# Patient Record
Sex: FEMALE | Race: ASIAN | NOT HISPANIC OR LATINO
[De-identification: names, ages, dates, MRNs, and addresses within clinical notes are randomized per-mention and may not be internally consistent; named-entity substitution may affect disease eponyms.]

---

## 2017-01-04 ENCOUNTER — MESSAGE (OUTPATIENT)
Age: 42
End: 2017-01-04

## 2017-04-23 ENCOUNTER — RESULT REVIEW (OUTPATIENT)
Age: 42
End: 2017-04-23

## 2017-04-24 ENCOUNTER — APPOINTMENT (OUTPATIENT)
Dept: OBGYN | Facility: CLINIC | Age: 42
End: 2017-04-24

## 2017-05-19 ENCOUNTER — APPOINTMENT (OUTPATIENT)
Dept: OBGYN | Facility: CLINIC | Age: 42
End: 2017-05-19

## 2017-06-27 ENCOUNTER — APPOINTMENT (OUTPATIENT)
Dept: OBGYN | Facility: CLINIC | Age: 42
End: 2017-06-27

## 2017-10-13 ENCOUNTER — APPOINTMENT (OUTPATIENT)
Dept: OBGYN | Facility: CLINIC | Age: 42
End: 2017-10-13

## 2019-09-16 ENCOUNTER — INPATIENT (INPATIENT)
Facility: HOSPITAL | Age: 44
LOS: 3 days | Discharge: HOME | End: 2019-09-20
Attending: HOSPITALIST | Admitting: HOSPITALIST
Payer: SELF-PAY

## 2019-09-16 VITALS
DIASTOLIC BLOOD PRESSURE: 122 MMHG | TEMPERATURE: 98 F | OXYGEN SATURATION: 98 % | HEART RATE: 129 BPM | SYSTOLIC BLOOD PRESSURE: 172 MMHG | RESPIRATION RATE: 18 BRPM

## 2019-09-16 DIAGNOSIS — R50.9 FEVER, UNSPECIFIED: ICD-10-CM

## 2019-09-16 DIAGNOSIS — D61.818 OTHER PANCYTOPENIA: ICD-10-CM

## 2019-09-16 DIAGNOSIS — E87.2 ACIDOSIS: ICD-10-CM

## 2019-09-16 DIAGNOSIS — F10.239 ALCOHOL DEPENDENCE WITH WITHDRAWAL, UNSPECIFIED: ICD-10-CM

## 2019-09-16 DIAGNOSIS — T18.2XXA FOREIGN BODY IN STOMACH, INITIAL ENCOUNTER: ICD-10-CM

## 2019-09-16 DIAGNOSIS — R74.0 NONSPECIFIC ELEVATION OF LEVELS OF TRANSAMINASE AND LACTIC ACID DEHYDROGENASE [LDH]: ICD-10-CM

## 2019-09-16 DIAGNOSIS — F10.10 ALCOHOL ABUSE, UNCOMPLICATED: ICD-10-CM

## 2019-09-16 DIAGNOSIS — Z29.9 ENCOUNTER FOR PROPHYLACTIC MEASURES, UNSPECIFIED: ICD-10-CM

## 2019-09-16 LAB
ALBUMIN SERPL ELPH-MCNC: 4.6 G/DL — SIGNIFICANT CHANGE UP (ref 3.3–5)
ALP SERPL-CCNC: 94 U/L — SIGNIFICANT CHANGE UP (ref 40–120)
ALT FLD-CCNC: 94 U/L — HIGH (ref 4–33)
ANION GAP SERPL CALC-SCNC: 23 MMO/L — HIGH (ref 7–14)
ANISOCYTOSIS BLD QL: SLIGHT — SIGNIFICANT CHANGE UP
APAP SERPL-MCNC: < 15 UG/ML — LOW (ref 15–25)
APTT BLD: 30.1 SEC — SIGNIFICANT CHANGE UP (ref 27.5–36.3)
AST SERPL-CCNC: 150 U/L — HIGH (ref 4–32)
BASE EXCESS BLDV CALC-SCNC: 1 MMOL/L — SIGNIFICANT CHANGE UP
BASE EXCESS BLDV CALC-SCNC: 2.3 MMOL/L — SIGNIFICANT CHANGE UP
BASOPHILS # BLD AUTO: 0.03 K/UL — SIGNIFICANT CHANGE UP (ref 0–0.2)
BASOPHILS NFR BLD AUTO: 1 % — SIGNIFICANT CHANGE UP (ref 0–2)
BASOPHILS NFR SPEC: 1.8 % — SIGNIFICANT CHANGE UP (ref 0–2)
BILIRUB SERPL-MCNC: 0.4 MG/DL — SIGNIFICANT CHANGE UP (ref 0.2–1.2)
BLASTS # FLD: 0 % — SIGNIFICANT CHANGE UP (ref 0–0)
BLOOD GAS VENOUS - CREATININE: 0.41 MG/DL — LOW (ref 0.5–1.3)
BLOOD GAS VENOUS - CREATININE: SIGNIFICANT CHANGE UP MG/DL (ref 0.5–1.3)
BLOOD GAS VENOUS - FIO2: 21 — SIGNIFICANT CHANGE UP
BLOOD GAS VENOUS - FIO2: 21 — SIGNIFICANT CHANGE UP
BUN SERPL-MCNC: 13 MG/DL — SIGNIFICANT CHANGE UP (ref 7–23)
CALCIUM SERPL-MCNC: 8.9 MG/DL — SIGNIFICANT CHANGE UP (ref 8.4–10.5)
CHLORIDE BLDV-SCNC: 100 MMOL/L — SIGNIFICANT CHANGE UP (ref 96–108)
CHLORIDE BLDV-SCNC: 100 MMOL/L — SIGNIFICANT CHANGE UP (ref 96–108)
CHLORIDE SERPL-SCNC: 93 MMOL/L — LOW (ref 98–107)
CO2 SERPL-SCNC: 23 MMOL/L — SIGNIFICANT CHANGE UP (ref 22–31)
CREAT SERPL-MCNC: 0.52 MG/DL — SIGNIFICANT CHANGE UP (ref 0.5–1.3)
EOSINOPHIL # BLD AUTO: 0.06 K/UL — SIGNIFICANT CHANGE UP (ref 0–0.5)
EOSINOPHIL NFR BLD AUTO: 1.9 % — SIGNIFICANT CHANGE UP (ref 0–6)
EOSINOPHIL NFR FLD: 1.8 % — SIGNIFICANT CHANGE UP (ref 0–6)
ETHANOL BLD-MCNC: 221 MG/DL — HIGH
GAS PNL BLDV: 137 MMOL/L — SIGNIFICANT CHANGE UP (ref 136–146)
GAS PNL BLDV: 139 MMOL/L — SIGNIFICANT CHANGE UP (ref 136–146)
GIANT PLATELETS BLD QL SMEAR: PRESENT — SIGNIFICANT CHANGE UP
GLUCOSE BLDV-MCNC: 102 MG/DL — HIGH (ref 70–99)
GLUCOSE BLDV-MCNC: 120 MG/DL — HIGH (ref 70–99)
GLUCOSE SERPL-MCNC: 102 MG/DL — HIGH (ref 70–99)
HCG SERPL-ACNC: < 5 MIU/ML — SIGNIFICANT CHANGE UP
HCO3 BLDV-SCNC: 25 MMOL/L — SIGNIFICANT CHANGE UP (ref 20–27)
HCO3 BLDV-SCNC: 25 MMOL/L — SIGNIFICANT CHANGE UP (ref 20–27)
HCT VFR BLD CALC: 34.2 % — LOW (ref 34.5–45)
HCT VFR BLDV CALC: 25.4 % — LOW (ref 34.5–45)
HCT VFR BLDV CALC: 30 % — LOW (ref 34.5–45)
HGB BLD-MCNC: 9.4 G/DL — LOW (ref 11.5–15.5)
HGB BLDV-MCNC: 8.2 G/DL — LOW (ref 11.5–15.5)
HGB BLDV-MCNC: 9.7 G/DL — LOW (ref 11.5–15.5)
HYPOCHROMIA BLD QL: SIGNIFICANT CHANGE UP
IMM GRANULOCYTES NFR BLD AUTO: 0.6 % — SIGNIFICANT CHANGE UP (ref 0–1.5)
INR BLD: 1.03 — SIGNIFICANT CHANGE UP (ref 0.88–1.17)
LACTATE BLDV-MCNC: 5.6 MMOL/L — CRITICAL HIGH (ref 0.5–2)
LACTATE BLDV-MCNC: 6.3 MMOL/L — CRITICAL HIGH (ref 0.5–2)
LIDOCAIN IGE QN: 147.1 U/L — HIGH (ref 7–60)
LYMPHOCYTES # BLD AUTO: 0.47 K/UL — LOW (ref 1–3.3)
LYMPHOCYTES # BLD AUTO: 15 % — SIGNIFICANT CHANGE UP (ref 13–44)
LYMPHOCYTES NFR SPEC AUTO: 13.4 % — SIGNIFICANT CHANGE UP (ref 13–44)
MACROCYTES BLD QL: SLIGHT — SIGNIFICANT CHANGE UP
MAGNESIUM SERPL-MCNC: 1.5 MG/DL — LOW (ref 1.6–2.6)
MCHC RBC-ENTMCNC: 20.8 PG — LOW (ref 27–34)
MCHC RBC-ENTMCNC: 27.5 % — LOW (ref 32–36)
MCV RBC AUTO: 75.8 FL — LOW (ref 80–100)
METAMYELOCYTES # FLD: 1.8 % — HIGH (ref 0–1)
MICROCYTES BLD QL: SLIGHT — SIGNIFICANT CHANGE UP
MONOCYTES # BLD AUTO: 0.17 K/UL — SIGNIFICANT CHANGE UP (ref 0–0.9)
MONOCYTES NFR BLD AUTO: 5.4 % — SIGNIFICANT CHANGE UP (ref 2–14)
MONOCYTES NFR BLD: 3.5 % — SIGNIFICANT CHANGE UP (ref 2–9)
MYELOCYTES NFR BLD: 0 % — SIGNIFICANT CHANGE UP (ref 0–0)
NEUTROPHIL AB SER-ACNC: 77.7 % — HIGH (ref 43–77)
NEUTROPHILS # BLD AUTO: 2.39 K/UL — SIGNIFICANT CHANGE UP (ref 1.8–7.4)
NEUTROPHILS NFR BLD AUTO: 76.1 % — SIGNIFICANT CHANGE UP (ref 43–77)
NEUTS BAND # BLD: 0 % — SIGNIFICANT CHANGE UP (ref 0–6)
NRBC # FLD: 0 K/UL — SIGNIFICANT CHANGE UP (ref 0–0)
OTHER - HEMATOLOGY %: 0 — SIGNIFICANT CHANGE UP
PCO2 BLDV: 42 MMHG — SIGNIFICANT CHANGE UP (ref 41–51)
PCO2 BLDV: 44 MMHG — SIGNIFICANT CHANGE UP (ref 41–51)
PH BLDV: 7.4 PH — SIGNIFICANT CHANGE UP (ref 7.32–7.43)
PH BLDV: 7.4 PH — SIGNIFICANT CHANGE UP (ref 7.32–7.43)
PHOSPHATE SERPL-MCNC: 3.4 MG/DL — SIGNIFICANT CHANGE UP (ref 2.5–4.5)
PLATELET # BLD AUTO: 83 K/UL — LOW (ref 150–400)
PLATELET COUNT - ESTIMATE: SIGNIFICANT CHANGE UP
PMV BLD: SIGNIFICANT CHANGE UP FL (ref 7–13)
PO2 BLDV: 25 MMHG — LOW (ref 35–40)
PO2 BLDV: 40 MMHG — SIGNIFICANT CHANGE UP (ref 35–40)
POLYCHROMASIA BLD QL SMEAR: SLIGHT — SIGNIFICANT CHANGE UP
POTASSIUM BLDV-SCNC: 3.7 MMOL/L — SIGNIFICANT CHANGE UP (ref 3.4–4.5)
POTASSIUM BLDV-SCNC: 3.8 MMOL/L — SIGNIFICANT CHANGE UP (ref 3.4–4.5)
POTASSIUM SERPL-MCNC: 3.9 MMOL/L — SIGNIFICANT CHANGE UP (ref 3.5–5.3)
POTASSIUM SERPL-SCNC: 3.9 MMOL/L — SIGNIFICANT CHANGE UP (ref 3.5–5.3)
PROMYELOCYTES # FLD: 0 % — SIGNIFICANT CHANGE UP (ref 0–0)
PROT SERPL-MCNC: 8.3 G/DL — SIGNIFICANT CHANGE UP (ref 6–8.3)
PROTHROM AB SERPL-ACNC: 11.7 SEC — SIGNIFICANT CHANGE UP (ref 9.8–13.1)
RBC # BLD: 4.51 M/UL — SIGNIFICANT CHANGE UP (ref 3.8–5.2)
RBC # FLD: 20.8 % — HIGH (ref 10.3–14.5)
REVIEW TO FOLLOW: YES — SIGNIFICANT CHANGE UP
SALICYLATES SERPL-MCNC: < 5 MG/DL — LOW (ref 15–30)
SAO2 % BLDV: 31.9 % — LOW (ref 60–85)
SAO2 % BLDV: 62.8 % — SIGNIFICANT CHANGE UP (ref 60–85)
SODIUM SERPL-SCNC: 139 MMOL/L — SIGNIFICANT CHANGE UP (ref 135–145)
TSH SERPL-MCNC: 1.59 UIU/ML — SIGNIFICANT CHANGE UP (ref 0.27–4.2)
VARIANT LYMPHS # BLD: 0 % — SIGNIFICANT CHANGE UP
WBC # BLD: 3.14 K/UL — LOW (ref 3.8–10.5)
WBC # FLD AUTO: 3.14 K/UL — LOW (ref 3.8–10.5)

## 2019-09-16 PROCEDURE — 70450 CT HEAD/BRAIN W/O DYE: CPT | Mod: 26

## 2019-09-16 PROCEDURE — 99223 1ST HOSP IP/OBS HIGH 75: CPT | Mod: GC

## 2019-09-16 PROCEDURE — 72125 CT NECK SPINE W/O DYE: CPT | Mod: 26

## 2019-09-16 PROCEDURE — 70486 CT MAXILLOFACIAL W/O DYE: CPT | Mod: 26

## 2019-09-16 PROCEDURE — 99222 1ST HOSP IP/OBS MODERATE 55: CPT | Mod: GC

## 2019-09-16 PROCEDURE — 74177 CT ABD & PELVIS W/CONTRAST: CPT | Mod: 26

## 2019-09-16 PROCEDURE — 71045 X-RAY EXAM CHEST 1 VIEW: CPT | Mod: 26

## 2019-09-16 RX ORDER — MAGNESIUM SULFATE 500 MG/ML
2 VIAL (ML) INJECTION ONCE
Refills: 0 | Status: COMPLETED | OUTPATIENT
Start: 2019-09-16 | End: 2019-09-16

## 2019-09-16 RX ORDER — ONDANSETRON 8 MG/1
4 TABLET, FILM COATED ORAL ONCE
Refills: 0 | Status: COMPLETED | OUTPATIENT
Start: 2019-09-16 | End: 2019-09-16

## 2019-09-16 RX ORDER — SODIUM CHLORIDE 9 MG/ML
1000 INJECTION INTRAMUSCULAR; INTRAVENOUS; SUBCUTANEOUS ONCE
Refills: 0 | Status: COMPLETED | OUTPATIENT
Start: 2019-09-16 | End: 2019-09-16

## 2019-09-16 RX ORDER — ACETAMINOPHEN 500 MG
650 TABLET ORAL ONCE
Refills: 0 | Status: COMPLETED | OUTPATIENT
Start: 2019-09-16 | End: 2019-09-16

## 2019-09-16 RX ORDER — SODIUM CHLORIDE 9 MG/ML
1000 INJECTION, SOLUTION INTRAVENOUS
Refills: 0 | Status: DISCONTINUED | OUTPATIENT
Start: 2019-09-16 | End: 2019-09-16

## 2019-09-16 RX ORDER — SODIUM CHLORIDE 9 MG/ML
1000 INJECTION, SOLUTION INTRAVENOUS
Refills: 0 | Status: COMPLETED | OUTPATIENT
Start: 2019-09-16 | End: 2019-09-16

## 2019-09-16 RX ORDER — THIAMINE MONONITRATE (VIT B1) 100 MG
500 TABLET ORAL DAILY
Refills: 0 | Status: COMPLETED | OUTPATIENT
Start: 2019-09-16 | End: 2019-09-19

## 2019-09-16 RX ORDER — INFLUENZA VIRUS VACCINE 15; 15; 15; 15 UG/.5ML; UG/.5ML; UG/.5ML; UG/.5ML
0.5 SUSPENSION INTRAMUSCULAR ONCE
Refills: 0 | Status: DISCONTINUED | OUTPATIENT
Start: 2019-09-16 | End: 2019-09-20

## 2019-09-16 RX ORDER — FOLIC ACID 0.8 MG
1 TABLET ORAL DAILY
Refills: 0 | Status: DISCONTINUED | OUTPATIENT
Start: 2019-09-16 | End: 2019-09-20

## 2019-09-16 RX ORDER — SODIUM CHLORIDE 9 MG/ML
1000 INJECTION INTRAMUSCULAR; INTRAVENOUS; SUBCUTANEOUS
Refills: 0 | Status: DISCONTINUED | OUTPATIENT
Start: 2019-09-16 | End: 2019-09-17

## 2019-09-16 RX ORDER — THIAMINE MONONITRATE (VIT B1) 100 MG
100 TABLET ORAL DAILY
Refills: 0 | Status: DISCONTINUED | OUTPATIENT
Start: 2019-09-16 | End: 2019-09-16

## 2019-09-16 RX ADMIN — SODIUM CHLORIDE 150 MILLILITER(S): 9 INJECTION, SOLUTION INTRAVENOUS at 20:00

## 2019-09-16 RX ADMIN — ONDANSETRON 4 MILLIGRAM(S): 8 TABLET, FILM COATED ORAL at 20:36

## 2019-09-16 RX ADMIN — ONDANSETRON 4 MILLIGRAM(S): 8 TABLET, FILM COATED ORAL at 14:31

## 2019-09-16 RX ADMIN — Medication 4 MILLIGRAM(S): at 20:37

## 2019-09-16 RX ADMIN — Medication 2 MILLIGRAM(S): at 14:30

## 2019-09-16 RX ADMIN — Medication 2 MILLIGRAM(S): at 16:27

## 2019-09-16 RX ADMIN — Medication 2 MILLIGRAM(S): at 18:06

## 2019-09-16 RX ADMIN — SODIUM CHLORIDE 150 MILLILITER(S): 9 INJECTION, SOLUTION INTRAVENOUS at 16:15

## 2019-09-16 RX ADMIN — Medication 650 MILLIGRAM(S): at 14:29

## 2019-09-16 RX ADMIN — Medication 50 GRAM(S): at 20:37

## 2019-09-16 RX ADMIN — SODIUM CHLORIDE 1000 MILLILITER(S): 9 INJECTION INTRAMUSCULAR; INTRAVENOUS; SUBCUTANEOUS at 14:31

## 2019-09-16 NOTE — H&P ADULT - NSHPREVIEWOFSYSTEMS_GEN_ALL_CORE
REVIEW OF SYSTEMS:  CONSTITUTIONAL: No fever, chills, night sweats, or fatigue  EYES: No eye pain, visual disturbances, or discharge  ENT:  No difficulty hearing, tinnitus, vertigo; No sinus or throat pain  NECK: No pain or stiffness  RESPIRATORY: No cough, wheezing, or hemoptysis; No shortness of breath  CARDIOVASCULAR: No chest pain, palpitations, dizziness, or leg swelling  GASTROINTESTINAL: SEE HPI  GENITOURINARY: No dysuria, frequency or urgency.  NEUROLOGICAL: SEE HPI  SKIN: No itching, burning, rashes, or lesions   LYMPH NODES: No enlarged glands  ENDOCRINE: No hot or cold intolerance; No hair loss  MUSCULOSKELETAL: No joint pain or swelling; No muscle, back, or extremity pain  PSYCHIATRIC: SEE HPI  HEME/LYMPH: No easy bruising, or bleeding gums  ALLERGY AND IMMUNOLOGIC: No hives or eczema

## 2019-09-16 NOTE — H&P ADULT - PROBLEM SELECTOR PLAN 1
Patient admitted with . CIWA 12. s/p ativan 2 mg x2 in ED.  - will treat with librium taper  - monitor CIWA  - urine toxicology, urinalysis and lipase ordered  - continue with IV thiamine, folic acid and multivitamin  - replete electrolytes  - monitor vital signs Patient admitted with . CIWA 12. s/p ativan 2 mg x2 in ED.  - will treat with ativan taper  - monitor CIWA  - urine toxicology, urinalysis and lipase ordered  - continue with IV thiamine, folic acid and multivitamin  - replete electrolytes  - monitor vital signs

## 2019-09-16 NOTE — ED PROVIDER NOTE - OBJECTIVE STATEMENT
44F w/ pmh alcoholism, depression, prior pancreatitis, multiple hospitalizations for etoh withdrawal (last 2011?) -- brought by family for concern of withdrawal. Patient reports drank 0.5L vodka yesterday, and fell down from bed yesterday or day before, unclear about history. Patient reports generalized weakness / pain. Reports mild blurry vision, has contacts in R eye but not in left (fell out)    Family at bedside report concern that patient is unreliable historian, drinks far more than reported, and fell down 10 stairs yesterday.

## 2019-09-16 NOTE — H&P ADULT - ASSESSMENT
44 year old female with history of alcoholism, depression, and prior pancreatitis presents with alcohol withdrawal, admitted for further management.

## 2019-09-16 NOTE — ED PROVIDER NOTE - CLINICAL SUMMARY MEDICAL DECISION MAKING FREE TEXT BOX
44F w/ concern for trauma, also etoh withdrawal - will check trauma workup, give benzos/vitamins, IVF, reassess

## 2019-09-16 NOTE — ED ADULT NURSE NOTE - NSIMPLEMENTINTERV_GEN_ALL_ED
Implemented All Fall with Harm Risk Interventions:  [place] to call system. Call bell, personal items and telephone within reach. Instruct patient to call for assistance. Room bathroom lighting operational. Non-slip footwear when patient is off stretcher. Physically safe environment: no spills, clutter or unnecessary equipment. Stretcher in lowest position, wheels locked, appropriate side rails in place. Provide visual cue, wrist band, yellow gown, etc. Monitor gait and stability. Monitor for mental status changes and reorient to person, place, and time. Review medications for side effects contributing to fall risk. Reinforce activity limits and safety measures with patient and family. Provide visual clues: red socks.

## 2019-09-16 NOTE — ED ADULT NURSE NOTE - OBJECTIVE STATEMENT
pt is a 44 yr old female bib family from home s/p fall last night. pt states she is experiencing withdrawal from alcohol. pt states she drinking 0.5 L of alcohol/ vodka a day, last drink last night. Accoding to family , pt fell and hit head, remarkable swelling over the right eye noted, tenderness noted on right hip of md gallego. pt aaox4 at this times, states she is halluciating, but not hearing things. #20 g placed in right at, labs sent, elevated temp noted, septic panel sent at this time, pt unabe to void. see flow sheet for vs and ciwa. will ctm

## 2019-09-16 NOTE — H&P ADULT - PROBLEM SELECTOR PLAN 3
Lactic acid elevated at 6.3 on admission. Downtrended to 5.6 on repeat. pH 7.4.  - likely 2/2 reduced po intake vs alcoholic ketoacidosis  - continue with IV fluids  - repeat lactic acid in the AM Patient febrile to 100.6F in the ED.  - etiology unclear  - BCX and urinalysis ordered

## 2019-09-16 NOTE — H&P ADULT - NSHPLABSRESULTS_GEN_ALL_CORE
LABS:                          9.4    3.14  )-----------( 83       ( 16 Sep 2019 13:50 )             34.2     Hb Trend: 9.4<--  WBC Trend: 3.14<--  Plt Trend: 83<--          09-16    139  |  93<L>  |  13  ----------------------------<  102<H>  3.9   |  23  |  0.52    Ca    8.9      16 Sep 2019 13:50  Phos  3.4     09-16  Mg     1.5     09-16    TPro  8.3  /  Alb  4.6  /  TBili  0.4  /  DBili  x   /  AST  150<H>  /  ALT  94<H>  /  AlkPhos  94  09-16      PT/INR - ( 16 Sep 2019 13:50 )   PT: 11.7 SEC;   INR: 1.03          PTT - ( 16 Sep 2019 13:50 )  PTT:30.1 SEC    CAPILLARY BLOOD GLUCOSE              IMAGING:  < from: CT Abdomen and Pelvis w/ IV Cont (09.16.19 @ 15:51) >    EXAM:  CT ABDOMEN AND PELVIS IC      PROCEDURE DATE:  Sep 16 2019     INTERPRETATION:  CLINICAL INFORMATION: Status post fall down 10 stairs   with general abdominal pain.    < from: CT Abdomen and Pelvis w/ IV Cont (09.16.19 @ 15:51) >    IMPRESSION:     No acute traumatic injury to the abdomen and pelvis.    Radiopaque foreign bodies in the stomach.    Findings were discussed with Dr. TERESA PACE 9/16/2019 3:58 PM by Dr. Bhat with read back confirmation.    < end of copied text >      < from: CT Cervical Spine No Cont (09.16.19 @ 15:40) >    EXAM:  CT CERVICAL SPINE      EXAM:  CT MAXILLOFACIAL      EXAM:  CT BRAIN      PROCEDURE DATE:  Sep 16 2019     < end of copied text >    < from: CT Cervical Spine No Cont (09.16.19 @ 15:40) >    Impression: No evidence acute hemorrhage, mass effect in posterior   fossa or supratentorial    No maxillofacial facial fractures or dislocations are seen.    There are no acute fractures or dislocations identified.    Evaluation of the paraspinal soft tissues appears unremarkable    The visualized portion of both lung apices appear clear.    Impression: Degenerative changes involving the cervical spine without   evidence of a fracture or dislocation seen.    < end of copied text >    Chest Xray: No acute Pathology    EKG: NSR,  bpm, QTc 456 ms

## 2019-09-16 NOTE — H&P ADULT - PROBLEM SELECTOR PLAN 6
Admitted with Hgb 9.4, WBC 3.14 and Platelets 83.  - likely bone marrow suppression 2/2 alcohol abuse  - trend CBC daily  - transfuse of Hgb <7  - transfuse for platelets <10, <15 if febrile, <50 if actively bleeding Admitted with  and ALT 94.  - likely 2/2 to alcohol use  - trend liver enzymes daily

## 2019-09-16 NOTE — ED PROVIDER NOTE - PHYSICAL EXAMINATION
*GEN:   uncomfortable, full body tremors, AOx3  *EYES:   pupils equally round and reactive to light, extra-occular movements intact  *HEENT:   R periocular bruising, tongue fasciculations, airway patent, moist mucosal membranes, no chipped teeth, full ROM neck w/out midline tenderness to palpation, no galicia sign, no septal hematoma or deviation, no maxillary/mandibular mobility  *CV:   tachycardiac, regular rhythm  *RESP:   clear to auscultation bilaterally, non-labored  *ABD:   soft, tender throughout including   *:   no cva/flank tenderness  *MSK:   no pelvic mobility, no MSK tenderness or limited ROM across bilateral upper and lower extremities  *SKIN:  no other bruising besides over face  *NEURO:   full body tremors, AOx3, cranial nerves intact throughout, strength 5/5, no focal loss of sensation, no pronator drift, finger/nose normal

## 2019-09-16 NOTE — H&P ADULT - PROBLEM SELECTOR PLAN 4
Patient has been drinking 0.5 liters of vodka daily.  - Social work consult  - SBIRT Lactic acid elevated at 6.3 on admission. Downtrended to 5.6 on repeat. pH 7.4.  - likely 2/2 reduced po intake vs alcoholic ketoacidosis  - continue with IV fluids  - repeat lactic acid in the AM

## 2019-09-16 NOTE — CONSULT NOTE ADULT - ASSESSMENT
44 year old female with medical history of alcohol abuse, depression, pancreatitis in past, multiple hospitalizations for etoh withdrawal and alcohol intoxication in past, brought by family for concern of withdrawal and after she fell down.  CT scan of abdomen and pelvis was done as part of trauma screening where she was found to have 2 radiopaque foreign bodies in the stomach measuring 3.2 x 2.5 cm and 0.9 x 0.7 cm.  GI consult was called for possible EGD and to retrieve these foreign bodies.  Patient had chicken noddles and soup earlier like 4 pm.  Imaging findings, anesthesia risks with general sedation, risks and benefits of performing EGD urgently now to remove foreign bodies vs holding off EGD and leaving these foreign bodies in the stomach with the possibility of them moving down to small bowel and causing small bowel perforation and inability to remove them discussed in details with the patient, her fiance and her sister at the bedside.  Patient, her fiance and sister all decided that they want to hold off EGD for now until she is more stable from her alcohol intoxication and withdrawal.    Impression:  # Foreign bodies in stomach DDx looks like sharp objects - unclear of the nature of these FBs since patient denied swallowing any.  # Alcohol abuse / withdrawal    Recommendations:  - Keep NPO for now  - Daily abdominal x ray  - Will follow   - Alcohol withdrawal management and CIWA protocol as per primary team 44 year old female with medical history of alcohol abuse, depression, pancreatitis in past, multiple hospitalizations for etoh withdrawal and alcohol intoxication in past, brought by family for concern of withdrawal and after she fell down.  CT scan of abdomen and pelvis was done as part of trauma screening where she was found to have 2 radiopaque foreign bodies in the stomach measuring 3.2 x 2.5 cm and 0.9 x 0.7 cm.  GI consult was called for possible EGD and to retrieve these foreign bodies.  Patient had chicken noddles and soup earlier like 4 pm.  Imaging findings, anesthesia risks with general sedation, risks and benefits of performing EGD urgently now to remove foreign bodies vs holding off EGD and leaving these foreign bodies in the stomach with the possibility of them moving down to small bowel and causing small bowel perforation and inability to remove them discussed in details with the patient, her fiance and her sister at the bedside.  Patient, her fiance and sister all decided that they want to hold off EGD for now until she is more stable from her alcohol intoxication and withdrawal.    Impression:  # Foreign bodies in stomach on CT A/P. DDx looks like sharp objects - unclear of the nature of these FBs since patient denied swallowing any.  # Alcohol abuse / withdrawal  # Steatosis    Recommendations:  - Keep NPO for now  - Daily abdominal x ray  - Will follow   - Alcohol withdrawal management and CIWA protocol as per primary team 44 year old female with medical history of alcohol abuse, depression, pancreatitis in past, multiple hospitalizations for etoh withdrawal and alcohol intoxication in past, brought by family for concern of withdrawal and after she fell down.  CT scan of abdomen and pelvis was done as part of trauma screening where she was found to have 2 radiopaque foreign bodies in the stomach measuring 3.2 x 2.5 cm and 0.9 x 0.7 cm.  GI consult was called for possible EGD and to retrieve these foreign bodies.  Patient had chicken noddles and soup earlier like 4 pm.  Imaging findings, anesthesia risks with general sedation, risks and benefits of performing EGD urgently now to remove foreign bodies vs holding off EGD and leaving these foreign bodies in the stomach with the possibility of them moving down to small bowel and causing small bowel perforation and inability to remove them discussed in details with the patient, her fiance and her sister at the bedside.  Patient, her fiance and sister all decided that they want to hold off EGD for now until she is more stable from her alcohol intoxication and withdrawal.    Impression:  # Foreign bodies in stomach on CT A/P. DDx looks like sharp objects - unclear of the nature of these FBs since patient denied swallowing any.  # Alcohol abuse / withdrawal  # Steatosis    Recommendations:  - Patient was offered EGD for possible removal of foreign bodies. Risks of procedure (aspiration, bleeding, perforation, anesthesia complications) and risks of waiting (migration of object into small bowel and perforation of small bowel) were discussed.  At this time, the patient, fiance, and sister would prefer to hold off on endoscopic evaluation given active withdrawal and aspiration risk.  - Keep NPO for now  - Daily abdominal X ray  - Will follow   - Alcohol withdrawal management and CIWA protocol as per primary team

## 2019-09-16 NOTE — H&P ADULT - PROBLEM SELECTOR PLAN 5
Admitted with  and ALT 94.  - likely 2/2 to alcohol use  - trend liver enzymes daily Patient has been drinking 0.5 liters of vodka daily.  - Social work consult  - SBIRT

## 2019-09-16 NOTE — H&P ADULT - NSHPPHYSICALEXAM_GEN_ALL_CORE
Vital Signs Last 24 Hrs  T(C): 36.9 (16 Sep 2019 16:59), Max: 38.1 (16 Sep 2019 13:52)  T(F): 98.4 (16 Sep 2019 16:59), Max: 100.6 (16 Sep 2019 13:52)  HR: 106 (16 Sep 2019 16:59) (106 - 129)  BP: 159/112 (16 Sep 2019 16:59) (159/112 - 172/122)  BP(mean): --  RR: 18 (16 Sep 2019 16:59) (18 - 18)  SpO2: 100% (16 Sep 2019 16:59) (98% - 100%)    General: Uncomfortable, in acute distress, anxious  Head: Right eye contusion  Eyes: PERRLA, EOMI, normal sclera  Throat: Moist mucous membranes, no tongue fasciculation  Respiratory: CTAB anteriorly, normal respiratory effort, no wheezes, crackles, rales  CV: Tachycardic, regular rhythm, S1/S2, no murmurs, rubs or gallops  Abdominal: Soft, bowel sounds present, right sided tenderness to palpation diffusely, ND  Extremities: No edema, 2+ DP pulses, bilateral hand tremors  Neurological: A&Ox4, MAEx4, non-focal  Skin: Bruise on right forehead

## 2019-09-16 NOTE — H&P ADULT - PROBLEM SELECTOR PLAN 2
CT abdomen and pelvis with two radiopaque foreign bodies 3.2 x 2.5 cm and 0.9 x 0.7 cm.   - patient does not remember possible episodes of ingestion  - GI consult  - keep NPO  - continue with IV fluids CT abdomen and pelvis with two radiopaque foreign bodies 3.2 x 2.5 cm and 0.9 x 0.7 cm.   - patient does not remember possible episodes of ingestion  - GI following; daily abdominal xrays for now  - keep NPO  - continue with IV fluids

## 2019-09-16 NOTE — ED PROVIDER NOTE - ATTENDING CONTRIBUTION TO CARE
Dr. Amaro:  I have personally performed a face to face bedside history and physical examination of this patient. I have discussed the history, examination, review of systems, assessment and plan of management with the resident. I have reviewed the electronic medical record and amended it to reflect my history, review of systems, physical exam, assessment and plan.    44f h/o ETOH abuse, depression, presents with possible ETOH withdrawal.  Last drink as yesterday.  Had  a fall down stairs, unclear if yesterday or day prior, with head trauma.  Noted to have Tm 100.6 orally in ED.  Pt feels anxious and shaky, denies other symptoms.    Exam:  - +mild tremors and tongue fasciculations  - tachy, regular  - ctab  - +contusion to right eye  - abd soft ntnd    A/P  # fall with head trauma, eval [facility]  - CT head and CT max/face  # ETOH withdrawal  - tox labs, Ativan, reassess, CIWA  # fever, eval infectious source  - cbc, cmp, ua, urine culture, ucg, cxr

## 2019-09-16 NOTE — H&P ADULT - HISTORY OF PRESENT ILLNESS
44 year old female with history of alcoholism, depression, and prior pancreatitis presents with alcohol withdrawal. The patient drinks 1/2 liter of vodka daily. Yesterday she was drinking vodka as she normally does and fell off the bed and hit her head on the table. She got up by herself, but cannot remember the following events which led to her falling down a flight of stairs. The patient reports that she was sober for nine years and started drinking again three months ago after losing her job. She was fired after getting into an argument with someone at work. She reports that she has blacked out on prior episodes after drinking. She has also had seizures in the past after drinking. She endorses anxiety, headache, nausea, abdominal pain, and tremors. She denies auditory or visual hallucinations, suicidal ideation, vision changes, vomiting, chest pain, or shortness of breath.    In the ED, vital signs were Tmax 100.6F, -129, -172/118-122 mm Hg, RR 18, 100% RA.  She received acetaminophen 650 mg x1, ativan 2 mg x2, zofran 4 mg x1, 1L NS bolus x1, IV thiamine, multivitamin and folic acid.

## 2019-09-16 NOTE — ED ADULT TRIAGE NOTE - CHIEF COMPLAINT QUOTE
p/t with hx ETOH c/o of feeling very anxious and shaky last alcohol intake last night, p/t had a seizure last night fell down 10 steps, bruising to rt head and rt eye noted,

## 2019-09-16 NOTE — CONSULT NOTE ADULT - SUBJECTIVE AND OBJECTIVE BOX
Chief Complaint:  Fall    HPI:  44 year old female with medical history of alcohol abuse, depression, pancreatitis in past, multiple hospitalizations for etoh withdrawal and alcohol intoxication in past, brought by family for concern of withdrawal. Patient reports drank 0.5L vodka yesterday and fell down from bed   yesterday or day before, unclear about history. Family at bedside report concern that patient is unreliable historian, drinks far more than reported, and fell down 10 stairs yesterday.  Patient complains of right side abdominal pain, nausea but no vomiting.  She denies swallowing foreign objects like ear rings or any other metallic objects.  No other GI symptoms.    Allergies:  No Known Allergies      Home Medications:    Hospital Medications:  LORazepam   Injectable 2 milliGRAM(s) IV Push once  multivitamin/thiamine/folic acid in sodium chloride 0.9% 1000 milliLiter(s) IV Continuous <Continuous>  ondansetron Injectable 4 milliGRAM(s) IV Push once      PMHX/PSHX:  Depression  ETOH Abuse  S/P Tonsillectomy      Family history:  No pertinent family history in first degree relatives      Social History: no smoking    ROS: Patient is intoxicated and unreliable       PHYSICAL EXAM:   GENERAL:  NAD, Appears stated age  HEENT:  NC/AT,  conjunctivae clear and pink, sclera -anicteric  CHEST:  CTA B/L, Normal effort  HEART:  RRR S1/S2, No murmurs  ABDOMEN:  Soft, RUQ tenderness, non-distended, normoactive bowel sounds,  no masses   EXTREMITIES:  No cyanosis or Edema  SKIN:  Warm & Dry. No rash or erythema  NEURO:  Alert, oriented    Vital Signs:  Vital Signs Last 24 Hrs  T(C): 36.9 (16 Sep 2019 16:59), Max: 38.1 (16 Sep 2019 13:52)  T(F): 98.4 (16 Sep 2019 16:59), Max: 100.6 (16 Sep 2019 13:52)  HR: 106 (16 Sep 2019 16:59) (106 - 129)  BP: 159/112 (16 Sep 2019 16:59) (159/112 - 172/122)  BP(mean): --  RR: 18 (16 Sep 2019 16:59) (18 - 18)  SpO2: 100% (16 Sep 2019 16:59) (98% - 100%)  Daily     Daily     LABS:                        9.4    3.14  )-----------( 83       ( 16 Sep 2019 13:50 )             34.2     Mean Cell Volume: 75.8 fL (09-16-19 @ 13:50)    09-16    139  |  93<L>  |  13  ----------------------------<  102<H>  3.9   |  23  |  0.52    Ca    8.9      16 Sep 2019 13:50  Phos  3.4     09-16  Mg     1.5     09-16    TPro  8.3  /  Alb  4.6  /  TBili  0.4  /  DBili  x   /  AST  150<H>  /  ALT  94<H>  /  AlkPhos  94  09-16    LIVER FUNCTIONS - ( 16 Sep 2019 13:50 )  Alb: 4.6 g/dL / Pro: 8.3 g/dL / ALK PHOS: 94 u/L / ALT: 94 u/L / AST: 150 u/L / GGT: x           PT/INR - ( 16 Sep 2019 13:50 )   PT: 11.7 SEC;   INR: 1.03          PTT - ( 16 Sep 2019 13:50 )  PTT:30.1 SEC    Amylase Serum--      Lipase [id number]       Ammonia--                          9.4    3.14  )-----------( 83       ( 16 Sep 2019 13:50 )             34.2     Imaging: Chief Complaint:  Fall    HPI:  44 year old female with medical history of alcohol abuse, depression, pancreatitis in past, multiple hospitalizations for etoh withdrawal and alcohol intoxication in past, brought by family for concern of withdrawal. Patient reports drank 0.5L vodka yesterday and fell down from bed   yesterday or day before, unclear about history. Family at bedside report concern that patient is unreliable historian, drinks far more than reported, and fell down 10 stairs yesterday.  Patient complains of right side abdominal pain, nausea but no vomiting.  She denies swallowing foreign objects like earrings or any other metallic objects.  No other GI symptoms.  CT A/P revealed 2 metallic objects in the stomach.    Allergies:  No Known Allergies    Hospital Medications:  LORazepam   Injectable 2 milliGRAM(s) IV Push once  multivitamin/thiamine/folic acid in sodium chloride 0.9% 1000 milliLiter(s) IV Continuous <Continuous>  ondansetron Injectable 4 milliGRAM(s) IV Push once      PMHX/PSHX:  Depression  ETOH Abuse  S/P Tonsillectomy      Family history:  No pertinent family history in first degree relatives      Social History: + alcohol abuse, no smoking or illicit drugs    ROS:   Patient is intoxicated and unreliable.  Limited ROS significant for anxiety and abdominal pain.      PHYSICAL EXAM:   GENERAL:  NAD, Appears stated age  HEENT:  NC/AT,  conjunctivae clear and pink, sclera -anicteric  NECK: supple  CHEST:  CTA B/L, Normal effort  HEART:  RRR S1/S2, No murmurs  ABDOMEN:  Soft, RUQ tenderness, non-distended, normoactive bowel sounds,  no masses   EXTREMITIES:  No cyanosis or Edema  SKIN:  Warm & Dry. No rash or erythema  NEURO:  Alert, oriented, tremulous  PSYCH: Anxious    Vital Signs:  Vital Signs Last 24 Hrs  T(C): 36.9 (16 Sep 2019 16:59), Max: 38.1 (16 Sep 2019 13:52)  T(F): 98.4 (16 Sep 2019 16:59), Max: 100.6 (16 Sep 2019 13:52)  HR: 106 (16 Sep 2019 16:59) (106 - 129)  BP: 159/112 (16 Sep 2019 16:59) (159/112 - 172/122)  BP(mean): --  RR: 18 (16 Sep 2019 16:59) (18 - 18)  SpO2: 100% (16 Sep 2019 16:59) (98% - 100%)    LABS:                        9.4    3.14  )-----------( 83       ( 16 Sep 2019 13:50 )             34.2     Mean Cell Volume: 75.8 fL (09-16-19 @ 13:50)    09-16    139  |  93<L>  |  13  ----------------------------<  102<H>  3.9   |  23  |  0.52    Ca    8.9      16 Sep 2019 13:50  Phos  3.4     09-16  Mg     1.5     09-16    TPro  8.3  /  Alb  4.6  /  TBili  0.4  /  DBili  x   /  AST  150<H>  /  ALT  94<H>  /  AlkPhos  94  09-16    LIVER FUNCTIONS - ( 16 Sep 2019 13:50 )  Alb: 4.6 g/dL / Pro: 8.3 g/dL / ALK PHOS: 94 u/L / ALT: 94 u/L / AST: 150 u/L / GGT: x           PT/INR - ( 16 Sep 2019 13:50 )   PT: 11.7 SEC;   INR: 1.03          PTT - ( 16 Sep 2019 13:50 )  PTT:30.1 SEC    Amylase Serum--      Lipase [id number]       Ammonia--                          9.4    3.14  )-----------( 83       ( 16 Sep 2019 13:50 )             34.2     Imaging:

## 2019-09-17 ENCOUNTER — TRANSCRIPTION ENCOUNTER (OUTPATIENT)
Age: 44
End: 2019-09-17

## 2019-09-17 LAB
ALBUMIN SERPL ELPH-MCNC: 3.6 G/DL — SIGNIFICANT CHANGE UP (ref 3.3–5)
ALP SERPL-CCNC: 70 U/L — SIGNIFICANT CHANGE UP (ref 40–120)
ALT FLD-CCNC: 61 U/L — HIGH (ref 4–33)
ANION GAP SERPL CALC-SCNC: 16 MMO/L — HIGH (ref 7–14)
AST SERPL-CCNC: 80 U/L — HIGH (ref 4–32)
BASOPHILS # BLD AUTO: 0.04 K/UL — SIGNIFICANT CHANGE UP (ref 0–0.2)
BASOPHILS NFR BLD AUTO: 1 % — SIGNIFICANT CHANGE UP (ref 0–2)
BILIRUB SERPL-MCNC: 0.6 MG/DL — SIGNIFICANT CHANGE UP (ref 0.2–1.2)
BUN SERPL-MCNC: 10 MG/DL — SIGNIFICANT CHANGE UP (ref 7–23)
CALCIUM SERPL-MCNC: 7.9 MG/DL — LOW (ref 8.4–10.5)
CHLORIDE SERPL-SCNC: 96 MMOL/L — LOW (ref 98–107)
CO2 SERPL-SCNC: 24 MMOL/L — SIGNIFICANT CHANGE UP (ref 22–31)
CREAT SERPL-MCNC: 0.53 MG/DL — SIGNIFICANT CHANGE UP (ref 0.5–1.3)
EOSINOPHIL # BLD AUTO: 0.12 K/UL — SIGNIFICANT CHANGE UP (ref 0–0.5)
EOSINOPHIL NFR BLD AUTO: 3 % — SIGNIFICANT CHANGE UP (ref 0–6)
FERRITIN SERPL-MCNC: 54.08 NG/ML — SIGNIFICANT CHANGE UP (ref 15–150)
FOLATE SERPL-MCNC: 15.3 NG/ML — SIGNIFICANT CHANGE UP (ref 4.7–20)
GLUCOSE SERPL-MCNC: 94 MG/DL — SIGNIFICANT CHANGE UP (ref 70–99)
HBV CORE AB SER-ACNC: NONREACTIVE — SIGNIFICANT CHANGE UP
HBV CORE IGM SER-ACNC: NONREACTIVE — SIGNIFICANT CHANGE UP
HBV SURFACE AB SER-ACNC: REACTIVE — SIGNIFICANT CHANGE UP
HBV SURFACE AG SER-ACNC: NEGATIVE — SIGNIFICANT CHANGE UP
HCT VFR BLD CALC: 25.8 % — LOW (ref 34.5–45)
HCT VFR BLD CALC: 28.3 % — LOW (ref 34.5–45)
HCV AB S/CO SERPL IA: 0.28 S/CO — SIGNIFICANT CHANGE UP (ref 0–0.99)
HCV AB SERPL-IMP: SIGNIFICANT CHANGE UP
HGB BLD-MCNC: 7.3 G/DL — LOW (ref 11.5–15.5)
HGB BLD-MCNC: 7.7 G/DL — LOW (ref 11.5–15.5)
IMM GRANULOCYTES NFR BLD AUTO: 0.8 % — SIGNIFICANT CHANGE UP (ref 0–1.5)
IRON SATN MFR SERPL: 316 UG/DL — SIGNIFICANT CHANGE UP (ref 140–530)
IRON SATN MFR SERPL: 48 UG/DL — SIGNIFICANT CHANGE UP (ref 30–160)
LACTATE SERPL-SCNC: 0.7 MMOL/L — SIGNIFICANT CHANGE UP (ref 0.5–2)
LYMPHOCYTES # BLD AUTO: 0.53 K/UL — LOW (ref 1–3.3)
LYMPHOCYTES # BLD AUTO: 13.3 % — SIGNIFICANT CHANGE UP (ref 13–44)
MAGNESIUM SERPL-MCNC: 1.7 MG/DL — SIGNIFICANT CHANGE UP (ref 1.6–2.6)
MCHC RBC-ENTMCNC: 20.8 PG — LOW (ref 27–34)
MCHC RBC-ENTMCNC: 21.2 PG — LOW (ref 27–34)
MCHC RBC-ENTMCNC: 27.2 % — LOW (ref 32–36)
MCHC RBC-ENTMCNC: 28.3 % — LOW (ref 32–36)
MCV RBC AUTO: 75 FL — LOW (ref 80–100)
MCV RBC AUTO: 76.3 FL — LOW (ref 80–100)
MONOCYTES # BLD AUTO: 0.3 K/UL — SIGNIFICANT CHANGE UP (ref 0–0.9)
MONOCYTES NFR BLD AUTO: 7.5 % — SIGNIFICANT CHANGE UP (ref 2–14)
NEUTROPHILS # BLD AUTO: 2.98 K/UL — SIGNIFICANT CHANGE UP (ref 1.8–7.4)
NEUTROPHILS NFR BLD AUTO: 74.4 % — SIGNIFICANT CHANGE UP (ref 43–77)
NRBC # FLD: 0 K/UL — SIGNIFICANT CHANGE UP (ref 0–0)
NRBC # FLD: 0 K/UL — SIGNIFICANT CHANGE UP (ref 0–0)
PHOSPHATE SERPL-MCNC: 2.1 MG/DL — LOW (ref 2.5–4.5)
PLATELET # BLD AUTO: 81 K/UL — LOW (ref 150–400)
PLATELET # BLD AUTO: 81 K/UL — LOW (ref 150–400)
PMV BLD: SIGNIFICANT CHANGE UP FL (ref 7–13)
PMV BLD: SIGNIFICANT CHANGE UP FL (ref 7–13)
POTASSIUM SERPL-MCNC: 3.6 MMOL/L — SIGNIFICANT CHANGE UP (ref 3.5–5.3)
POTASSIUM SERPL-SCNC: 3.6 MMOL/L — SIGNIFICANT CHANGE UP (ref 3.5–5.3)
PROT SERPL-MCNC: 6.7 G/DL — SIGNIFICANT CHANGE UP (ref 6–8.3)
RBC # BLD: 3.44 M/UL — LOW (ref 3.8–5.2)
RBC # BLD: 3.71 M/UL — LOW (ref 3.8–5.2)
RBC # FLD: 20.3 % — HIGH (ref 10.3–14.5)
RBC # FLD: 20.4 % — HIGH (ref 10.3–14.5)
RETICS #: 81 K/UL — SIGNIFICANT CHANGE UP (ref 25–125)
RETICS/RBC NFR: 2.2 % — SIGNIFICANT CHANGE UP (ref 0.5–2.5)
SODIUM SERPL-SCNC: 136 MMOL/L — SIGNIFICANT CHANGE UP (ref 135–145)
SPECIMEN SOURCE: SIGNIFICANT CHANGE UP
SPECIMEN SOURCE: SIGNIFICANT CHANGE UP
UIBC SERPL-MCNC: 268.4 UG/DL — SIGNIFICANT CHANGE UP (ref 110–370)
VIT B12 SERPL-MCNC: 921 PG/ML — HIGH (ref 200–900)
WBC # BLD: 4 K/UL — SIGNIFICANT CHANGE UP (ref 3.8–10.5)
WBC # BLD: 4.94 K/UL — SIGNIFICANT CHANGE UP (ref 3.8–10.5)
WBC # FLD AUTO: 4 K/UL — SIGNIFICANT CHANGE UP (ref 3.8–10.5)
WBC # FLD AUTO: 4.94 K/UL — SIGNIFICANT CHANGE UP (ref 3.8–10.5)

## 2019-09-17 PROCEDURE — 74019 RADEX ABDOMEN 2 VIEWS: CPT | Mod: 26

## 2019-09-17 PROCEDURE — 99233 SBSQ HOSP IP/OBS HIGH 50: CPT | Mod: GC

## 2019-09-17 PROCEDURE — 99232 SBSQ HOSP IP/OBS MODERATE 35: CPT | Mod: GC

## 2019-09-17 RX ORDER — SODIUM CHLORIDE 9 MG/ML
1000 INJECTION INTRAMUSCULAR; INTRAVENOUS; SUBCUTANEOUS
Refills: 0 | Status: DISCONTINUED | OUTPATIENT
Start: 2019-09-17 | End: 2019-09-19

## 2019-09-17 RX ORDER — MAGNESIUM SULFATE 500 MG/ML
1 VIAL (ML) INJECTION ONCE
Refills: 0 | Status: COMPLETED | OUTPATIENT
Start: 2019-09-17 | End: 2019-09-17

## 2019-09-17 RX ORDER — SODIUM,POTASSIUM PHOSPHATES 278-250MG
1 POWDER IN PACKET (EA) ORAL ONCE
Refills: 0 | Status: COMPLETED | OUTPATIENT
Start: 2019-09-17 | End: 2019-09-17

## 2019-09-17 RX ADMIN — Medication 4 MILLIGRAM(S): at 04:34

## 2019-09-17 RX ADMIN — SODIUM CHLORIDE 75 MILLILITER(S): 9 INJECTION INTRAMUSCULAR; INTRAVENOUS; SUBCUTANEOUS at 16:13

## 2019-09-17 RX ADMIN — Medication 4 MILLIGRAM(S): at 00:26

## 2019-09-17 RX ADMIN — Medication 4 MILLIGRAM(S): at 16:21

## 2019-09-17 RX ADMIN — Medication 100 GRAM(S): at 12:45

## 2019-09-17 RX ADMIN — Medication 1 TABLET(S): at 16:20

## 2019-09-17 RX ADMIN — Medication 4 MILLIGRAM(S): at 08:34

## 2019-09-17 RX ADMIN — Medication 1 PACKET(S): at 12:45

## 2019-09-17 RX ADMIN — Medication 3 MILLIGRAM(S): at 20:26

## 2019-09-17 RX ADMIN — SODIUM CHLORIDE 75 MILLILITER(S): 9 INJECTION INTRAMUSCULAR; INTRAVENOUS; SUBCUTANEOUS at 02:40

## 2019-09-17 RX ADMIN — Medication 4 MILLIGRAM(S): at 12:44

## 2019-09-17 RX ADMIN — Medication 105 MILLIGRAM(S): at 14:43

## 2019-09-17 RX ADMIN — Medication 1 MILLIGRAM(S): at 12:45

## 2019-09-17 NOTE — PROGRESS NOTE ADULT - ATTENDING COMMENTS
Patient seen and examined by myself , case discussed  with resident ,agree with the above finding and plan  pt was tearful during my exam, concerned about the bills she will receive from the hospital as she has no insurance   pt denies headache, dizziness, SOB, CP, Palpitations , N/V/D, abdominal pain  as per her HCP, she was very tender in RUQ yesterday, but minimal tenderness on exam today   CIWA score 7-9, will c/w ativan taper , CIWA protocol   foreign body in GI tract : GI eval appreciated , C/W NPO for now, IVF, will f/u with GI for endoscopic removal vs serial imaging   elevated lactate resolved, will continue to monitor   pt refusing psych eval at this time for possible depression and anxiety  SW eval

## 2019-09-17 NOTE — PROGRESS NOTE ADULT - ASSESSMENT
Impression:  # Foreign bodies in stomach on CT A/P. DDx looks like sharp objects - unclear of the nature of these FBs since patient denied swallowing any.  # Alcohol abuse / withdrawal  # Steatosis Impression:  # Foreign bodies in stomach on CT A/P. DDx looks like sharp objects - unclear of the nature of these FBs since patient denied swallowing any.  # Alcohol abuse / withdrawal  # Steatosis    Recommendations:  . Start clear liquid diet  - NPO after midnight   - Daily abdominal X ray  - Will follow   - Alcohol withdrawal management and CIWA protocol as per primary team Impression:  # Foreign bodies in stomach on CT A/P. XRay with appearance of earrings currently in stomach and small bowel  # Alcohol abuse / withdrawal  # Steatosis    Recommendations:  - Start clear liquid diet  - NPO after midnight   - Daily abdominal X ray  - Will follow- patient offered endoscopic evaluation but concerned about anesthesia risks in setting of withdrawal  - Alcohol withdrawal management and CIWA protocol as per primary team

## 2019-09-17 NOTE — MEDICAL STUDENT PROGRESS NOTE(EDUCATION) - SUBJECTIVE AND OBJECTIVE BOX
AYLEEN DEY  44y  Female    Patient is a 44y old  Female who presents with a chief complaint of Alcohol Withdrawal (17 Sep 2019 09:21)      INTERVAL HPI/OVERNIGHT EVENTS: Pt seen and examined. No acute overnight events. Pt reports constant nausea and dry heaving, tremor, mild anxitey, and restlessness. She also reports pins and needles sensation in both upper extremities and in her feet. Pt denies auditory hallucinations. She mentioned that she was "seeing bridges that she had never seen before" but description of her visions did not seem like visual hallucinations. Pt also reports moderate headache on right upper forehead, and in the back of her head.  Pt reports abdominal pain.   Pt reports that she was drinking two days ago, fell and hit her head, then fell down the stairs, after which her fiance and her sister-in-law brought her to the hospital. She has been drinking 0.5L vodka daily for the past 3 months. Prior to this she was sober for 9 years, but relapsed as she lost her job 3 months ago. Pt reports that during her previous period of drinking (9 years ago), she had seizures and delirium tremens. Her last hospitalization was also 9 years ago, for drinking too much. Pt says that she usually drinks to the point where she passes out, and occasionally takes ativan to "detox herself". Pt denies depressed mood, SI/HI.    REVIEW OF SYSTEMS:  CONSTITUTIONAL: No fever, weight loss, or fatigue  EYES: No eye pain, or discharge; Reports seeing things that she has never seen before.  ENMT:  No difficulty hearing, tinnitus, vertigo; No sinus or throat pain  NECK: No pain or stiffness  BREASTS: No pain, masses, or nipple discharge  RESPIRATORY: No cough, wheezing, chills or hemoptysis; No shortness of breath  CARDIOVASCULAR: No chest pain, palpitations, dizziness, or leg swelling  GASTROINTESTINAL: Endorses abdominal pain, along with constant nausea. No diarrhea or constipation. No melena or hematochezia.  GENITOURINARY: No dysuria, frequency, hematuria, or incontinence  NEUROLOGICAL: No memory loss, loss of strength, numbness; Endorses moderate headache, and constant tremors in upper extremities b/l  SKIN: No itching, burning, rashes, or lesions   LYMPH NODES: No enlarged glands  ENDOCRINE: No heat or cold intolerance; No hair loss  MUSCULOSKELETAL: No joint pain or swelling; No muscle, back, or extremity pain  PSYCHIATRIC: No depression, anxiety, mood swings; Pt reports difficulty sleeping last night due to tremors  HEME/LYMPH: No easy bruising, or bleeding gums  ALLERY AND IMMUNOLOGIC: No hives or eczema    T(C): 36.8 (09-17-19 @ 08:30), Max: 38.1 (09-16-19 @ 13:52)  HR: 103 (09-17-19 @ 08:30) (98 - 129)  BP: 141/93 (09-17-19 @ 08:30) (131/80 - 172/122)  RR: 19 (09-17-19 @ 08:30) (18 - 20)  SpO2: 100% (09-17-19 @ 08:30) (98% - 100%)  Wt(kg): --Vital Signs Last 24 Hrs  T(C): 36.8 (17 Sep 2019 08:30), Max: 38.1 (16 Sep 2019 13:52)  T(F): 98.3 (17 Sep 2019 08:30), Max: 100.6 (16 Sep 2019 13:52)  HR: 103 (17 Sep 2019 08:30) (98 - 129)  BP: 141/93 (17 Sep 2019 08:30) (131/80 - 172/122)  BP(mean): --  RR: 19 (17 Sep 2019 08:30) (18 - 20)  SpO2: 100% (17 Sep 2019 08:30) (98% - 100%)    PHYSICAL EXAM:  GENERAL: NAD, disheveled  HEAD:  Atraumatic, Normocephalic  EYES: EOMI, PERRLA, conjunctiva and sclera clear  NERVOUS SYSTEM:  Alert & Oriented X3, Good concentration; B/l upper extremity tremors, at rest and extended  CHEST/LUNG: Clear to percussion bilaterally; No rales, rhonchi, wheezing, or rubs  HEART: Tachycardic; No murmurs, rubs, or gallops  ABDOMEN: Soft, tender to palpation on RUQ and LUQ, Nondistended; Bowel sounds present  EXTREMITIES:  2+ Peripheral Pulses, No clubbing, cyanosis, or edema  LYMPH: No lymphadenopathy noted  SKIN: No rashes or lesions    Consultant(s) Notes Reviewed:  [x ] YES  [ ] NO  Care Discussed with Consultants/Other Providers [ x] YES  [ ] NO    LABS:                        7.3    4.00  )-----------( 81       ( 17 Sep 2019 05:36 )             25.8     09-17    136  |  96<L>  |  10  ----------------------------<  94  3.6   |  24  |  0.53    Ca    7.9<L>      17 Sep 2019 05:36  Phos  2.1     09-17  Mg     1.7     09-17    TPro  6.7  /  Alb  3.6  /  TBili  0.6  /  DBili  x   /  AST  80<H>  /  ALT  61<H>  /  AlkPhos  70  09-17    PT/INR - ( 16 Sep 2019 13:50 )   PT: 11.7 SEC;   INR: 1.03          PTT - ( 16 Sep 2019 13:50 )  PTT:30.1 SEC    CAPILLARY BLOOD GLUCOSE                RADIOLOGY & ADDITIONAL TESTS:    CT Abdomen and Pelvis w/ IV Cont:   EXAM:  CT ABDOMEN AND PELVIS IC    PROCEDURE DATE:  Sep 16 2019   INTERPRETATION:  CLINICAL INFORMATION: Status post fall down 10 stairs   with general abdominal pain.  COMPARISON: CT abdomen and pelvis 5/19/2011.  PROCEDURE:   CT of the Abdomen and Pelvis was performed with intravenous contrast.   Arterial and Portal Venous phases were acquired.  Intravenous contrast: 90 ml Omnipaque 350. 10 ml discarded.  Oral contrast: None.  Sagittal and coronal reformats were performed.  FINDINGS:  LOWER CHEST: Within normal limits.  LIVER: Steatosis.  BILE DUCTS: Normal caliber.  GALLBLADDER: Within normal limits.  SPLEEN: Within normal limits.  PANCREAS: Within normal limits.  ADRENALS: Within normal limits.  KIDNEYS/URETERS: Within normal limits.  BLADDER: Within normal limits.  REPRODUCTIVE ORGANS: Intrauterine device is low lying in the lower   uterine segment and cervix.  BOWEL: Small hiatal hernia. There are 2 radiopaque foreign bodies in the   stomach measuring 3.2 x2.5 cm and 0.9 x 0.7 cm. No bowel obstruction.   Appendix is not visualized. No evidence of inflammation in the pericecal   region.  PERITONEUM: No ascites.  VESSELS: Within normal limits.  RETROPERITONEUM/LYMPH NODES: No lymphadenopathy.    ABDOMINAL WALL: Within normal limits.  BONES: Degenerative changes at L5-S1.  IMPRESSION:   No acute traumatic injury to the abdomen and pelvis.  Radiopaque foreign bodies in the stomach.  Findings were discussed with Dr. TERESA PACE 9/16/2019 3:58 PM by Dr. Lara with read back confirmation.  FARZAD LARA M.D., RADIOLOGY RESIDENT  This document has been electronically signed.  TERI ZEPEDA M.D., ATTENDING RADIOLOGIST  This document has been electronically signed. Sep 16 2019  4:16PM      CT Head No Cont:   EXAM:  CT CERVICAL SPINE    EXAM:  CT MAXILLOFACIAL    EXAM:  CT BRAIN    PROCEDURE DATE:  Sep 16 2019   INTERPRETATION:  Clinical indication: Status post fall.  Multiple axial sections were performed from base of skull to vertex for   contrast enhancement. Thin axial sections were performed through the   maxillofacial region. Coronal and sagittal reconstructions were performed   as well.  The lateral ventricles have a normal configuration.  There is no evidence of acute hemorrhage mass or mass effect in the   posterior fossa or supratentorial region.  Evaluation of the osseous structures with the appropriate window appears   unremarkable  Right frontal extracalvarial soft tissue swelling/hematoma is seen.  No acute fractures or dislocations are seen in the maxillofacial region.  There is small air-fluid level seen involving left sphenoid sinus with   associated frothy secretions.  Both mastoid and middle ear regions appear clear.  Impression: No evidenceacute hemorrhage, mass mass effect in posterior   fossa or supratentorial  No maxillofacial facial fractures or dislocations are seen.  Multiple axial sections were performed through the cervical spine.   Coronal and sagittal reconstructions wereperformed as well.  Loss of the normal cervical lordosis is seen which could be due to   positioning or muscle spasm.  Disc space narrowing endplate sclerotic change and osteophytes are seen   involving C3-4 C4-5 C5-6 C6-7 levels. These findings are secondary to   degenerative change.  Bilateral hypertrophic facet changes are seen at multiple levels are   secondary to degenerative changes  There are no acute fractures or dislocations identified.  Evaluation of the paraspinal soft tissues appears unremarkable  The visualized portion of both lung apices appear clear.  Impression: Degenerative changes involving the cervical spine without   evidence of a fracture or dislocation seen.  JUSTYNA PHILLIP M.D., ATTENDING RADIOLOGIST  This document has been electronically signed. Sep 16 2019  3:54PM      Imaging Personally Reviewed:  [ ] YES  [ ] NO    HEALTH ISSUES - PROBLEM Dx:  Fever, unspecified fever cause: Fever, unspecified fever cause  Need for prophylactic measure: Need for prophylactic measure  Pancytopenia: Pancytopenia  Transaminitis: Transaminitis  ETOH Abuse: ETOH Abuse  Lactic acidemia: Lactic acidemia  Foreign body in stomach, initial encounter: Foreign body in stomach, initial encounter  Alcohol withdrawal syndrome with complication: Alcohol withdrawal syndrome with complication AYLEEN DEY  44y  Female    Patient is a 44y old  Female who presents with a chief complaint of Alcohol Withdrawal (17 Sep 2019 09:21)      INTERVAL HPI/OVERNIGHT EVENTS: Pt seen and examined. No acute overnight events. Pt reports constant nausea and dry heaving, tremor, mild anxitey, and restlessness. She also reports pins and needles sensation in both upper extremities and in her feet. Pt denies auditory hallucinations. She mentioned that she was "seeing bridges that she had never seen before" but description of her visions did not seem like visual hallucinations. Pt also reports moderate headache on right upper forehead, and in the back of her head.  Pt reports abdominal pain. Pt reports that last drink was in the evening on 9/15, about 36 hours ago.  Pt reports that she was drinking two days ago, fell and hit her head, then fell down the stairs, after which her fiance and her sister-in-law brought her to the hospital. She has been drinking 0.5L vodka daily for the past 3 months. Prior to this she was sober for 9 years, but relapsed as she lost her job 3 months ago. Pt reports that during her previous period of drinking (9 years ago), she had seizures and delirium tremens. Her last hospitalization was also 9 years ago, for drinking too much. Pt says that she usually drinks to the point where she passes out, and occasionally takes ativan to "detox herself". Pt denies depressed mood, SI/HI.    REVIEW OF SYSTEMS:  CONSTITUTIONAL: No fever, weight loss, or fatigue  EYES: No eye pain, or discharge; Reports seeing things that she has never seen before.  ENMT:  No difficulty hearing, tinnitus, vertigo; No sinus or throat pain  NECK: No pain or stiffness  BREASTS: No pain, masses, or nipple discharge  RESPIRATORY: No cough, wheezing, chills or hemoptysis; No shortness of breath  CARDIOVASCULAR: No chest pain, palpitations, dizziness, or leg swelling  GASTROINTESTINAL: Endorses abdominal pain, along with constant nausea. No diarrhea or constipation. No melena or hematochezia.  GENITOURINARY: No dysuria, frequency, hematuria, or incontinence  NEUROLOGICAL: No memory loss, loss of strength, numbness; Endorses moderate headache, and constant tremors in upper extremities b/l  SKIN: No itching, burning, rashes, or lesions   LYMPH NODES: No enlarged glands  ENDOCRINE: No heat or cold intolerance; No hair loss  MUSCULOSKELETAL: No joint pain or swelling; No muscle, back, or extremity pain  PSYCHIATRIC: No depression, anxiety, mood swings; Pt reports difficulty sleeping last night due to tremors  HEME/LYMPH: No easy bruising, or bleeding gums  ALLERY AND IMMUNOLOGIC: No hives or eczema    T(C): 36.8 (09-17-19 @ 08:30), Max: 38.1 (09-16-19 @ 13:52)  HR: 103 (09-17-19 @ 08:30) (98 - 129)  BP: 141/93 (09-17-19 @ 08:30) (131/80 - 172/122)  RR: 19 (09-17-19 @ 08:30) (18 - 20)  SpO2: 100% (09-17-19 @ 08:30) (98% - 100%)  Wt(kg): --Vital Signs Last 24 Hrs  T(C): 36.8 (17 Sep 2019 08:30), Max: 38.1 (16 Sep 2019 13:52)  T(F): 98.3 (17 Sep 2019 08:30), Max: 100.6 (16 Sep 2019 13:52)  HR: 103 (17 Sep 2019 08:30) (98 - 129)  BP: 141/93 (17 Sep 2019 08:30) (131/80 - 172/122)  BP(mean): --  RR: 19 (17 Sep 2019 08:30) (18 - 20)  SpO2: 100% (17 Sep 2019 08:30) (98% - 100%)    PHYSICAL EXAM:  GENERAL: NAD, disheveled  HEAD:  Atraumatic, Normocephalic  EYES: EOMI, PERRLA, conjunctiva and sclera clear  NERVOUS SYSTEM:  Alert & Oriented X3, Good concentration; B/l upper extremity tremors, at rest and extended  CHEST/LUNG: Clear to percussion bilaterally; No rales, rhonchi, wheezing, or rubs  HEART: Tachycardic; No murmurs, rubs, or gallops  ABDOMEN: Soft, tender to palpation on RUQ and LUQ, Nondistended; Bowel sounds present  EXTREMITIES:  2+ Peripheral Pulses, No clubbing, cyanosis, or edema  LYMPH: No lymphadenopathy noted  SKIN: No rashes or lesions    Consultant(s) Notes Reviewed:  [x ] YES  [ ] NO  Care Discussed with Consultants/Other Providers [ x] YES  [ ] NO    LABS:                        7.3    4.00  )-----------( 81       ( 17 Sep 2019 05:36 )             25.8     09-17    136  |  96<L>  |  10  ----------------------------<  94  3.6   |  24  |  0.53    Ca    7.9<L>      17 Sep 2019 05:36  Phos  2.1     09-17  Mg     1.7     09-17    TPro  6.7  /  Alb  3.6  /  TBili  0.6  /  DBili  x   /  AST  80<H>  /  ALT  61<H>  /  AlkPhos  70  09-17    PT/INR - ( 16 Sep 2019 13:50 )   PT: 11.7 SEC;   INR: 1.03          PTT - ( 16 Sep 2019 13:50 )  PTT:30.1 SEC    CAPILLARY BLOOD GLUCOSE                RADIOLOGY & ADDITIONAL TESTS:    CT Abdomen and Pelvis w/ IV Cont:   EXAM:  CT ABDOMEN AND PELVIS IC    PROCEDURE DATE:  Sep 16 2019   INTERPRETATION:  CLINICAL INFORMATION: Status post fall down 10 stairs   with general abdominal pain.  COMPARISON: CT abdomen and pelvis 5/19/2011.  PROCEDURE:   CT of the Abdomen and Pelvis was performed with intravenous contrast.   Arterial and Portal Venous phases were acquired.  Intravenous contrast: 90 ml Omnipaque 350. 10 ml discarded.  Oral contrast: None.  Sagittal and coronal reformats were performed.  FINDINGS:  LOWER CHEST: Within normal limits.  LIVER: Steatosis.  BILE DUCTS: Normal caliber.  GALLBLADDER: Within normal limits.  SPLEEN: Within normal limits.  PANCREAS: Within normal limits.  ADRENALS: Within normal limits.  KIDNEYS/URETERS: Within normal limits.  BLADDER: Within normal limits.  REPRODUCTIVE ORGANS: Intrauterine device is low lying in the lower   uterine segment and cervix.  BOWEL: Small hiatal hernia. There are 2 radiopaque foreign bodies in the   stomach measuring 3.2 x2.5 cm and 0.9 x 0.7 cm. No bowel obstruction.   Appendix is not visualized. No evidence of inflammation in the pericecal   region.  PERITONEUM: No ascites.  VESSELS: Within normal limits.  RETROPERITONEUM/LYMPH NODES: No lymphadenopathy.    ABDOMINAL WALL: Within normal limits.  BONES: Degenerative changes at L5-S1.  IMPRESSION:   No acute traumatic injury to the abdomen and pelvis.  Radiopaque foreign bodies in the stomach.  Findings were discussed with Dr. TEERSA PACE 9/16/2019 3:58 PM by Dr. Lara with read back confirmation.  FARZAD LARA M.D., RADIOLOGY RESIDENT  This document has been electronically signed.  TERI ZEPEDA M.D., ATTENDING RADIOLOGIST  This document has been electronically signed. Sep 16 2019  4:16PM      CT Head No Cont:   EXAM:  CT CERVICAL SPINE    EXAM:  CT MAXILLOFACIAL    EXAM:  CT BRAIN    PROCEDURE DATE:  Sep 16 2019   INTERPRETATION:  Clinical indication: Status post fall.  Multiple axial sections were performed from base of skull to vertex for   contrast enhancement. Thin axial sections were performed through the   maxillofacial region. Coronal and sagittal reconstructions were performed   as well.  The lateral ventricles have a normal configuration.  There is no evidence of acute hemorrhage mass or mass effect in the   posterior fossa or supratentorial region.  Evaluation of the osseous structures with the appropriate window appears   unremarkable  Right frontal extracalvarial soft tissue swelling/hematoma is seen.  No acute fractures or dislocations are seen in the maxillofacial region.  There is small air-fluid level seen involving left sphenoid sinus with   associated frothy secretions.  Both mastoid and middle ear regions appear clear.  Impression: No evidenceacute hemorrhage, mass mass effect in posterior   fossa or supratentorial  No maxillofacial facial fractures or dislocations are seen.  Multiple axial sections were performed through the cervical spine.   Coronal and sagittal reconstructions wereperformed as well.  Loss of the normal cervical lordosis is seen which could be due to   positioning or muscle spasm.  Disc space narrowing endplate sclerotic change and osteophytes are seen   involving C3-4 C4-5 C5-6 C6-7 levels. These findings are secondary to   degenerative change.  Bilateral hypertrophic facet changes are seen at multiple levels are   secondary to degenerative changes  There are no acute fractures or dislocations identified.  Evaluation of the paraspinal soft tissues appears unremarkable  The visualized portion of both lung apices appear clear.  Impression: Degenerative changes involving the cervical spine without   evidence of a fracture or dislocation seen.  JUSTYNA PHILLIP M.D., ATTENDING RADIOLOGIST  This document has been electronically signed. Sep 16 2019  3:54PM      Imaging Personally Reviewed:  [ ] YES  [ ] NO    HEALTH ISSUES - PROBLEM Dx:  Fever, unspecified fever cause: Fever, unspecified fever cause  Need for prophylactic measure: Need for prophylactic measure  Pancytopenia: Pancytopenia  Transaminitis: Transaminitis  ETOH Abuse: ETOH Abuse  Lactic acidemia: Lactic acidemia  Foreign body in stomach, initial encounter: Foreign body in stomach, initial encounter  Alcohol withdrawal syndrome with complication: Alcohol withdrawal syndrome with complication

## 2019-09-17 NOTE — DISCHARGE NOTE PROVIDER - HOSPITAL COURSE
Patient is a 44 y.o female with EtOH abuse, alcoholic pancreatitis, and depression who presented to the hospital with symptoms of alcohol withdrawal.     Alcohol history:     Patient began to heavily consume alcohol about 17 years ago for about 8 years. Though she could not quantify how much she drank per day at that time, she states that she drank less at that time than she does now. She also endorsed that she was hospitalized for alcohol withdrawal approximately once per month. She also has a history of DTs as well as grand mal seizures from alcohol withdrawal. She was sober for 9 years and started to consume alcohol about 3 months ago. At this time she drinks half of a liter of vodka per day and usually drinks until she "passes out". She denies DTs, seizures, and hospitalizations during these three months. She states that she started to drink again secondary to losing her job but would not delve further into the details. She experiences tremors of her hands and general anxiety when she stops drinking. Pt denies suicidal and homicidal ideation, she denies thoughts of harming herself or others. She does endorse depression and anxiety at baseline but declined to converse about that.        HPI:    Pt presented to the hospital for alcohol withdrawal as well as fall and questionable loss of consciousness. As per patient, her last drink was on 09/15/19 at night and she drank until she passed out. Though she could not recall the entire situation, she states that she tripped over something, fell and hit her head. Additionally she also fell down the stairs but could not recall the events and none of these falls were witnessed. She believes that she "woke up" after 1 minute and was able to call her sister in law and fiance who brought her into the hospital. In the ED she endorsed some abdominal pain, nausea and vomiting, and tremors but denied hallucinations (both visual and auditory) f/c, CP, SOB, urinary changes and suicidal ideation. Her CIWA in the ED was 8.  Vitals were significant for fever of 100.6 (which resolved with Tylenol) and tachycardia to the 120s. She was given Ativan 2mg x2, Zofran 4mg x1, thiamine, folic acid and multivitamin. She also received some imaging, including a chest x-ray which was significant for radioopaque object. CT head which showed no acute hemorrhage or shift, and CT cervical spine (which showed no acute pathology) and, CT abdomen and pelvis which redemonstrated the radioopaque object. Labs were significant for normal lipase  level of 147. The patient was admitted to the floors at this point for EtOH withdrawal management as well as foreign body ingestion. Patient was started on an ativan taper.         At this point GI was consulted, and they recommended serial abdominal x rays daily to see the progression of the object through the bowel. Patient was kept NPO for possible endoscopy, but upon discussion between GI and anesthesia, the anesthesiologist was uncomfortable taking the patient who was actively withdrawing and therefore their plan was to monitor BM as well as serial abdominal films. We discussed these findings with the patient and she did not recall this occurring. Patient is a 44 y.o female with EtOH abuse, alcoholic pancreatitis, and depression who presented to the hospital with symptoms of alcohol withdrawal.     Alcohol history:     Patient began to heavily consume alcohol about 17 years ago for about 8 years. Though she could not quantify how much she drank per day at that time, she states that she drank less at that time than she does now. She also endorsed that she was hospitalized for alcohol withdrawal approximately once per month. She also has a history of DTs as well as grand mal seizures from alcohol withdrawal. She was sober for 9 years and started to consume alcohol about 3 months ago. At this time she drinks half of a liter of vodka per day and usually drinks until she "passes out". She denies DTs, seizures, and hospitalizations during these three months. She states that she started to drink again secondary to losing her job but would not delve further into the details. She experiences tremors of her hands and general anxiety when she stops drinking. Pt denies suicidal and homicidal ideation, she denies thoughts of harming herself or others. She does endorse depression and anxiety at baseline but declined to converse about that.        HPI:    Pt presented to the hospital for alcohol withdrawal as well as fall and questionable loss of consciousness. As per patient, her last drink was on 09/15/19 at night and she drank until she passed out. Though she could not recall the entire situation, she states that she tripped over something, fell and hit her head. Additionally she also fell down the stairs but could not recall the events and none of these falls were witnessed. She believes that she "woke up" after 1 minute and was able to call her sister in law and fiance who brought her into the hospital. In the ED she endorsed some abdominal pain, nausea and vomiting, and tremors but denied hallucinations (both visual and auditory) f/c, CP, SOB, urinary changes and suicidal ideation. Her CIWA in the ED was 8.  Vitals were significant for fever of 100.6 (which resolved with Tylenol) and tachycardia to the 120s. She was given Ativan 2mg x2, Zofran 4mg x1, thiamine, folic acid and multivitamin. She also received some imaging, including a chest x-ray which was significant for radioopaque object. CT head which showed no acute hemorrhage or shift, and CT cervical spine (which showed no acute pathology) and, CT abdomen and pelvis which redemonstrated the radioopaque object. Labs were significant for normal lipase  level of 147. The patient was admitted to the floors at this point for EtOH withdrawal management as well as foreign body ingestion. Patient was started on an ativan taper.         Patient did well on ativan taper. GI conducted endoscopy and removed the first part of the earring (larger part) without issue. No esophageal pathology found. Second part of the earring (smaller part) remains in the ascending colon. Pt refusing serial abdominal xrays secondary to radiation exposure and cost. She denies f/c, n/v, chest pain, sob, headaches, tremors and feels at baseline. She is tolerating a PO diet, ambulating independently and having normal BM. She is ready for safe medical discharge. Patient is a 44 y.o female with EtOH abuse, alcoholic pancreatitis, and depression who presented to the hospital with symptoms of alcohol withdrawal.     Alcohol history:     Patient began to heavily consume alcohol about 17 years ago for about 8 years. Though she could not quantify how much she drank per day at that time, she states that she drank less at that time than she does now. She also endorsed that she was hospitalized for alcohol withdrawal approximately once per month. She also has a history of DTs as well as grand mal seizures from alcohol withdrawal. She was sober for 9 years and started to consume alcohol about 3 months ago. At this time she drinks half of a liter of vodka per day and usually drinks until she "passes out". She denies DTs, seizures, and hospitalizations during these three months. She states that she started to drink again secondary to losing her job but would not delve further into the details. She experiences tremors of her hands and general anxiety when she stops drinking. Pt denies suicidal and homicidal ideation, she denies thoughts of harming herself or others. She does endorse depression and anxiety at baseline but declined to converse about that.        HPI:    Pt presented to the hospital for alcohol withdrawal as well as fall and questionable loss of consciousness. As per patient, her last drink was on 09/15/19 at night and she drank until she passed out. Though she could not recall the entire situation, she states that she tripped over something, fell and hit her head. Additionally she also fell down the stairs but could not recall the events and none of these falls were witnessed. She believes that she "woke up" after 1 minute and was able to call her sister in law and fiance who brought her into the hospital. In the ED she endorsed some abdominal pain, nausea and vomiting, and tremors but denied hallucinations (both visual and auditory) f/c, CP, SOB, urinary changes and suicidal ideation. Her CIWA in the ED was 8.  Vitals were significant for fever of 100.6 (which resolved with Tylenol) and tachycardia to the 120s. She was given Ativan 2mg x2, Zofran 4mg x1, thiamine, folic acid and multivitamin. She also received some imaging, including a chest x-ray which was significant for radioopaque object. CT head which showed no acute hemorrhage or shift, and CT cervical spine (which showed no acute pathology) and, CT abdomen and pelvis which redemonstrated the radioopaque object. Labs were significant for normal lipase  level of 147. The patient was admitted to the floors at this point for EtOH withdrawal management as well as foreign body ingestion. Patient was started on an ativan taper.         Patient did well on ativan taper. GI conducted endoscopy and removed the first part of the earring (larger part) without issue. No esophageal pathology found. Second part of the earring (smaller part) remains in the ascending colon. Pt refusing serial abdominal xrays secondary to radiation exposure and cost. She denies f/c, n/v, chest pain, sob, headaches, tremors and feels at baseline. She is tolerating a PO diet, ambulating independently and having normal BM. She is ready for safe medical discharge. Please follow up with LFTs and CBC as an outpatient

## 2019-09-17 NOTE — DISCHARGE NOTE PROVIDER - NSDCCPTREATMENT_GEN_ALL_CORE_FT
PRINCIPAL PROCEDURE  Procedure: Abdomen CT  Findings and Treatment: FINDINGS:  LOWER CHEST: Within normal limits.  LIVER: Steatosis.  BILE DUCTS: Normal caliber.  GALLBLADDER: Within normal limits.  SPLEEN: Within normal limits.  PANCREAS: Within normal limits.  ADRENALS: Within normal limits.  KIDNEYS/URETERS: Within normal limits.  BLADDER: Within normal limits.  REPRODUCTIVE ORGANS: Intrauterine device is low lying in the lower   uterine segment and cervix.  BOWEL: Small hiatal hernia. There are 2 radiopaque foreign bodies in the   stomach measuring 3.2 x 2.5 cm and 0.9 x 0.7 cm. No bowel obstruction.   Appendix is not visualized. No evidence of inflammation in the pericecal   region.  PERITONEUM: No ascites.  VESSELS: Within normal limits.  RETROPERITONEUM/LYMPH NODES: No lymphadenopathy.    ABDOMINAL WALL: Within normal limits.  BONES: Degenerative changes at L5-S1.  IMPRESSION:   No acute traumatic injury to the abdomen and pelvis.  Radiopaque foreign bodies in the stomach.      SECONDARY PROCEDURE  Procedure: CT cervical spine  Findings and Treatment: NTERPRETATION:  Clinical indication: Status post fall.  Multiple axial sections were performed from base of skull to vertex for   contrast enhancement. Thin axial sections were performed through the   maxillofacial region. Coronal and sagittal reconstructions were performed   as well.  The lateral ventricles have a normal configuration.  There is no evidence of acute hemorrhage mass or mass effect in the   posterior fossa or supratentorial region.  Evaluation of the osseous structures with the appropriate window appears   unremarkable  Right frontal extracalvarial soft tissue swelling/hematoma is seen.  No acute fractures or dislocations are seen in the maxillofacial region.  There is small air-fluid level seen involving left sphenoid sinus with   associated frothy secretions.  Both mastoid and middle ear regions appear clear.  Impression: No evidence acute hemorrhage, mass mass effect in posterior   fossa or supratentorial  No maxillofacial facial fractures or dislocations are seen.  Multiple axial sections were performed through the cervical spine.   Coronal and sagittal reconstructions were performed as well.  Loss of the normal cervical lordosis is seen which could be due to   positioning or muscle spasm.  Disc space narrowing endplate sclerotic change and osteophytes are seen   involving C3-4 C4-5 C5-6 C6-7 levels. These findings are secondary to   degenerative change.  Bilateral hypertrophic facet changes are seen at multiple levels are   secondary to degenerative changes  There are no acute fractures or dislocations identified.  Evaluation of the paraspinal soft tissues appears unremarkable  The visualized portion of both lung apices appear clear.  Impression: Degenerative changes involving the cervical spine without   evidence of a fracture or dislocation seen.      Procedure: UGI endoscopy  Findings and Treatment: Findings:       The examined esophagus was normal.       An earring was found in the gastric body. A few erosions were seen in        the stomach, likely from the earring. Removal was accomplished using a        foreign body encarnacion with a Murray net. Post removal, the esophagus was        normal without any visible tears.       The examined duodenum up to the 2nd part was normal.                                                                                   Impression:          - An earring was found in the stomach. Removal was                        successful.  Recommendation:      - [facility].                       - Advance diet as tolerated.

## 2019-09-17 NOTE — DISCHARGE NOTE PROVIDER - NSFOLLOWUPCLINICS_GEN_ALL_ED_FT
Alcoholics Anonymous - 24 hour hotline  Alcoholics Anonymous  .  [place]   Phone: [phone]  Fax:   Follow Up Time: Alcoholics Anonymous - 24 hour hotline  Alcoholics Anonymous  .  [place]   Phone: [phone]  Fax:   Follow Up Time:

## 2019-09-17 NOTE — PROGRESS NOTE ADULT - SUBJECTIVE AND OBJECTIVE BOX
Interval Events:     Allergies:  No Known Allergies      Hospital Medications:  folic acid 1 milliGRAM(s) Oral daily  influenza   Vaccine 0.5 milliLiter(s) IntraMuscular once  LORazepam   Injectable 4 milliGRAM(s) IV Push once  LORazepam   Injectable   IV Push   LORazepam   Injectable 4 milliGRAM(s) IV Push every 4 hours  LORazepam   Injectable 3 milliGRAM(s) IV Push every 4 hours  multivitamin  Chewable 1 Tablet(s) Oral daily  sodium chloride 0.9%. 1000 milliLiter(s) IV Continuous <Continuous>  thiamine IVPB 500 milliGRAM(s) IV Intermittent daily      PMHX/PSHX:  Depression  ETOH Abuse  S/P Tonsillectomy      ROS:   General:  No fevers, chills or night sweats  ENT:  No sore throat or dysphagia  CV:  No pain or palpitations  Resp:  No dyspnea, cough or  wheezing  GI:  as above  Skin:  No rash or edema    PHYSICAL EXAM:   Vital Signs:  Vital Signs Last 24 Hrs  T(C): 37.1 (17 Sep 2019 06:32), Max: 38.1 (16 Sep 2019 13:52)  T(F): 98.8 (17 Sep 2019 06:32), Max: 100.6 (16 Sep 2019 13:52)  HR: 102 (17 Sep 2019 06:32) (98 - 129)  BP: 131/82 (17 Sep 2019 06:32) (131/80 - 172/122)  BP(mean): --  RR: 20 (17 Sep 2019 06:32) (18 - 20)  SpO2: 100% (17 Sep 2019 06:32) (98% - 100%)  Daily Height in cm: 170.18 (16 Sep 2019 20:00)    Daily     PHYSICAL EXAM:   GENERAL:  bruises face and around her eyes,  HEENT:  NC/AT,  conjunctivae clear and pink, sclera -anicteric  NECK: supple  CHEST:  CTA B/L, Normal effort  HEART:  RRR S1/S2, No murmurs  ABDOMEN:  Soft, RUQ tenderness, non-distended, normoactive bowel sounds,  no masses   EXTREMITIES:  No cyanosis or Edema  SKIN:  Warm & Dry. No rash or erythema  NEURO:  Alert, oriented, tremulous  PSYCH: Anxious    LABS:                        9.4    3.14  )-----------( 83       ( 16 Sep 2019 13:50 )             34.2     Mean Cell Volume: 75.8 fL (09-16-19 @ 13:50)    09-16    139  |  93<L>  |  13  ----------------------------<  102<H>  3.9   |  23  |  0.52    Ca    8.9      16 Sep 2019 13:50  Phos  3.4     09-16  Mg     1.5     09-16    TPro  8.3  /  Alb  4.6  /  TBili  0.4  /  DBili  x   /  AST  150<H>  /  ALT  94<H>  /  AlkPhos  94  09-16    LIVER FUNCTIONS - ( 16 Sep 2019 13:50 )  Alb: 4.6 g/dL / Pro: 8.3 g/dL / ALK PHOS: 94 u/L / ALT: 94 u/L / AST: 150 u/L / GGT: x           PT/INR - ( 16 Sep 2019 13:50 )   PT: 11.7 SEC;   INR: 1.03          PTT - ( 16 Sep 2019 13:50 )  PTT:30.1 SEC    Amylase Serum--      Lipase [id number]       Ammonia--                          9.4    3.14  )-----------( 83       ( 16 Sep 2019 13:50 )             34.2       Imaging: Interval Events:   Feels nausea but no vomiting  No abdominal pain.  But still shaking and having tremors    Allergies:  No Known Allergies      Hospital Medications:  folic acid 1 milliGRAM(s) Oral daily  influenza   Vaccine 0.5 milliLiter(s) IntraMuscular once  LORazepam   Injectable 4 milliGRAM(s) IV Push once  LORazepam   Injectable   IV Push   LORazepam   Injectable 4 milliGRAM(s) IV Push every 4 hours  LORazepam   Injectable 3 milliGRAM(s) IV Push every 4 hours  multivitamin  Chewable 1 Tablet(s) Oral daily  sodium chloride 0.9%. 1000 milliLiter(s) IV Continuous <Continuous>  thiamine IVPB 500 milliGRAM(s) IV Intermittent daily      PMHX/PSHX:  Depression  ETOH Abuse  S/P Tonsillectomy      ROS:   General:  No fevers, chills or night sweats  ENT:  No sore throat or dysphagia  CV:  No pain or palpitations  Resp:  No dyspnea, cough or  wheezing  GI:  as above  Skin:  No rash or edema    PHYSICAL EXAM:   Vital Signs:  Vital Signs Last 24 Hrs  T(C): 37.1 (17 Sep 2019 06:32), Max: 38.1 (16 Sep 2019 13:52)  T(F): 98.8 (17 Sep 2019 06:32), Max: 100.6 (16 Sep 2019 13:52)  HR: 102 (17 Sep 2019 06:32) (98 - 129)  BP: 131/82 (17 Sep 2019 06:32) (131/80 - 172/122)  BP(mean): --  RR: 20 (17 Sep 2019 06:32) (18 - 20)  SpO2: 100% (17 Sep 2019 06:32) (98% - 100%)  Daily Height in cm: 170.18 (16 Sep 2019 20:00)    Daily     PHYSICAL EXAM:   GENERAL:  bruises face and around her eyes,  HEENT:  NC/AT,  conjunctivae clear and pink, sclera -anicteric  NECK: supple  CHEST:  CTA B/L, Normal effort  HEART:  RRR S1/S2, No murmurs  ABDOMEN:  Soft, RUQ tenderness, non-distended, normoactive bowel sounds,  no masses   EXTREMITIES:  No cyanosis or Edema  SKIN:  Warm & Dry. No rash or erythema  NEURO:  Alert, oriented, tremulous  PSYCH: Anxious    LABS:                        9.4    3.14  )-----------( 83       ( 16 Sep 2019 13:50 )             34.2     Mean Cell Volume: 75.8 fL (09-16-19 @ 13:50)    09-16    139  |  93<L>  |  13  ----------------------------<  102<H>  3.9   |  23  |  0.52    Ca    8.9      16 Sep 2019 13:50  Phos  3.4     09-16  Mg     1.5     09-16    TPro  8.3  /  Alb  4.6  /  TBili  0.4  /  DBili  x   /  AST  150<H>  /  ALT  94<H>  /  AlkPhos  94  09-16    LIVER FUNCTIONS - ( 16 Sep 2019 13:50 )  Alb: 4.6 g/dL / Pro: 8.3 g/dL / ALK PHOS: 94 u/L / ALT: 94 u/L / AST: 150 u/L / GGT: x           PT/INR - ( 16 Sep 2019 13:50 )   PT: 11.7 SEC;   INR: 1.03          PTT - ( 16 Sep 2019 13:50 )  PTT:30.1 SEC    Amylase Serum--      Lipase [id number]       Ammonia--                          9.4    3.14  )-----------( 83       ( 16 Sep 2019 13:50 )             34.2       Imaging: Interval Events:   Feels nausea but no vomiting  No abdominal pain.  But still shaking and having tremors    Allergies:  No Known Allergies      Hospital Medications:  folic acid 1 milliGRAM(s) Oral daily  influenza   Vaccine 0.5 milliLiter(s) IntraMuscular once  LORazepam   Injectable 4 milliGRAM(s) IV Push once  LORazepam   Injectable   IV Push   LORazepam   Injectable 4 milliGRAM(s) IV Push every 4 hours  LORazepam   Injectable 3 milliGRAM(s) IV Push every 4 hours  multivitamin  Chewable 1 Tablet(s) Oral daily  sodium chloride 0.9%. 1000 milliLiter(s) IV Continuous <Continuous>  thiamine IVPB 500 milliGRAM(s) IV Intermittent daily      PMHX/PSHX:  Depression  ETOH Abuse  S/P Tonsillectomy      ROS:   General:  No fevers, chills or night sweats  ENT:  No sore throat or dysphagia  CV:  No pain or palpitations  Resp:  No dyspnea, cough or  wheezing  GI:  as above  Skin:  No rash or edema    PHYSICAL EXAM:   Vital Signs:  Vital Signs Last 24 Hrs  T(C): 37.1 (17 Sep 2019 06:32), Max: 38.1 (16 Sep 2019 13:52)  T(F): 98.8 (17 Sep 2019 06:32), Max: 100.6 (16 Sep 2019 13:52)  HR: 102 (17 Sep 2019 06:32) (98 - 129)  BP: 131/82 (17 Sep 2019 06:32) (131/80 - 172/122)  BP(mean): --  RR: 20 (17 Sep 2019 06:32) (18 - 20)  SpO2: 100% (17 Sep 2019 06:32) (98% - 100%)  Daily Height in cm: 170.18 (16 Sep 2019 20:00)    Daily     PHYSICAL EXAM:   GENERAL:  bruises face and around her eyes,  HEENT:  NC/AT,  conjunctivae clear and pink, sclera -anicteric  NECK: supple  CHEST:  CTA B/L, Normal effort  HEART:  RRR S1/S2, No murmurs  ABDOMEN:  Soft, RUQ tenderness, non-distended, normoactive bowel sounds,  no masses   EXTREMITIES:  No cyanosis or Edema  SKIN:  Warm & Dry. No rash or erythema  NEURO:  Alert, oriented, tremulous  PSYCH: Anxious    LABS:                        9.4    3.14  )-----------( 83       ( 16 Sep 2019 13:50 )             34.2     Mean Cell Volume: 75.8 fL (09-16-19 @ 13:50)    09-16    139  |  93<L>  |  13  ----------------------------<  102<H>  3.9   |  23  |  0.52    Ca    8.9      16 Sep 2019 13:50  Phos  3.4     09-16  Mg     1.5     09-16    TPro  8.3  /  Alb  4.6  /  TBili  0.4  /  DBili  x   /  AST  150<H>  /  ALT  94<H>  /  AlkPhos  94  09-16    LIVER FUNCTIONS - ( 16 Sep 2019 13:50 )  Alb: 4.6 g/dL / Pro: 8.3 g/dL / ALK PHOS: 94 u/L / ALT: 94 u/L / AST: 150 u/L / GGT: x           PT/INR - ( 16 Sep 2019 13:50 )   PT: 11.7 SEC;   INR: 1.03          PTT - ( 16 Sep 2019 13:50 )  PTT:30.1 SEC    Amylase Serum--      Lipase [id number]       Ammonia--                          9.4    3.14  )-----------( 83       ( 16 Sep 2019 13:50 )             34.2       Imaging:  EXAM:  XR ABDOMEN 2V        PROCEDURE DATE:  Sep 17 2019       Impression:  The larger of the 2 foreign bodies is at the level of the   pylorus with smaller having progressed into the duodenum.

## 2019-09-17 NOTE — DISCHARGE NOTE PROVIDER - CARE PROVIDER_API CALL
Leti Shelley)  Internal Medicine  [address], [address]  [place] [address]  Phone: [phone]  Fax: [phone]  Follow Up Time: 2 weeks

## 2019-09-17 NOTE — MEDICAL STUDENT PROGRESS NOTE(EDUCATION) - NS MD HP STUD ASPLAN PLAN FT
Problem/Plan - 1:  ·  Problem: Alcohol withdrawal syndrome with complication.  Plan: Patient admitted with . CIWA 12. s/p ativan 2 mg x2 in ED.  - will treat with ativan taper  - monitor CIWA - 9/17 calculated as 12  - urine toxicology, urinalysis and lipase ordered  - continue with IV thiamine, folic acid and multivitamin  - replete electrolytes  - monitor vital signs.   Problem/Plan - 2:  ·  Problem: Foreign body in stomach, initial encounter.  Plan: CT abdomen and pelvis with two radiopaque foreign bodies 3.2 x 2.5 cm and 0.9 x 0.7 cm.   - patient does not remember possible episodes of ingestion  - GI following; daily abdominal xrays for now  - keep NPO  - continue with IV fluids.      Problem/Plan - 3:  ·  Problem: Fever, unspecified fever cause. Plan: Patient febrile to 100.6F in the ED.  - etiology unclear  - BCX and urinalysis ordered.     Problem/Plan - 4:  ·  Problem: Lactic acidemia. Plan: Lactic acid elevated at 6.3 on admission. Downtrended to 5.6 on repeat. pH 7.4.  - likely 2/2 reduced po intake vs alcoholic ketoacidosis  - continue with IV fluids  - repeat lactic acid in the AM.- 9/17 0.7 downtrending     Problem/Plan - 5:  ·  Problem: ETOH Abuse. Plan: Patient has been drinking 0.5 liters of vodka daily.  - Social work consult  - SBIRT.     Problem/Plan - 6:  Problem: Transaminitis. Plan: Admitted with  and ALT 94.  - likely 2/2 to alcohol use  - trend liver enzymes daily.- 9/17 AST 80 ALT 61     Problem/Plan - 7:  ·  Problem: Pancytopenia. Plan: Admitted with Hgb 9.4, WBC 3.14 and Platelets 83.  - likely bone marrow suppression 2/2 alcohol abuse  - trend CBC daily  - transfuse of Hgb <7  - transfuse for platelets <10, <15 if febrile, <50 if actively bleeding.     Problem/Plan - 8:  ·  Problem: Need for prophylactic measure.  Plan: - DVT: SCDs due to thrombocytopenia  - Diet: NPO. Problem/Plan - 1:  ·  Problem: Alcohol withdrawal syndrome with complication.  Plan: Patient admitted with . CIWA 12. s/p ativan 2 mg x2 in ED.  - will treat with ativan taper  - monitor CIWA - 9/17 calculated as 12  - urine toxicology, urinalysis and lipase ordered  - continue with IV thiamine, folic acid and multivitamin  - replete electrolytes  - monitor vital signs.   Problem/Plan - 2:  ·  Problem: Foreign body in stomach, initial encounter.  Plan: CT abdomen and pelvis with two radiopaque foreign bodies 3.2 x 2.5 cm and 0.9 x 0.7 cm.   - patient does not remember possible episodes of ingestion  - GI following; daily abdominal xrays for now  - keep NPO  - continue with IV fluids.      Problem/Plan - 3:  ·  Problem: Fever, unspecified fever cause. Plan: Patient febrile to 100.6F in the ED.  - etiology unclear  - BCX and urinalysis ordered.     Problem/Plan - 4:  ·  Problem: Lactic acidemia. Plan: Lactic acid elevated at 6.3 on admission. Downtrended to 5.6 on repeat. pH 7.4.  - likely 2/2 reduced po intake vs alcoholic ketoacidosis  - continue with IV fluids  - repeat lactic acid in the AM.- 9/17 0.7 downtrending     Problem/Plan - 5:  ·  Problem: ETOH Abuse. Plan: Patient has been drinking 0.5 liters of vodka daily.  - Social work consult  - SBIRT.     Problem/Plan - 6:  Problem: Transaminitis. Plan: Admitted with  and ALT 94.  - likely 2/2 to alcohol use  - trend liver enzymes daily.- 9/17 AST 80 ALT 61  - RUQ abdominal US to r/o cirrhosis  - f/u HIV, HepB, HepB     Problem/Plan - 7:  ·  Problem: Pancytopenia. Plan: Admitted with Hgb 9.4, WBC 3.14 and Platelets 83.  - likely bone marrow suppression 2/2 alcohol abuse  - trend CBC daily  - transfuse of Hgb <7  - transfuse for platelets <10, <15 if febrile, <50 if actively bleeding.     Problem/Plan - 8:  ·  Problem: Need for prophylactic measure.  Plan: - DVT: SCDs due to thrombocytopenia  - Diet: NPO. Problem/Plan - 1:  ·  Problem: Alcohol withdrawal syndrome with complication.  Plan: Patient admitted with . CIWA 12. s/p ativan 2 mg x2 in ED.  - will treat with ativan taper  - monitor CIWA - 9/17 calculated as 12  - urine toxicology, urinalysis and lipase ordered  - continue with IV thiamine, folic acid and multivitamin  - replete electrolytes  - monitor vital signs.   Problem/Plan - 2:  ·  Problem: Foreign body in stomach, initial encounter.  Plan: CT abdomen and pelvis with two radiopaque foreign bodies 3.2 x 2.5 cm and 0.9 x 0.7 cm.   - patient does not remember possible episodes of ingestion  - GI following; daily abdominal xrays for now  - keep NPO  - continue with IV fluids.      Problem/Plan - 3:  ·  Problem: Fever, unspecified fever cause. Plan: Patient febrile to 100.6F in the ED.  - etiology unclear  - BCX and urinalysis ordered.     Problem/Plan - 4:  ·  Problem: Lactic acidemia. Plan: Lactic acid elevated at 6.3 on admission. Downtrended to 5.6 on repeat. pH 7.4.  - likely 2/2 reduced po intake vs alcoholic ketoacidosis  - continue with IV fluids  - repeat lactic acid in the AM.- 9/17 0.7 downtrending     Problem/Plan - 5:  ·  Problem: ETOH Abuse. Plan: Patient has been drinking 0.5 liters of vodka daily.  - Social work consult  - SBIRT.  - reevaluatefor psych consult as needed     Problem/Plan - 6:  Problem: Transaminitis. Plan: Admitted with  and ALT 94.  - likely 2/2 to alcohol use  - trend liver enzymes daily.- 9/17 AST 80 ALT 61  - f/u HIV, HepB, HepC     Problem/Plan - 7:  ·  Problem: Pancytopenia. Plan: Admitted with Hgb 9.4, WBC 3.14 and Platelets 83.  - likely bone marrow suppression 2/2 alcohol abuse  - trend CBC daily  - transfuse of Hgb <7  - transfuse for platelets <10, <15 if febrile, <50 if actively bleeding.  -f/u TIBC, serum ferritin, serum transferrin, B12, folate levels     Problem/Plan - 8:  ·  Problem: Need for prophylactic measure.  Plan: - DVT: SCDs due to thrombocytopenia  - Diet: NPO.

## 2019-09-17 NOTE — DISCHARGE NOTE PROVIDER - NSDCCPCAREPLAN_GEN_ALL_CORE_FT
PRINCIPAL DISCHARGE DIAGNOSIS  Diagnosis: Alcohol withdrawal  Assessment and Plan of Treatment: You were hospitalized because of alcohol withdrawal. You presented with nausea, vomiting, tremors, abdominal pain and general malaise in addition to a falling and hitting your head. Your last drink was approximately 24 hours before your presentation in the emergency room which makes the situation high risk for further progression of withdrawal including delerium tremens and alcohol withdrawal seizures. During this hosptialization you were treated with an Ativan Taper according to your CIWA score. This is a safe way to ensure clinically and symptomatically that you are safely stopping your alcohol consumption. After your discharge from the hospital, it is important that you continue to stop consuming alcohol. Should you have symptoms of tremors, abdominal pain, hallucinations (both visual and auditory), nausea/vomiting or fevers, please return to the emergency room. PRINCIPAL DISCHARGE DIAGNOSIS  Diagnosis: Alcohol withdrawal  Assessment and Plan of Treatment: You were hospitalized because of alcohol withdrawal. You presented with nausea, vomiting, tremors, abdominal pain and general malaise in addition to a falling and hitting your head. Your last drink was approximately 24 hours before your presentation in the emergency room which makes the situation high risk for further progression of withdrawal including delerium tremens and alcohol withdrawal seizures. During this hosptialization you were treated with an Ativan Taper according to your CIWA score. This is a safe way to ensure clinically and symptomatically that you are safely stopping your alcohol consumption. After your discharge from the hospital, it is important that you continue to stop consuming alcohol. Please follow with your primary care physician and have them take a look at your liver function tests and blood counts. Should you have symptoms of tremors, abdominal pain, hallucinations (both visual and auditory), nausea/vomiting or fevers, please return to the emergency room.      SECONDARY DISCHARGE DIAGNOSES  Diagnosis: S/P endoscopy  Assessment and Plan of Treatment: You had an endoscopy in the hospital due to foreign object ingestion. Gastrointestinal physicians has completed an endoscopy, and they were able to successfully retrieve the object successfully. There was also evidence of a second smaller foreign object which they were not able to retrieve. The last abdominal x ray showed that this piece was in the ascending colon. Upon discharge, please monitor your bowel movements to see if this object has passed through your gastrointestinal system. Should you have signs of constipation, abnormal or extreme abdominal pain, abnormal diarrhea, fevers, chills or changes in your abdomen, please return to the emergency room.    Diagnosis: Anemia  Assessment and Plan of Treatment: During this hospitalization, you had blood work done that showed low red blood counts. We believe that this is due to bone marrow suppression secondary to alcohol consumption. We discussed working up the cause of the anemia, but after our conversation, you would like to defer this. After your discharge, please follow up your primary care physician where he or she can follow up with these labs. We also recommend taking folic acid, vitamin B12, vitamin C, and iron supplementation. Should you have symptoms of dizziness, loss of consciousness, fainting, chest pain, shortness of breath or general malaise, please return to the emergency room.

## 2019-09-17 NOTE — PROGRESS NOTE ADULT - PROBLEM SELECTOR PLAN 7
Admitted with Hgb 9.4, WBC 3.14 and Platelets 83.  - likely bone marrow suppression 2/2 alcohol abuse  - trend CBC daily  - transfuse of Hgb <7  - transfuse for platelets <10, <15 if febrile, <50 if actively bleeding

## 2019-09-17 NOTE — PROGRESS NOTE ADULT - SUBJECTIVE AND OBJECTIVE BOX
***************************************************************  COVERING RESIDENT:   Tanya Lind, PGY 1  Internal Medicine   Pager: JESUS [phone] | [facility]: [phone]  ***************************************************************    CC: 44 y.o Female with PMH of Depression and previous EtOH abuse who presents with EtOH withdrawal    INTERVAL HPI/OVERNIGHT EVENTS:   Patient presented to the hospital yesterday with the chief complaint of EtOH withdrawal. As per patient, she has had a hx of EtOH abuse 9 years ago for about 8 years, in which she consumed less alcohol compared to now, but had hx o    ROS: She otherwise denies any fevers, chills, headache, sore throat, cough, shortness of breath, chest pain, palpitations, abdominal pain, constipation, diarrhea, or extremity swelling.    Allergies: NKDA    Intolerances        MEDICATIONS (STANDING & PRN)  MEDICATIONS  (STANDING):  folic acid 1 milliGRAM(s) Oral daily  influenza   Vaccine 0.5 milliLiter(s) IntraMuscular once  LORazepam   Injectable 4 milliGRAM(s) IV Push once  LORazepam   Injectable   IV Push   LORazepam   Injectable 4 milliGRAM(s) IV Push every 4 hours  LORazepam   Injectable 3 milliGRAM(s) IV Push every 4 hours  multivitamin  Chewable 1 Tablet(s) Oral daily  sodium chloride 0.9%. 1000 milliLiter(s) (75 mL/Hr) IV Continuous <Continuous>  thiamine IVPB 500 milliGRAM(s) IV Intermittent daily    MEDICATIONS  (PRN):      OBJECTIVE    VITAL SIGNS  Vital Signs Last 24 Hrs  T(C): 36.8 (17 Sep 2019 08:30), Max: 38.1 (16 Sep 2019 13:52)  T(F): 98.3 (17 Sep 2019 08:30), Max: 100.6 (16 Sep 2019 13:52)  HR: 103 (17 Sep 2019 08:30) (98 - 129)  BP: 141/93 (17 Sep 2019 08:30) (131/80 - 172/122)  BP(mean): --  RR: 19 (17 Sep 2019 08:30) (18 - 20)  SpO2: 100% (17 Sep 2019 08:30) (98% - 100%)    Daily Height in cm: 170.18 (16 Sep 2019 20:00)    Daily     PHYSICAL EXAM:  ***REFER TO PREVIOUS P/E ON ADMISSION***    LABS:                        7.3    4.00  )-----------( 81       ( 17 Sep 2019 05:36 )             25.8     09-17    136  |  96<L>  |  10  ----------------------------<  94  3.6   |  24  |  0.53    Ca    7.9<L>      17 Sep 2019 05:36  Phos  2.1     09-17  Mg     1.7     09-17    TPro  6.7  /  Alb  3.6  /  TBili  0.6  /  DBili  x   /  AST  80<H>  /  ALT  61<H>  /  AlkPhos  70  09-17    LIVER FUNCTIONS - ( 17 Sep 2019 05:36 )  Alb: 3.6 g/dL / Pro: 6.7 g/dL / ALK PHOS: 70 u/L / ALT: 61 u/L / AST: 80 u/L / GGT: x           PT/INR - ( 16 Sep 2019 13:50 )   PT: 11.7 SEC;   INR: 1.03          PTT - ( 16 Sep 2019 13:50 )  PTT:30.1 SEC        CAPILLARY BLOOD GLUCOSE          Arterial Blood Gas    Lactate, Blood: 0.7 mmol/L (09-17-19 @ 05:36)          RADIOLOGY & ADDITIONAL TESTS:    CT Abdomen and Pelvis w/ IV Cont:   EXAM:  CT ABDOMEN AND PELVIS IC        PROCEDURE DATE:  Sep 16 2019         INTERPRETATION:  CLINICAL INFORMATION: Status post fall down 10 stairs   with general abdominal pain.    COMPARISON: CT abdomen and pelvis 5/19/2011.    PROCEDURE:   CT of the Abdomen and Pelvis was performed with intravenous contrast.   Arterial and Portal Venous phases were acquired.  Intravenous contrast: 90 ml Omnipaque 350. 10 ml discarded.  Oral contrast: None.  Sagittal and coronal reformats were performed.    FINDINGS:    LOWER CHEST: Within normal limits.    LIVER: Steatosis.  BILE DUCTS: Normal caliber.  GALLBLADDER: Within normal limits.  SPLEEN: Within normal limits.  PANCREAS: Within normal limits.  ADRENALS: Within normal limits.  KIDNEYS/URETERS: Within normal limits.    BLADDER: Within normal limits.  REPRODUCTIVE ORGANS: Intrauterine device is low lying in the lower   uterine segment and cervix.    BOWEL: Small hiatal hernia. There are 2 radiopaque foreign bodies in the   stomach measuring 3.2 x2.5 cm and 0.9 x 0.7 cm. No bowel obstruction.   Appendix is not visualized. No evidence of inflammation in the pericecal   region.  PERITONEUM: No ascites.  VESSELS: Within normal limits.  RETROPERITONEUM/LYMPH NODES: No lymphadenopathy.    ABDOMINAL WALL: Within normal limits.  BONES: Degenerative changes at L5-S1.    IMPRESSION:     No acute traumatic injury to the abdomen and pelvis.    Radiopaque foreign bodies in the stomach.    Findings were discussed with Dr. TERESA PACE 9/16/2019 3:58 PM by Dr. Lara with read back confirmation.                FARZAD LARA M.D., RADIOLOGY RESIDENT  This document has been electronically signed.  TERI ZEPEDA M.D., ATTENDING RADIOLOGIST  This document has been electronically signed. Sep 16 2019  4:16PM               (09-16-19 @ 15:51)  CT Head No Cont:   EXAM:  CT CERVICAL SPINE      EXAM:  CT MAXILLOFACIAL      EXAM:  CT BRAIN        PROCEDURE DATE:  Sep 16 2019         INTERPRETATION:  Clinical indication: Status post fall.    Multiple axial sections were performed from base of skull to vertex for   contrast enhancement. Thin axial sections were performed through the   maxillofacial region. Coronal and sagittal reconstructions were performed   as well.    The lateral ventricles have a normal configuration.    There is no evidence of acute hemorrhage mass or mass effect in the   posterior fossa or supratentorial region.    Evaluation of the osseous structures with the appropriate window appears   unremarkable    Right frontal extracalvarial soft tissue swelling/hematoma is seen.    No acute fractures or dislocations are seen in the maxillofacial region.    There is small air-fluid level seen involving left sphenoid sinus with   associated frothy secretions.    Both mastoid and middle ear regions appear clear.    Impression: No evidenceacute hemorrhage, mass mass effect in posterior   fossa or supratentorial    No maxillofacial facial fractures or dislocations are seen.    Multiple axial sections were performed through the cervical spine.   Coronal and sagittal reconstructions wereperformed as well.    Loss of the normal cervical lordosis is seen which could be due to   positioning or muscle spasm.    Disc space narrowing endplate sclerotic change and osteophytes are seen   involving C3-4 C4-5 C5-6 C6-7 levels. These findings are secondary to   degenerative change.    Bilateral hypertrophic facet changes are seen at multiple levels are   secondary to degenerative changes    There are no acute fractures or dislocations identified.    Evaluation of the paraspinal soft tissues appears unremarkable    The visualized portion of both lung apices appear clear.    Impression: Degenerative changes involving the cervical spine without   evidence of a fracture or dislocation seen.                        JUSTYNA PHILLIP M.D., ATTENDING RADIOLOGIST  This document has been electronically signed. Sep 16 2019  3:54PM               (09-16-19 @ 15:39)      Imaging Personally Reviewed:  [ ] YES  [ ] NO    Consultant(s) Notes Reviewed:  [x] YES  [ ] NO  Care Discussed with Consultants/Other Providers [x] YES  [ ] NO ***************************************************************  COVERING RESIDENT:   Tanya Lind, PGY 1  Internal Medicine   Pager: JESUS [phone] | [facility]: [phone]  ***************************************************************    CC: 44 y.o Female with PMH of Depression and previous EtOH abuse who presents with EtOH withdrawal    INTERVAL HPI/OVERNIGHT EVENTS:   44 y.o Female with PMH of depression and alcoholic pancreatitis, EtOH abuse, DT's and alcohol withdrawal seizures presented to the hospital yesterday with the chief complaint of EtOH withdrawal. As per patient, she has had a hx of EtOH abuse 9 years ago for about 8 years, in which she consumed less alcohol compared to now, but had hx of seizures and DTs with multiple hospitalizations. She had been sober for 9 years and started to consume alcohol about 3 months ago. She drinks about half of a liter of vodka per day and usually drinks until she loses consciousness. Her last drink was Sept 15 at night. She has not experienced DTs or seizures since the start of her drinking 3 months ago. As per pt, she consumed alcohol on Friday, tripped and fell and hit her head and she also fell down the stairs, all unwitnessed. She states that she was unsure if she had LOC, but she woke up and called her sister in law and fiance after about 1 minute. Family members brought her into the hospital. She endorses tremors, general malaise, anxiety, abdominal pain, nausea, but no vomiting.     Allergies: NKDA  Intolerances    Family history:   Significant for alcohol abuse- father    Social history:  Significant alcohol consumption- as stated above  Denies smoking, denies IV drug use (occasional benzodiazepines)  Lives with fiance, no other people in the house, denies suicidal thoughts, or thoughts of harming herself or others  Endorses depression and anxiety, but does not want to converse about those issues at the moment      MEDICATIONS (STANDING & PRN)  MEDICATIONS  (STANDING):  folic acid 1 milliGRAM(s) Oral daily  influenza   Vaccine 0.5 milliLiter(s) IntraMuscular once  LORazepam   Injectable 4 milliGRAM(s) IV Push once  LORazepam   Injectable   IV Push   LORazepam   Injectable 4 milliGRAM(s) IV Push every 4 hours  LORazepam   Injectable 3 milliGRAM(s) IV Push every 4 hours  multivitamin  Chewable 1 Tablet(s) Oral daily  sodium chloride 0.9%. 1000 milliLiter(s) (75 mL/Hr) IV Continuous <Continuous>  thiamine IVPB 500 milliGRAM(s) IV Intermittent daily    MEDICATIONS  (PRN):      OBJECTIVE  VITAL SIGNS  Vital Signs Last 24 Hrs  T(C): 36.8 (17 Sep 2019 08:30), Max: 38.1 (16 Sep 2019 13:52)  T(F): 98.3 (17 Sep 2019 08:30), Max: 100.6 (16 Sep 2019 13:52)  HR: 103 (17 Sep 2019 08:30) (98 - 129)  BP: 141/93 (17 Sep 2019 08:30) (131/80 - 172/122)  BP(mean): --  RR: 19 (17 Sep 2019 08:30) (18 - 20)  SpO2: 100% (17 Sep 2019 08:30) (98% - 100%)    Daily Height in cm: 170.18 (16 Sep 2019 20:00)    Daily     Physical Exam:  General: anxious appearing woman, with ecchymosis around the right eye, laying down, uncomfortable  HEENT: normocephalic, pain on palpation of the right temporal and frontal area, ecchymosis around the right eye with blood pooling, but EOMI and WILFRID  Resp: good inspiratory effort, CTA b/l  Cardiac: tachycardic, S1 and S2 heard, no m/g/r  Abdomen: Soft, BS+, tenderness to palpation of the right upper quadrant and left upper quadrant  Extremities: no pedal edema  Neurologic: no focal neurologic deficits  Psych: anxious, AAOx3, answers questions appropriately    LABS:                        7.3    4.00  )-----------( 81       ( 17 Sep 2019 05:36 )             25.8     09-17    136  |  96<L>  |  10  ----------------------------<  94  3.6   |  24  |  0.53    Ca    7.9<L>      17 Sep 2019 05:36  Phos  2.1     09-17  Mg     1.7     09-17    TPro  6.7  /  Alb  3.6  /  TBili  0.6  /  DBili  x   /  AST  80<H>  /  ALT  61<H>  /  AlkPhos  70  09-17    LIVER FUNCTIONS - ( 17 Sep 2019 05:36 )  Alb: 3.6 g/dL / Pro: 6.7 g/dL / ALK PHOS: 70 u/L / ALT: 61 u/L / AST: 80 u/L / GGT: x           PT/INR - ( 16 Sep 2019 13:50 )   PT: 11.7 SEC;   INR: 1.03    PTT - ( 16 Sep 2019 13:50 )  PTT:30.1 SEC    Lipase 147.1        CAPILLARY BLOOD GLUCOSE          Arterial Blood Gas    Lactate, Blood: 0.7 mmol/L (09-17-19 @ 05:36)          RADIOLOGY & ADDITIONAL TESTS:    CT Abdomen and Pelvis w/ IV Cont:   EXAM:  CT ABDOMEN AND PELVIS IC        PROCEDURE DATE:  Sep 16 2019         INTERPRETATION:  CLINICAL INFORMATION: Status post fall down 10 stairs   with general abdominal pain.    COMPARISON: CT abdomen and pelvis 5/19/2011.    PROCEDURE:   CT of the Abdomen and Pelvis was performed with intravenous contrast.   Arterial and Portal Venous phases were acquired.  Intravenous contrast: 90 ml Omnipaque 350. 10 ml discarded.  Oral contrast: None.  Sagittal and coronal reformats were performed.    FINDINGS:    LOWER CHEST: Within normal limits.    LIVER: Steatosis.  BILE DUCTS: Normal caliber.  GALLBLADDER: Within normal limits.  SPLEEN: Within normal limits.  PANCREAS: Within normal limits.  ADRENALS: Within normal limits.  KIDNEYS/URETERS: Within normal limits.    BLADDER: Within normal limits.  REPRODUCTIVE ORGANS: Intrauterine device is low lying in the lower   uterine segment and cervix.    BOWEL: Small hiatal hernia. There are 2 radiopaque foreign bodies in the   stomach measuring 3.2 x2.5 cm and 0.9 x 0.7 cm. No bowel obstruction.   Appendix is not visualized. No evidence of inflammation in the pericecal   region.  PERITONEUM: No ascites.  VESSELS: Within normal limits.  RETROPERITONEUM/LYMPH NODES: No lymphadenopathy.    ABDOMINAL WALL: Within normal limits.  BONES: Degenerative changes at L5-S1.    IMPRESSION:     No acute traumatic injury to the abdomen and pelvis.  Radiopaque foreign bodies in the stomach.                 (09-16-19 @ 15:51)  CT Head No Cont:   EXAM:  CT CERVICAL SPINE    EXAM:  CT MAXILLOFACIAL    EXAM:  CT BRAIN        INTERPRETATION:  Clinical indication: Status post fall.    Multiple axial sections were performed from base of skull to vertex for   contrast enhancement. Thin axial sections were performed through the   maxillofacial region. Coronal and sagittal reconstructions were performed   as well.    The lateral ventricles have a normal configuration.    There is no evidence of acute hemorrhage mass or mass effect in the   posterior fossa or supratentorial region.    Evaluation of the osseous structures with the appropriate window appears   unremarkable    Right frontal extracalvarial soft tissue swelling/hematoma is seen.    No acute fractures or dislocations are seen in the maxillofacial region.    There is small air-fluid level seen involving left sphenoid sinus with   associated frothy secretions.    Both mastoid and middle ear regions appear clear.    Impression: No evidence acute hemorrhage, mass mass effect in posterior   fossa or supratentorial    No maxillofacial facial fractures or dislocations are seen.    Multiple axial sections were performed through the cervical spine.   Coronal and sagittal reconstructions wereperformed as well.    Loss of the normal cervical lordosis is seen which could be due to   positioning or muscle spasm.    Disc space narrowing endplate sclerotic change and osteophytes are seen   involving C3-4 C4-5 C5-6 C6-7 levels. These findings are secondary to   degenerative change.    Bilateral hypertrophic facet changes are seen at multiple levels are   secondary to degenerative changes    There are no acute fractures or dislocations identified.    Evaluation of the paraspinal soft tissues appears unremarkable    The visualized portion of both lung apices appear clear.    Impression: Degenerative changes involving the cervical spine without   evidence of a fracture or dislocation seen.             (09-16-19 @ 15:39)      Imaging Personally Reviewed:  [ ] YES  [ ] NO    Consultant(s) Notes Reviewed:  [x] YES  [ ] NO  Care Discussed with Consultants/Other Providers [x] YES  [ ] NO ***************************************************************  COVERING RESIDENT:   Tanya Lind, PGY 1  Internal Medicine   Pager: JESUS [phone] | [facility]: [phone]  ***************************************************************    CC: 44 y.o Female with PMH of Depression and previous EtOH abuse who presents with EtOH withdrawal    INTERVAL HPI/OVERNIGHT EVENTS:   44 y.o Female with PMH of depression and alcoholic pancreatitis, EtOH abuse, DT's and alcohol withdrawal seizures presented to the hospital yesterday with the chief complaint of EtOH withdrawal. As per patient, she has had a hx of EtOH abuse 9 years ago for about 8 years, in which she consumed less alcohol compared to now, but had hx of seizures and DTs with multiple hospitalizations. She had been sober for 9 years and started to consume alcohol about 3 months ago. She drinks about half of a liter of vodka per day and usually drinks until she loses consciousness. Her last drink was Sept 15 at night. She has not experienced DTs or seizures since the start of her drinking 3 months ago. As per pt, she consumed alcohol on Friday, tripped and fell and hit her head and she also fell down the stairs, all unwitnessed. She states that she was unsure if she had LOC, but she woke up and called her sister in law and firoverto after about 1 minute. Family members brought her into the hospital. She endorses tremors, general malaise, anxiety, abdominal pain, nausea, but no vomiting. She has a h/o foreign object ingestion but states that she does not remember consuming any objects. No pain out of proportion at this point.     Allergies: NKDA  Intolerances    Family history:   Significant for alcohol abuse- father    Social history:  Significant alcohol consumption- as stated above  Denies smoking, denies IV drug use (occasional benzodiazepines)  Lives with fiance, no other people in the house, denies suicidal thoughts, or thoughts of harming herself or others  Endorses depression and anxiety, but does not want to converse about those issues at the moment      MEDICATIONS (STANDING & PRN)  MEDICATIONS  (STANDING):  folic acid 1 milliGRAM(s) Oral daily  influenza   Vaccine 0.5 milliLiter(s) IntraMuscular once  LORazepam   Injectable 4 milliGRAM(s) IV Push once  LORazepam   Injectable   IV Push   LORazepam   Injectable 4 milliGRAM(s) IV Push every 4 hours  LORazepam   Injectable 3 milliGRAM(s) IV Push every 4 hours  multivitamin  Chewable 1 Tablet(s) Oral daily  sodium chloride 0.9%. 1000 milliLiter(s) (75 mL/Hr) IV Continuous <Continuous>  thiamine IVPB 500 milliGRAM(s) IV Intermittent daily    MEDICATIONS  (PRN):      OBJECTIVE  VITAL SIGNS  Vital Signs Last 24 Hrs  T(C): 36.8 (17 Sep 2019 08:30), Max: 38.1 (16 Sep 2019 13:52)  T(F): 98.3 (17 Sep 2019 08:30), Max: 100.6 (16 Sep 2019 13:52)  HR: 103 (17 Sep 2019 08:30) (98 - 129)  BP: 141/93 (17 Sep 2019 08:30) (131/80 - 172/122)  BP(mean): --  RR: 19 (17 Sep 2019 08:30) (18 - 20)  SpO2: 100% (17 Sep 2019 08:30) (98% - 100%)    Daily Height in cm: 170.18 (16 Sep 2019 20:00)    Daily     Physical Exam:  General: anxious appearing woman, with ecchymosis around the right eye, laying down, uncomfortable  HEENT: normocephalic, pain on palpation of the right temporal and frontal area, ecchymosis around the right eye with blood pooling, but EOMI and WILFRID  Resp: good inspiratory effort, CTA b/l  Cardiac: tachycardic, S1 and S2 heard, no m/g/r  Abdomen: Soft, BS+, tenderness to palpation of the right upper quadrant and left upper quadrant, is not peritoneal  Extremities: no pedal edema  Neurologic: no focal neurologic deficits  Psych: anxious, AAOx3, answers questions appropriately    LABS:                        7.3    4.00  )-----------( 81       ( 17 Sep 2019 05:36 )             25.8     09-17    136  |  96<L>  |  10  ----------------------------<  94  3.6   |  24  |  0.53    Ca    7.9<L>      17 Sep 2019 05:36  Phos  2.1     09-17  Mg     1.7     09-17    TPro  6.7  /  Alb  3.6  /  TBili  0.6  /  DBili  x   /  AST  80<H>  /  ALT  61<H>  /  AlkPhos  70  09-17    LIVER FUNCTIONS - ( 17 Sep 2019 05:36 )  Alb: 3.6 g/dL / Pro: 6.7 g/dL / ALK PHOS: 70 u/L / ALT: 61 u/L / AST: 80 u/L / GGT: x           PT/INR - ( 16 Sep 2019 13:50 )   PT: 11.7 SEC;   INR: 1.03    PTT - ( 16 Sep 2019 13:50 )  PTT:30.1 SEC    Lipase 147.1        CAPILLARY BLOOD GLUCOSE          Arterial Blood Gas    Lactate, Blood: 0.7 mmol/L (09-17-19 @ 05:36)          RADIOLOGY & ADDITIONAL TESTS:    CT Abdomen and Pelvis w/ IV Cont:   EXAM:  CT ABDOMEN AND PELVIS IC        PROCEDURE DATE:  Sep 16 2019         INTERPRETATION:  CLINICAL INFORMATION: Status post fall down 10 stairs   with general abdominal pain.    COMPARISON: CT abdomen and pelvis 5/19/2011.    PROCEDURE:   CT of the Abdomen and Pelvis was performed with intravenous contrast.   Arterial and Portal Venous phases were acquired.  Intravenous contrast: 90 ml Omnipaque 350. 10 ml discarded.  Oral contrast: None.  Sagittal and coronal reformats were performed.    FINDINGS:    LOWER CHEST: Within normal limits.    LIVER: Steatosis.  BILE DUCTS: Normal caliber.  GALLBLADDER: Within normal limits.  SPLEEN: Within normal limits.  PANCREAS: Within normal limits.  ADRENALS: Within normal limits.  KIDNEYS/URETERS: Within normal limits.    BLADDER: Within normal limits.  REPRODUCTIVE ORGANS: Intrauterine device is low lying in the lower   uterine segment and cervix.    BOWEL: Small hiatal hernia. There are 2 radiopaque foreign bodies in the   stomach measuring 3.2 x2.5 cm and 0.9 x 0.7 cm. No bowel obstruction.   Appendix is not visualized. No evidence of inflammation in the pericecal   region.  PERITONEUM: No ascites.  VESSELS: Within normal limits.  RETROPERITONEUM/LYMPH NODES: No lymphadenopathy.    ABDOMINAL WALL: Within normal limits.  BONES: Degenerative changes at L5-S1.    IMPRESSION:     No acute traumatic injury to the abdomen and pelvis.  Radiopaque foreign bodies in the stomach.                 (09-16-19 @ 15:51)  CT Head No Cont:   EXAM:  CT CERVICAL SPINE    EXAM:  CT MAXILLOFACIAL    EXAM:  CT BRAIN        INTERPRETATION:  Clinical indication: Status post fall.    Multiple axial sections were performed from base of skull to vertex for   contrast enhancement. Thin axial sections were performed through the   maxillofacial region. Coronal and sagittal reconstructions were performed   as well.    The lateral ventricles have a normal configuration.    There is no evidence of acute hemorrhage mass or mass effect in the   posterior fossa or supratentorial region.    Evaluation of the osseous structures with the appropriate window appears   unremarkable    Right frontal extracalvarial soft tissue swelling/hematoma is seen.    No acute fractures or dislocations are seen in the maxillofacial region.    There is small air-fluid level seen involving left sphenoid sinus with   associated frothy secretions.    Both mastoid and middle ear regions appear clear.    Impression: No evidence acute hemorrhage, mass mass effect in posterior   fossa or supratentorial    No maxillofacial facial fractures or dislocations are seen.    Multiple axial sections were performed through the cervical spine.   Coronal and sagittal reconstructions wereperformed as well.    Loss of the normal cervical lordosis is seen which could be due to   positioning or muscle spasm.    Disc space narrowing endplate sclerotic change and osteophytes are seen   involving C3-4 C4-5 C5-6 C6-7 levels. These findings are secondary to   degenerative change.    Bilateral hypertrophic facet changes are seen at multiple levels are   secondary to degenerative changes    There are no acute fractures or dislocations identified.    Evaluation of the paraspinal soft tissues appears unremarkable    The visualized portion of both lung apices appear clear.    Impression: Degenerative changes involving the cervical spine without   evidence of a fracture or dislocation seen.             (09-16-19 @ 15:39)      Imaging Personally Reviewed:  [ ] YES  [ ] NO    Consultant(s) Notes Reviewed:  [x] YES  [ ] NO  Care Discussed with Consultants/Other Providers [x] YES  [ ] NO

## 2019-09-18 LAB
ALBUMIN SERPL ELPH-MCNC: 3.6 G/DL — SIGNIFICANT CHANGE UP (ref 3.3–5)
ALP SERPL-CCNC: 72 U/L — SIGNIFICANT CHANGE UP (ref 40–120)
ALT FLD-CCNC: 47 U/L — HIGH (ref 4–33)
ANION GAP SERPL CALC-SCNC: 15 MMO/L — HIGH (ref 7–14)
AST SERPL-CCNC: 63 U/L — HIGH (ref 4–32)
BILIRUB SERPL-MCNC: 0.4 MG/DL — SIGNIFICANT CHANGE UP (ref 0.2–1.2)
BUN SERPL-MCNC: 6 MG/DL — LOW (ref 7–23)
CALCIUM SERPL-MCNC: 8.5 MG/DL — SIGNIFICANT CHANGE UP (ref 8.4–10.5)
CHLORIDE SERPL-SCNC: 100 MMOL/L — SIGNIFICANT CHANGE UP (ref 98–107)
CO2 SERPL-SCNC: 23 MMOL/L — SIGNIFICANT CHANGE UP (ref 22–31)
CREAT SERPL-MCNC: 0.45 MG/DL — LOW (ref 0.5–1.3)
GLUCOSE SERPL-MCNC: 97 MG/DL — SIGNIFICANT CHANGE UP (ref 70–99)
HCG SERPL-ACNC: < 5 MIU/ML — SIGNIFICANT CHANGE UP
HCT VFR BLD CALC: 27 % — LOW (ref 34.5–45)
HGB BLD-MCNC: 7.5 G/DL — LOW (ref 11.5–15.5)
MAGNESIUM SERPL-MCNC: 1.8 MG/DL — SIGNIFICANT CHANGE UP (ref 1.6–2.6)
MCHC RBC-ENTMCNC: 20.9 PG — LOW (ref 27–34)
MCHC RBC-ENTMCNC: 27.8 % — LOW (ref 32–36)
MCV RBC AUTO: 75.2 FL — LOW (ref 80–100)
NRBC # FLD: 0 K/UL — SIGNIFICANT CHANGE UP (ref 0–0)
PHOSPHATE SERPL-MCNC: 1.9 MG/DL — LOW (ref 2.5–4.5)
PLATELET # BLD AUTO: 87 K/UL — LOW (ref 150–400)
PMV BLD: 10.4 FL — SIGNIFICANT CHANGE UP (ref 7–13)
POTASSIUM SERPL-MCNC: 3.9 MMOL/L — SIGNIFICANT CHANGE UP (ref 3.5–5.3)
POTASSIUM SERPL-SCNC: 3.9 MMOL/L — SIGNIFICANT CHANGE UP (ref 3.5–5.3)
PROT SERPL-MCNC: 6.6 G/DL — SIGNIFICANT CHANGE UP (ref 6–8.3)
RBC # BLD: 3.59 M/UL — LOW (ref 3.8–5.2)
RBC # FLD: 20.2 % — HIGH (ref 10.3–14.5)
SODIUM SERPL-SCNC: 138 MMOL/L — SIGNIFICANT CHANGE UP (ref 135–145)
WBC # BLD: 3.46 K/UL — LOW (ref 3.8–10.5)
WBC # FLD AUTO: 3.46 K/UL — LOW (ref 3.8–10.5)

## 2019-09-18 PROCEDURE — 74019 RADEX ABDOMEN 2 VIEWS: CPT | Mod: 26

## 2019-09-18 PROCEDURE — 43247 EGD REMOVE FOREIGN BODY: CPT | Mod: GC

## 2019-09-18 PROCEDURE — 99233 SBSQ HOSP IP/OBS HIGH 50: CPT | Mod: GC

## 2019-09-18 RX ORDER — FERROUS SULFATE 325(65) MG
325 TABLET ORAL DAILY
Refills: 0 | Status: DISCONTINUED | OUTPATIENT
Start: 2019-09-18 | End: 2019-09-20

## 2019-09-18 RX ORDER — MAGNESIUM SULFATE 500 MG/ML
1 VIAL (ML) INJECTION ONCE
Refills: 0 | Status: COMPLETED | OUTPATIENT
Start: 2019-09-18 | End: 2019-09-18

## 2019-09-18 RX ORDER — SODIUM,POTASSIUM PHOSPHATES 278-250MG
1 POWDER IN PACKET (EA) ORAL ONCE
Refills: 0 | Status: DISCONTINUED | OUTPATIENT
Start: 2019-09-18 | End: 2019-09-18

## 2019-09-18 RX ADMIN — Medication 3 MILLIGRAM(S): at 00:40

## 2019-09-18 RX ADMIN — Medication 3 MILLIGRAM(S): at 08:32

## 2019-09-18 RX ADMIN — SODIUM CHLORIDE 75 MILLILITER(S): 9 INJECTION INTRAMUSCULAR; INTRAVENOUS; SUBCUTANEOUS at 04:10

## 2019-09-18 RX ADMIN — Medication 3 MILLIGRAM(S): at 04:38

## 2019-09-18 RX ADMIN — Medication 63.75 MILLIMOLE(S): at 11:40

## 2019-09-18 RX ADMIN — Medication 100 GRAM(S): at 10:48

## 2019-09-18 RX ADMIN — Medication 2 MILLIGRAM(S): at 20:30

## 2019-09-18 RX ADMIN — Medication 105 MILLIGRAM(S): at 11:40

## 2019-09-18 NOTE — MEDICAL STUDENT PROGRESS NOTE(EDUCATION) - SUBJECTIVE AND OBJECTIVE BOX
TAMARDANEMARTIN DOTTIELUANN  44y  Female    Patient is a 44y old  Female who presents with a chief complaint of Alcohol Withdrawal (17 Sep 2019 21:10)      INTERVAL HPI/OVERNIGHT EVENTS:    REVIEW OF SYSTEMS:  CONSTITUTIONAL: No fever, weight loss, or fatigue  EYES: No eye pain, visual disturbances, or discharge  ENMT:  No difficulty hearing, tinnitus, vertigo; No sinus or throat pain  NECK: No pain or stiffness  BREASTS: No pain, masses, or nipple discharge  RESPIRATORY: No cough, wheezing, chills or hemoptysis; No shortness of breath  CARDIOVASCULAR: No chest pain, palpitations, dizziness, or leg swelling  GASTROINTESTINAL: No abdominal or epigastric pain. No nausea, vomiting, or hematemesis; No diarrhea or constipation. No melena or hematochezia.  GENITOURINARY: No dysuria, frequency, hematuria, or incontinence  NEUROLOGICAL: No headaches, memory loss, loss of strength, numbness, or tremors  SKIN: No itching, burning, rashes, or lesions   LYMPH NODES: No enlarged glands  ENDOCRINE: No heat or cold intolerance; No hair loss  MUSCULOSKELETAL: No joint pain or swelling; No muscle, back, or extremity pain  PSYCHIATRIC: No depression, anxiety, mood swings, or difficulty sleeping  HEME/LYMPH: No easy bruising, or bleeding gums  ALLERY AND IMMUNOLOGIC: No hives or eczema    T(C): 36.7 (09-18-19 @ 08:30), Max: 37.1 (09-17-19 @ 22:37)  HR: 94 (09-18-19 @ 08:30) (74 - 103)  BP: 141/90 (09-18-19 @ 08:30) (132/94 - 152/100)  RR: 18 (09-18-19 @ 08:30) (16 - 18)  SpO2: 100% (09-18-19 @ 08:30) (98% - 100%)  Wt(kg): --Vital Signs Last 24 Hrs  T(C): 36.7 (18 Sep 2019 08:30), Max: 37.1 (17 Sep 2019 22:37)  T(F): 98 (18 Sep 2019 08:30), Max: 98.7 (17 Sep 2019 22:37)  HR: 94 (18 Sep 2019 08:30) (74 - 103)  BP: 141/90 (18 Sep 2019 08:30) (132/94 - 152/100)  BP(mean): --  RR: 18 (18 Sep 2019 08:30) (16 - 18)  SpO2: 100% (18 Sep 2019 08:30) (98% - 100%)    PHYSICAL EXAM:  GENERAL: NAD, well-groomed, well-developed  HEAD:  Atraumatic, Normocephalic  EYES: EOMI, PERRLA, conjunctiva and sclera clear  ENMT: No tonsillar erythema, exudates, or enlargement; Moist mucous membranes, Good dentition, No lesions  NECK: Supple, No JVD, Normal thyroid  NERVOUS SYSTEM:  Alert & Oriented X3, Good concentration; Motor Strength 5/5 B/L upper and lower extremities; DTRs 2+ intact and symmetric  CHEST/LUNG: Clear to percussion bilaterally; No rales, rhonchi, wheezing, or rubs  HEART: Regular rate and rhythm; No murmurs, rubs, or gallops  ABDOMEN: Soft, Nontender, Nondistended; Bowel sounds present  EXTREMITIES:  2+ Peripheral Pulses, No clubbing, cyanosis, or edema  LYMPH: No lymphadenopathy noted  SKIN: No rashes or lesions    Consultant(s) Notes Reviewed:  [x ] YES  [ ] NO  Care Discussed with Consultants/Other Providers [ x] YES  [ ] NO    LABS:                        7.5    3.46  )-----------( 87       ( 18 Sep 2019 07:50 )             27.0     09-18    138  |  100  |  6<L>  ----------------------------<  97  3.9   |  23  |  0.45<L>    Ca    8.5      18 Sep 2019 07:50  Phos  1.9     09-18  Mg     1.8     09-18    TPro  6.6  /  Alb  3.6  /  TBili  0.4  /  DBili  x   /  AST  63<H>  /  ALT  47<H>  /  AlkPhos  72  09-18    PT/INR - ( 16 Sep 2019 13:50 )   PT: 11.7 SEC;   INR: 1.03          PTT - ( 16 Sep 2019 13:50 )  PTT:30.1 SEC    CAPILLARY BLOOD GLUCOSE                RADIOLOGY & ADDITIONAL TESTS:    Imaging Personally Reviewed:  [ ] YES  [ ] NO    HEALTH ISSUES - PROBLEM Dx:  Fever, unspecified fever cause: Fever, unspecified fever cause  Need for prophylactic measure: Need for prophylactic measure  Pancytopenia: Pancytopenia  Transaminitis: Transaminitis  ETOH Abuse: ETOH Abuse  Lactic acidemia: Lactic acidemia  Foreign body in stomach, initial encounter: Foreign body in stomach, initial encounter  Alcohol withdrawal syndrome with complication: Alcohol withdrawal syndrome with complication AYLEEN DEY  44y  Female    Patient is a 44y old  Female who presents with a chief complaint of Alcohol Withdrawal (17 Sep 2019 21:10)      INTERVAL HPI/OVERNIGHT EVENTS: Pt seen and examined. No acute overnight events. Pt reports that tremors stopped overnight. Pt reports 3/10 headache in right forehead. Pt also mentioned that she has a history of adenomyosis and menorrhagia, which might be contributing to her anemia. CIWA of 0.    REVIEW OF SYSTEMS:  CONSTITUTIONAL: No fever, weight loss, or fatigue  EYES: No eye pain, visual disturbances, or discharge  ENMT:  No difficulty hearing, tinnitus, vertigo; No sinus or throat pain  NECK: No pain or stiffness  BREASTS: No pain, masses, or nipple discharge  RESPIRATORY: No cough, wheezing, chills or hemoptysis; No shortness of breath  CARDIOVASCULAR: No chest pain, palpitations, dizziness, or leg swelling  GASTROINTESTINAL: No abdominal or epigastric pain. No nausea, vomiting, or hematemesis; No diarrhea or constipation. No melena or hematochezia.  GENITOURINARY: No dysuria, frequency, hematuria, or incontinence  NEUROLOGICAL: 3/10 headache in right upper forehead region. No memory loss, loss of strength, numbness, or tremors  SKIN: No itching, burning, rashes, or lesions   LYMPH NODES: No enlarged glands  ENDOCRINE: No heat or cold intolerance; No hair loss  MUSCULOSKELETAL: No joint pain or swelling; No muscle, back, or extremity pain  PSYCHIATRIC: No depression, anxiety, mood swings, or difficulty sleeping  HEME/LYMPH: No easy bruising, or bleeding gums  ALLERY AND IMMUNOLOGIC: No hives or eczema    T(C): 36.7 (09-18-19 @ 08:30), Max: 37.1 (09-17-19 @ 22:37)  HR: 94 (09-18-19 @ 08:30) (74 - 103)  BP: 141/90 (09-18-19 @ 08:30) (132/94 - 152/100)  RR: 18 (09-18-19 @ 08:30) (16 - 18)  SpO2: 100% (09-18-19 @ 08:30) (98% - 100%)  Wt(kg): --Vital Signs Last 24 Hrs  T(C): 36.7 (18 Sep 2019 08:30), Max: 37.1 (17 Sep 2019 22:37)  T(F): 98 (18 Sep 2019 08:30), Max: 98.7 (17 Sep 2019 22:37)  HR: 94 (18 Sep 2019 08:30) (74 - 103)  BP: 141/90 (18 Sep 2019 08:30) (132/94 - 152/100)  BP(mean): --  RR: 18 (18 Sep 2019 08:30) (16 - 18)  SpO2: 100% (18 Sep 2019 08:30) (98% - 100%)    PHYSICAL EXAM:  GENERAL: NAD, well-groomed, well-developed  HEAD:  Atraumatic, Normocephalic  EYES: EOMI, PERRLA, conjunctiva and sclera clear  NERVOUS SYSTEM:  Alert & Oriented X3, Good concentration;   CHEST/LUNG: Clear to percussion bilaterally; No rales, rhonchi, wheezing, or rubs  HEART: Regular rate and rhythm; No murmurs, rubs, or gallops  ABDOMEN: Soft, Nontender, Nondistended; Bowel sounds present  EXTREMITIES:  2+ Peripheral Pulses, No clubbing, cyanosis, or edema  LYMPH: No lymphadenopathy noted  SKIN: No rashes or lesions; Bruising around right eye reduced from yesterday; looks to be healing.    Consultant(s) Notes Reviewed:  [x ] YES  [ ] NO  Care Discussed with Consultants/Other Providers [ x] YES  [ ] NO    LABS:                        7.5    3.46  )-----------( 87       ( 18 Sep 2019 07:50 )             27.0     09-18    138  |  100  |  6<L>  ----------------------------<  97  3.9   |  23  |  0.45<L>    Ca    8.5      18 Sep 2019 07:50  Phos  1.9     09-18  Mg     1.8     09-18    TPro  6.6  /  Alb  3.6  /  TBili  0.4  /  DBili  x   /  AST  63<H>  /  ALT  47<H>  /  AlkPhos  72  09-18    PT/INR - ( 16 Sep 2019 13:50 )   PT: 11.7 SEC;   INR: 1.03          PTT - ( 16 Sep 2019 13:50 )  PTT:30.1 SEC    CAPILLARY BLOOD GLUCOSE                RADIOLOGY & ADDITIONAL TESTS:    Imaging Personally Reviewed:  [ ] YES  [ ] NO    HEALTH ISSUES - PROBLEM Dx:  Fever, unspecified fever cause: Fever, unspecified fever cause  Need for prophylactic measure: Need for prophylactic measure  Pancytopenia: Pancytopenia  Transaminitis: Transaminitis  ETOH Abuse: ETOH Abuse  Lactic acidemia: Lactic acidemia  Foreign body in stomach, initial encounter: Foreign body in stomach, initial encounter  Alcohol withdrawal syndrome with complication: Alcohol withdrawal syndrome with complication

## 2019-09-18 NOTE — PROGRESS NOTE ADULT - ATTENDING COMMENTS
Patient seen and examined by myself , case discussed  with resident ,agree with the above finding and plan  pt was tearful again today  concerned about the bills she will receive from the hospital as she has no insurance   pt denies headache, dizziness, SOB, CP, Palpitations , N/V/D, abdominal pain  CIWA score 7-9, will c/w ativan taper , CIWA protocol   foreign body in GI tract : GI eval appreciated , C/W NPO for now, IVF, repeat Xray noted, GI plan for scope today   elevated lactate resolved, will continue to monitor   pt refusing psych eval at this time for possible depression and anxiety  SW eval  Emotional support provided Patient seen and examined by myself , case discussed  with resident ,agree with the above finding and plan  pt was tearful again today  concerned about the bills she will receive from the hospital as she has no insurance   pt denies headache, dizziness, SOB, CP, Palpitations , N/V/D, abdominal pain  CIWA score 7-9, will c/w ativan taper , CIWA protocol   foreign body in GI tract : GI eval appreciated , C/W NPO for now, IVF, repeat Xray noted, GI plan for scope today   elevated lactate resolved, will continue to monitor   pt refusing psych eval at this time for possible depression and anxiety  hypophosphatemia and hypomagnesemia : supplemented, will monitor BMP   SW eval  Emotional support provided

## 2019-09-18 NOTE — MEDICAL STUDENT PROGRESS NOTE(EDUCATION) - NS MD HP STUD ASPLAN PLAN FT
Problem/Plan - 1:  ·  Problem: Alcohol withdrawal syndrome with complication.  Plan: Patient admitted with . CIWA 12. s/p ativan 2 mg x2 in ED.  - will treat with ativan taper  - monitor CIWA - 9/18 calculated as 0  - urine toxicology, urinalysis and lipase ordered  - continue with IV thiamine, folic acid and multivitamin  - replete electrolytes  - monitor vital signs.   Problem/Plan - 2:  ·  Problem: Foreign body in stomach, initial encounter.  Plan: CT abdomen and pelvis with two radiopaque foreign bodies 3.2 x 2.5 cm and 0.9 x 0.7 cm.   - patient does not remember possible episodes of ingestion  - GI following; daily abdominal xrays for now  - scheduled for endoscopy 9/18 at 3pm  - keep NPO until after endoscopy  - continue with IV fluids.      Problem/Plan - 3:  ·  Problem: Fever, unspecified fever cause. Plan: Patient febrile to 100.6F in the ED.  - etiology unclear  - BCX and urinalysis ordered.     Problem/Plan - 4:  ·  Problem: Lactic acidemia. Plan: Lactic acid elevated at 6.3 on admission. Downtrended to 5.6 on repeat. pH 7.4.  - likely 2/2 reduced po intake vs alcoholic ketoacidosis  - continue with IV fluids  - repeat lactic acid in the AM.- 9/17 0.7 downtrending     Problem/Plan - 5:  ·  Problem: ETOH Abuse. Plan: Patient has been drinking 0.5 liters of vodka daily.  - Social work consult  - SBIRT.  - reevaluate for psych consult as needed     Problem/Plan - 6:  Problem: Transaminitis. Plan: Admitted with  and ALT 94.  - likely 2/2 to alcohol use  - trend liver enzymes daily.- 9/18 AST 63 ALT 47  - HIV, HepB, HepC, tests negative     Problem/Plan - 7:  ·  Problem: Pancytopenia. Plan: Admitted with Hgb 9.4, WBC 3.14 and Platelets 83.  - likely bone marrow suppression 2/2 alcohol abuse  - possibly exacerbated by adenomyosis and heavy menstruation  - trend CBC daily  - transfuse of Hgb <7  - transfuse for platelets <10, <15 if febrile, <50 if actively bleeding.  - TIBC, serum ferritin, serum transferrin, folate levels wnl; B12 levels slightly elevated  - recommend iron supplementation     Problem/Plan - 8:  ·  Problem: Need for prophylactic measure.  Plan: - DVT: SCDs due to thrombocytopenia  - Diet: NPO.

## 2019-09-18 NOTE — PROGRESS NOTE ADULT - SUBJECTIVE AND OBJECTIVE BOX
***************************************************************  COVERING RESIDENT: Tanya Lind, PGY1  Internal Medicine   Pager: JESUS [phone] | [facility]: [phone]  ***************************************************************    CC: 44 y.o F who presented with alcohol withdrawal and     INTERVAL HPI/OVERNIGHT EVENTS:   No acute events overnight. AYLEEN DEY endorses improvement/worsening of her .     ROS: She otherwise denies any fevers, chills, headache, sore throat, cough, shortness of breath, chest pain, palpitations, abdominal pain, constipation, diarrhea, or extremity swelling.    ALLERGIES  Allergies    No Known Allergies    Intolerances        MEDICATIONS (STANDING & PRN)  MEDICATIONS  (STANDING):  folic acid 1 milliGRAM(s) Oral daily  influenza   Vaccine 0.5 milliLiter(s) IntraMuscular once  LORazepam   Injectable 4 milliGRAM(s) IV Push once  LORazepam   Injectable   IV Push   LORazepam   Injectable 3 milliGRAM(s) IV Push every 4 hours  LORazepam   Injectable 2 milliGRAM(s) IV Push every 4 hours  multivitamin 1 Tablet(s) Oral daily  sodium chloride 0.9%. 1000 milliLiter(s) (75 mL/Hr) IV Continuous <Continuous>  thiamine IVPB 500 milliGRAM(s) IV Intermittent daily    MEDICATIONS  (PRN):  LORazepam   Injectable 2 milliGRAM(s) IV Push every 2 hours PRN CIWA >8 or CIWA change >2      OBJECTIVE    VITAL SIGNS  Vital Signs Last 24 Hrs  T(C): 36.7 (18 Sep 2019 08:30), Max: 37.1 (17 Sep 2019 22:37)  T(F): 98 (18 Sep 2019 08:30), Max: 98.7 (17 Sep 2019 22:37)  HR: 94 (18 Sep 2019 08:30) (74 - 103)  BP: 141/90 (18 Sep 2019 08:30) (132/94 - 152/100)  BP(mean): --  RR: 18 (18 Sep 2019 08:30) (16 - 18)  SpO2: 100% (18 Sep 2019 08:30) (98% - 100%)    Daily     Daily     PHYSICAL EXAM:  ***REFER TO PREVIOUS P/E ON ADMISSION***    LABS:                        7.5    3.46  )-----------( 87       ( 18 Sep 2019 07:50 )             27.0     09-18    138  |  100  |  6<L>  ----------------------------<  97  3.9   |  23  |  0.45<L>    Ca    8.5      18 Sep 2019 07:50  Phos  1.9     09-18  Mg     1.8     09-18    TPro  6.6  /  Alb  3.6  /  TBili  0.4  /  DBili  x   /  AST  63<H>  /  ALT  47<H>  /  AlkPhos  72  09-18    LIVER FUNCTIONS - ( 18 Sep 2019 07:50 )  Alb: 3.6 g/dL / Pro: 6.6 g/dL / ALK PHOS: 72 u/L / ALT: 47 u/L / AST: 63 u/L / GGT: x           PT/INR - ( 16 Sep 2019 13:50 )   PT: 11.7 SEC;   INR: 1.03          PTT - ( 16 Sep 2019 13:50 )  PTT:30.1 SEC        CAPILLARY BLOOD GLUCOSE          Arterial Blood Gas            RADIOLOGY & ADDITIONAL TESTS:        Imaging Personally Reviewed:  [ ] YES  [ ] NO    Consultant(s) Notes Reviewed:  [x] YES  [ ] NO  Care Discussed with Consultants/Other Providers [x] YES  [ ] NO ***************************************************************  COVERING RESIDENT: Tanya Lind, PGY1  Internal Medicine   Pager: JESUS [phone] | [facility]: [phone]  ***************************************************************    CC: 44 y.o F who presented with alcohol withdrawal and s/p fall.    INTERVAL HPI/OVERNIGHT EVENTS:   No acute events overnight. CIWA have been downtrending, the last being 0, 0, 1, 0. She states that the pain in her head where she fell has been improving. Otherwise no SOB, CP, abdominal pain, or extremity swelling.       No Known Allergies  Intolerances    MEDICATIONS (STANDING & PRN)  MEDICATIONS  (STANDING):  folic acid 1 milliGRAM(s) Oral daily  influenza   Vaccine 0.5 milliLiter(s) IntraMuscular once  LORazepam   Injectable 4 milliGRAM(s) IV Push once  LORazepam   Injectable   IV Push   LORazepam   Injectable 3 milliGRAM(s) IV Push every 4 hours  LORazepam   Injectable 2 milliGRAM(s) IV Push every 4 hours  multivitamin 1 Tablet(s) Oral daily  sodium chloride 0.9%. 1000 milliLiter(s) (75 mL/Hr) IV Continuous <Continuous>  thiamine IVPB 500 milliGRAM(s) IV Intermittent daily    MEDICATIONS  (PRN):  LORazepam   Injectable 2 milliGRAM(s) IV Push every 2 hours PRN CIWA >8 or CIWA change >2      OBJECTIVE  Vital Signs Last 24 Hrs  T(C): 36.7 (18 Sep 2019 08:30), Max: 37.1 (17 Sep 2019 22:37)  T(F): 98 (18 Sep 2019 08:30), Max: 98.7 (17 Sep 2019 22:37)  HR: 94 (18 Sep 2019 08:30) (74 - 103)  BP: 141/90 (18 Sep 2019 08:30) (132/94 - 152/100)  BP(mean): --  RR: 18 (18 Sep 2019 08:30) (16 - 18)  SpO2: 100% (18 Sep 2019 08:30) (98% - 100%)        PHYSICAL EXAM:  General: well appearing, standing up, getting changed  HEENT:  Resp:  Cardiac:  Abdomen:  Extremities:    LABS:                        7.5    3.46  )-----------( 87       ( 18 Sep 2019 07:50 )             27.0     09-18    138  |  100  |  6<L>  ----------------------------<  97  3.9   |  23  |  0.45<L>    Ca    8.5      18 Sep 2019 07:50  Phos  1.9     09-18  Mg     1.8     09-18    TPro  6.6  /  Alb  3.6  /  TBili  0.4  /  DBili  x   /  AST  63<H>  /  ALT  47<H>  /  AlkPhos  72  09-18    LIVER FUNCTIONS - ( 18 Sep 2019 07:50 )  Alb: 3.6 g/dL / Pro: 6.6 g/dL / ALK PHOS: 72 u/L / ALT: 47 u/L / AST: 63 u/L / GGT: x           PT/INR - ( 16 Sep 2019 13:50 )   PT: 11.7 SEC;   INR: 1.03     PTT - ( 16 Sep 2019 13:50 )  PTT:30.1 SEC        CAPILLARY BLOOD GLUCOSE  Arterial Blood Gas            RADIOLOGY & ADDITIONAL TESTS:        Imaging Personally Reviewed:  [ ] YES  [ ] NO    Consultant(s) Notes Reviewed:  [x] YES  [ ] NO  Care Discussed with Consultants/Other Providers [x] YES  [ ] NO ***************************************************************  COVERING RESIDENT: Tanya Lind, PGY1  Internal Medicine   Pager: JESUS [phone] | [facility]: [phone]  ***************************************************************    CC: 44 y.o F who presented with alcohol withdrawal and s/p fall.    INTERVAL HPI/OVERNIGHT EVENTS:   No acute events overnight. CIWA have been downtrending, the last being 0, 0, 1, 0. She states that the pain in her head where she fell has been improving. Otherwise no SOB, CP, abdominal pain, or extremity swelling. She is likely going for endoscopy today.       No Known Allergies  Intolerances    MEDICATIONS (STANDING & PRN)  MEDICATIONS  (STANDING):  folic acid 1 milliGRAM(s) Oral daily  influenza   Vaccine 0.5 milliLiter(s) IntraMuscular once  LORazepam   Injectable 4 milliGRAM(s) IV Push once  LORazepam   Injectable   IV Push   LORazepam   Injectable 3 milliGRAM(s) IV Push every 4 hours  LORazepam   Injectable 2 milliGRAM(s) IV Push every 4 hours  multivitamin 1 Tablet(s) Oral daily  sodium chloride 0.9%. 1000 milliLiter(s) (75 mL/Hr) IV Continuous <Continuous>  thiamine IVPB 500 milliGRAM(s) IV Intermittent daily    MEDICATIONS  (PRN):  LORazepam   Injectable 2 milliGRAM(s) IV Push every 2 hours PRN CIWA >8 or CIWA change >2      OBJECTIVE  Vital Signs Last 24 Hrs  T(C): 36.7 (18 Sep 2019 08:30), Max: 37.1 (17 Sep 2019 22:37)  T(F): 98 (18 Sep 2019 08:30), Max: 98.7 (17 Sep 2019 22:37)  HR: 94 (18 Sep 2019 08:30) (74 - 103)  BP: 141/90 (18 Sep 2019 08:30) (132/94 - 152/100)  BP(mean): --  RR: 18 (18 Sep 2019 08:30) (16 - 18)  SpO2: 100% (18 Sep 2019 08:30) (98% - 100%)        PHYSICAL EXAM:  General: well appearing, standing up, getting changed  HEENT: NC/AT, the ecchymosis around the eye has gotten better  Resp:CTA b/l  Cardiac: normal rhythm and rate, S1 and S2 heard, no m/g/r  Abdomen: soft, ND, NT, BS+  Extremities: warm and well perfused    LABS:                        7.5    3.46  )-----------( 87       ( 18 Sep 2019 07:50 )             27.0     09-18    138  |  100  |  6<L>  ----------------------------<  97  3.9   |  23  |  0.45<L>    Ca    8.5      18 Sep 2019 07:50  Phos  1.9     09-18  Mg     1.8     09-18    TPro  6.6  /  Alb  3.6  /  TBili  0.4  /  DBili  x   /  AST  63<H>  /  ALT  47<H>  /  AlkPhos  72  09-18    LIVER FUNCTIONS - ( 18 Sep 2019 07:50 )  Alb: 3.6 g/dL / Pro: 6.6 g/dL / ALK PHOS: 72 u/L / ALT: 47 u/L / AST: 63 u/L / GGT: x           PT/INR - ( 16 Sep 2019 13:50 )   PT: 11.7 SEC;   INR: 1.03     PTT - ( 16 Sep 2019 13:50 )  PTT:30.1 SEC        CAPILLARY BLOOD GLUCOSE  Arterial Blood Gas            RADIOLOGY & ADDITIONAL TESTS:        Imaging Personally Reviewed:  [ ] YES  [ ] NO    Consultant(s) Notes Reviewed:  [x] YES  [ ] NO  Care Discussed with Consultants/Other Providers [x] YES  [ ] NO

## 2019-09-19 LAB
ALBUMIN SERPL ELPH-MCNC: 3.4 G/DL — SIGNIFICANT CHANGE UP (ref 3.3–5)
ALP SERPL-CCNC: 73 U/L — SIGNIFICANT CHANGE UP (ref 40–120)
ALT FLD-CCNC: 51 U/L — HIGH (ref 4–33)
ANION GAP SERPL CALC-SCNC: 14 MMO/L — SIGNIFICANT CHANGE UP (ref 7–14)
AST SERPL-CCNC: 78 U/L — HIGH (ref 4–32)
BILIRUB SERPL-MCNC: 0.4 MG/DL — SIGNIFICANT CHANGE UP (ref 0.2–1.2)
BUN SERPL-MCNC: 12 MG/DL — SIGNIFICANT CHANGE UP (ref 7–23)
CALCIUM SERPL-MCNC: 9 MG/DL — SIGNIFICANT CHANGE UP (ref 8.4–10.5)
CHLORIDE SERPL-SCNC: 102 MMOL/L — SIGNIFICANT CHANGE UP (ref 98–107)
CO2 SERPL-SCNC: 23 MMOL/L — SIGNIFICANT CHANGE UP (ref 22–31)
CREAT SERPL-MCNC: 0.54 MG/DL — SIGNIFICANT CHANGE UP (ref 0.5–1.3)
GLUCOSE SERPL-MCNC: 117 MG/DL — HIGH (ref 70–99)
HCT VFR BLD CALC: 26.6 % — LOW (ref 34.5–45)
HGB BLD-MCNC: 7.5 G/DL — LOW (ref 11.5–15.5)
MAGNESIUM SERPL-MCNC: 1.7 MG/DL — SIGNIFICANT CHANGE UP (ref 1.6–2.6)
MCHC RBC-ENTMCNC: 21.5 PG — LOW (ref 27–34)
MCHC RBC-ENTMCNC: 28.2 % — LOW (ref 32–36)
MCV RBC AUTO: 76.2 FL — LOW (ref 80–100)
NRBC # FLD: 0 K/UL — SIGNIFICANT CHANGE UP (ref 0–0)
PHOSPHATE SERPL-MCNC: 3 MG/DL — SIGNIFICANT CHANGE UP (ref 2.5–4.5)
PLATELET # BLD AUTO: 117 K/UL — LOW (ref 150–400)
PMV BLD: SIGNIFICANT CHANGE UP FL (ref 7–13)
POTASSIUM SERPL-MCNC: 3.5 MMOL/L — SIGNIFICANT CHANGE UP (ref 3.5–5.3)
POTASSIUM SERPL-SCNC: 3.5 MMOL/L — SIGNIFICANT CHANGE UP (ref 3.5–5.3)
PROT SERPL-MCNC: 6.6 G/DL — SIGNIFICANT CHANGE UP (ref 6–8.3)
RBC # BLD: 3.49 M/UL — LOW (ref 3.8–5.2)
RBC # FLD: 20.7 % — HIGH (ref 10.3–14.5)
SODIUM SERPL-SCNC: 139 MMOL/L — SIGNIFICANT CHANGE UP (ref 135–145)
WBC # BLD: 5.29 K/UL — SIGNIFICANT CHANGE UP (ref 3.8–10.5)
WBC # FLD AUTO: 5.29 K/UL — SIGNIFICANT CHANGE UP (ref 3.8–10.5)

## 2019-09-19 PROCEDURE — 99233 SBSQ HOSP IP/OBS HIGH 50: CPT | Mod: GC

## 2019-09-19 PROCEDURE — 99232 SBSQ HOSP IP/OBS MODERATE 35: CPT | Mod: GC

## 2019-09-19 RX ORDER — POTASSIUM CHLORIDE 20 MEQ
10 PACKET (EA) ORAL ONCE
Refills: 0 | Status: COMPLETED | OUTPATIENT
Start: 2019-09-19 | End: 2019-09-19

## 2019-09-19 RX ORDER — MAGNESIUM SULFATE 500 MG/ML
2 VIAL (ML) INJECTION ONCE
Refills: 0 | Status: COMPLETED | OUTPATIENT
Start: 2019-09-19 | End: 2019-09-19

## 2019-09-19 RX ORDER — FOLIC ACID 0.8 MG
1 TABLET ORAL
Qty: 0 | Refills: 0 | DISCHARGE
Start: 2019-09-19

## 2019-09-19 RX ADMIN — Medication 325 MILLIGRAM(S): at 11:12

## 2019-09-19 RX ADMIN — Medication 1 MILLIGRAM(S): at 11:12

## 2019-09-19 RX ADMIN — Medication 1 MILLIGRAM(S): at 17:14

## 2019-09-19 RX ADMIN — Medication 2 MILLIGRAM(S): at 04:22

## 2019-09-19 RX ADMIN — Medication 1 MILLIGRAM(S): at 10:21

## 2019-09-19 RX ADMIN — Medication 50 GRAM(S): at 10:21

## 2019-09-19 RX ADMIN — Medication 100 MILLIEQUIVALENT(S): at 10:21

## 2019-09-19 RX ADMIN — Medication 1 TABLET(S): at 11:12

## 2019-09-19 RX ADMIN — Medication 105 MILLIGRAM(S): at 12:05

## 2019-09-19 NOTE — MEDICAL STUDENT PROGRESS NOTE(EDUCATION) - NS MD HP STUD ASPLAN PLAN FT
Problem/Plan - 1:  ·  Problem: Alcohol withdrawal syndrome with complication.  Plan: Patient admitted with . CIWA 12. s/p ativan 2 mg x2 in ED.  - will treat with ativan taper  - monitor CIWA - 9/19 calculated as 0  - urine toxicology, urinalysis and lipase ordered  - continue with IV thiamine, folic acid and multivitamin  - replete electrolytes  - monitor vital signs.   Problem/Plan - 2:  ·  Problem: Foreign body in stomach, initial encounter.  Plan: CT abdomen and pelvis with two radiopaque foreign bodies 3.2 x 2.5 cm and 0.9 x 0.7 cm.   - patient does not remember possible episodes of ingestion  - scheduled for endoscopy 9/18 at 3pm  - object was removed on endoscopy. Second object was in large colon at time of Xray.  - pt can resume regular diet.      Problem/Plan - 3:  ·  Problem: Fever, unspecified fever cause. Plan: Patient febrile to 100.6F in the ED.  - etiology unclear  - BCX and urinalysis ordered.  - resolved     Problem/Plan - 4:  ·  Problem: Lactic acidemia. Plan: Lactic acid elevated at 6.3 on admission. Downtrended to 5.6 on repeat. pH 7.4.  - likely 2/2 reduced po intake vs alcoholic ketoacidosis  - continue with IV fluids  - repeat lactic acid in the AM.- 9/17 0.7 downtrending  - resolved.     Problem/Plan - 5:  ·  Problem: ETOH Abuse. Plan: Patient has been drinking 0.5 liters of vodka daily.  - Social work consult  - SBIRT.  - reevaluate for psych consult as needed     Problem/Plan - 6:  Problem: Transaminitis. Plan: Admitted with  and ALT 94.  - likely 2/2 to alcohol use  - trend liver enzymes daily.- 9/19 AST 78 ALT 51, trending up   - HIV, HepB, HepC, tests negative     Problem/Plan - 7:  ·  Problem: Pancytopenia. Plan: Admitted with Hgb 9.4, WBC 3.14 and Platelets 83.  - likely bone marrow suppression 2/2 alcohol abuse  - possibly exacerbated by adenomyosis and heavy menstruation  - trend CBC daily  - transfuse of Hgb <7  - transfuse for platelets <10, <15 if febrile, <50 if actively bleeding.  - TIBC, serum ferritin, serum transferrin, folate levels wnl; B12 levels slightly elevated  - recommend iron supplementation     Problem/Plan - 8:  ·  Problem: Need for prophylactic measure.  Plan: - DVT: pt is ambulating  - Diet: regular.

## 2019-09-19 NOTE — PROGRESS NOTE ADULT - SUBJECTIVE AND OBJECTIVE BOX
***************************************************************  COVERING RESIDENT: Tanya Lind, PGY1  Internal Medicine   Pager: JESUS [phone] | [facility]: [phone]  ***************************************************************    CC: 44 y.o F who presented with alcohol withdrawal and s/p fall.    INTERVAL HPI/OVERNIGHT EVENTS:   No acute events overnight. Yesterday pt went for endoscopy in which she tolerated well. Earing was seen in the stomach and removed with Murray net. Few erosions seen in stomach with earring, but after removal, esophagus was normal w/o issues. Last CIWAs were 2, 1 and 2.  Liberalized taper yesterday to Ativan 2mg q8hrs, patient tolerating well.     No Known Allergies  Intolerances    MEDICATIONS (STANDING & PRN)  MEDICATIONS  (STANDING):  folic acid 1 milliGRAM(s) Oral daily  influenza   Vaccine 0.5 milliLiter(s) IntraMuscular once  LORazepam   Injectable 4 milliGRAM(s) IV Push once  LORazepam   Injectable   IV Push   LORazepam   Injectable 3 milliGRAM(s) IV Push every 4 hours  LORazepam   Injectable 2 milliGRAM(s) IV Push every 4 hours  multivitamin 1 Tablet(s) Oral daily  sodium chloride 0.9%. 1000 milliLiter(s) (75 mL/Hr) IV Continuous <Continuous>  thiamine IVPB 500 milliGRAM(s) IV Intermittent daily    MEDICATIONS  (PRN):  LORazepam   Injectable 2 milliGRAM(s) IV Push every 2 hours PRN CIWA >8 or CIWA change >2      OBJECTIVE  Vital Signs Last 24 Hrs  T(C): 36.8 (19 Sep 2019 04:00), Max: 36.8 (19 Sep 2019 00:00)  T(F): 98.2 (19 Sep 2019 04:00), Max: 98.2 (19 Sep 2019 00:00)  HR: 96 (19 Sep 2019 04:00) (88 - 104)  BP: 136/87 (19 Sep 2019 04:00) (114/80 - 149/80)  BP(mean): --  RR: 17 (19 Sep 2019 04:00) (17 - 18)  SpO2: 98% (19 Sep 2019 04:00) (98% - 100%)      PHYSICAL EXAM:  General: well appearing, standing up, getting changed  HEENT: NC/AT, the ecchymosis around the eye has gotten better  Resp:CTA b/l  Cardiac: normal rhythm and rate, S1 and S2 heard, no m/g/r  Abdomen: soft, ND, NT, BS+  Extremities: warm and well perfused    LABS:                        7.5    5.29  )-----------( 117      ( 19 Sep 2019 05:20 )             26.6     09-18    138  |  100  |  6<L>  ----------------------------<  97  3.9   |  23  |  0.45<L>    Ca    8.5      18 Sep 2019 07:50  Phos  1.9     09-18  Mg     1.8     09-18    TPro  6.6  /  Alb  3.6  /  TBili  0.4  /  DBili  x   /  AST  63<H>  /  ALT  47<H>  /  AlkPhos  72  09-18    LIVER FUNCTIONS - ( 18 Sep 2019 07:50 )  Alb: 3.6 g/dL / Pro: 6.6 g/dL / ALK PHOS: 72 u/L / ALT: 47 u/L / AST: 63 u/L / GGT: x           PT/INR - ( 16 Sep 2019 13:50 )   PT: 11.7 SEC;   INR: 1.03     PTT - ( 16 Sep 2019 13:50 )  PTT:30.1 SEC      Endoscopy:  Findings:       The examined esophagus was normal.       An earring was found in the gastric body. A few erosions were seen in        the stomach, likely from the earring. Removal was accomplished using a        foreign body encarnacion with a Murray net. Post removal, the esophagus was        normal without any visible tears.       The examined duodenum up to the 2nd part was normal.                                                                                   Impression:          - An earring was found in the stomach. Removal was                        successful.  Recommendation:      - [facility].                       - Advance diet as tolerated. ***************************************************************  COVERING RESIDENT: Tanya Lind, PGY1  Internal Medicine   Pager: JESUS [phone] | [facility]: [phone]  ***************************************************************    CC: 44 y.o F who presented with alcohol withdrawal and s/p fall.    INTERVAL HPI/OVERNIGHT EVENTS:   No acute events overnight. Yesterday pt went for endoscopy in which she tolerated well. Earing was seen in the stomach and removed with Murray net. Few erosions seen in stomach with earring, but after removal, esophagus was normal w/o issues. Last CIWAs were 2, 1 and 2.  Liberalized taper yesterday to Ativan 2mg q8hrs, patient tolerating well. The pt feels well today, she is still worried about financial issues secondary to hospital stay. She states that she does not want blood work or x rays done for the remainder of the hospitalization. Otherwise she denies n/v, f/c, headache, abdominal pain, urinary symptoms, cp, sob, or palpitations.      MEDICATIONS (STANDING & PRN)  MEDICATIONS  (STANDING):  folic acid 1 milliGRAM(s) Oral daily  influenza   Vaccine 0.5 milliLiter(s) IntraMuscular once  LORazepam   Injectable 4 milliGRAM(s) IV Push once  LORazepam   Injectable   IV Push   LORazepam   Injectable 3 milliGRAM(s) IV Push every 4 hours  LORazepam   Injectable 2 milliGRAM(s) IV Push every 4 hours  multivitamin 1 Tablet(s) Oral daily  sodium chloride 0.9%. 1000 milliLiter(s) (75 mL/Hr) IV Continuous <Continuous>  thiamine IVPB 500 milliGRAM(s) IV Intermittent daily    MEDICATIONS  (PRN):  LORazepam   Injectable 2 milliGRAM(s) IV Push every 2 hours PRN CIWA >8 or CIWA change >2      OBJECTIVE  Vital Signs Last 24 Hrs  T(C): 36.8 (19 Sep 2019 04:00), Max: 36.8 (19 Sep 2019 00:00)  T(F): 98.2 (19 Sep 2019 04:00), Max: 98.2 (19 Sep 2019 00:00)  HR: 96 (19 Sep 2019 04:00) (88 - 104)  BP: 136/87 (19 Sep 2019 04:00) (114/80 - 149/80)  BP(mean): --  RR: 17 (19 Sep 2019 04:00) (17 - 18)  SpO2: 98% (19 Sep 2019 04:00) (98% - 100%)      PHYSICAL EXAM:  General: well appearing, standing up, getting changed  HEENT: NC/AT, the ecchymosis around the eye has gotten better  Resp: CTA b/l  Cardiac: normal rhythm and rate, S1 and S2 heard, no m/g/r  Abdomen: soft, ND, NT, BS+  Extremities: warm and well perfused    LABS:                        7.5    5.29  )-----------( 117      ( 19 Sep 2019 05:20 )             26.6     09-18    138  |  100  |  6<L>  ----------------------------<  97  3.9   |  23  |  0.45<L>    Ca    8.5      18 Sep 2019 07:50  Phos  1.9     09-18  Mg     1.8     09-18    TPro  6.6  /  Alb  3.6  /  TBili  0.4  /  DBili  x   /  AST  63<H>  /  ALT  47<H>  /  AlkPhos  72  09-18    LIVER FUNCTIONS - ( 18 Sep 2019 07:50 )  Alb: 3.6 g/dL / Pro: 6.6 g/dL / ALK PHOS: 72 u/L / ALT: 47 u/L / AST: 63 u/L / GGT: x           PT/INR - ( 16 Sep 2019 13:50 )   PT: 11.7 SEC;   INR: 1.03     PTT - ( 16 Sep 2019 13:50 )  PTT:30.1 SEC      Endoscopy:  Findings:       The examined esophagus was normal.       An earring was found in the gastric body. A few erosions were seen in        the stomach, likely from the earring. Removal was accomplished using a        foreign body encarnacion with a Murray net. Post removal, the esophagus was        normal without any visible tears.       The examined duodenum up to the 2nd part was normal.                                                                                   Impression:          - An earring was found in the stomach. Removal was                        successful.  Recommendation:      - [facility].                       - Advance diet as tolerated.

## 2019-09-19 NOTE — PROGRESS NOTE ADULT - SUBJECTIVE AND OBJECTIVE BOX
Interval Events:   She feels good, no abdominal pain, nausea, vomiting, diarrhea     Allergies:  No Known Allergies      Hospital Medications:  ferrous    sulfate 325 milliGRAM(s) Oral daily  folic acid 1 milliGRAM(s) Oral daily  influenza   Vaccine 0.5 milliLiter(s) IntraMuscular once  LORazepam   Injectable 4 milliGRAM(s) IV Push once  LORazepam   Injectable 2 milliGRAM(s) IV Push every 2 hours PRN  LORazepam   Injectable   IV Push   LORazepam   Injectable 2 milliGRAM(s) IV Push every 8 hours  LORazepam   Injectable 1.5 milliGRAM(s) IV Push every 12 hours  multivitamin 1 Tablet(s) Oral daily  sodium chloride 0.9%. 1000 milliLiter(s) IV Continuous <Continuous>  thiamine IVPB 500 milliGRAM(s) IV Intermittent daily      PMHX/PSHX:  Depression  ETOH Abuse  S/P Tonsillectomy      ROS:   General:  No fevers, chills or night sweats  ENT:  No sore throat or dysphagia  CV:  No pain or palpitations  Resp:  No dyspnea, cough or  wheezing  GI:  as above  Skin:  No rash or edema    PHYSICAL EXAM:   Vital Signs:  Vital Signs Last 24 Hrs  T(C): 36.8 (19 Sep 2019 08:01), Max: 36.8 (19 Sep 2019 00:00)  T(F): 98.2 (19 Sep 2019 08:01), Max: 98.2 (19 Sep 2019 00:00)  HR: 97 (19 Sep 2019 08:01) (88 - 104)  BP: 127/94 (19 Sep 2019 08:01) (114/80 - 149/80)  BP(mean): --  RR: 17 (19 Sep 2019 08:01) (17 - 18)  SpO2: 100% (19 Sep 2019 08:01) (98% - 100%)  Daily Height in cm: 170 (18 Sep 2019 15:38)    Daily     PHYSICAL EXAM:   GENERAL:  bruises face and around her eyes,  HEENT:  NC/AT,  conjunctivae clear and pink, sclera -anicteric  NECK: supple  CHEST:  CTA B/L, Normal effort  HEART:  RRR S1/S2, No murmurs  ABDOMEN:  Soft, RUQ tenderness, non-distended, normoactive bowel sounds,  no masses   EXTREMITIES:  No cyanosis or Edema  SKIN:  Warm & Dry. No rash or erythema  NEURO:  Alert, oriented, tremulous  PSYCH: Anxious    LABS:                        7.5    5.29  )-----------( 117      ( 19 Sep 2019 05:20 )             26.6     Mean Cell Volume: 76.2 fL (09-19-19 @ 05:20)    09-19    139  |  102  |  12  ----------------------------<  117<H>  3.5   |  23  |  0.54    Ca    9.0      19 Sep 2019 05:20  Phos  3.0     09-19  Mg     1.7     09-19    TPro  6.6  /  Alb  3.4  /  TBili  0.4  /  DBili  x   /  AST  78<H>  /  ALT  51<H>  /  AlkPhos  73  09-19    LIVER FUNCTIONS - ( 19 Sep 2019 05:20 )  Alb: 3.4 g/dL / Pro: 6.6 g/dL / ALK PHOS: 73 u/L / ALT: 51 u/L / AST: 78 u/L / GGT: x                                       7.5    5.29  )-----------( 117      ( 19 Sep 2019 05:20 )             26.6                         7.5    3.46  )-----------( 87       ( 18 Sep 2019 07:50 )             27.0                         7.7    4.94  )-----------( 81       ( 17 Sep 2019 13:09 )             28.3                         7.3    4.00  )-----------( 81       ( 17 Sep 2019 05:36 )             25.8                         9.4    3.14  )-----------( 83       ( 16 Sep 2019 13:50 )             34.2       Imaging:

## 2019-09-19 NOTE — PROGRESS NOTE ADULT - ASSESSMENT
44 year old female with history of alcoholism, depression, and prior pancreatitis presents with alcohol withdrawal, admitted for further management. Patient was found to have a foreign body in the stomach. It was removed by endoscopy by GI, without issues. Her CIWA scores have been downtrending to (0-2). Ativan taper was liberalized. Patient doing well with no signs of alcohol withdrawal at this moment.

## 2019-09-19 NOTE — MEDICAL STUDENT PROGRESS NOTE(EDUCATION) - SUBJECTIVE AND OBJECTIVE BOX
AYLEEN DEY  44y  Female    Patient is a 44y old  Female who presents with a chief complaint of Alcohol Withdrawal (19 Sep 2019 08:06)      INTERVAL HPI/OVERNIGHT EVENTS: Pt seen and examined. No acute overnight events. Pt s/p endoscopy, one foreign object removed from stomach, and pt can resume regular diet. Pt reports 2/10 right forehead pain on palpation. CIWA today of 0.     REVIEW OF SYSTEMS:  CONSTITUTIONAL: No fever, weight loss, or fatigue  EYES: No eye pain, visual disturbances, or discharge  ENMT:  No difficulty hearing, tinnitus, vertigo; No sinus or throat pain  NECK: No pain or stiffness  BREASTS: No pain, masses, or nipple discharge  RESPIRATORY: No cough, wheezing, chills or hemoptysis; No shortness of breath  CARDIOVASCULAR: No chest pain, palpitations, dizziness, or leg swelling  GASTROINTESTINAL: No abdominal or epigastric pain. No nausea, vomiting, or hematemesis; No diarrhea or constipation. No melena or hematochezia.  GENITOURINARY: No dysuria, frequency, hematuria, or incontinence  NEUROLOGICAL: No headaches, memory loss, loss of strength, numbness, or tremors; Pain in right forehead on palpation.  SKIN: No itching, burning, rashes, or lesions   LYMPH NODES: No enlarged glands  ENDOCRINE: No heat or cold intolerance; No hair loss  MUSCULOSKELETAL: No joint pain or swelling; No muscle, back, or extremity pain  PSYCHIATRIC: No depression, anxiety, mood swings, or difficulty sleeping  HEME/LYMPH: No easy bruising, or bleeding gums  ALLERY AND IMMUNOLOGIC: No hives or eczema    T(C): 36.8 (09-19-19 @ 12:00), Max: 36.9 (09-19-19 @ 10:00)  HR: 100 (09-19-19 @ 12:00) (92 - 104)  BP: 143/98 (09-19-19 @ 12:00) (114/80 - 149/80)  RR: 18 (09-19-19 @ 12:00) (17 - 18)  SpO2: 100% (09-19-19 @ 12:00) (98% - 100%)  Wt(kg): --Vital Signs Last 24 Hrs  T(C): 36.8 (19 Sep 2019 12:00), Max: 36.9 (19 Sep 2019 10:00)  T(F): 98.3 (19 Sep 2019 12:00), Max: 98.4 (19 Sep 2019 10:00)  HR: 100 (19 Sep 2019 12:00) (92 - 104)  BP: 143/98 (19 Sep 2019 12:00) (114/80 - 149/80)  BP(mean): --  RR: 18 (19 Sep 2019 12:00) (17 - 18)  SpO2: 100% (19 Sep 2019 12:00) (98% - 100%)    PHYSICAL EXAM:  GENERAL: NAD, well-groomed, well-developed  HEAD:  Atraumatic, Normocephalic  EYES: EOMI, PERRLA, conjunctiva and sclera clear  NERVOUS SYSTEM:  Alert & Oriented X3, Good concentration  CHEST/LUNG: Clear to percussion bilaterally; No rales, rhonchi, wheezing, or rubs  HEART: Regular rate and rhythm; No murmurs, rubs, or gallops  ABDOMEN: Soft, Nontender, Nondistended; Bowel sounds present  EXTREMITIES:  2+ Peripheral Pulses, No clubbing, cyanosis, or edema  LYMPH: No lymphadenopathy noted  SKIN: No rashes or lesions    Consultant(s) Notes Reviewed:  [x ] YES  [ ] NO  Care Discussed with Consultants/Other Providers [ x] YES  [ ] NO    LABS:                        7.5    5.29  )-----------( 117      ( 19 Sep 2019 05:20 )             26.6     09-19    139  |  102  |  12  ----------------------------<  117<H>  3.5   |  23  |  0.54    Ca    9.0      19 Sep 2019 05:20  Phos  3.0     09-19  Mg     1.7     09-19    TPro  6.6  /  Alb  3.4  /  TBili  0.4  /  DBili  x   /  AST  78<H>  /  ALT  51<H>  /  AlkPhos  73  09-19        CAPILLARY BLOOD GLUCOSE                RADIOLOGY & ADDITIONAL TESTS:    Imaging Personally Reviewed:  [ ] YES  [ ] NO    HEALTH ISSUES - PROBLEM Dx:  Fever, unspecified fever cause: Fever, unspecified fever cause  Need for prophylactic measure: Need for prophylactic measure  Pancytopenia: Pancytopenia  Transaminitis: Transaminitis  ETOH Abuse: ETOH Abuse  Lactic acidemia: Lactic acidemia  Foreign body in stomach, initial encounter: Foreign body in stomach, initial encounter  Alcohol withdrawal syndrome with complication: Alcohol withdrawal syndrome with complication

## 2019-09-19 NOTE — PROGRESS NOTE ADULT - ATTENDING COMMENTS
Patient seen and examined by myself , case discussed  with resident ,agree with the above finding and plan  pt feeling better , emotionally more stable today , still very   concerned about the bills she will receive from the hospital as she has no insurance , refusing further testing, Xrays, and US   pt denies headache, dizziness, SOB, CP, Palpitations , N/V/D, abdominal pain  CIWA score 1-2 , will c/w ativan taper , liberalized taper as pt wants to go home tomorrow , CIWA protocol   foreign body in GI tract : GI eval appreciated , S/P EGD 9/18 , large earring was found in the stomach and removed successfully and recommend  Daily abdominal X ray until  small earring part is out of her body can be confirmed . ( GI Discussed with the patient but she refused concern for radiation exposure and cost of x-ray).   elevated lactate resolved, will continue to monitor   pt refusing psych eval at this time for possible depression and anxiety  hypophosphatemia and hypomagnesemia : resolved with supplement  Transaminitis: will recommend outpt f/u in 1-2 week as pt does not want any further labs in the hospital   SW eval  DC planning in am

## 2019-09-19 NOTE — PROGRESS NOTE ADULT - ASSESSMENT
Impression:  # Foreign bodies -0 S/P EGD 09/18 large earring was found in the stomach. Removed successfully.   # Small earring is down in the region of the ascending colon ( based on abdominal x ray yesterday)  # Alcohol abuse / withdrawal  # Steatosis    Recommendations:  - Regular diet  - Daily abdominal X ray until we confirm small earring part is out of her body. ( Discussed with the patient but she refused concern for radiation exposure and cost of x-ray).   - Alcohol withdrawal management and CIWA protocol as per primary team  - Please call us back with any questions. Impression:  # Foreign bodies -0 S/P EGD 09/18 large earring was found in the stomach. Removed successfully.   # Small earring is down in the region of the ascending colon ( based on abdominal x ray yesterday)  # Alcohol abuse / withdrawal  # Steatosis    Recommendations:  - Regular diet  - Daily abdominal X ray recommended until earring is confirmed to be expelled without complication. ( Discussed with the patient but she is refusing due to concern for radiation exposure and cost of x-ray).   - Alcohol withdrawal management and CIWA protocol as per primary team  - Will sign off, please call us back with any questions.

## 2019-09-20 ENCOUNTER — TRANSCRIPTION ENCOUNTER (OUTPATIENT)
Age: 44
End: 2019-09-20

## 2019-09-20 VITALS
HEART RATE: 100 BPM | SYSTOLIC BLOOD PRESSURE: 123 MMHG | DIASTOLIC BLOOD PRESSURE: 86 MMHG | TEMPERATURE: 98 F | OXYGEN SATURATION: 100 % | RESPIRATION RATE: 16 BRPM

## 2019-09-20 PROCEDURE — 99239 HOSP IP/OBS DSCHRG MGMT >30: CPT | Mod: GC

## 2019-09-20 RX ORDER — ASCORBIC ACID 60 MG
1 TABLET,CHEWABLE ORAL
Qty: 14 | Refills: 0
Start: 2019-09-20 | End: 2019-10-03

## 2019-09-20 RX ORDER — FOLIC ACID 0.8 MG
1 TABLET ORAL
Qty: 14 | Refills: 0
Start: 2019-09-20 | End: 2019-10-03

## 2019-09-20 RX ORDER — THIAMINE MONONITRATE (VIT B1) 100 MG
1 TABLET ORAL
Qty: 14 | Refills: 0
Start: 2019-09-20 | End: 2019-10-03

## 2019-09-20 RX ORDER — FOLIC ACID 0.8 MG
1 TABLET ORAL
Qty: 30 | Refills: 0
Start: 2019-09-20 | End: 2019-10-19

## 2019-09-20 RX ORDER — FERROUS SULFATE 325(65) MG
1 TABLET ORAL
Qty: 30 | Refills: 0
Start: 2019-09-20 | End: 2019-10-19

## 2019-09-20 RX ORDER — THIAMINE MONONITRATE (VIT B1) 100 MG
1 TABLET ORAL
Qty: 30 | Refills: 0
Start: 2019-09-20 | End: 2019-10-19

## 2019-09-20 RX ORDER — FERROUS SULFATE 325(65) MG
1 TABLET ORAL
Qty: 14 | Refills: 0
Start: 2019-09-20 | End: 2019-10-03

## 2019-09-20 RX ADMIN — Medication 0.5 MILLIGRAM(S): at 08:14

## 2019-09-20 NOTE — PROGRESS NOTE ADULT - PROBLEM SELECTOR PLAN 4
Lactic acid elevated at 6.3 on admission. Downtrended to 5.6 on repeat. pH 7.4.  - likely 2/2 reduced po intake vs alcoholic ketoacidosis  - continue with IV fluids  - repeat lactic acid in the AM
- AST and ALT have been downtrending  - pt refused RUQ US secondary to not having insurance  - Hep panel and HIV negative
- AST and ALT have been downtrending  - pt refused RUQ US secondary to not having insurance  - Hep panel and HIV negative  - pt does not want more blood work
- AST and ALT have been downtrending  - pt refused RUQ US secondary to not having insurance  - Hep panel and HIV negative

## 2019-09-20 NOTE — DISCHARGE NOTE NURSING/CASE MANAGEMENT/SOCIAL WORK - NSDCPNINST_GEN_ALL_CORE
Ms Jensen is stable for discharge , she is alert and oriented x 4, diet is tolerated well and she has no visible signs of Alcohol Withdrawal.  Vital Signs are stable and Discharge Instructions are given

## 2019-09-20 NOTE — DISCHARGE NOTE NURSING/CASE MANAGEMENT/SOCIAL WORK - PATIENT PORTAL LINK FT
You can access the FollowMyHealth Patient Portal offered by [facility] by registering at the following website: [email or web address]. By joining [employer]’s FollowMyHealth portal, you will also be able to view your health information using other applications (apps) compatible with our system.

## 2019-09-20 NOTE — PROGRESS NOTE ADULT - PROBLEM SELECTOR PLAN 2
- Earring found in stomach via endoscopy w/ GI  - No issues following endoscopy, esophagus was normal after removal of foreign object  - pt tolerated endoscopy well, now tolerating a regular diet
- Earring found in stomach via endoscopy w/ GI  - No issues following endoscopy, esophagus was normal after removal of foreign object  - pt tolerated endoscopy well, now tolerating a regular diet
- CT abdomen and pelvis with two radiopaque foreign bodies 3.2 x 2.5 cm and 0.9 x 0.7 cm.   - As per GI, we will obtain abdominal x-ray for location of foreign body, and they will most likely scope her since her CIWA is lower  - keep NPO  - continue with IV fluids
CT abdomen and pelvis with two radiopaque foreign bodies 3.2 x 2.5 cm and 0.9 x 0.7 cm.   - patient does not remember possible episodes of ingestion  - GI following; daily abdominal xrays for now  - keep NPO  - continue with IV fluids

## 2019-09-20 NOTE — MEDICAL STUDENT PROGRESS NOTE(EDUCATION) - NS MD HP STUD ASPLAN PLAN FT
Problem/Plan - 1:  ·  Problem: Alcohol withdrawal syndrome with complication.  Plan: Patient admitted with . CIWA 12. s/p ativan 2 mg x2 in ED.  - will treat with ativan taper  - monitor CIWA - 9/19 calculated as 0  - urine toxicology, urinalysis and lipase ordered  - continue with IV thiamine, folic acid and multivitamin  - replete electrolytes  - monitor vital signs.   Problem/Plan - 2:  ·  Problem: Foreign body in stomach, initial encounter.  Plan: CT abdomen and pelvis with two radiopaque foreign bodies 3.2 x 2.5 cm and 0.9 x 0.7 cm.   - patient does not remember possible episodes of ingestion  - scheduled for endoscopy 9/18 at 3pm  - object was removed on endoscopy. Second object was in large colon at time of Xray. Provide pt with HAT to check stool for object.   - pt can resume regular diet.      Problem/Plan - 3:  ·  Problem: Fever, unspecified fever cause. Plan: Patient febrile to 100.6F in the ED.  - etiology unclear  - BCX and urinalysis ordered.  - resolved     Problem/Plan - 4:  ·  Problem: Lactic acidemia. Plan: Lactic acid elevated at 6.3 on admission. Downtrended to 5.6 on repeat. pH 7.4.  - likely 2/2 reduced po intake vs alcoholic ketoacidosis  - continue with IV fluids  - repeat lactic acid in the AM.- 9/17 0.7 downtrending  - resolved.     Problem/Plan - 5:  ·  Problem: ETOH Abuse. Plan: Patient has been drinking 0.5 liters of vodka daily.  - Social work consult  - SBIRT.  - reevaluate for psych consult as needed     Problem/Plan - 6:  Problem: Transaminitis. Plan: Admitted with  and ALT 94.  - likely 2/2 to alcohol use  - trend liver enzymes daily.- 9/19 AST 78 ALT 51, trending up   - HIV, HepB, HepC, tests negative     Problem/Plan - 7:  ·  Problem: Pancytopenia. Plan: Admitted with Hgb 9.4, WBC 3.14 and Platelets 83.  - likely bone marrow suppression 2/2 alcohol abuse  - possibly exacerbated by adenomyosis and heavy menstruation  - trend CBC daily  - transfuse of Hgb <7  - transfuse for platelets <10, <15 if febrile, <50 if actively bleeding.  - TIBC, serum ferritin, serum transferrin, folate levels wnl; B12 levels slightly elevated  - recommend iron supplementation     Problem/Plan - 8:  ·  Problem: Need for prophylactic measure.  Plan: - DVT: pt is ambulating  - Diet: regular.

## 2019-09-20 NOTE — PROGRESS NOTE ADULT - PROBLEM SELECTOR PROBLEM 2
Foreign body in stomach, initial encounter

## 2019-09-20 NOTE — PROGRESS NOTE ADULT - PROBLEM SELECTOR PLAN 1
- Ativan liberalized  - doing well, no signs of alcohol withdrawal
- CIWAs have been downtrending  - Ativan taper liberalized  - patient tolerating well without signs of alcohol withdrawal
- pt admitted with CIWA of 12 and BAL of 221  - Last several CIWAs are 0, 0, 1, 0  - c/w ativan taper, can liberalize her taper secondary to low score on CIWA  - f/u U tox  - continue with IV thiamine, folic acid and multivitamin  - electrolytes repleted
Patient admitted with . CIWA 12. s/p ativan 2 mg x2 in ED.  - repeat CIWA in the AM was 12  - will treat with ativan taper  - monitor CIWA  - urine toxicology, urinalysis and lipase ordered  - continue with IV thiamine, folic acid and multivitamin  - electrolytes repleted  - monitor vital signs

## 2019-09-20 NOTE — PROGRESS NOTE ADULT - REASON FOR ADMISSION
Alcohol Withdrawal

## 2019-09-20 NOTE — PROGRESS NOTE ADULT - PROBLEM SELECTOR PLAN 5
Patient has been drinking 0.5 liters of vodka daily.  - Social work consult  - SBIRT
- hemoglobin is in the 7's  - pt states she has a history of adenomyosis, with menorrhagia  - currently she is on her menstrual period and believes that is the cause  - no need for workup of anemia at this point
- hemoglobin is in the 7's  - pt states she has a history of adenomyosis, with menorrhagia  - currently she is on her menstrual period and believes that is the cause  - patient does not want more blood work, or work up  - no need for workup of anemia at this point
- Admitted with Hgb 9.4, WBC 3.14 and Platelets 83.  - likely bone marrow suppression 2/2 alcohol abuse  - trend CBC daily  - transfuse of Hgb <7  - transfuse for platelets <10, <15 if febrile, <50 if actively bleeding  - work up was negative

## 2019-09-20 NOTE — MEDICAL STUDENT PROGRESS NOTE(EDUCATION) - SUBJECTIVE AND OBJECTIVE BOX
AYLEEN DEY  44y  Female    Patient is a 44y old  Female who presents with a chief complaint of Alcohol Withdrawal (20 Sep 2019 07:09)      INTERVAL HPI/OVERNIGHT EVENTS: Pt seen and examined. No acute overnight events. Pt reports that she had a bowel movement last night. CIWA of 0.    REVIEW OF SYSTEMS:  CONSTITUTIONAL: No fever, weight loss, or fatigue  EYES: No eye pain, visual disturbances, or discharge  ENMT:  No difficulty hearing, tinnitus, vertigo; No sinus or throat pain  NECK: No pain or stiffness  BREASTS: No pain, masses, or nipple discharge  RESPIRATORY: No cough, wheezing, chills or hemoptysis; No shortness of breath  CARDIOVASCULAR: No chest pain, palpitations, dizziness, or leg swelling  GASTROINTESTINAL: No abdominal or epigastric pain. No nausea, vomiting, or hematemesis; No diarrhea or constipation. No melena or hematochezia.  GENITOURINARY: No dysuria, frequency, hematuria, or incontinence  NEUROLOGICAL: No headaches, memory loss, loss of strength, numbness, or tremors  SKIN: No itching, burning, rashes, or lesions   LYMPH NODES: No enlarged glands  ENDOCRINE: No heat or cold intolerance; No hair loss  MUSCULOSKELETAL: No joint pain or swelling; No muscle, back, or extremity pain  PSYCHIATRIC: No depression, anxiety, mood swings, or difficulty sleeping  HEME/LYMPH: No easy bruising, or bleeding gums  ALLERY AND IMMUNOLOGIC: No hives or eczema    T(C): 36.6 (09-20-19 @ 08:00), Max: 36.8 (09-19-19 @ 12:00)  HR: 100 (09-20-19 @ 08:00) (97 - 103)  BP: 132/71 (09-20-19 @ 08:00) (115/78 - 147/69)  RR: 16 (09-20-19 @ 08:00) (16 - 18)  SpO2: 100% (09-20-19 @ 08:00) (100% - 100%)  Wt(kg): --Vital Signs Last 24 Hrs  T(C): 36.6 (20 Sep 2019 08:00), Max: 36.8 (19 Sep 2019 12:00)  T(F): 97.9 (20 Sep 2019 08:00), Max: 98.3 (19 Sep 2019 12:00)  HR: 100 (20 Sep 2019 08:00) (97 - 103)  BP: 132/71 (20 Sep 2019 08:00) (115/78 - 147/69)  BP(mean): --  RR: 16 (20 Sep 2019 08:00) (16 - 18)  SpO2: 100% (20 Sep 2019 08:00) (100% - 100%)    PHYSICAL EXAM:  GENERAL: NAD, well-groomed, well-developed  HEAD:  Atraumatic, Normocephalic  EYES: EOMI, PERRLA, conjunctiva and sclera clear  ENMT: No tonsillar erythema, exudates, or enlargement; Moist mucous membranes, Good dentition, No lesions  NECK: Supple, No JVD, Normal thyroid  NERVOUS SYSTEM:  Alert & Oriented X3, Good concentration; Motor Strength 5/5 B/L upper and lower extremities; DTRs 2+ intact and symmetric  CHEST/LUNG: Clear to percussion bilaterally; No rales, rhonchi, wheezing, or rubs  HEART: Regular rate and rhythm; No murmurs, rubs, or gallops  ABDOMEN: Soft, Nontender, Nondistended; Bowel sounds present  EXTREMITIES:  2+ Peripheral Pulses, No clubbing, cyanosis, or edema  LYMPH: No lymphadenopathy noted  SKIN: No rashes or lesions    Consultant(s) Notes Reviewed:  [x ] YES  [ ] NO  Care Discussed with Consultants/Other Providers [ x] YES  [ ] NO    LABS:                        7.5    5.29  )-----------( 117      ( 19 Sep 2019 05:20 )             26.6     09-19    139  |  102  |  12  ----------------------------<  117<H>  3.5   |  23  |  0.54    Ca    9.0      19 Sep 2019 05:20  Phos  3.0     09-19  Mg     1.7     09-19    TPro  6.6  /  Alb  3.4  /  TBili  0.4  /  DBili  x   /  AST  78<H>  /  ALT  51<H>  /  AlkPhos  73  09-19        CAPILLARY BLOOD GLUCOSE                RADIOLOGY & ADDITIONAL TESTS:    Imaging Personally Reviewed:  [ ] YES  [ ] NO    HEALTH ISSUES - PROBLEM Dx:  Fever, unspecified fever cause: Fever, unspecified fever cause  Need for prophylactic measure: Need for prophylactic measure  Pancytopenia: Pancytopenia  Transaminitis: Transaminitis  ETOH Abuse: ETOH Abuse  Lactic acidemia: Lactic acidemia  Foreign body in stomach, initial encounter: Foreign body in stomach, initial encounter  Alcohol withdrawal syndrome with complication: Alcohol withdrawal syndrome with complication

## 2019-09-20 NOTE — MEDICAL STUDENT PROGRESS NOTE(EDUCATION) - NS MD HP STUD ASPLAN ASSES FT
44 year old female with history of alcoholism, depression, and prior pancreatitis presents with alcohol withdrawal, admitted for further management. CIWA of 0 today. Pt is s/p endoscopy for foreign object removal.
44 year old female with history of alcoholism, depression, and prior pancreatitis presents with alcohol withdrawal, admitted for further management. CIWA of 0 today. Pt is s/p endoscopy for foreign object removal.
44 year old female with history of alcoholism, depression, and prior pancreatitis presents with alcohol withdrawal, admitted for further management. On abdominal Xray, foreign object is seen in the duodenum.
44 year old female with history of alcoholism, depression, and prior pancreatitis presents with alcohol withdrawal, admitted for further management. CIWA of 0 today. On abdominal Xray, foreign object is seen in the duodenum.

## 2019-09-20 NOTE — PROGRESS NOTE ADULT - ASSESSMENT
44 year old female with history of alcoholism, depression, and prior pancreatitis presents with alcohol withdrawal, admitted for further management. Patient was found to have a foreign body in the stomach. It was removed by endoscopy by GI, without issues. Her CIWA scores have been downtrending to (0-2). Ativan taper was liberalized. Patient doing well with no signs of alcohol withdrawal at this moment. Plan for discharge today.

## 2019-09-20 NOTE — PROGRESS NOTE ADULT - PROBLEM SELECTOR PLAN 3
Patient febrile to 100.6F in the ED.  - pt has been afebrile since admission to the floors  - BCX and urinalysis ordered
- Patient has been drinking 0.5 liters of vodka daily  - Social work consult  - SBIRT
- Patient has been drinking 0.5 liters of vodka daily  - Social work consult  - SBIRT
- Patient has been drinking 0.5 liters of vodka daily.  - Social work consult  - SBIRT

## 2019-09-20 NOTE — PROGRESS NOTE ADULT - SUBJECTIVE AND OBJECTIVE BOX
***************************************************************  COVERING RESIDENT: Tanya Lind, PGY1  Internal Medicine   Pager: [phone] | [facility]: [phone]  ***************************************************************    CC: 44 y.o F who presented with alcohol withdrawal and s/p fall.    INTERVAL HPI/OVERNIGHT EVENTS:   Yesterday, pt expressed wishes to be discharged early. No acute events overnight.     MEDICATIONS (STANDING & PRN)  MEDICATIONS  (STANDING):  folic acid 1 milliGRAM(s) Oral daily  influenza   Vaccine 0.5 milliLiter(s) IntraMuscular once  LORazepam   Injectable 4 milliGRAM(s) IV Push once  LORazepam   Injectable   IV Push   LORazepam   Injectable 3 milliGRAM(s) IV Push every 4 hours  LORazepam   Injectable 2 milliGRAM(s) IV Push every 4 hours  multivitamin 1 Tablet(s) Oral daily  sodium chloride 0.9%. 1000 milliLiter(s) (75 mL/Hr) IV Continuous <Continuous>  thiamine IVPB 500 milliGRAM(s) IV Intermittent daily    MEDICATIONS  (PRN):  LORazepam   Injectable 2 milliGRAM(s) IV Push every 2 hours PRN CIWA >8 or CIWA change >2      OBJECTIVE  Vital Signs Last 24 Hrs  Vital Signs Last 24 Hrs  T(C): 36.7 (20 Sep 2019 04:00), Max: 36.9 (19 Sep 2019 10:00)  T(F): 98 (20 Sep 2019 04:00), Max: 98.4 (19 Sep 2019 10:00)  HR: 97 (20 Sep 2019 04:00) (97 - 103)  BP: 122/75 (20 Sep 2019 04:00) (115/78 - 147/69)  BP(mean): --  RR: 16 (20 Sep 2019 04:00) (16 - 18)  SpO2: 100% (20 Sep 2019 04:00) (100% - 100%)    PHYSICAL EXAM:  General: well appearing, standing up, getting changed  HEENT: NC/AT, the ecchymosis around the eye has gotten better  Resp: CTA b/l  Cardiac: normal rhythm and rate, S1 and S2 heard, no m/g/r  Abdomen: soft, ND, NT, BS+  Extremities: warm and well perfused    LABS:                                   7.5    5.29  )-----------( 117      ( 19 Sep 2019 05:20 )             26.6       09-18    138  |  100  |  6<L>  ----------------------------<  97  3.9   |  23  |  0.45<L>    Ca    8.5      18 Sep 2019 07:50  Phos  1.9     09-18  Mg     1.8     09-18    TPro  6.6  /  Alb  3.6  /  TBili  0.4  /  DBili  x   /  AST  63<H>  /  ALT  47<H>  /  AlkPhos  72  09-18    LIVER FUNCTIONS - ( 18 Sep 2019 07:50 )  Alb: 3.6 g/dL / Pro: 6.6 g/dL / ALK PHOS: 72 u/L / ALT: 47 u/L / AST: 63 u/L / GGT: x           PT/INR - ( 16 Sep 2019 13:50 )   PT: 11.7 SEC;   INR: 1.03     PTT - ( 16 Sep 2019 13:50 )  PTT:30.1 SEC      Endoscopy:  Findings:       The examined esophagus was normal.       An earring was found in the gastric body. A few erosions were seen in        the stomach, likely from the earring. Removal was accomplished using a        foreign body encarnacion with a Murray net. Post removal, the esophagus was        normal without any visible tears.       The examined duodenum up to the 2nd part was normal.                                                                                   Impression:          - An earring was found in the stomach. Removal was                        successful.  Recommendation:      - [facility].                       - Advance diet as tolerated.

## 2019-09-20 NOTE — PROGRESS NOTE ADULT - PROBLEM SELECTOR PLAN 6
- Admitted with  and ALT 94, INR and albumin are normal, platelets in the 80s  - likely due to EtOH abuse  - f/u RUQ US  - f/u Hep panel and HIV status
- DVT: no need for SCDs at this time, she is ambulating  - Diet: Regular diet  - Dispo: to home
- DVT: no need for SCDs at this time, she is ambulating  - Diet: Regular diet  - Dispo: to home
- DVT: no need for SCDs at this time, she is ambulating  - Diet: NPO for procedure  - Dispo: to home

## 2019-09-20 NOTE — PROGRESS NOTE ADULT - ATTENDING COMMENTS
Patient seen and examined by myself , case discussed  with resident ,agree with the above finding and plan  pt feeling better , emotionally stable today , still very   concerned about the bills she will receive from the hospital as she has no insurance , refusing further testing, Xrays, and US   pt denies headache, dizziness, SOB, CP, Palpitations , N/V/D, abdominal pain  CIWA score 0-1 , will complete  ativan taper today  , liberalized taper as pt wants to go home tomorrow , CIWA protocol   foreign body in GI tract : GI eval appreciated , S/P EGD 9/18 , large earring was found in the stomach and removed successfully and recommend  Daily abdominal X ray until  small earring part is out of her body can be confirmed . ( GI Discussed with the patient but she refused concern for radiation exposure and cost of x-ray).   elevated lactate resolved,   pt refusing psych eval at this time for possible depression and anxiety  hypophosphatemia and hypomagnesemia : resolved with supplement  Transaminitis: will recommend outpt f/u in 1-2 week as pt does not want any further labs in the hospital   SW eval  DC home today   Patient hemodynamically stable for discharge home  pt advised to f/u with medical clinic for f/u care and repeat CBC and CMP   Time spent in discharge process is 40 min

## 2019-09-20 NOTE — PROGRESS NOTE ADULT - PROBLEM SELECTOR PROBLEM 6
Transaminitis
Need for prophylactic measure

## 2019-09-21 LAB
BACTERIA BLD CULT: SIGNIFICANT CHANGE UP
BACTERIA BLD CULT: SIGNIFICANT CHANGE UP

## 2020-05-21 ENCOUNTER — INPATIENT (INPATIENT)
Facility: HOSPITAL | Age: 45
LOS: 2 days | Discharge: HOME | DRG: 897 | End: 2020-05-24
Attending: INTERNAL MEDICINE | Admitting: STUDENT IN AN ORGANIZED HEALTH CARE EDUCATION/TRAINING PROGRAM
Payer: MEDICAID

## 2020-05-21 VITALS
RESPIRATION RATE: 16 BRPM | WEIGHT: 160.06 LBS | TEMPERATURE: 98 F | SYSTOLIC BLOOD PRESSURE: 158 MMHG | HEART RATE: 113 BPM | OXYGEN SATURATION: 96 % | HEIGHT: 67 IN | DIASTOLIC BLOOD PRESSURE: 124 MMHG

## 2020-05-21 LAB
BASE EXCESS BLDV CALC-SCNC: 1.9 MMOL/L — SIGNIFICANT CHANGE UP (ref -2–2)
CO2 BLDV-SCNC: 28 MMOL/L — SIGNIFICANT CHANGE UP (ref 22–30)
ETHANOL SERPL-MCNC: 376 MG/DL — HIGH (ref 0–10)
GAS PNL BLDV: SIGNIFICANT CHANGE UP
HCO3 BLDV-SCNC: 26 MMOL/L — SIGNIFICANT CHANGE UP (ref 21–29)
HCT VFR BLD CALC: 31.2 % — LOW (ref 34.5–45)
HGB BLD-MCNC: 8.8 G/DL — LOW (ref 11.5–15.5)
MCHC RBC-ENTMCNC: 19.5 PG — LOW (ref 27–34)
MCHC RBC-ENTMCNC: 28.2 GM/DL — LOW (ref 32–36)
MCV RBC AUTO: 69 FL — LOW (ref 80–100)
PCO2 BLDV: 42 MMHG — SIGNIFICANT CHANGE UP (ref 35–50)
PH BLDV: 7.41 — SIGNIFICANT CHANGE UP (ref 7.35–7.45)
PLATELET # BLD AUTO: 117 K/UL — LOW (ref 150–400)
PO2 BLDV: 54 MMHG — HIGH (ref 25–45)
RBC # BLD: 4.52 M/UL — SIGNIFICANT CHANGE UP (ref 3.8–5.2)
RBC # FLD: 22.9 % — HIGH (ref 10.3–14.5)
SAO2 % BLDV: 81 % — SIGNIFICANT CHANGE UP (ref 67–88)
WBC # BLD: 3.67 K/UL — LOW (ref 3.8–10.5)
WBC # FLD AUTO: 3.67 K/UL — LOW (ref 3.8–10.5)

## 2020-05-21 PROCEDURE — 99285 EMERGENCY DEPT VISIT HI MDM: CPT

## 2020-05-21 RX ORDER — THIAMINE MONONITRATE (VIT B1) 100 MG
100 TABLET ORAL ONCE
Refills: 0 | Status: COMPLETED | OUTPATIENT
Start: 2020-05-21 | End: 2020-05-21

## 2020-05-21 RX ORDER — DIAZEPAM 5 MG
5 TABLET ORAL ONCE
Refills: 0 | Status: DISCONTINUED | OUTPATIENT
Start: 2020-05-21 | End: 2020-05-21

## 2020-05-21 RX ORDER — FOLIC ACID 0.8 MG
1 TABLET ORAL ONCE
Refills: 0 | Status: COMPLETED | OUTPATIENT
Start: 2020-05-21 | End: 2020-05-21

## 2020-05-21 RX ORDER — SODIUM CHLORIDE 9 MG/ML
1000 INJECTION INTRAMUSCULAR; INTRAVENOUS; SUBCUTANEOUS ONCE
Refills: 0 | Status: COMPLETED | OUTPATIENT
Start: 2020-05-21 | End: 2020-05-21

## 2020-05-21 RX ORDER — SODIUM CHLORIDE 9 MG/ML
1000 INJECTION, SOLUTION INTRAVENOUS
Refills: 0 | Status: COMPLETED | OUTPATIENT
Start: 2020-05-21 | End: 2020-05-22

## 2020-05-21 RX ADMIN — SODIUM CHLORIDE 1000 MILLILITER(S): 9 INJECTION INTRAMUSCULAR; INTRAVENOUS; SUBCUTANEOUS at 23:26

## 2020-05-21 RX ADMIN — Medication 100 MILLIGRAM(S): at 23:26

## 2020-05-21 RX ADMIN — Medication 5 MILLIGRAM(S): at 23:25

## 2020-05-21 NOTE — ED PROVIDER NOTE - OBJECTIVE STATEMENT
44 year old female with PMH HTN (on losartan), EtOH abuse with history of withdrawal seizures BIBEMS for intoxication. Pt reports 6 years of sobriety, but states she has been drinking Vodka for 1.5 years without people knowing. Pt reports drinking vodka daily, but declines to state the amount. Per EMS, patient's fiance found patient intoxicated at home today and called 911 because he was concerned about her. Pt denies any complaints at this time. Denies any chest pain, shortness of breath, abdominal pain, vomiting, diarrhea, anxiety, tremors, SI/HI, or fevers. Denies any additional complaints.

## 2020-05-21 NOTE — ED PROVIDER NOTE - CLINICAL SUMMARY MEDICAL DECISION MAKING FREE TEXT BOX
Aidan-PGY1: 44 year old female with PMH HTN (on losartan), EtOH abuse with history of withdrawal seizures BIBEMS for intoxication. Pt clinically intoxicated at this time, denies any complaints. No signs or symptoms of withdrawal at this time. Abdominal exam benign. Will obtain labs including toxicology labs, symptomatic treatment with benzos to prevent EtOH withdrawal and reassess when clinically sober.

## 2020-05-21 NOTE — ED PROVIDER NOTE - PROGRESS NOTE DETAILS
Aidan-PGY1: patient persistently tachycardic and tremulous despite 10 mg valium.  Will give 10 mg valium IV and admit for further evaluation and management of high risk etoh withdrawal given seizure history and history of DT.

## 2020-05-21 NOTE — ED PROVIDER NOTE - PHYSICAL EXAMINATION
Addended by: TITUS MCCLURE on: 5/13/2020 01:08 PM     Modules accepted: Orders     VITAL SIGNS: I have reviewed nursing notes and confirm.  CONSTITUTIONAL: intoxicated appearing, NAD  SKIN: no rash, no petechiae.  EYES: pink conjunctiva, anicteric  ENT: tongue and uvular midline, no exudates, moist mucous membranes, no tongue fasciculations  NECK: Supple; no meningismus, no JVD  CARD: RRR, no murmurs, equal radial pulses bilaterally 2+  RESP: CTAB, no respiratory distress  ABD: Soft, non-tender, non-distended, no peritoneal signs  EXT: No edema.   NEURO: Alert, oriented.   PSYCH: Cooperative, appropriate. Intoxicated appearing.

## 2020-05-21 NOTE — ED PROVIDER NOTE - NS ED ROS FT
Review of Systems    Constitutional: (-) fever, (-) chills, (-) fatigue  HEENT: (-) sore throat, (-) hearing loss, (-) nasal congestion  Cardiovascular: (-) chest pain, (-) syncope  Respiratory: (-) cough, (-) shortness of breath  Gastrointestinal: (-) vomiting, (-) diarrhea, (-) abdominal pain  Musculoskeletal: (-) neck pain, (-) back pain, (-) joint pain  Integumentary: (-) rash, (-) edema, (-) wound  Neurological: (-) headache, (-) weakness    Except as documented in the HPI, all other systems are negative.

## 2020-05-22 DIAGNOSIS — F10.239 ALCOHOL DEPENDENCE WITH WITHDRAWAL, UNSPECIFIED: ICD-10-CM

## 2020-05-22 DIAGNOSIS — I10 ESSENTIAL (PRIMARY) HYPERTENSION: ICD-10-CM

## 2020-05-22 DIAGNOSIS — F41.9 ANXIETY DISORDER, UNSPECIFIED: ICD-10-CM

## 2020-05-22 DIAGNOSIS — Z29.9 ENCOUNTER FOR PROPHYLACTIC MEASURES, UNSPECIFIED: ICD-10-CM

## 2020-05-22 DIAGNOSIS — Z02.9 ENCOUNTER FOR ADMINISTRATIVE EXAMINATIONS, UNSPECIFIED: ICD-10-CM

## 2020-05-22 LAB
ALBUMIN SERPL ELPH-MCNC: 3.6 G/DL — SIGNIFICANT CHANGE UP (ref 3.3–5)
ALBUMIN SERPL ELPH-MCNC: 4.2 G/DL — SIGNIFICANT CHANGE UP (ref 3.3–5)
ALP SERPL-CCNC: 52 U/L — SIGNIFICANT CHANGE UP (ref 40–120)
ALP SERPL-CCNC: 64 U/L — SIGNIFICANT CHANGE UP (ref 40–120)
ALT FLD-CCNC: 36 U/L — SIGNIFICANT CHANGE UP (ref 10–45)
ALT FLD-CCNC: 38 U/L — SIGNIFICANT CHANGE UP (ref 10–45)
ANION GAP SERPL CALC-SCNC: 12 MMOL/L — SIGNIFICANT CHANGE UP (ref 5–17)
ANION GAP SERPL CALC-SCNC: 17 MMOL/L — SIGNIFICANT CHANGE UP (ref 5–17)
ANISOCYTOSIS BLD QL: SIGNIFICANT CHANGE UP
APAP SERPL-MCNC: <15 UG/ML — SIGNIFICANT CHANGE UP (ref 10–30)
AST SERPL-CCNC: 71 U/L — HIGH (ref 10–40)
AST SERPL-CCNC: 99 U/L — HIGH (ref 10–40)
BASOPHILS # BLD AUTO: 0.03 K/UL — SIGNIFICANT CHANGE UP (ref 0–0.2)
BASOPHILS NFR BLD AUTO: 0.9 % — SIGNIFICANT CHANGE UP (ref 0–2)
BILIRUB DIRECT SERPL-MCNC: <0.1 MG/DL — SIGNIFICANT CHANGE UP (ref 0–0.2)
BILIRUB INDIRECT FLD-MCNC: >0.2 MG/DL — SIGNIFICANT CHANGE UP (ref 0.2–1)
BILIRUB SERPL-MCNC: 0.2 MG/DL — SIGNIFICANT CHANGE UP (ref 0.2–1.2)
BILIRUB SERPL-MCNC: 0.3 MG/DL — SIGNIFICANT CHANGE UP (ref 0.2–1.2)
BUN SERPL-MCNC: 6 MG/DL — LOW (ref 7–23)
BUN SERPL-MCNC: 8 MG/DL — SIGNIFICANT CHANGE UP (ref 7–23)
CALCIUM SERPL-MCNC: 8.1 MG/DL — LOW (ref 8.4–10.5)
CALCIUM SERPL-MCNC: 8.4 MG/DL — SIGNIFICANT CHANGE UP (ref 8.4–10.5)
CHLORIDE SERPL-SCNC: 101 MMOL/L — SIGNIFICANT CHANGE UP (ref 96–108)
CHLORIDE SERPL-SCNC: 101 MMOL/L — SIGNIFICANT CHANGE UP (ref 96–108)
CO2 SERPL-SCNC: 24 MMOL/L — SIGNIFICANT CHANGE UP (ref 22–31)
CO2 SERPL-SCNC: 24 MMOL/L — SIGNIFICANT CHANGE UP (ref 22–31)
CREAT SERPL-MCNC: 0.48 MG/DL — LOW (ref 0.5–1.3)
CREAT SERPL-MCNC: 0.52 MG/DL — SIGNIFICANT CHANGE UP (ref 0.5–1.3)
DACRYOCYTES BLD QL SMEAR: SLIGHT — SIGNIFICANT CHANGE UP
EOSINOPHIL # BLD AUTO: 0.8 K/UL — HIGH (ref 0–0.5)
EOSINOPHIL NFR BLD AUTO: 21.9 % — HIGH (ref 0–6)
GLUCOSE SERPL-MCNC: 132 MG/DL — HIGH (ref 70–99)
GLUCOSE SERPL-MCNC: 87 MG/DL — SIGNIFICANT CHANGE UP (ref 70–99)
HCG SERPL-ACNC: <2 MIU/ML — SIGNIFICANT CHANGE UP
HYPOCHROMIA BLD QL: SLIGHT — SIGNIFICANT CHANGE UP
LIDOCAIN IGE QN: 15 U/L — SIGNIFICANT CHANGE UP (ref 7–60)
LYMPHOCYTES # BLD AUTO: 0.9 K/UL — LOW (ref 1–3.3)
LYMPHOCYTES # BLD AUTO: 24.6 % — SIGNIFICANT CHANGE UP (ref 13–44)
MAGNESIUM SERPL-MCNC: 1.2 MG/DL — LOW (ref 1.6–2.6)
MAGNESIUM SERPL-MCNC: 1.6 MG/DL — SIGNIFICANT CHANGE UP (ref 1.6–2.6)
MANUAL SMEAR VERIFICATION: SIGNIFICANT CHANGE UP
MICROCYTES BLD QL: SIGNIFICANT CHANGE UP
MONOCYTES # BLD AUTO: 0.06 K/UL — SIGNIFICANT CHANGE UP (ref 0–0.9)
MONOCYTES NFR BLD AUTO: 1.7 % — LOW (ref 2–14)
NEUTROPHILS # BLD AUTO: 1.87 K/UL — SIGNIFICANT CHANGE UP (ref 1.8–7.4)
NEUTROPHILS NFR BLD AUTO: 50.9 % — SIGNIFICANT CHANGE UP (ref 43–77)
OVALOCYTES BLD QL SMEAR: SLIGHT — SIGNIFICANT CHANGE UP
PHOSPHATE SERPL-MCNC: 2.4 MG/DL — LOW (ref 2.5–4.5)
PLAT MORPH BLD: NORMAL — SIGNIFICANT CHANGE UP
POIKILOCYTOSIS BLD QL AUTO: SLIGHT — SIGNIFICANT CHANGE UP
POTASSIUM SERPL-MCNC: 3.4 MMOL/L — LOW (ref 3.5–5.3)
POTASSIUM SERPL-MCNC: 4.2 MMOL/L — SIGNIFICANT CHANGE UP (ref 3.5–5.3)
POTASSIUM SERPL-SCNC: 3.4 MMOL/L — LOW (ref 3.5–5.3)
POTASSIUM SERPL-SCNC: 4.2 MMOL/L — SIGNIFICANT CHANGE UP (ref 3.5–5.3)
PROT SERPL-MCNC: 6.9 G/DL — SIGNIFICANT CHANGE UP (ref 6–8.3)
PROT SERPL-MCNC: 8.2 G/DL — SIGNIFICANT CHANGE UP (ref 6–8.3)
RBC BLD AUTO: ABNORMAL
SALICYLATES SERPL-MCNC: <2 MG/DL — LOW (ref 15–30)
SARS-COV-2 RNA SPEC QL NAA+PROBE: SIGNIFICANT CHANGE UP
SCHISTOCYTES BLD QL AUTO: SLIGHT — SIGNIFICANT CHANGE UP
SODIUM SERPL-SCNC: 137 MMOL/L — SIGNIFICANT CHANGE UP (ref 135–145)
SODIUM SERPL-SCNC: 142 MMOL/L — SIGNIFICANT CHANGE UP (ref 135–145)

## 2020-05-22 PROCEDURE — 12345: CPT | Mod: NC

## 2020-05-22 PROCEDURE — 99223 1ST HOSP IP/OBS HIGH 75: CPT | Mod: AI

## 2020-05-22 RX ORDER — LOSARTAN POTASSIUM 100 MG/1
50 TABLET, FILM COATED ORAL DAILY
Refills: 0 | Status: DISCONTINUED | OUTPATIENT
Start: 2020-05-22 | End: 2020-05-24

## 2020-05-22 RX ORDER — DIAZEPAM 5 MG
10 TABLET ORAL ONCE
Refills: 0 | Status: DISCONTINUED | OUTPATIENT
Start: 2020-05-22 | End: 2020-05-22

## 2020-05-22 RX ORDER — MAGNESIUM SULFATE 500 MG/ML
2 VIAL (ML) INJECTION ONCE
Refills: 0 | Status: COMPLETED | OUTPATIENT
Start: 2020-05-22 | End: 2020-05-22

## 2020-05-22 RX ORDER — DIAZEPAM 5 MG
5 TABLET ORAL ONCE
Refills: 0 | Status: DISCONTINUED | OUTPATIENT
Start: 2020-05-22 | End: 2020-05-22

## 2020-05-22 RX ORDER — POTASSIUM CHLORIDE 20 MEQ
40 PACKET (EA) ORAL ONCE
Refills: 0 | Status: COMPLETED | OUTPATIENT
Start: 2020-05-22 | End: 2020-05-22

## 2020-05-22 RX ORDER — SODIUM CHLORIDE 9 MG/ML
1000 INJECTION, SOLUTION INTRAVENOUS ONCE
Refills: 0 | Status: COMPLETED | OUTPATIENT
Start: 2020-05-22 | End: 2020-05-22

## 2020-05-22 RX ORDER — LOSARTAN POTASSIUM 100 MG/1
50 TABLET, FILM COATED ORAL ONCE
Refills: 0 | Status: COMPLETED | OUTPATIENT
Start: 2020-05-22 | End: 2020-05-22

## 2020-05-22 RX ORDER — FOLIC ACID 0.8 MG
1 TABLET ORAL DAILY
Refills: 0 | Status: DISCONTINUED | OUTPATIENT
Start: 2020-05-22 | End: 2020-05-24

## 2020-05-22 RX ORDER — THIAMINE MONONITRATE (VIT B1) 100 MG
100 TABLET ORAL DAILY
Refills: 0 | Status: DISCONTINUED | OUTPATIENT
Start: 2020-05-22 | End: 2020-05-24

## 2020-05-22 RX ADMIN — Medication 2 MILLIGRAM(S): at 15:45

## 2020-05-22 RX ADMIN — Medication 40 MILLIEQUIVALENT(S): at 01:18

## 2020-05-22 RX ADMIN — Medication 50 GRAM(S): at 16:13

## 2020-05-22 RX ADMIN — LOSARTAN POTASSIUM 50 MILLIGRAM(S): 100 TABLET, FILM COATED ORAL at 01:19

## 2020-05-22 RX ADMIN — SODIUM CHLORIDE 1000 MILLILITER(S): 9 INJECTION INTRAMUSCULAR; INTRAVENOUS; SUBCUTANEOUS at 00:36

## 2020-05-22 RX ADMIN — Medication 50 MILLIGRAM(S): at 11:23

## 2020-05-22 RX ADMIN — LOSARTAN POTASSIUM 50 MILLIGRAM(S): 100 TABLET, FILM COATED ORAL at 08:41

## 2020-05-22 RX ADMIN — Medication 5 MILLIGRAM(S): at 05:34

## 2020-05-22 RX ADMIN — Medication 2 MILLIGRAM(S): at 13:07

## 2020-05-22 RX ADMIN — Medication 2 MILLIGRAM(S): at 23:10

## 2020-05-22 RX ADMIN — Medication 10 MILLIGRAM(S): at 06:20

## 2020-05-22 RX ADMIN — Medication 50 MILLIGRAM(S): at 08:19

## 2020-05-22 RX ADMIN — Medication 2 MILLIGRAM(S): at 09:07

## 2020-05-22 RX ADMIN — Medication 1 MILLIGRAM(S): at 11:23

## 2020-05-22 RX ADMIN — SODIUM CHLORIDE 1000 MILLILITER(S): 9 INJECTION, SOLUTION INTRAVENOUS at 05:34

## 2020-05-22 RX ADMIN — SODIUM CHLORIDE 100 MILLILITER(S): 9 INJECTION, SOLUTION INTRAVENOUS at 00:35

## 2020-05-22 RX ADMIN — Medication 50 MILLIGRAM(S): at 18:33

## 2020-05-22 RX ADMIN — Medication 1 MILLIGRAM(S): at 00:35

## 2020-05-22 RX ADMIN — Medication 100 MILLIGRAM(S): at 16:15

## 2020-05-22 RX ADMIN — Medication 1 TABLET(S): at 11:23

## 2020-05-22 NOTE — CHART NOTE - NSCHARTNOTEFT_GEN_A_CORE
Patient here for etoh withdrawal. Currently on librium taper.    Valorie Moore D.O.  Hospitalist Pager # [phone]

## 2020-05-22 NOTE — ED ADULT NURSE NOTE - NS ED NURSE REPORT GIVEN TO FT
Bedside report given to on coming nurse Micah CORONA RN holding. Understands pmh, medications given and plan of care for patient. Patient in stable condition, vital signs updated, has no complaints at this time and has been updated on care plan. Explained to patient that it is change of shift and new nurse is taking over, pt verbalized understanding.

## 2020-05-22 NOTE — H&P ADULT - NSICDXFAMILYHX_GEN_ALL_CORE_FT
FAMILY HISTORY:  No pertinent family history in first degree relatives FAMILY HISTORY:  Family history of heart attack, father  FH: diabetes mellitus, mother  FH: HTN (hypertension), both parents

## 2020-05-22 NOTE — H&P ADULT - ASSESSMENT
44 F Hx HTN, anxiety/depression, EtOH abuse (DT w/ ICU stay 2010 and withdrawal seizures) BIBEMS for intoxication initially, a/w EtOH withdraw.

## 2020-05-22 NOTE — ED ADULT NURSE NOTE - OBJECTIVE STATEMENT
44y Female HTN, alcohol abuse presents to the ED via EMS complaining of alcohol intoxication, as per EMS there was 4 empty vodka bottles (1L) in her apartment. Patient hesitant to divulge information regarding hospitalization, Pt stated " I had an 8oz glass of vodka" Pt is AOX4, patent airway, clear lung sounds, tender epigastric area, denies N/V/D, pt appears mildly intoxicated but does have slight hand tremors, pt is hypertensive reports not taking her blood pressure medication this evening. labs drawn and sent as ordered, 18G Iv in left AC. comfort measures and safety measures in place.

## 2020-05-22 NOTE — H&P ADULT - NSHPSOCIALHISTORY_GEN_ALL_CORE
single, lives with fiancee, works as a PA, no tobacco, drinks alcohol (1 pint of Vodka daily, last drink 5/21/20)

## 2020-05-22 NOTE — H&P ADULT - PROBLEM SELECTOR PLAN 1
s/p Valium (total 20 mg) in ED for agitation and tremors  - will start Librium taper with ativan prn  - CIWA monitoring  - Folate, Thiamine and MVI

## 2020-05-22 NOTE — H&P ADULT - ADDITIONAL PE
T(F): 98.5 (22 May 2020 06:32), Max: 98.8 (21 May 2020 23:59)  HR: 96 - 115  BP: 132/94 - 169/120  RR: 16 - 18  SpO2: 96% - 99%

## 2020-05-22 NOTE — ED ADULT NURSE REASSESSMENT NOTE - NS ED NURSE REASSESS COMMENT FT1
pt ambulated to bathroom with steady gait, pt resting comfortably in bed. MD aware of current vital signs and MD aware patient reported tremors. safety measures maintained

## 2020-05-22 NOTE — H&P ADULT - PROBLEM SELECTOR PLAN 3
ISTOP reviewed,  Ambien 10 and Xanax 0.5 QID were last filled Oct/Nov, 2019.  - will hold while on Librium taper

## 2020-05-22 NOTE — H&P ADULT - HISTORY OF PRESENT ILLNESS
44 year old female with PMH HTN (on losartan), EtOH abuse with history of withdrawal seizures BIBEMS for intoxication. Pt reports 6 years of sobriety, but states she has been drinking Vodka for 1.5 years without people knowing. Last drink possibly 11 AM on 5/21, CIWA 6 on admission, EtOH 300s, admitted for alcohol withdrawal ISTOP ref [id number]  44 F Hx HTN, anxiety/depression, EtOH abuse (DT w/ ICU stay 2010 and withdrawal seizures) BIBEMS for intoxication.  Pt reports 6 years of sobriety (last admission 2012), but states she has relapsed for about 1.5 years, drinking Vodka (1 pint on average) without people knowing. Last drink sometime on 5/21.  Her fiancee was concerned and called EMS.  c/o tremors slightly improved with multiple doses of Valium and HA. ISTOP ref [id number]  44 F Hx HTN, anxiety/depression, EtOH abuse (DT w/ ICU stay 2010 and withdrawal seizures) BIBEMS for intoxication.  Pt reports 6 years of sobriety (last admission 2012), but states she has relapsed for about 1.5 years (last admission Sep 2019), has been drinking Vodka (1 pint on average) without people knowing. Last drink sometime on 5/21.  Her fiancee was concerned and called EMS.  c/o tremors slightly improved with multiple doses of Valium and HA.

## 2020-05-22 NOTE — PROVIDER CONTACT NOTE (OTHER) - ACTION/TREATMENT ORDERED:
provider & charge nurse notified. provider stated ok to give iv ativan as ordered PRN. no further orders.

## 2020-05-22 NOTE — ED ADULT NURSE NOTE - NSIMPLEMENTINTERV_GEN_ALL_ED
Implemented All Universal Safety Interventions:  [place] to call system. Call bell, personal items and telephone within reach. Instruct patient to call for assistance. Room bathroom lighting operational. Non-slip footwear when patient is off stretcher. Physically safe environment: no spills, clutter or unnecessary equipment. Stretcher in lowest position, wheels locked, appropriate side rails in place.

## 2020-05-22 NOTE — H&P ADULT - PROBLEM SELECTOR PLAN 2
's likely due to withdraw, s/p Losartan home dose at 1am  - will continue with Losartan for HTN w/ parameters  - Psych eval offered but patient declined at this time 's likely due to withdraw, s/p Losartan home dose at 1am  - will continue with Losartan for HTN w/ parameters  - if HA persist, will CTH r/o hypertensive bleed  - Psych eval offered but patient declined at this time

## 2020-05-23 LAB
ALBUMIN SERPL ELPH-MCNC: 3.6 G/DL — SIGNIFICANT CHANGE UP (ref 3.3–5)
ALP SERPL-CCNC: 71 U/L — SIGNIFICANT CHANGE UP (ref 40–120)
ALT FLD-CCNC: 36 U/L — SIGNIFICANT CHANGE UP (ref 10–45)
ANION GAP SERPL CALC-SCNC: 13 MMOL/L — SIGNIFICANT CHANGE UP (ref 5–17)
AST SERPL-CCNC: 69 U/L — HIGH (ref 10–40)
BILIRUB SERPL-MCNC: 0.5 MG/DL — SIGNIFICANT CHANGE UP (ref 0.2–1.2)
BUN SERPL-MCNC: 14 MG/DL — SIGNIFICANT CHANGE UP (ref 7–23)
CALCIUM SERPL-MCNC: 8.6 MG/DL — SIGNIFICANT CHANGE UP (ref 8.4–10.5)
CHLORIDE SERPL-SCNC: 98 MMOL/L — SIGNIFICANT CHANGE UP (ref 96–108)
CO2 SERPL-SCNC: 22 MMOL/L — SIGNIFICANT CHANGE UP (ref 22–31)
CREAT SERPL-MCNC: 0.61 MG/DL — SIGNIFICANT CHANGE UP (ref 0.5–1.3)
ETHANOL SERPL-MCNC: SIGNIFICANT CHANGE UP MG/DL (ref 0–10)
GLUCOSE SERPL-MCNC: 125 MG/DL — HIGH (ref 70–99)
HCT VFR BLD CALC: 30.1 % — LOW (ref 34.5–45)
HGB BLD-MCNC: 8 G/DL — LOW (ref 11.5–15.5)
MAGNESIUM SERPL-MCNC: 1.9 MG/DL — SIGNIFICANT CHANGE UP (ref 1.6–2.6)
MCHC RBC-ENTMCNC: 18.6 PG — LOW (ref 27–34)
MCHC RBC-ENTMCNC: 26.6 GM/DL — LOW (ref 32–36)
MCV RBC AUTO: 70 FL — LOW (ref 80–100)
NRBC # BLD: 0 /100 WBCS — SIGNIFICANT CHANGE UP (ref 0–0)
PLATELET # BLD AUTO: 83 K/UL — LOW (ref 150–400)
POTASSIUM SERPL-MCNC: 3.4 MMOL/L — LOW (ref 3.5–5.3)
POTASSIUM SERPL-SCNC: 3.4 MMOL/L — LOW (ref 3.5–5.3)
PROT SERPL-MCNC: 7.3 G/DL — SIGNIFICANT CHANGE UP (ref 6–8.3)
RBC # BLD: 4.3 M/UL — SIGNIFICANT CHANGE UP (ref 3.8–5.2)
RBC # FLD: 21.7 % — HIGH (ref 10.3–14.5)
SODIUM SERPL-SCNC: 133 MMOL/L — LOW (ref 135–145)
TSH SERPL-MCNC: 2.31 UIU/ML — SIGNIFICANT CHANGE UP (ref 0.27–4.2)
WBC # BLD: 2.96 K/UL — LOW (ref 3.8–10.5)
WBC # FLD AUTO: 2.96 K/UL — LOW (ref 3.8–10.5)

## 2020-05-23 RX ORDER — POTASSIUM CHLORIDE 20 MEQ
40 PACKET (EA) ORAL ONCE
Refills: 0 | Status: COMPLETED | OUTPATIENT
Start: 2020-05-23 | End: 2020-05-23

## 2020-05-23 RX ADMIN — LOSARTAN POTASSIUM 50 MILLIGRAM(S): 100 TABLET, FILM COATED ORAL at 05:53

## 2020-05-23 RX ADMIN — Medication 1 TABLET(S): at 14:00

## 2020-05-23 RX ADMIN — Medication 50 MILLIGRAM(S): at 01:12

## 2020-05-23 RX ADMIN — Medication 50 MILLIGRAM(S): at 14:00

## 2020-05-23 RX ADMIN — Medication 50 MILLIGRAM(S): at 22:45

## 2020-05-23 RX ADMIN — Medication 100 MILLIGRAM(S): at 14:00

## 2020-05-23 RX ADMIN — Medication 40 MILLIEQUIVALENT(S): at 10:10

## 2020-05-23 RX ADMIN — Medication 1 MILLIGRAM(S): at 14:00

## 2020-05-23 NOTE — PROGRESS NOTE ADULT - SUBJECTIVE AND OBJECTIVE BOX
Patient is a 44y old  Female who presents with a chief complaint of alcohol withdraw (22 May 2020 06:52)      SUBJECTIVE / OVERNIGHT EVENTS:    She acknowledges that she triggered CWAS last evening 4 times, but she says that her tachycardia is due to anxiety about being in the hospital  Denies any new symptoms    Vital Signs Last 24 Hrs  T(C): 36.9 (23 May 2020 10:13), Max: 37.1 (23 May 2020 09:07)  T(F): 98.4 (23 May 2020 10:13), Max: 98.7 (23 May 2020 09:07)  HR: 106 (23 May 2020 10:13) (96 - 125)  BP: 128/90 (23 May 2020 10:13) (117/84 - 148/95)  BP(mean): --  RR: 18 (23 May 2020 10:13) (18 - 20)  SpO2: 100% (23 May 2020 10:13) (98% - 100%)  I&O's Summary    22 May 2020 07:01  -  23 May 2020 07:00  --------------------------------------------------------  IN: 370 mL / OUT: 0 mL / NET: 370 mL        GENERAL: NAD, well-developed  HEAD:  Atraumatic, Normocephalic, no tongue fasciculations  EYES: EOMI, PERRLA, conjunctiva and sclera clear  NECK: Supple, No JVD, No LAD, No carotid bruits  CHEST/LUNG: Clear to auscultation bilaterally; No wheezes  HEART: Regular rate and rhythm; No murmurs, rubs, or gallops  ABDOMEN: Soft, Nontender, Nondistended; Bowel sounds present  EXTREMITIES:  2+ Peripheral Pulses, No clubbing, cyanosis, or edema, able to fully extend UEs without tremor  SKIN: No rashes or lesions  NEURO: A+O x 3; nonfocal CN/motor/sensory/reflexes  PSYCH: Nl affect; no agitation or delirium; no suicidal or homicidal ideation    LABS:                        8.0    2.96  )-----------( 83       ( 23 May 2020 06:53 )             30.1     05-23    133<L>  |  98  |  14  ----------------------------<  125<H>  3.4<L>   |  22  |  0.61    Ca    8.6      23 May 2020 06:53  Phos  2.4     05-22  Mg     1.9     05-23    TPro  7.3  /  Alb  3.6  /  TBili  0.5  /  DBili  x   /  AST  69<H>  /  ALT  36  /  AlkPhos  71  05-23      CAPILLARY BLOOD GLUCOSE                RADIOLOGY & ADDITIONAL TESTS:    Imaging Personally Reviewed:  [x] YES  [ ] NO    Consultant(s) Notes Reviewed:  [x] YES  [ ] NO      MEDICATIONS  (STANDING):  chlordiazePOXIDE 50 milliGRAM(s) Oral every 8 hours  chlordiazePOXIDE   Oral   folic acid 1 milliGRAM(s) Oral daily  losartan 50 milliGRAM(s) Oral daily  multivitamin 1 Tablet(s) Oral daily  thiamine 100 milliGRAM(s) Oral daily    MEDICATIONS  (PRN):  LORazepam     Tablet 2 milliGRAM(s) Oral every 2 hours PRN Symptom-triggered 2 point increase in CIWA-Ar  LORazepam   Injectable 2 milliGRAM(s) IV Push every 1 hour PRN Symptom-triggered: each CIWA -Ar score 8 or GREATER      Care Discussed with Consultants/Other Providers [x] YES  [ ] NO    HEALTH ISSUES - PROBLEM Dx:  Discharge planning issues: Discharge planning issues  Prophylactic measure: Prophylactic measure  Anxiety: Anxiety  Essential hypertension: Essential hypertension  Alcohol withdrawal syndrome with complication: Alcohol withdrawal syndrome with complication

## 2020-05-23 NOTE — PROGRESS NOTE ADULT - PROBLEM SELECTOR PLAN 1
s/p Valium (total 20 mg) in ED for agitation and tremors  - on librium taper  - CIWA monitoring  - Folate, Thiamine and MVI
2019 09:03

## 2020-05-23 NOTE — PROGRESS NOTE ADULT - PROBLEM SELECTOR PLAN 2
's likely due to withdraw, s/p Losartan home dose at 1am  - will continue with Losartan for HTN w/ parameters  - if HA persist, will CTH r/o hypertensive bleed  - Psych eval offered but patient declined at this time

## 2020-05-24 ENCOUNTER — TRANSCRIPTION ENCOUNTER (OUTPATIENT)
Age: 45
End: 2020-05-24

## 2020-05-24 VITALS
DIASTOLIC BLOOD PRESSURE: 92 MMHG | OXYGEN SATURATION: 96 % | RESPIRATION RATE: 18 BRPM | HEART RATE: 99 BPM | TEMPERATURE: 98 F | SYSTOLIC BLOOD PRESSURE: 124 MMHG

## 2020-05-24 LAB
ALBUMIN SERPL ELPH-MCNC: 4.2 G/DL — SIGNIFICANT CHANGE UP (ref 3.3–5)
ALP SERPL-CCNC: 73 U/L — SIGNIFICANT CHANGE UP (ref 40–120)
ALT FLD-CCNC: 38 U/L — SIGNIFICANT CHANGE UP (ref 10–45)
ANION GAP SERPL CALC-SCNC: 13 MMOL/L — SIGNIFICANT CHANGE UP (ref 5–17)
AST SERPL-CCNC: 65 U/L — HIGH (ref 10–40)
BILIRUB SERPL-MCNC: 0.4 MG/DL — SIGNIFICANT CHANGE UP (ref 0.2–1.2)
BUN SERPL-MCNC: 17 MG/DL — SIGNIFICANT CHANGE UP (ref 7–23)
CALCIUM SERPL-MCNC: 9.6 MG/DL — SIGNIFICANT CHANGE UP (ref 8.4–10.5)
CHLORIDE SERPL-SCNC: 101 MMOL/L — SIGNIFICANT CHANGE UP (ref 96–108)
CO2 SERPL-SCNC: 21 MMOL/L — LOW (ref 22–31)
CREAT SERPL-MCNC: 0.6 MG/DL — SIGNIFICANT CHANGE UP (ref 0.5–1.3)
GLUCOSE SERPL-MCNC: 129 MG/DL — HIGH (ref 70–99)
HCT VFR BLD CALC: 31.6 % — LOW (ref 34.5–45)
HGB BLD-MCNC: 8.3 G/DL — LOW (ref 11.5–15.5)
MAGNESIUM SERPL-MCNC: 1.8 MG/DL — SIGNIFICANT CHANGE UP (ref 1.6–2.6)
MCHC RBC-ENTMCNC: 18.7 PG — LOW (ref 27–34)
MCHC RBC-ENTMCNC: 26.3 GM/DL — LOW (ref 32–36)
MCV RBC AUTO: 71.3 FL — LOW (ref 80–100)
NRBC # BLD: 0 /100 WBCS — SIGNIFICANT CHANGE UP (ref 0–0)
PHOSPHATE SERPL-MCNC: 2.7 MG/DL — SIGNIFICANT CHANGE UP (ref 2.5–4.5)
PLATELET # BLD AUTO: 130 K/UL — LOW (ref 150–400)
POTASSIUM SERPL-MCNC: 3.7 MMOL/L — SIGNIFICANT CHANGE UP (ref 3.5–5.3)
POTASSIUM SERPL-SCNC: 3.7 MMOL/L — SIGNIFICANT CHANGE UP (ref 3.5–5.3)
PROT SERPL-MCNC: 8.2 G/DL — SIGNIFICANT CHANGE UP (ref 6–8.3)
RBC # BLD: 4.43 M/UL — SIGNIFICANT CHANGE UP (ref 3.8–5.2)
RBC # FLD: 22.3 % — HIGH (ref 10.3–14.5)
SODIUM SERPL-SCNC: 135 MMOL/L — SIGNIFICANT CHANGE UP (ref 135–145)
WBC # BLD: 4.93 K/UL — SIGNIFICANT CHANGE UP (ref 3.8–10.5)
WBC # FLD AUTO: 4.93 K/UL — SIGNIFICANT CHANGE UP (ref 3.8–10.5)

## 2020-05-24 PROCEDURE — 99285 EMERGENCY DEPT VISIT HI MDM: CPT | Mod: 25

## 2020-05-24 PROCEDURE — 83735 ASSAY OF MAGNESIUM: CPT

## 2020-05-24 PROCEDURE — 83690 ASSAY OF LIPASE: CPT

## 2020-05-24 PROCEDURE — 93005 ELECTROCARDIOGRAM TRACING: CPT

## 2020-05-24 PROCEDURE — 96374 THER/PROPH/DIAG INJ IV PUSH: CPT

## 2020-05-24 PROCEDURE — 80076 HEPATIC FUNCTION PANEL: CPT

## 2020-05-24 PROCEDURE — 84100 ASSAY OF PHOSPHORUS: CPT

## 2020-05-24 PROCEDURE — 84443 ASSAY THYROID STIM HORMONE: CPT

## 2020-05-24 PROCEDURE — 80053 COMPREHEN METABOLIC PANEL: CPT

## 2020-05-24 PROCEDURE — 82803 BLOOD GASES ANY COMBINATION: CPT

## 2020-05-24 PROCEDURE — 85027 COMPLETE CBC AUTOMATED: CPT

## 2020-05-24 PROCEDURE — 84702 CHORIONIC GONADOTROPIN TEST: CPT

## 2020-05-24 PROCEDURE — 96361 HYDRATE IV INFUSION ADD-ON: CPT

## 2020-05-24 PROCEDURE — 80307 DRUG TEST PRSMV CHEM ANLYZR: CPT

## 2020-05-24 PROCEDURE — 96376 TX/PRO/DX INJ SAME DRUG ADON: CPT

## 2020-05-24 PROCEDURE — 80048 BASIC METABOLIC PNL TOTAL CA: CPT

## 2020-05-24 RX ORDER — THIAMINE MONONITRATE (VIT B1) 100 MG
1 TABLET ORAL
Qty: 0 | Refills: 0 | DISCHARGE
Start: 2020-05-24

## 2020-05-24 RX ORDER — THIAMINE MONONITRATE (VIT B1) 100 MG
1 TABLET ORAL
Qty: 30 | Refills: 0
Start: 2020-05-24 | End: 2020-06-22

## 2020-05-24 RX ORDER — FOLIC ACID 0.8 MG
1 TABLET ORAL
Qty: 0 | Refills: 0 | DISCHARGE
Start: 2020-05-24

## 2020-05-24 RX ORDER — FOLIC ACID 0.8 MG
1 TABLET ORAL
Qty: 30 | Refills: 0
Start: 2020-05-24 | End: 2020-06-22

## 2020-05-24 RX ADMIN — LOSARTAN POTASSIUM 50 MILLIGRAM(S): 100 TABLET, FILM COATED ORAL at 05:43

## 2020-05-24 RX ADMIN — Medication 50 MILLIGRAM(S): at 05:43

## 2020-05-24 NOTE — DISCHARGE NOTE PROVIDER - PROVIDER TOKENS
FREE:[LAST:[[facility]],PHONE:[phone]],FAX:[(   [phone]    -],ADDRESS:[[address] [place] [address]]]

## 2020-05-24 NOTE — DISCHARGE NOTE PROVIDER - HOSPITAL COURSE
44 F Hx HTN, anxiety/depression, EtOH abuse (DT w/ ICU stay 2010 and withdrawal seizures) BIBEMS for intoxication.  Pt reports 6 years of sobriety (last admission 2012), but states she has relapsed for about 1.5 years (last admission Sep 2019), has been drinking Vodka (1 pint on average) without people knowing. Last drink sometime on 5/21.  Her fiancee was concerned and called EMS.  c/o tremors slightly improved with multiple doses of Valium and HA. 44 F Hx HTN, anxiety/depression, EtOH abuse (DT w/ ICU stay 2010 and withdrawal seizures) BIBEMS for intoxication.  Pt reports 6 years of sobriety (last admission 2012), but states she has relapsed for about 1.5 years (last admission Sep 2019), has been drinking Vodka (1 pint on average) without people knowing. Last drink sometime on 5/21.  Her fiancee was concerned and called EMS.  c/o tremors slightly improved with multiple doses of Valium and HA.            Work-up revealed an alcohol blood level of 376. CIWA protocol initiated. Scoring as high as 9; today score 0-1; SW consulted with patient today. Medically cleared for discharge home today by Dr Paige.

## 2020-05-24 NOTE — DISCHARGE NOTE PROVIDER - NSDCCPCAREPLAN_GEN_ALL_CORE_FT
PRINCIPAL DISCHARGE DIAGNOSIS  Diagnosis: Alcohol withdrawal  Assessment and Plan of Treatment: Your condition has improved.  Complete Librium taper as prescribed.  Follow up with your primary healthcare provider or [facility] in 1-2 weeks. Call telephone number provided for appointment.  Consider alcohol Rehab program and/or Alcoholics Anonymous.      SECONDARY DISCHARGE DIAGNOSES  Diagnosis: Anxiety  Assessment and Plan of Treatment: Condition stable.  Follow up with your primary healthcare provider.    Diagnosis: Essential hypertension  Assessment and Plan of Treatment: Low salt diet  Activity as tolerated.  Take all medication as prescribed.  Follow up with your medical doctor for routine blood pressure monitoring at your next visit.  Notify your doctor if you have any of the following symptoms:   Dizziness, Lightheadedness, Blurry vision, Headache, Chest pain, Shortness of breath PRINCIPAL DISCHARGE DIAGNOSIS  Diagnosis: Alcohol withdrawal  Assessment and Plan of Treatment: Your condition has improved.  Complete Chlordiazepoxide (Librium) taper as prescribed.  Follow up with your primary healthcare provider or [facility] in 1-2 weeks. Call telephone number provided for appointment.  Consider alcohol Rehab program and/or Alcoholics Anonymous.      SECONDARY DISCHARGE DIAGNOSES  Diagnosis: Anxiety  Assessment and Plan of Treatment: Condition stable.  Follow up with your primary healthcare provider.    Diagnosis: Essential hypertension  Assessment and Plan of Treatment: Low salt diet  Activity as tolerated.  Take all medication as prescribed.  Follow up with your medical doctor for routine blood pressure monitoring at your next visit.  Notify your doctor if you have any of the following symptoms:   Dizziness, Lightheadedness, Blurry vision, Headache, Chest pain, Shortness of breath

## 2020-05-24 NOTE — DISCHARGE NOTE PROVIDER - CARE PROVIDER_API CALL
[facility],   [address] [place] [address]  Phone: [phone]  Fax: (   )    -  Follow Up Time:

## 2020-05-24 NOTE — DISCHARGE NOTE NURSING/CASE MANAGEMENT/SOCIAL WORK - PATIENT PORTAL LINK FT
You can access the FollowMyHealth Patient Portal offered by [facility] by registering at the following website: [email or web address]. By joining [employer]’s FollowMyHealth portal, you will also be able to view your health information using other applications (apps) compatible with our system.

## 2020-05-24 NOTE — DISCHARGE NOTE PROVIDER - NSCORESITESY/N_GEN_A_CORE_RD
Patient's authorization for Botox expires on 5/28/2019. Please enter new orders and send to [facility] for re authorization. No

## 2020-05-24 NOTE — DISCHARGE NOTE PROVIDER - NSDCMRMEDTOKEN_GEN_ALL_CORE_FT
Ambien 10 mg oral tablet: 1 tab(s) orally once a day (at bedtime)  chlordiazePOXIDE 25 mg oral tablet: 2 tab(s) orally every 12 hours x2 doses  folic acid 1 mg oral tablet: 1 tab(s) orally once a day  losartan 50 mg oral tablet: 1 tab(s) orally once a day  Multiple Vitamins oral tablet: 1 tab(s) orally once a day  thiamine 100 mg oral tablet: 1 tab(s) orally once a day  Xanax 0.5 mg oral tablet: 1 tab(s) orally 4 times a day, As Needed Ambien 10 mg oral tablet: 1 tab(s) orally once a day (at bedtime)  chlordiazePOXIDE 25 mg oral tablet: 2 tab(s) orally every 12 hours x2 doses  losartan 50 mg oral tablet: 1 tab(s) orally once a day  Multiple Vitamins oral tablet: 1 tab(s) orally once a day  Xanax 0.5 mg oral tablet: 1 tab(s) orally 4 times a day, As Needed Ambien 10 mg oral tablet: 1 tab(s) orally once a day (at bedtime)  chlordiazePOXIDE 25 mg oral tablet: 2 tab(s) orally every 12 hours x2 doses MDD:1 tab  folic acid 1 mg oral tablet: 1 tab(s) orally once a day  losartan 50 mg oral tablet: 1 tab(s) orally once a day  Multiple Vitamins oral tablet: 1 tab(s) orally once a day  thiamine 100 mg oral tablet: 1 tab(s) orally once a day  Xanax 0.5 mg oral tablet: 1 tab(s) orally 4 times a day, As Needed

## 2020-06-05 ENCOUNTER — INPATIENT (INPATIENT)
Facility: HOSPITAL | Age: 45
LOS: 3 days | Discharge: HOME | End: 2020-06-09
Attending: INTERNAL MEDICINE | Admitting: INTERNAL MEDICINE
Payer: MEDICAID

## 2020-06-05 VITALS
HEART RATE: 122 BPM | DIASTOLIC BLOOD PRESSURE: 114 MMHG | SYSTOLIC BLOOD PRESSURE: 153 MMHG | TEMPERATURE: 98 F | OXYGEN SATURATION: 100 % | RESPIRATION RATE: 18 BRPM

## 2020-06-05 LAB
ALBUMIN SERPL ELPH-MCNC: 4.2 G/DL — SIGNIFICANT CHANGE UP (ref 3.3–5)
ALP SERPL-CCNC: 80 U/L — SIGNIFICANT CHANGE UP (ref 40–120)
ALT FLD-CCNC: 89 U/L — HIGH (ref 4–33)
ANION GAP SERPL CALC-SCNC: 18 MMO/L — HIGH (ref 7–14)
APAP SERPL-MCNC: < 15 UG/ML — LOW (ref 15–25)
AST SERPL-CCNC: 151 U/L — HIGH (ref 4–32)
BILIRUB SERPL-MCNC: < 0.2 MG/DL — LOW (ref 0.2–1.2)
BUN SERPL-MCNC: 11 MG/DL — SIGNIFICANT CHANGE UP (ref 7–23)
CALCIUM SERPL-MCNC: 9.8 MG/DL — SIGNIFICANT CHANGE UP (ref 8.4–10.5)
CHLORIDE SERPL-SCNC: 97 MMOL/L — LOW (ref 98–107)
CO2 SERPL-SCNC: 29 MMOL/L — SIGNIFICANT CHANGE UP (ref 22–31)
CREAT SERPL-MCNC: 0.58 MG/DL — SIGNIFICANT CHANGE UP (ref 0.5–1.3)
ETHANOL BLD-MCNC: 347 MG/DL — HIGH
GLUCOSE SERPL-MCNC: 149 MG/DL — HIGH (ref 70–99)
HCT VFR BLD CALC: 30.5 % — LOW (ref 34.5–45)
HGB BLD-MCNC: 8.4 G/DL — LOW (ref 11.5–15.5)
MCHC RBC-ENTMCNC: 18.8 PG — LOW (ref 27–34)
MCHC RBC-ENTMCNC: 27.5 % — LOW (ref 32–36)
MCV RBC AUTO: 68.2 FL — LOW (ref 80–100)
NRBC # FLD: 0 K/UL — SIGNIFICANT CHANGE UP (ref 0–0)
PLATELET # BLD AUTO: 128 K/UL — LOW (ref 150–400)
PMV BLD: SIGNIFICANT CHANGE UP FL (ref 7–13)
POTASSIUM SERPL-MCNC: 3.7 MMOL/L — SIGNIFICANT CHANGE UP (ref 3.5–5.3)
POTASSIUM SERPL-SCNC: 3.7 MMOL/L — SIGNIFICANT CHANGE UP (ref 3.5–5.3)
PROT SERPL-MCNC: 7.5 G/DL — SIGNIFICANT CHANGE UP (ref 6–8.3)
RBC # BLD: 4.47 M/UL — SIGNIFICANT CHANGE UP (ref 3.8–5.2)
RBC # FLD: 22.5 % — HIGH (ref 10.3–14.5)
SALICYLATES SERPL-MCNC: < 5 MG/DL — LOW (ref 15–30)
SODIUM SERPL-SCNC: 144 MMOL/L — SIGNIFICANT CHANGE UP (ref 135–145)
WBC # BLD: 3.14 K/UL — LOW (ref 3.8–10.5)
WBC # FLD AUTO: 3.14 K/UL — LOW (ref 3.8–10.5)

## 2020-06-05 RX ORDER — LOSARTAN POTASSIUM 100 MG/1
50 TABLET, FILM COATED ORAL ONCE
Refills: 0 | Status: COMPLETED | OUTPATIENT
Start: 2020-06-05 | End: 2020-06-05

## 2020-06-05 RX ORDER — SODIUM CHLORIDE 9 MG/ML
1000 INJECTION, SOLUTION INTRAVENOUS
Refills: 0 | Status: DISCONTINUED | OUTPATIENT
Start: 2020-06-05 | End: 2020-06-07

## 2020-06-05 RX ADMIN — SODIUM CHLORIDE 150 MILLILITER(S): 9 INJECTION, SOLUTION INTRAVENOUS at 20:55

## 2020-06-05 RX ADMIN — Medication 1 MILLIGRAM(S): at 23:44

## 2020-06-05 RX ADMIN — Medication 1 MILLIGRAM(S): at 23:58

## 2020-06-05 RX ADMIN — LOSARTAN POTASSIUM 50 MILLIGRAM(S): 100 TABLET, FILM COATED ORAL at 21:20

## 2020-06-05 NOTE — ED PROVIDER NOTE - CLINICAL SUMMARY MEDICAL DECISION MAKING FREE TEXT BOX
patient with hx of etoh use, withdrawal seizures in past with intox today with 1 pint alcohol. labs IV hydration MVI reassess.

## 2020-06-05 NOTE — ED ADULT NURSE NOTE - ED STAT RN HANDOFF DETAILS
Report given to BOBBY lloyd. Pt on 1:1 for risk for harm to self and SI. Pt a&ox4 and ambulatory. Pt aware of plan of care. PT respirations even and unlabored. PT CIWA as noted, vital signs as noted, call bell in reach, md evaluated, comfort measures provided. will continue to monitor till transport.

## 2020-06-05 NOTE — ED ADULT NURSE REASSESSMENT NOTE - NS ED NURSE REASSESS COMMENT FT1
Pt a&ox4 and ambulatory. Pt states "I feel like I am going to have a seizure I suffer from DT (delirious tremors)." MD made aware medicated as per md order.  Vital signs as noted, call bell in reach, md evaluated, comfort measures provided, environment remains safe, pca within arms reach at bedside. will continue to monitor closely.

## 2020-06-05 NOTE — ED PROVIDER NOTE - PROGRESS NOTE DETAILS
Patient became tremulous, tachycrdic and hypertensive, worried about having a seizure. will continue CIWA, treat with ativan 2mg and admit

## 2020-06-05 NOTE — ED ADULT NURSE NOTE - OBJECTIVE STATEMENT
Pt arrives as intox, drowsy oriented x3, reports drinking 1 pint of vodka, denies pain, c/o dizziness. No obvious injuries, swelling, or trauma to pt's body. Placed on cardiac monitoring, undressed into pt gown. PCA at bedside. Pt denies being suicidal or homicidal at present. Pt arrives as intox, drowsy oriented x3, reports drinking 1 pint of vodka, denies pain, c/o dizziness. No obvious injuries, swelling, or trauma to pt's body. Placed on cardiac monitoring, undressed into pt gown. PCA at bedside. Pt denies being suicidal or homicidal at present. Report given to primary RN Jody ADKINS

## 2020-06-05 NOTE — ED PROVIDER NOTE - OBJECTIVE STATEMENT
44 F Hx HTN, anxiety/depression, EtOH abuse (DT w/ ICU stay 2010 and withdrawal seizures) BIBEMS for intoxication.  Pt reports 6 years of sobriety (last admission 2012), here 2 week ago with intox then admitted for withdrawal. 44 F Hx HTN, anxiety/depression, EtOH abuse (DT w/ ICU stay 2010 and withdrawal seizures) BIBEMS for intoxication.  Pt reports 6 years of sobriety (last admission 2012), here 2 week ago with intox then admitted for withdrawal. stayed 3 days d/c 5/24. take alprazolam, zolpidem. Drank 1 pint today. Initially today triage that she was suicidal, sshe denes it at present. Hx of HTN. not on meds.

## 2020-06-05 NOTE — ED PROVIDER NOTE - PHYSICAL EXAMINATION
awake, slurring speech   Full eom, perrl  neck supple, no evidence trauma  heart soundsnml   lungs clear,   abd soft  moving all extremities  pulses intact   no edema  nonfocal neuro. but coordination poor likely secondary to intoxication.

## 2020-06-05 NOTE — ED ADULT TRIAGE NOTE - CHIEF COMPLAINT QUOTE
Pt's sis ter called EMS because pt has been binge drinking and threatened to kill herself on the phone today.  Pt appears acutely intoxicated, unable to ambulate as per EMS. FS by .  Past medical history: HTN, depression, ETOH abuse

## 2020-06-06 DIAGNOSIS — F10.230 ALCOHOL DEPENDENCE WITH WITHDRAWAL, UNCOMPLICATED: ICD-10-CM

## 2020-06-06 DIAGNOSIS — Z29.9 ENCOUNTER FOR PROPHYLACTIC MEASURES, UNSPECIFIED: ICD-10-CM

## 2020-06-06 DIAGNOSIS — R45.851 SUICIDAL IDEATIONS: ICD-10-CM

## 2020-06-06 DIAGNOSIS — F43.29 ADJUSTMENT DISORDER WITH OTHER SYMPTOMS: ICD-10-CM

## 2020-06-06 DIAGNOSIS — I10 ESSENTIAL (PRIMARY) HYPERTENSION: ICD-10-CM

## 2020-06-06 DIAGNOSIS — D64.9 ANEMIA, UNSPECIFIED: ICD-10-CM

## 2020-06-06 DIAGNOSIS — F32.9 MAJOR DEPRESSIVE DISORDER, SINGLE EPISODE, UNSPECIFIED: ICD-10-CM

## 2020-06-06 DIAGNOSIS — F10.239 ALCOHOL DEPENDENCE WITH WITHDRAWAL, UNSPECIFIED: ICD-10-CM

## 2020-06-06 DIAGNOSIS — Z02.9 ENCOUNTER FOR ADMINISTRATIVE EXAMINATIONS, UNSPECIFIED: ICD-10-CM

## 2020-06-06 DIAGNOSIS — F10.920 ALCOHOL USE, UNSPECIFIED WITH INTOXICATION, UNCOMPLICATED: ICD-10-CM

## 2020-06-06 DIAGNOSIS — F10.10 ALCOHOL ABUSE, UNCOMPLICATED: ICD-10-CM

## 2020-06-06 DIAGNOSIS — D50.0 IRON DEFICIENCY ANEMIA SECONDARY TO BLOOD LOSS (CHRONIC): ICD-10-CM

## 2020-06-06 LAB
ANION GAP SERPL CALC-SCNC: 17 MMO/L — HIGH (ref 7–14)
BLD GP AB SCN SERPL QL: NEGATIVE — SIGNIFICANT CHANGE UP
BUN SERPL-MCNC: 9 MG/DL — SIGNIFICANT CHANGE UP (ref 7–23)
CALCIUM SERPL-MCNC: 9.3 MG/DL — SIGNIFICANT CHANGE UP (ref 8.4–10.5)
CHLORIDE SERPL-SCNC: 93 MMOL/L — LOW (ref 98–107)
CHOLEST SERPL-MCNC: 247 MG/DL — HIGH (ref 120–199)
CK MB BLD-MCNC: 1.03 NG/ML — SIGNIFICANT CHANGE UP (ref 1–4.7)
CK MB BLD-MCNC: < 1 NG/ML — LOW (ref 1–4.7)
CK MB BLD-MCNC: SIGNIFICANT CHANGE UP (ref 0–2.5)
CK SERPL-CCNC: 74 U/L — SIGNIFICANT CHANGE UP (ref 25–170)
CK SERPL-CCNC: 96 U/L — SIGNIFICANT CHANGE UP (ref 25–170)
CO2 SERPL-SCNC: 26 MMOL/L — SIGNIFICANT CHANGE UP (ref 22–31)
CREAT SERPL-MCNC: 0.55 MG/DL — SIGNIFICANT CHANGE UP (ref 0.5–1.3)
FERRITIN SERPL-MCNC: 24.87 NG/ML — SIGNIFICANT CHANGE UP (ref 15–150)
FOLATE SERPL-MCNC: 8 NG/ML — SIGNIFICANT CHANGE UP (ref 4.7–20)
GLUCOSE SERPL-MCNC: 159 MG/DL — HIGH (ref 70–99)
HBA1C BLD-MCNC: 5.5 % — SIGNIFICANT CHANGE UP (ref 4–5.6)
HCG SERPL-ACNC: < 5 MIU/ML — SIGNIFICANT CHANGE UP
HCT VFR BLD CALC: 25 % — LOW (ref 34.5–45)
HCT VFR BLD CALC: 25.9 % — LOW (ref 34.5–45)
HCT VFR BLD CALC: 27.5 % — LOW (ref 34.5–45)
HDLC SERPL-MCNC: 111 MG/DL — HIGH (ref 45–65)
HGB BLD-MCNC: 7.1 G/DL — LOW (ref 11.5–15.5)
HGB BLD-MCNC: 7.2 G/DL — LOW (ref 11.5–15.5)
HGB BLD-MCNC: 7.5 G/DL — LOW (ref 11.5–15.5)
IRON SATN MFR SERPL: 27 UG/DL — LOW (ref 30–160)
IRON SATN MFR SERPL: 346 UG/DL — SIGNIFICANT CHANGE UP (ref 140–530)
LIPID PNL WITH DIRECT LDL SERPL: 141 MG/DL — SIGNIFICANT CHANGE UP
MAGNESIUM SERPL-MCNC: 1.5 MG/DL — LOW (ref 1.6–2.6)
MAGNESIUM SERPL-MCNC: 1.7 MG/DL — SIGNIFICANT CHANGE UP (ref 1.6–2.6)
MCHC RBC-ENTMCNC: 18.4 PG — LOW (ref 27–34)
MCHC RBC-ENTMCNC: 18.6 PG — LOW (ref 27–34)
MCHC RBC-ENTMCNC: 19.5 PG — LOW (ref 27–34)
MCHC RBC-ENTMCNC: 27.3 % — LOW (ref 32–36)
MCHC RBC-ENTMCNC: 27.4 % — LOW (ref 32–36)
MCHC RBC-ENTMCNC: 28.8 % — LOW (ref 32–36)
MCV RBC AUTO: 67.6 FL — LOW (ref 80–100)
MCV RBC AUTO: 67.6 FL — LOW (ref 80–100)
MCV RBC AUTO: 68 FL — LOW (ref 80–100)
NRBC # FLD: 0 K/UL — SIGNIFICANT CHANGE UP (ref 0–0)
PHOSPHATE SERPL-MCNC: 3.3 MG/DL — SIGNIFICANT CHANGE UP (ref 2.5–4.5)
PHOSPHATE SERPL-MCNC: 3.8 MG/DL — SIGNIFICANT CHANGE UP (ref 2.5–4.5)
PLATELET # BLD AUTO: 101 K/UL — LOW (ref 150–400)
PLATELET # BLD AUTO: 133 K/UL — LOW (ref 150–400)
PLATELET # BLD AUTO: 134 K/UL — LOW (ref 150–400)
PMV BLD: SIGNIFICANT CHANGE UP FL (ref 7–13)
POTASSIUM SERPL-MCNC: 3.3 MMOL/L — LOW (ref 3.5–5.3)
POTASSIUM SERPL-SCNC: 3.3 MMOL/L — LOW (ref 3.5–5.3)
RBC # BLD: 3.7 M/UL — LOW (ref 3.8–5.2)
RBC # BLD: 3.81 M/UL — SIGNIFICANT CHANGE UP (ref 3.8–5.2)
RBC # BLD: 4.07 M/UL — SIGNIFICANT CHANGE UP (ref 3.8–5.2)
RBC # FLD: 22 % — HIGH (ref 10.3–14.5)
RBC # FLD: 22.2 % — HIGH (ref 10.3–14.5)
RBC # FLD: 22.4 % — HIGH (ref 10.3–14.5)
RH IG SCN BLD-IMP: POSITIVE — SIGNIFICANT CHANGE UP
SARS-COV-2 RNA SPEC QL NAA+PROBE: SIGNIFICANT CHANGE UP
SODIUM SERPL-SCNC: 136 MMOL/L — SIGNIFICANT CHANGE UP (ref 135–145)
TRIGL SERPL-MCNC: 144 MG/DL — SIGNIFICANT CHANGE UP (ref 10–149)
TROPONIN T, HIGH SENSITIVITY: < 6 NG/L — SIGNIFICANT CHANGE UP (ref ?–14)
TROPONIN T, HIGH SENSITIVITY: < 6 NG/L — SIGNIFICANT CHANGE UP (ref ?–14)
TSH SERPL-MCNC: 3.43 UIU/ML — SIGNIFICANT CHANGE UP (ref 0.27–4.2)
UIBC SERPL-MCNC: 318.7 UG/DL — SIGNIFICANT CHANGE UP (ref 110–370)
VIT B12 SERPL-MCNC: 693 PG/ML — SIGNIFICANT CHANGE UP (ref 200–900)
WBC # BLD: 3.09 K/UL — LOW (ref 3.8–10.5)
WBC # BLD: 4.23 K/UL — SIGNIFICANT CHANGE UP (ref 3.8–10.5)
WBC # BLD: 4.46 K/UL — SIGNIFICANT CHANGE UP (ref 3.8–10.5)
WBC # FLD AUTO: 3.09 K/UL — LOW (ref 3.8–10.5)
WBC # FLD AUTO: 4.23 K/UL — SIGNIFICANT CHANGE UP (ref 3.8–10.5)
WBC # FLD AUTO: 4.46 K/UL — SIGNIFICANT CHANGE UP (ref 3.8–10.5)

## 2020-06-06 PROCEDURE — 99284 EMERGENCY DEPT VISIT MOD MDM: CPT

## 2020-06-06 PROCEDURE — 99223 1ST HOSP IP/OBS HIGH 75: CPT | Mod: GC

## 2020-06-06 PROCEDURE — 93010 ELECTROCARDIOGRAM REPORT: CPT | Mod: 76

## 2020-06-06 RX ORDER — ENOXAPARIN SODIUM 100 MG/ML
40 INJECTION SUBCUTANEOUS DAILY
Refills: 0 | Status: DISCONTINUED | OUTPATIENT
Start: 2020-06-06 | End: 2020-06-06

## 2020-06-06 RX ORDER — POTASSIUM CHLORIDE 20 MEQ
40 PACKET (EA) ORAL ONCE
Refills: 0 | Status: COMPLETED | OUTPATIENT
Start: 2020-06-06 | End: 2020-06-06

## 2020-06-06 RX ORDER — ALPRAZOLAM 0.25 MG
1 TABLET ORAL
Qty: 0 | Refills: 0 | DISCHARGE

## 2020-06-06 RX ORDER — MAGNESIUM SULFATE 500 MG/ML
1 VIAL (ML) INJECTION ONCE
Refills: 0 | Status: COMPLETED | OUTPATIENT
Start: 2020-06-06 | End: 2020-06-06

## 2020-06-06 RX ORDER — LANOLIN ALCOHOL/MO/W.PET/CERES
6 CREAM (GRAM) TOPICAL AT BEDTIME
Refills: 0 | Status: DISCONTINUED | OUTPATIENT
Start: 2020-06-06 | End: 2020-06-09

## 2020-06-06 RX ORDER — THIAMINE MONONITRATE (VIT B1) 100 MG
500 TABLET ORAL EVERY 8 HOURS
Refills: 0 | Status: COMPLETED | OUTPATIENT
Start: 2020-06-06 | End: 2020-06-09

## 2020-06-06 RX ORDER — LOSARTAN POTASSIUM 100 MG/1
50 TABLET, FILM COATED ORAL DAILY
Refills: 0 | Status: DISCONTINUED | OUTPATIENT
Start: 2020-06-06 | End: 2020-06-09

## 2020-06-06 RX ORDER — THIAMINE MONONITRATE (VIT B1) 100 MG
100 TABLET ORAL ONCE
Refills: 0 | Status: DISCONTINUED | OUTPATIENT
Start: 2020-06-06 | End: 2020-06-06

## 2020-06-06 RX ORDER — THIAMINE MONONITRATE (VIT B1) 100 MG
100 TABLET ORAL DAILY
Refills: 0 | Status: DISCONTINUED | OUTPATIENT
Start: 2020-06-06 | End: 2020-06-06

## 2020-06-06 RX ORDER — ONDANSETRON 8 MG/1
4 TABLET, FILM COATED ORAL ONCE
Refills: 0 | Status: COMPLETED | OUTPATIENT
Start: 2020-06-06 | End: 2020-06-06

## 2020-06-06 RX ORDER — PANTOPRAZOLE SODIUM 20 MG/1
40 TABLET, DELAYED RELEASE ORAL DAILY
Refills: 0 | Status: DISCONTINUED | OUTPATIENT
Start: 2020-06-06 | End: 2020-06-09

## 2020-06-06 RX ORDER — ONDANSETRON 8 MG/1
4 TABLET, FILM COATED ORAL EVERY 6 HOURS
Refills: 0 | Status: DISCONTINUED | OUTPATIENT
Start: 2020-06-06 | End: 2020-06-09

## 2020-06-06 RX ORDER — THIAMINE MONONITRATE (VIT B1) 100 MG
500 TABLET ORAL DAILY
Refills: 0 | Status: DISCONTINUED | OUTPATIENT
Start: 2020-06-06 | End: 2020-06-06

## 2020-06-06 RX ORDER — FOLIC ACID 0.8 MG
1 TABLET ORAL DAILY
Refills: 0 | Status: DISCONTINUED | OUTPATIENT
Start: 2020-06-06 | End: 2020-06-06

## 2020-06-06 RX ORDER — FOLIC ACID 0.8 MG
1 TABLET ORAL DAILY
Refills: 0 | Status: DISCONTINUED | OUTPATIENT
Start: 2020-06-06 | End: 2020-06-09

## 2020-06-06 RX ADMIN — Medication 105 MILLIGRAM(S): at 18:38

## 2020-06-06 RX ADMIN — SODIUM CHLORIDE 150 MILLILITER(S): 9 INJECTION, SOLUTION INTRAVENOUS at 10:38

## 2020-06-06 RX ADMIN — Medication 2 MILLIGRAM(S): at 18:11

## 2020-06-06 RX ADMIN — Medication 40 MILLIEQUIVALENT(S): at 09:25

## 2020-06-06 RX ADMIN — Medication 1 MILLIGRAM(S): at 08:16

## 2020-06-06 RX ADMIN — Medication 1 MILLIGRAM(S): at 04:08

## 2020-06-06 RX ADMIN — Medication 2 MILLIGRAM(S): at 05:24

## 2020-06-06 RX ADMIN — Medication 50 MILLIGRAM(S): at 06:20

## 2020-06-06 RX ADMIN — Medication 2 MILLIGRAM(S): at 09:24

## 2020-06-06 RX ADMIN — LOSARTAN POTASSIUM 50 MILLIGRAM(S): 100 TABLET, FILM COATED ORAL at 05:24

## 2020-06-06 RX ADMIN — ONDANSETRON 4 MILLIGRAM(S): 8 TABLET, FILM COATED ORAL at 02:56

## 2020-06-06 RX ADMIN — Medication 2 MILLIGRAM(S): at 14:22

## 2020-06-06 RX ADMIN — Medication 1 TABLET(S): at 12:32

## 2020-06-06 RX ADMIN — Medication 1 MILLIGRAM(S): at 12:32

## 2020-06-06 RX ADMIN — SODIUM CHLORIDE 150 MILLILITER(S): 9 INJECTION, SOLUTION INTRAVENOUS at 18:44

## 2020-06-06 RX ADMIN — Medication 6 MILLIGRAM(S): at 21:45

## 2020-06-06 RX ADMIN — Medication 2 MILLIGRAM(S): at 21:45

## 2020-06-06 RX ADMIN — ONDANSETRON 4 MILLIGRAM(S): 8 TABLET, FILM COATED ORAL at 07:29

## 2020-06-06 RX ADMIN — Medication 1 MILLIGRAM(S): at 02:15

## 2020-06-06 RX ADMIN — Medication 105 MILLIGRAM(S): at 10:38

## 2020-06-06 RX ADMIN — SODIUM CHLORIDE 150 MILLILITER(S): 9 INJECTION, SOLUTION INTRAVENOUS at 02:56

## 2020-06-06 RX ADMIN — Medication 105 MILLIGRAM(S): at 22:08

## 2020-06-06 RX ADMIN — Medication 50 MILLIGRAM(S): at 07:13

## 2020-06-06 RX ADMIN — Medication 100 GRAM(S): at 09:25

## 2020-06-06 RX ADMIN — PANTOPRAZOLE SODIUM 40 MILLIGRAM(S): 20 TABLET, DELAYED RELEASE ORAL at 14:22

## 2020-06-06 NOTE — H&P ADULT - RS GEN PE MLT RESP DETAILS PC
no rales/no intercostal retractions/no wheezes/clear to auscultation bilaterally/airway patent/no rhonchi/good air movement/respirations non-labored/breath sounds equal

## 2020-06-06 NOTE — H&P ADULT - HISTORY OF PRESENT ILLNESS
44F, history of HTN, anxiety, EtOH abuse and ETOH withdrawal seizures in 2010 and ICU stay, the pt says she has been 6 years of sober and began daily drinking 6 months ago with Vodka, 1 pint a day to cope with a bad relationship with a alexis. The pt says her last drink was 11AM on 6/5. Her sister in-law called EMS and the pt was taken to the ED. In the Ed, the pt expressed SI and placed on CO. The pt also with HA, nausea, vomit, dizziness, with a fall 2 weeks ago. Denies chills, body aches, chest pain, abdominal pain, dysuria. The pt extremely tremulus and placed on a symptom triggered CIWA.      EKG  ST @ 109b/ min, TWI V1 V2 V3, QT/ QTC= 356/ 479.  Ethanol = 347  H &  H= 7.5/  27.5  Glucose= 149   AST/ALT = 151/ 89    Vitals: T max= 98.3 oral, HR = 111b/ min , BP= 149/ 110, RR= 18b/min, SPo2= 99% ra 43yo Female, history of HTN, anxiety, EtOH abuse and ETOH withdrawal seizures in 2010 and ICU stay, the pt says she has been 6 years of sober and began daily drinking 6 months ago with Vodka, 1 pint a day to cope with a "bad" personal relationship. The pt says her last drink was 11AM on 6/5. Her sister in-law called EMS. The pt expressed SI and placed on CO. The pt also with HA, nausea, vomit, dizziness, with a fall 2 weeks ago. Denies chills, body aches, chest pain, abdominal pain, dysuria. The pt extremely tremulus and placed on a symptom triggered CIWA.      EKG  ST @ 109b/ min, TWI V1 V2 V3, QT/ QTC= 356/ 479.  Ethanol = 347  H &  H= 7.5/  27.5  Glucose= 149   AST/ALT = 151/ 89    Vitals: T max= 98.3 oral, HR = 111b/ min , BP= 149/ 110, RR= 18b/min, SPo2= 99% ra 43yo Female, history of HTN, anxiety, EtOH abuse and ETOH withdrawal seizures in 2010 and ICU stay, anemia due to prolonged menses, the pt says she has been 6 years of sober and began daily drinking alcohol 6 months ago. Drinks vodka, 0.5L a day to cope with a "bad" personal relationship. The pt says her last drink was 11AM on 6/5. Her sister in-law called EMS as the pt threatened to kill herself on the phone today. The pt also reports HA, tremors, nausea, and a fall 2 weeks ago. Denies chills, body aches, chest pain, abdominal pain, dysuria.       ED course: CIWA initiated, T max= 98.3 oral, HR = 111b/ min , BP= 149/ 110, RR= 18b/min, SPo2= 99%RA

## 2020-06-06 NOTE — H&P ADULT - PROBLEM SELECTOR PLAN 7
1.  Name of PCP:  2.  PCP Contacted on Admission: [ ] Y    [X] N    3.  PCP contacted at Discharge: [ ] Y    [ ] N    [ ] N/A  4.  Post-Discharge Appointment Date and Location:  5.  Summary of Handoff given to PCP: VTE with lower extremity compression.  Fall, Aspiration, safety, seizure precautions.

## 2020-06-06 NOTE — BEHAVIORAL HEALTH ASSESSMENT NOTE - SUICIDE PROTECTIVE FACTORS
Responsibility to family and others/Supportive social network of family or friends/Identifies reasons for living

## 2020-06-06 NOTE — BEHAVIORAL HEALTH ASSESSMENT NOTE - PATIENT'S CHIEF COMPLAINT
Patient states, "I said that to my sister-in-law because I was angry, I would never do that to myself."

## 2020-06-06 NOTE — BEHAVIORAL HEALTH ASSESSMENT NOTE - NSBHCONSFOLLOWNEEDS_PSY_A_CORE
This note written by NP, the above impression/plan needs to be reviewed by Dr. Baumann, this note needs to be cosigned/Patient needs further psychiatric safety assessment prior to discharge

## 2020-06-06 NOTE — BEHAVIORAL HEALTH ASSESSMENT NOTE - CASE SUMMARY
Signed out for co-sign on 6/7. pt seen. asleep, awaken with light tactile stimuli.  45 yo female with PMHx HTN, ETOH abuse, withdrawal seizures, anemia, PPHx anxiety, depression BIBEMS for intoxication, HA, tremors, nausea, fall 2 weeks ago,  drinking daily alcohol past 6 months, sister-in-law called EMS s/p patient threatening to kill herself.   a&oX4, denies feeling "sad" today. Saying "I don't think I'm depressed, I just have a lot on my plate." reports able to enjoy things. affect flat, reports making suicidal threats in anger, reports that she has many stressors but denies current SIPI.   appeared inpatient with interview, reports seeing  psychiatrist Dr. Gitlin from [place], last saw 2months ago, prescribed Xanax (reports on takes 2x/week) and Ambien (last took 2 weeks ago), Pristiq (never took), denies a/v/h/d, denies hx inpatient psychiatric hospitalizations. Denies past SA. Declined collateral to be called.  CIWA =0 this AM. Pt appeared comfortable. Will cont CO 1:1 and continue CIWA and ativan taper. If pt remains non-suicidal, consider dc CO tomorrow.

## 2020-06-06 NOTE — BEHAVIORAL HEALTH ASSESSMENT NOTE - SUMMARY
45 yo female with PMHx HTN, ETOH abuse, withdrawal seizures, anemia, PPHx anxiety, depression BIBEMS for intoxication, HA, tremors, nausea, fall 2 weeks ago, patient reports sober X6 years, drinking daily alcohol past 6 months, sister-in-law called EMS s/p patient threatening to kill herself.    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 Gitlin from [place], last saw 2months ago, prescribed Xanax (reports on takes 2x/week) and Ambien (last took 2 weeks ago), Pristiq (never took), denies a/v/h/d, denies hx inpatient psychiatric hospitalizations. Declined collateral to be called.    On physical exam patient noted to have bilateral hand tremors, slight nystagmus, ciwa today @ 1800=8, 1900=6, 2000=6, VSS.     Discussed with primary team recommendations below. CL attending to follow patient tomorrow.    PLAN:  -c/w 1:1 Constant Observation-to be reevaluated by CL attending tomorrow  -Melatonin 6mg PO HS-monitor for sedation, hold if RR<12  -Replete and Optimize electrolytes  -Thiamine 500mg q8h IV  -c/w Ativan taper-taper may need to be altered depending on patient's response to treatment, monitor closely and adjust taper accordingly  -c/w PRN Ativan as per ciwa protocol  -Obtain EKG  -Utox

## 2020-06-06 NOTE — H&P ADULT - ASSESSMENT
44F admitted for ETOH withdrawal, SI, anemia and r/o COVID. 45yo Female, history of HTN, anxiety, EtOH abuse and ETOH withdrawal seizures in 2010 and ICU stay, anemia due to prolonged menses a/w SI in the setting of alcohol withdrawal and use d/o and intoxication;

## 2020-06-06 NOTE — H&P ADULT - NSHPSOCIALHISTORY_GEN_ALL_CORE
Single.  Lives alone.  ETOH : Vodka 1 pint daily x 6 months, last drink 6/5,11AM.  Marijuana: last smoked 6 months ago.   Denies Nicotine.  Unemployed. Single  Lives alone  ETOH : Vodka 1/2 L daily x 6 months, last drink 6/5,11AM  Marijuana: last smoked 6 months ago  Denies Nicotine use  Unemployed

## 2020-06-06 NOTE — BEHAVIORAL HEALTH ASSESSMENT NOTE - OTHER
lives with fiance frequently closing eyes when speaking not assessed-in bed "sad" unemployment, financial stressors, estranged from family

## 2020-06-06 NOTE — BEHAVIORAL HEALTH ASSESSMENT NOTE - NSBHMEDSOTHERFT_PSY_A_CORE
Home Medications:  Ambien 10 mg oral tablet: 1 tab(s) orally once a day (at bedtime) (22 May 2020 07:44)  losartan 50 mg oral tablet: 1 tab(s) orally once a day (22 May 2020 07:44)  Multiple Vitamins oral tablet: 1 tab(s) orally once a day (24 May 2020 09:43)  Xanax 1 mg oral tablet: 1 tab(s) orally 2 times a day, As Needed (06 Jun 2020 06:43)

## 2020-06-06 NOTE — BEHAVIORAL HEALTH ASSESSMENT NOTE - NSBHCONSULTRECOMMENDOTHER_PSY_A_CORE FT
[facility]  [address] [place] [address], [address], [address] [place] [address] [phone]    c/w AA

## 2020-06-06 NOTE — H&P ADULT - NEGATIVE ENMT SYMPTOMS
no nasal congestion/no tinnitus/no sinus symptoms/no nasal discharge/no ear pain/no nasal obstruction

## 2020-06-06 NOTE — BEHAVIORAL HEALTH ASSESSMENT NOTE - NSBHCHARTREVIEWLAB_PSY_A_CORE FT
CBC Full  -  ( 06 Jun 2020 07:03 )  WBC Count : 4.46 K/uL  RBC Count : 3.70 M/uL  Hemoglobin : 7.2 g/dL  Hematocrit : 25.0 %  Platelet Count - Automated : 133 K/uL  Mean Cell Volume : 67.6 fL  Mean Cell Hemoglobin : 19.5 pg  Mean Cell Hemoglobin Concentration : 28.8 %  Auto Neutrophil # : x    Ethanol, Whole Blood: 347:     Auto Lymphocyte # : x  Auto Monocyte # : x  Auto Eosinophil # : x  Auto Basophil # : x  Auto Neutrophil % : x  Auto Lymphocyte % : x  Auto Monocyte % : x  Auto Eosinophil % : x  Auto Basophil % : x  06-06    136  |  93<L>  |  9   ----------------------------<  159<H>  3.3<L>   |  26  |  0.55    Ca    9.3      06 Jun 2020 04:00  Phos  3.3     06-06  Mg     1.5     06-06    TPro  7.5  /  Alb  4.2  /  TBili  < 0.2<L>  /  DBili  x   /  AST  151<H>  /  ALT  89<H>  /  AlkPhos  80  06-05

## 2020-06-06 NOTE — H&P ADULT - NEGATIVE OPHTHALMOLOGIC SYMPTOMS
no photophobia/no blurred vision R/no lacrimation L/no lacrimation R/no blurred vision L/no discharge L/no discharge R

## 2020-06-06 NOTE — H&P ADULT - GASTROINTESTINAL DETAILS
no bruit/no rebound tenderness/no rigidity/nontender/no guarding/soft/no masses palpable/bowel sounds normal

## 2020-06-06 NOTE — H&P ADULT - PROBLEM SELECTOR PLAN 3
Continue CO with 1 to 1.  Call Psych in am., Hgb baseline 8-9 per EMR review; Due to menorrhagia;  MCV 80s-->60s overtime per EMR;  Hematology c/s for IV iron   Gyn c/s for menstruation control  No signs of active bleeding  Pt request no blood transfusions

## 2020-06-06 NOTE — H&P ADULT - NSHPLABSRESULTS_GEN_ALL_CORE
EKG  ST @ 109b/ min, TWI V1 V2 V3, QT/ QTC= 356/ 479.  Ethanol = 347  H &  H= 7.5/  27.5  Glucose= 149   AST/ALT = 151/ 89                        7.5    4.23  )-----------( 134      ( 06 Jun 2020 04:00 )             27.5   06-05    144  |  97<L>  |  11  ----------------------------<  149<H>  3.7   |  29  |  0.58    Ca    9.8      05 Jun 2020 20:30  Phos  3.8     06-05  Mg     1.7     06-05    TPro  7.5  /  Alb  4.2  /  TBili  < 0.2<L>  /  DBili  x   /  AST  151<H>  /  ALT  89<H>  /  AlkPhos  80  06-05 EKG, 6/6, sinus tachycardia, @ 109b/ min, QTC= 356/ 479, no acute Tw or ST changes - my reading     Ethanol = 347  H &  H= 7.5/  27.5  Glucose= 149   AST/ALT = 151/ 89                        7.5    4.23  )-----------( 134      ( 06 Jun 2020 04:00 )             27.5   06-05    144  |  97<L>  |  11  ----------------------------<  149<H>  3.7   |  29  |  0.58    Ca    9.8      05 Jun 2020 20:30  Phos  3.8     06-05  Mg     1.7     06-05    TPro  7.5  /  Alb  4.2  /  TBili  < 0.2<L>  /  DBili  x   /  AST  151<H>  /  ALT  89<H>  /  AlkPhos  80  06-05

## 2020-06-06 NOTE — BEHAVIORAL HEALTH ASSESSMENT NOTE - RISK ASSESSMENT
High Acute Suicide Risk Risk Factors: ETOH dependence, ETOH withdrawal, hx DT's/seizures, hx depression/anxiety, loss of job, estranged from family, current relationship in jeopardy r/t alcohol abuse, financial stressors r/t loss of job  Protective Factors: residential stability, supportive fiance, has AA sponsor, educated, able to articulate feelings/emotions, no hx inpatient hospitalization, denies si    At this time and circumstance patient is at risk to harm self as evidenced by statement made to family as well as multiple stressors, 1:1 constant observation is recommended at this time.

## 2020-06-06 NOTE — BEHAVIORAL HEALTH ASSESSMENT NOTE - HPI (INCLUDE ILLNESS QUALITY, SEVERITY, DURATION, TIMING, CONTEXT, MODIFYING FACTORS, ASSOCIATED SIGNS AND SYMPTOMS)
45 yo female with PMHx HTN, ETOH abuse, withdrawal seizures, anemia, PPHx anxiety, depression BIBEMS for intoxication, HA, tremors, nausea, fall 2 weeks ago, patient reports sober X6 years, drinking daily alcohol past 6 months, sister-in-law called EMS s/p patient threatening to kill herself.    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 Gitlin from [place], last saw 2months ago, prescribed Xanax (reports on takes 2x/week) and Ambien (last took 2 weeks ago), Pristiq (never took), denies a/v/h/d, denies hx inpatient psychiatric hospitalizations. Declined collateral to be called.    On physical exam patient noted to have bilateral hand tremors, slight nystagmus, ciwa today @ 1800=8, 1900=6, 2000=6, VSS.    Discussed with primary team recommendations below. CL attending to follow patient.

## 2020-06-06 NOTE — H&P ADULT - NSICDXFAMILYHX_GEN_ALL_CORE_FT
FAMILY HISTORY:  Family history of heart attack, father  FH: alcohol abuse, father  FH: diabetes mellitus, mother  FH: HTN (hypertension), both parents

## 2020-06-06 NOTE — H&P ADULT - PROBLEM SELECTOR PLAN 6
VTE with lower extremity compression.  Fall, Aspiration, safety, seizure precautions. Currently not on any antidepressants.  Psych c/s pending

## 2020-06-06 NOTE — PROVIDER CONTACT NOTE (OTHER) - ACTION/TREATMENT ORDERED:
PA will see pt, EKG done as ordered. IVF infusing well. on CIWA monitoring as ordered. will continue to monitor

## 2020-06-06 NOTE — H&P ADULT - NEGATIVE NEUROLOGICAL SYMPTOMS
no syncope/no facial palsy/no weakness/no paresthesias/no confusion/no loss of consciousness/no hemiparesis

## 2020-06-06 NOTE — H&P ADULT - PROBLEM SELECTOR PLAN 2
No signs of active bleeding.  F/U repeat CBC and Fe+ studies. No signs of active bleeding.  F/U repeat CBC and Fe+ studies.  Pt adamantly refusing further blood draws and states she does not want a blood transfusion. Withdrawing; Suspect alcohol dependence given EtOH level 347;  Plan as above  SW c/s

## 2020-06-06 NOTE — BEHAVIORAL HEALTH ASSESSMENT NOTE - NSBHCHARTREVIEWVS_PSY_A_CORE FT
Vital Signs Last 24 Hrs  T(C): 36.7 (06 Jun 2020 21:07), Max: 36.9 (06 Jun 2020 15:00)  T(F): 98.1 (06 Jun 2020 21:07), Max: 98.5 (06 Jun 2020 15:00)  HR: 93 (06 Jun 2020 21:07) (92 - 116)  BP: 161/105 (06 Jun 2020 21:07) (126/87 - 190/133)  BP(mean): --  RR: 18 (06 Jun 2020 21:07) (16 - 18)  SpO2: 100% (06 Jun 2020 21:07) (97% - 100%)

## 2020-06-06 NOTE — H&P ADULT - PROBLEM SELECTOR PLAN 1
- Continue CIWA symptom triggered.   - Give F.A, Thiamine, MVI. Zofran PRN  - Seizure precautions.   - Call JUAN. High risk for DT;  - Continue CIWA symptom triggered.   - F.A, Thiamine, MVI. Zofran PRN  - Seizure precautions.   - SW c/s  - Librium taper

## 2020-06-07 ENCOUNTER — TRANSCRIPTION ENCOUNTER (OUTPATIENT)
Age: 45
End: 2020-06-07

## 2020-06-07 LAB
ALBUMIN SERPL ELPH-MCNC: 3.5 G/DL — SIGNIFICANT CHANGE UP (ref 3.3–5)
ALP SERPL-CCNC: 69 U/L — SIGNIFICANT CHANGE UP (ref 40–120)
ALT FLD-CCNC: 58 U/L — HIGH (ref 4–33)
ANION GAP SERPL CALC-SCNC: 14 MMO/L — SIGNIFICANT CHANGE UP (ref 7–14)
ANISOCYTOSIS BLD QL: SIGNIFICANT CHANGE UP
ANISOCYTOSIS BLD QL: SIGNIFICANT CHANGE UP
APTT BLD: 29.3 SEC — SIGNIFICANT CHANGE UP (ref 27.5–36.3)
AST SERPL-CCNC: 72 U/L — HIGH (ref 4–32)
BASOPHILS # BLD AUTO: 0.02 K/UL — SIGNIFICANT CHANGE UP (ref 0–0.2)
BASOPHILS NFR BLD AUTO: 0.5 % — SIGNIFICANT CHANGE UP (ref 0–2)
BASOPHILS NFR SPEC: 0 % — SIGNIFICANT CHANGE UP (ref 0–2)
BILIRUB SERPL-MCNC: 0.4 MG/DL — SIGNIFICANT CHANGE UP (ref 0.2–1.2)
BLD GP AB SCN SERPL QL: NEGATIVE — SIGNIFICANT CHANGE UP
BUN SERPL-MCNC: 6 MG/DL — LOW (ref 7–23)
CALCIUM SERPL-MCNC: 9 MG/DL — SIGNIFICANT CHANGE UP (ref 8.4–10.5)
CHLORIDE SERPL-SCNC: 98 MMOL/L — SIGNIFICANT CHANGE UP (ref 98–107)
CO2 SERPL-SCNC: 22 MMOL/L — SIGNIFICANT CHANGE UP (ref 22–31)
CREAT SERPL-MCNC: 0.56 MG/DL — SIGNIFICANT CHANGE UP (ref 0.5–1.3)
EOSINOPHIL # BLD AUTO: 0.16 K/UL — SIGNIFICANT CHANGE UP (ref 0–0.5)
EOSINOPHIL NFR BLD AUTO: 3.9 % — SIGNIFICANT CHANGE UP (ref 0–6)
EOSINOPHIL NFR FLD: 0 % — SIGNIFICANT CHANGE UP (ref 0–6)
GLUCOSE SERPL-MCNC: 119 MG/DL — HIGH (ref 70–99)
HCT VFR BLD CALC: 25.9 % — LOW (ref 34.5–45)
HGB BLD-MCNC: 7.3 G/DL — LOW (ref 11.5–15.5)
HYPOCHROMIA BLD QL: SIGNIFICANT CHANGE UP
HYPOCHROMIA BLD QL: SLIGHT — SIGNIFICANT CHANGE UP
IMM GRANULOCYTES NFR BLD AUTO: 0.2 % — SIGNIFICANT CHANGE UP (ref 0–1.5)
INR BLD: 1.04 — SIGNIFICANT CHANGE UP (ref 0.88–1.17)
LG PLATELETS BLD QL AUTO: SLIGHT — SIGNIFICANT CHANGE UP
LYMPHOCYTES # BLD AUTO: 0.77 K/UL — LOW (ref 1–3.3)
LYMPHOCYTES # BLD AUTO: 18.7 % — SIGNIFICANT CHANGE UP (ref 13–44)
LYMPHOCYTES NFR SPEC AUTO: 20 % — SIGNIFICANT CHANGE UP (ref 13–44)
MAGNESIUM SERPL-MCNC: 1.6 MG/DL — SIGNIFICANT CHANGE UP (ref 1.6–2.6)
MANUAL SMEAR VERIFICATION: SIGNIFICANT CHANGE UP
MANUAL SMEAR VERIFICATION: SIGNIFICANT CHANGE UP
MCHC RBC-ENTMCNC: 19.3 PG — LOW (ref 27–34)
MCHC RBC-ENTMCNC: 28.2 % — LOW (ref 32–36)
MCV RBC AUTO: 68.3 FL — LOW (ref 80–100)
MICROCYTES BLD QL: SIGNIFICANT CHANGE UP
MICROCYTES BLD QL: SIGNIFICANT CHANGE UP
MONOCYTES # BLD AUTO: 0.16 K/UL — SIGNIFICANT CHANGE UP (ref 0–0.9)
MONOCYTES NFR BLD AUTO: 3.9 % — SIGNIFICANT CHANGE UP (ref 2–14)
MONOCYTES NFR BLD: 7 % — SIGNIFICANT CHANGE UP (ref 2–9)
NEUTROPHIL AB SER-ACNC: 70 % — SIGNIFICANT CHANGE UP (ref 43–77)
NEUTROPHILS # BLD AUTO: 2.99 K/UL — SIGNIFICANT CHANGE UP (ref 1.8–7.4)
NEUTROPHILS NFR BLD AUTO: 72.8 % — SIGNIFICANT CHANGE UP (ref 43–77)
NRBC # BLD: 0 /100WBC — SIGNIFICANT CHANGE UP
NRBC # FLD: 0 K/UL — SIGNIFICANT CHANGE UP (ref 0–0)
PHOSPHATE SERPL-MCNC: 3 MG/DL — SIGNIFICANT CHANGE UP (ref 2.5–4.5)
PLATELET # BLD AUTO: 96 K/UL — LOW (ref 150–400)
PLATELET COUNT - ESTIMATE: SIGNIFICANT CHANGE UP
PLATELET COUNT - ESTIMATE: SIGNIFICANT CHANGE UP
PMV BLD: SIGNIFICANT CHANGE UP FL (ref 7–13)
POLYCHROMASIA BLD QL SMEAR: SLIGHT — SIGNIFICANT CHANGE UP
POTASSIUM SERPL-MCNC: 3.4 MMOL/L — LOW (ref 3.5–5.3)
POTASSIUM SERPL-SCNC: 3.4 MMOL/L — LOW (ref 3.5–5.3)
PROT SERPL-MCNC: 7 G/DL — SIGNIFICANT CHANGE UP (ref 6–8.3)
PROTHROM AB SERPL-ACNC: 12 SEC — SIGNIFICANT CHANGE UP (ref 9.8–13.1)
RBC # BLD: 3.79 M/UL — LOW (ref 3.8–5.2)
RBC # FLD: 21.8 % — HIGH (ref 10.3–14.5)
RH IG SCN BLD-IMP: POSITIVE — SIGNIFICANT CHANGE UP
SODIUM SERPL-SCNC: 134 MMOL/L — LOW (ref 135–145)
VARIANT LYMPHS # BLD: 3 % — SIGNIFICANT CHANGE UP
WBC # BLD: 4.11 K/UL — SIGNIFICANT CHANGE UP (ref 3.8–10.5)
WBC # FLD AUTO: 4.11 K/UL — SIGNIFICANT CHANGE UP (ref 3.8–10.5)

## 2020-06-07 PROCEDURE — 99222 1ST HOSP IP/OBS MODERATE 55: CPT

## 2020-06-07 RX ORDER — POTASSIUM CHLORIDE 20 MEQ
20 PACKET (EA) ORAL ONCE
Refills: 0 | Status: COMPLETED | OUTPATIENT
Start: 2020-06-07 | End: 2020-06-07

## 2020-06-07 RX ADMIN — Medication 6 MILLIGRAM(S): at 22:14

## 2020-06-07 RX ADMIN — Medication 1.5 MILLIGRAM(S): at 18:10

## 2020-06-07 RX ADMIN — Medication 20 MILLIEQUIVALENT(S): at 07:54

## 2020-06-07 RX ADMIN — Medication 105 MILLIGRAM(S): at 05:08

## 2020-06-07 RX ADMIN — Medication 1.5 MILLIGRAM(S): at 14:26

## 2020-06-07 RX ADMIN — Medication 20 MILLIEQUIVALENT(S): at 10:08

## 2020-06-07 RX ADMIN — Medication 105 MILLIGRAM(S): at 14:26

## 2020-06-07 RX ADMIN — Medication 1.5 MILLIGRAM(S): at 22:13

## 2020-06-07 RX ADMIN — Medication 1 MILLIGRAM(S): at 12:51

## 2020-06-07 RX ADMIN — Medication 1.5 MILLIGRAM(S): at 10:08

## 2020-06-07 RX ADMIN — Medication 2 MILLIGRAM(S): at 05:07

## 2020-06-07 RX ADMIN — LOSARTAN POTASSIUM 50 MILLIGRAM(S): 100 TABLET, FILM COATED ORAL at 05:07

## 2020-06-07 RX ADMIN — Medication 105 MILLIGRAM(S): at 22:13

## 2020-06-07 RX ADMIN — Medication 2 MILLIGRAM(S): at 02:32

## 2020-06-07 RX ADMIN — Medication 1 TABLET(S): at 12:51

## 2020-06-07 RX ADMIN — PANTOPRAZOLE SODIUM 40 MILLIGRAM(S): 20 TABLET, DELAYED RELEASE ORAL at 12:51

## 2020-06-07 NOTE — DISCHARGE NOTE PROVIDER - NSDCCPCAREPLAN_GEN_ALL_CORE_FT
PRINCIPAL DISCHARGE DIAGNOSIS  Diagnosis: Suicidal ideation  Assessment and Plan of Treatment: You came in with suicidal ideation as well as alcohol abuse. We consulted the psychiatry doctors who put you under supervision and we started ativan to prevent seizures from alcohol abuse. Please followup with your primary care doctor and psychiatrist within 1-2 weeks of discharge. If you have any new or recurrent symptoms, please call your doctor or go to the ED. PRINCIPAL DISCHARGE DIAGNOSIS  Diagnosis: Suicidal ideation  Assessment and Plan of Treatment: You came in with suicidal ideation as well as alcohol abuse. We consulted the psychiatry doctors who put you under supervision and we started ativan to prevent seizures from alcohol use. Please followup with your primary care doctor and psychiatrist within 1-2 weeks of discharge. If you have any new or recurrent symptoms, please call your doctor or go to the ED. You did great, keep up with the the good work! 6 years of abstinence is hard, and you did it!

## 2020-06-07 NOTE — DISCHARGE NOTE PROVIDER - NSDCMRMEDTOKEN_GEN_ALL_CORE_FT
Ambien 10 mg oral tablet: 1 tab(s) orally once a day (at bedtime)  folic acid 1 mg oral tablet: 1 tab(s) orally once a day  losartan 50 mg oral tablet: 1 tab(s) orally once a day  Multiple Vitamins oral tablet: 1 tab(s) orally once a day  thiamine 100 mg oral tablet: 1 tab(s) orally once a day  Xanax 1 mg oral tablet: 1 tab(s) orally 2 times a day, As Needed Ambien 10 mg oral tablet: 1 tab(s) orally once a day (at bedtime)  chlordiazePOXIDE 25 mg oral capsule: 1 cap(s) orally every 12 hours MDD:2  chlordiazePOXIDE 25 mg oral capsule: 1 cap(s) orally every 8 hours MDD:3  chlordiazePOXIDE 25 mg oral capsule: 1 cap(s) orally once a day MDD:1  folic acid 1 mg oral tablet: 1 tab(s) orally once a day  losartan 50 mg oral tablet: 1 tab(s) orally once a day  Multiple Vitamins oral tablet: 1 tab(s) orally once a day  thiamine 100 mg oral tablet: 1 tab(s) orally once a day  Xanax 1 mg oral tablet: 1 tab(s) orally 2 times a day, As Needed

## 2020-06-07 NOTE — PROGRESS NOTE ADULT - SUBJECTIVE AND OBJECTIVE BOX
PROGRESS NOTE:     Patient is a 44y old  Female who presents with a chief complaint of Threatened to kill herself (07 Jun 2020 07:15)      SUBJECTIVE / OVERNIGHT EVENTS: Pt seen and examined. No acute overnight events. In the morning, pt denies fever, chills, CP, SOB, abdominal pain, N/V, urinary or bowel issues, or new joint aches.     ADDITIONAL REVIEW OF SYSTEMS: Otherwise negative    MEDICATIONS  (STANDING):  folic acid 1 milliGRAM(s) Oral daily  LORazepam   Injectable   IV Push   LORazepam   Injectable 1.5 milliGRAM(s) IV Push every 4 hours  losartan 50 milliGRAM(s) Oral daily  melatonin 6 milliGRAM(s) Oral at bedtime  multivitamin 1 Tablet(s) Oral daily  pantoprazole  Injectable 40 milliGRAM(s) IV Push daily  thiamine IVPB 500 milliGRAM(s) IV Intermittent every 8 hours    MEDICATIONS  (PRN):  LORazepam     Tablet 2 milliGRAM(s) Oral every 2 hours PRN Symptom-triggered 2 point increase in CIWA-Ar  LORazepam   Injectable 1 milliGRAM(s) IV Push every 1 hour PRN CIWA-Ar score 8 or greater  ondansetron Injectable 4 milliGRAM(s) IV Push every 6 hours PRN Nausea and/or Vomiting      CAPILLARY BLOOD GLUCOSE        I&O's Summary    06 Jun 2020 07:01  -  07 Jun 2020 07:00  --------------------------------------------------------  IN: 1850 mL / OUT: 1 mL / NET: 1849 mL        PHYSICAL EXAM:  Vital Signs Last 24 Hrs  T(C): 36.7 (07 Jun 2020 14:00), Max: 36.9 (06 Jun 2020 16:00)  T(F): 98 (07 Jun 2020 14:00), Max: 98.4 (06 Jun 2020 16:00)  HR: 96 (07 Jun 2020 14:00) (78 - 100)  BP: 134/93 (07 Jun 2020 14:00) (131/92 - 161/105)  BP(mean): --  RR: 18 (07 Jun 2020 14:00) (18 - 19)  SpO2: 99% (07 Jun 2020 14:00) (97% - 100%)    CONSTITUTIONAL: NAD  RESPIRATORY: Normal respiratory effort; lungs are clear to auscultation bilaterally  CARDIOVASCULAR: Regular rate and rhythm, normal S1 and S2, no murmur/rub/gallop  ABDOMEN: Nontender to palpation, normoactive bowel sounds, no rebound/guarding; No hepatosplenomegaly  EXTREMITIES: No lower extremity edema; Peripheral pulses are 2+ bilaterally  MUSCLOSKELETAL: no clubbing or cyanosis of digits; no joint swelling or tenderness to palpation  PSYCH: A+O to person, place, and time; affect appropriate    LABS:                        7.3    4.11  )-----------( 96       ( 07 Jun 2020 06:44 )             25.9     06-07    134<L>  |  98  |  6<L>  ----------------------------<  119<H>  3.4<L>   |  22  |  0.56    Ca    9.0      07 Jun 2020 06:44  Phos  3.0     06-07  Mg     1.6     06-07    TPro  7.0  /  Alb  3.5  /  TBili  0.4  /  DBili  x   /  AST  72<H>  /  ALT  58<H>  /  AlkPhos  69  06-07    PT/INR - ( 07 Jun 2020 06:44 )   PT: 12.0 SEC;   INR: 1.04          PTT - ( 07 Jun 2020 06:44 )  PTT:29.3 SEC  CARDIAC MARKERS ( 06 Jun 2020 10:12 )  x     / x     / 96 u/L / 1.03 ng/mL / x      CARDIAC MARKERS ( 06 Jun 2020 04:00 )  x     / x     / 74 u/L / < 1.00 ng/mL / x                RADIOLOGY & ADDITIONAL TESTS:  Results Reviewed:   Imaging Personally Reviewed:  Electrocardiogram Personally Reviewed:    COORDINATION OF CARE:  Care Discussed with Consultants/Other Providers [Y/N]:  Prior or Outpatient Records Reviewed [Y/N]:

## 2020-06-07 NOTE — PROGRESS NOTE ADULT - ASSESSMENT
45yo Female, history of HTN, anxiety, EtOH abuse and ETOH withdrawal seizures in 2010 and ICU stay, anemia due to prolonged menses a/w SI in the setting of alcohol withdrawal and use d/o and intoxication;

## 2020-06-07 NOTE — PROGRESS NOTE ADULT - SUBJECTIVE AND OBJECTIVE BOX
Patient is a 44y old  Female who presents with a chief complaint of Threatened to kill herself (06 Jun 2020 04:38)      SUBJECTIVE / OVERNIGHT EVENTS:    Denies overnight bleeding  Requesting liver U/S as she is concerned whether she has underlying cirrhosis    Vital Signs Last 24 Hrs  T(C): 36.6 (07 Jun 2020 06:50), Max: 36.9 (06 Jun 2020 15:00)  T(F): 97.8 (07 Jun 2020 06:50), Max: 98.5 (06 Jun 2020 15:00)  HR: 93 (07 Jun 2020 06:50) (78 - 111)  BP: 138/96 (07 Jun 2020 06:50) (126/87 - 161/105)  BP(mean): --  RR: 18 (07 Jun 2020 06:50) (18 - 19)  SpO2: 98% (07 Jun 2020 06:50) (97% - 100%)  I&O's Summary    06 Jun 2020 07:01  -  07 Jun 2020 07:00  --------------------------------------------------------  IN: 1850 mL / OUT: 1 mL / NET: 1849 mL        GENERAL: NAD, well-developed  HEAD:  Atraumatic, Normocephalic, (+)tongue fasisculations  EYES: EOMI, PERRLA, conjunctiva and sclera clear  NECK: Supple, No JVD, No LAD, No carotid bruits  CHEST/LUNG: Clear to auscultation bilaterally; No wheezes  HEART: Regular rhythm; (+)tachy; No murmurs, rubs, or gallops  ABDOMEN: Soft, Nontender, Nondistended; Bowel sounds present  EXTREMITIES:  2+ Peripheral Pulses, No clubbing, cyanosis, or edema  SKIN: No rashes or lesions  NEURO: A+O x 3; nonfocal CN/motor/sensory/reflexes; (+) UE tremors upon full extension  PSYCH: Nl affect; no agitation or delirium; no suicidal or homicidal ideation    LABS:                        7.1    3.09  )-----------( 101      ( 06 Jun 2020 20:45 )             25.9     06-06    136  |  93<L>  |  9   ----------------------------<  159<H>  3.3<L>   |  26  |  0.55    Ca    9.3      06 Jun 2020 04:00  Phos  3.3     06-06  Mg     1.5     06-06    TPro  7.5  /  Alb  4.2  /  TBili  < 0.2<L>  /  DBili  x   /  AST  151<H>  /  ALT  89<H>  /  AlkPhos  80  06-05      CAPILLARY BLOOD GLUCOSE        CARDIAC MARKERS ( 06 Jun 2020 10:12 )  x     / x     / 96 u/L / 1.03 ng/mL / x      CARDIAC MARKERS ( 06 Jun 2020 04:00 )  x     / x     / 74 u/L / < 1.00 ng/mL / x              RADIOLOGY & ADDITIONAL TESTS:    Imaging Personally Reviewed:  [x] YES  [ ] NO    Consultant(s) Notes Reviewed:  [x] YES  [ ] NO      MEDICATIONS  (STANDING):  folic acid 1 milliGRAM(s) Oral daily  lactated ringers 1000 milliLiter(s) (150 mL/Hr) IV Continuous <Continuous>  LORazepam   Injectable   IV Push   LORazepam   Injectable 1.5 milliGRAM(s) IV Push every 4 hours  losartan 50 milliGRAM(s) Oral daily  melatonin 6 milliGRAM(s) Oral at bedtime  multivitamin 1 Tablet(s) Oral daily  pantoprazole  Injectable 40 milliGRAM(s) IV Push daily  thiamine IVPB 500 milliGRAM(s) IV Intermittent every 8 hours    MEDICATIONS  (PRN):  LORazepam   Injectable 1 milliGRAM(s) IV Push every 1 hour PRN CIWA-Ar score 8 or greater  ondansetron Injectable 4 milliGRAM(s) IV Push every 6 hours PRN Nausea and/or Vomiting      Care Discussed with Consultants/Other Providers [x] YES  [ ] NO    HEALTH ISSUES - PROBLEM Dx:  Adjustment disorder associated with trauma or stressor  Alcohol dependence with uncomplicated withdrawal  Acute alcohol abuse  Iron deficiency anemia due to chronic blood loss: Iron deficiency anemia due to chronic blood loss  Alcoholic intoxication without complication: Alcoholic intoxication without complication  Discharge planning issues: Discharge planning issues  Need for prophylactic measure: Need for prophylactic measure  Depression: Depression  Essential hypertension: Essential hypertension  Suicidal ideation: Suicidal ideation  Anemia, unspecified type: Anemia, unspecified type  Alcohol withdrawal syndrome with complication: Alcohol withdrawal syndrome with complication

## 2020-06-07 NOTE — PROGRESS NOTE ADULT - PROBLEM SELECTOR PLAN 1
High risk for DT; On IV ativan taper.  - Continue CIWA symptom triggered.   - F.A, Thiamine, MVI. Zofran PRN  - Seizure precautions.   - SW c/s  - Librium taper

## 2020-06-07 NOTE — DISCHARGE NOTE PROVIDER - HOSPITAL COURSE
43yo Female, history of HTN, anxiety, EtOH abuse and ETOH withdrawal seizures in 2010 and ICU stay, anemia due to prolonged menses a/w SI in the setting of alcohol withdrawal and use d/o and intoxication. Psychiatry was consulted, 1 to 1 was instituted. Patient was started on ativan taper. CIWA trending down. 43yo Female, history of HTN, anxiety, EtOH abuse and ETOH withdrawal seizures in 2010 and ICU stay, anemia due to prolonged menses a/w SI in the setting of alcohol withdrawal and use d/o and intoxication. Psychiatry was consulted, 1 to 1 was instituted. Patient was started on ativan taper then transitioned to librium taper. Pt tolerated librium well, was evaluated by house psychiatry, denied SI/HI, has a good plan, pt said she will follow up with her sponsor and outpatient psychiatrist. 45yo Female, history of HTN, anxiety, EtOH abuse and ETOH withdrawal seizures in 2010 and ICU stay, anemia due to prolonged menses a/w SI in the setting of alcohol withdrawal and use d/o and intoxication. Psychiatry was consulted, 1 to 1 was instituted. Patient was started on ativan taper then transitioned to librium taper. Pt tolerated librium well, was evaluated by house psychiatry, denied SI/HI, has a good plan, pt said she will follow up with her sponsor and outpatient psychiatrist.

## 2020-06-07 NOTE — PROGRESS NOTE ADULT - PROBLEM SELECTOR PLAN 3
Hgb baseline 8-9 per EMR review; Due to menorrhagia;  MCV 80s-->60s overtime per EMR;  I spoke to her about IV iron, she will think about it; deferring blood transfusions if possible  No signs of active bleeding

## 2020-06-08 LAB
ALBUMIN SERPL ELPH-MCNC: 3.7 G/DL — SIGNIFICANT CHANGE UP (ref 3.3–5)
ALP SERPL-CCNC: 68 U/L — SIGNIFICANT CHANGE UP (ref 40–120)
ALT FLD-CCNC: 45 U/L — HIGH (ref 4–33)
ANION GAP SERPL CALC-SCNC: 13 MMO/L — SIGNIFICANT CHANGE UP (ref 7–14)
APTT BLD: 28.3 SEC — SIGNIFICANT CHANGE UP (ref 27.5–36.3)
AST SERPL-CCNC: 47 U/L — HIGH (ref 4–32)
BASOPHILS # BLD AUTO: 0.03 K/UL — SIGNIFICANT CHANGE UP (ref 0–0.2)
BASOPHILS NFR BLD AUTO: 0.7 % — SIGNIFICANT CHANGE UP (ref 0–2)
BILIRUB SERPL-MCNC: 0.3 MG/DL — SIGNIFICANT CHANGE UP (ref 0.2–1.2)
BUN SERPL-MCNC: 16 MG/DL — SIGNIFICANT CHANGE UP (ref 7–23)
CALCIUM SERPL-MCNC: 9 MG/DL — SIGNIFICANT CHANGE UP (ref 8.4–10.5)
CHLORIDE SERPL-SCNC: 100 MMOL/L — SIGNIFICANT CHANGE UP (ref 98–107)
CO2 SERPL-SCNC: 23 MMOL/L — SIGNIFICANT CHANGE UP (ref 22–31)
CREAT SERPL-MCNC: 0.63 MG/DL — SIGNIFICANT CHANGE UP (ref 0.5–1.3)
EOSINOPHIL # BLD AUTO: 0.11 K/UL — SIGNIFICANT CHANGE UP (ref 0–0.5)
EOSINOPHIL NFR BLD AUTO: 2.6 % — SIGNIFICANT CHANGE UP (ref 0–6)
GLUCOSE SERPL-MCNC: 140 MG/DL — HIGH (ref 70–99)
HCT VFR BLD CALC: 26 % — LOW (ref 34.5–45)
HGB BLD-MCNC: 7.3 G/DL — LOW (ref 11.5–15.5)
IMM GRANULOCYTES NFR BLD AUTO: 0.2 % — SIGNIFICANT CHANGE UP (ref 0–1.5)
INR BLD: 1 — SIGNIFICANT CHANGE UP (ref 0.88–1.17)
LYMPHOCYTES # BLD AUTO: 0.88 K/UL — LOW (ref 1–3.3)
LYMPHOCYTES # BLD AUTO: 20.9 % — SIGNIFICANT CHANGE UP (ref 13–44)
MAGNESIUM SERPL-MCNC: 1.7 MG/DL — SIGNIFICANT CHANGE UP (ref 1.6–2.6)
MCHC RBC-ENTMCNC: 19.7 PG — LOW (ref 27–34)
MCHC RBC-ENTMCNC: 28.1 % — LOW (ref 32–36)
MCV RBC AUTO: 70.1 FL — LOW (ref 80–100)
MONOCYTES # BLD AUTO: 0.2 K/UL — SIGNIFICANT CHANGE UP (ref 0–0.9)
MONOCYTES NFR BLD AUTO: 4.7 % — SIGNIFICANT CHANGE UP (ref 2–14)
NEUTROPHILS # BLD AUTO: 2.99 K/UL — SIGNIFICANT CHANGE UP (ref 1.8–7.4)
NEUTROPHILS NFR BLD AUTO: 70.9 % — SIGNIFICANT CHANGE UP (ref 43–77)
NRBC # FLD: 0 K/UL — SIGNIFICANT CHANGE UP (ref 0–0)
PHOSPHATE SERPL-MCNC: 3 MG/DL — SIGNIFICANT CHANGE UP (ref 2.5–4.5)
PLATELET # BLD AUTO: 91 K/UL — LOW (ref 150–400)
PMV BLD: SIGNIFICANT CHANGE UP FL (ref 7–13)
POTASSIUM SERPL-MCNC: 4 MMOL/L — SIGNIFICANT CHANGE UP (ref 3.5–5.3)
POTASSIUM SERPL-SCNC: 4 MMOL/L — SIGNIFICANT CHANGE UP (ref 3.5–5.3)
PROT SERPL-MCNC: 7.2 G/DL — SIGNIFICANT CHANGE UP (ref 6–8.3)
PROTHROM AB SERPL-ACNC: 11.4 SEC — SIGNIFICANT CHANGE UP (ref 9.8–13.1)
RBC # BLD: 3.71 M/UL — LOW (ref 3.8–5.2)
RBC # FLD: 22.2 % — HIGH (ref 10.3–14.5)
SODIUM SERPL-SCNC: 136 MMOL/L — SIGNIFICANT CHANGE UP (ref 135–145)
WBC # BLD: 4.22 K/UL — SIGNIFICANT CHANGE UP (ref 3.8–10.5)
WBC # FLD AUTO: 4.22 K/UL — SIGNIFICANT CHANGE UP (ref 3.8–10.5)

## 2020-06-08 PROCEDURE — 76700 US EXAM ABDOM COMPLETE: CPT | Mod: 26

## 2020-06-08 PROCEDURE — 99233 SBSQ HOSP IP/OBS HIGH 50: CPT

## 2020-06-08 RX ADMIN — Medication 25 MILLIGRAM(S): at 22:27

## 2020-06-08 RX ADMIN — Medication 1.5 MILLIGRAM(S): at 02:04

## 2020-06-08 RX ADMIN — Medication 1 TABLET(S): at 12:33

## 2020-06-08 RX ADMIN — Medication 25 MILLIGRAM(S): at 16:57

## 2020-06-08 RX ADMIN — Medication 1.5 MILLIGRAM(S): at 05:44

## 2020-06-08 RX ADMIN — PANTOPRAZOLE SODIUM 40 MILLIGRAM(S): 20 TABLET, DELAYED RELEASE ORAL at 12:33

## 2020-06-08 RX ADMIN — Medication 6 MILLIGRAM(S): at 22:27

## 2020-06-08 RX ADMIN — LOSARTAN POTASSIUM 50 MILLIGRAM(S): 100 TABLET, FILM COATED ORAL at 05:44

## 2020-06-08 RX ADMIN — Medication 105 MILLIGRAM(S): at 14:04

## 2020-06-08 RX ADMIN — Medication 105 MILLIGRAM(S): at 22:26

## 2020-06-08 RX ADMIN — Medication 1 MILLIGRAM(S): at 12:33

## 2020-06-08 RX ADMIN — Medication 105 MILLIGRAM(S): at 05:44

## 2020-06-08 RX ADMIN — Medication 25 MILLIGRAM(S): at 10:02

## 2020-06-08 NOTE — PROGRESS NOTE ADULT - SUBJECTIVE AND OBJECTIVE BOX
PROGRESS NOTE:   Authored By: Nayely Pride MD  Pager: [phone], [phone]    Patient is a 44y old  Female who presents with a chief complaint of Threatened to kill herself (08 Jun 2020 09:52)      OVERNIGHT EVENTS: No acute events overnight    SUBJECTIVE: Pt seen and examined at bedside this morning. no f/c/n/v/d/dizziness/tremulousness       MEDICATIONS  (STANDING):  chlordiazePOXIDE 25 milliGRAM(s) Oral every 6 hours  folic acid 1 milliGRAM(s) Oral daily  losartan 50 milliGRAM(s) Oral daily  melatonin 6 milliGRAM(s) Oral at bedtime  multivitamin 1 Tablet(s) Oral daily  pantoprazole  Injectable 40 milliGRAM(s) IV Push daily  thiamine IVPB 500 milliGRAM(s) IV Intermittent every 8 hours    MEDICATIONS  (PRN):  LORazepam     Tablet 2 milliGRAM(s) Oral every 2 hours PRN Symptom-triggered 2 point increase in CIWA-Ar  LORazepam   Injectable 1 milliGRAM(s) IV Push every 1 hour PRN CIWA-Ar score 8 or greater  ondansetron Injectable 4 milliGRAM(s) IV Push every 6 hours PRN Nausea and/or Vomiting      CAPILLARY BLOOD GLUCOSE        I&O's Summary    07 Jun 2020 07:01  -  08 Jun 2020 07:00  --------------------------------------------------------  IN: 300 mL / OUT: 1 mL / NET: 299 mL    08 Jun 2020 07:01  -  08 Jun 2020 13:27  --------------------------------------------------------  IN: 240 mL / OUT: 0 mL / NET: 240 mL        PHYSICAL EXAM:  Vital Signs Last 24 Hrs  T(C): 36.7 (08 Jun 2020 03:00), Max: 37.7 (08 Jun 2020 00:00)  T(F): 98 (08 Jun 2020 03:00), Max: 99.8 (08 Jun 2020 00:00)  HR: 103 (08 Jun 2020 11:05) (83 - 103)  BP: 149/98 (08 Jun 2020 11:05) (127/84 - 149/98)  BP(mean): --  RR: 17 (08 Jun 2020 11:05) (14 - 18)  SpO2: 100% (08 Jun 2020 11:05) (99% - 100%)    CONSTITUTIONAL: NAD, well-developed  RESPIRATORY: No increased work of breathing, CTAB, no wheezes or crackles appreciated  CARDIOVASCULAR: RRR, S1 and S2 present, no m/r/g  ABDOMEN: soft, NT, ND, bowel sounds present  EXTREMITIES: No LE edema  MUSCLOSKELETAL: no joint swelling or tenderness to palpation  NEURO: A&Ox3, moving all extremities    LABS:                        7.3    4.22  )-----------( 91       ( 08 Jun 2020 06:10 )             26.0     06-08    136  |  100  |  16  ----------------------------<  140<H>  4.0   |  23  |  0.63    Ca    9.0      08 Jun 2020 06:10  Phos  3.0     06-08  Mg     1.7     06-08    TPro  7.2  /  Alb  3.7  /  TBili  0.3  /  DBili  x   /  AST  47<H>  /  ALT  45<H>  /  AlkPhos  68  06-08    PT/INR - ( 08 Jun 2020 06:10 )   PT: 11.4 SEC;   INR: 1.00          PTT - ( 08 Jun 2020 06:10 )  PTT:28.3 SEC            RADIOLOGY & ADDITIONAL TESTS:    Results Reviewed:   Imaging Personally Reviewed:  Electrocardiogram Personally Reviewed:    COORDINATION OF CARE:  Care Discussed with Consultants/Other Providers [Y/N]:  Prior or Outpatient Records Reviewed [Y/N]:

## 2020-06-08 NOTE — CHART NOTE - NSCHARTNOTEFT_GEN_A_CORE
This writer attempted to conduct consult at bedside however pt on 1:1 and sleeping without arousal, despite this writers frequent attempts of calling name aloud.   This writer informed unit SW.  SBIRT services remain available throughout pt's hospital admission.

## 2020-06-08 NOTE — PROGRESS NOTE BEHAVIORAL HEALTH - RISK ASSESSMENT
Risk Factors: ETOH dependence, ETOH withdrawal, hx DT's/seizures, hx depression/anxiety, loss of job, estranged from family, current relationship in jeopardy r/t alcohol abuse, financial stressors r/t loss of job  Protective Factors: residential stability, supportive fiance, plans to have a baby, has AA sponsor, educated, able to articulate feelings/emotions, no hx inpatient hospitalization, denies si    At this time and circumstance patient is NOT at risk to harm self as she has consistently denied legitimate SI.

## 2020-06-08 NOTE — PROGRESS NOTE BEHAVIORAL HEALTH - CASE SUMMARY
Pt seen/evaluated with PGY2 Dr. Horner, agree with above. In brief pt is a 45 yo female with PMHx HTN, ETOH abuse, withdrawal seizures, anemia, PPHx anxiety, depression BIBEMS for intoxication, HA, tremors, nausea, fall 2 weeks ago, patient reports sober X6 years, drinking daily alcohol past 6 months, sister-in-law called EMS s/p patient threatening to kill herself. 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 Pt has AA sponsor and did well utilizing AA for sobriety, she sees psychiatrist Dr. Gitlin from [place], last saw 2months ago, prescribed Xanax (reports on takes 2x/week) and Ambien (last took 2 weeks ago), Pristiq (never took). In terms of EtOH w/d symptoms, CIWA scores 0-1, denies a/v/h/d, no tremors. Impression: EtOH withdrawal; EtOH dependence with uncomplicated withdrawal; adjustment disorder. Plan: as above- c/w ativan taper as per primary team. CIWA/Ativan prns. C/w high dose thiamine. Discontinue CO 1:1 as pt does not represent a danger to herself or others. Once medically stable, pt is psychiatrically cleared for discharge and will f/u with her outpatient psychiatrist in [place] as above.

## 2020-06-08 NOTE — PROGRESS NOTE BEHAVIORAL HEALTH - NSBHCONSULTOBSREASON_PSY_A_CORE FT
At this time and circumstance patient is NOT at risk to harm self as she has consistently denied legitimate SI.

## 2020-06-08 NOTE — PROGRESS NOTE BEHAVIORAL HEALTH - NSBHFUPINTERVALHXFT_PSY_A_CORE
Pt only willing to talk to psych team in the presence of the attending. Pt is in bed and tearful on exam. Pt reconfirms that she threatened to kill herself on the phone with her sister-in-law just because she was angry and wanted to emotionally hurt the sister-in-law. She had no intention of acting on that threat. Further states that she is too "chicken" to ever actually go through with suicide. She has never drank alcohol with the intention of dying. 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 No hx of suicide attempt. Has smoked marijuana in the past but no other substance abuse, takes meds as prescribed. Denies AH/VH, SI/I/P, HI/I/P.

## 2020-06-08 NOTE — PROGRESS NOTE ADULT - PROBLEM SELECTOR PLAN 1
High risk for DT  - I switched to longer-acting librium taper today  - Continue CIWA symptom triggered.   - F.A, Thiamine, MVI. Zofran PRN  - Seizure precautions.   - She is deferring  consult for now

## 2020-06-08 NOTE — PROGRESS NOTE BEHAVIORAL HEALTH - NSBHADMITCOUNSEL_PSY_A_CORE
Discussed plan of care with pt, and reasons to work toward returning to sobriety/importance of adherence to chosen treatment/prognosis/instructions for management, treatment and follow up/risk factor reduction/client/family/caregiver education

## 2020-06-08 NOTE — PROGRESS NOTE BEHAVIORAL HEALTH - OTHER
primarily looks at attending even though med student is asking the questions not assessed-in bed tremulous "that's a hard question"

## 2020-06-08 NOTE — PROGRESS NOTE ADULT - PROBLEM SELECTOR PLAN 2
Withdrawing; Suspect alcohol dependence given EtOH level 347;  Plan as above  She is deferring SW consult for now

## 2020-06-08 NOTE — PROGRESS NOTE BEHAVIORAL HEALTH - SUMMARY
43 yo female with PMHx HTN, ETOH abuse, withdrawal seizures, anemia, PPHx anxiety, depression BIBEMS for intoxication, HA, tremors, nausea, fall 2 weeks ago, patient reports sober X6 years, drinking daily alcohol past 6 months, sister-in-law called EMS s/p patient threatening to kill herself.    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 Gitlin from [place], last saw 2months ago, prescribed Xanax (reports on takes 2x/week) and Ambien (last took 2 weeks ago), Pristiq (never took), denies a/v/h/d, denies hx inpatient psychiatric hospitalizations. Declined collateral to be called.    On physical exam patient noted to have bilateral hand tremors, slight nystagmus, ciwa today @ 1800=8, 1900=6, 2000=6, VSS.     PLAN:  -d/c 1:1 as pt has no SI/I/P  -Melatonin 6mg PO HS-monitor for sedation, hold if RR<12  -Replete and Optimize electrolytes  -Thiamine 500mg q8h IV  -c/w Ativan taper-taper may need to be altered depending on patient's response to treatment, monitor closely and adjust taper accordingly  -c/w PRN Ativan as per ciwa protocol 43 yo female with PMHx HTN, ETOH abuse, withdrawal seizures, anemia, PPHx anxiety, depression BIBEMS for intoxication, HA, tremors, nausea, fall 2 weeks ago, patient reports sober X6 years, drinking daily alcohol past 6 months, sister-in-law called EMS s/p patient threatening to kill herself.    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 Gitlin from [place], last saw 2months ago, prescribed Xanax (reports on takes 2x/week) and Ambien (last took 2 weeks ago), Pristiq (never took), denies a/v/h/d, denies hx inpatient psychiatric hospitalizations. Declined collateral to be called.    On physical exam patient noted to have bilateral hand tremors, slight nystagmus, ciwa today @ 1800=8, 1900=6, 2000=6, VSS.     PLAN:  -d/c CO 1:1 as pt has no SI/I/P  -Melatonin 6mg PO HS-monitor for sedation, hold if RR<12  -Replete and Optimize electrolytes  -Thiamine 500mg q8h IV  -c/w Ativan taper-taper may need to be altered depending on patient's response to treatment, monitor closely and adjust taper accordingly  -c/w PRN Ativan as per ciwa protocol

## 2020-06-08 NOTE — PROGRESS NOTE ADULT - SUBJECTIVE AND OBJECTIVE BOX
Patient is a 44y old  Female who presents with a chief complaint of Threatened to kill herself (06 Jun 2020 04:38)      SUBJECTIVE / OVERNIGHT EVENTS:    Did not trigger the CWAS overnight or this morning  LIver U/S shows fatty infiltration, no cirrhosis    Vital Signs Last 24 Hrs  T(C): 36.7 (08 Jun 2020 03:00), Max: 37.7 (08 Jun 2020 00:00)  T(F): 98 (08 Jun 2020 03:00), Max: 99.8 (08 Jun 2020 00:00)  HR: 90 (08 Jun 2020 06:55) (83 - 100)  BP: 136/91 (08 Jun 2020 06:55) (127/84 - 143/104)  BP(mean): --  RR: 16 (08 Jun 2020 06:55) (14 - 18)  SpO2: 100% (08 Jun 2020 06:55) (99% - 100%)    I&O's Summary    06-07-20 @ 07:01  -  06-08-20 @ 07:00  --------------------------------------------------------  IN: 300 mL / OUT: 1 mL / NET: 299 mL    06-08-20 @ 07:01  -  06-08-20 @ 09:53  --------------------------------------------------------  IN: 240 mL / OUT: 0 mL / NET: 240 mL            GENERAL: NAD, well-developed  HEAD:  Atraumatic, Normocephalic  EYES: EOMI, PERRLA, conjunctiva and sclera clear  NECK: Supple, No JVD, No LAD, No carotid bruits  CHEST/LUNG: Clear to auscultation bilaterally; No wheezes  HEART: Regular rhythm; (+)tachy; No murmurs, rubs, or gallops  ABDOMEN: Soft, Nontender, Nondistended; Bowel sounds present  EXTREMITIES:  2+ Peripheral Pulses, No clubbing, cyanosis, or edema  SKIN: No rashes or lesions  NEURO: A+O x 3; nonfocal CN/motor/sensory/reflexes; no UE tremors upon full extension  PSYCH: Nl affect; no agitation or delirium; no suicidal or homicidal ideation    LABS:                        7.3    4.22  )-----------( 91       ( 08 Jun 2020 06:10 )             26.0     06-08    136  |  100  |  16  ----------------------------<  140<H>  4.0   |  23  |  0.63    Ca    9.0      08 Jun 2020 06:10  Phos  3.0     06-08  Mg     1.7     06-08    TPro  7.2  /  Alb  3.7  /  TBili  0.3  /  DBili  x   /  AST  47<H>  /  ALT  45<H>  /  AlkPhos  68  06-08    PT/INR - ( 08 Jun 2020 06:10 )   PT: 11.4 SEC;   INR: 1.00          PTT - ( 08 Jun 2020 06:10 )  PTT:28.3 SEC  CAPILLARY BLOOD GLUCOSE        CARDIAC MARKERS ( 06 Jun 2020 10:12 )  x     / x     / 96 u/L / 1.03 ng/mL / x              RADIOLOGY & ADDITIONAL TESTS:    Imaging Personally Reviewed:  [x] YES  [ ] NO    Consultant(s) Notes Reviewed:  [x] YES  [ ] NO

## 2020-06-09 ENCOUNTER — TRANSCRIPTION ENCOUNTER (OUTPATIENT)
Age: 45
End: 2020-06-09

## 2020-06-09 VITALS
OXYGEN SATURATION: 100 % | RESPIRATION RATE: 16 BRPM | HEART RATE: 94 BPM | DIASTOLIC BLOOD PRESSURE: 94 MMHG | TEMPERATURE: 98 F | SYSTOLIC BLOOD PRESSURE: 141 MMHG

## 2020-06-09 RX ADMIN — LOSARTAN POTASSIUM 50 MILLIGRAM(S): 100 TABLET, FILM COATED ORAL at 06:04

## 2020-06-09 RX ADMIN — Medication 25 MILLIGRAM(S): at 06:04

## 2020-06-09 RX ADMIN — Medication 105 MILLIGRAM(S): at 06:04

## 2020-06-09 NOTE — PROGRESS NOTE ADULT - ATTENDING COMMENTS
Pt seen and examined 6/9/20.  Agree with PGY-1 note.  Cleared for d/c on librium taper.  I advised her to make f/u appt with her outside PCP and psychiatrist within a week.  And to abstain from ETOH intake forever.

## 2020-06-09 NOTE — DISCHARGE NOTE NURSING/CASE MANAGEMENT/SOCIAL WORK - PATIENT PORTAL LINK FT
You can access the FollowMyHealth Patient Portal offered by [facility] by registering at the following website: [email or web address]. By joining [facility]’s FollowMyHealth portal, you will also be able to view your health information using other applications (apps) compatible with our system.

## 2020-06-09 NOTE — DISCHARGE NOTE NURSING/CASE MANAGEMENT/SOCIAL WORK - NSDCPNINST_GEN_ALL_CORE
Patient alert and oriented x 4. No s/s of alcohol withdrawal at this time. Denies pain this shift. Vital signs stable. Skin intact

## 2020-06-09 NOTE — PROGRESS NOTE ADULT - ASSESSMENT
43yo Female, history of HTN, anxiety, EtOH abuse and ETOH withdrawal seizures in 2010 and ICU stay, anemia due to prolonged menses p/w SI in the setting of alcohol withdrawal and use d/o and intoxication. Psych consulted, pt no longer has SI/HI, and alcohol withdraw managed with ativan and librium taper, pt is now hds

## 2020-06-09 NOTE — PROGRESS NOTE ADULT - SUBJECTIVE AND OBJECTIVE BOX
PROGRESS NOTE:   Authored By: Nayely Pride MD  Pager: [phone], [phone]    Patient is a 44y old  Female who presents with a chief complaint of Threatened to kill herself (08 Jun 2020 13:27)      OVERNIGHT EVENTS: No acute events overnight    SUBJECTIVE: Pt seen and examined at bedside this morning. tolerated librium well, wants to go home. no f/c/n/v/d/constipation/cp/sob       MEDICATIONS  (STANDING):  chlordiazePOXIDE 25 milliGRAM(s) Oral every 8 hours  folic acid 1 milliGRAM(s) Oral daily  losartan 50 milliGRAM(s) Oral daily  melatonin 6 milliGRAM(s) Oral at bedtime  multivitamin 1 Tablet(s) Oral daily  pantoprazole  Injectable 40 milliGRAM(s) IV Push daily    MEDICATIONS  (PRN):  LORazepam     Tablet 2 milliGRAM(s) Oral every 2 hours PRN Symptom-triggered 2 point increase in CIWA-Ar  LORazepam   Injectable 1 milliGRAM(s) IV Push every 1 hour PRN CIWA-Ar score 8 or greater  ondansetron Injectable 4 milliGRAM(s) IV Push every 6 hours PRN Nausea and/or Vomiting      CAPILLARY BLOOD GLUCOSE        I&O's Summary    08 Jun 2020 07:01  -  09 Jun 2020 07:00  --------------------------------------------------------  IN: 240 mL / OUT: 0 mL / NET: 240 mL        PHYSICAL EXAM:  Vital Signs Last 24 Hrs  T(C): 36.7 (09 Jun 2020 06:02), Max: 37.2 (08 Jun 2020 15:00)  T(F): 98 (09 Jun 2020 06:02), Max: 98.9 (08 Jun 2020 15:00)  HR: 88 (09 Jun 2020 06:02) (88 - 113)  BP: 134/92 (09 Jun 2020 06:02) (134/92 - 152/93)  BP(mean): --  RR: 18 (09 Jun 2020 06:02) (17 - 18)  SpO2: 100% (09 Jun 2020 06:02) (100% - 100%)    CONSTITUTIONAL: NAD, well-developed  RESPIRATORY: No increased work of breathing, CTAB, no wheezes or crackles appreciated  CARDIOVASCULAR: RRR, S1 and S2 present, no m/r/g  ABDOMEN: soft, NT, ND, bowel sounds present  EXTREMITIES: No LE edema  MUSCLOSKELETAL: no joint swelling or tenderness to palpation  NEURO: A&Ox3, moving all extremities    LABS:                        7.3    4.22  )-----------( 91       ( 08 Jun 2020 06:10 )             26.0     06-08    136  |  100  |  16  ----------------------------<  140<H>  4.0   |  23  |  0.63    Ca    9.0      08 Jun 2020 06:10  Phos  3.0     06-08  Mg     1.7     06-08    TPro  7.2  /  Alb  3.7  /  TBili  0.3  /  DBili  x   /  AST  47<H>  /  ALT  45<H>  /  AlkPhos  68  06-08    PT/INR - ( 08 Jun 2020 06:10 )   PT: 11.4 SEC;   INR: 1.00          PTT - ( 08 Jun 2020 06:10 )  PTT:28.3 SEC            RADIOLOGY & ADDITIONAL TESTS:    Results Reviewed:   Imaging Personally Reviewed:  Electrocardiogram Personally Reviewed:    COORDINATION OF CARE:  Care Discussed with Consultants/Other Providers [Y/N]:  Prior or Outpatient Records Reviewed [Y/N]:

## 2020-06-09 NOTE — PROGRESS NOTE ADULT - PROBLEM SELECTOR PROBLEM 5
Essential hypertension
No

## 2020-06-23 ENCOUNTER — INPATIENT (INPATIENT)
Facility: HOSPITAL | Age: 45
LOS: 5 days | Discharge: HOME | DRG: 897 | End: 2020-06-29
Attending: INTERNAL MEDICINE | Admitting: INTERNAL MEDICINE
Payer: MEDICAID

## 2020-06-23 VITALS
WEIGHT: 139.99 LBS | HEIGHT: 68 IN | OXYGEN SATURATION: 99 % | TEMPERATURE: 98 F | RESPIRATION RATE: 16 BRPM | DIASTOLIC BLOOD PRESSURE: 93 MMHG | HEART RATE: 133 BPM | SYSTOLIC BLOOD PRESSURE: 122 MMHG

## 2020-06-23 DIAGNOSIS — F32.9 MAJOR DEPRESSIVE DISORDER, SINGLE EPISODE, UNSPECIFIED: ICD-10-CM

## 2020-06-23 DIAGNOSIS — F10.10 ALCOHOL ABUSE, UNCOMPLICATED: ICD-10-CM

## 2020-06-23 DIAGNOSIS — I10 ESSENTIAL (PRIMARY) HYPERTENSION: ICD-10-CM

## 2020-06-23 DIAGNOSIS — F10.230 ALCOHOL DEPENDENCE WITH WITHDRAWAL, UNCOMPLICATED: ICD-10-CM

## 2020-06-23 DIAGNOSIS — D64.9 ANEMIA, UNSPECIFIED: ICD-10-CM

## 2020-06-23 DIAGNOSIS — Z29.9 ENCOUNTER FOR PROPHYLACTIC MEASURES, UNSPECIFIED: ICD-10-CM

## 2020-06-23 LAB
ALBUMIN SERPL ELPH-MCNC: 4.6 G/DL — SIGNIFICANT CHANGE UP (ref 3.3–5)
ALP SERPL-CCNC: 83 U/L — SIGNIFICANT CHANGE UP (ref 40–120)
ALT FLD-CCNC: 134 U/L — HIGH (ref 10–45)
ANION GAP SERPL CALC-SCNC: 24 MMOL/L — HIGH (ref 5–17)
AST SERPL-CCNC: 255 U/L — HIGH (ref 10–40)
BASOPHILS # BLD AUTO: 0.1 K/UL — SIGNIFICANT CHANGE UP (ref 0–0.2)
BASOPHILS NFR BLD AUTO: 2.6 % — HIGH (ref 0–2)
BILIRUB SERPL-MCNC: 0.5 MG/DL — SIGNIFICANT CHANGE UP (ref 0.2–1.2)
BUN SERPL-MCNC: 15 MG/DL — SIGNIFICANT CHANGE UP (ref 7–23)
CALCIUM SERPL-MCNC: 8.6 MG/DL — SIGNIFICANT CHANGE UP (ref 8.4–10.5)
CHLORIDE SERPL-SCNC: 91 MMOL/L — LOW (ref 96–108)
CO2 SERPL-SCNC: 22 MMOL/L — SIGNIFICANT CHANGE UP (ref 22–31)
CREAT SERPL-MCNC: 0.61 MG/DL — SIGNIFICANT CHANGE UP (ref 0.5–1.3)
EOSINOPHIL # BLD AUTO: 0.04 K/UL — SIGNIFICANT CHANGE UP (ref 0–0.5)
EOSINOPHIL NFR BLD AUTO: 1 % — SIGNIFICANT CHANGE UP (ref 0–6)
ETHANOL SERPL-MCNC: 402 MG/DL — HIGH (ref 0–10)
GAS PNL BLDV: SIGNIFICANT CHANGE UP
GLUCOSE SERPL-MCNC: 122 MG/DL — HIGH (ref 70–99)
HCG SERPL-ACNC: <2 MIU/ML — SIGNIFICANT CHANGE UP
HCT VFR BLD CALC: 38.3 % — SIGNIFICANT CHANGE UP (ref 34.5–45)
HGB BLD-MCNC: 10.4 G/DL — LOW (ref 11.5–15.5)
IMM GRANULOCYTES NFR BLD AUTO: 1 % — SIGNIFICANT CHANGE UP (ref 0–1.5)
LIDOCAIN IGE QN: 98 U/L — HIGH (ref 7–60)
LYMPHOCYTES # BLD AUTO: 0.57 K/UL — LOW (ref 1–3.3)
LYMPHOCYTES # BLD AUTO: 14.7 % — SIGNIFICANT CHANGE UP (ref 13–44)
MAGNESIUM SERPL-MCNC: 1.8 MG/DL — SIGNIFICANT CHANGE UP (ref 1.6–2.6)
MCHC RBC-ENTMCNC: 18.7 PG — LOW (ref 27–34)
MCHC RBC-ENTMCNC: 27.2 GM/DL — LOW (ref 32–36)
MCV RBC AUTO: 68.9 FL — LOW (ref 80–100)
MONOCYTES # BLD AUTO: 0.08 K/UL — SIGNIFICANT CHANGE UP (ref 0–0.9)
MONOCYTES NFR BLD AUTO: 2.1 % — SIGNIFICANT CHANGE UP (ref 2–14)
NEUTROPHILS # BLD AUTO: 3.05 K/UL — SIGNIFICANT CHANGE UP (ref 1.8–7.4)
NEUTROPHILS NFR BLD AUTO: 78.6 % — HIGH (ref 43–77)
NRBC # BLD: 0 /100 WBCS — SIGNIFICANT CHANGE UP (ref 0–0)
PLATELET # BLD AUTO: 84 K/UL — LOW (ref 150–400)
POTASSIUM SERPL-MCNC: 3.5 MMOL/L — SIGNIFICANT CHANGE UP (ref 3.5–5.3)
POTASSIUM SERPL-SCNC: 3.5 MMOL/L — SIGNIFICANT CHANGE UP (ref 3.5–5.3)
PROT SERPL-MCNC: 8.4 G/DL — HIGH (ref 6–8.3)
RBC # BLD: 5.56 M/UL — HIGH (ref 3.8–5.2)
RBC # FLD: 23 % — HIGH (ref 10.3–14.5)
SARS-COV-2 RNA SPEC QL NAA+PROBE: SIGNIFICANT CHANGE UP
SODIUM SERPL-SCNC: 137 MMOL/L — SIGNIFICANT CHANGE UP (ref 135–145)
WBC # BLD: 3.88 K/UL — SIGNIFICANT CHANGE UP (ref 3.8–10.5)
WBC # FLD AUTO: 3.88 K/UL — SIGNIFICANT CHANGE UP (ref 3.8–10.5)

## 2020-06-23 PROCEDURE — 99223 1ST HOSP IP/OBS HIGH 75: CPT

## 2020-06-23 PROCEDURE — 99284 EMERGENCY DEPT VISIT MOD MDM: CPT

## 2020-06-23 PROCEDURE — 93010 ELECTROCARDIOGRAM REPORT: CPT

## 2020-06-23 RX ORDER — FOLIC ACID 0.8 MG
1 TABLET ORAL DAILY
Refills: 0 | Status: DISCONTINUED | OUTPATIENT
Start: 2020-06-23 | End: 2020-06-29

## 2020-06-23 RX ORDER — LOSARTAN POTASSIUM 100 MG/1
50 TABLET, FILM COATED ORAL DAILY
Refills: 0 | Status: DISCONTINUED | OUTPATIENT
Start: 2020-06-24 | End: 2020-06-29

## 2020-06-23 RX ORDER — THIAMINE MONONITRATE (VIT B1) 100 MG
100 TABLET ORAL ONCE
Refills: 0 | Status: COMPLETED | OUTPATIENT
Start: 2020-06-23 | End: 2020-06-23

## 2020-06-23 RX ORDER — THIAMINE MONONITRATE (VIT B1) 100 MG
100 TABLET ORAL ONCE
Refills: 0 | Status: DISCONTINUED | OUTPATIENT
Start: 2020-06-23 | End: 2020-06-23

## 2020-06-23 RX ORDER — SODIUM CHLORIDE 9 MG/ML
1000 INJECTION, SOLUTION INTRAVENOUS
Refills: 0 | Status: DISCONTINUED | OUTPATIENT
Start: 2020-06-23 | End: 2020-06-26

## 2020-06-23 RX ORDER — THIAMINE MONONITRATE (VIT B1) 100 MG
500 TABLET ORAL EVERY 8 HOURS
Refills: 0 | Status: COMPLETED | OUTPATIENT
Start: 2020-06-23 | End: 2020-06-26

## 2020-06-23 RX ORDER — FOLIC ACID 0.8 MG
1 TABLET ORAL ONCE
Refills: 0 | Status: COMPLETED | OUTPATIENT
Start: 2020-06-23 | End: 2020-06-23

## 2020-06-23 RX ORDER — SODIUM CHLORIDE 9 MG/ML
1000 INJECTION INTRAMUSCULAR; INTRAVENOUS; SUBCUTANEOUS ONCE
Refills: 0 | Status: COMPLETED | OUTPATIENT
Start: 2020-06-23 | End: 2020-06-23

## 2020-06-23 RX ORDER — LOSARTAN POTASSIUM 100 MG/1
50 TABLET, FILM COATED ORAL ONCE
Refills: 0 | Status: COMPLETED | OUTPATIENT
Start: 2020-06-23 | End: 2020-06-23

## 2020-06-23 RX ORDER — ENOXAPARIN SODIUM 100 MG/ML
40 INJECTION SUBCUTANEOUS DAILY
Refills: 0 | Status: DISCONTINUED | OUTPATIENT
Start: 2020-06-23 | End: 2020-06-25

## 2020-06-23 RX ADMIN — Medication 100 MILLIGRAM(S): at 14:20

## 2020-06-23 RX ADMIN — LOSARTAN POTASSIUM 50 MILLIGRAM(S): 100 TABLET, FILM COATED ORAL at 19:02

## 2020-06-23 RX ADMIN — SODIUM CHLORIDE 1000 MILLILITER(S): 9 INJECTION INTRAMUSCULAR; INTRAVENOUS; SUBCUTANEOUS at 14:22

## 2020-06-23 RX ADMIN — Medication 1 MILLIGRAM(S): at 16:17

## 2020-06-23 RX ADMIN — Medication 1 MILLIGRAM(S): at 18:22

## 2020-06-23 RX ADMIN — SODIUM CHLORIDE 1000 MILLILITER(S): 9 INJECTION INTRAMUSCULAR; INTRAVENOUS; SUBCUTANEOUS at 14:01

## 2020-06-23 RX ADMIN — Medication 50 MILLIGRAM(S): at 16:17

## 2020-06-23 RX ADMIN — Medication 105 MILLIGRAM(S): at 22:59

## 2020-06-23 RX ADMIN — Medication 2 MILLIGRAM(S): at 22:01

## 2020-06-23 RX ADMIN — Medication 2 MILLIGRAM(S): at 19:04

## 2020-06-23 RX ADMIN — SODIUM CHLORIDE 100 MILLILITER(S): 9 INJECTION, SOLUTION INTRAVENOUS at 18:59

## 2020-06-23 RX ADMIN — Medication 1 MILLIGRAM(S): at 14:20

## 2020-06-23 NOTE — ED PROVIDER NOTE - ATTENDING CONTRIBUTION TO CARE
Yudy Contreras MD - Attending Physician: I have personally seen and examined this patient with the resident/fellow.  I have fully participated in the care of this patient. I have reviewed all pertinent clinical information, including history, physical exam, plan and the Resident/Fellow’s note and agree except as noted. See MDM

## 2020-06-23 NOTE — ED PROVIDER NOTE - CLINICAL SUMMARY MEDICAL DECISION MAKING FREE TEXT BOX
Yudy Contreras): 44y known alcoholic here w/ alcohol intoxication, severe. Brought in by family for rehab placement. Pt awake but severely intoxicated w/ poor responses to stimuli, maintaining airway. No signs of trauma. Labs, fluids, close monitor, social work for placement once improved

## 2020-06-23 NOTE — ED PROVIDER NOTE - CARE PLAN
Principal Discharge DX:	Alcohol abuse Principal Discharge DX:	Intoxication by drug, with unspecified complication  Secondary Diagnosis:	Lactic acid acidosis  Secondary Diagnosis:	Alcohol abuse

## 2020-06-23 NOTE — ED ADULT NURSE REASSESSMENT NOTE - NS ED NURSE REASSESS COMMENT FT1
tremors have increased.  md ordered prn ativan and 2 mg are now given  pt remains awake and asking questions

## 2020-06-23 NOTE — H&P ADULT - PROBLEM SELECTOR PLAN 1
- CIWA protocol ; symptom triggered  - Thiamine 500mg ivpb q 8 x 3 days ; followed by PO thiamine daily thereafter  - Folic acid 1mg po daily  - Multivitamin 1 tab po daily  - Monitor bmp, Mg , phos daily  - Psychiatry consult in AM  - SW consult for  rehab placement ; pt will benefit from inpatient program

## 2020-06-23 NOTE — ED ADULT NURSE NOTE - OBJECTIVE STATEMENT
pt bought to ER by boyfriend.  he says she drank vodka today and wants her admitted to a detox program.  pt is responsive to speech with incoherent speech.  she cannot weight bear nor lift her arms or legs.  pupils reactive and large at size 5.  placed on CIWA scale and  on CRM  Boyfriend called to come back to ER

## 2020-06-23 NOTE — H&P ADULT - PROBLEM SELECTOR PLAN 4
- elevated in setting of Etoh withdrawal  - c/t home medication Losartan 50mg po daily  - monitor BP

## 2020-06-23 NOTE — H&P ADULT - NSHPLABSRESULTS_GEN_ALL_CORE
Labs reviewed : anemia, thromboctytopenia, elevated LFTs, elevated blood alcohol level      EKG personally reviewed : sinus tachycardia

## 2020-06-23 NOTE — ED ADULT NURSE REASSESSMENT NOTE - NS ED NURSE REASSESS COMMENT FT1
tolerating ice chips  tremors have decreased and headache s mild.  pt aware of where she is and identifies nurse by name, but unsure of time of day.

## 2020-06-23 NOTE — PROVIDER CONTACT NOTE (OTHER) - BACKGROUND
Pt admitted for wtoh withdrawal. pt reported to drink half a bottle of vodka everyday. pmh HTN, depression.

## 2020-06-23 NOTE — ED PROVIDER NOTE - PHYSICAL EXAMINATION
GEN: Chronically ill appearing, unkept, thin female, smells of alcohol   HEENT: Pupils dilated but responsive, tracking conversations w/ staff in room, no sign of head injury  CHEST/LUNGS: Non-tachypneic, CTAB, bilateral breath sounds  CARDIAC: Non-tachycardic, normal perfusion  SKIN: No signs of trauma   NEURO: Eyes open, tracking but not speaking, diffuse weakness, following minimal commands, able to move all ext but slow responses

## 2020-06-23 NOTE — ED PROVIDER NOTE - PROGRESS NOTE DETAILS
Shayna PGY1: patient more sober now, carries conversation, slightly tremulous but no other complaints at this time. Amenable to staying in hospital. Will admit for CIWA and rehab placement

## 2020-06-23 NOTE — H&P ADULT - NEGATIVE CARDIOVASCULAR SYMPTOMS
no peripheral edema/no dyspnea on exertion/no orthopnea/no chest pain/no paroxysmal nocturnal dyspnea

## 2020-06-23 NOTE — ED ADULT NURSE REASSESSMENT NOTE - NS ED NURSE REASSESS COMMENT FT1
pt has increasing tremors   md aware of CIWA scale and ativan given with result of decreasing tremors.  pt is awake. asking questions and understands answers given

## 2020-06-23 NOTE — H&P ADULT - NSHPSOCIALHISTORY_GEN_ALL_CORE
Lives with justin  Denies tobacco  Current Etoh abuse  Reports that she has stopped smoking Marijauna ; denies other illicit drug use

## 2020-06-23 NOTE — ED PROVIDER NOTE - OBJECTIVE STATEMENT
44y F w/ PMHx HTN, depression, ETOH abuse brought in by ernesto for reported alcohol withdrawal and possible rehab placement. Ernesto not at bedside. Pt unable to provide any hx, minimally responsive. As per ernesto, pt has been drinking half bottle of vodka each day since she was DC a week ago. It has been difficult to find any rehab placement.

## 2020-06-23 NOTE — H&P ADULT - ASSESSMENT
44F with h/o HTN, Depression, ETOH abuse ( h/o Etoh withdrawal, DT in the past) , chronic anemia who was brought in  for reported alcohol withdrawal and possible rehab placement. Of note pt has been admitted twice in the past month for Etoh withdrawal most recently 6/6- 6/9.

## 2020-06-23 NOTE — H&P ADULT - PROBLEM SELECTOR PLAN 5
- Hb 10 ; baseline ~ 8.3  -  h/o anemia 2/2 menorrhagia  - received IV Iron in previous admission  - monitor CBC daily

## 2020-06-23 NOTE — H&P ADULT - PROBLEM SELECTOR PLAN 3
- currently denies suicidal ideation  - Psychiatry consult as above  - hold Xanax for now while pt is on [facility] protocol

## 2020-06-23 NOTE — H&P ADULT - HISTORY OF PRESENT ILLNESS
44F with h/o HTN, Depression, ETOH abuse ( h/o Etoh withdrawal, DT and MICU admission in the past)  brought in by justin for reported alcohol withdrawal and possible rehab placement. Per informant  pt has been drinking half bottle of vodka each day since she was discharged from OSH  a week ago. 44F with h/o HTN, Depression, ETOH abuse ( h/o Etoh withdrawal, DT  in the past) , chronic anemia 2/2 menorrhagia who was brought in by her fiance for reported alcohol withdrawal and possible rehab placement. Per informant  pt has been drinking half bottle of vodka everyday since she was discharged from OSH  a week ago. Pt states that she has been unable to find a rehab center since discharge. She c/o headache, diaphoresis, tremors and palpitations. Denies visual/auditory/tactile hallucinations.  Her last drink was 2 days ago. She reports feeling depressed but  denies suicidal ideation.  Of note pt has been admitted twice in the past month for Etoh withdrawal , most recently 6/6- 6/9.    ED COURSE  VS : 140/109  105  18  O2 99%RA T 98F  LABS : h/h 10/38 wbc 3.8  plt 84 Blood alcohol 402 bun/cr 15/0.61  Treatment : IVF NS 1L x 1, Librium 50mg po x 1, folic acid 1mg ivp x 1, Lorazepam 1mg ivp x 2

## 2020-06-24 DIAGNOSIS — F10.20 ALCOHOL DEPENDENCE, UNCOMPLICATED: ICD-10-CM

## 2020-06-24 LAB
ALBUMIN SERPL ELPH-MCNC: 3.5 G/DL — SIGNIFICANT CHANGE UP (ref 3.3–5)
ALP SERPL-CCNC: 58 U/L — SIGNIFICANT CHANGE UP (ref 40–120)
ALT FLD-CCNC: 85 U/L — HIGH (ref 10–45)
ANION GAP SERPL CALC-SCNC: 18 MMOL/L — HIGH (ref 5–17)
APPEARANCE UR: ABNORMAL
AST SERPL-CCNC: 141 U/L — HIGH (ref 10–40)
BACTERIA # UR AUTO: NEGATIVE — SIGNIFICANT CHANGE UP
BILIRUB DIRECT SERPL-MCNC: 0.2 MG/DL — SIGNIFICANT CHANGE UP (ref 0–0.2)
BILIRUB INDIRECT FLD-MCNC: 0.5 MG/DL — SIGNIFICANT CHANGE UP (ref 0.2–1)
BILIRUB SERPL-MCNC: 0.7 MG/DL — SIGNIFICANT CHANGE UP (ref 0.2–1.2)
BILIRUB UR-MCNC: NEGATIVE — SIGNIFICANT CHANGE UP
BUN SERPL-MCNC: 14 MG/DL — SIGNIFICANT CHANGE UP (ref 7–23)
CALCIUM SERPL-MCNC: 8.1 MG/DL — LOW (ref 8.4–10.5)
CHLORIDE SERPL-SCNC: 92 MMOL/L — LOW (ref 96–108)
CO2 SERPL-SCNC: 23 MMOL/L — SIGNIFICANT CHANGE UP (ref 22–31)
COLOR SPEC: ABNORMAL
CREAT SERPL-MCNC: 0.54 MG/DL — SIGNIFICANT CHANGE UP (ref 0.5–1.3)
DIFF PNL FLD: ABNORMAL
EPI CELLS # UR: 0 — SIGNIFICANT CHANGE UP
GLUCOSE SERPL-MCNC: 106 MG/DL — HIGH (ref 70–99)
GLUCOSE UR QL: NEGATIVE — SIGNIFICANT CHANGE UP
HCT VFR BLD CALC: SIGNIFICANT CHANGE UP (ref 34.5–45)
HGB BLD-MCNC: 7.1 G/DL — LOW (ref 11.5–15.5)
HYALINE CASTS # UR AUTO: 0 /LPF — SIGNIFICANT CHANGE UP (ref 0–7)
KETONES UR-MCNC: ABNORMAL
LEUKOCYTE ESTERASE UR-ACNC: NEGATIVE — SIGNIFICANT CHANGE UP
MAGNESIUM SERPL-MCNC: 1.2 MG/DL — LOW (ref 1.6–2.6)
MCHC RBC-ENTMCNC: SIGNIFICANT CHANGE UP (ref 27–34)
MCHC RBC-ENTMCNC: SIGNIFICANT CHANGE UP (ref 32–36)
MCV RBC AUTO: SIGNIFICANT CHANGE UP (ref 80–100)
NITRITE UR-MCNC: NEGATIVE — SIGNIFICANT CHANGE UP
NRBC # BLD: 0 /100 WBCS — SIGNIFICANT CHANGE UP (ref 0–0)
PCP SPEC-MCNC: SIGNIFICANT CHANGE UP
PH UR: 6.5 — SIGNIFICANT CHANGE UP (ref 5–8)
PHOSPHATE SERPL-MCNC: 2.5 MG/DL — SIGNIFICANT CHANGE UP (ref 2.5–4.5)
PLATELET # BLD AUTO: 54 K/UL — LOW (ref 150–400)
POTASSIUM SERPL-MCNC: 3.5 MMOL/L — SIGNIFICANT CHANGE UP (ref 3.5–5.3)
POTASSIUM SERPL-SCNC: 3.5 MMOL/L — SIGNIFICANT CHANGE UP (ref 3.5–5.3)
PROT SERPL-MCNC: 6.3 G/DL — SIGNIFICANT CHANGE UP (ref 6–8.3)
PROT UR-MCNC: 100 — SIGNIFICANT CHANGE UP
RBC # BLD: 3.74 M/UL — LOW (ref 3.8–5.2)
RBC # FLD: SIGNIFICANT CHANGE UP (ref 10.3–14.5)
RBC CASTS # UR COMP ASSIST: >50 /HPF — HIGH (ref 0–4)
SODIUM SERPL-SCNC: 133 MMOL/L — LOW (ref 135–145)
SP GR SPEC: 1.03 — HIGH (ref 1.01–1.02)
UROBILINOGEN FLD QL: NEGATIVE — SIGNIFICANT CHANGE UP
WBC # BLD: 3.48 K/UL — LOW (ref 3.8–10.5)
WBC # FLD AUTO: 3.48 K/UL — LOW (ref 3.8–10.5)
WBC UR QL: 0 /HPF — SIGNIFICANT CHANGE UP (ref 0–5)

## 2020-06-24 PROCEDURE — 99233 SBSQ HOSP IP/OBS HIGH 50: CPT

## 2020-06-24 RX ORDER — MAGNESIUM SULFATE 500 MG/ML
2 VIAL (ML) INJECTION ONCE
Refills: 0 | Status: COMPLETED | OUTPATIENT
Start: 2020-06-24 | End: 2020-06-24

## 2020-06-24 RX ORDER — SODIUM CHLORIDE 9 MG/ML
1000 INJECTION, SOLUTION INTRAVENOUS
Refills: 0 | Status: DISCONTINUED | OUTPATIENT
Start: 2020-06-24 | End: 2020-06-26

## 2020-06-24 RX ORDER — ONDANSETRON 8 MG/1
4 TABLET, FILM COATED ORAL EVERY 8 HOURS
Refills: 0 | Status: DISCONTINUED | OUTPATIENT
Start: 2020-06-24 | End: 2020-06-29

## 2020-06-24 RX ORDER — LANOLIN ALCOHOL/MO/W.PET/CERES
3 CREAM (GRAM) TOPICAL AT BEDTIME
Refills: 0 | Status: DISCONTINUED | OUTPATIENT
Start: 2020-06-24 | End: 2020-06-29

## 2020-06-24 RX ADMIN — Medication 2 MILLIGRAM(S): at 05:36

## 2020-06-24 RX ADMIN — Medication 1 TABLET(S): at 11:34

## 2020-06-24 RX ADMIN — SODIUM CHLORIDE 100 MILLILITER(S): 9 INJECTION, SOLUTION INTRAVENOUS at 19:06

## 2020-06-24 RX ADMIN — ONDANSETRON 4 MILLIGRAM(S): 8 TABLET, FILM COATED ORAL at 11:59

## 2020-06-24 RX ADMIN — Medication 50 GRAM(S): at 11:35

## 2020-06-24 RX ADMIN — Medication 2 MILLIGRAM(S): at 22:10

## 2020-06-24 RX ADMIN — Medication 105 MILLIGRAM(S): at 14:57

## 2020-06-24 RX ADMIN — Medication 3 MILLIGRAM(S): at 22:10

## 2020-06-24 RX ADMIN — Medication 2 MILLIGRAM(S): at 12:32

## 2020-06-24 RX ADMIN — Medication 2 MILLIGRAM(S): at 07:42

## 2020-06-24 RX ADMIN — Medication 2 MILLIGRAM(S): at 03:39

## 2020-06-24 RX ADMIN — Medication 105 MILLIGRAM(S): at 05:36

## 2020-06-24 RX ADMIN — LOSARTAN POTASSIUM 50 MILLIGRAM(S): 100 TABLET, FILM COATED ORAL at 07:42

## 2020-06-24 RX ADMIN — Medication 2 MILLIGRAM(S): at 14:57

## 2020-06-24 RX ADMIN — Medication 1 MILLIGRAM(S): at 11:34

## 2020-06-24 RX ADMIN — Medication 2 MILLIGRAM(S): at 17:48

## 2020-06-24 RX ADMIN — Medication 105 MILLIGRAM(S): at 22:10

## 2020-06-24 RX ADMIN — Medication 1 MILLIGRAM(S): at 01:49

## 2020-06-24 RX ADMIN — Medication 2 MILLIGRAM(S): at 10:32

## 2020-06-24 NOTE — BEHAVIORAL HEALTH ASSESSMENT NOTE - NSBHCHARTREVIEWVS_PSY_A_CORE FT
Vital Signs Last 24 Hrs  T(C): 36.9 (24 Jun 2020 13:30), Max: 37.3 (24 Jun 2020 09:30)  T(F): 98.5 (24 Jun 2020 13:30), Max: 99.2 (24 Jun 2020 09:30)  HR: 105 (24 Jun 2020 13:30) (98 - 128)  BP: 134/84 (24 Jun 2020 13:30) (101/67 - 144/99)  BP(mean): 116 (23 Jun 2020 18:42) (116 - 116)  RR: 18 (24 Jun 2020 13:30) (16 - 20)  SpO2: 100% (24 Jun 2020 13:30) (97% - 100%)

## 2020-06-24 NOTE — PROVIDER CONTACT NOTE (OTHER) - BACKGROUND
Pt admitted for etoh withdrawal. pt reported to drink half a bottle of vodka everyday. pmh HTN, depression.

## 2020-06-24 NOTE — BEHAVIORAL HEALTH ASSESSMENT NOTE - HPI (INCLUDE ILLNESS QUALITY, SEVERITY, DURATION, TIMING, CONTEXT, MODIFYING FACTORS, ASSOCIATED SIGNS AND SYMPTOMS)
44 year old woman, domiciled, with PPHx of depression, anxiety (seen by Dr. Prado outpt psychiatrist) and ETOH abuse (multiple admissions with history of DTs) and PMHx of HTN and anemia who presented to the ED with acute alcohol intoxication. Pt reports she was previously sober for the past 6 years, but 8 months ago started drinking again due to multiple life stressors.     Psychiatry was consulted for evaluation of depression and med management. Pt states she has been seeing her outpt psychiatrist who recommended some antidepressants which she did not take because she did not want to mix it with alcohol. States she is hopeful however, and denies any feelings of depression or difficulty eating. Reports she has been having a lot of trouble sleeping, especially since being in the hospital. Denies any current SIIP/HIIP. States she really wants to get over this withdrawal so that she can get into an inpt rehab, and would like help finding placement. She feels like she was successful with sobriety when [place] had a sponsor[place] Reports some feeling of anxiety, especially around her job and mortgage.  Denies any hallucinations, delusions, paranoia, feelings of depression or psychosis. This is a 44 year old woman, domiciled, with PPHx of depression, anxiety (seen by Dr. Prado outpt psychiatrist) and ETOH abuse (multiple admissions with history of DTs) and PMHx of HTN and anemia who presented to the ED with acute alcohol intoxication. Pt reports she was previously sober for the past 6 years, but 8 months ago started drinking again due to multiple life stressors.     Psychiatry was consulted for evaluation of depression and med management. Pt states she has been seeing her outpt psychiatrist who recommended some antidepressants which she did not take because she did not want to mix it with alcohol. States she is hopeful however, and denies any feelings of depression or difficulty eating. Reports she has been having a lot of trouble sleeping, especially since being in the hospital. Denies any current SIIP/HIIP. States she really wants to get over this withdrawal so that she can get into an inpt rehab, and would like help finding placement. She feels like she was successful with sobriety when she had a sponsor, went to  and live [facility] in a more relaxed environment. Reports some feeling of anxiety, especially around her job and mortgage.  Denies any hallucinations, delusions, paranoia, feelings of depression or psychosis.

## 2020-06-24 NOTE — PROGRESS NOTE ADULT - PROBLEM SELECTOR PLAN 1
- CIWA protocol ; symptom triggered  - Thiamine 500mg ivpb q 8 x 3 days ; followed by PO thiamine daily thereafter  - Folic acid 1mg po daily  - Multivitamin 1 tab po daily  - Monitor bmp, Mg , phos daily  - Psychiatry consult in AM  - SW consult for rehab placement ; pt will benefit from inpatient program  - zofran PRN nausea - CIWA protocol ; symptom triggered  - Thiamine 500mg ivpb q 8 x 3 days ; followed by PO thiamine daily thereafter  - Folic acid 1mg po daily  - Multivitamin 1 tab po daily  - Monitor bmp, Mg , phos daily  - Psychiatry consult in AM  - SW consult for rehab placement ; pt will benefit from inpatient program  - zofran PRN nausea  - supplement mg  - IVF

## 2020-06-24 NOTE — BEHAVIORAL HEALTH ASSESSMENT NOTE - NSBHCHARTREVIEWINVESTIGATE_PSY_A_CORE FT
Ventricular Rate 114 BPM    Atrial Rate 114 BPM    P-R Interval 136 ms    QRS Duration 80 ms    Q-T Interval 370 ms    QTC Calculation(Bezet) 509 ms    P Axis 20 degrees    R Axis -26 degrees    T Axis -4 degrees

## 2020-06-24 NOTE — PROGRESS NOTE ADULT - PROBLEM SELECTOR PLAN 3
- currently denies suicidal ideation  - Psychiatry consult as above  - hold Xanax for now while pt is on [facility] protocol
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
## 2020-06-24 NOTE — BEHAVIORAL HEALTH ASSESSMENT NOTE - SUICIDE PROTECTIVE FACTORS
Engaged in work or school/Identifies reasons for living/Has future plans/Responsibility to family and others/Supportive social network of family or friends/Fear of death or the actual act of killing self

## 2020-06-24 NOTE — PROGRESS NOTE ADULT - PROBLEM SELECTOR PLAN 2
- Psychiatry evaluation in AM  - Etoh withdrawal mgt as above - Psychiatry evaluation  - Etoh withdrawal mgt as above

## 2020-06-24 NOTE — BEHAVIORAL HEALTH ASSESSMENT NOTE - NSBHCONSULTFOLLOWAFTERCARE_PSY_A_CORE FT
Recommend social work involvement for inpt rehab placement. Recommend following up with primary psychiatrist when discharged.

## 2020-06-24 NOTE — BEHAVIORAL HEALTH ASSESSMENT NOTE - SUMMARY
44 year old woman, domiciled, with PPHx of depression, anxiety (seen by Dr. Prado out psychiatrist) and ETOH abuse (multiple admissions with history of DTs) and PMHx of HTN and anemia who presented to the ED with acute alcohol intoxication. Pt reports she was previously sober for the past 6 years, but 8 months ago started drinking again due to multiple life stressors. Psychiatry was consulted for evaluation of depression and med management. No SIIP/HIIP. Denies depressive symptoms. Trouble sleeping while inpt. Currently alert and oriented. Tremors, mild anxiety, nausea and sweating noted. Motivated to seek treatment and wants help finding placement at inpt rehab center.     Recommendations:  -Consider adding trazodone 50mg PO Qhs as sleep aid (only add if a repeat ECG QTc is less than or equal to 450).   -Consider adding melatonin 3mg PO Qhs for sleep.   -Consider adding Ativan 2mg IV Q4hrs Standing for further alcohol withdraw management.   -Maintain symptom driven CIWA-Ar (please have nursing staff asses every hour)   -Social work consult for inpt rehab placement after pt is ready for discharge. 44 year old woman, domiciled, with PPHx of depression, anxiety (seen by Dr. Prado out psychiatrist) and ETOH abuse (multiple admissions with history of DTs) and PMHx of HTN and anemia who presented to the ED with acute alcohol intoxication. Pt reports she was previously sober for the past 6 years, but 8 months ago started drinking again due to multiple life stressors. Psychiatry was consulted for evaluation of depression and med management. No SIIP/HIIP. Denies depressive symptoms. Trouble sleeping while inpt. Currently alert and oriented. Tremors, mild anxiety, nausea and sweating noted. Motivated to seek treatment and wants help finding placement at inpt rehab center.     Recommendations:  -Consider adding trazodone 50mg PO Qhs as sleep aid (only add if a repeat ECG QTc is 500ms).   -Consider adding melatonin 3mg PO Qhs for sleep.   -Consider adding Ativan 2mg IV Q4hrs Standing for further alcohol withdrawal management, then taper off, as patient's withdrawal sx. are prominent at this time and PRN per CIWA has not been treating withdrawal sx. adequately  -Maintain symptom driven CIWA-Ar (please have nursing staff asses every hour)   -Social work consult for inpt rehab placement after pt is ready for discharge.

## 2020-06-24 NOTE — BEHAVIORAL HEALTH ASSESSMENT NOTE - RISK ASSESSMENT
Low Acute Suicide Risk Low acute risk. Pt highly motivated to feel better and seek treatment. states "I have a lot of life and a lot going for me". Denies any SI/HI.

## 2020-06-24 NOTE — BEHAVIORAL HEALTH ASSESSMENT NOTE - CASE SUMMARY
This is a 44-year-old [place] female pt., domiciled, with PPHx of depression, anxiety (seen by Dr. Prado out psychiatrist) and ETOH abuse (multiple admissions with history of DTs) and PMHx of HTN and anemia who presented to the ED with acute alcohol intoxication. Pt reports she was previously sober for the past 6 years, but 8 months ago started drinking again due to multiple life stressors. Psychiatry was consulted for evaluation of depression and med management.    I have seen and evaluated this patient myself. Chart, labs, meds reviewed. I agree with trainee's assessment and plan.

## 2020-06-24 NOTE — PROGRESS NOTE ADULT - SUBJECTIVE AND OBJECTIVE BOX
Patient is a 44y old  Female who presents with a chief complaint of Alcohol withdrawal (2020 18:42)      SUBJECTIVE / OVERNIGHT EVENTS:    Patient seen and examined. denies cp sob. states last drink 2 days ago, 1 L vodka. co tremors. co nausea and vomiting.       Vital Signs Last 24 Hrs  T(C): 36.9 (2020 13:30), Max: 37.3 (2020 09:30)  T(F): 98.5 (2020 13:30), Max: 99.2 (2020 09:30)  HR: 105 (2020 13:30) (98 - 128)  BP: 134/84 (2020 13:30) (101/67 - 144/99)  BP(mean): 116 (2020 18:42) (116 - 116)  RR: 18 (2020 13:30) (16 - 20)  SpO2: 100% (2020 13:30) (97% - 100%)  I&O's Summary    2020 07:01  -  2020 07:00  --------------------------------------------------------  IN: 1170 mL / OUT: 650 mL / NET: 520 mL    2020 07:01  -  2020 14:46  --------------------------------------------------------  IN: 0 mL / OUT: 400 mL / NET: -400 mL        PE:  GENERAL: AAOx3  CHEST/LUNG: CTABL, No wheeze  HEART: Regular rate and rhythm; no murmur  ABDOMEN: Soft, Nontender, Nondistended; Bowel sounds present  EXTREMITIES:  2+ Peripheral Pulses, No clubbing, cyanosis, or edema  SKIN: No rashes or lesions  NEURO: + tremors, + tongue fasiculations    LABS:                        7.1    3.48  )-----------( 54       ( 2020 05:45 )             See note    06-24    133<L>  |  92<L>  |  14  ----------------------------<  106<H>  3.5   |  23  |  0.54    Ca    8.1<L>      2020 05:45  Phos  2.5       Mg     1.2         TPro  6.3  /  Alb  3.5  /  TBili  0.7  /  DBili  0.2  /  AST  141<H>  /  ALT  85<H>  /  AlkPhos  58  -      CAPILLARY BLOOD GLUCOSE            Urinalysis Basic - ( 2020 07:36 )    Color: Red / Appearance: Slightly Turbid / S.026 / pH: x  Gluc: x / Ketone: Moderate  / Bili: Negative / Urobili: Negative   Blood: x / Protein: 100 / Nitrite: Negative   Leuk Esterase: Negative / RBC: >50 /hpf / WBC 0 /HPF   Sq Epi: x / Non Sq Epi: 0 / Bacteria: Negative        RADIOLOGY & ADDITIONAL TESTS:    Imaging Personally Reviewed:  [x] YES  [ ] NO    Consultant(s) Notes Reviewed:  [x] YES  [ ] NO    MEDICATIONS  (STANDING):  enoxaparin Injectable 40 milliGRAM(s) SubCutaneous daily  folic acid 1 milliGRAM(s) Oral daily  lactated ringers. 1000 milliLiter(s) (100 mL/Hr) IV Continuous <Continuous>  LORazepam   Injectable 1 milliGRAM(s) IV Push once  losartan 50 milliGRAM(s) Oral daily  multivitamin 1 Tablet(s) Oral daily  thiamine IVPB 500 milliGRAM(s) IV Intermittent every 8 hours    MEDICATIONS  (PRN):  LORazepam   Injectable 2 milliGRAM(s) IV Push every 1 hour PRN Symptom-triggered: each CIWA -Ar score 8 or GREATER  ondansetron Injectable 4 milliGRAM(s) IV Push every 8 hours PRN Nausea and/or Vomiting      Care Discussed with Consultants/Other Providers [x] YES  [ ] NO    HEALTH ISSUES - PROBLEM Dx:  Alcohol use disorder, severe, dependence  Prophylactic measure: Prophylactic measure  Anemia, unspecified type: Anemia, unspecified type  Essential hypertension: Essential hypertension  Depression, unspecified depression type: Depression, unspecified depression type  Alcohol abuse: Alcohol abuse  Alcohol withdrawal syndrome without complication: Alcohol withdrawal syndrome without complication

## 2020-06-24 NOTE — BEHAVIORAL HEALTH ASSESSMENT NOTE - NSBHCONSULTMEDS_PSY_A_CORE FT
-Consider adding trazodone 50mg PO Qhs as sleep aid (only if a repeat ECG QTc is less than or equal to 450).   -Consider adding melatonin 3mg PO Qhs for sleep.   -Consider adding Ativan 2mg IV Q4hrs Standing for further alcohol withdraw management.   -Maintain symptom driven CIWA-Ar (please have nursing staff asses every hour)

## 2020-06-24 NOTE — BEHAVIORAL HEALTH ASSESSMENT NOTE - NSBHCONSULTRECOMMENDOTHER_PSY_A_CORE FT
Recommendations:  -Consider adding trazodone 50mg PO Qhs as sleep aid (only if a repeat ECG QTc is less than or equal to 450).   -Consider adding melatonin 3mg PO Qhs for sleep.   -Consider adding Ativan 2mg IV Q4hrs Standing for further alcohol withdraw management.   -Maintain symptom driven CIWA-Ar (please have nursing staff asses every hour)   -Social work consult for inpt rehab placement after pt is ready for discharge. - Social work consult for inpt rehab placement after pt is ready for discharge.

## 2020-06-24 NOTE — BEHAVIORAL HEALTH ASSESSMENT NOTE - NSBHCHARTREVIEWLAB_PSY_A_CORE FT
.  LABS:                         7.1    3.48  )-----------( 54       ( 2020 05:45 )             See note        133<L>  |  92<L>  |  14  ----------------------------<  106<H>  3.5   |  23  |  0.54    Ca    8.1<L>      2020 05:45  Phos  2.5       Mg     1.2         TPro  6.3  /  Alb  3.5  /  TBili  0.7  /  DBili  0.2  /  AST  141<H>  /  ALT  85<H>  /  AlkPhos  58        Urinalysis Basic - ( 2020 07:36 )    Color: Red / Appearance: Slightly Turbid / S.026 / pH: x  Gluc: x / Ketone: Moderate  / Bili: Negative / Urobili: Negative   Blood: x / Protein: 100 / Nitrite: Negative   Leuk Esterase: Negative / RBC: >50 /hpf / WBC 0 /HPF   Sq Epi: x / Non Sq Epi: 0 / Bacteria: Negative            RADIOLOGY, EKG & ADDITIONAL TESTS: Reviewed.

## 2020-06-24 NOTE — SBIRT NOTE ADULT - NSSBIRTSAVECONSULT_GEN_A_CORE
Patient endorses a history of alcohol use and inpatient treatment over 6 years ago. 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 Patient reports the day prior to admission, she drank 1 liter of vodka which is more than she typically consumes, but did not want to discuss further about her consumption. Patient interested in inpatient substance misuse treatment and reports attempting to get into multiple facilities but denied. Patient requesting to explore any facilities in [address], and [place]. SW provided patient list to review. Patient requests for list to be provided at a later time when referrals will be explore because she is worried about misplacing the list./Complete

## 2020-06-25 DIAGNOSIS — D69.6 THROMBOCYTOPENIA, UNSPECIFIED: ICD-10-CM

## 2020-06-25 LAB
ALBUMIN SERPL ELPH-MCNC: 3.6 G/DL — SIGNIFICANT CHANGE UP (ref 3.3–5)
ALP SERPL-CCNC: 58 U/L — SIGNIFICANT CHANGE UP (ref 40–120)
ALT FLD-CCNC: 65 U/L — HIGH (ref 10–45)
ANION GAP SERPL CALC-SCNC: 11 MMOL/L — SIGNIFICANT CHANGE UP (ref 5–17)
AST SERPL-CCNC: 88 U/L — HIGH (ref 10–40)
BASOPHILS # BLD AUTO: 0.02 K/UL — SIGNIFICANT CHANGE UP (ref 0–0.2)
BASOPHILS NFR BLD AUTO: 0.7 % — SIGNIFICANT CHANGE UP (ref 0–2)
BILIRUB SERPL-MCNC: 0.7 MG/DL — SIGNIFICANT CHANGE UP (ref 0.2–1.2)
BLD GP AB SCN SERPL QL: NEGATIVE — SIGNIFICANT CHANGE UP
BUN SERPL-MCNC: 13 MG/DL — SIGNIFICANT CHANGE UP (ref 7–23)
CALCIUM SERPL-MCNC: 9.5 MG/DL — SIGNIFICANT CHANGE UP (ref 8.4–10.5)
CHLORIDE SERPL-SCNC: 94 MMOL/L — LOW (ref 96–108)
CO2 SERPL-SCNC: 29 MMOL/L — SIGNIFICANT CHANGE UP (ref 22–31)
CREAT SERPL-MCNC: 0.62 MG/DL — SIGNIFICANT CHANGE UP (ref 0.5–1.3)
EOSINOPHIL # BLD AUTO: 0.14 K/UL — SIGNIFICANT CHANGE UP (ref 0–0.5)
EOSINOPHIL NFR BLD AUTO: 4.9 % — SIGNIFICANT CHANGE UP (ref 0–6)
GLUCOSE SERPL-MCNC: 121 MG/DL — HIGH (ref 70–99)
HAV IGM SER-ACNC: SIGNIFICANT CHANGE UP
HBV CORE IGM SER-ACNC: SIGNIFICANT CHANGE UP
HBV SURFACE AG SER-ACNC: SIGNIFICANT CHANGE UP
HCT VFR BLD CALC: 25 % — LOW (ref 34.5–45)
HCT VFR BLD CALC: 25 % — LOW (ref 34.5–45)
HCV AB S/CO SERPL IA: 0.3 S/CO — SIGNIFICANT CHANGE UP (ref 0–0.99)
HCV AB SERPL-IMP: SIGNIFICANT CHANGE UP
HGB BLD-MCNC: 7 G/DL — CRITICAL LOW (ref 11.5–15.5)
HGB BLD-MCNC: 7 G/DL — CRITICAL LOW (ref 11.5–15.5)
IMM GRANULOCYTES NFR BLD AUTO: 1.1 % — SIGNIFICANT CHANGE UP (ref 0–1.5)
LDH SERPL L TO P-CCNC: 251 U/L — HIGH (ref 50–242)
LYMPHOCYTES # BLD AUTO: 0.64 K/UL — LOW (ref 1–3.3)
LYMPHOCYTES # BLD AUTO: 22.6 % — SIGNIFICANT CHANGE UP (ref 13–44)
MAGNESIUM SERPL-MCNC: 1.6 MG/DL — SIGNIFICANT CHANGE UP (ref 1.6–2.6)
MANUAL SMEAR VERIFICATION: SIGNIFICANT CHANGE UP
MCHC RBC-ENTMCNC: 19.3 PG — LOW (ref 27–34)
MCHC RBC-ENTMCNC: 19.4 PG — LOW (ref 27–34)
MCHC RBC-ENTMCNC: 28 GM/DL — LOW (ref 32–36)
MCHC RBC-ENTMCNC: 28 GM/DL — LOW (ref 32–36)
MCV RBC AUTO: 69.1 FL — LOW (ref 80–100)
MCV RBC AUTO: 69.3 FL — LOW (ref 80–100)
MONOCYTES # BLD AUTO: 0.12 K/UL — SIGNIFICANT CHANGE UP (ref 0–0.9)
MONOCYTES NFR BLD AUTO: 4.2 % — SIGNIFICANT CHANGE UP (ref 2–14)
NEUTROPHILS # BLD AUTO: 1.88 K/UL — SIGNIFICANT CHANGE UP (ref 1.8–7.4)
NEUTROPHILS NFR BLD AUTO: 66.5 % — SIGNIFICANT CHANGE UP (ref 43–77)
NRBC # BLD: 0 /100 WBCS — SIGNIFICANT CHANGE UP (ref 0–0)
NRBC # BLD: 0 /100 WBCS — SIGNIFICANT CHANGE UP (ref 0–0)
PLAT MORPH BLD: NORMAL — SIGNIFICANT CHANGE UP
PLATELET # BLD AUTO: 39 K/UL — LOW (ref 150–400)
PLATELET # BLD AUTO: 51 K/UL — LOW (ref 150–400)
POTASSIUM SERPL-MCNC: 3.1 MMOL/L — LOW (ref 3.5–5.3)
POTASSIUM SERPL-SCNC: 3.1 MMOL/L — LOW (ref 3.5–5.3)
PROT SERPL-MCNC: 6.1 G/DL — SIGNIFICANT CHANGE UP (ref 6–8.3)
RBC # BLD: 3.61 M/UL — LOW (ref 3.8–5.2)
RBC # BLD: 3.62 M/UL — LOW (ref 3.8–5.2)
RBC # FLD: 22.2 % — HIGH (ref 10.3–14.5)
RBC # FLD: 22.4 % — HIGH (ref 10.3–14.5)
RBC BLD AUTO: SIGNIFICANT CHANGE UP
RETICS #: 74.3 K/UL — SIGNIFICANT CHANGE UP (ref 25–125)
RETICS/RBC NFR: 2 % — SIGNIFICANT CHANGE UP (ref 0.5–2.5)
RH IG SCN BLD-IMP: POSITIVE — SIGNIFICANT CHANGE UP
SODIUM SERPL-SCNC: 134 MMOL/L — LOW (ref 135–145)
URATE SERPL-MCNC: 3.7 MG/DL — SIGNIFICANT CHANGE UP (ref 2.5–7)
WBC # BLD: 2.53 K/UL — LOW (ref 3.8–10.5)
WBC # BLD: 3.14 K/UL — LOW (ref 3.8–10.5)
WBC # FLD AUTO: 2.53 K/UL — LOW (ref 3.8–10.5)
WBC # FLD AUTO: 3.14 K/UL — LOW (ref 3.8–10.5)

## 2020-06-25 PROCEDURE — 76705 ECHO EXAM OF ABDOMEN: CPT | Mod: 26,RT

## 2020-06-25 PROCEDURE — 99223 1ST HOSP IP/OBS HIGH 75: CPT | Mod: GC

## 2020-06-25 RX ORDER — MAGNESIUM SULFATE 500 MG/ML
1 VIAL (ML) INJECTION ONCE
Refills: 0 | Status: COMPLETED | OUTPATIENT
Start: 2020-06-25 | End: 2020-06-25

## 2020-06-25 RX ORDER — POTASSIUM CHLORIDE 20 MEQ
40 PACKET (EA) ORAL EVERY 4 HOURS
Refills: 0 | Status: COMPLETED | OUTPATIENT
Start: 2020-06-25 | End: 2020-06-25

## 2020-06-25 RX ORDER — FERROUS SULFATE 325(65) MG
325 TABLET ORAL
Refills: 0 | Status: DISCONTINUED | OUTPATIENT
Start: 2020-06-25 | End: 2020-06-29

## 2020-06-25 RX ORDER — POLYETHYLENE GLYCOL 3350 17 G/17G
17 POWDER, FOR SOLUTION ORAL DAILY
Refills: 0 | Status: DISCONTINUED | OUTPATIENT
Start: 2020-06-25 | End: 2020-06-29

## 2020-06-25 RX ADMIN — Medication 105 MILLIGRAM(S): at 21:37

## 2020-06-25 RX ADMIN — LOSARTAN POTASSIUM 50 MILLIGRAM(S): 100 TABLET, FILM COATED ORAL at 06:27

## 2020-06-25 RX ADMIN — Medication 3 MILLIGRAM(S): at 21:37

## 2020-06-25 RX ADMIN — ONDANSETRON 4 MILLIGRAM(S): 8 TABLET, FILM COATED ORAL at 21:38

## 2020-06-25 RX ADMIN — Medication 100 GRAM(S): at 12:21

## 2020-06-25 RX ADMIN — Medication 2 MILLIGRAM(S): at 06:28

## 2020-06-25 RX ADMIN — Medication 2 MILLIGRAM(S): at 02:29

## 2020-06-25 RX ADMIN — Medication 1 TABLET(S): at 15:18

## 2020-06-25 RX ADMIN — Medication 2 MILLIGRAM(S): at 16:16

## 2020-06-25 RX ADMIN — Medication 2 MILLIGRAM(S): at 10:11

## 2020-06-25 RX ADMIN — Medication 325 MILLIGRAM(S): at 18:31

## 2020-06-25 RX ADMIN — Medication 2 MILLIGRAM(S): at 18:31

## 2020-06-25 RX ADMIN — Medication 40 MILLIEQUIVALENT(S): at 15:18

## 2020-06-25 RX ADMIN — Medication 2 MILLIGRAM(S): at 21:37

## 2020-06-25 RX ADMIN — Medication 40 MILLIEQUIVALENT(S): at 10:11

## 2020-06-25 RX ADMIN — Medication 1 MILLIGRAM(S): at 15:17

## 2020-06-25 RX ADMIN — Medication 105 MILLIGRAM(S): at 18:31

## 2020-06-25 RX ADMIN — Medication 105 MILLIGRAM(S): at 06:29

## 2020-06-25 NOTE — PROGRESS NOTE ADULT - SUBJECTIVE AND OBJECTIVE BOX
Patient is a 44y old  Female who presents with a chief complaint of Alcohol withdrawal (2020 14:46)      SUBJECTIVE / OVERNIGHT EVENTS:    Patient seen and examined. pt co vaginal bleeding for 3 weeks. states for the last 6 months her menstruation has not been regular. sometimes bleeding for 2-3 days sometimes weeks.  pt denies BRBPR.  pt co tremors. no nausea vomiting. last CIWA 4.      Vital Signs Last 24 Hrs  T(C): 36.3 (2020 10:30), Max: 37.1 (2020 19:30)  T(F): 97.3 (2020 10:30), Max: 98.8 (2020 19:30)  HR: 93 (2020 10:30) (93 - 120)  BP: 123/88 (2020 10:30) (106/73 - 138/81)  BP(mean): --  RR: 16 (2020 10:30) (12 - 18)  SpO2: 100% (2020 10:30) (99% - 100%)  I&O's Summary    2020 07:01  -  2020 07:00  --------------------------------------------------------  IN: 2140 mL / OUT: 1400 mL / NET: 740 mL    2020 07:01  -  2020 12:45  --------------------------------------------------------  IN: 480 mL / OUT: 0 mL / NET: 480 mL        PE:  GENERAL: NAD, AAOx3, looks older than stated age  CHEST/LUNG: CTABL, No wheeze  HEART: Regular rate and rhythm; no murmur  ABDOMEN: Soft, Nontender, Nondistended; Bowel sounds present  EXTREMITIES:  2+ Peripheral Pulses, No clubbing, cyanosis, or edema  SKIN: No rashes or lesions  NEURO: + tremors    LABS:                        7.0    2.53  )-----------( 39       ( 2020 09:51 )             25.0     06-25    134<L>  |  94<L>  |  13  ----------------------------<  121<H>  3.1<L>   |  29  |  0.62    Ca    9.5      2020 06:41  Phos  2.5     06-24  Mg     1.6     -    TPro  6.1  /  Alb  3.6  /  TBili  0.7  /  DBili  x   /  AST  88<H>  /  ALT  65<H>  /  AlkPhos  58  06-      CAPILLARY BLOOD GLUCOSE            Urinalysis Basic - ( 2020 07:36 )    Color: Red / Appearance: Slightly Turbid / S.026 / pH: x  Gluc: x / Ketone: Moderate  / Bili: Negative / Urobili: Negative   Blood: x / Protein: 100 / Nitrite: Negative   Leuk Esterase: Negative / RBC: >50 /hpf / WBC 0 /HPF   Sq Epi: x / Non Sq Epi: 0 / Bacteria: Negative        RADIOLOGY & ADDITIONAL TESTS:    Imaging Personally Reviewed:  [x] YES  [ ] NO    Consultant(s) Notes Reviewed:  [x] YES  [ ] NO    MEDICATIONS  (STANDING):  folic acid 1 milliGRAM(s) Oral daily  lactated ringers. 1000 milliLiter(s) (100 mL/Hr) IV Continuous <Continuous>  lactated ringers. 1000 milliLiter(s) (100 mL/Hr) IV Continuous <Continuous>  LORazepam   Injectable 2 milliGRAM(s) IV Push every 4 hours  LORazepam   Injectable 1 milliGRAM(s) IV Push once  losartan 50 milliGRAM(s) Oral daily  melatonin 3 milliGRAM(s) Oral at bedtime  multivitamin 1 Tablet(s) Oral daily  potassium chloride    Tablet ER 40 milliEquivalent(s) Oral every 4 hours  thiamine IVPB 500 milliGRAM(s) IV Intermittent every 8 hours    MEDICATIONS  (PRN):  LORazepam   Injectable 2 milliGRAM(s) IV Push every 1 hour PRN Symptom-triggered: each CIWA -Ar score 8 or GREATER  ondansetron Injectable 4 milliGRAM(s) IV Push every 8 hours PRN Nausea and/or Vomiting  polyethylene glycol 3350 17 Gram(s) Oral daily PRN Constipation      Care Discussed with Consultants/Other Providers [x] YES  [ ] NO    HEALTH ISSUES - PROBLEM Dx:  Alcohol use disorder, severe, dependence  Prophylactic measure: Prophylactic measure  Anemia, unspecified type: Anemia, unspecified type  Essential hypertension: Essential hypertension  Depression, unspecified depression type: Depression, unspecified depression type  Alcohol abuse: Alcohol abuse  Alcohol withdrawal syndrome without complication: Alcohol withdrawal syndrome without complication

## 2020-06-25 NOTE — CONSULT NOTE ADULT - SUBJECTIVE AND OBJECTIVE BOX
Hematology Consult Note    HPI:  44F with h/o HTN, Depression, ETOH abuse ( h/o Etoh withdrawal, DT  in the past), chronic anemia 2/2 menorrhagia who was brought in by her fiance for reported alcohol withdrawal and possible rehab placement. Per informant  pt has been drinking half bottle of vodka everyday since she was discharged from OSH  a week ago. Pt states that she has been unable to find a rehab center since discharge. She c/o headache, diaphoresis, tremors and palpitations. Denies visual/auditory/tactile hallucinations. Her last drink was 2 days ago. She reports feeling depressed but  denies suicidal ideation.  Of note pt has been admitted twice in the past month for Etoh withdrawal , most recently 6/6- 6/9.    ED COURSE  VS : 140/109  105  18  O2 99%RA T 98F  LABS : h/h 10/38 wbc 3.8  plt 84 Blood alcohol 402 bun/cr 15/0.61  Treatment : IVF NS 1L x 1, Librium 50mg po x 1, folic acid 1mg ivp x 1, Lorazepam 1mg ivp x 2 (23 Jun 2020 18:42)      Allergies    No Known Allergies    Intolerances        MEDICATIONS  (STANDING):  folic acid 1 milliGRAM(s) Oral daily  lactated ringers. 1000 milliLiter(s) (100 mL/Hr) IV Continuous <Continuous>  lactated ringers. 1000 milliLiter(s) (100 mL/Hr) IV Continuous <Continuous>  LORazepam   Injectable 2 milliGRAM(s) IV Push every 4 hours  LORazepam   Injectable 1 milliGRAM(s) IV Push once  losartan 50 milliGRAM(s) Oral daily  melatonin 3 milliGRAM(s) Oral at bedtime  multivitamin 1 Tablet(s) Oral daily  potassium chloride    Tablet ER 40 milliEquivalent(s) Oral every 4 hours  thiamine IVPB 500 milliGRAM(s) IV Intermittent every 8 hours    MEDICATIONS  (PRN):  LORazepam   Injectable 2 milliGRAM(s) IV Push every 1 hour PRN Symptom-triggered: each CIWA -Ar score 8 or GREATER  ondansetron Injectable 4 milliGRAM(s) IV Push every 8 hours PRN Nausea and/or Vomiting  polyethylene glycol 3350 17 Gram(s) Oral daily PRN Constipation      PAST MEDICAL & SURGICAL HISTORY:  HTN (hypertension)  Depression  ETOH Abuse  S/P Tonsillectomy      FAMILY HISTORY:  FH: alcohol abuse: father  Family history of heart attack: father  FH: diabetes mellitus: mother  FH: HTN (hypertension): both parents      SOCIAL HISTORY: No EtOH, no tobacco    REVIEW OF SYSTEMS:    CONSTITUTIONAL: No weakness, fevers or chills  EYES/ENT: No visual changes;  No vertigo or throat pain   NECK: No pain or stiffness  RESPIRATORY: No cough, wheezing, hemoptysis; No shortness of breath  CARDIOVASCULAR: No chest pain or palpitations  GASTROINTESTINAL: No abdominal or epigastric pain. No nausea, vomiting, or hematemesis; No diarrhea or constipation. No melena or hematochezia.  GENITOURINARY: No dysuria, frequency or hematuria  NEUROLOGICAL: No numbness or weakness  SKIN: No itching, burning, rashes, or lesions   All other review of systems is negative unless indicated above.        T(F): 97.3 (06-25-20 @ 10:30), Max: 98.8 (06-24-20 @ 19:30)  HR: 93 (06-25-20 @ 10:30)  BP: 123/88 (06-25-20 @ 10:30)  RR: 16 (06-25-20 @ 10:30)  SpO2: 100% (06-25-20 @ 10:30)  Wt(kg): --    Physical Exam  GENERAL: NAD, well-developed  HEAD:  Atraumatic, Normocephalic  EYES: EOMI, PERRLA, conjunctiva and sclera clear  NECK: Supple, No JVD  CHEST/LUNG: Clear to auscultation bilaterally; No wheeze  HEART: Regular rate and rhythm; No murmurs, rubs, or gallops  ABDOMEN: Soft, Nontender, Nondistended; Bowel sounds present  EXTREMITIES:  2+ Peripheral Pulses, No clubbing, cyanosis, or edema  NEUROLOGY: non-focal  SKIN: No rashes or lesions                          7.0    2.53  )-----------( 39       ( 25 Jun 2020 09:51 )             25.0       06-25    134<L>  |  94<L>  |  13  ----------------------------<  121<H>  3.1<L>   |  29  |  0.62    Ca    9.5      25 Jun 2020 06:41  Phos  2.5     06-24  Mg     1.6     06-25    TPro  6.1  /  Alb  3.6  /  TBili  0.7  /  DBili  x   /  AST  88<H>  /  ALT  65<H>  /  AlkPhos  58  06-25      Magnesium, Serum: 1.6 mg/dL (06-25 @ 06:41)  Uric Acid, Serum: 3.7 mg/dL (06-25 @ 06:41) Hematology Consult Note    HPI:  44F with h/o HTN, Depression, ETOH abuse ( h/o Etoh withdrawal, DT  in the past), chronic anemia 2/2 menorrhagia who was brought in by her fiance for reported alcohol withdrawal and possible rehab placement. Per informant  pt has been drinking half bottle of vodka everyday since she was discharged from OSH  a week ago. Pt states that she has been unable to find a rehab center since discharge. She c/o headache, diaphoresis, tremors and palpitations. Denies visual/auditory/tactile hallucinations. Her last drink was 2 days ago. She reports feeling depressed but  denies suicidal ideation.  Of note pt has been admitted twice in the past month for Etoh withdrawal , most recently 6/6- 6/9.    ED COURSE  VS : 140/109  105  18  O2 99%RA T 98F  LABS : h/h 10/38 wbc 3.8  plt 84 Blood alcohol 402 bun/cr 15/0.61  Treatment : IVF NS 1L x 1, Librium 50mg po x 1, folic acid 1mg ivp x 1, Lorazepam 1mg ivp x 2 (23 Jun 2020 18:42)      Allergies    No Known Allergies    Intolerances        MEDICATIONS  (STANDING):  folic acid 1 milliGRAM(s) Oral daily  lactated ringers. 1000 milliLiter(s) (100 mL/Hr) IV Continuous <Continuous>  lactated ringers. 1000 milliLiter(s) (100 mL/Hr) IV Continuous <Continuous>  LORazepam   Injectable 2 milliGRAM(s) IV Push every 4 hours  LORazepam   Injectable 1 milliGRAM(s) IV Push once  losartan 50 milliGRAM(s) Oral daily  melatonin 3 milliGRAM(s) Oral at bedtime  multivitamin 1 Tablet(s) Oral daily  potassium chloride    Tablet ER 40 milliEquivalent(s) Oral every 4 hours  thiamine IVPB 500 milliGRAM(s) IV Intermittent every 8 hours    MEDICATIONS  (PRN):  LORazepam   Injectable 2 milliGRAM(s) IV Push every 1 hour PRN Symptom-triggered: each CIWA -Ar score 8 or GREATER  ondansetron Injectable 4 milliGRAM(s) IV Push every 8 hours PRN Nausea and/or Vomiting  polyethylene glycol 3350 17 Gram(s) Oral daily PRN Constipation      PAST MEDICAL & SURGICAL HISTORY:  HTN (hypertension)  Depression  ETOH Abuse  S/P Tonsillectomy      FAMILY HISTORY:  FH: alcohol abuse: father  Family history of heart attack: father  FH: diabetes mellitus: mother  FH: HTN (hypertension): both parents      SOCIAL HISTORY: No EtOH, no tobacco    REVIEW OF SYSTEMS:    CONSTITUTIONAL: No weakness, fevers or chills  EYES/ENT: No visual changes;  No vertigo or throat pain   NECK: No pain or stiffness  RESPIRATORY: No cough, wheezing, hemoptysis; No shortness of breath  CARDIOVASCULAR: No chest pain or palpitations  GASTROINTESTINAL: No abdominal or epigastric pain. No nausea, vomiting, or hematemesis; No diarrhea or constipation. No melena or hematochezia.  GENITOURINARY: No dysuria, frequency or hematuria  NEUROLOGICAL: No numbness or weakness. Mild   SKIN: No itching, burning, rashes, or lesions   All other review of systems is negative unless indicated above.        T(F): 97.3 (06-25-20 @ 10:30), Max: 98.8 (06-24-20 @ 19:30)  HR: 93 (06-25-20 @ 10:30)  BP: 123/88 (06-25-20 @ 10:30)  RR: 16 (06-25-20 @ 10:30)  SpO2: 100% (06-25-20 @ 10:30)  Wt(kg): --    Physical Exam  GENERAL: NAD, well-developed  HEAD:  Atraumatic, Normocephalic  EYES: EOMI, PERRLA, conjunctiva and sclera clear  NECK: Supple, No JVD  CHEST/LUNG: Clear to auscultation bilaterally; No wheeze  HEART: Regular rate and rhythm; No murmurs, rubs, or gallops  ABDOMEN: Soft, Nontender, Nondistended; Bowel sounds present  EXTREMITIES:  2+ Peripheral Pulses, No clubbing, cyanosis, or edema  NEUROLOGY: non-focal  SKIN: No rashes or lesions                          7.0    2.53  )-----------( 39       ( 25 Jun 2020 09:51 )             25.0       06-25    134<L>  |  94<L>  |  13  ----------------------------<  121<H>  3.1<L>   |  29  |  0.62    Ca    9.5      25 Jun 2020 06:41  Phos  2.5     06-24  Mg     1.6     06-25    TPro  6.1  /  Alb  3.6  /  TBili  0.7  /  DBili  x   /  AST  88<H>  /  ALT  65<H>  /  AlkPhos  58  06-25      Magnesium, Serum: 1.6 mg/dL (06-25 @ 06:41)  Uric Acid, Serum: 3.7 mg/dL (06-25 @ 06:41) Hematology Consult Note    HPI:  44F with h/o HTN, Depression, ETOH abuse ( h/o Etoh withdrawal, DT  in the past), chronic anemia 2/2 menorrhagia who was brought in by her fiance for reported alcohol withdrawal and possible rehab placement. Per informant  pt has been drinking half bottle of vodka everyday since she was discharged from OSH  a week ago. Pt states that she has been unable to find a rehab center since discharge. She c/o tremors. Denies headache, diaphoresis, palpitations, visual/auditory/tactile hallucinations. Her last drink was 2 days ago. She reports feeling depressed but  denies suicidal ideation.  Of note pt has been admitted twice in the past month for Etoh withdrawal , most recently 6/6- 6/9.    ED COURSE  VS : 140/109  105  18  O2 99%RA T 98F  LABS : h/h 10/38 wbc 3.8  plt 84 Blood alcohol 402 bun/cr 15/0.61  Treatment : IVF NS 1L x 1, Librium 50mg po x 1, folic acid 1mg ivp x 1, Lorazepam 1mg ivp x 2 (23 Jun 2020 18:42)      Allergies    No Known Allergies    Intolerances        MEDICATIONS  (STANDING):  folic acid 1 milliGRAM(s) Oral daily  lactated ringers. 1000 milliLiter(s) (100 mL/Hr) IV Continuous <Continuous>  lactated ringers. 1000 milliLiter(s) (100 mL/Hr) IV Continuous <Continuous>  LORazepam   Injectable 2 milliGRAM(s) IV Push every 4 hours  LORazepam   Injectable 1 milliGRAM(s) IV Push once  losartan 50 milliGRAM(s) Oral daily  melatonin 3 milliGRAM(s) Oral at bedtime  multivitamin 1 Tablet(s) Oral daily  potassium chloride    Tablet ER 40 milliEquivalent(s) Oral every 4 hours  thiamine IVPB 500 milliGRAM(s) IV Intermittent every 8 hours    MEDICATIONS  (PRN):  LORazepam   Injectable 2 milliGRAM(s) IV Push every 1 hour PRN Symptom-triggered: each CIWA -Ar score 8 or GREATER  ondansetron Injectable 4 milliGRAM(s) IV Push every 8 hours PRN Nausea and/or Vomiting  polyethylene glycol 3350 17 Gram(s) Oral daily PRN Constipation      PAST MEDICAL & SURGICAL HISTORY:  HTN (hypertension)  Depression  ETOH Abuse  S/P Tonsillectomy      FAMILY HISTORY:  FH: alcohol abuse: father  Family history of heart attack: father  FH: diabetes mellitus: mother  FH: HTN (hypertension): both parents      SOCIAL HISTORY: No EtOH, no tobacco    REVIEW OF SYSTEMS:    CONSTITUTIONAL: No weakness, fevers or chills  EYES/ENT: No visual changes;  No vertigo or throat pain   NECK: No pain or stiffness  RESPIRATORY: No cough, wheezing, hemoptysis; No shortness of breath  CARDIOVASCULAR: No chest pain or palpitations  GASTROINTESTINAL: No abdominal or epigastric pain. No nausea, vomiting, or hematemesis; No diarrhea or constipation. No melena or hematochezia.  GENITOURINARY: No dysuria, frequency or hematuria  NEUROLOGICAL: No numbness or weakness.   SKIN: No itching, burning, rashes, or lesions   All other review of systems is negative unless indicated above.        T(F): 97.3 (06-25-20 @ 10:30), Max: 98.8 (06-24-20 @ 19:30)  HR: 93 (06-25-20 @ 10:30)  BP: 123/88 (06-25-20 @ 10:30)  RR: 16 (06-25-20 @ 10:30)  SpO2: 100% (06-25-20 @ 10:30)  Wt(kg): --    Physical Exam  GENERAL: NAD, well-developed  HEAD:  Atraumatic, Normocephalic  EYES: EOMI, PERRLA, conjunctiva and sclera clear  NECK: Supple, No JVD  CHEST/LUNG: Clear to auscultation bilaterally; No wheeze  HEART: Regular rate and rhythm; No murmurs, rubs, or gallops  ABDOMEN: Soft, Nontender, Nondistended; Bowel sounds present  EXTREMITIES:  2+ Peripheral Pulses, No clubbing, cyanosis, or edema. Mild tremors in b/l UE  NEUROLOGY: non-focal. Blunted affect  SKIN: No rashes or lesions                          7.0    2.53  )-----------( 39       ( 25 Jun 2020 09:51 )             25.0       06-25    134<L>  |  94<L>  |  13  ----------------------------<  121<H>  3.1<L>   |  29  |  0.62    Ca    9.5      25 Jun 2020 06:41  Phos  2.5     06-24  Mg     1.6     06-25    TPro  6.1  /  Alb  3.6  /  TBili  0.7  /  DBili  x   /  AST  88<H>  /  ALT  65<H>  /  AlkPhos  58  06-25      Magnesium, Serum: 1.6 mg/dL (06-25 @ 06:41)  Uric Acid, Serum: 3.7 mg/dL (06-25 @ 06:41) Hematology Consult Note    HPI:  44F with h/o HTN, Depression, ETOH abuse ( h/o Etoh withdrawal, DT  in the past), chronic anemia 2/2 menorrhagia who was brought in by her fiance for reported alcohol withdrawal and possible rehab placement. Per informant  pt has been drinking half bottle of vodka everyday since she was discharged from OSH  a week ago. Pt states that she has been unable to find a rehab center since discharge. She c/o tremors. Denies headache, diaphoresis, palpitations, visual/auditory/tactile hallucinations. Her last drink was 2 days ago. She reports feeling depressed but  denies suicidal ideation.  Of note pt has been admitted twice in the past month for Etoh withdrawal , most recently 6/6- 6/9.    ED COURSE  VS : 140/109  105  18  O2 99%RA T 98F  LABS : h/h 10/38 wbc 3.8  plt 84 Blood alcohol 402 bun/cr 15/0.61  Treatment : IVF NS 1L x 1, Librium 50mg po x 1, folic acid 1mg ivp x 1, Lorazepam 1mg ivp x 2 (23 Jun 2020 18:42)      Allergies    No Known Allergies    Intolerances        MEDICATIONS  (STANDING):  folic acid 1 milliGRAM(s) Oral daily  lactated ringers. 1000 milliLiter(s) (100 mL/Hr) IV Continuous <Continuous>  lactated ringers. 1000 milliLiter(s) (100 mL/Hr) IV Continuous <Continuous>  LORazepam   Injectable 2 milliGRAM(s) IV Push every 4 hours  LORazepam   Injectable 1 milliGRAM(s) IV Push once  losartan 50 milliGRAM(s) Oral daily  melatonin 3 milliGRAM(s) Oral at bedtime  multivitamin 1 Tablet(s) Oral daily  potassium chloride    Tablet ER 40 milliEquivalent(s) Oral every 4 hours  thiamine IVPB 500 milliGRAM(s) IV Intermittent every 8 hours    MEDICATIONS  (PRN):  LORazepam   Injectable 2 milliGRAM(s) IV Push every 1 hour PRN Symptom-triggered: each CIWA -Ar score 8 or GREATER  ondansetron Injectable 4 milliGRAM(s) IV Push every 8 hours PRN Nausea and/or Vomiting  polyethylene glycol 3350 17 Gram(s) Oral daily PRN Constipation      PAST MEDICAL & SURGICAL HISTORY:  HTN (hypertension)  Depression  ETOH Abuse  S/P Tonsillectomy      FAMILY HISTORY:  FH: alcohol abuse: father  Family history of heart attack: father  FH: diabetes mellitus: mother  FH: HTN (hypertension): both parents      SOCIAL HISTORY: No EtOH, no tobacco    REVIEW OF SYSTEMS:    CONSTITUTIONAL: No weakness, fevers or chills  EYES/ENT: No visual changes;  No vertigo or throat pain   NECK: No pain or stiffness  RESPIRATORY: No cough, wheezing, hemoptysis; No shortness of breath  CARDIOVASCULAR: No chest pain or palpitations  GASTROINTESTINAL: No abdominal or epigastric pain. No nausea, vomiting, or hematemesis; No diarrhea or constipation. No melena or hematochezia.  GENITOURINARY/GYN: No dysuria, frequency or hematuria. + Menorrhagia  NEUROLOGICAL: No numbness or weakness.   SKIN: No itching, burning, rashes, or lesions   All other review of systems is negative unless indicated above.        T(F): 97.3 (06-25-20 @ 10:30), Max: 98.8 (06-24-20 @ 19:30)  HR: 93 (06-25-20 @ 10:30)  BP: 123/88 (06-25-20 @ 10:30)  RR: 16 (06-25-20 @ 10:30)  SpO2: 100% (06-25-20 @ 10:30)  Wt(kg): --    Physical Exam  GENERAL: NAD, well-developed  HEAD:  Atraumatic, Normocephalic  EYES: EOMI, PERRLA, conjunctiva and sclera clear  NECK: Supple, No JVD  CHEST/LUNG: Clear to auscultation bilaterally; No wheeze  HEART: Regular rate and rhythm; No murmurs, rubs, or gallops  ABDOMEN: Soft, Nontender, Nondistended; Bowel sounds present  EXTREMITIES:  2+ Peripheral Pulses, No clubbing, cyanosis, or edema. Mild tremors in b/l UE  NEUROLOGY: non-focal. Blunted affect  SKIN: No rashes or lesions                          7.0    2.53  )-----------( 39       ( 25 Jun 2020 09:51 )             25.0       06-25    134<L>  |  94<L>  |  13  ----------------------------<  121<H>  3.1<L>   |  29  |  0.62    Ca    9.5      25 Jun 2020 06:41  Phos  2.5     06-24  Mg     1.6     06-25    TPro  6.1  /  Alb  3.6  /  TBili  0.7  /  DBili  x   /  AST  88<H>  /  ALT  65<H>  /  AlkPhos  58  06-25      Magnesium, Serum: 1.6 mg/dL (06-25 @ 06:41)  Uric Acid, Serum: 3.7 mg/dL (06-25 @ 06:41)

## 2020-06-25 NOTE — PROGRESS NOTE ADULT - PROBLEM SELECTOR PLAN 1
- [facility] protocol  - Thiamine 500mg ivpb q 8 x 3 days ; followed by PO thiamine daily thereafter  - Folic acid 1mg po daily  - Multivitamin 1 tab po daily  - Monitor bmp, Mg, phos daily  - aprpeciate psych recs  - SW consult for rehab  - zofran PRN nausea  - IVF - [facility] protocol  - Thiamine 500mg ivpb q 8 x 3 days ; followed by PO thiamine daily thereafter  - Folic acid 1mg po daily  - Multivitamin 1 tab po daily  - Monitor bmp, Mg, phos daily  - aprpeciate psych recs  - zofran PRN nausea  - IVF  - social work fu

## 2020-06-25 NOTE — PROGRESS NOTE ADULT - PROBLEM SELECTOR PLAN 4
- chronic anemia  - h/o anemia 2/2 menorrhagia, hx adenomyosis, outpt followed Dr Frankel, now Dr. Erich Talley  - monitor CBC daily, cont to monitor  - transfuse <7, patient agreeable to transfusion prbc after discussion risk and benefit  - patient declining pelvic exam and gyn exam, patient requests to follow up outpatient with her gynecologist

## 2020-06-25 NOTE — CONSULT NOTE ADULT - ATTENDING COMMENTS
43 y/o F with alcohol abuse, iron deficiency anemia due to menorrhagia/fibroids a/w for alcohol withdrawal.   Her pancytopenia is likely due to bone marrow suppression from acute toxicity from alcohol.    No evidence of DIC/TTP.  Very low susp for HIT.  Check her MMA/homocysteine level to rule out B12 deficiency along with RBC folate.   Lovenox on hold with plt <50.  Prior abd sonogram with no splenomegaly.  Iron deficiency, repeat iron profile.  Check stool occult blood.  Recommend oral iron supplementation daily.  On discharge, she will need to see gyn.   Currently she has a mirena placed.  Check vWAg/Ac/multimers.

## 2020-06-25 NOTE — CHART NOTE - NSCHARTNOTEFT_GEN_A_CORE
CC: Thrombocytopenia, plts 51K  Pt seen and examined at bedside for thrombocytopenia A & O X3, follows commands, NAD. reports left hand tremor. Denies blood in urine. No BM since admission.  Hx of Adenomyosis, menorrhagia, chronic anemia. Vaginal bleeding + for 3 weeks, pt dose not want pelvic exam at this time as per Dr. Davidson  - Will repeat CBC, type and screen, hepatitis panel.   - PRBC 1 units if hgb< 7.5,   - Hematology eval for pancytopenia   - US of abdomen to evaluate Liver   - Plan discussed with Dr. Stuart Mtz NP-C  #[id number]

## 2020-06-25 NOTE — CONSULT NOTE ADULT - ASSESSMENT
44F with h/o HTN, Depression, ETOH abuse (h/o Etoh withdrawal, DT  in the past), chronic anemia 2/2 menorrhagia p/w alcohol withdrawal. Was found to have pancytopenia for which hematology was consulted.      # Anemia  - Patient has had chronic microcytic hypochromic anemia for a long time(since at least 2008) but recently started having lower H/H. Her baseline Hb was 8-9 till 9/2019 but now at 7-8. Iron panel from 6/6 reviewed, ferritin 24.87 at low normal.   - Folate and B12 level was WNL. Long history of menorrhagia, adenomyosis, and passing large clots. During last admission, she received IV Iron. Currently menstruating. No reported melena or hematochezia.   - Patient needs GYN evaluation for chronic menorrhagia causing LOYDA. Reticulocyte Index low(hypoproliferative) due to iron deficiency. H/H dropped today, would require transfusional support. Start supplemental iron as well.   - Peripheral smear showed hypochromic RBCs with various shape and size, some target cells, very rare schistocytes.   - Can also send Hb electrophoresis with the next lab.     # Thrombocytopenia  - Has chronic thrombocytopenia since 9/2019. Baseline PLT at . Now count dropping since yesterday.  - Peripheral smear showed large platelet but without clumping. Rare schistocytes basically rules out MAHA but can check haptoglobin and LDH with next lab. Note that haptoglobin can be low in liver dysfunction.  - Patient did NOT receive Lovenox on this admission or last admission, no h/o heparin exposure; no concern for HIT.  - Overall her thrombocytopenia is likely from chronic bone marrow suppression and direct toxicity from EtOH use. Would monitor her counts closely and perform infectious work-up given h/o odorous vaginal discharge and acute PLT count drop.    # Leukopenia  - Patient has had fluctuating WBC counts since 2011, now WBC count dropping.  - Peripheral smear with few atypical lymphocytes and neutrophils with abundant cytoplasm, some with hypolobulation.  - Would monitor her counts closely and perform infectious work-up given h/o odorous vaginal discharge.  - Patient is not neutropenic but monitor CBC with differential daily to monitor ANC.        Dixie Taylor MD  PGY 4, Oncology/Hematology fellow  (P) [phone]  After 5pm, please contact on-call team. 44F with h/o HTN, Depression, ETOH abuse (h/o Etoh withdrawal, DT  in the past), chronic anemia 2/2 menorrhagia p/w alcohol withdrawal. Was found to have pancytopenia for which hematology was consulted.      # Anemia  - Patient has had chronic microcytic hypochromic anemia for a long time(since at least 2008) but recently started having lower H/H. Her baseline Hb was 8-9 till 9/2019 but now at 7-8. Iron panel from 6/6 reviewed, ferritin 24.87 at low normal.   - Folate and B12 level was WNL. Long history of menorrhagia, adenomyosis, and passing large clots. During last admission, she received IV Iron. Currently menstruating. No reported melena or hematochezia.   - Patient needs GYN evaluation for chronic menorrhagia causing LOYDA. Reticulocyte Index low(hypoproliferative) due to iron deficiency. H/H dropped today, would require transfusional support if Hb drops below 7.0. Start supplemental iron as well.   - Peripheral smear showed hypochromic RBCs with various shape and size, some target cells, very rare schistocytes.   - Repeat Iron panel, B12, folate, and MMA/homocysteine with next lab.  - Can also send Hb electrophoresis with the next lab.     # Thrombocytopenia  - Has chronic thrombocytopenia since 9/2019. Baseline PLT at . Now count dropping since yesterday.  - Peripheral smear showed large platelet but without clumping. Rare schistocytes basically rules out MAHA but can check haptoglobin and LDH with next lab. Note that haptoglobin can be low in liver dysfunction.  - Patient did NOT receive Lovenox on this admission or last admission, no h/o heparin exposure; no concern for HIT.  - Overall her thrombocytopenia is likely from chronic bone marrow suppression and direct toxicity from EtOH use. Would monitor her counts closely and perform infectious work-up given h/o odorous vaginal discharge and acute PLT count drop.    # Leukopenia  - Patient has had fluctuating WBC counts since 2011, now WBC count dropping.  - Peripheral smear with few atypical lymphocytes and neutrophils with abundant cytoplasm, some with hypolobulation.  - Likely 2/2 acute bone marrow suppression due to excessive EtOH use. Would monitor her counts closely and perform infectious work-up given h/o odorous vaginal discharge.  - Patient is not neutropenic but monitor CBC with differential daily to monitor ANC.        Dixie Taylor MD  PGY 4, Oncology/Hematology fellow  (P) [phone]  After 5pm, please contact on-call team.

## 2020-06-25 NOTE — PROGRESS NOTE ADULT - ASSESSMENT
44F with h/o HTN, Depression, ETOH abuse ( h/o Etoh withdrawal, DT in the past), chronic anemia who was brought in for reported alcohol withdrawal and possible rehab placement. pt has been admitted twice in the past month for Etoh withdrawal most recently 6/6- 6/9.

## 2020-06-25 NOTE — PROGRESS NOTE ADULT - PROBLEM SELECTOR PLAN 3
- currently denies suicidal ideation  - hold Xanax for now while pt is on CIWA protocol  - melatonin for sleep

## 2020-06-26 DIAGNOSIS — N93.9 ABNORMAL UTERINE AND VAGINAL BLEEDING, UNSPECIFIED: ICD-10-CM

## 2020-06-26 LAB
ALBUMIN SERPL ELPH-MCNC: 3.4 G/DL — SIGNIFICANT CHANGE UP (ref 3.3–5)
ALP SERPL-CCNC: 60 U/L — SIGNIFICANT CHANGE UP (ref 40–120)
ALT FLD-CCNC: 75 U/L — HIGH (ref 10–45)
ANION GAP SERPL CALC-SCNC: 12 MMOL/L — SIGNIFICANT CHANGE UP (ref 5–17)
AST SERPL-CCNC: 121 U/L — HIGH (ref 10–40)
BILIRUB SERPL-MCNC: 0.4 MG/DL — SIGNIFICANT CHANGE UP (ref 0.2–1.2)
BUN SERPL-MCNC: 12 MG/DL — SIGNIFICANT CHANGE UP (ref 7–23)
CALCIUM SERPL-MCNC: 9 MG/DL — SIGNIFICANT CHANGE UP (ref 8.4–10.5)
CHLORIDE SERPL-SCNC: 99 MMOL/L — SIGNIFICANT CHANGE UP (ref 96–108)
CO2 SERPL-SCNC: 24 MMOL/L — SIGNIFICANT CHANGE UP (ref 22–31)
CREAT SERPL-MCNC: 0.63 MG/DL — SIGNIFICANT CHANGE UP (ref 0.5–1.3)
FACT VIII ACT/NOR PPP: 381 % — HIGH (ref 60–125)
FERRITIN SERPL-MCNC: 158 NG/ML — HIGH (ref 15–150)
FOLATE SERPL-MCNC: 18.1 NG/ML — SIGNIFICANT CHANGE UP
GLUCOSE SERPL-MCNC: 136 MG/DL — HIGH (ref 70–99)
HAPTOGLOB SERPL-MCNC: 40 MG/DL — SIGNIFICANT CHANGE UP (ref 34–200)
HCG UR QL: NEGATIVE — SIGNIFICANT CHANGE UP
HCT VFR BLD CALC: 24.3 % — LOW (ref 34.5–45)
HGB BLD-MCNC: 6.6 G/DL — CRITICAL LOW (ref 11.5–15.5)
IRON SATN MFR SERPL: 158 UG/DL — SIGNIFICANT CHANGE UP (ref 30–160)
IRON SATN MFR SERPL: 48 % — SIGNIFICANT CHANGE UP (ref 14–50)
LDH SERPL L TO P-CCNC: 226 U/L — SIGNIFICANT CHANGE UP (ref 50–242)
MAGNESIUM SERPL-MCNC: 1.6 MG/DL — SIGNIFICANT CHANGE UP (ref 1.6–2.6)
MCHC RBC-ENTMCNC: 19.3 PG — LOW (ref 27–34)
MCHC RBC-ENTMCNC: 27.2 GM/DL — LOW (ref 32–36)
MCV RBC AUTO: 71.1 FL — LOW (ref 80–100)
NRBC # BLD: 0 /100 WBCS — SIGNIFICANT CHANGE UP (ref 0–0)
PLATELET # BLD AUTO: 60 K/UL — LOW (ref 150–400)
POTASSIUM SERPL-MCNC: 3.7 MMOL/L — SIGNIFICANT CHANGE UP (ref 3.5–5.3)
POTASSIUM SERPL-SCNC: 3.7 MMOL/L — SIGNIFICANT CHANGE UP (ref 3.5–5.3)
PROT SERPL-MCNC: 6.2 G/DL — SIGNIFICANT CHANGE UP (ref 6–8.3)
RBC # BLD: 3.42 M/UL — LOW (ref 3.8–5.2)
RBC # FLD: 22.8 % — HIGH (ref 10.3–14.5)
SODIUM SERPL-SCNC: 135 MMOL/L — SIGNIFICANT CHANGE UP (ref 135–145)
TIBC SERPL-MCNC: 328 UG/DL — SIGNIFICANT CHANGE UP (ref 220–430)
UIBC SERPL-MCNC: 170 UG/DL — SIGNIFICANT CHANGE UP (ref 110–370)
VIT B12 SERPL-MCNC: 909 PG/ML — SIGNIFICANT CHANGE UP (ref 232–1245)
VWF AG ACT/NOR PPP IA: 317 % — HIGH (ref 63–170)
VWF:RCO ACT/NOR PPP PL AGG: 375 % — HIGH (ref 45–133)
WBC # BLD: 2.77 K/UL — LOW (ref 3.8–10.5)
WBC # FLD AUTO: 2.77 K/UL — LOW (ref 3.8–10.5)

## 2020-06-26 PROCEDURE — 83020 HEMOGLOBIN ELECTROPHORESIS: CPT | Mod: 26

## 2020-06-26 RX ORDER — MAGNESIUM SULFATE 500 MG/ML
1 VIAL (ML) INJECTION ONCE
Refills: 0 | Status: COMPLETED | OUTPATIENT
Start: 2020-06-26 | End: 2020-06-26

## 2020-06-26 RX ORDER — DIPHENHYDRAMINE HCL 50 MG
25 CAPSULE ORAL ONCE
Refills: 0 | Status: COMPLETED | OUTPATIENT
Start: 2020-06-26 | End: 2020-06-26

## 2020-06-26 RX ORDER — TRANEXAMIC ACID 100 MG/ML
1300 INJECTION, SOLUTION INTRAVENOUS THREE TIMES A DAY
Refills: 0 | Status: DISCONTINUED | OUTPATIENT
Start: 2020-06-26 | End: 2020-06-29

## 2020-06-26 RX ADMIN — Medication 325 MILLIGRAM(S): at 18:30

## 2020-06-26 RX ADMIN — Medication 2 MILLIGRAM(S): at 15:29

## 2020-06-26 RX ADMIN — Medication 3 MILLIGRAM(S): at 22:36

## 2020-06-26 RX ADMIN — Medication 2 MILLIGRAM(S): at 06:19

## 2020-06-26 RX ADMIN — Medication 1 MILLIGRAM(S): at 11:33

## 2020-06-26 RX ADMIN — Medication 2 MILLIGRAM(S): at 22:37

## 2020-06-26 RX ADMIN — Medication 105 MILLIGRAM(S): at 06:18

## 2020-06-26 RX ADMIN — TRANEXAMIC ACID 1300 MILLIGRAM(S): 100 INJECTION, SOLUTION INTRAVENOUS at 20:55

## 2020-06-26 RX ADMIN — Medication 2 MILLIGRAM(S): at 02:49

## 2020-06-26 RX ADMIN — LOSARTAN POTASSIUM 50 MILLIGRAM(S): 100 TABLET, FILM COATED ORAL at 06:19

## 2020-06-26 RX ADMIN — Medication 105 MILLIGRAM(S): at 15:22

## 2020-06-26 RX ADMIN — Medication 1 TABLET(S): at 11:33

## 2020-06-26 RX ADMIN — Medication 25 MILLIGRAM(S): at 14:46

## 2020-06-26 RX ADMIN — Medication 2 MILLIGRAM(S): at 18:30

## 2020-06-26 RX ADMIN — ONDANSETRON 4 MILLIGRAM(S): 8 TABLET, FILM COATED ORAL at 06:18

## 2020-06-26 RX ADMIN — Medication 325 MILLIGRAM(S): at 06:19

## 2020-06-26 RX ADMIN — Medication 2 MILLIGRAM(S): at 11:32

## 2020-06-26 RX ADMIN — Medication 100 GRAM(S): at 18:32

## 2020-06-26 NOTE — PROVIDER CONTACT NOTE (CRITICAL VALUE NOTIFICATION) - ACTION/TREATMENT ORDERED:
NP Bibi aware. No immediate interventions at this time. Will continue to monitor for bleeding.
Type and screen, continue to monitor patient
Transfuse 1 U call hematology consult

## 2020-06-26 NOTE — PROGRESS NOTE ADULT - PROBLEM SELECTOR PLAN 1
- [facility] protocol  - Thiamine  - Folic acid 1mg po daily  - Multivitamin 1 tab po daily  - Monitor bmp, Mg, phos daily  - aprpeciate psych recs  - zofran PRN nausea  - social work fu  - IV to be replaced, if difficulty, will have to give PO ativan

## 2020-06-26 NOTE — CONSULT NOTE ADULT - PROBLEM SELECTOR RECOMMENDATION 9
-Recommend TVUS   -Lysteda 1300mg TID x5 days will help treat bleeding; recommend confirming with Hematology they are okay with this medication; if patient discharged for inpatient rehab, she can take this medication monthly during menses  -Will defer inpatient endometrial biopsy given thrombocytopenia, plts 60; patient can have this done outpatient if necessary  -GYN will follow    D/w Dr. Shruthi Garcia PGY-3 -Recommend TVUS   -Lysteda 1300mg TID x5 days will help treat bleeding; recommend confirming with Hematology they are okay with this medication; if patient discharged for inpatient rehab, she can take this medication monthly during menses  -Will defer inpatient endometrial biopsy given thrombocytopenia, plts 60; patient can have this done outpatient if necessary  -Defer blood transfusion per Hematology team  -GYN will follow    D/w Dr. Shruthi Garcia PGY-3 -Recommend TVUS   -F/u urine hcg (did not find in ED note)  -Lysteda 1300mg TID x5 days will help treat bleeding; recommend confirming with Hematology they are okay with this medication; if patient discharged for inpatient rehab, she can take this medication monthly during menses  -Will defer inpatient endometrial biopsy given thrombocytopenia, plts 60; patient can have this done outpatient if necessary  -Defer blood transfusion per Hematology team  -GYN will follow    D/w Dr. Shruthi Garcia PGY-3 -Recommend TVUS   -F/u urine hcg (did not find in ED note), BV panel, and path for IUD  -Lysteda 1300mg TID x5 days will help treat bleeding; recommend confirming with Hematology they are okay with this medication; if patient discharged for inpatient rehab, she can take this medication monthly during menses  -Will defer inpatient endometrial biopsy given thrombocytopenia, plts 60; patient can have this done outpatient if necessary  -Defer blood transfusion per Hematology team  -GYN will follow    D/w Dr. Shruthi Garcia PGY-3

## 2020-06-26 NOTE — CONSULT NOTE ADULT - ASSESSMENT
43y/o , LMP ~3 weeks ago, h/o substance abuse disorder, HTN, AUB and adenomyosis, was admitted with alcohol withdrawal and for potential placement into a rehab center. GYN consulted for prolonged vaginal bleeding, since onset of LMP. Patient has chronic anemia and current H/H is 6.6/24.3. Chart review demonstrates hgb ranging 7-8 in the past 1-2 months. No active bleeding on exam, s/p removal of IUD today at patient's request. Labs significant for pancytopenia, thought to be 2/2 bone marrow suppression and direct toxicity in the setting of alcohol use, as per Heme.

## 2020-06-26 NOTE — PROGRESS NOTE ADULT - PROBLEM SELECTOR PLAN 4
- chronic anemia  - h/o anemia 2/2 menorrhagia, hx adenomyosis, outpt followed Dr Frankel, now Dr. Erich Talley  - monitor CBC daily, cont to monitor  - transfuse <7, patient agreeable to transfusion prbc after discussion risk and benefit  - patient now agreeable to pelvic exam, will consult gyn

## 2020-06-26 NOTE — CONSULT NOTE ADULT - SUBJECTIVE AND OBJECTIVE BOX
R3 GYN Consult Note:    HPI:    45yo G_P_     Pt denies fever, chills, nausea, vomiting, diarrhea, headache, constipation, dizzyness, syncope, chest pain, palpitations, shortness of breath, dysuria, urgency, frequency, abdominal/pelvic pain, vaginal bleeding/discharge/odor/pain/itching, breast lumps/bumps, dyspareunia.    In ED today    Primary OB/GYN:    OBH:  GYNH: denies hx of fibroids, ov cysts, abnl paps, sti  PMSH:  MEDS: none  ALL: nkda  SOCH: denies t/e/d; safe at home  FAMH: denies fam hx of breast/uterine/ovarian/colon cancer    ROS: negative except per hpi    OBJECTIVE:    VITAL SIGNS:  Vital Signs Last 24 Hrs  T(C): 36.8 (26 Jun 2020 12:00), Max: 37.3 (25 Jun 2020 13:54)  T(F): 98.3 (26 Jun 2020 12:00), Max: 99.1 (25 Jun 2020 13:54)  HR: 104 (26 Jun 2020 12:00) (92 - 112)  BP: 123/89 (26 Jun 2020 12:00) (109/76 - 134/89)  BP(mean): --  RR: 17 (26 Jun 2020 12:00) (17 - 18)  SpO2: 100% (26 Jun 2020 12:00) (100% - 100%)    CAPILLARY BLOOD GLUCOSE          06-25-20 @ 07:01  -  06-26-20 @ 07:00  --------------------------------------------------------  IN: 855 mL / OUT: 0 mL / NET: 855 mL        PHYSICAL EXAM:  GEN: NAD, A&Ox3  CV: RRR  LUNGS: CTAB  ABD: soft, NT, ND, +BS  SPECULUM:  PELVIC:  EXT: No LE edema/tenderness    LABS:                        6.6    2.77  )-----------( 60       ( 26 Jun 2020 07:35 )             24.3   baso x      eos x      imm gran x      lymph x      mono x      poly x                            7.0    2.53  )-----------( 39       ( 25 Jun 2020 09:51 )             25.0   baso 0.7    eos 4.9    imm gran 1.1    lymph 22.6   mono 4.2    poly 66.5                         7.0    3.14  )-----------( 51       ( 25 Jun 2020 06:41 )             25.0   baso x      eos x      imm gran x      lymph x      mono x      poly x                            7.1    3.48  )-----------( 54       ( 24 Jun 2020 05:45 )             See note  baso x      eos x      imm gran x      lymph x      mono x      poly x                            10.4   3.88  )-----------( 84       ( 23 Jun 2020 13:24 )             38.3   baso 2.6    eos 1.0    imm gran 1.0    lymph 14.7   mono 2.1    poly 78.6       06-26    135  |  99  |  12  ----------------------------<  136<H>  3.7   |  24  |  0.63    Ca    9.0      26 Jun 2020 07:35  Mg     1.6     06-26    TPro  6.2  /  Alb  3.4  /  TBili  0.4  /  DBili  x   /  AST  121<H>  /  ALT  75<H>  /  AlkPhos  60  06-26          RADIOLOGY: R3 GYN Consult Note:    HPI:  45y/o , LMP ~3 weeks ago, h/o substance abuse disorder, HTN, AUB and adenomyosis, was brought in by her fiance for concern for alcohol withdrawal. Patient admitted for management of withdrawal and potential placement into a rehab center. Patient reports a h/o heavy menses and has been bleeding since her period began 3 weeks ago. GYN consulted for this reason. Patient has chronic anemia and her H/H on this admission was noted to decrease to 6.6/24.3. Review of chart demonstrates that during a previous hospital admission, H/H on  was 7.3/26. End of May, her H/H was ~8/31. Patient reports heavy and irregular periods for a couple of years and had a Mirena IUD replaced 3 years ago. She sometimes gets her menses 2x a month, very close together, and they usually last more than a week. She uses a tampon every 45 minutes at the start of her menses. Her menses were less heavy when the IUD was first placed, but she noticed heavier bleeding over the past 6 months. Denies pelvic pain outside of her menses. Has had episodes of dizziness overt he past week but is unsure if it is due to how much alcohol she has been consuming. Denies n/v, syncope, chest pain, palpitations, shortness of breath, dysuria. States she is "rarely" sexually active. In a monogamous relationship. Endorses malodorous discharge over the past couple weeks and is concerned for vaginitis. She would like to have a culture done for BV.     At the bedside, 1 unit of pRBCs noted to be running.     Prior images in [place] reviewed and a CT 2019 demonstrates the IUD is located in the lower uterine segment/cervix. Patient denies knowing the IUD was displaced.    Primary OB/GYN: Last saw GYN 2017; does not have a GYN currently    OBH: SAB x1, TOP x3 (D&C x3)   GYNH: AUB, adenomyosis (diagnosed on laparoscopy ~7 yrs ago), h/o +HPV status post colposcopy ~4 years ago; denies hx of fibroids, ov cysts, STIs  PMSH: substance use disorder w/ h/o multiple hospital admissions, including for delirium tremens; HTN  MEDS: self prescribes losartan prn for htn or uses her 's lisinopril; has not been taking iron as advised for her anemia  ALL: nkda  SOCH: alcohol abuse since at least , was sober for 6 years, relapses 1-2 years ago    ROS: negative except per hpi    OBJECTIVE:    VITAL SIGNS:  Vital Signs Last 24 Hrs  T(C): 36.8 (2020 12:00), Max: 37.3 (2020 13:54)  T(F): 98.3 (2020 12:00), Max: 99.1 (2020 13:54)  HR: 104 (2020 12:00) (92 - 112)  BP: 123/89 (2020 12:00) (109/76 - 134/89)  BP(mean): --  RR: 17 (2020 12:00) (17 - 18)  SpO2: 100% (2020 12:00) (100% - 100%)      20 @ 07:01  -  20 @ 07:00  --------------------------------------------------------  IN: 855 mL / OUT: 0 mL / NET: 855 mL      PHYSICAL EXAM:  GEN: NAD, A&Ox3  ABD: soft, NT, ND  SPECULUM: small amount of blood pooling, no active bleeding from os, IUD string protruding from os; IUD removed at the request of the patient  PELVIC: closed cervix, midline and adnexa non-tender, no palpable masses      LABS:                        6.6    2.77  )-----------( 60       ( 2020 07:35 )             24.3   baso x      eos x      imm gran x      lymph x      mono x      poly x                            7.0    2.53  )-----------( 39       ( 2020 09:51 )             25.0   baso 0.7    eos 4.9    imm gran 1.1    lymph 22.6   mono 4.2    poly 66.5                         7.0    3.14  )-----------( 51       ( 2020 06:41 )             25.0   baso x      eos x      imm gran x      lymph x      mono x      poly x                            7.1    3.48  )-----------( 54       ( 2020 05:45 )             See note  baso x      eos x      imm gran x      lymph x      mono x      poly x                            10.4   3.88  )-----------( 84       ( 2020 13:24 )             38.3   baso 2.6    eos 1.0    imm gran 1.0    lymph 14.7   mono 2.1    poly 78.6           135  |  99  |  12  ----------------------------<  136<H>  3.7   |  24  |  0.63    Ca    9.0      2020 07:35  Mg     1.6         TPro  6.2  /  Alb  3.4  /  TBili  0.4  /  DBili  x   /  AST  121<H>  /  ALT  75<H>  /  AlkPhos  60          RADIOLOGY:  Pending

## 2020-06-26 NOTE — CHART NOTE - NSCHARTNOTEFT_GEN_A_CORE
H/H dropped this AM, patient received 1 unit PRBC this AM. LDH and haptoglobin WNL, not likely hemolysis. Iron panel reviewed, would continue iron supplement given chronic vaginal bleeding history.  Leukopenia and thrombocytopenia improving on follow-up lab.  Visited the patient today but pt refused to speak with the team.  Continue to monitor CBC.        Dixie Taylor MD  PGY 4, Oncology/Hematology fellow  (P) [phone]  After 5pm, please contact on-call team.

## 2020-06-26 NOTE — PROGRESS NOTE ADULT - SUBJECTIVE AND OBJECTIVE BOX
Patient is a 44y old  Female who presents with a chief complaint of Alcohol withdrawal (25 Jun 2020 12:51)      SUBJECTIVE / OVERNIGHT EVENTS:    Patient seen and examined. states her IV hurts and upset that it has to be changed. CIWA scoring 4-6.  co vaginal bleeding      Vital Signs Last 24 Hrs  T(C): 36.8 (26 Jun 2020 12:00), Max: 37.3 (25 Jun 2020 13:54)  T(F): 98.3 (26 Jun 2020 12:00), Max: 99.1 (25 Jun 2020 13:54)  HR: 104 (26 Jun 2020 12:00) (92 - 112)  BP: 123/89 (26 Jun 2020 12:00) (109/76 - 134/89)  BP(mean): --  RR: 17 (26 Jun 2020 12:00) (17 - 18)  SpO2: 100% (26 Jun 2020 12:00) (100% - 100%)  I&O's Summary    25 Jun 2020 07:01  -  26 Jun 2020 07:00  --------------------------------------------------------  IN: 855 mL / OUT: 0 mL / NET: 855 mL        PE:  GENERAL: NAD, AAOx3, anxious  CHEST/LUNG: CTABL, No wheeze  HEART: Regular rate and rhythm; no murmur  ABDOMEN: Soft, Nontender, Nondistended; Bowel sounds present  EXTREMITIES:  2+ Peripheral Pulses, No clubbing, cyanosis, or edema  SKIN: No rashes or lesions  NEURO: + tremors + tongue fasiculation    LABS:                        6.6    2.77  )-----------( 60       ( 26 Jun 2020 07:35 )             24.3     06-26    135  |  99  |  12  ----------------------------<  136<H>  3.7   |  24  |  0.63    Ca    9.0      26 Jun 2020 07:35  Mg     1.6     06-26    TPro  6.2  /  Alb  3.4  /  TBili  0.4  /  DBili  x   /  AST  121<H>  /  ALT  75<H>  /  AlkPhos  60  06-26      CAPILLARY BLOOD GLUCOSE                RADIOLOGY & ADDITIONAL TESTS:    Imaging Personally Reviewed:  [x] YES  [ ] NO    Consultant(s) Notes Reviewed:  [x] YES  [ ] NO    MEDICATIONS  (STANDING):  ferrous    sulfate 325 milliGRAM(s) Oral two times a day  folic acid 1 milliGRAM(s) Oral daily  lactated ringers. 1000 milliLiter(s) (100 mL/Hr) IV Continuous <Continuous>  lactated ringers. 1000 milliLiter(s) (100 mL/Hr) IV Continuous <Continuous>  LORazepam   Injectable 2 milliGRAM(s) IV Push every 4 hours  LORazepam   Injectable 1 milliGRAM(s) IV Push once  losartan 50 milliGRAM(s) Oral daily  melatonin 3 milliGRAM(s) Oral at bedtime  multivitamin 1 Tablet(s) Oral daily  thiamine IVPB 500 milliGRAM(s) IV Intermittent every 8 hours    MEDICATIONS  (PRN):  LORazepam   Injectable 2 milliGRAM(s) IV Push every 1 hour PRN Symptom-triggered: each CIWA -Ar score 8 or GREATER  ondansetron Injectable 4 milliGRAM(s) IV Push every 8 hours PRN Nausea and/or Vomiting  polyethylene glycol 3350 17 Gram(s) Oral daily PRN Constipation      Care Discussed with Consultants/Other Providers [x] YES  [ ] NO    HEALTH ISSUES - PROBLEM Dx:  Thrombocytopenia: Thrombocytopenia  Alcohol use disorder, severe, dependence  Prophylactic measure: Prophylactic measure  Anemia, unspecified type: Anemia, unspecified type  Essential hypertension: Essential hypertension  Depression, unspecified depression type: Depression, unspecified depression type  Alcohol abuse: Alcohol abuse  Alcohol withdrawal syndrome without complication: Alcohol withdrawal syndrome without complication

## 2020-06-26 NOTE — PROVIDER CONTACT NOTE (OTHER) - ASSESSMENT
Pt A&Ox4, VSS. Pt c/o mild itchiness post PRBC transfusion; benadryl PO given during transfusion. No other symptoms noted.

## 2020-06-26 NOTE — CHART NOTE - NSCHARTNOTEFT_GEN_A_CORE
GYN recommending [id number] TID for 5 days. Spoke with Hematology fellow on call Dr. Stein- No objection to Lysteda.  Will start Lysteda 1300mg TID for 5 days   Dr. Davidson made aware    June Mtz NP-C  #[id number]

## 2020-06-27 LAB
ANION GAP SERPL CALC-SCNC: 13 MMOL/L — SIGNIFICANT CHANGE UP (ref 5–17)
BUN SERPL-MCNC: 12 MG/DL — SIGNIFICANT CHANGE UP (ref 7–23)
CALCIUM SERPL-MCNC: 9.2 MG/DL — SIGNIFICANT CHANGE UP (ref 8.4–10.5)
CHLORIDE SERPL-SCNC: 99 MMOL/L — SIGNIFICANT CHANGE UP (ref 96–108)
CO2 SERPL-SCNC: 23 MMOL/L — SIGNIFICANT CHANGE UP (ref 22–31)
CREAT SERPL-MCNC: 0.64 MG/DL — SIGNIFICANT CHANGE UP (ref 0.5–1.3)
GLUCOSE SERPL-MCNC: 180 MG/DL — HIGH (ref 70–99)
HCT VFR BLD CALC: 30.9 % — LOW (ref 34.5–45)
HGB BLD-MCNC: 8.9 G/DL — LOW (ref 11.5–15.5)
MAGNESIUM SERPL-MCNC: 1.9 MG/DL — SIGNIFICANT CHANGE UP (ref 1.6–2.6)
MCHC RBC-ENTMCNC: 21.4 PG — LOW (ref 27–34)
MCHC RBC-ENTMCNC: 28.8 GM/DL — LOW (ref 32–36)
MCV RBC AUTO: 74.3 FL — LOW (ref 80–100)
NRBC # BLD: 0 /100 WBCS — SIGNIFICANT CHANGE UP (ref 0–0)
PLATELET # BLD AUTO: 108 K/UL — LOW (ref 150–400)
POTASSIUM SERPL-MCNC: 3.8 MMOL/L — SIGNIFICANT CHANGE UP (ref 3.5–5.3)
POTASSIUM SERPL-SCNC: 3.8 MMOL/L — SIGNIFICANT CHANGE UP (ref 3.5–5.3)
RBC # BLD: 4.16 M/UL — SIGNIFICANT CHANGE UP (ref 3.8–5.2)
RBC # FLD: 25.2 % — HIGH (ref 10.3–14.5)
SODIUM SERPL-SCNC: 135 MMOL/L — SIGNIFICANT CHANGE UP (ref 135–145)
WBC # BLD: 4.66 K/UL — SIGNIFICANT CHANGE UP (ref 3.8–10.5)
WBC # FLD AUTO: 4.66 K/UL — SIGNIFICANT CHANGE UP (ref 3.8–10.5)

## 2020-06-27 RX ADMIN — Medication 325 MILLIGRAM(S): at 06:25

## 2020-06-27 RX ADMIN — Medication 2 MILLIGRAM(S): at 21:40

## 2020-06-27 RX ADMIN — TRANEXAMIC ACID 1300 MILLIGRAM(S): 100 INJECTION, SOLUTION INTRAVENOUS at 06:25

## 2020-06-27 RX ADMIN — Medication 325 MILLIGRAM(S): at 18:09

## 2020-06-27 RX ADMIN — TRANEXAMIC ACID 1300 MILLIGRAM(S): 100 INJECTION, SOLUTION INTRAVENOUS at 21:41

## 2020-06-27 RX ADMIN — Medication 3 MILLIGRAM(S): at 21:41

## 2020-06-27 RX ADMIN — Medication 2 MILLIGRAM(S): at 06:26

## 2020-06-27 RX ADMIN — Medication 2 MILLIGRAM(S): at 02:26

## 2020-06-27 RX ADMIN — TRANEXAMIC ACID 1300 MILLIGRAM(S): 100 INJECTION, SOLUTION INTRAVENOUS at 14:02

## 2020-06-27 RX ADMIN — Medication 1 TABLET(S): at 12:12

## 2020-06-27 RX ADMIN — Medication 2 MILLIGRAM(S): at 12:12

## 2020-06-27 RX ADMIN — Medication 1 MILLIGRAM(S): at 12:11

## 2020-06-27 NOTE — PROGRESS NOTE ADULT - SUBJECTIVE AND OBJECTIVE BOX
Patient is a 44y old  Female who presents with a chief complaint of Alcohol withdrawal (26 Jun 2020 13:18)      SUBJECTIVE / OVERNIGHT EVENTS:    Patient seen and examined. states tremors improving. states menstrual bleeding improving. refused losartan this AM.      Vital Signs Last 24 Hrs  T(C): 36.4 (27 Jun 2020 14:00), Max: 37.4 (26 Jun 2020 22:40)  T(F): 97.5 (27 Jun 2020 14:00), Max: 99.3 (26 Jun 2020 22:40)  HR: 103 (27 Jun 2020 14:00) (78 - 106)  BP: 122/90 (27 Jun 2020 14:00) (105/72 - 125/88)  BP(mean): --  RR: 18 (27 Jun 2020 14:00) (16 - 18)  SpO2: 100% (27 Jun 2020 14:00) (97% - 100%)  I&O's Summary    26 Jun 2020 07:01  -  27 Jun 2020 07:00  --------------------------------------------------------  IN: 100 mL / OUT: 0 mL / NET: 100 mL    27 Jun 2020 07:01  -  27 Jun 2020 14:27  --------------------------------------------------------  IN: 240 mL / OUT: 0 mL / NET: 240 mL        PE:  GENERAL: NAD, AAOx3, anxious  CHEST/LUNG: CTABL, No wheeze  HEART: Regular rate and rhythm; no murmur  ABDOMEN: Soft, Nontender, Nondistended; Bowel sounds present  EXTREMITIES:  2+ Peripheral Pulses, No clubbing, cyanosis, or edema  SKIN: No rashes or lesions  NEURO: no tremors notongue fasiculation    LABS:                        8.9    4.66  )-----------( 108      ( 27 Jun 2020 09:50 )             30.9     06-27    135  |  99  |  12  ----------------------------<  180<H>  3.8   |  23  |  0.64    Ca    9.2      27 Jun 2020 09:50  Mg     1.9     06-27    TPro  6.2  /  Alb  3.4  /  TBili  0.4  /  DBili  x   /  AST  121<H>  /  ALT  75<H>  /  AlkPhos  60  06-26      CAPILLARY BLOOD GLUCOSE                RADIOLOGY & ADDITIONAL TESTS:    Imaging Personally Reviewed:  [x] YES  [ ] NO    Consultant(s) Notes Reviewed:  [x] YES  [ ] NO    MEDICATIONS  (STANDING):  ferrous    sulfate 325 milliGRAM(s) Oral two times a day  folic acid 1 milliGRAM(s) Oral daily  LORazepam   Injectable 2 milliGRAM(s) IV Push every 6 hours  losartan 50 milliGRAM(s) Oral daily  melatonin 3 milliGRAM(s) Oral at bedtime  multivitamin 1 Tablet(s) Oral daily  tranexamic  acid 1300 milliGRAM(s) Oral three times a day    MEDICATIONS  (PRN):  LORazepam   Injectable 2 milliGRAM(s) IV Push every 1 hour PRN Symptom-triggered: each CIWA -Ar score 8 or GREATER  ondansetron Injectable 4 milliGRAM(s) IV Push every 8 hours PRN Nausea and/or Vomiting  polyethylene glycol 3350 17 Gram(s) Oral daily PRN Constipation      Care Discussed with Consultants/Other Providers [x] YES  [ ] NO    HEALTH ISSUES - PROBLEM Dx:  Abnormal uterine bleeding (AUB): Abnormal uterine bleeding (AUB)  Thrombocytopenia: Thrombocytopenia  Alcohol use disorder, severe, dependence  Prophylactic measure: Prophylactic measure  Anemia, unspecified type: Anemia, unspecified type  Essential hypertension: Essential hypertension  Depression, unspecified depression type: Depression, unspecified depression type  Alcohol abuse: Alcohol abuse  Alcohol withdrawal syndrome without complication: Alcohol withdrawal syndrome without complication

## 2020-06-27 NOTE — PROGRESS NOTE ADULT - PROBLEM SELECTOR PLAN 4
chronic anemia  appreciate gyn recs, sp IUD removal  Lysteda 1300mg TID x5 days will help treat bleeding  iron supplementation  pt can take this medication monthly during menses  endometrial biopsy deferred given thrombocytopenia, patient can have this done outpatient if necessary  monitor CBC daily, cont to monitor  proper response sp 1 unit prbc transfusion  no active blding on gyn exam  transvaginal US pending  fu vaginal cultures

## 2020-06-27 NOTE — PROVIDER CONTACT NOTE (MEDICATION) - ASSESSMENT
Pt a&ox4, VSS. Pt denies pain or discomfort at this time. /74, HR 78. Pt states that her BP is too low at this time for losartan and refuses medication.

## 2020-06-27 NOTE — PROVIDER CONTACT NOTE (OTHER) - ASSESSMENT
Pt a&ox4, VSS. pt denies pain or discomfort. Pt refusing telemetry monitoring at this time. Pt refused telemetry monitoring since day shift 6/26/20, day provider notified at 1800.

## 2020-06-27 NOTE — CHART NOTE - NSCHARTNOTEFT_GEN_A_CORE
CIWA score improving - 1 today. Tapered down the ativan to 2mg Q6 hours. Will monitor and will taper further in 24 hours. Dr. Davidson concurs with the plan.    [id number]

## 2020-06-27 NOTE — PROGRESS NOTE ADULT - PROBLEM SELECTOR PLAN 3
currently denies suicidal ideation  hold Xanax for now while pt is on CIWA protocol  melatonin for sleep

## 2020-06-27 NOTE — PROGRESS NOTE ADULT - PROBLEM SELECTOR PLAN 1
CIWA protocol, CIWA around 1-2, decrease ativan  Thiamine, Folic acid 1mg po daily, Multivitamin 1 tab po daily  appreciate psych recs  social work fu

## 2020-06-27 NOTE — PROVIDER CONTACT NOTE (MEDICATION) - REASON
Nephrology  Patient: Feliciano Hernandez Date:3/18/2020   : 1947 Attending: Marlon Barrett MD   72 year old male        Chief complaint: Chronic kidney disease - Stage IV    HPI from initial consult:   This is a 72 year old male with a past medical history significant for systemic hypertension, heart failure with preserved ejection fraction, pulmonary hypertension, multiple hospitalizations for IMELDA.    Nephrology was consulted for CKD IV. He follows with Dr. Sirisha malagon. Baseline Cr 1.9-2.2. CKD 2/2 multiple AKIs, cardiorenal, HTN. Cr on admission 2.22, today 2.3. Previous non-nephrotic range proteinuria     Hospital Course:  3/14: LE edema remains, wt down 1kg, UOP 3.7L  3/15: awake lots of questions, epistaxis last night, wt down, UOP 4L  3/16: Awake no overnight events, weight down this AM, UOP 4L, Na 134  3/17: Cr stable, wt slow to come down, UOP 4.28L  3/18: Cr rising, wt down, UOP 2L. 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 (CMS/HCC) 2019   • Lower leg edema 2019    bilateral  // wears TEDS   • Malignant neoplasm (CMS/HCC) 2017    melanoma below right ear    • PAC (premature atrial contraction) 2019   • Pancreatitis, gallstone 10/2019   • Peripheral neuropathy 2019   • Pleural effusion, bilateral 10/2019   • Pulmonary HTN (CMS/HCC) 10/2019   • Radial fracture as a child   • Restless leg    • Sinusitis, chronic    • Skin cancer several  Patient refused losartan at this time     face, left cheek, left nose, most recent right bridge of nose 9/19/17   • Tricuspid valve regurgitation 08/2019    mod   • Wears glasses    • Wrist fracture, right 2014       ALLERGIES:  No Known Allergies      Review of Systems:  All pertinent positive and negative ROS reviewed in HPI and hospital course.     Vital Last Value 24 Hour Range   Temperature 98.3 °F (36.8 °C) Temp  Min: 97.6 °F (36.4 °C)  Max: 98.3 °F (36.8 °C)   Pulse 84 Pulse  Min: 80  Max: 84   Respiratory 18 Resp  Min: 16  Max: 18   Blood Pressure 103/58 BP  Min: 88/54  Max: 103/58   Art BP   No data recorded   Pulse Oximetry 95 % SpO2  Min: 94 %  Max: 95 %     Vital Today Admitted   Weight 94.5 kg(Reweight x2) Weight: 99.7 kg   Height N/A Height: 5' 7\" (170.2 cm)   BMI N/A BMI (Calculated): 35.73     Weight over the past 48 Hours:  Patient Vitals for the past 48 hrs:   Weight   03/17/20 0400 96.3 kg   03/18/20 0544 94.5 kg        Hemodynamics:      Last Value 24 Hour Range   CVP   No data recorded   PAS/PAD   No data recorded   PCWP   No data recorded   CO   No data recorded   CI   No data recorded   SVR   No data recorded   SV02   No data recorded     Intake/Output:    I/O this shift:  In: 572 [P.O.:240; I.V.:128; IV Piggyback:204]  Out: 475 [Urine:475]      Intake/Output Summary (Last 24 hours) at 3/18/2020 1159  Last data filed at 3/18/2020 0901  Gross per 24 hour   Intake 1691 ml   Output 1900 ml   Net -209 ml       Medications/Infusions:  Medications Prior to Admission   Medication Sig Dispense Refill   • gabapentin (NEURONTIN) 600 MG tablet Take 600 mg by mouth 2 times daily.  1   • spironolactone (ALDACTONE) 50 MG tablet Take 100 mg by mouth daily.     • metOLazone (ZAROXOLYN) 2.5 MG tablet Take 1 tablet by mouth daily for 7 days. 7 tablet 0   • torsemide (DEMADEX) 20 MG tablet Take 5 tablets by mouth 2 times daily. (Patient taking differently: Take 100 mg by mouth 2 times daily. Take five tablets (=100mg) by mouth twice daily, once in  the morning and once at noon.) 90 tablet 0   • clobetasol emollient base (CLOBETASOL PROPIONATE E) 0.05 % cream Mix into a jar of Cetaphil 454g and apply twice daily as needed for dry skin, itchy skin. 30 g 0   • Sodium Zirconium Cyclosilicate 10 g Pack Take 10 mg by mouth daily. 30 each 3   • alendronate (FOSAMAX) 70 MG tablet TAKE 1 TABLET BY MOUTH EVERY 7 DAYS, TAKE ON AN EMPTY STOMACH WITH A LARGE GLASS OF WATER, DONT EAT 1 HOURS AFTERWARD (Patient taking differently: Take 70 mg by mouth every 7 days. Take one tablet by mouth every Friday.) 4 tablet 1   • midodrine (PROAMATINE) 2.5 MG tablet Take 1 tablet by mouth 3 times daily (before meals). (Patient taking differently: Take 2.5 mg by mouth 3 times daily (before meals). Indications: Blood Pressure Drop Upon Standing ) 270 tablet 1   • acetaminophen (TYLENOL) 325 MG tablet Take 2 tablets by mouth every 4 hours as needed for Pain or Fever. 30 tablet 0   • diclofenac-gabapentin-lidocaine 3-6-5 % cream Apply 1-2 grams (pumps) to feet 3-4 times daily as needed. (Patient taking differently: Apply 1 application topically as needed for Pain. Apply to affected area three to four times daily as needed for pain.) 240 g 11   • cetirizine (ZYRTEC) 10 MG tablet TAKE 1 TABLET BY MOUTH DAILY 90 tablet 1   • folic acid (FOLATE) 1 MG tablet Take 1 tablet by mouth daily. 30 tablet 2   • traZODone (DESYREL) 50 MG tablet Take 1-3 tablets by mouth nightly as needed for Sleep. 90 tablet 2   • pramipexole (MIRAPEX) 0.125 MG tablet TAKE 1 TABLET BY MOUTH NIGHTLY AS NEEDED FOR RESTLESS LEGS (Patient taking differently: Take 0.125 mg by mouth nightly. ) 30 tablet 5   • aspirin 81 MG tablet Take 1 tablet by mouth daily. 90 tablet 1   • pantoprazole (PROTONIX) 40 MG tablet Take 1 tablet by mouth daily. 90 tablet 1   • tiotropium (SPIRIVA RESPIMAT) 2.5 MCG/ACT inhaler Inhale 2 puffs into the lungs daily. 4 g 11   • apixaBAN (ELIQUIS) 5 MG Tab Take 1 tablet by mouth every 12 hours. 180  tablet 1     • torsemide  100 mg Oral BID   • hydroCORTisone   Topical BID   • [Held by provider] metOLazone  5 mg Oral BID   • apixaBAN  5 mg Oral 2 times per day   • aspirin  81 mg Oral Daily   • cetirizine  10 mg Oral Daily   • folic acid  1 mg Oral Daily   • gabapentin  600 mg Oral 2 times per day   • midodrine  2.5 mg Oral TID AC   • pantoprazole  40 mg Oral QAM AC   • pramipexole  0.125 mg Oral Nightly   • spironolactone  100 mg Oral Daily   • umeclidinium bromide  1 puff Inhalation Daily Resp   • sodium chloride (PF)  2 mL Intracatheter 2 times per day         Physical Exam:  General: Alert, no acute distress  HEENT: Head atraumatic, normocephalic.  Moist oral mucus membranes.  Sclera non-icteric.   Neck: Supple, + JVD.  Trachea midline.  Chest/Lungs: Normal effort.  Symmetrical expansion.  Clear to auscultation.  No wheezes, rales or rhonchi.  CVS: Regular rate and rhythm. S1,S2 well heard. There is no S3 or S4 gallop. Has no pericardial rub heard.  Abdomen: BS well heard. Soft, non tender, non distended.  No hepatosplenomegaly.  Neuro: No focal neurological deficit.  A&O.   Skin: Warm, dry.  No rashes.   Extremities: Pulses intact and symmetric. No clubbing or cyanosis. 3+ LE edema.      Laboratory Results:  Recent Labs   Lab 03/18/20  0427 03/17/20  0431 03/16/20  0456  03/13/20  0450  03/12/20  1837   SODIUM 134* 132* 134*   < > 138  --  137   POTASSIUM 3.6 3.7 3.8   < > 3.1*   < > 3.1*   CHLORIDE 92* 92* 95*   < > 97*  --  94*   CO2 32 32 33*   < > 33*  --  32   ANIONGAP 14 12 10   < > 11  --  14   BUN 94* 87* 83*   < > 81*  --  74*   CREATININE 2.97* 2.60* 2.59*   < > 2.30*  --  2.48*   GLUCOSE 117* 121* 119*   < > 103*  --  106*   CALCIUM 9.4 9.2 9.3   < > 9.7  --  9.7   ALBUMIN  --   --   --   --  3.6  --  3.7   MG 2.5* 2.4 2.3   < > 2.4  --  2.3   AST  --   --   --   --  18  --  23   GPT  --   --   --   --  20  --  22   ALKPT  --   --   --   --  68  --  82   BILIRUBIN  --   --   --   --  0.7  --  0.7     < > = values in this interval not displayed.       Recent Labs   Lab 03/16/20  0456 03/15/20  0855 03/14/20  2049   WBC 7.9 7.9 8.7   HGB 8.8* 8.7* 8.5*   HCT 28.3* 27.9* 27.7*    234 229       No results found    No results found    Urine Panel  No results found    Imaging/Procedures:    Impression, Plan & Recommendations:  Chronic kidney disease stage IV - non-oliguric  · Baseline Cr 1.9-2.2, Cr fluctuating 2/2 cardiorenal, may need to accept higher levels of Cr to achieve euvolemia   · Previous hx of non-nephrotic range proteinuria  · Wt down, Cr rising, will transition off lasix gtt and resume PTA Torsemide 100 mg BID, holding metolazone 2/2 hyponatremia  Acute on HFpEF  · EF 57%  · Diuretics as above  · Daily weights, monitor I&Os, FR 1500cc/day  Hyponatremia -with diuresis  · Holding metolazone   Chronic atrial fibrillation: Management per primary team   Hypotension:   · On midodrine 2.5 mg TID      I was present for the ROS and Physical exam, which was performed by Dr. Clinton.   Discussed assessment and plan.    Eunice Castellanos PA-C          I have personally seen and examined the patient, agree with the history, physical exam. I have personally formulated the assessment and plan and have made appropriate addendums/modifications in the note above.     Cynthia Clinton MD

## 2020-06-27 NOTE — PROGRESS NOTE ADULT - ASSESSMENT
44F with h/o HTN, Depression, ETOH abuse ( h/o Etoh withdrawal, DT in the past), chronic anemia who was brought in for reported alcohol withdrawal and possible rehab placement. pt has been admitted twice in the past month for Etoh withdrawal most recently 6/6- 6/9. found to have menorrhagia, anemia.

## 2020-06-28 LAB
HCT VFR BLD CALC: 30.4 % — LOW (ref 34.5–45)
HGB BLD-MCNC: 8.7 G/DL — LOW (ref 11.5–15.5)
MCHC RBC-ENTMCNC: 21.8 PG — LOW (ref 27–34)
MCHC RBC-ENTMCNC: 28.6 GM/DL — LOW (ref 32–36)
MCV RBC AUTO: 76 FL — LOW (ref 80–100)
NRBC # BLD: 0 /100 WBCS — SIGNIFICANT CHANGE UP (ref 0–0)
PLATELET # BLD AUTO: 135 K/UL — LOW (ref 150–400)
RBC # BLD: 4 M/UL — SIGNIFICANT CHANGE UP (ref 3.8–5.2)
RBC # FLD: 26.3 % — HIGH (ref 10.3–14.5)
WBC # BLD: 3.5 K/UL — LOW (ref 3.8–10.5)
WBC # FLD AUTO: 3.5 K/UL — LOW (ref 3.8–10.5)

## 2020-06-28 RX ADMIN — TRANEXAMIC ACID 1300 MILLIGRAM(S): 100 INJECTION, SOLUTION INTRAVENOUS at 21:11

## 2020-06-28 RX ADMIN — Medication 325 MILLIGRAM(S): at 06:07

## 2020-06-28 RX ADMIN — Medication 325 MILLIGRAM(S): at 17:08

## 2020-06-28 RX ADMIN — TRANEXAMIC ACID 1300 MILLIGRAM(S): 100 INJECTION, SOLUTION INTRAVENOUS at 06:08

## 2020-06-28 RX ADMIN — Medication 1 MILLIGRAM(S): at 21:11

## 2020-06-28 RX ADMIN — Medication 3 MILLIGRAM(S): at 21:11

## 2020-06-28 RX ADMIN — Medication 1 MILLIGRAM(S): at 11:58

## 2020-06-28 RX ADMIN — TRANEXAMIC ACID 1300 MILLIGRAM(S): 100 INJECTION, SOLUTION INTRAVENOUS at 13:27

## 2020-06-28 RX ADMIN — Medication 2 MILLIGRAM(S): at 06:07

## 2020-06-28 RX ADMIN — Medication 1 TABLET(S): at 11:57

## 2020-06-28 RX ADMIN — Medication 1 MILLIGRAM(S): at 11:57

## 2020-06-28 NOTE — PROGRESS NOTE ADULT - PROBLEM SELECTOR PLAN 1
CIWA protocol, CIWA 1, decrease ativan 1mg q6  Thiamine, Folic acid 1mg po daily, Multivitamin 1 tab po daily  appreciate psych recs  social work fu CIWA protocol, CIWA 1, decrease ativan 1mg q6 PO  Thiamine, Folic acid 1mg po daily, Multivitamin 1 tab po daily  appreciate psych recs  social work fu

## 2020-06-28 NOTE — PROGRESS NOTE ADULT - SUBJECTIVE AND OBJECTIVE BOX
Patient is a 44y old  Female who presents with a chief complaint of Alcohol withdrawal (27 Jun 2020 14:26)      SUBJECTIVE / OVERNIGHT EVENTS:    Patient seen and examined.       Vital Signs Last 24 Hrs  T(C): 36.7 (28 Jun 2020 05:22), Max: 37 (27 Jun 2020 21:55)  T(F): 98 (28 Jun 2020 05:22), Max: 98.6 (27 Jun 2020 21:55)  HR: 73 (28 Jun 2020 05:22) (73 - 104)  BP: 109/74 (28 Jun 2020 05:22) (109/74 - 134/95)  BP(mean): --  RR: 18 (28 Jun 2020 05:22) (18 - 18)  SpO2: 100% (28 Jun 2020 05:22) (97% - 100%)  I&O's Summary    27 Jun 2020 07:01  -  28 Jun 2020 07:00  --------------------------------------------------------  IN: 480 mL / OUT: 0 mL / NET: 480 mL        PE:  GENERAL: NAD, AAOx3, anxious  CHEST/LUNG: CTABL, No wheeze  HEART: Regular rate and rhythm; no murmur  ABDOMEN: Soft, Nontender, Nondistended; Bowel sounds present  EXTREMITIES:  2+ Peripheral Pulses, No clubbing, cyanosis, or edema  SKIN: No rashes or lesions  NEURO: no tremors notongue fasiculation  LABS:                        8.9    4.66  )-----------( 108      ( 27 Jun 2020 09:50 )             30.9     06-27    135  |  99  |  12  ----------------------------<  180<H>  3.8   |  23  |  0.64    Ca    9.2      27 Jun 2020 09:50  Mg     1.9     06-27        CAPILLARY BLOOD GLUCOSE                RADIOLOGY & ADDITIONAL TESTS:    Imaging Personally Reviewed:  [x] YES  [ ] NO    Consultant(s) Notes Reviewed:  [x] YES  [ ] NO    MEDICATIONS  (STANDING):  ferrous    sulfate 325 milliGRAM(s) Oral two times a day  folic acid 1 milliGRAM(s) Oral daily  LORazepam   Injectable 1 milliGRAM(s) IV Push every 6 hours  losartan 50 milliGRAM(s) Oral daily  melatonin 3 milliGRAM(s) Oral at bedtime  multivitamin 1 Tablet(s) Oral daily  tranexamic  acid 1300 milliGRAM(s) Oral three times a day    MEDICATIONS  (PRN):  LORazepam   Injectable 2 milliGRAM(s) IV Push every 1 hour PRN Symptom-triggered: each CIWA -Ar score 8 or GREATER  ondansetron Injectable 4 milliGRAM(s) IV Push every 8 hours PRN Nausea and/or Vomiting  polyethylene glycol 3350 17 Gram(s) Oral daily PRN Constipation      Care Discussed with Consultants/Other Providers [x] YES  [ ] NO    HEALTH ISSUES - PROBLEM Dx:  Abnormal uterine bleeding (AUB): Abnormal uterine bleeding (AUB)  Thrombocytopenia: Thrombocytopenia  Alcohol use disorder, severe, dependence  Prophylactic measure: Prophylactic measure  Anemia, unspecified type: Anemia, unspecified type  Essential hypertension: Essential hypertension  Depression, unspecified depression type: Depression, unspecified depression type  Alcohol abuse: Alcohol abuse  Alcohol withdrawal syndrome without complication: Alcohol withdrawal syndrome without complication Patient is a 44y old  Female who presents with a chief complaint of Alcohol withdrawal (27 Jun 2020 14:26)      SUBJECTIVE / OVERNIGHT EVENTS:    Patient seen and examined. denies menstrual bleeding. eager to go to rehab. denies aggitation, anxiety, tremors.      Vital Signs Last 24 Hrs  T(C): 36.7 (28 Jun 2020 05:22), Max: 37 (27 Jun 2020 21:55)  T(F): 98 (28 Jun 2020 05:22), Max: 98.6 (27 Jun 2020 21:55)  HR: 73 (28 Jun 2020 05:22) (73 - 104)  BP: 109/74 (28 Jun 2020 05:22) (109/74 - 134/95)  BP(mean): --  RR: 18 (28 Jun 2020 05:22) (18 - 18)  SpO2: 100% (28 Jun 2020 05:22) (97% - 100%)  I&O's Summary    27 Jun 2020 07:01  -  28 Jun 2020 07:00  --------------------------------------------------------  IN: 480 mL / OUT: 0 mL / NET: 480 mL        PE:  GENERAL: NAD, AAOx3, anxious  CHEST/LUNG: CTABL, No wheeze  HEART: Regular rate and rhythm; no murmur  ABDOMEN: Soft, Nontender, Nondistended; Bowel sounds present  EXTREMITIES:  2+ Peripheral Pulses, No clubbing, cyanosis, or edema  SKIN: No rashes or lesions  NEURO: no tremors no tongue fasciculations    LABS:                        8.9    4.66  )-----------( 108      ( 27 Jun 2020 09:50 )             30.9     06-27    135  |  99  |  12  ----------------------------<  180<H>  3.8   |  23  |  0.64    Ca    9.2      27 Jun 2020 09:50  Mg     1.9     06-27        CAPILLARY BLOOD GLUCOSE                RADIOLOGY & ADDITIONAL TESTS:    Imaging Personally Reviewed:  [x] YES  [ ] NO    Consultant(s) Notes Reviewed:  [x] YES  [ ] NO    MEDICATIONS  (STANDING):  ferrous    sulfate 325 milliGRAM(s) Oral two times a day  folic acid 1 milliGRAM(s) Oral daily  LORazepam   Injectable 1 milliGRAM(s) IV Push every 6 hours  losartan 50 milliGRAM(s) Oral daily  melatonin 3 milliGRAM(s) Oral at bedtime  multivitamin 1 Tablet(s) Oral daily  tranexamic  acid 1300 milliGRAM(s) Oral three times a day    MEDICATIONS  (PRN):  LORazepam   Injectable 2 milliGRAM(s) IV Push every 1 hour PRN Symptom-triggered: each CIWA -Ar score 8 or GREATER  ondansetron Injectable 4 milliGRAM(s) IV Push every 8 hours PRN Nausea and/or Vomiting  polyethylene glycol 3350 17 Gram(s) Oral daily PRN Constipation      Care Discussed with Consultants/Other Providers [x] YES  [ ] NO    HEALTH ISSUES - PROBLEM Dx:  Abnormal uterine bleeding (AUB): Abnormal uterine bleeding (AUB)  Thrombocytopenia: Thrombocytopenia  Alcohol use disorder, severe, dependence  Prophylactic measure: Prophylactic measure  Anemia, unspecified type: Anemia, unspecified type  Essential hypertension: Essential hypertension  Depression, unspecified depression type: Depression, unspecified depression type  Alcohol abuse: Alcohol abuse  Alcohol withdrawal syndrome without complication: Alcohol withdrawal syndrome without complication

## 2020-06-29 ENCOUNTER — TRANSCRIPTION ENCOUNTER (OUTPATIENT)
Age: 45
End: 2020-06-29

## 2020-06-29 VITALS
HEART RATE: 89 BPM | SYSTOLIC BLOOD PRESSURE: 122 MMHG | TEMPERATURE: 98 F | OXYGEN SATURATION: 99 % | DIASTOLIC BLOOD PRESSURE: 83 MMHG | RESPIRATION RATE: 18 BRPM

## 2020-06-29 LAB
CULTURE RESULTS: SIGNIFICANT CHANGE UP
SPECIMEN SOURCE: SIGNIFICANT CHANGE UP

## 2020-06-29 PROCEDURE — 85240 CLOT FACTOR VIII AHG 1 STAGE: CPT

## 2020-06-29 PROCEDURE — 96375 TX/PRO/DX INJ NEW DRUG ADDON: CPT

## 2020-06-29 PROCEDURE — 93005 ELECTROCARDIOGRAM TRACING: CPT

## 2020-06-29 PROCEDURE — 84295 ASSAY OF SERUM SODIUM: CPT

## 2020-06-29 PROCEDURE — 83615 LACTATE (LD) (LDH) ENZYME: CPT

## 2020-06-29 PROCEDURE — 82435 ASSAY OF BLOOD CHLORIDE: CPT

## 2020-06-29 PROCEDURE — P9016: CPT

## 2020-06-29 PROCEDURE — 82330 ASSAY OF CALCIUM: CPT

## 2020-06-29 PROCEDURE — 96374 THER/PROPH/DIAG INJ IV PUSH: CPT

## 2020-06-29 PROCEDURE — 86901 BLOOD TYPING SEROLOGIC RH(D): CPT

## 2020-06-29 PROCEDURE — 81001 URINALYSIS AUTO W/SCOPE: CPT

## 2020-06-29 PROCEDURE — 85247 CLOT FACTOR VIII MULTIMETRIC: CPT

## 2020-06-29 PROCEDURE — 87070 CULTURE OTHR SPECIMN AEROBIC: CPT

## 2020-06-29 PROCEDURE — 84550 ASSAY OF BLOOD/URIC ACID: CPT

## 2020-06-29 PROCEDURE — 83090 ASSAY OF HOMOCYSTEINE: CPT

## 2020-06-29 PROCEDURE — 83550 IRON BINDING TEST: CPT

## 2020-06-29 PROCEDURE — 85027 COMPLETE CBC AUTOMATED: CPT

## 2020-06-29 PROCEDURE — 83921 ORGANIC ACID SINGLE QUANT: CPT

## 2020-06-29 PROCEDURE — 82607 VITAMIN B-12: CPT

## 2020-06-29 PROCEDURE — 36430 TRANSFUSION BLD/BLD COMPNT: CPT

## 2020-06-29 PROCEDURE — 85245 CLOT FACTOR VIII VW RISTOCTN: CPT

## 2020-06-29 PROCEDURE — 87075 CULTR BACTERIA EXCEPT BLOOD: CPT

## 2020-06-29 PROCEDURE — 83010 ASSAY OF HAPTOGLOBIN QUANT: CPT

## 2020-06-29 PROCEDURE — 80074 ACUTE HEPATITIS PANEL: CPT

## 2020-06-29 PROCEDURE — 86900 BLOOD TYPING SEROLOGIC ABO: CPT

## 2020-06-29 PROCEDURE — 81025 URINE PREGNANCY TEST: CPT

## 2020-06-29 PROCEDURE — 80076 HEPATIC FUNCTION PANEL: CPT

## 2020-06-29 PROCEDURE — 85246 CLOT FACTOR VIII VW ANTIGEN: CPT

## 2020-06-29 PROCEDURE — 83690 ASSAY OF LIPASE: CPT

## 2020-06-29 PROCEDURE — 86923 COMPATIBILITY TEST ELECTRIC: CPT

## 2020-06-29 PROCEDURE — 80048 BASIC METABOLIC PNL TOTAL CA: CPT

## 2020-06-29 PROCEDURE — 84702 CHORIONIC GONADOTROPIN TEST: CPT

## 2020-06-29 PROCEDURE — 83540 ASSAY OF IRON: CPT

## 2020-06-29 PROCEDURE — 82962 GLUCOSE BLOOD TEST: CPT

## 2020-06-29 PROCEDURE — 84132 ASSAY OF SERUM POTASSIUM: CPT

## 2020-06-29 PROCEDURE — 83605 ASSAY OF LACTIC ACID: CPT

## 2020-06-29 PROCEDURE — 85045 AUTOMATED RETICULOCYTE COUNT: CPT

## 2020-06-29 PROCEDURE — 76705 ECHO EXAM OF ABDOMEN: CPT

## 2020-06-29 PROCEDURE — 82803 BLOOD GASES ANY COMBINATION: CPT

## 2020-06-29 PROCEDURE — 86850 RBC ANTIBODY SCREEN: CPT

## 2020-06-29 PROCEDURE — 83735 ASSAY OF MAGNESIUM: CPT

## 2020-06-29 PROCEDURE — 82746 ASSAY OF FOLIC ACID SERUM: CPT

## 2020-06-29 PROCEDURE — 82947 ASSAY GLUCOSE BLOOD QUANT: CPT

## 2020-06-29 PROCEDURE — 84100 ASSAY OF PHOSPHORUS: CPT

## 2020-06-29 PROCEDURE — 82728 ASSAY OF FERRITIN: CPT

## 2020-06-29 PROCEDURE — 80053 COMPREHEN METABOLIC PANEL: CPT

## 2020-06-29 PROCEDURE — 85014 HEMATOCRIT: CPT

## 2020-06-29 PROCEDURE — 99285 EMERGENCY DEPT VISIT HI MDM: CPT | Mod: 25

## 2020-06-29 PROCEDURE — 83020 HEMOGLOBIN ELECTROPHORESIS: CPT

## 2020-06-29 PROCEDURE — 80307 DRUG TEST PRSMV CHEM ANLYZR: CPT

## 2020-06-29 RX ORDER — POLYETHYLENE GLYCOL 3350 17 G/17G
17 POWDER, FOR SOLUTION ORAL
Qty: 0 | Refills: 0 | DISCHARGE
Start: 2020-06-29

## 2020-06-29 RX ORDER — FERROUS SULFATE 325(65) MG
1 TABLET ORAL
Qty: 60 | Refills: 0
Start: 2020-06-29 | End: 2020-07-28

## 2020-06-29 RX ORDER — TRANEXAMIC ACID 100 MG/ML
2 INJECTION, SOLUTION INTRAVENOUS
Qty: 12 | Refills: 0
Start: 2020-06-29 | End: 2020-06-30

## 2020-06-29 RX ADMIN — Medication 1 MILLIGRAM(S): at 12:54

## 2020-06-29 RX ADMIN — Medication 1 TABLET(S): at 12:54

## 2020-06-29 RX ADMIN — Medication 325 MILLIGRAM(S): at 05:42

## 2020-06-29 RX ADMIN — TRANEXAMIC ACID 1300 MILLIGRAM(S): 100 INJECTION, SOLUTION INTRAVENOUS at 12:54

## 2020-06-29 RX ADMIN — TRANEXAMIC ACID 1300 MILLIGRAM(S): 100 INJECTION, SOLUTION INTRAVENOUS at 05:42

## 2020-06-29 RX ADMIN — Medication 325 MILLIGRAM(S): at 17:23

## 2020-06-29 RX ADMIN — LOSARTAN POTASSIUM 50 MILLIGRAM(S): 100 TABLET, FILM COATED ORAL at 05:42

## 2020-06-29 NOTE — DISCHARGE NOTE NURSING/CASE MANAGEMENT/SOCIAL WORK - NSDCCRTYPESERV_GEN_ALL_CORE_FT
[facility], a funded agency of the [facility] Department of Mental Health, Chemical Dependency, and Developmental Disabilities Services and [facility] Office of Alcohol and Substance Abuse Services, provides a safe, therapeutic environment for chemically dependent individuals and/or individuals with co-occurring disorders for purposes of stabilization in a residential setting; along with compassionate care, and preparation for the next level of the recovery process. Pre-admission screenings are encouraged. It is recommended to contact [facility] by telephone and complete the screening process with a nurse or counselor. An appointment for evaluation will be provided immediately following the telephone screening.

## 2020-06-29 NOTE — DISCHARGE NOTE NURSING/CASE MANAGEMENT/SOCIAL WORK - PATIENT PORTAL LINK FT
You can access the FollowMyHealth Patient Portal offered by [facility] by registering at the following website: [email or web address]. By joining [employer]’s FollowMyHealth portal, you will also be able to view your health information using other applications (apps) compatible with our system.

## 2020-06-29 NOTE — DISCHARGE NOTE PROVIDER - NSDCCPCAREPLAN_GEN_ALL_CORE_FT
PRINCIPAL DISCHARGE DIAGNOSIS  Diagnosis: Intoxication by drug, with unspecified complication  Assessment and Plan of Treatment: Treatment for alcohol dependence and withdrawal includes medicines, detoxification, and therapy. Diagnosing and treating alcohol dependence and withdrawal as soon as possible may relieve or prevent symptoms. With treatment and care, your alcohol dependence and withdrawal may be controlled, and your quality of life improved.  Keep all follow up appointments. Write down any questions you may have. This way you will remember to ask these questions during your next visit.      SECONDARY DISCHARGE DIAGNOSES  Diagnosis: Alcohol abuse  Assessment and Plan of Treatment: Treatment for alcohol dependence and withdrawal includes medicines, detoxification, and therapy. Diagnosing and treating alcohol dependence and withdrawal as soon as possible may relieve or prevent symptoms. With treatment and care, your alcohol dependence and withdrawal may be controlled, and your quality of life improved.  Keep all follow up appointments. Write down any questions you may have. This way you will remember to ask these questions during your next visit.    Diagnosis: Acute anemia  Assessment and Plan of Treatment: Continue iron supplements  Eat iron and protein rich foods including: meat, dark leafy green vegetables, and beans.  Limit caffeine.  Notify your doctor if you experience heartburn, constipation, diarrhea, nausea/vomiting, dizziness or are feeling extremely tired.  Seek immediate care or call 911 if you experience:  shortness of breath even at rest, you have blood in your bowel movement or vomit, if you are too dizzy to stand up    Diagnosis: Adenomyosis of uterus  Assessment and Plan of Treatment: Continue Lysteda as prescribed.  Follow up with your out patient gyn for endometrial biopsy    Diagnosis: Lactic acid acidosis  Assessment and Plan of Treatment:

## 2020-06-29 NOTE — PROVIDER CONTACT NOTE (OTHER) - ACTION/TREATMENT ORDERED:
PA notified and made aware, will continue to monitor patient
BRODY Lino aware. IVP Ativan PRN.
MARISSA Mtz aware. Will continue to monitor.
Pa Holland aware, continue to do CIWA scoring and vs q1h. Give Ativan PRN as needed.
Provider notified and aware. Continue to monitor.
See if the ativan helps and continue to monitor and assess pt.

## 2020-06-29 NOTE — PROVIDER CONTACT NOTE (OTHER) - ASSESSMENT
Pt does not show and signs or symptoms of withdrawal. Pt states she wants to sleep, and also stated she has not slept properly for days and is hoping to sleep without any disturbance.

## 2020-06-29 NOTE — DISCHARGE NOTE PROVIDER - HOSPITAL COURSE
44F with h/o HTN, Depression, ETOH abuse ( h/o Etoh withdrawal, DT and MICU admission in the past)  brought in by justin for reported alcohol withdrawal and possible rehab placement. Per informant  pt has been drinking half bottle of vodka each day since she was discharged from OSH  a week PTA.  Admitted with Etoh Withdrawal,  Anemia - S/p 1 unit PRBC on 6/26, Vaginal bleeding in the the setting of Hx of adenomyosis and menorrhagia - GYN eval, Lysteda 1300mg TID x5 days, endometrial biopsy as outpatient.  Thrombocytopenia - Hold Lovenox ranges 80 - 100    Pancytopenia is likely due to bone marrow suppression from acute toxicity from alcohol.  Pt placed on CIWA protocol, 0 on scale, CIWA d/cd.  Pt unwilling to wait for rehab bed. D/C to sister's home.  F/U with PCP and gyn out pt. 44F with h/o HTN, Depression, ETOH abuse ( h/o Etoh withdrawal, DT and MICU admission in the past)  brought in by justin for reported alcohol withdrawal and possible rehab placement. Per informant  pt has been drinking half bottle of vodka each day since she was discharged from OSH  a week PTA.  Admitted with Etoh Withdrawal,  Anemia - S/p 1 unit PRBC on 6/26, Vaginal bleeding in the the setting of Hx of adenomyosis and menorrhagia - GYN eval, Lysteda 1300mg TID x5 days, endometrial biopsy as outpatient.  Thrombocytopenia - Hold Lovenox ranges 80 - 100    Pancytopenia is likely due to bone marrow suppression from acute toxicity from alcohol.  Pt placed on CIWA protocol, 0 on scale, CIWA d/cd.  Pt unwilling to wait for rehab bed. D/C to sister's home.  F/U with PCP and gyn out pt.         patient seen and examined. agree with the above.     discussed with patient and family    medication reconciliation reviewed    30 minutes was spent coordinating care on day of discharge    please refer to progress note on day of DC

## 2020-06-29 NOTE — DISCHARGE NOTE PROVIDER - CARE PROVIDER_API CALL
Lexi Hawkins)  OBSGYN  [address] [place] [address]  Phone: [phone]  Fax: [phone]  Follow Up Time:

## 2020-06-29 NOTE — CHART NOTE - NSCHARTNOTEFT_GEN_A_CORE
PHI: 44F with h/o HTN, Depression, ETOH abuse (h/o Etoh withdrawal, DT in the past), chronic anemia 2/2 menorrhagia p/w alcohol withdrawal. Was found to have pancytopenia for which hematology was consulted.    Pancytopenia now improving, her anemia is likely 2/2 iron deficiency + ETOH bone marrow suppression.  Patient needs to stop drinking.    Patient can follow-up at our hematology outpatient clinic if she wishes([facility] patient scheduling unit #: [phone]).    Hematology signs off. Call us back should you have any questions.        Dixie Taylor MD  PGY 4, Oncology/Hematology fellow  (P) [phone]  After 5pm, please contact on-call team.

## 2020-06-29 NOTE — DISCHARGE NOTE PROVIDER - NSDCMRMEDTOKEN_GEN_ALL_CORE_FT
Ambien 10 mg oral tablet: 1 tab(s) orally once a day (at bedtime)  ferrous sulfate 325 mg (65 mg elemental iron) oral delayed release tablet: 1 tab(s) orally 2 times a day   folic acid 1 mg oral tablet: 1 tab(s) orally once a day  losartan 50 mg oral tablet: 1 tab(s) orally once a day  Multiple Vitamins oral tablet: 1 tab(s) orally once a day  polyethylene glycol 3350 oral powder for reconstitution: 17 gram(s) orally once a day, As needed, Constipation  thiamine 100 mg oral tablet: 1 tab(s) orally once a day  tranexamic acid 650 mg oral tablet: 2 tab(s) orally 3 times a day  Xanax 1 mg oral tablet: 1 tab(s) orally 2 times a day, As Needed

## 2020-07-01 LAB
CULTURE RESULTS: SIGNIFICANT CHANGE UP
HEMOGLOBIN INTERPRETATION: SIGNIFICANT CHANGE UP
HGB A MFR BLD: 98.1 % — HIGH (ref 95.8–98)
HGB A2 MFR BLD: 1.9 % — LOW (ref 2–3.2)
SPECIMEN SOURCE: SIGNIFICANT CHANGE UP

## 2020-07-03 LAB
HOMOCYSTEINE LEVEL: 10.7 UMOL/L — HIGH
METHYLMALONIC ACID LEVEL: 104 NMOL/L — SIGNIFICANT CHANGE UP (ref 87–318)

## 2020-07-05 LAB — VWF CBA/VWF AG PPP IA-RTO: SIGNIFICANT CHANGE UP

## 2020-07-23 NOTE — H&P ADULT - SEXUAL ORIENTATION
Schedule Virtual Kidney Donor Eval for 8/7/20 slot 1.Is direct/PEP donor-GEOVANI Agrawal is coordinator.   Choose not to disclose

## 2020-08-14 ENCOUNTER — EMERGENCY (EMERGENCY)
Facility: HOSPITAL | Age: 45
LOS: 1 days | Discharge: LEFT BEFORE DISCHARGE | End: 2020-08-14
Attending: EMERGENCY MEDICINE | Admitting: EMERGENCY MEDICINE
Payer: MEDICAID

## 2020-08-14 VITALS
HEART RATE: 125 BPM | DIASTOLIC BLOOD PRESSURE: 88 MMHG | RESPIRATION RATE: 16 BRPM | OXYGEN SATURATION: 99 % | SYSTOLIC BLOOD PRESSURE: 126 MMHG | TEMPERATURE: 98 F

## 2020-08-14 VITALS
OXYGEN SATURATION: 98 % | TEMPERATURE: 98 F | RESPIRATION RATE: 16 BRPM | DIASTOLIC BLOOD PRESSURE: 115 MMHG | HEART RATE: 120 BPM | SYSTOLIC BLOOD PRESSURE: 153 MMHG

## 2020-08-14 LAB
ALBUMIN SERPL ELPH-MCNC: 4.4 G/DL — SIGNIFICANT CHANGE UP (ref 3.3–5)
ALP SERPL-CCNC: 71 U/L — SIGNIFICANT CHANGE UP (ref 40–120)
ALT FLD-CCNC: 39 U/L — SIGNIFICANT CHANGE UP (ref 10–45)
ANION GAP SERPL CALC-SCNC: 22 MMOL/L — HIGH (ref 5–17)
ANISOCYTOSIS BLD QL: SLIGHT — SIGNIFICANT CHANGE UP
APAP SERPL-MCNC: <15 UG/ML — SIGNIFICANT CHANGE UP (ref 10–30)
AST SERPL-CCNC: 56 U/L — HIGH (ref 10–40)
BASOPHILS # BLD AUTO: 0.05 K/UL — SIGNIFICANT CHANGE UP (ref 0–0.2)
BASOPHILS NFR BLD AUTO: 0.5 % — SIGNIFICANT CHANGE UP (ref 0–2)
BILIRUB SERPL-MCNC: 0.4 MG/DL — SIGNIFICANT CHANGE UP (ref 0.2–1.2)
BUN SERPL-MCNC: 16 MG/DL — SIGNIFICANT CHANGE UP (ref 7–23)
CALCIUM SERPL-MCNC: 8.8 MG/DL — SIGNIFICANT CHANGE UP (ref 8.4–10.5)
CHLORIDE SERPL-SCNC: 98 MMOL/L — SIGNIFICANT CHANGE UP (ref 96–108)
CO2 SERPL-SCNC: 20 MMOL/L — LOW (ref 22–31)
CREAT SERPL-MCNC: 0.62 MG/DL — SIGNIFICANT CHANGE UP (ref 0.5–1.3)
EOSINOPHIL # BLD AUTO: 0.06 K/UL — SIGNIFICANT CHANGE UP (ref 0–0.5)
EOSINOPHIL NFR BLD AUTO: 0.6 % — SIGNIFICANT CHANGE UP (ref 0–6)
ETHANOL SERPL-MCNC: 206 MG/DL — HIGH (ref 0–10)
GLUCOSE SERPL-MCNC: 123 MG/DL — HIGH (ref 70–99)
HCG SERPL-ACNC: <2 MIU/ML — SIGNIFICANT CHANGE UP
HCT VFR BLD CALC: 37.1 % — SIGNIFICANT CHANGE UP (ref 34.5–45)
HGB BLD-MCNC: 11 G/DL — LOW (ref 11.5–15.5)
IMM GRANULOCYTES NFR BLD AUTO: 0.3 % — SIGNIFICANT CHANGE UP (ref 0–1.5)
LYMPHOCYTES # BLD AUTO: 1.08 K/UL — SIGNIFICANT CHANGE UP (ref 1–3.3)
LYMPHOCYTES # BLD AUTO: 11.5 % — LOW (ref 13–44)
MANUAL SMEAR VERIFICATION: SIGNIFICANT CHANGE UP
MCHC RBC-ENTMCNC: 22.3 PG — LOW (ref 27–34)
MCHC RBC-ENTMCNC: 29.6 GM/DL — LOW (ref 32–36)
MCV RBC AUTO: 75.1 FL — LOW (ref 80–100)
MICROCYTES BLD QL: SLIGHT — SIGNIFICANT CHANGE UP
MONOCYTES # BLD AUTO: 0.26 K/UL — SIGNIFICANT CHANGE UP (ref 0–0.9)
MONOCYTES NFR BLD AUTO: 2.8 % — SIGNIFICANT CHANGE UP (ref 2–14)
NEUTROPHILS # BLD AUTO: 7.88 K/UL — HIGH (ref 1.8–7.4)
NEUTROPHILS NFR BLD AUTO: 84.3 % — HIGH (ref 43–77)
NRBC # BLD: 0 /100 WBCS — SIGNIFICANT CHANGE UP (ref 0–0)
PLAT MORPH BLD: NORMAL — SIGNIFICANT CHANGE UP
PLATELET # BLD AUTO: 231 K/UL — SIGNIFICANT CHANGE UP (ref 150–400)
POTASSIUM SERPL-MCNC: 4 MMOL/L — SIGNIFICANT CHANGE UP (ref 3.5–5.3)
POTASSIUM SERPL-SCNC: 4 MMOL/L — SIGNIFICANT CHANGE UP (ref 3.5–5.3)
PROT SERPL-MCNC: 7.9 G/DL — SIGNIFICANT CHANGE UP (ref 6–8.3)
RBC # BLD: 4.94 M/UL — SIGNIFICANT CHANGE UP (ref 3.8–5.2)
RBC # FLD: 21.9 % — HIGH (ref 10.3–14.5)
RBC BLD AUTO: ABNORMAL
SALICYLATES SERPL-MCNC: <2 MG/DL — LOW (ref 15–30)
SODIUM SERPL-SCNC: 140 MMOL/L — SIGNIFICANT CHANGE UP (ref 135–145)
TROPONIN T, HIGH SENSITIVITY RESULT: <6 NG/L — SIGNIFICANT CHANGE UP (ref 0–51)
WBC # BLD: 9.36 K/UL — SIGNIFICANT CHANGE UP (ref 3.8–10.5)
WBC # FLD AUTO: 9.36 K/UL — SIGNIFICANT CHANGE UP (ref 3.8–10.5)

## 2020-08-14 PROCEDURE — 84484 ASSAY OF TROPONIN QUANT: CPT

## 2020-08-14 PROCEDURE — 99285 EMERGENCY DEPT VISIT HI MDM: CPT

## 2020-08-14 PROCEDURE — 99053 MED SERV 10PM-8AM 24 HR FAC: CPT

## 2020-08-14 PROCEDURE — 84702 CHORIONIC GONADOTROPIN TEST: CPT

## 2020-08-14 PROCEDURE — 99285 EMERGENCY DEPT VISIT HI MDM: CPT | Mod: 25

## 2020-08-14 PROCEDURE — 93005 ELECTROCARDIOGRAM TRACING: CPT

## 2020-08-14 PROCEDURE — 83690 ASSAY OF LIPASE: CPT

## 2020-08-14 PROCEDURE — 80053 COMPREHEN METABOLIC PANEL: CPT

## 2020-08-14 PROCEDURE — 85027 COMPLETE CBC AUTOMATED: CPT

## 2020-08-14 PROCEDURE — 80307 DRUG TEST PRSMV CHEM ANLYZR: CPT

## 2020-08-14 RX ORDER — SODIUM CHLORIDE 9 MG/ML
1000 INJECTION INTRAMUSCULAR; INTRAVENOUS; SUBCUTANEOUS ONCE
Refills: 0 | Status: COMPLETED | OUTPATIENT
Start: 2020-08-14 | End: 2020-08-14

## 2020-08-14 RX ADMIN — Medication 50 MILLIGRAM(S): at 09:42

## 2020-08-14 RX ADMIN — SODIUM CHLORIDE 1000 MILLILITER(S): 9 INJECTION INTRAMUSCULAR; INTRAVENOUS; SUBCUTANEOUS at 07:45

## 2020-08-14 NOTE — ED ADULT NURSE REASSESSMENT NOTE - NS ED NURSE REASSESS COMMENT FT1
As per MD Villarreal pt A&Ox3, walking independently, speaking in full coherent sentences. Pt declined Kate exam as per MD Villarreal. Kate RN, Sultana at bedside at this time. As per MD Villarreal pt A&Ox3, walking independently, speaking in full coherent sentences. Pt declined Sane exam as per MD Villarreal. Pt states she "does not want to talk about it." Kate RN, Sultana at bedside at this time. See Kate documentation by BOBBY Weinberg.
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
## 2020-08-14 NOTE — ED ADULT NURSE REASSESSMENT NOTE - NS ED NURSE REASSESS COMMENT FT1
MD Villarreal at bedside going over AMA paperwork. Pt adamant about leaving at this time. Pt advised to return to ED if new or worsening symptoms occur, aware she has 72 hr for Sane exam. Pt verbalized understanding. Social work arraigned cab home.

## 2020-08-14 NOTE — ED ADULT NURSE REASSESSMENT NOTE - NS ED NURSE REASSESS COMMENT FT1
Received report from BOBBY Dwyer. Pt A&Ox3, laying in stretcher. Flat affect, when asked questions pt is slow to respond. Pt c/o mild abdominal pain. Pt refused to be placed on cardiac monitor. Safety and comfort measures in place. Pt located near nursing station for enhanced supervision. Will continue to monitor. MD at bedside speaking to pt at this time. Received report from BOBBY Dwyer. Pt A&Ox3, laying in stretcher. Flat affect, when asked questions pt is slow to respond. Pt c/o mild abdominal pain. Pt declined to be placed on cardiac monitor. Safety and comfort measures in place. Pt located near nursing station for enhanced supervision. Will continue to monitor. MD at bedside speaking to pt at this time.

## 2020-08-14 NOTE — ED ADULT NURSE NOTE - NSIMPLEMENTINTERV_GEN_ALL_ED
Implemented All Fall Risk Interventions:  [place] to call system. Call bell, personal items and telephone within reach. Instruct patient to call for assistance. Room bathroom lighting operational. Non-slip footwear when patient is off stretcher. Physically safe environment: no spills, clutter or unnecessary equipment. Stretcher in lowest position, wheels locked, appropriate side rails in place. Provide visual cue, wrist band, yellow gown, etc. Monitor gait and stability. Monitor for mental status changes and reorient to person, place, and time. Review medications for side effects contributing to fall risk. Reinforce activity limits and safety measures with patient and family.
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
## 2020-08-14 NOTE — ED ADULT NURSE REASSESSMENT NOTE - NS ED NURSE REASSESS COMMENT FT1
Pt. tachycardic to 120s & hypertensive 153/115. MD [place] & Phil at bedside. Pt refusing further treatment, still refusing Sane exam. Pt stating she "wants to go home". MD Contreras discussing risks of signing out against medical advice. Pt verbalized understanding, however states she want to leave. Pt. tachycardic to 120s & hypertensive 153/115. MD [place] & Phil at bedside. Pt declining further treatment, still declining Sane exam. Pt stating she "wants to go home". MD Tol[place] and Phil discussing risks of signing out against medical advice. Pt verbalized understanding, however states she want to leave.
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
## 2020-08-14 NOTE — ED PROVIDER NOTE - PATIENT PORTAL LINK FT
You can access the FollowMyHealth Patient Portal offered by [facility] by registering at the following website: [email or web address]. By joining [facility]’s FollowMyHealth portal, you will also be able to view your health information using other applications (apps) compatible with our system.

## 2020-08-14 NOTE — ED PROVIDER NOTE - PHYSICAL EXAMINATION
Exam:   General: Non toxic, well appearing  HENT: No pharyngeal erythema/exudate, external ear exam normal  Eyes: PEERL, EOMI  Lungs: normal resp rate  Chest: equal chest rise bl   Abdomen: Soft, no tenderness, no rigidity, no guarding, neg Rovsing's signs, neg tenderness at Mckburney's point  MSK: FROM of joints, no ecchymosis, abrasions  Skin: No new rashes or lesions  Neuro: Alert and oriented x 3, power 5/5 x 4, CN II-XII GI, no facial asymmetry, no cerebellar findings, appears intoxicated
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
## 2020-08-24 ENCOUNTER — INPATIENT (INPATIENT)
Facility: HOSPITAL | Age: 45
LOS: 1 days | Discharge: HOME | DRG: 897 | End: 2020-08-26
Attending: HOSPITALIST | Admitting: STUDENT IN AN ORGANIZED HEALTH CARE EDUCATION/TRAINING PROGRAM
Payer: MEDICAID

## 2020-08-24 VITALS
OXYGEN SATURATION: 97 % | DIASTOLIC BLOOD PRESSURE: 101 MMHG | HEART RATE: 123 BPM | RESPIRATION RATE: 18 BRPM | WEIGHT: 145.06 LBS | SYSTOLIC BLOOD PRESSURE: 154 MMHG | HEIGHT: 64 IN | TEMPERATURE: 98 F

## 2020-08-24 DIAGNOSIS — F10.10 ALCOHOL ABUSE, UNCOMPLICATED: ICD-10-CM

## 2020-08-24 DIAGNOSIS — F10.239 ALCOHOL DEPENDENCE WITH WITHDRAWAL, UNSPECIFIED: ICD-10-CM

## 2020-08-24 DIAGNOSIS — Z98.890 OTHER SPECIFIED POSTPROCEDURAL STATES: Chronic | ICD-10-CM

## 2020-08-24 DIAGNOSIS — R10.84 GENERALIZED ABDOMINAL PAIN: ICD-10-CM

## 2020-08-24 DIAGNOSIS — I10 ESSENTIAL (PRIMARY) HYPERTENSION: ICD-10-CM

## 2020-08-24 DIAGNOSIS — Z87.19 PERSONAL HISTORY OF OTHER DISEASES OF THE DIGESTIVE SYSTEM: ICD-10-CM

## 2020-08-24 DIAGNOSIS — D64.9 ANEMIA, UNSPECIFIED: ICD-10-CM

## 2020-08-24 DIAGNOSIS — Z29.9 ENCOUNTER FOR PROPHYLACTIC MEASURES, UNSPECIFIED: ICD-10-CM

## 2020-08-24 DIAGNOSIS — F41.9 ANXIETY DISORDER, UNSPECIFIED: ICD-10-CM

## 2020-08-24 DIAGNOSIS — R07.9 CHEST PAIN, UNSPECIFIED: ICD-10-CM

## 2020-08-24 LAB
ALBUMIN SERPL ELPH-MCNC: 4.1 G/DL — SIGNIFICANT CHANGE UP (ref 3.3–5)
ALP SERPL-CCNC: 88 U/L — SIGNIFICANT CHANGE UP (ref 40–120)
ALT FLD-CCNC: 74 U/L — HIGH (ref 10–45)
ANION GAP SERPL CALC-SCNC: 18 MMOL/L — HIGH (ref 5–17)
APAP SERPL-MCNC: <15 UG/ML — SIGNIFICANT CHANGE UP (ref 10–30)
AST SERPL-CCNC: 124 U/L — HIGH (ref 10–40)
BASE EXCESS BLDV CALC-SCNC: 5.1 MMOL/L — HIGH (ref -2–2)
BASOPHILS # BLD AUTO: 0.03 K/UL — SIGNIFICANT CHANGE UP (ref 0–0.2)
BASOPHILS NFR BLD AUTO: 0.5 % — SIGNIFICANT CHANGE UP (ref 0–2)
BILIRUB SERPL-MCNC: 0.2 MG/DL — SIGNIFICANT CHANGE UP (ref 0.2–1.2)
BUN SERPL-MCNC: 7 MG/DL — SIGNIFICANT CHANGE UP (ref 7–23)
CALCIUM SERPL-MCNC: 10.1 MG/DL — SIGNIFICANT CHANGE UP (ref 8.4–10.5)
CHLORIDE SERPL-SCNC: 94 MMOL/L — LOW (ref 96–108)
CO2 BLDV-SCNC: 30 MMOL/L — SIGNIFICANT CHANGE UP (ref 22–30)
CO2 SERPL-SCNC: 26 MMOL/L — SIGNIFICANT CHANGE UP (ref 22–31)
CREAT SERPL-MCNC: 0.53 MG/DL — SIGNIFICANT CHANGE UP (ref 0.5–1.3)
EOSINOPHIL # BLD AUTO: 0.02 K/UL — SIGNIFICANT CHANGE UP (ref 0–0.5)
EOSINOPHIL NFR BLD AUTO: 0.3 % — SIGNIFICANT CHANGE UP (ref 0–6)
ETHANOL SERPL-MCNC: 223 MG/DL — HIGH (ref 0–10)
GLUCOSE SERPL-MCNC: 194 MG/DL — HIGH (ref 70–99)
HCG UR QL: NEGATIVE — SIGNIFICANT CHANGE UP
HCO3 BLDV-SCNC: 29 MMOL/L — SIGNIFICANT CHANGE UP (ref 21–29)
HCT VFR BLD CALC: 33.9 % — LOW (ref 34.5–45)
HGB BLD-MCNC: 10.1 G/DL — LOW (ref 11.5–15.5)
IMM GRANULOCYTES NFR BLD AUTO: 0.5 % — SIGNIFICANT CHANGE UP (ref 0–1.5)
LIDOCAIN IGE QN: 19 U/L — SIGNIFICANT CHANGE UP (ref 7–60)
LYMPHOCYTES # BLD AUTO: 0.87 K/UL — LOW (ref 1–3.3)
LYMPHOCYTES # BLD AUTO: 13.1 % — SIGNIFICANT CHANGE UP (ref 13–44)
MAGNESIUM SERPL-MCNC: 1.3 MG/DL — LOW (ref 1.6–2.6)
MCHC RBC-ENTMCNC: 22.6 PG — LOW (ref 27–34)
MCHC RBC-ENTMCNC: 29.8 GM/DL — LOW (ref 32–36)
MCV RBC AUTO: 76 FL — LOW (ref 80–100)
MONOCYTES # BLD AUTO: 0.38 K/UL — SIGNIFICANT CHANGE UP (ref 0–0.9)
MONOCYTES NFR BLD AUTO: 5.7 % — SIGNIFICANT CHANGE UP (ref 2–14)
NEUTROPHILS # BLD AUTO: 5.29 K/UL — SIGNIFICANT CHANGE UP (ref 1.8–7.4)
NEUTROPHILS NFR BLD AUTO: 79.9 % — HIGH (ref 43–77)
NRBC # BLD: 0 /100 WBCS — SIGNIFICANT CHANGE UP (ref 0–0)
PCO2 BLDV: 41 MMHG — SIGNIFICANT CHANGE UP (ref 35–50)
PH BLDV: 7.46 — HIGH (ref 7.35–7.45)
PLATELET # BLD AUTO: 201 K/UL — SIGNIFICANT CHANGE UP (ref 150–400)
PO2 BLDV: 58 MMHG — HIGH (ref 25–45)
POTASSIUM SERPL-MCNC: 3.9 MMOL/L — SIGNIFICANT CHANGE UP (ref 3.5–5.3)
POTASSIUM SERPL-SCNC: 3.9 MMOL/L — SIGNIFICANT CHANGE UP (ref 3.5–5.3)
PROT SERPL-MCNC: 7.4 G/DL — SIGNIFICANT CHANGE UP (ref 6–8.3)
RBC # BLD: 4.46 M/UL — SIGNIFICANT CHANGE UP (ref 3.8–5.2)
RBC # FLD: 22.8 % — HIGH (ref 10.3–14.5)
SALICYLATES SERPL-MCNC: <2 MG/DL — LOW (ref 15–30)
SAO2 % BLDV: 87 % — SIGNIFICANT CHANGE UP (ref 67–88)
SARS-COV-2 RNA SPEC QL NAA+PROBE: SIGNIFICANT CHANGE UP
SODIUM SERPL-SCNC: 138 MMOL/L — SIGNIFICANT CHANGE UP (ref 135–145)
TROPONIN T, HIGH SENSITIVITY RESULT: 10 NG/L — SIGNIFICANT CHANGE UP (ref 0–51)
TROPONIN T, HIGH SENSITIVITY RESULT: 11 NG/L — SIGNIFICANT CHANGE UP (ref 0–51)
TROPONIN T, HIGH SENSITIVITY RESULT: 9 NG/L — SIGNIFICANT CHANGE UP (ref 0–51)
WBC # BLD: 6.62 K/UL — SIGNIFICANT CHANGE UP (ref 3.8–10.5)
WBC # FLD AUTO: 6.62 K/UL — SIGNIFICANT CHANGE UP (ref 3.8–10.5)

## 2020-08-24 PROCEDURE — 93306 TTE W/DOPPLER COMPLETE: CPT | Mod: 26

## 2020-08-24 PROCEDURE — 93010 ELECTROCARDIOGRAM REPORT: CPT

## 2020-08-24 PROCEDURE — 99285 EMERGENCY DEPT VISIT HI MDM: CPT

## 2020-08-24 PROCEDURE — 99223 1ST HOSP IP/OBS HIGH 75: CPT | Mod: GC

## 2020-08-24 PROCEDURE — 71045 X-RAY EXAM CHEST 1 VIEW: CPT | Mod: 26

## 2020-08-24 PROCEDURE — 74176 CT ABD & PELVIS W/O CONTRAST: CPT | Mod: 26

## 2020-08-24 RX ORDER — LANOLIN ALCOHOL/MO/W.PET/CERES
5 CREAM (GRAM) TOPICAL AT BEDTIME
Refills: 0 | Status: DISCONTINUED | OUTPATIENT
Start: 2020-08-24 | End: 2020-08-26

## 2020-08-24 RX ORDER — LOSARTAN POTASSIUM 100 MG/1
50 TABLET, FILM COATED ORAL ONCE
Refills: 0 | Status: COMPLETED | OUTPATIENT
Start: 2020-08-24 | End: 2020-08-24

## 2020-08-24 RX ORDER — ONDANSETRON 8 MG/1
8 TABLET, FILM COATED ORAL ONCE
Refills: 0 | Status: COMPLETED | OUTPATIENT
Start: 2020-08-24 | End: 2020-08-25

## 2020-08-24 RX ORDER — LISINOPRIL 2.5 MG/1
20 TABLET ORAL DAILY
Refills: 0 | Status: DISCONTINUED | OUTPATIENT
Start: 2020-08-24 | End: 2020-08-26

## 2020-08-24 RX ORDER — DIAZEPAM 5 MG
5 TABLET ORAL ONCE
Refills: 0 | Status: DISCONTINUED | OUTPATIENT
Start: 2020-08-24 | End: 2020-08-24

## 2020-08-24 RX ORDER — FOLIC ACID 0.8 MG
1 TABLET ORAL ONCE
Refills: 0 | Status: COMPLETED | OUTPATIENT
Start: 2020-08-24 | End: 2020-08-24

## 2020-08-24 RX ORDER — THIAMINE MONONITRATE (VIT B1) 100 MG
100 TABLET ORAL DAILY
Refills: 0 | Status: DISCONTINUED | OUTPATIENT
Start: 2020-08-24 | End: 2020-08-26

## 2020-08-24 RX ORDER — FOLIC ACID 0.8 MG
1 TABLET ORAL ONCE
Refills: 0 | Status: DISCONTINUED | OUTPATIENT
Start: 2020-08-24 | End: 2020-08-24

## 2020-08-24 RX ORDER — METOCLOPRAMIDE HCL 10 MG
10 TABLET ORAL ONCE
Refills: 0 | Status: COMPLETED | OUTPATIENT
Start: 2020-08-24 | End: 2020-08-24

## 2020-08-24 RX ORDER — PANTOPRAZOLE SODIUM 20 MG/1
40 TABLET, DELAYED RELEASE ORAL ONCE
Refills: 0 | Status: COMPLETED | OUTPATIENT
Start: 2020-08-24 | End: 2020-08-24

## 2020-08-24 RX ORDER — MORPHINE SULFATE 50 MG/1
4 CAPSULE, EXTENDED RELEASE ORAL ONCE
Refills: 0 | Status: DISCONTINUED | OUTPATIENT
Start: 2020-08-24 | End: 2020-08-24

## 2020-08-24 RX ORDER — PANTOPRAZOLE SODIUM 20 MG/1
40 TABLET, DELAYED RELEASE ORAL
Refills: 0 | Status: DISCONTINUED | OUTPATIENT
Start: 2020-08-24 | End: 2020-08-26

## 2020-08-24 RX ORDER — SODIUM CHLORIDE 9 MG/ML
1000 INJECTION INTRAMUSCULAR; INTRAVENOUS; SUBCUTANEOUS ONCE
Refills: 0 | Status: COMPLETED | OUTPATIENT
Start: 2020-08-24 | End: 2020-08-24

## 2020-08-24 RX ORDER — ACETAMINOPHEN 500 MG
650 TABLET ORAL EVERY 6 HOURS
Refills: 0 | Status: DISCONTINUED | OUTPATIENT
Start: 2020-08-24 | End: 2020-08-26

## 2020-08-24 RX ORDER — FAMOTIDINE 10 MG/ML
20 INJECTION INTRAVENOUS ONCE
Refills: 0 | Status: COMPLETED | OUTPATIENT
Start: 2020-08-24 | End: 2020-08-24

## 2020-08-24 RX ORDER — THIAMINE MONONITRATE (VIT B1) 100 MG
100 TABLET ORAL ONCE
Refills: 0 | Status: COMPLETED | OUTPATIENT
Start: 2020-08-24 | End: 2020-08-24

## 2020-08-24 RX ORDER — MAGNESIUM SULFATE 500 MG/ML
1 VIAL (ML) INJECTION ONCE
Refills: 0 | Status: COMPLETED | OUTPATIENT
Start: 2020-08-24 | End: 2020-08-24

## 2020-08-24 RX ORDER — LOSARTAN POTASSIUM 100 MG/1
1 TABLET, FILM COATED ORAL
Qty: 0 | Refills: 0 | DISCHARGE

## 2020-08-24 RX ORDER — LOSARTAN POTASSIUM 100 MG/1
50 TABLET, FILM COATED ORAL DAILY
Refills: 0 | Status: DISCONTINUED | OUTPATIENT
Start: 2020-08-24 | End: 2020-08-24

## 2020-08-24 RX ORDER — ONDANSETRON 8 MG/1
4 TABLET, FILM COATED ORAL ONCE
Refills: 0 | Status: COMPLETED | OUTPATIENT
Start: 2020-08-24 | End: 2020-08-24

## 2020-08-24 RX ORDER — FOLIC ACID 0.8 MG
1 TABLET ORAL DAILY
Refills: 0 | Status: DISCONTINUED | OUTPATIENT
Start: 2020-08-24 | End: 2020-08-26

## 2020-08-24 RX ORDER — HEPARIN SODIUM 5000 [USP'U]/ML
5000 INJECTION INTRAVENOUS; SUBCUTANEOUS EVERY 8 HOURS
Refills: 0 | Status: DISCONTINUED | OUTPATIENT
Start: 2020-08-24 | End: 2020-08-25

## 2020-08-24 RX ORDER — THIAMINE MONONITRATE (VIT B1) 100 MG
100 TABLET ORAL ONCE
Refills: 0 | Status: DISCONTINUED | OUTPATIENT
Start: 2020-08-24 | End: 2020-08-24

## 2020-08-24 RX ADMIN — Medication 2 MILLIGRAM(S): at 22:02

## 2020-08-24 RX ADMIN — Medication 5 MILLIGRAM(S): at 06:55

## 2020-08-24 RX ADMIN — Medication 1 TABLET(S): at 13:06

## 2020-08-24 RX ADMIN — PANTOPRAZOLE SODIUM 40 MILLIGRAM(S): 20 TABLET, DELAYED RELEASE ORAL at 13:05

## 2020-08-24 RX ADMIN — Medication 1 MILLIGRAM(S): at 13:07

## 2020-08-24 RX ADMIN — Medication 5 MILLIGRAM(S): at 05:30

## 2020-08-24 RX ADMIN — MORPHINE SULFATE 4 MILLIGRAM(S): 50 CAPSULE, EXTENDED RELEASE ORAL at 06:59

## 2020-08-24 RX ADMIN — FAMOTIDINE 20 MILLIGRAM(S): 10 INJECTION INTRAVENOUS at 03:16

## 2020-08-24 RX ADMIN — Medication 100 MILLIGRAM(S): at 06:56

## 2020-08-24 RX ADMIN — LOSARTAN POTASSIUM 50 MILLIGRAM(S): 100 TABLET, FILM COATED ORAL at 06:55

## 2020-08-24 RX ADMIN — Medication 1 MILLIGRAM(S): at 06:56

## 2020-08-24 RX ADMIN — SODIUM CHLORIDE 1000 MILLILITER(S): 9 INJECTION INTRAMUSCULAR; INTRAVENOUS; SUBCUTANEOUS at 03:43

## 2020-08-24 RX ADMIN — Medication 650 MILLIGRAM(S): at 13:06

## 2020-08-24 RX ADMIN — Medication 2 MILLIGRAM(S): at 09:40

## 2020-08-24 RX ADMIN — Medication 650 MILLIGRAM(S): at 14:05

## 2020-08-24 RX ADMIN — Medication 5 MILLIGRAM(S): at 22:01

## 2020-08-24 RX ADMIN — MORPHINE SULFATE 4 MILLIGRAM(S): 50 CAPSULE, EXTENDED RELEASE ORAL at 03:43

## 2020-08-24 RX ADMIN — Medication 2 MILLIGRAM(S): at 18:13

## 2020-08-24 RX ADMIN — LOSARTAN POTASSIUM 50 MILLIGRAM(S): 100 TABLET, FILM COATED ORAL at 13:06

## 2020-08-24 RX ADMIN — PANTOPRAZOLE SODIUM 40 MILLIGRAM(S): 20 TABLET, DELAYED RELEASE ORAL at 03:43

## 2020-08-24 RX ADMIN — Medication 100 GRAM(S): at 04:53

## 2020-08-24 RX ADMIN — SODIUM CHLORIDE 1000 MILLILITER(S): 9 INJECTION INTRAMUSCULAR; INTRAVENOUS; SUBCUTANEOUS at 03:12

## 2020-08-24 RX ADMIN — Medication 2 MILLIGRAM(S): at 14:13

## 2020-08-24 RX ADMIN — LISINOPRIL 20 MILLIGRAM(S): 2.5 TABLET ORAL at 18:14

## 2020-08-24 RX ADMIN — Medication 100 MILLIGRAM(S): at 14:14

## 2020-08-24 RX ADMIN — ONDANSETRON 4 MILLIGRAM(S): 8 TABLET, FILM COATED ORAL at 03:12

## 2020-08-24 NOTE — H&P ADULT - PROBLEM SELECTOR PLAN 4
some evidence of pancreatic fibrosis seen on CT  check vitamin D level as indicator for fat malabsorption  check stool fat content pancreatic atrophy seen on CT  check vitamin D level as indicator for fat malabsorption  check stool fat content

## 2020-08-24 NOTE — H&P ADULT - NSHPLABSRESULTS_GEN_ALL_CORE
Troponin T, High Sensitivity (08.24.20 @ 06:11)    Troponin T, High Sensitivity Result: 9    Troponin T, High Sensitivity (08.24.20 @ 03:12)    Troponin T, High Sensitivity Result: 10                          10.1   6.62  )-----------( 201      ( 24 Aug 2020 03:12 )             33.9    08-24    138  |  94<L>  |  7   ----------------------------<  194<H>  3.9   |  26  |  0.53    Ca    10.1      24 Aug 2020 03:12  Mg     1.3     08-24    TPro  7.4  /  Alb  4.1  /  TBili  0.2  /  AST  124<H>  /  ALT  74<H>  /  AlkPhos  88  08-24    EtOH: 223  Salicylates and acetaminophen neg    COVID neg    CXR: within normal limits  CT abdomen pelvis w/o contrast: Distended gallbladder without cholelithiasis, wall thickening, or pericholecystic fluid. Moderate fluid-containing hiatal hernia.  EKG: + tachycardia 120s, + poor R wave progression, no ST or T wave changes

## 2020-08-24 NOTE — H&P ADULT - ATTENDING COMMENTS
-Patient seen/examined on 8/24/20. Case/plan discussed with the intern and resident as reviewed/edited by me above and in any comments below.  -F/u with GI regarding fluid filled hiatal hernia, distended gall bladder, epigastric discomfort with N/V, and ?chronic pancreatitis.   -[facility] protocol for alcohol withdrawal. JUAN gallego. Counseled on alcohol cessation.   -CP unlikely cardiac, but given family history of early cardiac disease in father, will get echo. C/w telemetry.
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
## 2020-08-24 NOTE — CONSULT NOTE ADULT - SUBJECTIVE AND OBJECTIVE BOX
-------------------------------------------------------------------------------------------------------------------------------------------------------------------------------  HEPATOLOGY INITIAL CONSULT  -------------------------------------------------------------------------------------------------------------------------------------------------------------------------------    HPI:  Pt is a 45y woman with PMH significant for hypertension, EtOH, multiple prior admissions for EtOH withdrawl (DT in the past), anemia 2/2 menorrhagia,  pancreatitis? and anxiety presenting with 4 hours of chest and abdominal pain in context of acute EtOH intoxication. Pt endorses waking up suddenly with "cardiac" chest pain, described as pressure-like sensation that has been getting better since onset. In ED pt endorsed pain radiating to jaw, however at time of interview pt stated that pain had become mild and did not radiate. She has a history of anxiety and when she called EMS for this pain she was initially told it was likely due to anxiety. She has taken 4mg of her home alprazolam since onset of pain. She also described periumbilical pain "sharp" 6/10 worsening in severity non radiating. In ED pt endorsed that she had been nauseated and vomiting since onset of chest and abdominal pain. She endorsed mild SOB, denied prior exertional SOB. Pt also denied experiencing abdominal pain after eating on prior occasions. (24 Aug 2020 08:31)    Outpatient GI Provider: none    Patient had prior endoscopy in 9/2019 due to swallowing an ear ring, which was subsequently removed. The EGD was normal up to the second part of the duodenum. Recent RUQ ultrasound from 6/2020 showed hepatomegaly with hepatic steatosis.     PAST MEDICAL & SURGICAL HISTORY:  HTN (hypertension)  Depression  ETOH Abuse  S/P Tonsillectomy    Review of Systems: Negative except as per HPI.    MEDICATIONS  (STANDING):  folic acid 1 milliGRAM(s) Oral daily  LORazepam   Injectable 2 milliGRAM(s) IV Push every 4 hours  LORazepam   Injectable   IV Push   losartan 50 milliGRAM(s) Oral daily  multivitamin 1 Tablet(s) Oral daily  pantoprazole    Tablet 40 milliGRAM(s) Oral before breakfast  thiamine 100 milliGRAM(s) Oral daily    MEDICATIONS  (PRN):  acetaminophen   Tablet .. 650 milliGRAM(s) Oral every 6 hours PRN Moderate Pain (4 - 6), Severe Pain (7 - 10)  LORazepam   Injectable 2 milliGRAM(s) IV Push every 1 hour PRN Symptom-triggered: each CIWA -Ar score 8 or GREATER      ALLERGIES:      SOCIAL HISTORY:  - Alcohol:  - Recreational drug use:    FAMILY HISTORY:  FH: alcohol abuse: father  Family history of heart attack: father  FH: diabetes mellitus: mother  FH: HTN (hypertension): both parents      Vital Signs Last 24 Hrs  T(C): 36.7 (24 Aug 2020 11:59), Max: 37.1 (24 Aug 2020 05:38)  T(F): 98 (24 Aug 2020 11:59), Max: 98.8 (24 Aug 2020 05:38)  HR: 130 (24 Aug 2020 14:04) (109 - 130)  BP: 116/80 (24 Aug 2020 14:04) (116/80 - 176/129)  BP(mean): 125 (24 Aug 2020 07:26) (125 - 125)  RR: 18 (24 Aug 2020 14:04) (16 - 20)  SpO2: 96% (24 Aug 2020 14:04) (96% - 100%)    PHYSICAL EXAM:  Constitutional: no acute distress  Eyes: no icterus  Neck: no masses, no LAD  Respiratory: normal inspiratory effort; no wheezing or crackles  Cardiovascular: RRR, normal S1/S2, no murmurs/rubs/gallops  Gastrointestinal: soft, nondistended, nontender, +BS  Rectal:   Extremities: no LE edema  Salicylate Level, Serum: <2.0 mg/dL (08.24.20 @ 03:12)    Neurological: AAOx3, no asterixis  Skin: no rashes, bruises, petechiae    LABS:                        10.1   6.62  )-----------( 201      ( 24 Aug 2020 03:12 )             33.9     08-24    138  |  94<L>  |  7   ----------------------------<  194<H>  3.9   |  26  |  0.53    Ca    10.1      24 Aug 2020 03:12  Mg     1.3     08-24    TPro  7.4  /  Alb  4.1  /  TBili  0.2  /  DBili  x   /  AST  124<H>  /  ALT  74<H>  /  AlkPhos  88  08-24    Troponin T, High Sensitivity Result: 11: Rapid upward or downward changes in high-sensitivity troponin levels  suggest acute myocardial injury. Renal impairment may cause sustained  troponin elevations.  Normal: <6 - 14 ng/L  Indeterminate: 15-51 ng/L  Elevated: > 51 ng/L  See [email or web address]/test/[email or web address] on the [facility] intranet for more  information ng/L (08.24.20 @ 13:34)      LIVER FUNCTIONS - ( 24 Aug 2020 03:12 )  Alb: 4.1 g/dL / Pro: 7.4 g/dL / ALK PHOS: 88 U/L / ALT: 74 U/L / AST: 124 U/L / GGT: x           Alcohol, Blood (08.24.20 @ 03:12)    Alcohol, Blood: 223 mg/dL    Acetaminophen Level, Serum (08.24.20 @ 03:12)    Acetaminophen Level, Serum: <15 ug/mL    Salicylate Level, Serum (08.24.20 @ 03:12)    Salicylate Level, Serum: <2.0 mg/dL    Acute Hepatitis Panel (06.25.20 @ 12:50)    Hepatitis C Virus S/CO Ratio: 0.30 S/CO    Hepatitis C Virus Interpretation: Nonreact: Hepatitis C AB  S/CO Ratio                        Interpretation  < 1.00                                   Non-Reactive  1.00 - 4.99                         Weakly-Reactive  >= 5.00                                Reactive  Non-Reactive: A person witha non-reactive HCV antibody result is  considered uninfected.  No further action is needed unless recent  infection is suspected.  In these cases, consider repeat testing later to  detect seroconversion..  Weakly-Reactive: HCV antibody test is abnormal, HCV RNA Qualitative test  will follow.  Reactive: HCV antibody test is abnormal, HCV RNA Qualitative test will  follow.  Note: HCV antibody testing is performed on the Abbott  system.    Hepatitis B Core IgM Antibody: Nonreact    Hepatitis B Surface Antigen: Nonreact    Hepatitis A IgM Antibody: Nonreact        RADIOLOGY & ADDITIONAL STUDIES:    < from: CT Abdomen and Pelvis No Cont (08.24.20 @ 06:27) >  EXAM:  CT ABDOMEN AND PELVIS                            *** ADDENDUM 08/24/2020  ***    The impression of the report should read:    Distended gallbladder without cholelithiasis, wall thickening, or pericholecystic fluid.    Moderate fluid-containing hiatal hernia.    *** END OF ADDENDUM 08/24/2020  ***      PROCEDURE DATE:  08/24/2020      INTERPRETATION:  CLINICAL INFORMATION: Epigastric abdominal pain and tenderness to palpation.    COMPARISON: Sonogram abdomen from 6/25/2020. CT abdomen pelvis from 9/16/2019    PROCEDURE:  CT of the Abdomen and Pelvis was performed without intravenous contrast.  Intravenous contrast: None.  Oral contrast: None.  Sagittal and coronal reformats were performed.    FINDINGS:  LOWER CHEST: Minimal right basilar atelectasis.    LIVER: Steatosis.  BILE DUCTS: Normal caliber.  GALLBLADDER: Distended to 4.1 cm. No cholelithiasis, wall thickening or pericholecystic fluid.  SPLEEN: Within normal limits.  PANCREAS: Atrophic.  ADRENALS: Within normal limits.  KIDNEYS/URETERS: Within normal limits.    BLADDER: Distended.  REPRODUCTIVE ORGANS: Uterus within normal limits    BOWEL: Moderate fluid containing hiatal hernia. No bowel obstruction. Appendix within normal limits.  PERITONEUM: No ascites or pneumoperitoneum.  VESSELS: Within normal limits.  RETROPERITONEUM/LYMPH NODES: No lymphadenopathy.  ABDOMINAL WALL: Small fat-containing umbilical hernia.  BONES: Within normal limits.    IMPRESSION:  Distended gallbladder with a cholelithiasis, wall thickening, or pericholecystic fluid.    Moderate hernia containing hiatal hernia.    < end of copied text > -------------------------------------------------------------------------------------------------------------------------------------------------------------------------------  HEPATOLOGY INITIAL CONSULT  -------------------------------------------------------------------------------------------------------------------------------------------------------------------------------    HPI:  HPI:  Pt is a 45y woman with PMH significant for hypertension, EtOH, multiple prior admissions for EtOH withdrawl, one prior episode of pancreatitis, depression/anxiety presenting with chest and abdominal pain in context of acute EtOH intoxication. Pt endorses developing "cardiac" chest pain yesterday morning, described as pressure-like sensation that has been getting better since onset. She called EMS who told her it was likely anxiety. She remained at home during the day and describes nausea and vomiting. She eventually did call EMS again. In ED pt endorsed pain radiating to jaw, however at time of interview pt stated that pain had become mild and did not radiate. She has taken 4mg of her home alprazolam since onset of pain. She also described epigastric pain "sharp" 6/10 worsening in severity non radiating. Of note, pt states that she has experienced symptoms of reflux "all her life" but usually the symptoms are controlled with omeprazole and diet.  She had an upper endoscopy 5 years ago w/ biopsy and reports both were normal.     When provider returned later in the day, pt described significant epigastric pain that started immediately after eating and was soothed by drinking cold ginger ale. Pt was informed that a hiatal hernia had been noted on her CT and she said "a hiatal hernia would make sense" in the context of her symptoms.    Pt admits to longstanding EtOH abuse. Says she was sober for 6.5 years, however this year has been difficult for her and she has been drinking. Said she "started drinking after I left my job" two weeks ago. (24 Aug 2020 08:31)      Outpatient GI Provider: none    Patient had prior endoscopy in 9/2019 due to swallowing an ear ring, which was subsequently removed. The EGD was normal up to the second part of the duodenum. EGD reported also done 5 years ago with normal biopsy results.  Recent RUQ ultrasound from 6/2020 showed hepatomegaly with hepatic steatosis. CT A/P from this admission noted gallbladder distension. On CT no findings of cholelithiasis/fluid, and also noted a hiatal hernia and atrophic pancreas.     PAST MEDICAL & SURGICAL HISTORY:  HTN (hypertension)  Depression  ETOH Abuse  S/P Tonsillectomy    Review of Systems: Negative except as per HPI.    MEDICATIONS  (STANDING):  folic acid 1 milliGRAM(s) Oral daily  LORazepam   Injectable 2 milliGRAM(s) IV Push every 4 hours  LORazepam   Injectable   IV Push   losartan 50 milliGRAM(s) Oral daily  multivitamin 1 Tablet(s) Oral daily  pantoprazole    Tablet 40 milliGRAM(s) Oral before breakfast  thiamine 100 milliGRAM(s) Oral daily    MEDICATIONS  (PRN):  acetaminophen   Tablet .. 650 milliGRAM(s) Oral every 6 hours PRN Moderate Pain (4 - 6), Severe Pain (7 - 10)  LORazepam   Injectable 2 milliGRAM(s) IV Push every 1 hour PRN Symptom-triggered: each CIWA -Ar score 8 or GREATER      ALLERGIES:      SOCIAL HISTORY:  - Alcohol:  - Recreational drug use:    FAMILY HISTORY:  FH: alcohol abuse: father  Family history of heart attack: father  FH: diabetes mellitus: mother  FH: HTN (hypertension): both parents      Vital Signs Last 24 Hrs  T(C): 36.7 (24 Aug 2020 11:59), Max: 37.1 (24 Aug 2020 05:38)  T(F): 98 (24 Aug 2020 11:59), Max: 98.8 (24 Aug 2020 05:38)  HR: 130 (24 Aug 2020 14:04) (109 - 130)  BP: 116/80 (24 Aug 2020 14:04) (116/80 - 176/129)  BP(mean): 125 (24 Aug 2020 07:26) (125 - 125)  RR: 18 (24 Aug 2020 14:04) (16 - 20)  SpO2: 96% (24 Aug 2020 14:04) (96% - 100%)    PHYSICAL EXAM:  Constitutional: no acute distress  Eyes: no icterus  Neck: no masses, no LAD  Respiratory: normal inspiratory effort; no wheezing or crackles  Cardiovascular: tachycardia, regular rate. no murmurs/rubs/gallops  Gastrointestinal: tender to palpation in the epigastric region. no rebound tenderness.  soft, nondistended, +BS  Extremities: no LE edema  Psych: denies AVH. denies formication, endorses moderate anxiety  Neurological: AAOx3, slight tremors, no asterexis  Skin: no rashes, bruises, petechiae    LABS:                        10.1   6.62  )-----------( 201      ( 24 Aug 2020 03:12 )             33.9     08-24    138  |  94<L>  |  7   ----------------------------<  194<H>  3.9   |  26  |  0.53    Ca    10.1      24 Aug 2020 03:12  Mg     1.3     08-24    TPro  7.4  /  Alb  4.1  /  TBili  0.2  /  DBili  x   /  AST  124<H>  /  ALT  74<H>  /  AlkPhos  88  08-24    Troponin T, High Sensitivity Result: 11: Rapid upward or downward changes in high-sensitivity troponin levels  suggest acute myocardial injury. Renal impairment may cause sustained  troponin elevations.  Normal: <6 - 14 ng/L  Indeterminate: 15-51 ng/L  Elevated: > 51 ng/L  See [email or web address]/test/[email or web address] on the [facility] intranet for more  information ng/L (08.24.20 @ 13:34)      LIVER FUNCTIONS - ( 24 Aug 2020 03:12 )  Alb: 4.1 g/dL / Pro: 7.4 g/dL / ALK PHOS: 88 U/L / ALT: 74 U/L / AST: 124 U/L / GGT: x           Alcohol, Blood (08.24.20 @ 03:12)    Alcohol, Blood: 223 mg/dL    Acetaminophen Level, Serum (08.24.20 @ 03:12)    Acetaminophen Level, Serum: <15 ug/mL    Salicylate Level, Serum (08.24.20 @ 03:12)    Salicylate Level, Serum: <2.0 mg/dL    Acute Hepatitis Panel (06.25.20 @ 12:50)    Hepatitis C Virus S/CO Ratio: 0.30 S/CO    Hepatitis C Virus Interpretation: Nonreact: Hepatitis C AB  S/CO Ratio                        Interpretation  < 1.00                                   Non-Reactive  1.00 - 4.99                         Weakly-Reactive  >= 5.00                                Reactive  Non-Reactive: A person witha non-reactive HCV antibody result is  considered uninfected.  No further action is needed unless recent  infection is suspected.  In these cases, consider repeat testing later to  detect seroconversion..  Weakly-Reactive: HCV antibody test is abnormal, HCV RNA Qualitative test  will follow.  Reactive: HCV antibody test is abnormal, HCV RNA Qualitative test will  follow.  Note: HCV antibody testing is performed on the Abbott  system.    Hepatitis B Core IgM Antibody: Nonreact    Hepatitis B Surface Antigen: Nonreact    Hepatitis A IgM Antibody: Nonreact    Lipase, Serum: 19 U/L (08.24.20 @ 03:12)        RADIOLOGY & ADDITIONAL STUDIES:    < from: CT Abdomen and Pelvis No Cont (08.24.20 @ 06:27) >  EXAM:  CT ABDOMEN AND PELVIS                            *** ADDENDUM 08/24/2020  ***    The impression of the report should read:    Distended gallbladder without cholelithiasis, wall thickening, or pericholecystic fluid.    Moderate fluid-containing hiatal hernia.    *** END OF ADDENDUM 08/24/2020  ***      PROCEDURE DATE:  08/24/2020      INTERPRETATION:  CLINICAL INFORMATION: Epigastric abdominal pain and tenderness to palpation.    COMPARISON: Sonogram abdomen from 6/25/2020. CT abdomen pelvis from 9/16/2019    PROCEDURE:  CT of the Abdomen and Pelvis was performed without intravenous contrast.  Intravenous contrast: None.  Oral contrast: None.  Sagittal and coronal reformats were performed.    FINDINGS:  LOWER CHEST: Minimal right basilar atelectasis.    LIVER: Steatosis.  BILE DUCTS: Normal caliber.  GALLBLADDER: Distended to 4.1 cm. No cholelithiasis, wall thickening or pericholecystic fluid.  SPLEEN: Within normal limits.  PANCREAS: Atrophic.  ADRENALS: Within normal limits.  KIDNEYS/URETERS: Within normal limits.    BLADDER: Distended.  REPRODUCTIVE ORGANS: Uterus within normal limits    BOWEL: Moderate fluid containing hiatal hernia. No bowel obstruction. Appendix within normal limits.  PERITONEUM: No ascites or pneumoperitoneum.  VESSELS: Within normal limits.  RETROPERITONEUM/LYMPH NODES: No lymphadenopathy.  ABDOMINAL WALL: Small fat-containing umbilical hernia.  BONES: Within normal limits.    IMPRESSION:  Distended gallbladder with a cholelithiasis, wall thickening, or pericholecystic fluid.    Moderate hernia containing hiatal hernia.    < end of copied text >

## 2020-08-24 NOTE — SBIRT NOTE ADULT - NSSBIRTSAVECONSULT_GEN_A_CORE
Complete/Brief intervention offered and declined. Patient reports she discharged to her sisters home from [facility] in June 2020 and was sober for almost 2 months, but recently left her job and started drinking again. Patient declining to further explore her alcohol consumption but reports "I need better coping skills." LMSW educated patient on outpatient resources. Patient requests follow up at a later time when she does not have chest pain to further discuss resources. Patient reportedly attends AA via Zoom.

## 2020-08-24 NOTE — ED ADULT NURSE NOTE - NSIMPLEMENTINTERV_GEN_ALL_ED
Implemented All Fall Risk Interventions:  [place] to call system. Call bell, personal items and telephone within reach. Instruct patient to call for assistance. Room bathroom lighting operational. Non-slip footwear when patient is off stretcher. Physically safe environment: no spills, clutter or unnecessary equipment. Stretcher in lowest position, wheels locked, appropriate side rails in place. Provide visual cue, wrist band, yellow gown, etc. Monitor gait and stability. Monitor for mental status changes and reorient to person, place, and time. Review medications for side effects contributing to fall risk. Reinforce activity limits and safety measures with patient and family.

## 2020-08-24 NOTE — PATIENT PROFILE ADULT - HARM RISK FACTORS
Patient PIV not flushing, needs to be replaced. Discussed with US IV RN, she will place a new PIV overnight.   no

## 2020-08-24 NOTE — H&P ADULT - NSHPREVIEWOFSYSTEMS_GEN_ALL_CORE
Pt requested that interview be continued at a later time; for this reason history will be completed after obtaining more information. ROS:   Gen: denies fever, chills  Neuro: + headache  CV: +CP, unsure if she experienced palpitations or lightheadedness  Pulm: endorses mild SOB   GI: + nausea, +vomiting, denies diarrhea. Endorses floating stools for past ~2 weeks.   : LMP was 2 weeks ago, pt does have heavy menstrual bleeding   Psych: +anxiety

## 2020-08-24 NOTE — PROVIDER CONTACT NOTE (OTHER) - ACTION/TREATMENT ORDERED:
STAT labs and EKG and pain med and protonix  ordered , will follow the orders, will continue to monitor

## 2020-08-24 NOTE — H&P ADULT - NSICDXPASTMEDICALHX_GEN_ALL_CORE_FT
PAST MEDICAL HISTORY:  Adenomyosis     Depression     ETOH Abuse     History of pancreatitis     HTN (hypertension)

## 2020-08-24 NOTE — H&P ADULT - NSHPSOCIALHISTORY_GEN_ALL_CORE
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

## 2020-08-24 NOTE — H&P ADULT - PROBLEM SELECTOR PLAN 3
CIWA protocol with Ativan taper CIWA protocol with Ativan taper  Thiamine/folic acid/ multivitamin qd  Sees psychiatrist outpatient, did not wish to speak to psych inpatient

## 2020-08-24 NOTE — ED PROVIDER NOTE - PHYSICAL EXAMINATION
General: Well developed, well nourished female, appears uncomfortable, intoxicated.   HEENT: Normocephalic and atraumatic, No crepitus on palpation of chest/neck. Trachea midline.   Cardiac: Normal S1 and S2 w/ tachycardia No MRG. Chest wall TTP  Pulmonary: CTA bilaterally. No increased WOB.   Abdominal: Soft, diffusely TTP. No rebound. Pt is guarding epigastrium.   Neurologic: AAOX3, moving all extremities.   Musculoskeletal: No limited ROM.  Vascular: Warm and well perfused  Skin: Color appropriate for race.   Psychiatric: Denies SI/HI. Flat affect. Minimally interactive   Charles Nunes M.D. PGY-3

## 2020-08-24 NOTE — ED PROVIDER NOTE - NS ED ROS FT
REVIEW OF SYSTEMS:  General: no fever, no chills  HEENT: no headache, no vision changes  Cardiac: +chest pain, no palpitations  Respiratory: no cough, no shortness of breath  Gastrointestinal: no abdominal pain, +nausea, +vomiting, no diarrhea  Genitourinary: no hematuria, no dysuria, no urinary frequency  Extremities: no extremity swelling, no extremity pain  Neuro: no focal weakness, no numbness/tingling of the extremities, no decreased sensation  Heme: no easy bleeding, no easy bruising  Skin: no jaundice,  no rashes, no lesions  All other ROS as documented in HPI  -Charles Nunes, PGY-3

## 2020-08-24 NOTE — H&P ADULT - PROBLEM SELECTOR PLAN 2
hiatal hernia and dilated gallbladder both possible etiologies  acute pancreatitis less likely given normal lipase, however patient could have acute on chronic  trend AST and ALT  - GI consulted - will advise on whether to proceed with upper endoscopy, MRCP, RUQ US  - given elevated transaminases, check PT for hiatal hernia and dilated gallbladder both possible etiologies  acute pancreatitis less likely given normal lipase  trend AST and ALT  - GI consulted - will advise on whether to proceed with upper endoscopy, MRCP, RUQ US  - given elevated transaminases, check PT for hiatal hernia and dilated gallbladder both possible etiologies  acute pancreatitis less likely given normal lipase  trend AST and ALT  - GI consulted - per hepatology no need for intervention  - CT done 8/24 - no evidence acute GI pathology  - given elevated transaminases, check PT/INR

## 2020-08-24 NOTE — ED PROVIDER NOTE - ATTENDING CONTRIBUTION TO CARE
Afebrile. Awake and Alert. Intoxicated. Lungs CTA. Heart regular rate and fast. Abdomen soft, +epigastric TTP, ND. CN II-XII grossly intact. Moves all extremities without lateralization.    r/o pancreatitis  r/o alcohol intoxication  r/o Boerhaave's from N/V  r/o ACS

## 2020-08-24 NOTE — CHART NOTE - NSCHARTNOTEFT_GEN_A_CORE
Search Terms: allison estrada, 1975     Search Date: 08/24/2020 15:29:54 PM       Searching on behalf of: [id number] - [facility]     The Drug Utilization Report below displays all of the controlled substance prescriptions, if any, that your patient has filled in the last twelve months. The information displayed on this report is compiled from pharmacy submissions to the Department, and accurately reflects the information as submitted by the pharmacies.    This report was requested by: Fortino Gomez | Reference #: [id number]             Others' Prescriptions        Patient Name: Allison Estrada     YOB: 1975       Address: [address] [place] [id number]     Sex: Female                     Rx Written    Rx Dispensed    Drug    Quantity    Days Supply    Prescriber Name    Payment Method    Dispenser      08/13/2019 11/21/2019 zolpidem tartrate 10 mg tablet  30 30 GitRicahrd bermudez MD [facility]   10/30/2019 10/30/2019 alprazolam 0.5 mg tablet  120 30 GitRichard bermudez MD [facility]   08/13/2019 10/19/2019 zolpidem tartrate 10 mg tablet  30 30 GitlinRichard MD [facility]   09/26/2019 09/28/2019 alprazolam xr 0.5 mg tablet  120 30 GitRichard bermudez MD [facility]   08/13/2019 09/20/2019 zolpidem tartrate 10 mg tablet  30 30 GitRichard bermudez MD [facility]               * - Drugs marked with an asterisk are compound drugs. If the compound drug is made up of more than one controlled substance, then each controlled substance will be a separate row in the table.

## 2020-08-24 NOTE — ED ADULT NURSE REASSESSMENT NOTE - NS ED NURSE REASSESS COMMENT FT1
pt noted to be shaky/have tremor, when asked if cold pt says "I just feel a little shaky right now". given blanket, will check blood glucose.

## 2020-08-24 NOTE — H&P ADULT - ASSESSMENT
44 y/o woman with PMH of alcohol abuse, prior pancreatiti      Unclear diagnosis at this time. Differential includes alcoholic hepatitis, cholecystitis, pancreatitis, and anxiety. 46 y/o woman with PMH of alcohol abuse, prior pancreatiti      Unclear diagnosis at this time. Differential includes:    cardiac etiology - troponin neg, pain improving, EKG neg  alcoholic hepatitis - elevated AST & ALT in ~2:1 ratio  cholecystitis - no stone or inflammation on CT, alk phos wnl  pancreatitis - CT neg, alk phos wnl  anxiety - dx of exclusion 44 y/o woman with GERD and longstanding history of alcohol abuse s/p multiple admissions for withdrawal and prior episode of pancreatitis presenting with chest pain, found to be acutely intoxicated, with evidence of hiatal hernia and biliary distention (no stone or inflammation) seen on CT noncon.     Differential includes:    hiatal hernia - seen on CT, pt has nausea, vomiting, and epigastric pain, tender to palpation  cardiac etiology - troponin neg, pain improving, EKG neg - less likely at this time  alcoholic hepatitis - elevated AST & ALT in ~2:1 ratio  cholecystitis - no stone or inflammation on CT, alk phos wnl  pancreatitis - CT neg, alk phos wnl  anxiety - dx of exclusion 46 y/o woman with GERD and longstanding history of alcohol abuse s/p multiple admissions for withdrawal and prior episode of pancreatitis presenting with chest pain, found to be acutely intoxicated, with evidence of hiatal hernia and biliary distention (no stone or inflammation) seen on CT noncon.     Differential includes:    hiatal hernia - seen on CT, pt has nausea, vomiting, and epigastric pain, tender to palpation  cardiac etiology - troponin neg, pain improving, EKG neg - less likely at this time  alcoholic hepatitis - elevated AST & ALT in ~2:1 ratio, tender hepatomegaly  cholecystitis - no stone or inflammation on CT, alk phos wnl  pancreatitis - CT showed atrophic pancreas, lipase wnl, alk phos wnl  anxiety - dx of exclusion 44 y/o woman with GERD and longstanding history of alcohol abuse s/p multiple admissions for EtOH withdrawal and prior episode of pancreatitis presenting with chest pain, found to be acutely intoxicated, with evidence of hiatal hernia and biliary distention (no stone or inflammation) seen on CT noncon.     Differential includes:    hiatal hernia - seen on CT, pt has nausea, vomiting, and epigastric pain, tender to palpation  cardiac etiology - troponin neg, pain improving, EKG neg - less likely at this time  alcoholic hepatitis - elevated AST & ALT in ~2:1 ratio, tender hepatomegaly  cholecystitis - no stone or inflammation on CT, alk phos wnl  pancreatitis - CT showed atrophic pancreas, lipase wnl, alk phos wnl  anxiety - dx of exclusion

## 2020-08-24 NOTE — ED ADULT NURSE NOTE - OBJECTIVE STATEMENT
44 y/o female PMH of HTN, anxiety, ETOH abuse presents to ED via EMS c/o L sided chest "pressure" radiating to jaw for hours and nausea, dry heaving at times but no emesis other than saliva. Per EMS, pt had similar episode last night and had negative cardiac workup in ED. Per pt, she drinks daily or almost daily, about half a liter of vodka. Pt reports hospitalization 2 months ago, cannot describe exactly for what diagnosis, possibly pancreatitis. Pt endorses having alcohol withdrawal in past, denies ever having seizures. Pt reports losing job last week "I resigned". A&Ox4, breath sounds clear bilaterally, no edema or swelling, abd is tender diffusely but especially to R side. Skin warm dry and intact. Able to move all extremities. IV placed via EMS. Pt is tachycardiac in 120s upon arrival. Pt says she feels safe at home. Denies SI/HI/depression. Denies fever, cough, sweats, SOB, weakness, dizziness, urinary or bowel symptoms. IV intact

## 2020-08-24 NOTE — ED PROVIDER NOTE - OBJECTIVE STATEMENT
45F PMH HTN, Depression, ETOH abuse, withdrawal, pancreatitis presents with CP. Pt states 4-5 hours of CP. States L sided, pressure like, radiates to jaw. States Called EMS earlier and was not brought to ER. States drinks 1/2 L liquor daily, last 8 hrs ago. States after CP started she was having a lot of nausea and vomiting, has vomited multiple times tonight. Denies h/o alcoholic seizures.

## 2020-08-24 NOTE — ED ADULT NURSE REASSESSMENT NOTE - NS ED NURSE REASSESS COMMENT FT1
Report received from BOBBY Jenkins. Pt A&Ox4, responds to voice, drowsy in stretcher. MD from medicine at bedside, updating pt on plan of care that pt to be admitted and awaiting bed assignment. Safety maintained.

## 2020-08-24 NOTE — H&P ADULT - NSHPPHYSICALEXAM_GEN_ALL_CORE
Physical exam limited due to pt request to continue evaluation at a later time    Vital signs:   T(C): 36.8 (24 Aug 2020 07:26), Max: 37.1 (24 Aug 2020 05:38)  HR: 113 (24 Aug 2020 07:26) (113 - 123)  BP: 147/115 (24 Aug 2020 07:26) (147/115 - 176/129)  RR: 16 (24 Aug 2020 07:26) (16 - 20)  SpO2: 100% (24 Aug 2020 07:26) (96% - 100%)    General appearance: tired but oriented and able to converse  Abdominal exam: + tenderness to palpation, + voluntary guarding of right upper quadrant Physical exam limited due to pt request to continue evaluation at a later time    Vital signs:   T(C): 36.8 (24 Aug 2020 07:26), Max: 37.1 (24 Aug 2020 05:38)  HR: 113 (24 Aug 2020 07:26) (113 - 123)  BP: 147/115 (24 Aug 2020 07:26) (147/115 - 176/129)  RR: 16 (24 Aug 2020 07:26) (16 - 20)  SpO2: 100% (24 Aug 2020 07:26) (96% - 100%)    General appearance: tired but oriented and able to converse  Abdominal exam: liver palpable, + RUQ tenderness to palpation, + voluntary guarding of right upper quadrant Vital signs:   T(C): 36.8 (24 Aug 2020 07:26), Max: 37.1 (24 Aug 2020 05:38)  HR: 113 (24 Aug 2020 07:26) (113 - 123)  BP: 147/115 (24 Aug 2020 07:26) (147/115 - 176/129)  RR: 16 (24 Aug 2020 07:26) (16 - 20)  SpO2: 100% (24 Aug 2020 07:26) (96% - 100%)    General appearance: tired but oriented and able to converse  CV: normal S1 and S2 no m/r/g  Abdominal exam: liver palpable, + diffuse tenderness to palpation, + voluntary guarding of right upper quadrant/ epigastrium Vital signs:   T(C): 36.8 (24 Aug 2020 07:26), Max: 37.1 (24 Aug 2020 05:38)  HR: 113 (24 Aug 2020 07:26) (113 - 123)  BP: 147/115 (24 Aug 2020 07:26) (147/115 - 176/129)  RR: 16 (24 Aug 2020 07:26) (16 - 20)  SpO2: 100% (24 Aug 2020 07:26) (96% - 100%)    GENERAL: NAD, lying in bed comfortably, tired but oriented and able to converse  HEAD:  Atraumatic, Normocephalic  EYES: EOMI, PERRLA, conjunctiva and sclera clear  ENT: Moist mucous membranes  NECK: Supple, No JVD  CHEST/LUNG: Clear to auscultation bilaterally; No rales, rhonchi, wheezing, or rubs. Unlabored respirations  HEART: Regular rate and rhythm; No murmurs, rubs, or gallops  ABDOMEN: Bowel sounds present; Soft, Nontender, Nondistended. liver palpable, + diffuse tenderness to palpation, + voluntary guarding of right upper quadrant/ epigastrium  EXTREMITIES:  2+ Peripheral Pulses, brisk capillary refill. No clubbing, cyanosis, or edema  NERVOUS SYSTEM:  Alert & Oriented X3, speech clear. No deficits   MSK: FROM all 4 extremities, full and equal strength  SKIN: No rashes or lesions

## 2020-08-24 NOTE — H&P ADULT - PROBLEM SELECTOR PLAN 6
Pt sees psychiatrist who prescribes her alprazolam Pt sees psychiatrist who prescribes her alprazolam PO 1mg PRN

## 2020-08-24 NOTE — H&P ADULT - PROBLEM SELECTOR PLAN 1
troponin negative  No ST segment elevation or T wave inversion seen on EKG  less likely at this time  however retain high level of suspicion as pt's father suffered MI at age 50  Plan: continue telemetry, obtain echocardiogram  check cholesterol levels high sensitivity troponins negative  No ST segment elevation or T wave inversion seen on EKG  less likely cardiac at this time  however retain high level of suspicion as pt's father suffered MI at age 50  Plan: continue telemetry, obtain echocardiogram  check cholesterol levels high sensitivity troponins negative x2  No ST segment elevation or T wave inversion seen on EKG  low level of suspicion for cardiac pain   Plan: continue telemetry, obtain echocardiogram  more likely 2/2 hiatal hernia vs alcoholic hepatitis vs acute on chronic pancreatitis vs cholecystitis

## 2020-08-24 NOTE — H&P ADULT - PROBLEM SELECTOR PLAN 5
hgb 10  pt reports this is an increase since last hgb was measured at 7  pt had extensive w/u on previous admission and no cause was found hgb 10 - microcytic   check iron studies   pt reports this is an increase since last hgb was measured at 7  pt had extensive w/u on previous admission and no cause was found hgb 10 - microcytic   pt reports this is an increase since last hgb was measured at 7  pt had extensive w/u on previous admission and no cause was found hgb 10 - microcytic   pt reports this is an increase since last hgb was measured at 7  history of anemia due to gyn bleeding, no evidence of current bleed, LMP 2 weeks ago  pt had extensive w/u on previous admission and no cause was found  iron studies - supplement with iron if indicate LOYDA

## 2020-08-24 NOTE — ED PROVIDER NOTE - CLINICAL SUMMARY MEDICAL DECISION MAKING FREE TEXT BOX
45F presents with CP, nausea vomiting, ETOH abuse. Concern is for ACS, possibly boerhaaves. Also suspect possible pancreatitis. Will monitor for s/s withdrawal. Labs, CXR. EKG w/o ischemic changes. Consider CT after intial imaging. 45F presents with CP, nausea vomiting, ETOH abuse. Concern is for ACS, possibly boerhaaves. Also suspect possible pancreatitis. Will monitor for s/s withdrawal. Labs, CXR. EKG w/o ischemic changes. Consider CT after initial imaging.

## 2020-08-24 NOTE — H&P ADULT - HISTORY OF PRESENT ILLNESS
Pt is a 45y woman with PMH significant for hypertension, EtOH, prior pancreatitis, and anxiety presenting with 4 hours of chest and abdominal pain in context of acute EtOH intoxication. Pt endorses waking up suddenly with "cardiac" chest pain, described as pressure-like sensation that has been getting better since onset. In ED pt endorsed pain radiating to jaw, however at time of interview pt stated that pain had become mild and did not radiate. She has a history of anxiety and when she called EMS for this pain she was initially told it was likely due to anxiety. She has taken 4mg of her home alprazolam since onset of pain. She also described periumbilical pain "sharp" 6/10 worsening in severity non radiating. In ED pt endorsed that she had been nauseated and vomiting since onset of chest and abdominal pain. She endorsed mild SOB, denied prior exertional SOB. Pt also denied experiencing abdominal pain after eating on prior occasions. Pt is a 45y woman with PMH significant for hypertension, EtOH, multiple prior admissions for EtOH withdrawl, pancreatitis? and anxiety presenting with 4 hours of chest and abdominal pain in context of acute EtOH intoxication. Pt endorses waking up suddenly with "cardiac" chest pain, described as pressure-like sensation that has been getting better since onset. In ED pt endorsed pain radiating to jaw, however at time of interview pt stated that pain had become mild and did not radiate. She has a history of anxiety and when she called EMS for this pain she was initially told it was likely due to anxiety. She has taken 4mg of her home alprazolam since onset of pain. She also described periumbilical pain "sharp" 6/10 worsening in severity non radiating. In ED pt endorsed that she had been nauseated and vomiting since onset of chest and abdominal pain. She endorsed mild SOB, denied prior exertional SOB. Pt also denied experiencing abdominal pain after eating on prior occasions. Pt is a 45y woman with PMH significant for hypertension, EtOH, multiple prior admissions for EtOH withdrawl, one prior episode of pancreatitis, depression/anxiety presenting with chest and abdominal pain in context of acute EtOH intoxication. Pt endorses developing "cardiac" chest pain yesterday morning, described as pressure-like sensation that has been getting better since onset. She called EMS who told her it was likely anxiety. She remained at home during the day and describes nausea and vomiting. She eventually did call EMS again. In ED pt endorsed pain radiating to jaw, however at time of interview pt stated that pain had become mild and did not radiate. She has taken 4mg of her home alprazolam since onset of pain. She also described epigastric pain "sharp" 6/10 worsening in severity non radiating. Of note, pt states that she has experienced symptoms of reflux "all her life" but usually the symptoms are controlled with omeprazole and diet.     When provider returned later in the day, pt described significant epigastric pain that started immediately after eating and was soothed by drinking cold ginger ale. Pt was informed that a hiatal hernia had been noted on her CT and she said "a hiatal hernia would make sense" in the context of her symptoms.    Pt admits to longstanding EtOH abuse. Says she was sober for 6.5 years, however this year has been difficult for her and she has been drinking. She explained that she "started drinking after I left my job" two weeks ago. Pt is a 45y woman with PMH significant for hypertension, EtOH, multiple prior admissions for EtOH withdrawl, one prior episode of pancreatitis, depression/anxiety presenting with chest and abdominal pain in context of acute EtOH intoxication. Pt endorses developing "cardiac" chest pain yesterday morning, described as pressure-like sensation that has been getting better since onset. She called EMS who told her it was likely anxiety. She remained at home during the day and describes nausea and vomiting. She eventually did call EMS again. In ED pt endorsed pain radiating to jaw, however at time of interview pt stated that pain had become mild and did not radiate. She has taken 4mg of her home alprazolam since onset of pain. She also described epigastric pain "sharp" 6/10 worsening in severity non radiating. Of note, pt states that she has experienced symptoms of reflux "all her life" but usually the symptoms are controlled with omeprazole and diet.  She had an upper endoscopy 5 years ago w/ biopsy and reports both were normal.     When provider returned later in the day, pt described significant epigastric pain that started immediately after eating and was soothed by drinking cold ginger ale. Pt was informed that a hiatal hernia had been noted on her CT and she said "a hiatal hernia would make sense" in the context of her symptoms.    Pt admits to longstanding EtOH abuse. Says she was sober for 6.5 years, however this year has been difficult for her and she has been drinking. Said she "started drinking after I left my job" two weeks ago. Pt is a 45y woman with PMH significant for hypertension, EtOH, multiple prior admissions for EtOH withdrawl, one prior episode of pancreatitis, depression/anxiety presenting with chest and abdominal pain in context of acute EtOH intoxication. Pt endorses developing "cardiac" chest pain yesterday morning, described as pressure-like sensation that has been getting better since onset. She called EMS who told her it was likely anxiety. She remained at home during the day and describes nausea and vomiting. She eventually did call EMS again. In ED pt endorsed pain radiating to jaw, however at time of interview pt stated that pain had become mild and did not radiate. She has taken 4mg of her home alprazolam since onset of pain. She also described epigastric pain "sharp" 6/10 worsening in severity non radiating. Of note, pt states that she has experienced symptoms of reflux "all her life" but usually the symptoms are controlled with omeprazole and diet.  She had an upper endoscopy 5 years ago w/ biopsy and reports both were normal.     When provider returned later in the day, pt described significant epigastric pain that started immediately after eating and was soothed by drinking cold ginger ale. Pt was informed that a hiatal hernia had been noted on her CT and she said "a hiatal hernia would make sense" in the context of her symptoms.    Pt admits to longstanding EtOH abuse. Says she was sober for 6.5 years, however this year has been difficult for her and she has been drinking. Said she "started drinking after I left my job" two weeks ago.     In the ED:    Vitals:  Notable Labs:  Studies:  Imaging:    Pt given Pt is a 45y woman with PMH significant for hypertension, EtOH, multiple prior admissions for EtOH withdrawl, one prior episode of pancreatitis, depression/anxiety presenting with chest and abdominal pain in context of acute EtOH intoxication. Pt endorses developing "cardiac" chest pain yesterday morning, described as pressure-like sensation that has been getting better since onset. She called EMS who told her it was likely anxiety. She remained at home during the day and describes nausea and vomiting. She eventually did call EMS again. In ED pt endorsed pain radiating to jaw, however at time of interview pt stated that pain had become mild and did not radiate. She has taken 4mg of her home alprazolam since onset of pain. She also described epigastric pain "sharp" 6/10 worsening in severity non radiating. Of note, pt states that she has experienced symptoms of reflux "all her life" but usually the symptoms are controlled with omeprazole and diet.  She had an upper endoscopy 5 years ago w/ biopsy and reports both were normal.     When provider returned later in the day, pt described significant epigastric pain that started immediately after eating and was soothed by drinking cold ginger ale. Pt was informed that a hiatal hernia had been noted on her CT and she said "a hiatal hernia would make sense" in the context of her symptoms.    Pt admits to longstanding EtOH abuse. Says she was sober for 6.5 years, however this year has been difficult for her and she has been drinking. Said she "started drinking after I left my job" two weeks ago.     In the ED:  Vitals: T 36.7, , /118, RR 18, SpO2 97%  Notable Labs:  EtOH 223, troponin wnl x2, lipase and alk phos wnl        Studies: EKG no ST or T wave changes  Imaging: see below    Pt given:  - 0.9% saline bolus x 2  - diazepam 5 IV x2  - famotidine 20 mg IV push  - folic acid 1mg  - losartan 50mg  - thiamine 100 x2  - mag sulfate 1g  - metoclopramide 10mg  - morphine 4mg  - ondansetron 4mg Pt is a 45y woman with PMH significant for hypertension, EtOH, multiple prior admissions for EtOH withdrawl, one prior episode of pancreatitis, depression/anxiety presenting with chest and abdominal pain in context of acute EtOH intoxication. Pt endorses developing "cardiac" chest pain yesterday morning, described as pressure-like sensation that has been getting better since onset. She called EMS who told her it was likely anxiety. She remained at home during the day and describes nausea and vomiting. She eventually did call EMS again. In ED pt endorsed pain radiating to jaw, however at time of interview pt stated that pain had become mild and did not radiate. She has taken 4mg of her home alprazolam since onset of pain. She also described epigastric pain "sharp" 6/10 worsening in severity non radiating. Of note, pt states that she has experienced symptoms of reflux "all her life" but usually the symptoms are controlled with omeprazole and diet.  She had an upper endoscopy 5 years ago w/ biopsy and reports both were normal.     When provider returned later in the day, pt described significant epigastric pain that started immediately after eating and was soothed by drinking cold ginger ale. Pt was informed that a hiatal hernia had been noted on her CT and she said "a hiatal hernia would make sense" in the context of her symptoms.    Pt admits to longstanding EtOH abuse. Says she was sober for 6.5 years, however this year has been difficult for her and she has been drinking. Said she "started drinking after I left my job" two weeks ago.     In the ED:  Vitals: T 36.7, , /118, RR 18, SpO2 97%  Notable Labs:  EtOH 223, troponin wnl x2, lipase and alk phos wnl        Studies: EKG no ST or T wave changes  Imaging: no acute pathology on CXR or CT abd/pelvis noncon    Pt given:  - 0.9% saline bolus x 2  - diazepam 5 IV x2  - famotidine 20 mg IV push  - folic acid 1mg  - losartan 50mg  - thiamine 100 x2  - mag sulfate 1g  - metoclopramide 10mg  - morphine 4mg  - ondansetron 4mg

## 2020-08-24 NOTE — ED PROVIDER NOTE - CARE PLAN
Principal Discharge DX:	Alcohol withdrawal Principal Discharge DX:	Alcohol withdrawal  Secondary Diagnosis:	Abdominal pain

## 2020-08-25 ENCOUNTER — TRANSCRIPTION ENCOUNTER (OUTPATIENT)
Age: 45
End: 2020-08-25

## 2020-08-25 LAB
ALBUMIN SERPL ELPH-MCNC: 3.5 G/DL — SIGNIFICANT CHANGE UP (ref 3.3–5)
ALP SERPL-CCNC: 84 U/L — SIGNIFICANT CHANGE UP (ref 40–120)
ALT FLD-CCNC: 41 U/L — SIGNIFICANT CHANGE UP (ref 10–45)
ANION GAP SERPL CALC-SCNC: 13 MMOL/L — SIGNIFICANT CHANGE UP (ref 5–17)
AST SERPL-CCNC: 47 U/L — HIGH (ref 10–40)
BILIRUB SERPL-MCNC: 0.5 MG/DL — SIGNIFICANT CHANGE UP (ref 0.2–1.2)
BUN SERPL-MCNC: 9 MG/DL — SIGNIFICANT CHANGE UP (ref 7–23)
CALCIUM SERPL-MCNC: 9 MG/DL — SIGNIFICANT CHANGE UP (ref 8.4–10.5)
CHLORIDE SERPL-SCNC: 94 MMOL/L — LOW (ref 96–108)
CO2 SERPL-SCNC: 24 MMOL/L — SIGNIFICANT CHANGE UP (ref 22–31)
CREAT SERPL-MCNC: 0.65 MG/DL — SIGNIFICANT CHANGE UP (ref 0.5–1.3)
FERRITIN SERPL-MCNC: 42 NG/ML — SIGNIFICANT CHANGE UP (ref 15–150)
GLUCOSE SERPL-MCNC: 128 MG/DL — HIGH (ref 70–99)
HCT VFR BLD CALC: 31.1 % — LOW (ref 34.5–45)
HGB BLD-MCNC: 9.3 G/DL — LOW (ref 11.5–15.5)
INR BLD: 1.12 RATIO — SIGNIFICANT CHANGE UP (ref 0.88–1.16)
IRON SATN MFR SERPL: 27 UG/DL — LOW (ref 30–160)
IRON SATN MFR SERPL: 9 % — LOW (ref 14–50)
MAGNESIUM SERPL-MCNC: 1.3 MG/DL — LOW (ref 1.6–2.6)
MCHC RBC-ENTMCNC: 23 PG — LOW (ref 27–34)
MCHC RBC-ENTMCNC: 29.9 GM/DL — LOW (ref 32–36)
MCV RBC AUTO: 77 FL — LOW (ref 80–100)
NRBC # BLD: 0 /100 WBCS — SIGNIFICANT CHANGE UP (ref 0–0)
PHOSPHATE SERPL-MCNC: 2.3 MG/DL — LOW (ref 2.5–4.5)
PLATELET # BLD AUTO: 186 K/UL — SIGNIFICANT CHANGE UP (ref 150–400)
POTASSIUM SERPL-MCNC: 3.6 MMOL/L — SIGNIFICANT CHANGE UP (ref 3.5–5.3)
POTASSIUM SERPL-SCNC: 3.6 MMOL/L — SIGNIFICANT CHANGE UP (ref 3.5–5.3)
PROT SERPL-MCNC: 6.8 G/DL — SIGNIFICANT CHANGE UP (ref 6–8.3)
PROTHROM AB SERPL-ACNC: 13.2 SEC — SIGNIFICANT CHANGE UP (ref 10.6–13.6)
RBC # BLD: 4.04 M/UL — SIGNIFICANT CHANGE UP (ref 3.8–5.2)
RBC # FLD: 22.1 % — HIGH (ref 10.3–14.5)
SODIUM SERPL-SCNC: 131 MMOL/L — LOW (ref 135–145)
TIBC SERPL-MCNC: 293 UG/DL — SIGNIFICANT CHANGE UP (ref 220–430)
UIBC SERPL-MCNC: 266 UG/DL — SIGNIFICANT CHANGE UP (ref 110–370)
VIT D25+D1,25 OH+D1,25 PNL SERPL-MCNC: 17.4 PG/ML — LOW (ref 19.9–79.3)
WBC # BLD: 7.8 K/UL — SIGNIFICANT CHANGE UP (ref 3.8–10.5)
WBC # FLD AUTO: 7.8 K/UL — SIGNIFICANT CHANGE UP (ref 3.8–10.5)

## 2020-08-25 PROCEDURE — 76705 ECHO EXAM OF ABDOMEN: CPT | Mod: 26,RT

## 2020-08-25 PROCEDURE — 99233 SBSQ HOSP IP/OBS HIGH 50: CPT | Mod: GC

## 2020-08-25 PROCEDURE — 99222 1ST HOSP IP/OBS MODERATE 55: CPT | Mod: GC

## 2020-08-25 RX ORDER — ENOXAPARIN SODIUM 100 MG/ML
40 INJECTION SUBCUTANEOUS DAILY
Refills: 0 | Status: DISCONTINUED | OUTPATIENT
Start: 2020-08-25 | End: 2020-08-26

## 2020-08-25 RX ORDER — SODIUM,POTASSIUM PHOSPHATES 278-250MG
1 POWDER IN PACKET (EA) ORAL ONCE
Refills: 0 | Status: COMPLETED | OUTPATIENT
Start: 2020-08-25 | End: 2020-08-25

## 2020-08-25 RX ORDER — FERROUS SULFATE 325(65) MG
325 TABLET ORAL DAILY
Refills: 0 | Status: DISCONTINUED | OUTPATIENT
Start: 2020-08-25 | End: 2020-08-26

## 2020-08-25 RX ORDER — THIAMINE MONONITRATE (VIT B1) 100 MG
1 TABLET ORAL
Qty: 30 | Refills: 0
Start: 2020-08-25 | End: 2020-09-23

## 2020-08-25 RX ORDER — FOLIC ACID 0.8 MG
1 TABLET ORAL
Qty: 30 | Refills: 0
Start: 2020-08-25 | End: 2020-09-23

## 2020-08-25 RX ORDER — MAGNESIUM SULFATE 500 MG/ML
1 VIAL (ML) INJECTION ONCE
Refills: 0 | Status: COMPLETED | OUTPATIENT
Start: 2020-08-25 | End: 2020-08-25

## 2020-08-25 RX ORDER — ESCITALOPRAM OXALATE 10 MG/1
5 TABLET, FILM COATED ORAL DAILY
Refills: 0 | Status: DISCONTINUED | OUTPATIENT
Start: 2020-08-25 | End: 2020-08-26

## 2020-08-25 RX ADMIN — Medication 2 MILLIGRAM(S): at 02:06

## 2020-08-25 RX ADMIN — Medication 2 MILLIGRAM(S): at 06:02

## 2020-08-25 RX ADMIN — Medication 1 TABLET(S): at 12:12

## 2020-08-25 RX ADMIN — Medication 325 MILLIGRAM(S): at 16:42

## 2020-08-25 RX ADMIN — Medication 1.5 MILLIGRAM(S): at 09:55

## 2020-08-25 RX ADMIN — ONDANSETRON 8 MILLIGRAM(S): 8 TABLET, FILM COATED ORAL at 06:04

## 2020-08-25 RX ADMIN — Medication 1 MILLIGRAM(S): at 12:12

## 2020-08-25 RX ADMIN — Medication 100 MILLIGRAM(S): at 12:12

## 2020-08-25 RX ADMIN — Medication 5 MILLIGRAM(S): at 22:21

## 2020-08-25 RX ADMIN — PANTOPRAZOLE SODIUM 40 MILLIGRAM(S): 20 TABLET, DELAYED RELEASE ORAL at 06:02

## 2020-08-25 RX ADMIN — Medication 1 PACKET(S): at 14:02

## 2020-08-25 RX ADMIN — Medication 100 GRAM(S): at 12:12

## 2020-08-25 RX ADMIN — ESCITALOPRAM OXALATE 5 MILLIGRAM(S): 10 TABLET, FILM COATED ORAL at 16:42

## 2020-08-25 NOTE — PROGRESS NOTE ADULT - PROBLEM SELECTOR PLAN 4
pancreatic atrophy seen on CT  check vitamin D level as indicator for fat malabsorption  check stool fat content pancreatic atrophy seen on CT  follow up vitamin D level as indicator for fat malabsorption  follow up stool fat content

## 2020-08-25 NOTE — PROGRESS NOTE ADULT - SUBJECTIVE AND OBJECTIVE BOX
45y woman PMH longstanding EtOH abuse w/ multiple hospitalizations for withdrawal and prior pancreatitis, hypertension, history gyn bleed 2/2 adenomyosis, presented with chest/epigastric pain in context of acute EtOH intoxication, found to have hiatal hernia and dilated gallbladder on CT noncon.    O/N: no events. Reports her pain is improved and she desires to eat. No other complaints. Pt refused subq heparin because she says she has been ambulating.   On telemetry no arrhythmias reported, sinus tach 100-120s with brief episodes of 140-150s lasting several minutes.     ICU Vital Signs Last 24 Hrs  T(C): 36.7 (25 Aug 2020 05:40), Max: 37.3 (25 Aug 2020 01:53)  T(F): 98.1 (25 Aug 2020 05:40), Max: 99.2 (25 Aug 2020 01:53)  HR: 117 (25 Aug 2020 05:40) (109 - 130)  BP: 108/79 (25 Aug 2020 05:40) (105/67 - 171/118)  RR: 18 (25 Aug 2020 05:40) (18 - 18)  SpO2: 97% (25 Aug 2020 05:40) (96% - 98%)    Physical exam:     Labs:     Imaging results: 45y woman PMH longstanding EtOH abuse w/ multiple hospitalizations for withdrawal and prior pancreatitis, hypertension, history gyn bleed 2/2 adenomyosis, presented with chest/epigastric pain in context of acute EtOH intoxication, found to have hiatal hernia and dilated gallbladder on CT noncon.    O/N: no events. Reports her pain is improved and she desires to eat. No other complaints. Pt refused subq heparin because she says she has been ambulating.   On telemetry no arrhythmias reported, sinus tach 100-120s with brief episodes of 140-150s lasting several minutes.   CIWA scores trending down, 8/24 they averaged 4-5, overnight averaging 1-3    ICU Vital Signs Last 24 Hrs  T(C): 36.7 (25 Aug 2020 05:40), Max: 37.3 (25 Aug 2020 01:53)  T(F): 98.1 (25 Aug 2020 05:40), Max: 99.2 (25 Aug 2020 01:53)  HR: 117 (25 Aug 2020 05:40) (109 - 130)  BP: 108/79 (25 Aug 2020 05:40) (105/67 - 171/118)  RR: 18 (25 Aug 2020 05:40) (18 - 18)  SpO2: 97% (25 Aug 2020 05:40) (96% - 98%)    Physical exam:     Labs:     pt refused labs this am  provider encouraged labs   check back this pm     Imaging results: 45y woman PMH longstanding EtOH abuse w/ multiple hospitalizations for withdrawal and prior pancreatitis, hypertension, history gyn bleed 2/2 adenomyosis, presented with chest/epigastric pain in context of acute EtOH intoxication, found to have hiatal hernia and dilated gallbladder on CT noncon.    O/N: no events. Reports her pain is improved and she desires to eat. No other complaints. Pt refused subq heparin because she says she has been ambulating.   On telemetry no arrhythmias reported, sinus tach 100-120s with brief episodes of 140-150s lasting several minutes.   CIWA scores trending down, 8/24 they averaged 4-5, overnight averaging 1-3    ICU Vital Signs Last 24 Hrs  T(C): 36.7 (25 Aug 2020 05:40), Max: 37.3 (25 Aug 2020 01:53)  T(F): 98.1 (25 Aug 2020 05:40), Max: 99.2 (25 Aug 2020 01:53)  HR: 117 (25 Aug 2020 05:40) (109 - 130)  BP: 108/79 (25 Aug 2020 05:40) (105/67 - 171/118)  RR: 18 (25 Aug 2020 05:40) (18 - 18)  SpO2: 97% (25 Aug 2020 05:40) (96% - 98%)    Physical exam:   Gen appearance: Lying comfortably in bed, no acute distress, conversing; patient appears more alert and comfortable than yesterday    Labs:     pt refused labs this am  provider encouraged labs   check back this pm     Imaging results:     RUQ US 45y woman PMH longstanding EtOH abuse w/ multiple hospitalizations for withdrawal and prior pancreatitis, hypertension, history gyn bleed 2/2 adenomyosis, presented with chest/epigastric pain in context of acute EtOH intoxication, found to have hiatal hernia and dilated gallbladder on CT noncon.    O/N: no events. Reports her pain is improved and she desires to eat. No other complaints. Pt refused subq heparin because she says she has been ambulating.   On telemetry no arrhythmias reported, sinus tach 100-120s with brief episodes of 140-150s lasting several minutes.   CIWA scores trending down, 8/24 they averaged 4-5, overnight averaging 1-3    Patient is requesting to leave, agreed to remain overnight and d/c tomorrow if she does not show signs of EtOH withdrawal. States that she is concerned about being discharged because it is her father's birthday tomorrow and her family does not know she is here. She does not wish to speak with social work. She states that she attends online AA meetings most days and meets with a psychiatrist once a month.     ICU Vital Signs Last 24 Hrs  T(C): 36.7 (25 Aug 2020 05:40), Max: 37.3 (25 Aug 2020 01:53)  T(F): 98.1 (25 Aug 2020 05:40), Max: 99.2 (25 Aug 2020 01:53)  HR: 117 (25 Aug 2020 05:40) (109 - 130)  BP: 108/79 (25 Aug 2020 05:40) (105/67 - 171/118)  RR: 18 (25 Aug 2020 05:40) (18 - 18)  SpO2: 97% (25 Aug 2020 05:40) (96% - 98%)    Physical exam:   Gen appearance: Lying comfortably in bed, no acute distress, conversing; patient appears more alert and comfortable than yesterday    Labs:     pt refused labs this am  provider encouraged labs   check back this pm     Imaging results:     RUQ US 45y woman PMH longstanding EtOH abuse w/ multiple hospitalizations for withdrawal and prior pancreatitis, hypertension, history gyn bleed 2/2 adenomyosis, presented with chest/epigastric pain in context of acute EtOH intoxication, found to have hiatal hernia and dilated gallbladder on CT noncon.    O/N: no events. Reports her pain is improved and she desires to eat. No other complaints. Pt refused subq heparin because she says she has been ambulating.   On telemetry no arrhythmias reported, sinus tach 100-120s with brief episodes of 140-150s lasting several minutes.   CIWA scores trending down, 8/24 they averaged 4-5, overnight averaging 1-3    Patient is requesting to leave, agreed to remain overnight and d/c tomorrow if she does not show signs of EtOH withdrawal. States that she is concerned about being discharged because it is her father's birthday tomorrow and her family does not know she is here. She does not wish to speak with social work. She states that she attends online AA meetings most days and meets with a psychiatrist once a month.     ICU Vital Signs Last 24 Hrs  T(C): 36.7 (25 Aug 2020 05:40), Max: 37.3 (25 Aug 2020 01:53)  T(F): 98.1 (25 Aug 2020 05:40), Max: 99.2 (25 Aug 2020 01:53)  HR: 117 (25 Aug 2020 05:40) (109 - 130)  BP: 108/79 (25 Aug 2020 05:40) (105/67 - 171/118)  RR: 18 (25 Aug 2020 05:40) (18 - 18)  SpO2: 97% (25 Aug 2020 05:40) (96% - 98%)    Physical exam:   Gen appearance: Lying comfortably in bed, no acute distress, conversing; patient appears more alert and comfortable than yesterday    Labs:                           9.3    7.80  )-----------( 186      ( 25 Aug 2020 10:49 )             31.1          Imaging results:     RUQ US 45y woman PMH longstanding EtOH abuse w/ multiple hospitalizations for withdrawal and prior pancreatitis, hypertension, history gyn bleed 2/2 adenomyosis, presented with chest/epigastric pain in context of acute EtOH intoxication, found to have hiatal hernia and dilated gallbladder on CT noncon.    O/N: no events. Reports her pain is improved and she desires to eat. No other complaints. Pt refused subq heparin because she says she has been ambulating.   On telemetry no arrhythmias reported, sinus tach 100-120s with brief episodes of 140-150s lasting several minutes.   CIWA scores trending down, 8/24 they averaged 4-5, overnight averaging 1-3    Patient is requesting to leave, agreed to remain overnight and d/c tomorrow if she does not show signs of EtOH withdrawal. States that she is concerned about being discharged because it is her father's birthday tomorrow and her family does not know she is here. She does not wish to speak with social work. She states that she attends online AA meetings most days and meets with a psychiatrist once a month.     ICU Vital Signs Last 24 Hrs  T(C): 36.7 (25 Aug 2020 05:40), Max: 37.3 (25 Aug 2020 01:53)  T(F): 98.1 (25 Aug 2020 05:40), Max: 99.2 (25 Aug 2020 01:53)  HR: 117 (25 Aug 2020 05:40) (109 - 130)  BP: 108/79 (25 Aug 2020 05:40) (105/67 - 171/118)  RR: 18 (25 Aug 2020 05:40) (18 - 18)  SpO2: 97% (25 Aug 2020 05:40) (96% - 98%)    Physical exam:   Gen appearance: Lying comfortably in bed, no acute distress, conversing; patient appears more alert and comfortable than yesterday    Labs:                           9.3    7.80  )-----------( 186      ( 25 Aug 2020 10:49 )             31.1     08-25    131<L>  |  94<L>  |  9   ----------------------------<  128<H>  3.6   |  24  |  0.65    Ca    9.0      25 Aug 2020 10:49  Phos  2.3     08-25  Mg     1.3     08-25    TPro  6.8  /  Alb  3.5  /  TBili  0.5  /  DBili  x   /  AST  47<H>  /  ALT  41  /  AlkPhos  84  08-25    Imaging results:     TTE:  1. Normal left ventricular internal dimensions and wall  thickness.  2. Hyperdynamic left ventricular systolic function.  3. The right ventricle is not well visualized; grossly  normal right ventricular systolic function.  4. Estimated right ventricular systolic pressure equals 20  mm Hg, assuming right atrial pressure equals 8 mm Hg,  consistent with normal pulmonary pressures.  *** No previous Echo exam.  ------------------------------------------------------------------------  Confirmed on  8/24/2020 - 23:15:37 by Marcin Hanley M.D.    FINDINGS:    Liver is enlarged, measuring up to 18.9 cm in oblique sagittal dimension. Increased parenchymal echogenicity consistent with hepatic steatosis. Liver contour is smooth.  Bile ducts: Normal caliber. Common bile duct measures 3 mm.  Gallbladder: Nondistended gallbladder with multiple folds and wall thickness measuring up to 3 mm. Positive ultrasonographic Hillman's sign. No biliary sludge or cholelithiasis. No pericholecystic fluid collection.  Pancreas: Partially visualized.  Right kidney: 10.6 cm. No hydronephrosis.  Ascites: None.  IVC: Visualized portions are within normal limits.    IMPRESSION:    Mild hepatomegaly. Hepatic steatosis.    Nondistended gallbladder with wall thickness measuring up to 3 mm. No pericholecystic fluid collection. Positive sonographic Hillman's sign.    Consider further evaluation with HIDA scan. 45y woman PMH longstanding EtOH abuse w/ multiple hospitalizations for withdrawal and prior pancreatitis, hypertension, history gyn bleed 2/2 adenomyosis, presented with chest/epigastric pain in context of acute EtOH intoxication, found to have hiatal hernia and dilated gallbladder on CT noncon.    O/N: no events. Reports her pain is improved and she desires to eat. No other complaints. Pt refused subq heparin because she says she has been ambulating.   On telemetry no arrhythmias reported, sinus tach 100-120s with brief episodes of 140-150s lasting several minutes.   CIWA scores trending down, 8/24 they averaged 4-5, overnight averaging 1-3    Patient is requesting to leave, agreed to remain overnight and d/c tomorrow if she does not show signs of EtOH withdrawal. States that she is concerned about being discharged because it is her father's birthday tomorrow and her family does not know she is here. She does not wish to speak with social work. She states that she attends online AA meetings most days and meets with a psychiatrist once a month.     ICU Vital Signs Last 24 Hrs  T(C): 36.7 (25 Aug 2020 05:40), Max: 37.3 (25 Aug 2020 01:53)  T(F): 98.1 (25 Aug 2020 05:40), Max: 99.2 (25 Aug 2020 01:53)  HR: 117 (25 Aug 2020 05:40) (109 - 130)  BP: 108/79 (25 Aug 2020 05:40) (105/67 - 171/118)  RR: 18 (25 Aug 2020 05:40) (18 - 18)  SpO2: 97% (25 Aug 2020 05:40) (96% - 98%)    Physical exam:   Gen appearance: Lying comfortably in bed, no acute distress, conversing; patient appears more alert and comfortable than yesterday, eating during interview  Neuro: AO x 3, no visible tremor, moving all four extremities well  CV: normal rate and rhythm no m/r/g, CTAB    Labs:                           9.3    7.80  )-----------( 186      ( 25 Aug 2020 10:49 )             31.1     08-25    131<L>  |  94<L>  |  9   ----------------------------<  128<H>  3.6   |  24  |  0.65    Ca    9.0      25 Aug 2020 10:49  Phos  2.3     08-25  Mg     1.3     08-25    TPro  6.8  /  Alb  3.5  /  TBili  0.5  /  DBili  x   /  AST  47<H>  /  ALT  41  /  AlkPhos  84  08-25    Imaging results:     TTE:  1. Normal left ventricular internal dimensions and wall  thickness.  2. Hyperdynamic left ventricular systolic function.  3. The right ventricle is not well visualized; grossly  normal right ventricular systolic function.  4. Estimated right ventricular systolic pressure equals 20  mm Hg, assuming right atrial pressure equals 8 mm Hg,  consistent with normal pulmonary pressures.  *** No previous Echo exam.  ------------------------------------------------------------------------  Confirmed on  8/24/2020 - 23:15:37 by Marcin Hanley M.D.    FINDINGS:    Liver is enlarged, measuring up to 18.9 cm in oblique sagittal dimension. Increased parenchymal echogenicity consistent with hepatic steatosis. Liver contour is smooth.  Bile ducts: Normal caliber. Common bile duct measures 3 mm.  Gallbladder: Nondistended gallbladder with multiple folds and wall thickness measuring up to 3 mm. Positive ultrasonographic Hillman's sign. No biliary sludge or cholelithiasis. No pericholecystic fluid collection.  Pancreas: Partially visualized.  Right kidney: 10.6 cm. No hydronephrosis.  Ascites: None.  IVC: Visualized portions are within normal limits.    IMPRESSION:    Mild hepatomegaly. Hepatic steatosis.    Nondistended gallbladder with wall thickness measuring up to 3 mm. No pericholecystic fluid collection. Positive sonographic Hillman's sign.    Consider further evaluation with HIDA scan.

## 2020-08-25 NOTE — DISCHARGE NOTE PROVIDER - NSDCMRMEDTOKEN_GEN_ALL_CORE_FT
Ambien 10 mg oral tablet: 1 tab(s) orally once a day (at bedtime)  lisinopril 20 mg oral tablet: 1 tab(s) orally once a day  Xanax 1 mg oral tablet: 1 tab(s) orally 2 times a day, As Needed Ambien 10 mg oral tablet: 1 tab(s) orally once a day (at bedtime)  cholecalciferol oral tablet: 2000 unit(s) orally once a day  escitalopram 5 mg oral tablet: 1 tab(s) orally once a day  ferrous sulfate 325 mg (65 mg elemental iron) oral tablet: 1 tab(s) orally once a day  folic acid 1 mg oral tablet: 1 tab(s) orally once a day  lisinopril 20 mg oral tablet: 1 tab(s) orally once a day  Multiple Vitamins oral tablet: 1 tab(s) orally once a day  thiamine 100 mg oral tablet: 1 tab(s) orally once a day  Xanax 1 mg oral tablet: 1 tab(s) orally 2 times a day, As Needed

## 2020-08-25 NOTE — PROGRESS NOTE ADULT - PROBLEM SELECTOR PLAN 3
CIWA protocol with Ativan taper  Thiamine/folic acid/ multivitamin qd  Sees psychiatrist outpatient, did not wish to speak to psych inpatient CIWA protocol   discontinued standing Ativan  Rescue Ativan for CIWA score >8  Thiamine/folic acid/ multivitamin qd  Sees psychiatrist outpatient, did not wish to speak to psych or social work inpatient

## 2020-08-25 NOTE — DISCHARGE NOTE PROVIDER - NSFOLLOWUPCLINICS_GEN_ALL_ED_FT
[facility]  Gastroenterology  [address] [place] [address] [address]  [place] [address]  Phone: [phone]  Fax:     [facility]  Cardiology  [address] [place] [id number]  Phone: [phone]  Fax:   Follow Up Time:

## 2020-08-25 NOTE — PROGRESS NOTE ADULT - PROBLEM SELECTOR PLAN 5
hgb 10 - microcytic   pt reports this is an increase since last hgb was measured at 7  history of anemia due to gyn bleeding, no evidence of current bleed, LMP 2 weeks ago  pt had extensive w/u on previous admission and no cause was found  iron studies - supplement with iron if indicate LOYDA hgb 10 - microcytic   pt reports this is an increase since last hgb was measured at 7  history of anemia due to gyn bleeding, no evidence of current bleed, LMP 2 weeks ago  pt had extensive w/u on previous admission and no cause was found  follow up iron studies - supplement with iron if indicate LOYDA

## 2020-08-25 NOTE — PROGRESS NOTE ADULT - PROBLEM SELECTOR PLAN 8
DVT ppx: subq heparin 5000u q8h  Diet: DASH DVT ppx: subq heparin 5000u q8h  patient refusing - encourage ambulation  Diet: DASH DVT ppx: Lovenox SQ  patient refusing - encourage ambulation  Diet: DASH

## 2020-08-25 NOTE — PROGRESS NOTE ADULT - PROBLEM SELECTOR PLAN 1
high sensitivity troponins negative x2  No ST segment elevation or T wave inversion seen on EKG  low level of suspicion for cardiac pain   Plan: continue telemetry, obtain echocardiogram  more likely 2/2 hiatal hernia vs alcoholic hepatitis vs acute on chronic pancreatitis vs cholecystitis high sensitivity troponins negative x2  No ST segment elevation or T wave inversion seen on EKG  echocardiogram results reassuring  low level of suspicion for cardiac pain   Plan: continue telemetry  more likely 2/2 hiatal hernia vs alcoholic hepatitis vs acute on chronic pancreatitis vs cholecystitis high sensitivity troponins negative x2  No ST segment elevation or T wave inversion seen on EKG  echocardiogram results reassuring  low level of suspicion for cardiac pain   Plan: continue telemetry  more likely 2/2 hiatal hernia exacerbated by EtOH consumption and withdrawal, and anxiety

## 2020-08-25 NOTE — DISCHARGE NOTE PROVIDER - CARE PROVIDERS DIRECT ADDRESSES
[email or web address]

## 2020-08-25 NOTE — DISCHARGE NOTE PROVIDER - HOSPITAL COURSE
Ms. Catherine Estrada is a 45 year old woman with a past medical history of hypertension, EtOH misuse, multiple hospitalizations for EtOH withdrawal, pancreatitis, anemia, and anxiety who presented to [facility] ED on 8/24/20 with a chief complaint of chest pain. She also complained of abdominal pain, nausea, and vomiting. She was found to be intoxicated with EtOH. Her CT scan was significant for a dilated gallbladder 4.1cm, fluid-filled hiatal hernia, and pancreatic atrophy. Her EKG showed sinus tachycardia with no arrhythmias, ST or T wave changes and high sensitivity troponins were normal twice. Echocardiogram was reassuring. She was started on a CIWA protocol with Ativan taper for EtOH withdrawal. Ms. Catherine Estrada is a 45 year old woman with a past medical history of hypertension, EtOH misuse, multiple hospitalizations for EtOH withdrawal, pancreatitis, anemia, and anxiety who presented to [facility] ED on 8/24/20 with a chief complaint of chest pain. She also complained of abdominal pain, nausea, and vomiting. She was found to be intoxicated with EtOH. Her CT scan was significant for a dilated gallbladder 4.1cm, fluid-filled hiatal hernia, and pancreatic atrophy. Her EKG showed sinus tachycardia with no arrhythmias, ST or T wave changes and high sensitivity troponins were normal twice. Echocardiogram was reassuring and RUQ US was positive for sonographic atwood sign in absence of stones or fluid. She was started on a CIWA protocol with Ativan taper for EtOH withdrawal. On 8/25 Ativan was d/c'ed. CIWA scores = 0. Pt anxious to leave, able to tolerate food, denies pain except when swallowing food bolus. She states that sinus tach is her baseline and she wishes to f/u with cardio outpatient. GI recommends EGD, she will f/u with her GI outpatient as well. She was noted to have anemia and low iron and vitamin D deficiency. In context of pancreatic atrophy pt will f/u with GI re vitamin D. Ms. Catherine Estrada is a 45 year old woman with a past medical history of hypertension, EtOH misuse, history of EtOH withdrawal/DT and MICU stay, pancreatitis, anemia, and anxiety who presented to [facility] ED on 8/24/20 with a chief complaint of chest pain. She also complained of abdominal pain, nausea, and vomiting. She was found to be intoxicated with EtOH and admitted to binge drinking for the past two weeks after leaving her job. Her CT scan was significant for a dilated gallbladder 4.1cm, fluid-filled hiatal hernia, and pancreatic atrophy. Her EKG showed sinus tachycardia with no arrhythmias, ST or T wave changes and high sensitivity troponins were normal twice. Echocardiogram was reassuring and RUQ US was positive for sonographic atwood sign in absence of stones or fluid. She was started on a CIWA protocol with Ativan taper for EtOH withdrawal. On 8/25 Ativan was d/c'ed. CIWA scores = 0. Pt wishes to leave, able to tolerate food, denies pain except when swallowing food bolus. She states that sinus tach is her baseline and she wishes to f/u with cardio outpatient. GI recommends EGD, she will f/u with her GI outpatient as well. She was noted to have anemia and low iron and vitamin D deficiency. In context of pancreatic atrophy pt will f/u with GI re vitamin D.

## 2020-08-25 NOTE — DISCHARGE NOTE PROVIDER - NSDCCPCAREPLAN_GEN_ALL_CORE_FT
PRINCIPAL DISCHARGE DIAGNOSIS  Diagnosis: Alcohol withdrawal  Assessment and Plan of Treatment: PRINCIPAL DISCHARGE DIAGNOSIS  Diagnosis: Alcohol withdrawal  Assessment and Plan of Treatment: Alcohol withdrawal occurs when you stop drinking, or you drink less while having alcohol dependence. Symptoms begin as your body tries to get used to this change. You will be diagnosed with alcohol withdrawal if you have at least two symptoms after you limit or stop drinking. Common symptoms include shaking, throwing up, and sweating. These symptoms may make you anxious and unable to work or be around others. Symptoms occur within several hours to a few days after stopping alcohol, and are not caused by other health problems.  Treatment for alcohol dependence and withdrawal includes medicines, detoxification, and therapy. Diagnosing and treating alcohol dependence and withdrawal as soon as possible may relieve or prevent symptoms. With treatment and care, your alcohol dependence and withdrawal may be controlled, and your quality of life improved.  Avoid drinking alcohol: Set a goal for yourself to completely stop drinking. This will stop you from being dependent on it. This will also prevent you from developing alcohol withdrawal and other more serious health problems. Ask your caregiver if you should more of other liquids, such as water. Avoid caffeine, which may be found in coffee, tea, and sports drinks.  CONTACT A CAREGIVER IF: You cannot make it to your next meeting with your caregiver.  You feel you cannot cope at home, work, or in school.  You have new symptoms since the last time you visited your caregiver. Your symptoms are getting worse.  You have questions or concerns about your alcohol dependence or withdrawal, medicine, or care.  SEEK CARE IMMEDIATELY IF: You just had a convulsion. You have trouble breathing, chest pains, or a fast heartbeat. You feel like hurting yourself or someone else. PRINCIPAL DISCHARGE DIAGNOSIS  Diagnosis: Alcohol withdrawal  Assessment and Plan of Treatment: Alcohol withdrawal occurs when you stop drinking, or you drink less while having alcohol dependence. Symptoms begin as your body tries to get used to this change. You will be diagnosed with alcohol withdrawal if you have at least two symptoms after you limit or stop drinking. Common symptoms include shaking, throwing up, and sweating. These symptoms may make you anxious and unable to work or be around others. Symptoms occur within several hours to a few days after stopping alcohol, and are not caused by other health problems.  Treatment for alcohol dependence and withdrawal includes medicines, detoxification, and therapy. Diagnosing and treating alcohol dependence and withdrawal as soon as possible may relieve or prevent symptoms. With treatment and care, your alcohol dependence and withdrawal may be controlled, and your quality of life improved.  Avoid drinking alcohol: Set a goal for yourself to completely stop drinking. This will stop you from being dependent on it. This will also prevent you from developing alcohol withdrawal and other more serious health problems. Ask your caregiver if you should more of other liquids, such as water. Avoid caffeine, which may be found in coffee, tea, and sports drinks.  CONTACT A CAREGIVER IF: You cannot make it to your next meeting with your caregiver.  You feel you cannot cope at home, work, or in school.  You have new symptoms since the last time you visited your caregiver. Your symptoms are getting worse.  You have questions or concerns about your alcohol dependence or withdrawal, medicine, or care.  SEEK CARE IMMEDIATELY IF: You just had a convulsion. You have trouble breathing, chest pains, or a fast heartbeat. You feel like hurting yourself or someone else.        SECONDARY DISCHARGE DIAGNOSES  Diagnosis: Abdominal pain  Assessment and Plan of Treatment: PRINCIPAL DISCHARGE DIAGNOSIS  Diagnosis: Alcohol withdrawal  Assessment and Plan of Treatment: Alcohol withdrawal occurs when you stop drinking, or you drink less while having alcohol dependence. Symptoms begin as your body tries to get used to this change. You will be diagnosed with alcohol withdrawal if you have at least two symptoms after you limit or stop drinking. Common symptoms include shaking, throwing up, and sweating. These symptoms may make you anxious and unable to work or be around others. Symptoms occur within several hours to a few days after stopping alcohol, and are not caused by other health problems.  Treatment for alcohol dependence and withdrawal includes medicines, detoxification, and therapy. Diagnosing and treating alcohol dependence and withdrawal as soon as possible may relieve or prevent symptoms. With treatment and care, your alcohol dependence and withdrawal may be controlled, and your quality of life improved.  Avoid drinking alcohol: Set a goal for yourself to completely stop drinking. This will stop you from being dependent on it. This will also prevent you from developing alcohol withdrawal and other more serious health problems. Ask your caregiver if you should more of other liquids, such as water. Avoid caffeine, which may be found in coffee, tea, and sports drinks.  CONTACT A CAREGIVER IF: You cannot make it to your next meeting with your caregiver.  You feel you cannot cope at home, work, or in school.  You have new symptoms since the last time you visited your caregiver. Your symptoms are getting worse.  You have questions or concerns about your alcohol dependence or withdrawal, medicine, or care.  SEEK CARE IMMEDIATELY IF: You just had a convulsion. You have trouble breathing, chest pains, or a fast heartbeat. You feel like hurting yourself or someone else.        SECONDARY DISCHARGE DIAGNOSES  Diagnosis: Abdominal pain  Assessment and Plan of Treatment: Follow up with your outpatient GI doctor.

## 2020-08-25 NOTE — PROGRESS NOTE ADULT - PROBLEM SELECTOR PLAN 2
hiatal hernia and dilated gallbladder both possible etiologies  acute pancreatitis less likely given normal lipase  trend AST and ALT  - GI consulted - per hepatology no need for intervention  - CT done 8/24 - no evidence acute GI pathology  - given elevated transaminases, check PT/INR hiatal hernia and dilated gallbladder both possible etiologies  acute pancreatitis less likely given normal lipase  hiatal hernia more likely given patient's pain with swallowing food  AST and ALT decreased since yesterday, continue to trend  - CT done 8/24 - no evidence acute GI pathology  - GI consulted - no acute intervention recommended - patient will contact outpatient GI doc for upper endoscopy

## 2020-08-25 NOTE — DISCHARGE NOTE PROVIDER - CARE PROVIDER_API CALL
Leti Shelley  INTERNAL MEDICINE  [address], [address]  [place] [address]  Phone: [phone]  Fax: [phone]  Follow Up Time:

## 2020-08-26 ENCOUNTER — TRANSCRIPTION ENCOUNTER (OUTPATIENT)
Age: 45
End: 2020-08-26

## 2020-08-26 VITALS
SYSTOLIC BLOOD PRESSURE: 129 MMHG | HEART RATE: 96 BPM | DIASTOLIC BLOOD PRESSURE: 93 MMHG | OXYGEN SATURATION: 98 % | RESPIRATION RATE: 18 BRPM

## 2020-08-26 PROCEDURE — 96374 THER/PROPH/DIAG INJ IV PUSH: CPT

## 2020-08-26 PROCEDURE — 96376 TX/PRO/DX INJ SAME DRUG ADON: CPT

## 2020-08-26 PROCEDURE — 82652 VIT D 1 25-DIHYDROXY: CPT

## 2020-08-26 PROCEDURE — 99239 HOSP IP/OBS DSCHRG MGMT >30: CPT | Mod: GC

## 2020-08-26 PROCEDURE — 84100 ASSAY OF PHOSPHORUS: CPT

## 2020-08-26 PROCEDURE — 83735 ASSAY OF MAGNESIUM: CPT

## 2020-08-26 PROCEDURE — 93306 TTE W/DOPPLER COMPLETE: CPT

## 2020-08-26 PROCEDURE — 82962 GLUCOSE BLOOD TEST: CPT

## 2020-08-26 PROCEDURE — 82803 BLOOD GASES ANY COMBINATION: CPT

## 2020-08-26 PROCEDURE — 80307 DRUG TEST PRSMV CHEM ANLYZR: CPT

## 2020-08-26 PROCEDURE — 81025 URINE PREGNANCY TEST: CPT

## 2020-08-26 PROCEDURE — 74176 CT ABD & PELVIS W/O CONTRAST: CPT

## 2020-08-26 PROCEDURE — 85027 COMPLETE CBC AUTOMATED: CPT

## 2020-08-26 PROCEDURE — 84484 ASSAY OF TROPONIN QUANT: CPT

## 2020-08-26 PROCEDURE — 80053 COMPREHEN METABOLIC PANEL: CPT

## 2020-08-26 PROCEDURE — 83550 IRON BINDING TEST: CPT

## 2020-08-26 PROCEDURE — 99285 EMERGENCY DEPT VISIT HI MDM: CPT | Mod: 25

## 2020-08-26 PROCEDURE — 82728 ASSAY OF FERRITIN: CPT

## 2020-08-26 PROCEDURE — 83540 ASSAY OF IRON: CPT

## 2020-08-26 PROCEDURE — 83690 ASSAY OF LIPASE: CPT

## 2020-08-26 PROCEDURE — 96375 TX/PRO/DX INJ NEW DRUG ADDON: CPT

## 2020-08-26 PROCEDURE — 71045 X-RAY EXAM CHEST 1 VIEW: CPT

## 2020-08-26 PROCEDURE — 93005 ELECTROCARDIOGRAM TRACING: CPT

## 2020-08-26 PROCEDURE — 85610 PROTHROMBIN TIME: CPT

## 2020-08-26 PROCEDURE — 84702 CHORIONIC GONADOTROPIN TEST: CPT

## 2020-08-26 PROCEDURE — 76705 ECHO EXAM OF ABDOMEN: CPT

## 2020-08-26 RX ORDER — FERROUS SULFATE 325(65) MG
1 TABLET ORAL
Qty: 30 | Refills: 0
Start: 2020-08-26 | End: 2020-09-24

## 2020-08-26 RX ORDER — ESCITALOPRAM OXALATE 10 MG/1
1 TABLET, FILM COATED ORAL
Qty: 14 | Refills: 0
Start: 2020-08-26 | End: 2020-09-08

## 2020-08-26 RX ORDER — CHOLECALCIFEROL (VITAMIN D3) 125 MCG
2000 CAPSULE ORAL
Qty: 1 | Refills: 0
Start: 2020-08-26 | End: 2020-09-24

## 2020-08-26 RX ORDER — CHOLECALCIFEROL (VITAMIN D3) 125 MCG
2000 CAPSULE ORAL DAILY
Refills: 0 | Status: DISCONTINUED | OUTPATIENT
Start: 2020-08-26 | End: 2020-08-26

## 2020-08-26 RX ADMIN — PANTOPRAZOLE SODIUM 40 MILLIGRAM(S): 20 TABLET, DELAYED RELEASE ORAL at 05:49

## 2020-08-26 NOTE — PROGRESS NOTE ADULT - PROBLEM SELECTOR PLAN 2
hiatal hernia and dilated gallbladder both possible etiologies  acute pancreatitis less likely given normal lipase  hiatal hernia more likely given patient's pain with swallowing food  AST and ALT came down to essentially normal range  - CT done 8/24 - no evidence acute GI pathology  - GI consulted - no acute intervention recommended - patient will contact outpatient GI doc for EGD  - patient is relatively comfortable today and has been keeping food down. hiatal hernia and dilated gallbladder both possible etiologies  acute pancreatitis less likely given normal lipase  hiatal hernia more likely given patient's pain with swallowing food  AST and ALT came down to essentially normal range  - CT done 8/24 - no evidence acute GI pathology  - GI consulted - no acute intervention recommended - patient will contact outpatient GI doc for EGD  - patient is relatively comfortable today and has been keeping food down.   - she has omeprazole at home and will continue to take for symptomatic relief hiatal hernia and dilated gallbladder both possible etiologies  acute pancreatitis less likely given normal lipase  hiatal hernia more likely given patient's pain with swallowing food  AST and ALT came down to essentially normal range  - CT done 8/24 - no evidence acute GI pathology  - GI consulted - no acute intervention recommended - patient will contact outpatient GI doc for EGD  - patient is relatively comfortable today and has been keeping food down.   - she has omeprazole at home and will continue to take for management.

## 2020-08-26 NOTE — PROGRESS NOTE ADULT - PROBLEM SELECTOR PLAN 3
CIWA protocol - scores have been 0-1  discontinued standing Ativan  Rescue Ativan for CIWA score >8  Thiamine/folic acid/ multivitamin qd  Sees psychiatrist outpatient, did not wish to speak to psych or social work inpatient CIWA protocol - scores have been 0-1  discontinued standing Ativan  Rescue Ativan PRN for CIWA score >8; did not require further PRN overnight.   Thiamine/folic acid/ multivitamin qd  Sees psychiatrist outpatient, did not wish to speak to psych or social work inpatient  -Started lexapro 5mg daily and advised close outpatient psych f/u.

## 2020-08-26 NOTE — PROGRESS NOTE ADULT - SUBJECTIVE AND OBJECTIVE BOX
45y woman PMH longstanding EtOH abuse w/ multiple hospitalizations for withdrawal and prior pancreatitis, hypertension, history gyn bleed 2/2 adenomyosis, presented with chest/epigastric pain in context of acute EtOH intoxication, found to have hiatal hernia and dilated gallbladder on CT noncon.    O/N: no events. Patient is feeling well. She denies significant nausea, has been eating and keeping food down. Has some pain with swallowing food, however this is tolerable. Denies pain otherwise. She has no other complaints or concerns. On telemetry no arrhythmias reported, sinus tach 90s-110s overnight. CIWA scores trending down, several most recent have been 0.     ICU Vital Signs Last 24 Hrs  T(C): 36.7 (26 Aug 2020 04:44), Max: 36.8 (25 Aug 2020 12:27)  T(F): 98 (26 Aug 2020 04:44), Max: 98.2 (25 Aug 2020 12:27)  HR: 95 (26 Aug 2020 04:44) (95 - 112)  BP: 121/83 (26 Aug 2020 04:44) (97/68 - 121/83)  RR: 18 (26 Aug 2020 04:44) (17 - 18)  SpO2: 98% (26 Aug 2020 04:44) (95% - 98%)      Physical exam:   Gen appearance: Lying comfortably in bed, no acute distress, conversing, eating during interview  Neuro: AO x 3, no visible tremor, moving all four extremities well  CV: normal rate and rhythm no m/r/g, CTAB anteriorly  Abd: nontender nondistended      Labs:     PT/INR - ( 25 Aug 2020 17:05 )   PT: 13.2 sec;   INR: 1.12 ratio      Iron with Total Binding Capacity in AM (08.25.20 @ 15:07)    % Saturation, Iron: 9 %    Iron - Total Binding Capacity.: 293 ug/dL    Iron Total, Serum: 27 ug/dL    Unsaturated Iron Binding Capacity: 266 ug/dL    Vitamin D, 1, 25-Dihydroxy (08.25.20 @ 15:04)    Vitamin D, 1, 25-Dihydroxy: 17.4 pg/mL                         9.3    7.80  )-----------( 186      ( 25 Aug 2020 10:49 )             31.1     08-25    131<L>  |  94<L>  |  9   ----------------------------<  128<H>  3.6   |  24  |  0.65    Ca    9.0      25 Aug 2020 10:49  Phos  2.3     08-25  Mg     1.3     08-25    TPro  6.8  /  Alb  3.5  /  TBili  0.5  /  DBili  x   /  AST  47<H>  /  ALT  41  /  AlkPhos  84  08-25    Imaging results:     TTE:  1. Normal left ventricular internal dimensions and wall  thickness.  2. Hyperdynamic left ventricular systolic function.  3. The right ventricle is not well visualized; grossly  normal right ventricular systolic function.  4. Estimated right ventricular systolic pressure equals 20  mm Hg, assuming right atrial pressure equals 8 mm Hg,  consistent with normal pulmonary pressures.  *** No previous Echo exam.  ------------------------------------------------------------------------  Confirmed on  8/24/2020 - 23:15:37 by Marcin Hanley M.D.    FINDINGS:    Liver is enlarged, measuring up to 18.9 cm in oblique sagittal dimension. Increased parenchymal echogenicity consistent with hepatic steatosis. Liver contour is smooth.  Bile ducts: Normal caliber. Common bile duct measures 3 mm.  Gallbladder: Nondistended gallbladder with multiple folds and wall thickness measuring up to 3 mm. Positive ultrasonographic Hillman's sign. No biliary sludge or cholelithiasis. No pericholecystic fluid collection.  Pancreas: Partially visualized.  Right kidney: 10.6 cm. No hydronephrosis.  Ascites: None.  IVC: Visualized portions are within normal limits.    IMPRESSION:    Mild hepatomegaly. Hepatic steatosis.    Nondistended gallbladder with wall thickness measuring up to 3 mm. No pericholecystic fluid collection. Positive sonographic Hillman's sign.    Consider further evaluation with HIDA scan. 45y woman PMH longstanding EtOH abuse w/ multiple hospitalizations for withdrawal and prior pancreatitis, hypertension, history gyn bleed 2/2 adenomyosis, presented with chest/epigastric pain in context of acute EtOH intoxication, found to have hiatal hernia and dilated gallbladder on CT noncon.    O/N: no events. Patient is feeling well. She denies significant nausea, has been eating and keeping food down. Has some pain with swallowing food, however this is tolerable for her. Denies pain otherwise. She has no other complaints or concerns. On telemetry no arrhythmias reported, sinus tach 90s-110s overnight. CIWA scores trending down, two most recent scores have been 0.     ICU Vital Signs Last 24 Hrs  T(C): 36.7 (26 Aug 2020 04:44), Max: 36.8 (25 Aug 2020 12:27)  T(F): 98 (26 Aug 2020 04:44), Max: 98.2 (25 Aug 2020 12:27)  HR: 95 (26 Aug 2020 04:44) (95 - 112)  BP: 121/83 (26 Aug 2020 04:44) (97/68 - 121/83)  RR: 18 (26 Aug 2020 04:44) (17 - 18)  SpO2: 98% (26 Aug 2020 04:44) (95% - 98%)      Physical exam:   Gen appearance: Lying comfortably in bed, no acute distress, conversing, eating during interview  Neuro: AO x 3, no visible tremor, moving all four extremities well  CV: normal rate and rhythm no m/r/g, CTAB anteriorly  Abd: nontender nondistended no tenderness to palpation in epigastrium       Labs:     PT/INR - ( 25 Aug 2020 17:05 )   PT: 13.2 sec;   INR: 1.12 ratio      Iron with Total Binding Capacity in AM (08.25.20 @ 15:07)    % Saturation, Iron: 9 %    Iron - Total Binding Capacity.: 293 ug/dL    Iron Total, Serum: 27 ug/dL    Unsaturated Iron Binding Capacity: 266 ug/dL    Vitamin D, 1, 25-Dihydroxy (08.25.20 @ 15:04)    Vitamin D, 1, 25-Dihydroxy: 17.4 pg/mL                         9.3    7.80  )-----------( 186      ( 25 Aug 2020 10:49 )             31.1     08-25    131<L>  |  94<L>  |  9   ----------------------------<  128<H>  3.6   |  24  |  0.65    Ca    9.0      25 Aug 2020 10:49  Phos  2.3     08-25  Mg     1.3     08-25    TPro  6.8  /  Alb  3.5  /  TBili  0.5  /  DBili  x   /  AST  47<H>  /  ALT  41  /  AlkPhos  84  08-25    Imaging results:     TTE:  1. Normal left ventricular internal dimensions and wall  thickness.  2. Hyperdynamic left ventricular systolic function.  3. The right ventricle is not well visualized; grossly  normal right ventricular systolic function.  4. Estimated right ventricular systolic pressure equals 20  mm Hg, assuming right atrial pressure equals 8 mm Hg,  consistent with normal pulmonary pressures.  *** No previous Echo exam.  ------------------------------------------------------------------------  Confirmed on  8/24/2020 - 23:15:37 by Marcin Hanley M.D.    FINDINGS:    Liver is enlarged, measuring up to 18.9 cm in oblique sagittal dimension. Increased parenchymal echogenicity consistent with hepatic steatosis. Liver contour is smooth.  Bile ducts: Normal caliber. Common bile duct measures 3 mm.  Gallbladder: Nondistended gallbladder with multiple folds and wall thickness measuring up to 3 mm. Positive ultrasonographic Hillman's sign. No biliary sludge or cholelithiasis. No pericholecystic fluid collection.  Pancreas: Partially visualized.  Right kidney: 10.6 cm. No hydronephrosis.  Ascites: None.  IVC: Visualized portions are within normal limits.    IMPRESSION:    Mild hepatomegaly. Hepatic steatosis.    Nondistended gallbladder with wall thickness measuring up to 3 mm. No pericholecystic fluid collection. Positive sonographic Hillman's sign.    Consider further evaluation with HIDA scan.

## 2020-08-26 NOTE — PROGRESS NOTE ADULT - ATTENDING COMMENTS
-Patient seen/examined on 8/26/20. Case/plan discussed with the sub-intern and resident as reviewed/edited by me above and in any comments below.  -Patient not currently having tremor or HA or N/V. Says epigastric discomfort better. Tolerating PO intake. HR better. Did not use PRN Ativan.   -Counseled patient again on alcohol cessation. C/w Lexapro 5mg daily until outpatient psych follow up. Advised her to see her psychiatrist in about 2 weeks.   -C/w PPI. Advised patient to follow up with GI for EGD outpatient.   -Gave patient copies of her echo, CT, and US reports.   -Patient initially left the hospital prior to signing her DC paperwork, but reportedly returned to sign it after the nurse called her back. Concern for alcohol relapse.   -38 minutes spent on the discharge process.
-Patient seen/examined on 8/25/20. Case/plan discussed with the sub-intern and resident as reviewed/edited by me above and in any comments below.   -Patient states she has a history of sinus tachycardia regardless of alcohol withdrawal.   -Hepatology/GI recs appreciated; patient recommended to have an EGD outpatient. -C/w PPI.   -Patient wanted to leave today, but I am concerned about withdrawal and PO tolerance. She agreed to be monitored overnight off of standing Ativan. Will watch CIWA's and see if patient requires any Ativan PRN. If otherwise stable/improved tomorrow will plan for DC.   -Patient states she has a support group she attends for help with alcohol cessation. Offered her further services. Counseled on alcohol cessation.   -She admits to underlying anxiety and her psychiatrist was going to start her on an anti-depressant. Will start Lexapro 5mg daily and advised her to see her psychiatrist outpatient.

## 2020-08-26 NOTE — PROGRESS NOTE ADULT - PROBLEM SELECTOR PLAN 6
Pt sees psychiatrist who prescribes her alprazolam PO 1mg PRN  Started Lexapro 5mg qd Pt sees psychiatrist who prescribes her alprazolam PO 1mg PRN. (iSTOP in chart).   Started Lexapro 5mg qd with outpatient follow up.

## 2020-08-26 NOTE — PROGRESS NOTE ADULT - PROBLEM SELECTOR PLAN 7
continue home lisinopril 20mg PO qd  hold for SBP <90
continue home lisinopril 20mg PO qd  hold for SBP <90

## 2020-08-26 NOTE — PROGRESS NOTE ADULT - PROBLEM SELECTOR PLAN 5
hgb 10 - microcytic   pt reports this is an increase since last hgb was measured at 7  history of anemia due to gyn bleeding, no evidence of current bleed, LMP 2 weeks ago  pt had extensive w/u on previous admission and no cause was found  follow up iron studies - supplement with iron if indicate LOYDA hgb 10 - microcytic   pt reports this is an increase since last hgb was measured at 7  history of anemia due to gyn bleeding, no evidence of current bleed, LMP 2 weeks ago  pt had extensive w/u on previous admission and no cause was found  iron low at 27 with normal ferritin and TIBC  oral iron supplementation hgb 10 - microcytic   pt reports this is an increase since last hgb was measured at 7  history of anemia due to gyn bleeding, no evidence of current bleed, LMP 2 weeks ago  pt had extensive w/u on previous admission and no cause was found  iron low at 27 with normal ferritin and TIBC  oral iron supplementation for LOYDA.

## 2020-08-26 NOTE — PROGRESS NOTE ADULT - PROBLEM SELECTOR PLAN 4
pancreatic atrophy seen on CT  follow up vitamin D level as indicator for fat malabsorption  follow up stool fat content pancreatic atrophy seen on CT  vitamin D deficient = 17 (19.9 is low end normal)  vitamin D supplementation  follow up stool fat content pancreatic atrophy seen on CT  vitamin D deficient = 17 (19.9 is low end normal)  vitamin D supplementation  f/u outpatient with GI doc re pancreatic atrophy  follow up stool fat content pancreatic atrophy seen on CT  vitamin D deficient = 17 (19.9 is low end normal)  vitamin D supplementation  f/u outpatient with GI doc re pancreatic atrophy

## 2020-08-26 NOTE — PROGRESS NOTE ADULT - PROBLEM SELECTOR PLAN 1
high sensitivity troponins negative x2  No ST segment elevation or T wave inversion seen on EKG  echocardiogram results reassuring  low level of suspicion for cardiac pain   tachycardia is improving  more likely 2/2 hiatal hernia exacerbated by EtOH consumption and withdrawal, and anxiety  plan: patient will follow up with cardiology outpatient for tachycardia high sensitivity troponins negative x3  No ST segment elevation or T wave inversion seen on EKG  echocardiogram results reassuring  low level of suspicion for cardiac pain   tachycardia is improving  more likely 2/2 hiatal hernia exacerbated by EtOH consumption and withdrawal, and anxiety  plan: patient will follow up with cardiology outpatient for tachycardia

## 2020-08-26 NOTE — PROGRESS NOTE ADULT - ASSESSMENT
46 y/o woman with GERD and longstanding history of alcohol abuse s/p multiple admissions for EtOH withdrawal and prior episode of pancreatitis presenting with chest pain, found to be acutely intoxicated, with evidence of hiatal hernia and biliary distention (no stone or inflammation) seen on CT noncon. Pain likely 2/2 hiatal hernia, given that it is brought on by swallowing food and improved by swallowing cold drinks. EtOH and anxiety may have contributed. Today patient is feeling well and tolerating food. Her HR this morning was in the normal range (95) and her tachycardia was improved overnight; she has explained that at baseline she has tachycardia. Her CIWA scores have been 0-1 without standing or PRN Ativan. Given that patient is comfortable, tachycardia is consistent with her baseline, and CIWA scores low, OK for discharge home today. 46 y/o woman with GERD and longstanding history of alcohol abuse s/p multiple admissions for EtOH withdrawal and prior episode of pancreatitis presenting with chest pain, found to be acutely intoxicated, with evidence of hiatal hernia and biliary distention (no stone or inflammation) seen on CT noncon. Pain likely 2/2 hiatal hernia, given that it is brought on by swallowing food and improved by swallowing cold drinks. EtOH and anxiety may have contributed. Today patient is feeling well and tolerating food. Her HR this morning was in the normal range (95) and her tachycardia was improved overnight from yesterday; she has stated that at baseline she has tachycardia. Her CIWA scores have been 0-1 without standing or PRN Ativan. Given that patient is comfortable, tachycardia is consistent with her baseline, and CIWA scores low, OK for discharge home today.

## 2020-08-26 NOTE — DISCHARGE NOTE NURSING/CASE MANAGEMENT/SOCIAL WORK - PATIENT PORTAL LINK FT
You can access the FollowMyHealth Patient Portal offered by [facility] by registering at the following website: [email or web address]. By joining [facility]’s FollowMyHealth portal, you will also be able to view your health information using other applications (apps) compatible with our system.

## 2020-10-13 ENCOUNTER — INPATIENT (INPATIENT)
Facility: HOSPITAL | Age: 45
LOS: 1 days | Discharge: LEFT BEFORE DISCHARGE | DRG: 894 | End: 2020-10-15
Attending: STUDENT IN AN ORGANIZED HEALTH CARE EDUCATION/TRAINING PROGRAM | Admitting: HOSPITALIST
Payer: MEDICAID

## 2020-10-13 VITALS
TEMPERATURE: 98 F | RESPIRATION RATE: 20 BRPM | WEIGHT: 179.9 LBS | SYSTOLIC BLOOD PRESSURE: 154 MMHG | DIASTOLIC BLOOD PRESSURE: 116 MMHG | OXYGEN SATURATION: 96 % | HEIGHT: 67 IN | HEART RATE: 114 BPM

## 2020-10-13 DIAGNOSIS — F10.929 ALCOHOL USE, UNSPECIFIED WITH INTOXICATION, UNSPECIFIED: ICD-10-CM

## 2020-10-13 DIAGNOSIS — Z98.890 OTHER SPECIFIED POSTPROCEDURAL STATES: Chronic | ICD-10-CM

## 2020-10-13 LAB
ALBUMIN SERPL ELPH-MCNC: 4.3 G/DL — SIGNIFICANT CHANGE UP (ref 3.3–5)
ALP SERPL-CCNC: 64 U/L — SIGNIFICANT CHANGE UP (ref 40–120)
ALT FLD-CCNC: 42 U/L — SIGNIFICANT CHANGE UP (ref 10–45)
ANION GAP SERPL CALC-SCNC: 22 MMOL/L — HIGH (ref 5–17)
APAP SERPL-MCNC: <15 UG/ML — SIGNIFICANT CHANGE UP (ref 10–30)
APTT BLD: 29.6 SEC — SIGNIFICANT CHANGE UP (ref 27.5–35.5)
AST SERPL-CCNC: 71 U/L — HIGH (ref 10–40)
BASE EXCESS BLDV CALC-SCNC: -0.2 MMOL/L — SIGNIFICANT CHANGE UP (ref -2–2)
BASE EXCESS BLDV CALC-SCNC: 0.6 MMOL/L — SIGNIFICANT CHANGE UP (ref -2–2)
BASE EXCESS BLDV CALC-SCNC: 0.9 MMOL/L — SIGNIFICANT CHANGE UP (ref -2–2)
BASE EXCESS BLDV CALC-SCNC: 1.3 MMOL/L — SIGNIFICANT CHANGE UP (ref -2–2)
BASOPHILS # BLD AUTO: 0.09 K/UL — SIGNIFICANT CHANGE UP (ref 0–0.2)
BASOPHILS NFR BLD AUTO: 1.4 % — SIGNIFICANT CHANGE UP (ref 0–2)
BILIRUB SERPL-MCNC: 0.3 MG/DL — SIGNIFICANT CHANGE UP (ref 0.2–1.2)
BUN SERPL-MCNC: 11 MG/DL — SIGNIFICANT CHANGE UP (ref 7–23)
CA-I SERPL-SCNC: 0.97 MMOL/L — LOW (ref 1.12–1.3)
CA-I SERPL-SCNC: 1 MMOL/L — LOW (ref 1.12–1.3)
CA-I SERPL-SCNC: 1 MMOL/L — LOW (ref 1.12–1.3)
CA-I SERPL-SCNC: 1.01 MMOL/L — LOW (ref 1.12–1.3)
CALCIUM SERPL-MCNC: 8.7 MG/DL — SIGNIFICANT CHANGE UP (ref 8.4–10.5)
CHLORIDE BLDV-SCNC: 104 MMOL/L — SIGNIFICANT CHANGE UP (ref 96–108)
CHLORIDE BLDV-SCNC: 107 MMOL/L — SIGNIFICANT CHANGE UP (ref 96–108)
CHLORIDE BLDV-SCNC: 107 MMOL/L — SIGNIFICANT CHANGE UP (ref 96–108)
CHLORIDE BLDV-SCNC: 108 MMOL/L — SIGNIFICANT CHANGE UP (ref 96–108)
CHLORIDE SERPL-SCNC: 102 MMOL/L — SIGNIFICANT CHANGE UP (ref 96–108)
CO2 BLDV-SCNC: 25 MMOL/L — SIGNIFICANT CHANGE UP (ref 22–30)
CO2 BLDV-SCNC: 26 MMOL/L — SIGNIFICANT CHANGE UP (ref 22–30)
CO2 BLDV-SCNC: 27 MMOL/L — SIGNIFICANT CHANGE UP (ref 22–30)
CO2 BLDV-SCNC: 28 MMOL/L — SIGNIFICANT CHANGE UP (ref 22–30)
CO2 SERPL-SCNC: 21 MMOL/L — LOW (ref 22–31)
CREAT SERPL-MCNC: 0.55 MG/DL — SIGNIFICANT CHANGE UP (ref 0.5–1.3)
EOSINOPHIL # BLD AUTO: 0.33 K/UL — SIGNIFICANT CHANGE UP (ref 0–0.5)
EOSINOPHIL NFR BLD AUTO: 5.3 % — SIGNIFICANT CHANGE UP (ref 0–6)
ETHANOL SERPL-MCNC: 431 MG/DL — HIGH (ref 0–10)
GAS PNL BLDV: 139 MMOL/L — SIGNIFICANT CHANGE UP (ref 135–145)
GAS PNL BLDV: 146 MMOL/L — HIGH (ref 135–145)
GAS PNL BLDV: 147 MMOL/L — HIGH (ref 135–145)
GAS PNL BLDV: 148 MMOL/L — HIGH (ref 135–145)
GAS PNL BLDV: SIGNIFICANT CHANGE UP
GLUCOSE BLDV-MCNC: 102 MG/DL — HIGH (ref 70–99)
GLUCOSE BLDV-MCNC: 90 MG/DL — SIGNIFICANT CHANGE UP (ref 70–99)
GLUCOSE BLDV-MCNC: 90 MG/DL — SIGNIFICANT CHANGE UP (ref 70–99)
GLUCOSE BLDV-MCNC: 96 MG/DL — SIGNIFICANT CHANGE UP (ref 70–99)
GLUCOSE SERPL-MCNC: 96 MG/DL — SIGNIFICANT CHANGE UP (ref 70–99)
HCO3 BLDV-SCNC: 24 MMOL/L — SIGNIFICANT CHANGE UP (ref 21–29)
HCO3 BLDV-SCNC: 25 MMOL/L — SIGNIFICANT CHANGE UP (ref 21–29)
HCO3 BLDV-SCNC: 26 MMOL/L — SIGNIFICANT CHANGE UP (ref 21–29)
HCO3 BLDV-SCNC: 26 MMOL/L — SIGNIFICANT CHANGE UP (ref 21–29)
HCT VFR BLD CALC: 40.6 % — SIGNIFICANT CHANGE UP (ref 34.5–45)
HCT VFR BLDA CALC: 32 % — LOW (ref 39–50)
HCT VFR BLDA CALC: 36 % — LOW (ref 39–50)
HCT VFR BLDA CALC: 37 % — LOW (ref 39–50)
HCT VFR BLDA CALC: 38 % — LOW (ref 39–50)
HGB BLD CALC-MCNC: 10.3 G/DL — LOW (ref 11.5–15.5)
HGB BLD CALC-MCNC: 11.8 G/DL — SIGNIFICANT CHANGE UP (ref 11.5–15.5)
HGB BLD CALC-MCNC: 12.1 G/DL — SIGNIFICANT CHANGE UP (ref 11.5–15.5)
HGB BLD CALC-MCNC: 12.4 G/DL — SIGNIFICANT CHANGE UP (ref 11.5–15.5)
HGB BLD-MCNC: 12.1 G/DL — SIGNIFICANT CHANGE UP (ref 11.5–15.5)
IMM GRANULOCYTES NFR BLD AUTO: 0.2 % — SIGNIFICANT CHANGE UP (ref 0–1.5)
INR BLD: 1.01 RATIO — SIGNIFICANT CHANGE UP (ref 0.88–1.16)
LACTATE BLDV-MCNC: 5.7 MMOL/L — CRITICAL HIGH (ref 0.7–2)
LACTATE BLDV-MCNC: 6 MMOL/L — CRITICAL HIGH (ref 0.7–2)
LACTATE BLDV-MCNC: 6.1 MMOL/L — CRITICAL HIGH (ref 0.7–2)
LACTATE BLDV-MCNC: 6.6 MMOL/L — CRITICAL HIGH (ref 0.7–2)
LYMPHOCYTES # BLD AUTO: 1.69 K/UL — SIGNIFICANT CHANGE UP (ref 1–3.3)
LYMPHOCYTES # BLD AUTO: 27 % — SIGNIFICANT CHANGE UP (ref 13–44)
MAGNESIUM SERPL-MCNC: 1.8 MG/DL — SIGNIFICANT CHANGE UP (ref 1.6–2.6)
MCHC RBC-ENTMCNC: 22.4 PG — LOW (ref 27–34)
MCHC RBC-ENTMCNC: 29.8 GM/DL — LOW (ref 32–36)
MCV RBC AUTO: 75.3 FL — LOW (ref 80–100)
MONOCYTES # BLD AUTO: 0.19 K/UL — SIGNIFICANT CHANGE UP (ref 0–0.9)
MONOCYTES NFR BLD AUTO: 3 % — SIGNIFICANT CHANGE UP (ref 2–14)
NEUTROPHILS # BLD AUTO: 3.95 K/UL — SIGNIFICANT CHANGE UP (ref 1.8–7.4)
NEUTROPHILS NFR BLD AUTO: 63.1 % — SIGNIFICANT CHANGE UP (ref 43–77)
NRBC # BLD: 0 /100 WBCS — SIGNIFICANT CHANGE UP (ref 0–0)
OTHER CELLS CSF MANUAL: 14 ML/DL — LOW (ref 18–22)
PCO2 BLDV: 38 MMHG — SIGNIFICANT CHANGE UP (ref 35–50)
PCO2 BLDV: 40 MMHG — SIGNIFICANT CHANGE UP (ref 35–50)
PCO2 BLDV: 43 MMHG — SIGNIFICANT CHANGE UP (ref 35–50)
PCO2 BLDV: 47 MMHG — SIGNIFICANT CHANGE UP (ref 35–50)
PH BLDV: 7.37 — SIGNIFICANT CHANGE UP (ref 7.35–7.45)
PH BLDV: 7.39 — SIGNIFICANT CHANGE UP (ref 7.35–7.45)
PH BLDV: 7.41 — SIGNIFICANT CHANGE UP (ref 7.35–7.45)
PH BLDV: 7.41 — SIGNIFICANT CHANGE UP (ref 7.35–7.45)
PLATELET # BLD AUTO: 308 K/UL — SIGNIFICANT CHANGE UP (ref 150–400)
PO2 BLDV: 31 MMHG — SIGNIFICANT CHANGE UP (ref 25–45)
PO2 BLDV: 47 MMHG — HIGH (ref 25–45)
PO2 BLDV: 60 MMHG — HIGH (ref 25–45)
PO2 BLDV: 70 MMHG — HIGH (ref 25–45)
POTASSIUM BLDV-SCNC: 3.9 MMOL/L — SIGNIFICANT CHANGE UP (ref 3.5–5.3)
POTASSIUM BLDV-SCNC: 4 MMOL/L — SIGNIFICANT CHANGE UP (ref 3.5–5.3)
POTASSIUM SERPL-MCNC: 3.8 MMOL/L — SIGNIFICANT CHANGE UP (ref 3.5–5.3)
POTASSIUM SERPL-SCNC: 3.8 MMOL/L — SIGNIFICANT CHANGE UP (ref 3.5–5.3)
PROT SERPL-MCNC: 7.7 G/DL — SIGNIFICANT CHANGE UP (ref 6–8.3)
PROTHROM AB SERPL-ACNC: 12.1 SEC — SIGNIFICANT CHANGE UP (ref 10.6–13.6)
RBC # BLD: 5.39 M/UL — HIGH (ref 3.8–5.2)
RBC # FLD: 19.1 % — HIGH (ref 10.3–14.5)
SALICYLATES SERPL-MCNC: <2 MG/DL — LOW (ref 15–30)
SAO2 % BLDV: 43 % — LOW (ref 67–88)
SAO2 % BLDV: 74 % — SIGNIFICANT CHANGE UP (ref 67–88)
SAO2 % BLDV: 88 % — SIGNIFICANT CHANGE UP (ref 67–88)
SAO2 % BLDV: 90 % — HIGH (ref 67–88)
SARS-COV-2 RNA SPEC QL NAA+PROBE: SIGNIFICANT CHANGE UP
SODIUM SERPL-SCNC: 145 MMOL/L — SIGNIFICANT CHANGE UP (ref 135–145)
WBC # BLD: 6.26 K/UL — SIGNIFICANT CHANGE UP (ref 3.8–10.5)
WBC # FLD AUTO: 6.26 K/UL — SIGNIFICANT CHANGE UP (ref 3.8–10.5)

## 2020-10-13 PROCEDURE — 71045 X-RAY EXAM CHEST 1 VIEW: CPT | Mod: 26

## 2020-10-13 PROCEDURE — 93010 ELECTROCARDIOGRAM REPORT: CPT

## 2020-10-13 PROCEDURE — 99285 EMERGENCY DEPT VISIT HI MDM: CPT

## 2020-10-13 PROCEDURE — 74177 CT ABD & PELVIS W/CONTRAST: CPT | Mod: 26

## 2020-10-13 RX ORDER — FAMOTIDINE 10 MG/ML
20 INJECTION INTRAVENOUS ONCE
Refills: 0 | Status: COMPLETED | OUTPATIENT
Start: 2020-10-13 | End: 2020-10-13

## 2020-10-13 RX ORDER — ACETAMINOPHEN 500 MG
650 TABLET ORAL ONCE
Refills: 0 | Status: COMPLETED | OUTPATIENT
Start: 2020-10-13 | End: 2020-10-13

## 2020-10-13 RX ORDER — THIAMINE MONONITRATE (VIT B1) 100 MG
100 TABLET ORAL ONCE
Refills: 0 | Status: COMPLETED | OUTPATIENT
Start: 2020-10-13 | End: 2020-10-13

## 2020-10-13 RX ORDER — SODIUM CHLORIDE 9 MG/ML
1000 INJECTION, SOLUTION INTRAVENOUS ONCE
Refills: 0 | Status: COMPLETED | OUTPATIENT
Start: 2020-10-13 | End: 2020-10-13

## 2020-10-13 RX ORDER — SODIUM CHLORIDE 9 MG/ML
1000 INJECTION INTRAMUSCULAR; INTRAVENOUS; SUBCUTANEOUS ONCE
Refills: 0 | Status: COMPLETED | OUTPATIENT
Start: 2020-10-13 | End: 2020-10-13

## 2020-10-13 RX ORDER — ONDANSETRON 8 MG/1
4 TABLET, FILM COATED ORAL ONCE
Refills: 0 | Status: COMPLETED | OUTPATIENT
Start: 2020-10-13 | End: 2020-10-14

## 2020-10-13 RX ORDER — SODIUM CHLORIDE 9 MG/ML
1000 INJECTION, SOLUTION INTRAVENOUS
Refills: 0 | Status: DISCONTINUED | OUTPATIENT
Start: 2020-10-13 | End: 2020-10-14

## 2020-10-13 RX ADMIN — FAMOTIDINE 20 MILLIGRAM(S): 10 INJECTION INTRAVENOUS at 20:27

## 2020-10-13 RX ADMIN — SODIUM CHLORIDE 1000 MILLILITER(S): 9 INJECTION INTRAMUSCULAR; INTRAVENOUS; SUBCUTANEOUS at 22:42

## 2020-10-13 RX ADMIN — SODIUM CHLORIDE 1000 MILLILITER(S): 9 INJECTION, SOLUTION INTRAVENOUS at 20:27

## 2020-10-13 RX ADMIN — Medication 0.5 MILLIGRAM(S): at 22:17

## 2020-10-13 RX ADMIN — Medication 100 MILLIGRAM(S): at 20:26

## 2020-10-13 RX ADMIN — SODIUM CHLORIDE 1000 MILLILITER(S): 9 INJECTION, SOLUTION INTRAVENOUS at 22:42

## 2020-10-13 RX ADMIN — SODIUM CHLORIDE 100 MILLILITER(S): 9 INJECTION, SOLUTION INTRAVENOUS at 22:43

## 2020-10-13 RX ADMIN — SODIUM CHLORIDE 1000 MILLILITER(S): 9 INJECTION INTRAMUSCULAR; INTRAVENOUS; SUBCUTANEOUS at 17:50

## 2020-10-13 RX ADMIN — Medication 0.5 MILLIGRAM(S): at 22:35

## 2020-10-13 NOTE — ED PROVIDER NOTE - OBJECTIVE STATEMENT
46yo female etOH abuse sent here for intoxication and unresponsiveness. Per emergency contact Jaren Cuellar, drinking for several days, came home from rehab 10 days ago. says patient has not been complaining of anything and no known trauma. Patient A&Ox3 but appears intoxicated and not answering most questions.

## 2020-10-13 NOTE — CHART NOTE - NSCHARTNOTEFT_GEN_A_CORE
LMSW received a call from the patient's mother, Tracy Talamantes, ([phone]) requesting discuss her concerns about the patient.  LSMW explained she can not provide any information in regards to the patient's care but she can provide information she would like to the team to be aware of.  Mother acknowledged.  Mother informed the patient just completed 14 day detox at a facility in [place].  Mother explained the patient's fiancee found her intoxicated and the family is very disappointed with her actions. 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  Mother noted when she went to the patient's home and they found multiple bottles of vodka hidden around the home despite the patient stating all the bottles were removed. Mother became tearful as she is also dealing with her  who also was recently discharged from [facility] for alcoholism.  Mother requested for the patient to be admitted to another detox facility.  LMSW explained the patient can not be evaluated at the this time for admission as she is intoxicated () and she has to consent to admission. Mother further explained she is concerned about the patient going home as she no longer has the support of her fiancee who has left for [place].  LMSW provided support and active listening.  LMSW explained she will meet with the patient once she sober.  Mother acknowledged. LMSW will continue to follow up and assist as needed.

## 2020-10-13 NOTE — ED ADULT TRIAGE NOTE - DOMESTIC TRAVEL HIGH RISK QUESTION
Plan: Patient signed 2 MELANY’s for Dr. Fernandes and Plastic surgeon
Detail Level: Simple
Unable to Assess

## 2020-10-13 NOTE — ED PROVIDER NOTE - CLINICAL SUMMARY MEDICAL DECISION MAKING FREE TEXT BOX
44yo female known etOH abuse p/w intoxication and unresponsiveness after drinking heavily for 10 days. A&Ox3 but not responding to most questions, appears intoxicated and disheveled. Likely alcohol intoxication but will check for other coingestants. Low suspicion for infectious etiology or trauma. Labs and observe until clinically sober and no concerning findings upon reassessment.

## 2020-10-13 NOTE — ED PROVIDER NOTE - PHYSICAL EXAMINATION
Physical Exam:  Gen: NAD, AOx3, non-toxic appearing  Head: NCAT  HEENT: EOMI, PEERLA, normal conjunctiva, tongue midline, oral mucosa moist  Lung: CTAB, no respiratory distress, no wheezes/rhonchi/rales B/L, speaking in full sentences  CV: RRR, no murmurs, rubs or gallops, distal pulses 2+ b/l  Abd: soft, NT, ND, no guarding, no rigidity, no rebound tenderness, no CVA tenderness   MSK: no visible deformities, ROM normal in UE/LE, no back TTP  Neuro: 5/5 strength b/l, No focal sensory or motor deficits  Skin: Warm, well perfused, no rash, no leg swelling  Psych: intoxicated

## 2020-10-13 NOTE — ED ADULT NURSE NOTE - CAS TRG GENERAL AIRWAY, MLM
Additional Notes: Medication update, patient has received vaccine Patent Quality 111:Pneumonia Vaccination Status For Older Adults: Pneumococcal Vaccination Previously Received Detail Level: Simple Quality 130: Documentation Of Current Medications In The Medical Record: Current Medications Documented

## 2020-10-13 NOTE — ED PROVIDER NOTE - PROGRESS NOTE DETAILS
Patient oriented x 3, declined her xray chest and CT, states she feels better. Continue to monitor. IVF, repeat lactate pending. RGUJRAL Patient is reassessed, states she would like to eat. Discussed her lab findings and states she has not eaten. Abd soft non tender. Pt declined her CT states its too much radiation, offered sonogram, pt declined as well states she has no pain. RGUJRAL Patient with repeat labs and lactate elevated to 6 s/p 2 L IVF. CT abdomen reviewed w radiology, discussed with patient regarding admission, denies any SI/HI. Patient agreeable to admission and continue IVF.

## 2020-10-13 NOTE — ED PROVIDER NOTE - ATTENDING CONTRIBUTION TO CARE
RGUJRAL 46yo f hx HTN, Alcohol abuse, DT BIB EMS for alcohol intox. Pt recently discharged from detox and began drinking again. Patient appears intoxicated. Admits to drinking alcohol today.  On exam, patient is lethargic, arousable to voice, oriented x 2.   PERRL 4mm b/l. no external signs of trauma.  Lungs cta b/l, +S1S2   Abd soft + epigastric tender. +BS.   Ext no edema  Pt has history of cholecystitis, pancreatitis.   Labs, IVF, CT to eval for symptoms.

## 2020-10-13 NOTE — ED ADULT NURSE NOTE - OBJECTIVE STATEMENT
45YOF pmh seizures, HTN presnets to ED from home via EMS c/o ETOH. Per EMS, pt drank 1 bottle of vodka everyday for last 4 days. Family called EMS. Pt was released from a detox center couple days ago. Pt is arousable at ED but unable to give history at this time. Safety and comfort measures provided. 1:1 maintained.

## 2020-10-14 DIAGNOSIS — F10.929 ALCOHOL USE, UNSPECIFIED WITH INTOXICATION, UNSPECIFIED: ICD-10-CM

## 2020-10-14 DIAGNOSIS — K20.90 ESOPHAGITIS, UNSPECIFIED WITHOUT BLEEDING: ICD-10-CM

## 2020-10-14 DIAGNOSIS — G47.00 INSOMNIA, UNSPECIFIED: ICD-10-CM

## 2020-10-14 DIAGNOSIS — R79.89 OTHER SPECIFIED ABNORMAL FINDINGS OF BLOOD CHEMISTRY: ICD-10-CM

## 2020-10-14 PROBLEM — Z87.19 PERSONAL HISTORY OF OTHER DISEASES OF THE DIGESTIVE SYSTEM: Chronic | Status: ACTIVE | Noted: 2020-08-24

## 2020-10-14 PROBLEM — N80.0 ENDOMETRIOSIS OF UTERUS: Chronic | Status: ACTIVE | Noted: 2020-08-24

## 2020-10-14 LAB
ALBUMIN SERPL ELPH-MCNC: 3.5 G/DL — SIGNIFICANT CHANGE UP (ref 3.3–5)
ALBUMIN SERPL ELPH-MCNC: 3.8 G/DL — SIGNIFICANT CHANGE UP (ref 3.3–5)
ALP SERPL-CCNC: 51 U/L — SIGNIFICANT CHANGE UP (ref 40–120)
ALP SERPL-CCNC: 57 U/L — SIGNIFICANT CHANGE UP (ref 40–120)
ALT FLD-CCNC: 33 U/L — SIGNIFICANT CHANGE UP (ref 10–45)
ALT FLD-CCNC: 38 U/L — SIGNIFICANT CHANGE UP (ref 10–45)
ANION GAP SERPL CALC-SCNC: 13 MMOL/L — SIGNIFICANT CHANGE UP (ref 5–17)
ANION GAP SERPL CALC-SCNC: 16 MMOL/L — SIGNIFICANT CHANGE UP (ref 5–17)
APPEARANCE UR: ABNORMAL
AST SERPL-CCNC: 50 U/L — HIGH (ref 10–40)
AST SERPL-CCNC: 57 U/L — HIGH (ref 10–40)
BACTERIA # UR AUTO: ABNORMAL
BASE EXCESS BLDV CALC-SCNC: -0.1 MMOL/L — SIGNIFICANT CHANGE UP (ref -2–2)
BASE EXCESS BLDV CALC-SCNC: 0.3 MMOL/L — SIGNIFICANT CHANGE UP (ref -2–2)
BILIRUB DIRECT SERPL-MCNC: 0.1 MG/DL — SIGNIFICANT CHANGE UP (ref 0–0.2)
BILIRUB INDIRECT FLD-MCNC: 0.3 MG/DL — SIGNIFICANT CHANGE UP (ref 0.2–1)
BILIRUB SERPL-MCNC: 0.3 MG/DL — SIGNIFICANT CHANGE UP (ref 0.2–1.2)
BILIRUB SERPL-MCNC: 0.4 MG/DL — SIGNIFICANT CHANGE UP (ref 0.2–1.2)
BILIRUB UR-MCNC: NEGATIVE — SIGNIFICANT CHANGE UP
BUN SERPL-MCNC: 10 MG/DL — SIGNIFICANT CHANGE UP (ref 7–23)
BUN SERPL-MCNC: 12 MG/DL — SIGNIFICANT CHANGE UP (ref 7–23)
CA-I SERPL-SCNC: 1 MMOL/L — LOW (ref 1.12–1.3)
CA-I SERPL-SCNC: 1.07 MMOL/L — LOW (ref 1.12–1.3)
CALCIUM SERPL-MCNC: 7.8 MG/DL — LOW (ref 8.4–10.5)
CALCIUM SERPL-MCNC: 8 MG/DL — LOW (ref 8.4–10.5)
CHLORIDE BLDV-SCNC: 102 MMOL/L — SIGNIFICANT CHANGE UP (ref 96–108)
CHLORIDE BLDV-SCNC: 103 MMOL/L — SIGNIFICANT CHANGE UP (ref 96–108)
CHLORIDE SERPL-SCNC: 100 MMOL/L — SIGNIFICANT CHANGE UP (ref 96–108)
CHLORIDE SERPL-SCNC: 100 MMOL/L — SIGNIFICANT CHANGE UP (ref 96–108)
CO2 BLDV-SCNC: 25 MMOL/L — SIGNIFICANT CHANGE UP (ref 22–30)
CO2 BLDV-SCNC: 26 MMOL/L — SIGNIFICANT CHANGE UP (ref 22–30)
CO2 SERPL-SCNC: 22 MMOL/L — SIGNIFICANT CHANGE UP (ref 22–31)
CO2 SERPL-SCNC: 23 MMOL/L — SIGNIFICANT CHANGE UP (ref 22–31)
COLOR SPEC: YELLOW — SIGNIFICANT CHANGE UP
CREAT SERPL-MCNC: 0.69 MG/DL — SIGNIFICANT CHANGE UP (ref 0.5–1.3)
CREAT SERPL-MCNC: 0.78 MG/DL — SIGNIFICANT CHANGE UP (ref 0.5–1.3)
DIFF PNL FLD: NEGATIVE — SIGNIFICANT CHANGE UP
EPI CELLS # UR: 37 — SIGNIFICANT CHANGE UP
GAS PNL BLDV: 137 MMOL/L — SIGNIFICANT CHANGE UP (ref 135–145)
GAS PNL BLDV: 139 MMOL/L — SIGNIFICANT CHANGE UP (ref 135–145)
GAS PNL BLDV: SIGNIFICANT CHANGE UP
GLUCOSE BLDV-MCNC: 101 MG/DL — HIGH (ref 70–99)
GLUCOSE BLDV-MCNC: 93 MG/DL — SIGNIFICANT CHANGE UP (ref 70–99)
GLUCOSE SERPL-MCNC: 108 MG/DL — HIGH (ref 70–99)
GLUCOSE SERPL-MCNC: 95 MG/DL — SIGNIFICANT CHANGE UP (ref 70–99)
GLUCOSE UR QL: NEGATIVE — SIGNIFICANT CHANGE UP
HCO3 BLDV-SCNC: 24 MMOL/L — SIGNIFICANT CHANGE UP (ref 21–29)
HCO3 BLDV-SCNC: 25 MMOL/L — SIGNIFICANT CHANGE UP (ref 21–29)
HCT VFR BLD CALC: 31.6 % — LOW (ref 34.5–45)
HCT VFR BLDA CALC: 30 % — LOW (ref 39–50)
HCT VFR BLDA CALC: 33 % — LOW (ref 39–50)
HGB BLD CALC-MCNC: 10.5 G/DL — LOW (ref 11.5–15.5)
HGB BLD CALC-MCNC: 9.6 G/DL — LOW (ref 11.5–15.5)
HGB BLD-MCNC: 9.6 G/DL — LOW (ref 11.5–15.5)
HOROWITZ INDEX BLDV+IHG-RTO: SIGNIFICANT CHANGE UP
HOROWITZ INDEX BLDV+IHG-RTO: SIGNIFICANT CHANGE UP
HYALINE CASTS # UR AUTO: 3 /LPF — SIGNIFICANT CHANGE UP (ref 0–7)
KETONES UR-MCNC: SIGNIFICANT CHANGE UP
LACTATE BLDV-MCNC: 1.2 MMOL/L — SIGNIFICANT CHANGE UP (ref 0.7–2)
LACTATE BLDV-MCNC: 4.4 MMOL/L — CRITICAL HIGH (ref 0.7–2)
LACTATE BLDV-MCNC: 6.1 MMOL/L — CRITICAL HIGH (ref 0.7–2)
LEUKOCYTE ESTERASE UR-ACNC: NEGATIVE — SIGNIFICANT CHANGE UP
MAGNESIUM SERPL-MCNC: 1.4 MG/DL — LOW (ref 1.6–2.6)
MAGNESIUM SERPL-MCNC: 2.7 MG/DL — HIGH (ref 1.6–2.6)
MCHC RBC-ENTMCNC: 22.7 PG — LOW (ref 27–34)
MCHC RBC-ENTMCNC: 30.4 GM/DL — LOW (ref 32–36)
MCV RBC AUTO: 74.9 FL — LOW (ref 80–100)
NITRITE UR-MCNC: NEGATIVE — SIGNIFICANT CHANGE UP
NRBC # BLD: 0 /100 WBCS — SIGNIFICANT CHANGE UP (ref 0–0)
OTHER CELLS CSF MANUAL: 12 ML/DL — LOW (ref 18–22)
OTHER CELLS CSF MANUAL: 12 ML/DL — LOW (ref 18–22)
PCO2 BLDV: 37 MMHG — SIGNIFICANT CHANGE UP (ref 35–50)
PCO2 BLDV: 43 MMHG — SIGNIFICANT CHANGE UP (ref 35–50)
PH BLDV: 7.38 — SIGNIFICANT CHANGE UP (ref 7.35–7.45)
PH BLDV: 7.42 — SIGNIFICANT CHANGE UP (ref 7.35–7.45)
PH UR: 6 — SIGNIFICANT CHANGE UP (ref 5–8)
PHOSPHATE SERPL-MCNC: 1.9 MG/DL — LOW (ref 2.5–4.5)
PHOSPHATE SERPL-MCNC: 2.1 MG/DL — LOW (ref 2.5–4.5)
PLATELET # BLD AUTO: 253 K/UL — SIGNIFICANT CHANGE UP (ref 150–400)
PO2 BLDV: 54 MMHG — HIGH (ref 25–45)
PO2 BLDV: 61 MMHG — HIGH (ref 25–45)
POTASSIUM BLDV-SCNC: 3.6 MMOL/L — SIGNIFICANT CHANGE UP (ref 3.5–5.3)
POTASSIUM BLDV-SCNC: 3.7 MMOL/L — SIGNIFICANT CHANGE UP (ref 3.5–5.3)
POTASSIUM SERPL-MCNC: 3.8 MMOL/L — SIGNIFICANT CHANGE UP (ref 3.5–5.3)
POTASSIUM SERPL-MCNC: 3.9 MMOL/L — SIGNIFICANT CHANGE UP (ref 3.5–5.3)
POTASSIUM SERPL-SCNC: 3.8 MMOL/L — SIGNIFICANT CHANGE UP (ref 3.5–5.3)
POTASSIUM SERPL-SCNC: 3.9 MMOL/L — SIGNIFICANT CHANGE UP (ref 3.5–5.3)
PROT SERPL-MCNC: 6.4 G/DL — SIGNIFICANT CHANGE UP (ref 6–8.3)
PROT SERPL-MCNC: 7 G/DL — SIGNIFICANT CHANGE UP (ref 6–8.3)
PROT UR-MCNC: ABNORMAL
RBC # BLD: 4.22 M/UL — SIGNIFICANT CHANGE UP (ref 3.8–5.2)
RBC # FLD: 18.6 % — HIGH (ref 10.3–14.5)
RBC CASTS # UR COMP ASSIST: 4 /HPF — SIGNIFICANT CHANGE UP (ref 0–4)
SAO2 % BLDV: 84 % — SIGNIFICANT CHANGE UP (ref 67–88)
SAO2 % BLDV: 89 % — HIGH (ref 67–88)
SARS-COV-2 IGG SERPL QL IA: NEGATIVE — SIGNIFICANT CHANGE UP
SARS-COV-2 IGM SERPL IA-ACNC: 4.53 AU/ML — SIGNIFICANT CHANGE UP
SODIUM SERPL-SCNC: 136 MMOL/L — SIGNIFICANT CHANGE UP (ref 135–145)
SODIUM SERPL-SCNC: 138 MMOL/L — SIGNIFICANT CHANGE UP (ref 135–145)
SP GR SPEC: 1.03 — HIGH (ref 1.01–1.02)
UROBILINOGEN FLD QL: NEGATIVE — SIGNIFICANT CHANGE UP
WBC # BLD: 6.57 K/UL — SIGNIFICANT CHANGE UP (ref 3.8–10.5)
WBC # FLD AUTO: 6.57 K/UL — SIGNIFICANT CHANGE UP (ref 3.8–10.5)
WBC UR QL: 5 /HPF — SIGNIFICANT CHANGE UP (ref 0–5)

## 2020-10-14 PROCEDURE — 99223 1ST HOSP IP/OBS HIGH 75: CPT

## 2020-10-14 RX ORDER — PANTOPRAZOLE SODIUM 20 MG/1
40 TABLET, DELAYED RELEASE ORAL
Refills: 0 | Status: DISCONTINUED | OUTPATIENT
Start: 2020-10-14 | End: 2020-10-15

## 2020-10-14 RX ORDER — SODIUM,POTASSIUM PHOSPHATES 278-250MG
1 POWDER IN PACKET (EA) ORAL
Refills: 0 | Status: DISCONTINUED | OUTPATIENT
Start: 2020-10-14 | End: 2020-10-15

## 2020-10-14 RX ORDER — ACETAMINOPHEN 500 MG
650 TABLET ORAL ONCE
Refills: 0 | Status: COMPLETED | OUTPATIENT
Start: 2020-10-14 | End: 2020-10-14

## 2020-10-14 RX ORDER — LISINOPRIL 2.5 MG/1
1 TABLET ORAL
Qty: 0 | Refills: 0 | DISCHARGE

## 2020-10-14 RX ORDER — THIAMINE MONONITRATE (VIT B1) 100 MG
500 TABLET ORAL EVERY 8 HOURS
Refills: 0 | Status: DISCONTINUED | OUTPATIENT
Start: 2020-10-14 | End: 2020-10-14

## 2020-10-14 RX ORDER — METOCLOPRAMIDE HCL 10 MG
10 TABLET ORAL ONCE
Refills: 0 | Status: COMPLETED | OUTPATIENT
Start: 2020-10-14 | End: 2020-10-14

## 2020-10-14 RX ORDER — MAGNESIUM SULFATE 500 MG/ML
1 VIAL (ML) INJECTION ONCE
Refills: 0 | Status: DISCONTINUED | OUTPATIENT
Start: 2020-10-14 | End: 2020-10-14

## 2020-10-14 RX ORDER — MAGNESIUM SULFATE 500 MG/ML
2 VIAL (ML) INJECTION
Refills: 0 | Status: COMPLETED | OUTPATIENT
Start: 2020-10-14 | End: 2020-10-14

## 2020-10-14 RX ORDER — CALCIUM GLUCONATE 100 MG/ML
2 VIAL (ML) INTRAVENOUS ONCE
Refills: 0 | Status: COMPLETED | OUTPATIENT
Start: 2020-10-14 | End: 2020-10-14

## 2020-10-14 RX ORDER — CHOLECALCIFEROL (VITAMIN D3) 125 MCG
2000 CAPSULE ORAL DAILY
Refills: 0 | Status: DISCONTINUED | OUTPATIENT
Start: 2020-10-14 | End: 2020-10-15

## 2020-10-14 RX ORDER — ENOXAPARIN SODIUM 100 MG/ML
40 INJECTION SUBCUTANEOUS DAILY
Refills: 0 | Status: DISCONTINUED | OUTPATIENT
Start: 2020-10-14 | End: 2020-10-15

## 2020-10-14 RX ORDER — FOLIC ACID 0.8 MG
1 TABLET ORAL DAILY
Refills: 0 | Status: DISCONTINUED | OUTPATIENT
Start: 2020-10-14 | End: 2020-10-15

## 2020-10-14 RX ORDER — SODIUM CHLORIDE 9 MG/ML
1000 INJECTION INTRAMUSCULAR; INTRAVENOUS; SUBCUTANEOUS
Refills: 0 | Status: DISCONTINUED | OUTPATIENT
Start: 2020-10-14 | End: 2020-10-15

## 2020-10-14 RX ORDER — INFLUENZA VIRUS VACCINE 15; 15; 15; 15 UG/.5ML; UG/.5ML; UG/.5ML; UG/.5ML
0.5 SUSPENSION INTRAMUSCULAR ONCE
Refills: 0 | Status: DISCONTINUED | OUTPATIENT
Start: 2020-10-14 | End: 2020-10-15

## 2020-10-14 RX ORDER — THIAMINE MONONITRATE (VIT B1) 100 MG
500 TABLET ORAL EVERY 8 HOURS
Refills: 0 | Status: DISCONTINUED | OUTPATIENT
Start: 2020-10-14 | End: 2020-10-15

## 2020-10-14 RX ORDER — ONDANSETRON 8 MG/1
4 TABLET, FILM COATED ORAL EVERY 8 HOURS
Refills: 0 | Status: DISCONTINUED | OUTPATIENT
Start: 2020-10-14 | End: 2020-10-15

## 2020-10-14 RX ADMIN — Medication 2 MILLIGRAM(S): at 21:57

## 2020-10-14 RX ADMIN — ONDANSETRON 4 MILLIGRAM(S): 8 TABLET, FILM COATED ORAL at 10:39

## 2020-10-14 RX ADMIN — Medication 650 MILLIGRAM(S): at 13:01

## 2020-10-14 RX ADMIN — Medication 2 MILLIGRAM(S): at 12:43

## 2020-10-14 RX ADMIN — Medication 10 MILLIGRAM(S): at 02:59

## 2020-10-14 RX ADMIN — Medication 50 GRAM(S): at 04:03

## 2020-10-14 RX ADMIN — Medication 1 PACKET(S): at 18:34

## 2020-10-14 RX ADMIN — Medication 2 MILLIGRAM(S): at 03:24

## 2020-10-14 RX ADMIN — Medication 200 GRAM(S): at 07:44

## 2020-10-14 RX ADMIN — Medication 2 MILLIGRAM(S): at 15:01

## 2020-10-14 RX ADMIN — Medication 1 PACKET(S): at 06:22

## 2020-10-14 RX ADMIN — Medication 1 MILLIGRAM(S): at 12:22

## 2020-10-14 RX ADMIN — Medication 2 MILLIGRAM(S): at 07:43

## 2020-10-14 RX ADMIN — Medication 2000 UNIT(S): at 12:22

## 2020-10-14 RX ADMIN — Medication 105 MILLIGRAM(S): at 06:22

## 2020-10-14 RX ADMIN — Medication 650 MILLIGRAM(S): at 13:40

## 2020-10-14 RX ADMIN — Medication 50 GRAM(S): at 01:59

## 2020-10-14 RX ADMIN — Medication 1 TABLET(S): at 12:22

## 2020-10-14 RX ADMIN — Medication 650 MILLIGRAM(S): at 00:19

## 2020-10-14 RX ADMIN — PANTOPRAZOLE SODIUM 40 MILLIGRAM(S): 20 TABLET, DELAYED RELEASE ORAL at 07:44

## 2020-10-14 RX ADMIN — Medication 105 MILLIGRAM(S): at 22:01

## 2020-10-14 RX ADMIN — Medication 2 MILLIGRAM(S): at 16:43

## 2020-10-14 RX ADMIN — Medication 2 MILLIGRAM(S): at 10:39

## 2020-10-14 RX ADMIN — ONDANSETRON 4 MILLIGRAM(S): 8 TABLET, FILM COATED ORAL at 00:19

## 2020-10-14 RX ADMIN — Medication 105 MILLIGRAM(S): at 12:22

## 2020-10-14 RX ADMIN — ENOXAPARIN SODIUM 40 MILLIGRAM(S): 100 INJECTION SUBCUTANEOUS at 12:22

## 2020-10-14 RX ADMIN — Medication 2 MILLIGRAM(S): at 19:29

## 2020-10-14 RX ADMIN — SODIUM CHLORIDE 100 MILLILITER(S): 9 INJECTION INTRAMUSCULAR; INTRAVENOUS; SUBCUTANEOUS at 07:48

## 2020-10-14 NOTE — ED ADULT NURSE REASSESSMENT NOTE - NS ED NURSE REASSESS COMMENT FT1
Received report from Jose ROSALES. Patient resting comfortably in [facility] hallway outside of 15. 1:1 maintained. Patient requesting medication for headache, Reglan 2 mg given. Patient hypertensive and slightly tachycardic. All other VSS. IV flushes without difficulty. Denies new pain at this time. Pending bed placement. Told patient to make RN aware if anything is needed.

## 2020-10-14 NOTE — PROVIDER CONTACT NOTE (OTHER) - ASSESSMENT
VSS, visible tremors, pt reports headache/mild nausea which is resolving. BP elevated due to tremors of arms.

## 2020-10-14 NOTE — H&P ADULT - PROBLEM SELECTOR PLAN 2
CT A&P showing no intra-abdominal cause. Patient mentating well with warm extremities. It may be secondary to reduce clearance vs. thiamine deficiency    -Repeat lactate in AM  -Aggressive IV hydration  -Start high dose thiamine for 2-3 days

## 2020-10-14 NOTE — H&P ADULT - PROBLEM SELECTOR PLAN 1
Concern for impending alcohol withdrawal now that patient is metabolizing    -Start symptom-triggered aggressive CIWA protocol  -Aggressive IV hydration  -High dose thiamine, MVI, folic acid  -SW aware of patient, will discuss outpatient options  -Monitor LFTs for signs of alcoholic hepatitis

## 2020-10-14 NOTE — H&P ADULT - NSHPPHYSICALEXAM_GEN_ALL_CORE
T(C): 36.7 (10-13-20 @ 22:26), Max: 36.9 (10-13-20 @ 17:09)  HR: 108 (10-14-20 @ 00:55) (94 - 114)  BP: 128/78 (10-14-20 @ 00:55) (128/78 - 165/108)  RR: 20 (10-14-20 @ 00:55) (20 - 24)  SpO2: 96% (10-14-20 @ 00:55) (96% - 98%)    Gen: (fe)male in NAD, appears comfortable, no diaphoresis  HEENT: NCAT, MMM, neck soft and supple  CV: +S1/S2, no m/r/g  Resp: CTAB, no w/r/r  GI: normoactive BS, soft, NTND, no rebounding/guarding  Ext: No LE edema, extremities appear warm and well perfused   Neuro: AOx3, no focal deficits, CNII-XII grossly intact  Psych: No SI/HI/AVH, appropriate affect  Skin: no petechiae, ecchymosis or maculopapular rash noted T(C): 36.7 (10-13-20 @ 22:26), Max: 36.9 (10-13-20 @ 17:09)  HR: 108 (10-14-20 @ 00:55) (94 - 114)  BP: 128/78 (10-14-20 @ 00:55) (128/78 - 165/108)  RR: 20 (10-14-20 @ 00:55) (20 - 24)  SpO2: 96% (10-14-20 @ 00:55) (96% - 98%)    Gen: female in NAD, slight tongue fasciculations, slight anxiousness  HEENT: NCAT, MMM, neck soft and supple  CV: +S1/S2, no m/r/g  Resp: limited by cooperation, clear anteriorly  GI: normoactive BS, soft, NTND, no rebounding/guarding  Ext: No LE edema, extremities appear warm and well perfused   Neuro: no focal deficits, CNII-XII grossly intact  Psych: No SI/HI/AVH, flat affect  Skin: no petechiae, ecchymosis or maculopapular rash noted

## 2020-10-14 NOTE — H&P ADULT - ASSESSMENT
45F with PMHx of alcohol abuse presents for alcohol intoxication drinking 1 liter of vodka everyday. Patient recently finished detoxication program, but recently drank more heavily after fiance broke up with her. Alcohol level 437 and lactate elevated persistently at 6. Patient being admitted for impending alcohol withdrawal.

## 2020-10-14 NOTE — H&P ADULT - HISTORY OF PRESENT ILLNESS
45F with PMHx of alcohol abuse presents for alcohol intoxication. Per ED patient initially minimally responsive, but now speaking with one word answers. Apparently patient's  found her drinking alcohol heavily and sent her into ED. Collateral obtained by SW from mother indicates patient recently finished alcohol detoxification program, but then found to have alcohol bottles hidden around her house. Upon chart review patient with multiple admissions this year for alcohol withdrawal. 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 Patient's lactate initially elevated and given 2 L of isotonic solution with minimal clearance. CT A&P showing esophagitis but no acute intra-abdominal pathology. Patient towards the end of her ED stay became anxious concerning for alcohol withdrawal and thus is being admitted. Patient also given Tylenol, Pepcid, and Ativan. When seen by me in the ED requesting more ativan. She feels her hands are shaky. She endorses slight anxiousness. She does not want to talk to me. She did tell me she recently started drinking heavily again after her fiance left her. She drinks 1 to 1.5 liters of vodka per day. Last drink one day prior to admission. No history of DTs. She tells me she has a remote history of alcoholic seizures several years prior.

## 2020-10-14 NOTE — ED ADULT NURSE REASSESSMENT NOTE - NS ED NURSE REASSESS COMMENT FT1
Upon initial assessment, pt's CIWA score was 11. 2mg IV Ativan given. Pt very restless and anxious. Denies any visual disturbances or hallucinations. Will reassess in 1 hour.

## 2020-10-14 NOTE — H&P ADULT - NSHPREVIEWOFSYSTEMS_GEN_ALL_CORE
Gen: no night sweats or change in appetite  Eyes: no changes in vision or diplopia   ENT: no epistaxis, sinus pain, gingival bleeding, odynophagia or dysphagia  CV: no CP, PND or palpitations  Resp: no cough, wheezing, or hemoptysis  GI: no hematemesis, hematochezia, or melena  : no dysuria, polyuria, or hematuria  MSK: no arthralgias or joint swelling   Neuro: no gross sensory changes, numbness, focal deficits  Psych: no depression or changes in concentration  Heme/Onc: no purpura, petechiae or night sweats  Skin: no pruritus, hair loss or skin lesions  All: no photosensitivity, no complaints of anaphylaxis (SOB, throat swelling) Patient non-cooperative, wants more ativan.

## 2020-10-14 NOTE — ED ADULT NURSE REASSESSMENT NOTE - NS ED NURSE REASSESS COMMENT FT1
CIWA reassessment was a 2 after receiving IV ativan. Pt sleeping comfortably. Report given to BOBBY Costello in holding.

## 2020-10-15 ENCOUNTER — TRANSCRIPTION ENCOUNTER (OUTPATIENT)
Age: 45
End: 2020-10-15

## 2020-10-15 VITALS
OXYGEN SATURATION: 98 % | TEMPERATURE: 98 F | DIASTOLIC BLOOD PRESSURE: 198 MMHG | RESPIRATION RATE: 17 BRPM | HEART RATE: 107 BPM | SYSTOLIC BLOOD PRESSURE: 147 MMHG

## 2020-10-15 DIAGNOSIS — F43.23 ADJUSTMENT DISORDER WITH MIXED ANXIETY AND DEPRESSED MOOD: ICD-10-CM

## 2020-10-15 DIAGNOSIS — F10.10 ALCOHOL ABUSE, UNCOMPLICATED: ICD-10-CM

## 2020-10-15 LAB
ANION GAP SERPL CALC-SCNC: 12 MMOL/L — SIGNIFICANT CHANGE UP (ref 5–17)
BUN SERPL-MCNC: 9 MG/DL — SIGNIFICANT CHANGE UP (ref 7–23)
CALCIUM SERPL-MCNC: 8.7 MG/DL — SIGNIFICANT CHANGE UP (ref 8.4–10.5)
CHLORIDE SERPL-SCNC: 99 MMOL/L — SIGNIFICANT CHANGE UP (ref 96–108)
CO2 SERPL-SCNC: 23 MMOL/L — SIGNIFICANT CHANGE UP (ref 22–31)
CREAT SERPL-MCNC: 0.59 MG/DL — SIGNIFICANT CHANGE UP (ref 0.5–1.3)
GLUCOSE SERPL-MCNC: 87 MG/DL — SIGNIFICANT CHANGE UP (ref 70–99)
HCT VFR BLD CALC: 32.7 % — LOW (ref 34.5–45)
HGB BLD-MCNC: 9.9 G/DL — LOW (ref 11.5–15.5)
MCHC RBC-ENTMCNC: 23.3 PG — LOW (ref 27–34)
MCHC RBC-ENTMCNC: 30.3 GM/DL — LOW (ref 32–36)
MCV RBC AUTO: 76.9 FL — LOW (ref 80–100)
NRBC # BLD: 0 /100 WBCS — SIGNIFICANT CHANGE UP (ref 0–0)
PLATELET # BLD AUTO: 199 K/UL — SIGNIFICANT CHANGE UP (ref 150–400)
POTASSIUM SERPL-MCNC: 3.9 MMOL/L — SIGNIFICANT CHANGE UP (ref 3.5–5.3)
POTASSIUM SERPL-SCNC: 3.9 MMOL/L — SIGNIFICANT CHANGE UP (ref 3.5–5.3)
RBC # BLD: 4.25 M/UL — SIGNIFICANT CHANGE UP (ref 3.8–5.2)
RBC # FLD: 18.5 % — HIGH (ref 10.3–14.5)
SODIUM SERPL-SCNC: 134 MMOL/L — LOW (ref 135–145)
WBC # BLD: 4.92 K/UL — SIGNIFICANT CHANGE UP (ref 3.8–10.5)
WBC # FLD AUTO: 4.92 K/UL — SIGNIFICANT CHANGE UP (ref 3.8–10.5)

## 2020-10-15 PROCEDURE — 82962 GLUCOSE BLOOD TEST: CPT

## 2020-10-15 PROCEDURE — 96374 THER/PROPH/DIAG INJ IV PUSH: CPT | Mod: XU

## 2020-10-15 PROCEDURE — 86769 SARS-COV-2 COVID-19 ANTIBODY: CPT

## 2020-10-15 PROCEDURE — 85014 HEMATOCRIT: CPT

## 2020-10-15 PROCEDURE — 85025 COMPLETE CBC W/AUTO DIFF WBC: CPT

## 2020-10-15 PROCEDURE — 71045 X-RAY EXAM CHEST 1 VIEW: CPT

## 2020-10-15 PROCEDURE — 85730 THROMBOPLASTIN TIME PARTIAL: CPT

## 2020-10-15 PROCEDURE — 80307 DRUG TEST PRSMV CHEM ANLYZR: CPT

## 2020-10-15 PROCEDURE — 80053 COMPREHEN METABOLIC PANEL: CPT

## 2020-10-15 PROCEDURE — 85018 HEMOGLOBIN: CPT

## 2020-10-15 PROCEDURE — 82565 ASSAY OF CREATININE: CPT

## 2020-10-15 PROCEDURE — 93005 ELECTROCARDIOGRAM TRACING: CPT

## 2020-10-15 PROCEDURE — 82803 BLOOD GASES ANY COMBINATION: CPT

## 2020-10-15 PROCEDURE — 84100 ASSAY OF PHOSPHORUS: CPT

## 2020-10-15 PROCEDURE — 96361 HYDRATE IV INFUSION ADD-ON: CPT

## 2020-10-15 PROCEDURE — 83605 ASSAY OF LACTIC ACID: CPT

## 2020-10-15 PROCEDURE — 84702 CHORIONIC GONADOTROPIN TEST: CPT

## 2020-10-15 PROCEDURE — 80048 BASIC METABOLIC PNL TOTAL CA: CPT

## 2020-10-15 PROCEDURE — 99285 EMERGENCY DEPT VISIT HI MDM: CPT | Mod: 25

## 2020-10-15 PROCEDURE — 80076 HEPATIC FUNCTION PANEL: CPT

## 2020-10-15 PROCEDURE — 85610 PROTHROMBIN TIME: CPT

## 2020-10-15 PROCEDURE — 85027 COMPLETE CBC AUTOMATED: CPT

## 2020-10-15 PROCEDURE — 84295 ASSAY OF SERUM SODIUM: CPT

## 2020-10-15 PROCEDURE — 82435 ASSAY OF BLOOD CHLORIDE: CPT

## 2020-10-15 PROCEDURE — U0003: CPT

## 2020-10-15 PROCEDURE — 83735 ASSAY OF MAGNESIUM: CPT

## 2020-10-15 PROCEDURE — 82947 ASSAY GLUCOSE BLOOD QUANT: CPT

## 2020-10-15 PROCEDURE — 74177 CT ABD & PELVIS W/CONTRAST: CPT

## 2020-10-15 PROCEDURE — 99223 1ST HOSP IP/OBS HIGH 75: CPT

## 2020-10-15 PROCEDURE — 82330 ASSAY OF CALCIUM: CPT

## 2020-10-15 PROCEDURE — 81001 URINALYSIS AUTO W/SCOPE: CPT

## 2020-10-15 PROCEDURE — 96375 TX/PRO/DX INJ NEW DRUG ADDON: CPT

## 2020-10-15 PROCEDURE — 84132 ASSAY OF SERUM POTASSIUM: CPT

## 2020-10-15 PROCEDURE — 83690 ASSAY OF LIPASE: CPT

## 2020-10-15 RX ORDER — THIAMINE MONONITRATE (VIT B1) 100 MG
1 TABLET ORAL
Qty: 30 | Refills: 0
Start: 2020-10-15 | End: 2020-11-13

## 2020-10-15 RX ORDER — FOLIC ACID 0.8 MG
1 TABLET ORAL
Qty: 30 | Refills: 0
Start: 2020-10-15 | End: 2020-11-13

## 2020-10-15 RX ORDER — ZOLPIDEM TARTRATE 10 MG/1
1 TABLET ORAL
Qty: 0 | Refills: 0 | DISCHARGE

## 2020-10-15 RX ORDER — ALPRAZOLAM 0.25 MG
1 TABLET ORAL
Qty: 0 | Refills: 0 | DISCHARGE

## 2020-10-15 RX ORDER — PANTOPRAZOLE SODIUM 20 MG/1
1 TABLET, DELAYED RELEASE ORAL
Qty: 15 | Refills: 0
Start: 2020-10-15 | End: 2020-10-29

## 2020-10-15 RX ADMIN — Medication 105 MILLIGRAM(S): at 06:31

## 2020-10-15 RX ADMIN — Medication 2000 UNIT(S): at 15:20

## 2020-10-15 RX ADMIN — Medication 1 TABLET(S): at 15:20

## 2020-10-15 RX ADMIN — Medication 1 MILLIGRAM(S): at 15:19

## 2020-10-15 RX ADMIN — Medication 105 MILLIGRAM(S): at 15:23

## 2020-10-15 RX ADMIN — Medication 1 PACKET(S): at 15:20

## 2020-10-15 RX ADMIN — PANTOPRAZOLE SODIUM 40 MILLIGRAM(S): 20 TABLET, DELAYED RELEASE ORAL at 06:31

## 2020-10-15 RX ADMIN — Medication 1 PACKET(S): at 01:57

## 2020-10-15 RX ADMIN — Medication 1 PACKET(S): at 06:31

## 2020-10-15 NOTE — BEHAVIORAL HEALTH ASSESSMENT NOTE - DIFFERENTIAL
adjustment disorder w/ mixed anxiety/depression vs alcohol-induced mood disorder  Alcohol WD adjustment disorder w/ mixed anxiety/depression vs major depressive episode vs alcohol-induced mood disorder    Alcohol WD

## 2020-10-15 NOTE — BEHAVIORAL HEALTH ASSESSMENT NOTE - ACTIVATING EVENTS/STRESSORS
Current or pending social isolation/Perceived burden on family or others/Substance intoxication or withdrawal/Recent inpatient discharge/Triggering events leading to humiliation, shame, and/or despair (e.g. Loss of relationship, financial or health status) (real or anticipated)

## 2020-10-15 NOTE — CHART NOTE - NSCHARTNOTEFT_GEN_A_CORE
Patient Name: Catherine EstradaBirth Date: 1975  Address: [address] [place] [id number]: Female  Rx Written	Rx Dispensed	Drug	Quantity	Days Supply	Prescriber Name	Payment Method	Dispenser  08/13/2019	11/21/2019	zolpidem tartrate 10 mg tablet	30	30	Gitlin, Kevin N MD	[facility]  10/30/2019	10/30/2019	alprazolam 0.5 mg tablet	120	30	Gitlin, Kevin N MD	[facility]  08/13/2019	10/19/2019	zolpidem tartrate 10 mg tablet	30	30	Gitlin, Kevin N MD	[facility]

## 2020-10-15 NOTE — BEHAVIORAL HEALTH ASSESSMENT NOTE - SUMMARY
AYLEEN DEY is a 46 [place] woman, domiciled alone, , recently  from fiance, unemployed, w/ PPH of alcohol use disorder, w/ multiple ED visits for alcohol intox, w/multiple rehab admissions, most recently in rehab in [place] in september for 30 days, anxiety/panic attacks (on PRN xanax), no past psych hosp, hx prior SI when intoxicated, no known SA, sees outpt psychiatrist (Dr. Kevin Gitlin), w/ pmh of hepatic steatosis, who presented to ED on 10/13 for alcohol intoxication, , currently on CIWA for detox. Psychiatry consulted on 10/15 for consult for depression/no SI and alcohol relapse.     Patient presenting with alcohol relapse x2 days, no current signs of withdrawal, CIWA scores 0-1, and sxs of emotional lability, depressed mood, no SI, in the context of recent break-up with her long-term fiance. Her presentation is most consistent with adjustment disorder due to loss of relationship, which triggered her to relapse. Her recent alcohol binge is also a likely contributing factor to her mood. She has a hx of using alcohol as a coping mechanism for stressors, and her recent stressors, including COVID, loss of her partner, loss of her job, are likely precipitating factors. However she also demonstrates that she has gained coping skills, including a demonstrated positive outlook, saying her fiance may not have been right for her, and that she is tentatively optimistic about attending IOP and maintaining sobriety via AA meetings. She also says she has strong social support, as she is very close with her family and says she has several close friends. Patient would benefit from being connected to IOP program upon discharge and will follow up with her outpatient psychiatrist for continued support and management of her anxiety and depression.

## 2020-10-15 NOTE — BEHAVIORAL HEALTH ASSESSMENT NOTE - NSBHCHARTREVIEWINVESTIGATE_PSY_A_CORE FT
< from: 12 Lead ECG (10.13.20 @ 17:56) >      QTC Calculation(Bazett) 490 ms    < end of copied text >

## 2020-10-15 NOTE — BEHAVIORAL HEALTH ASSESSMENT NOTE - SUICIDE PROTECTIVE FACTORS
Identifies reasons for living/Has future plans/Positive therapeutic relationships/Ability to cope with stress/Frustration tolerance/Responsibility to family and others/Supportive social network of family or friends

## 2020-10-15 NOTE — DISCHARGE NOTE PROVIDER - CARE PROVIDER_API CALL
Dr. Kevin Gitlin,   Outpatient Psychiatrist  Phone: (   )    -  Fax: (   )    -  Follow Up Time: 1 week    [facility] (substance abuse) [facility] [phone].,   Phone: (   )    -  Fax: (   )    -  Follow Up Time:

## 2020-10-15 NOTE — BEHAVIORAL HEALTH ASSESSMENT NOTE - NSBHCONSULTRECOMMENDOTHER_PSY_A_CORE FT
RECOMMENDATIONS:  - Please have SW connect patient to resources for abstinence including IOP.  - Recommend follow-up appointment with outpatient psychiatrist Dr. Kevin Gitlin upon discharge. Can refer patient to [facility] (substance abuse) clinic [phone].   - Would not start medication for depression/anxiety at this time. Recommend patient f/u with outpatient psychiatrist to discuss further.   - Please contact with any questions.    ---    Note Authored by   Jo Ann Hamilton MD, PGY1, Psychiatry  Pager: [phone] or Teams      Case discussed with supervising psychiatry attending Dr. Carreon. RECOMMENDATIONS:  - Continue with IV thiamine 500 mg daily.  - Continue with CIWA protocol for alcohol withdrawal.   - Please have SW connect patient to resources for abstinence including IOP.  - Recommend follow-up appointment with outpatient psychiatrist Dr. Kevin Gitlin upon discharge. Can refer patient to [facility] (substance abuse) clinic [phone].   - Would not start medication for depression/anxiety at this time. Recommend patient f/u with outpatient psychiatrist to discuss further.   - Dispo: per medical team once patient completes detox.  - Please contact with any questions.    ---    Note Authored by   Jo Ann Hamilton MD, PGY1, Psychiatry  Pager: [phone] or Teams      Case discussed with supervising psychiatry attending Dr. Carreon. RECOMMENDATIONS:  - Continue with IV thiamine 500 mg daily.  - Continue with CIWA protocol for alcohol withdrawal.   - Can consider trazodone 150 mg QHS for sleep.  - Please have SW connect patient to resources for abstinence including IOP.  - Recommend follow-up appointment with outpatient psychiatrist Dr. Kevin Gitlin upon discharge. Can refer patient to [facility] (substance abuse) clinic [phone].   - Would not start medication for depression/anxiety at this time. Recommend patient f/u with outpatient psychiatrist to discuss further.   - Dispo: per medical team once patient completes detox.  - Please contact with any questions.    ---    Note Authored by   Jo Ann Hamilton MD, PGY1, Psychiatry  Pager: [phone] or Teams      Case discussed with supervising psychiatry attending Dr. Carreon.

## 2020-10-15 NOTE — CHART NOTE - NSCHARTNOTEFT_GEN_A_CORE
Patient is a 45y old  Female who presents with a chief complaint of Alcohol Withdrawal (15 Oct 2020 16:59)      Vital Signs Last 24 Hrs  T(C): 36.7 (15 Oct 2020 17:47), Max: 36.9 (15 Oct 2020 15:00)  T(F): 98 (15 Oct 2020 17:47), Max: 98.5 (15 Oct 2020 15:00)  HR: 107 (15 Oct 2020 17:47) (83 - 107)  BP: 147/198 (15 Oct 2020 17:47) (132/94 - 150/103)  BP(mean): --  RR: 17 (15 Oct 2020 17:47) (17 - 18)  SpO2: 98% (15 Oct 2020 17:47) (97% - 99%)      Labs:                          9.9    4.92  )-----------( 199      ( 15 Oct 2020 06:47 )             32.7     10-15    134<L>  |  99  |  9   ----------------------------<  87  3.9   |  23  |  0.59    Ca    8.7      15 Oct 2020 06:47  Phos  2.1     10-14  Mg     2.7     10-14    TPro  6.4  /  Alb  3.5  /  TBili  0.3  /  DBili  x   /  AST  50<H>  /  ALT  33  /  AlkPhos  51  10-14      Explanation of the Risk and Benifits of Leaving AMA was explained to patient.  Patient A+ O x 3, after thorough discussion regarding the risks, patient  verbalized full understanding that leaving the hospital without completing recommended diagnostic and treatment regime may result in permanent physical injury including death. Explained to patient to follow up with your primary MD/psychiatrist immediately upon discharge.   Dr. Davidson aware.    [id number]

## 2020-10-15 NOTE — BEHAVIORAL HEALTH ASSESSMENT NOTE - CASE SUMMARY
This is a 45-y.o. [place] F patient, domiciled alone, , recently  from [facility], unemployed, w/ PPH of alcohol use disorder, w/ multiple ED visits for alcohol intox, w/multiple rehab admissions, most recently in rehab in [place] in september for 30 days, anxiety/panic attacks (on PRN xanax), no past psych hosp, hx prior SI when intoxicated, no known SA, sees outpt psychiatrist (Dr. Kevin Gitlin), w/ pmh of hepatic steatosis, who presented to ED on 10/13 for alcohol intoxication, , currently on CIWA for detox. Psychiatry consulted on 10/15 for consult for depression/no SI and alcohol relapse.     I have seen and evaluated this patient myself. Chart, labs, meds reviewed. I agree with resident's assessment and plan.

## 2020-10-15 NOTE — DISCHARGE NOTE PROVIDER - HOSPITAL COURSE
45F with PMHx of alcohol abuse presents for alcohol intoxication drinking 1 liter of vodka everyday. Patient recently finished detoxication program, but recently drank more heavily after fiance broke up with her. Alcohol level 437 and lactate elevated persistently at 6. Patient being admitted for impending alcohol withdrawal.  Admitted with Alcohol intoxication and placed on symptom-triggered aggressive CIWA protocol --------------------------  Aggressive IV hydration and lactate trended down to normal. Also given high dose thiamine, MVI, folic acid  Psychiatry consulted no current signs of withdrawal, CIWA scores 0-1, and sxs of emotional lability, depressed mood, no SI, in the context of recent break-up with her long-term fiance. Her presentation is most consistent with adjustment disorder due to loss of relationship, which triggered her to relapse. Patient has no hx of recent or past sxs of SI/SA/SH. Per Psych patient has good coping skills as demonstrated by her interest in seeking help with sobriety. Recommends to follow-up appointment with outpatient psychiatrist Dr. Kevin Gitlin upon discharge and to follow up at [facility] (substance abuse) clinic [phone]. No psychiatric contraindications to discharge. Social work consulted - patient is interested in outpatient treatment, reviewed list. Patient chooses [facility]. Referral emailed to [facility] for review.-------------------------

## 2020-10-15 NOTE — PROGRESS NOTE ADULT - PROBLEM SELECTOR PLAN 1
symptom-triggered aggressive CIWA protocol  Aggressive IV hydration  High dose thiamine, MVI, folic acid  SW aware of patient, will discuss outpatient options  psych consult

## 2020-10-15 NOTE — BEHAVIORAL HEALTH ASSESSMENT NOTE - RISK ASSESSMENT
Low Acute Suicide Risk Low acute suicide risk at this time and does not merit inpatient psychiatric hospitalization at this time. Patient has no hx of recent or past sxs of SI/SA/SH. Patient has several acute and chronic risk factors including alcohol use disorder, with recent relapse, recent social isolation, some expressed hopelessness about relapsing. However patient's protective factors outweigh risk factors, including supportive family, close friends she can turn to, good therapeutic relationship with her psychiatrist, prior success at staying sober, and good coping skills as demonstrated by her interest in seeking help with sobriety.

## 2020-10-15 NOTE — SBIRT NOTE ADULT - NSSBIRTUNABLESCROTHER_GEN_A_CORE
Patient declined full screening as she reports familiar with education surrounding drinking and that she has consumed a lot recently.

## 2020-10-15 NOTE — BEHAVIORAL HEALTH ASSESSMENT NOTE - NSBHCHARTREVIEWLAB_PSY_A_CORE FT
10-15    134<L>  |  99  |  9   ----------------------------<  87  3.9   |  23  |  0.59    Ca    8.7      15 Oct 2020 06:47  Phos  2.1     10-14  Mg     2.7     10-14    TPro  6.4  /  Alb  3.5  /  TBili  0.3  /  DBili  x   /  AST  50<H>  /  ALT  33  /  AlkPhos  51  10-14                            9.9    4.92  )-----------( 199      ( 15 Oct 2020 06:47 )             32.7       Alcohol, Blood: 431 mg/dL (10.13.20 @ 17:58)

## 2020-10-15 NOTE — BEHAVIORAL HEALTH ASSESSMENT NOTE - NSBHCHARTREVIEWIMAGING_PSY_A_CORE FT
< from: Xray Chest 1 View AP/PA (10.13.20 @ 22:25) >    IMPRESSION:    No acute consolidation could be identified.    < end of copied text >      < from: CT Abdomen and Pelvis w/ IV Cont (10.13.20 @ 21:07) >    IMPRESSION:  No acute intra-abdominal pathology.    Mild circumferential thickening of the distal esophagus, suggesting esophagitis, and which can be correlated with nonemergent endoscopy.    < end of copied text >    < from: US Abdomen Upper Quadrant Right (08.25.20 @ 08:30) >    IMPRESSION:    Mild hepatomegaly. Hepatic steatosis.    Nondistended gallbladder with wall thickness measuring up to 3 mm. No pericholecystic fluid collection. Positive sonographic Hillman's sign.    Consider further evaluation with HIDA scan.    < end of copied text >

## 2020-10-15 NOTE — DISCHARGE NOTE PROVIDER - NSDCCPCAREPLAN_GEN_ALL_CORE_FT
PRINCIPAL DISCHARGE DIAGNOSIS  Diagnosis: Alcohol intoxication  Assessment and Plan of Treatment: Admitted with Alcohol intoxication   You are now symptom free of alcohol withdrawal  You  demonstrated interest in seeking help with sobriety.  Follow-up appointment with outpatient psychiatrist Dr. Kevin Gitlin upon discharge and follow up at [facility] (substance abuse) clinic [phone].   You were seen by social work who have referred you to [facility] ---------------      SECONDARY DISCHARGE DIAGNOSES  Diagnosis: Adjustment disorder with mixed anxiety and depressed mood  Assessment and Plan of Treatment: Follow-up appointment with outpatient psychiatrist Dr. Kevin Gitlin   Seek immediate medical help for any signs of helplessness and/or suicidal ideation    Diagnosis: Esophagitis  Assessment and Plan of Treatment: Continue Protonix

## 2020-10-15 NOTE — BEHAVIORAL HEALTH ASSESSMENT NOTE - HPI (INCLUDE ILLNESS QUALITY, SEVERITY, DURATION, TIMING, CONTEXT, MODIFYING FACTORS, ASSOCIATED SIGNS AND SYMPTOMS)
****************************  Note Authored by   Jo Ann Hamilton MD, PGY1, Psychiatry  Pager: [phone] or Teams  ****************************  H&P    AYLEEN DEY is a 44 yo south- woman, domiciled alone, single, recently  from [facility], unemployed, w/ PPH of alcohol use disorder, w/ several rehab admissions, most recently in rehab in [place] in september for 30 days, anxiety/panic attacks (on PRN xanax), no past psych hosp, no SA, sees outpt psychiatrist (Dr. Kevin Gitlin), w/ pmh of hepatic steatosis, who presented to ED on 10/13 for alcohol intoxication, , currently on CIWA for detox. Psychiatry consulted on 10/15 for consult for depression/no SI and alcohol relapse.    Patient seen at bedside. ****************************  Note Authored by   Jo Ann Hamilton MD, PGY1, Psychiatry  Pager: [phone] or Teams  ****************************  H&P    AYLEEN DEY is a 46 yo south- woman, domiciled alone, single, recently  from [facility], unemployed, w/ PPH of alcohol use disorder, w/ several rehab admissions, most recently in rehab in [place] in september for 30 days, anxiety/panic attacks (on PRN xanax), no past psych hosp, no SA, sees outpt psychiatrist (Dr. Kevin Gitlin), w/ pmh of hepatic steatosis, who presented to ED on 10/13 for alcohol intoxication, , currently on CIWA for detox. Psychiatry consulted on 10/15 for consult for depression/no SI and alcohol relapse.    Patient seen at bedside. Patient said she has been struggling with alcohol dependence for many years. She had a period of sobriety for 6 years until about a year ago after inpatient rehab and success with alcoholics anonymous and started following with a psychiatrist, Dr. Acuna. This lasted until a year ago, when she reports she began using marijuana with her fiance, which triggered her to relapse. 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 She sites several other propagating factors, including isolation from COVID and losing her job. In September patient reports that she decided to enroll in a 30 day inpatient rehab program in [place] and stayed sober until this past Monday, when patient said her fiance decided to leave her and move to [place]. Patient became tearful when describing that her fiance "unloaded many problems he had been holding inside" which she says "my drinking had also been to blame, but that wasn't fair to me." Per patient, "I have good coping skills but him leaving me overwhelmed them and became too much," and she had a relapse. Patient said she has never been a depressed person and is usually happy. She struggles with anxiety and panic attacks, for which she takes xanax PRN prescribed to her by her psychiatrist. She also takes medication sometimes for sleep. She says she rationally knows that she may be better off without her boyfriend, as he may have triggered her drinking, but she emotionally will need some time to heal. She describes having strained her relationships from drinking, but still sites family support, including her brother and sister-in-law, who offered to let her live with them for some time, her mother, and multiple close friends who she can confide in. She enjoys spending time with her nieces and nephews, describing herself as "the cool aunt" when she isn't drinking. She also describes a good relationship with her psychiatrist, who she speaks to once a month via telepsych visits. Patient shows strong motivation to get sober - she is interested in IOP program. She said that she cannot afford right now to spend money on another inpatient setting. She also said that she had the most success when she did alcoholics anonymous and was going to regular AA meetings with her sponsor when she lived in [place]. She also said that she has been discussing with her psychiatrist starting Pristiq as outpatient. She denies depressed mood, saying she mainly feels sad. No Hx of SA/SI. She currently is denying sxs of withdrawal such as tremors. No known hx of WD seizures. Offered patient emotional support throughout interview.     Psych ROS: (+) anxiety, PTSD (flashbacks) in relation to her divorce, (-) No SI/thoughts of self-harm/HI, hx of SA, hx of alexis, AH/VH. No hx of physical or sexual abuse. ****************************  Note Authored by   Jo Ann Hamilton MD, PGY1, Psychiatry  Pager: [phone] or Teams  ****************************  H&P    AYLEEN DEY is a 44 yo south- woman, domiciled alone, , [facility], [facility], w/ PPH of alcohol use disorder, w/ several rehab admissions, most recently in rehab in [place] in september for 30 days, anxiety/panic attacks (on PRN xanax), no past psych hosp, no SA, sees outpt psychiatrist (Dr. Kevin Gitlin), w/ pmh of hepatic steatosis, who presented to ED on 10/13 for alcohol intoxication, , currently on CIWA for detox. Psychiatry consulted on 10/15 for consult for depression/no SI and alcohol relapse.    Patient seen at bedside. Patient said she has been struggling with alcohol dependence for many years. She had a period of sobriety for 6 years until about a year ago after inpatient rehab and success with alcoholics anonymous and started following with a psychiatrist, Dr. Acuna. This lasted until a year ago, when she reports she began using marijuana with her fiance, which triggered her to relapse. 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 She sites several other propagating factors, including isolation from COVID and losing her job. In September patient reports that she decided to enroll in a 30 day inpatient rehab program in [place] and stayed sober until this past Monday, when patient said her fiance decided to leave her and move to [place]. Patient became tearful when describing that her fiance "unloaded many problems he had been holding inside" which she says "my drinking had also been to blame, but that wasn't fair to me." Per patient, "I have good coping skills but him leaving me overwhelmed them and became too much," and she had a relapse. Patient said she has never been a depressed person and is usually happy. She struggles with anxiety and panic attacks, for which she takes xanax PRN prescribed to her by her psychiatrist. She also takes medication sometimes for sleep. She says she rationally knows that she may be better off without her boyfriend, as he may have triggered her drinking, but she emotionally will need some time to heal. She describes having strained her relationships from drinking, but still sites family support, including her brother and sister-in-law, who offered to let her live with them for some time, her mother, and multiple close friends who she can confide in. She enjoys spending time with her nieces and nephews, describing herself as "the cool aunt" when she isn't drinking. She also describes a good relationship with her psychiatrist, who she speaks to once a month via telepsych visits. Patient shows strong motivation to get sober - she is interested in IOP program. She said that she cannot afford right now to spend money on another inpatient setting. She also said that she had the most success when she did alcoholics anonymous and was going to regular AA meetings with her sponsor when she lived in [place]. She also said that she has been discussing with her psychiatrist starting Pristiq as outpatient. She denies depressed mood, saying she mainly feels sad. No Hx of SA/SI. She currently is denying sxs of withdrawal such as tremors. No known hx of WD seizures. Offered patient emotional support throughout interview.     Psych ROS: (+) anxiety, PTSD (flashbacks) in relation to her divorce, (-) No SI/thoughts of self-harm/HI, hx of SA, hx of alexis, AH/VH. No hx of physical or sexual abuse. ****************************  Note Authored by   Jo Ann Hamilton MD, PGY1, Psychiatry  Pager: [phone] or Teams  ****************************  H&P    Source: Patient, chart review, psychiatrist's office    AYLEEN DEY is a 44 yo south- woman, domiciled alone, , recently  from [facility], unemployed, w/ PPH of alcohol use disorder, w/ multiple ED visits for alcohol intox, w/multiple rehab admissions, most recently in rehab in [place] in september for 30 days, anxiety/panic attacks (on PRN xanax), no past psych hosp, hx prior SI when intoxicated, no known SA, sees outpt psychiatrist (Dr. Kevin Gitlin), w/ pmh of hepatic steatosis, who presented to ED on 10/13 for alcohol intoxication, , currently on CIWA for detox. Psychiatry consulted on 10/15 for consult for depression/no SI and alcohol relapse.    Patient seen at bedside. Patient said she has been struggling with alcohol dependence for many years. She had a period of sobriety for 6 years until about a year ago after inpatient rehab and success with alcoholics anonymous and started following with a psychiatrist, Dr. Acuna. This lasted until a year ago, when she reports she began using marijuana with her fiance, which triggered her to relapse. 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 She sites several other propagating factors, including isolation from COVID and losing her job. In September patient reports that she decided to enroll in a 30 day inpatient rehab program in [place] and stayed sober until this past Monday, when patient said her fiance decided to leave her and move to [place]. Patient became tearful when describing that her fiance "unloaded many problems he had been holding inside" which she says "my drinking had also been to blame, but that wasn't fair to me." Per patient, "I have good coping skills but him leaving me overwhelmed them and became too much," and she had a relapse. Patient said she has never been a depressed person and is usually happy. She struggles with anxiety and panic attacks, for which she takes xanax PRN prescribed to her by her psychiatrist. She also takes medication sometimes for sleep. She says she rationally knows that she may be better off without her boyfriend, as he may have triggered her drinking, but she emotionally will need some time to heal. She describes having strained her relationships from drinking, but still sites family support, including her brother and sister-in-law, who offered to let her live with them for some time, her mother, and multiple close friends who she can confide in. She enjoys spending time with her nieces and nephews, describing herself as "the cool aunt" when she isn't drinking. She also describes a good relationship with her psychiatrist, who she speaks to once a month via telepsych visits. Patient shows strong motivation to get sober - she is interested in IOP program. She said that she cannot afford right now to spend money on another inpatient setting. She also said that she had the most success when she did alcoholics anonymous and was going to regular AA meetings with her sponsor when she lived in [place]. She also said that she has been discussing with her psychiatrist starting Pristiq as outpatient. She denies depressed mood, saying she mainly feels sad. No Hx of SA/SI. She currently is denying sxs of withdrawal such as tremors. No known hx of WD seizures. Offered patient emotional support throughout interview.     Psych ROS: (+) anxiety, PTSD (flashbacks) in relation to her divorce, (-) No SI/thoughts of self-harm/HI, hx of SA, hx of alexis, AH/VH. No hx of physical or sexual abuse. ****************************  Note Authored by   Jo Ann Hamilton MD, PGY1, Psychiatry  Pager: [phone] or Teams  ****************************  H&P    Source: Patient, chart review, psychiatrist's office    AYLEEN DEY is a 44 yo [place]ayan[place] woman, domiciled alone, , recently  from [place], unemployed, w/ PPH of alcohol use disorder, w/ multiple ED visits for alcohol intox, w/multiple rehab admissions, most recently in rehab in [place] in september for 30 days, anxiety/panic attacks (on PRN xanax), no past psych hosp, hx prior SI when intoxicated, no known SA, sees outpt psychiatrist (Dr. Kevin Gitlin), w/ pmh of hepatic steatosis, who presented to ED on 10/13 for alcohol intoxication, , currently on CIWA for detox. Psychiatry consulted on 10/15 for consult for depression/no SI and alcohol relapse.    Patient seen at bedside. Patient said she has been struggling with alcohol dependence for many years. She had a period of sobriety for 6 years until about a year ago after inpatient rehab and success with alcoholics anonymous and started following with a psychiatrist, Dr. Acuna. This lasted until a year ago, when she reports she began using marijuana with her fiance, which triggered her to relapse. 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 She sites several other propagating factors, including isolation from COVID and losing her job. In September patient reports that she decided to enroll in a 30 day inpatient rehab program in [place] and stayed sober until this past Monday, when patient said her fiance decided to leave her and move to [place]. Patient became tearful when describing that her fiance "unloaded many problems he had been holding inside" which she says "my drinking had also been to blame, but that wasn't fair to me." Per patient, "I have good coping skills but him leaving me overwhelmed them and became too much," and she had a relapse. Patient said she has never been a depressed person and is usually happy. She struggles with anxiety and panic attacks, for which she takes xanax PRN prescribed to her by her psychiatrist. She also takes medication sometimes for sleep. She says she rationally knows that she may be better off without her boyfriend, as he may have triggered her drinking, but she emotionally will need some time to heal. She describes having strained her relationships from drinking, but still sites family support, including her brother and sister-in-law, who offered to let her live with them for some time, her mother, and multiple close friends who she can confide in. She enjoys spending time with her nieces and nephews, describing herself as "the cool aunt" when she isn't drinking. She also describes a good relationship with her psychiatrist, who she speaks to once a month via telepsych visits. Patient shows strong motivation to get sober - she is interested in IOP program. She said that she cannot afford right now to spend money on another inpatient setting. She also said that she had the most success when she did alcoholics anonymous and was going to regular AA meetings with her sponsor when she lived in [place]. She also said that she has been discussing with her psychiatrist starting Pristiq as outpatient. She denies depressed mood, saying she mainly feels sad. No Hx of SA/SI. She currently is denying sxs of withdrawal such as tremors. No known hx of WD seizures. Offered patient emotional support throughout interview.     Psych ROS: (+) anxiety, PTSD (flashbacks) in relation to her divorce, (-) No SI/thoughts of self-harm/HI, hx of SA, hx of alexis, AH/VH. No hx of physical or sexual abuse.

## 2020-10-15 NOTE — DISCHARGE NOTE PROVIDER - PROVIDER TOKENS
FREE:[LAST:[Dr. Kevin Gitlin],PHONE:[(   )    -],FAX:[(   )    -],ADDRESS:[Outpatient Psychiatrist],FOLLOWUP:[1 week]],FREE:[LAST:[[facility] (substance abuse) clinic [phone].],PHONE:[(   )    -],FAX:[(   )    -]]

## 2020-10-15 NOTE — BEHAVIORAL HEALTH ASSESSMENT NOTE - NSBHCHARTREVIEWVS_PSY_A_CORE FT
Vital Signs Last 24 Hrs  T(C): 36.8 (15 Oct 2020 11:04), Max: 37.2 (14 Oct 2020 14:56)  T(F): 98.3 (15 Oct 2020 11:04), Max: 98.9 (14 Oct 2020 14:56)  HR: 83 (15 Oct 2020 11:04) (83 - 109)  BP: 150/103 (15 Oct 2020 11:04) (132/94 - 150/103)  BP(mean): --  RR: 18 (15 Oct 2020 11:04) (17 - 18)  SpO2: 98% (15 Oct 2020 11:04) (95% - 99%)

## 2020-10-15 NOTE — DISCHARGE NOTE NURSING/CASE MANAGEMENT/SOCIAL WORK - NSDCCRTYPESERV_GEN_ALL_CORE_FT
You may call intake at [phone] and leave a message stating interest in outpatient treatment and your contact information and a counselor will return you call.

## 2020-10-15 NOTE — DISCHARGE NOTE PROVIDER - NSDCMRMEDTOKEN_GEN_ALL_CORE_FT
Ambien 10 mg oral tablet: 1 tab(s) orally once a day (at bedtime)  cholecalciferol oral tablet: 2000 unit(s) orally once a day  ferrous sulfate 325 mg (65 mg elemental iron) oral tablet: 1 tab(s) orally once a day  Xanax 1 mg oral tablet: 1 tab(s) orally 2 times a day, As Needed   cholecalciferol oral tablet: 2000 unit(s) orally once a day  ferrous sulfate 325 mg (65 mg elemental iron) oral tablet: 1 tab(s) orally once a day  folic acid 1 mg oral tablet: 1 tab(s) orally once a day  Multiple Vitamins oral tablet: 1 tab(s) orally once a day  pantoprazole 40 mg oral delayed release tablet: 1 tab(s) orally once a day (before a meal)  thiamine 100 mg oral tablet: 1 tab(s) orally once a day

## 2020-10-15 NOTE — SBIRT NOTE ADULT - NSSBIRTSAVECONSULT_GEN_A_CORE
Complete/Patient reports continuing to follow with Psychiatrist Dr. Kevin Gitlin ([phone]) for medication management and AA via Zoom. Patient reports recent completion of inpatient rehab in [place] and was doing well, stayed sober for 1.5 months. She reports coming home on Tuesday to her fiancé leaving for [place] without consulting her and upon him leaving started consuming more alcohol. Much active listening and emotional support provided during encounter. Patient is interested in outpatient treatment, reviewed list. Patient chooses [facility]. HIPAA completed and referral emailed to [facility] for review.

## 2020-10-15 NOTE — BEHAVIORAL HEALTH ASSESSMENT NOTE - DESCRIPTION (FIRST USE, LAST USE, QUANTITY, FREQUENCY, DURATION)
Drinks 1-1/5L daily for many years, 6 years of sobriety, started drinking again one year ago. Rehab in september, stopped using until last week. Last drink was 10/12. infrequently

## 2020-10-15 NOTE — BEHAVIORAL HEALTH ASSESSMENT NOTE - SUICIDE RISK FACTORS
Current mood episode/Alcohol/Substance abuse disorders/Hopelessness or despair/Mood Disorder current/past

## 2020-10-15 NOTE — DISCHARGE NOTE NURSING/CASE MANAGEMENT/SOCIAL WORK - PATIENT PORTAL LINK FT
You can access the FollowMyHealth Patient Portal offered by [facility] by registering at the following website: [email or web address]. By joining [employer]’s FollowMyHealth portal, you will also be able to view your health information using other applications (apps) compatible with our system.

## 2020-10-15 NOTE — PROGRESS NOTE ADULT - SUBJECTIVE AND OBJECTIVE BOX
Patient is a 45y old  Female who presents with a chief complaint of Alcohol Withdrawal (14 Oct 2020 01:35)      SUBJECTIVE / OVERNIGHT EVENTS:    Patient seen and examined. ciwa overnight. this morning denies tremors. denies anxious. no cp sob.      Vital Signs Last 24 Hrs  T(C): 36.8 (15 Oct 2020 11:04), Max: 37.3 (14 Oct 2020 12:31)  T(F): 98.3 (15 Oct 2020 11:04), Max: 99.2 (14 Oct 2020 12:31)  HR: 83 (15 Oct 2020 11:04) (83 - 115)  BP: 150/103 (15 Oct 2020 11:04) (132/94 - 151/95)  BP(mean): --  RR: 18 (15 Oct 2020 11:04) (17 - 18)  SpO2: 98% (15 Oct 2020 11:04) (95% - 99%)  I&O's Summary    14 Oct 2020 07:01  -  15 Oct 2020 07:00  --------------------------------------------------------  IN: 560 mL / OUT: 0 mL / NET: 560 mL        PE:  GENERAL: NAD, AAOx3  HEAD:  Atraumatic, Normocephalic  CHEST/LUNG: CTABL, No wheeze  HEART: Regular rate and rhythm; no murmur  ABDOMEN: Soft, Nontender, Nondistended; Bowel sounds present  EXTREMITIES:  2+ Peripheral Pulses, No clubbing, cyanosis, or edema  SKIN: No rashes or lesions  NEURO: mild tremors    LABS:                        9.9    4.92  )-----------( 199      ( 15 Oct 2020 06:47 )             32.7     10-15    134<L>  |  99  |  9   ----------------------------<  87  3.9   |  23  |  0.59    Ca    8.7      15 Oct 2020 06:47  Phos  2.1     10-14  Mg     2.7     10-14    TPro  6.4  /  Alb  3.5  /  TBili  0.3  /  DBili  x   /  AST  50<H>  /  ALT  33  /  AlkPhos  51  10-14    PT/INR - ( 13 Oct 2020 17:58 )   PT: 12.1 sec;   INR: 1.01 ratio         PTT - ( 13 Oct 2020 17:58 )  PTT:29.6 sec  CAPILLARY BLOOD GLUCOSE            Urinalysis Basic - ( 14 Oct 2020 01:28 )    Color: Yellow / Appearance: Slightly Turbid / S.031 / pH: x  Gluc: x / Ketone: Trace  / Bili: Negative / Urobili: Negative   Blood: x / Protein: Trace / Nitrite: Negative   Leuk Esterase: Negative / RBC: 4 /hpf / WBC 5 /HPF   Sq Epi: x / Non Sq Epi: 37 / Bacteria: Moderate        RADIOLOGY & ADDITIONAL TESTS:    Imaging Personally Reviewed:  [x] YES  [ ] NO    Consultant(s) Notes Reviewed:  [x] YES  [ ] NO    MEDICATIONS  (STANDING):  cholecalciferol 2000 Unit(s) Oral daily  enoxaparin Injectable 40 milliGRAM(s) SubCutaneous daily  folic acid 1 milliGRAM(s) Oral daily  influenza   Vaccine 0.5 milliLiter(s) IntraMuscular once  multivitamin 1 Tablet(s) Oral daily  pantoprazole    Tablet 40 milliGRAM(s) Oral before breakfast  potassium phosphate / sodium phosphate Powder (PHOS-NaK) 1 Packet(s) Oral four times a day  sodium chloride 0.9%. 1000 milliLiter(s) (100 mL/Hr) IV Continuous <Continuous>  thiamine IVPB 500 milliGRAM(s) IV Intermittent every 8 hours    MEDICATIONS  (PRN):  LORazepam     Tablet 2 milliGRAM(s) Oral every 2 hours PRN Symptom-triggered 2 point increase in CIWA-Ar  LORazepam   Injectable 2 milliGRAM(s) IV Push every 1 hour PRN Symptom-triggered: each CIWA -Ar score 8 or GREATER  ondansetron Injectable 4 milliGRAM(s) IV Push every 8 hours PRN Nausea and/or Vomiting      Care Discussed with Consultants/Other Providers [x] YES  [ ] NO    HEALTH ISSUES - PROBLEM Dx:  Insomnia  Insomnia    Esophagitis  Esophagitis    Lactate blood increase  Lactate blood increase    Alcohol intoxication  Alcohol intoxication

## 2020-10-15 NOTE — BEHAVIORAL HEALTH ASSESSMENT NOTE - NSBHCONSULTFOLLOWAFTERCARE_PSY_A_CORE FT
- Please have SW connect patient to resources for abstinence including IOP.  - Recommend follow-up appointment with outpatient psychiatrist Dr. Kevin Gitlin upon discharge. Can refer patient to [facility] (substance abuse) clinic [phone].

## 2020-10-19 ENCOUNTER — INPATIENT (INPATIENT)
Facility: HOSPITAL | Age: 45
LOS: 6 days | Discharge: OTHER INSTITUTION | DRG: 896 | End: 2020-10-26
Attending: INTERNAL MEDICINE | Admitting: INTERNAL MEDICINE
Payer: MEDICAID

## 2020-10-19 VITALS
TEMPERATURE: 97 F | OXYGEN SATURATION: 96 % | WEIGHT: 160.06 LBS | HEART RATE: 98 BPM | HEIGHT: 67 IN | DIASTOLIC BLOOD PRESSURE: 94 MMHG | SYSTOLIC BLOOD PRESSURE: 135 MMHG | RESPIRATION RATE: 18 BRPM

## 2020-10-19 DIAGNOSIS — Z98.890 OTHER SPECIFIED POSTPROCEDURAL STATES: Chronic | ICD-10-CM

## 2020-10-19 DIAGNOSIS — F10.231 ALCOHOL DEPENDENCE WITH WITHDRAWAL DELIRIUM: ICD-10-CM

## 2020-10-19 LAB
ALBUMIN SERPL ELPH-MCNC: 4.2 G/DL — SIGNIFICANT CHANGE UP (ref 3.3–5)
ALP SERPL-CCNC: 62 U/L — SIGNIFICANT CHANGE UP (ref 40–120)
ALT FLD-CCNC: 67 U/L — HIGH (ref 10–45)
ANION GAP SERPL CALC-SCNC: 17 MMOL/L — SIGNIFICANT CHANGE UP (ref 5–17)
APAP SERPL-MCNC: <15 UG/ML — SIGNIFICANT CHANGE UP (ref 10–30)
APPEARANCE UR: CLEAR — SIGNIFICANT CHANGE UP
APTT BLD: 27.3 SEC — LOW (ref 27.5–35.5)
AST SERPL-CCNC: 113 U/L — HIGH (ref 10–40)
BACTERIA # UR AUTO: NEGATIVE — SIGNIFICANT CHANGE UP
BASE EXCESS BLDV CALC-SCNC: 4.7 MMOL/L — HIGH (ref -2–2)
BASOPHILS # BLD AUTO: 0.07 K/UL — SIGNIFICANT CHANGE UP (ref 0–0.2)
BASOPHILS NFR BLD AUTO: 1.1 % — SIGNIFICANT CHANGE UP (ref 0–2)
BILIRUB SERPL-MCNC: 0.3 MG/DL — SIGNIFICANT CHANGE UP (ref 0.2–1.2)
BILIRUB UR-MCNC: NEGATIVE — SIGNIFICANT CHANGE UP
BUN SERPL-MCNC: 15 MG/DL — SIGNIFICANT CHANGE UP (ref 7–23)
CA-I SERPL-SCNC: 1 MMOL/L — LOW (ref 1.12–1.3)
CALCIUM SERPL-MCNC: 8.8 MG/DL — SIGNIFICANT CHANGE UP (ref 8.4–10.5)
CHLORIDE BLDV-SCNC: 105 MMOL/L — SIGNIFICANT CHANGE UP (ref 96–108)
CHLORIDE SERPL-SCNC: 100 MMOL/L — SIGNIFICANT CHANGE UP (ref 96–108)
CO2 BLDV-SCNC: 32 MMOL/L — HIGH (ref 22–30)
CO2 SERPL-SCNC: 26 MMOL/L — SIGNIFICANT CHANGE UP (ref 22–31)
COLOR SPEC: SIGNIFICANT CHANGE UP
CREAT SERPL-MCNC: 0.6 MG/DL — SIGNIFICANT CHANGE UP (ref 0.5–1.3)
DIFF PNL FLD: NEGATIVE — SIGNIFICANT CHANGE UP
EOSINOPHIL # BLD AUTO: 0.12 K/UL — SIGNIFICANT CHANGE UP (ref 0–0.5)
EOSINOPHIL NFR BLD AUTO: 1.9 % — SIGNIFICANT CHANGE UP (ref 0–6)
EPI CELLS # UR: 3 /HPF — SIGNIFICANT CHANGE UP
ETHANOL SERPL-MCNC: 275 MG/DL — HIGH (ref 0–10)
ETHANOL SERPL-MCNC: 469 MG/DL — HIGH (ref 0–10)
GAS PNL BLDV: 145 MMOL/L — SIGNIFICANT CHANGE UP (ref 135–145)
GAS PNL BLDV: SIGNIFICANT CHANGE UP
GAS PNL BLDV: SIGNIFICANT CHANGE UP
GLUCOSE BLDV-MCNC: 111 MG/DL — HIGH (ref 70–99)
GLUCOSE SERPL-MCNC: 112 MG/DL — HIGH (ref 70–99)
GLUCOSE UR QL: NEGATIVE — SIGNIFICANT CHANGE UP
HCG SERPL-ACNC: <2 MIU/ML — SIGNIFICANT CHANGE UP
HCO3 BLDV-SCNC: 30 MMOL/L — HIGH (ref 21–29)
HCT VFR BLD CALC: 39.6 % — SIGNIFICANT CHANGE UP (ref 34.5–45)
HCT VFR BLDA CALC: 36 % — LOW (ref 39–50)
HGB BLD CALC-MCNC: 11.8 G/DL — SIGNIFICANT CHANGE UP (ref 11.5–15.5)
HGB BLD-MCNC: 11.6 G/DL — SIGNIFICANT CHANGE UP (ref 11.5–15.5)
HYALINE CASTS # UR AUTO: 2 /LPF — SIGNIFICANT CHANGE UP (ref 0–2)
IMM GRANULOCYTES NFR BLD AUTO: 0.3 % — SIGNIFICANT CHANGE UP (ref 0–1.5)
INR BLD: 0.97 RATIO — SIGNIFICANT CHANGE UP (ref 0.88–1.16)
KETONES UR-MCNC: ABNORMAL
LACTATE BLDV-MCNC: 3.9 MMOL/L — HIGH (ref 0.7–2)
LEUKOCYTE ESTERASE UR-ACNC: NEGATIVE — SIGNIFICANT CHANGE UP
LYMPHOCYTES # BLD AUTO: 1.65 K/UL — SIGNIFICANT CHANGE UP (ref 1–3.3)
LYMPHOCYTES # BLD AUTO: 25.9 % — SIGNIFICANT CHANGE UP (ref 13–44)
MCHC RBC-ENTMCNC: 22.2 PG — LOW (ref 27–34)
MCHC RBC-ENTMCNC: 29.3 GM/DL — LOW (ref 32–36)
MCV RBC AUTO: 75.9 FL — LOW (ref 80–100)
MONOCYTES # BLD AUTO: 0.25 K/UL — SIGNIFICANT CHANGE UP (ref 0–0.9)
MONOCYTES NFR BLD AUTO: 3.9 % — SIGNIFICANT CHANGE UP (ref 2–14)
NEUTROPHILS # BLD AUTO: 4.27 K/UL — SIGNIFICANT CHANGE UP (ref 1.8–7.4)
NEUTROPHILS NFR BLD AUTO: 66.9 % — SIGNIFICANT CHANGE UP (ref 43–77)
NITRITE UR-MCNC: NEGATIVE — SIGNIFICANT CHANGE UP
NRBC # BLD: 0 /100 WBCS — SIGNIFICANT CHANGE UP (ref 0–0)
PCO2 BLDV: 51 MMHG — HIGH (ref 35–50)
PH BLDV: 7.39 — SIGNIFICANT CHANGE UP (ref 7.35–7.45)
PH UR: 6 — SIGNIFICANT CHANGE UP (ref 5–8)
PLATELET # BLD AUTO: 165 K/UL — SIGNIFICANT CHANGE UP (ref 150–400)
PO2 BLDV: 37 MMHG — SIGNIFICANT CHANGE UP (ref 25–45)
POTASSIUM BLDV-SCNC: 3.7 MMOL/L — SIGNIFICANT CHANGE UP (ref 3.5–5.3)
POTASSIUM SERPL-MCNC: 3.7 MMOL/L — SIGNIFICANT CHANGE UP (ref 3.5–5.3)
POTASSIUM SERPL-SCNC: 3.7 MMOL/L — SIGNIFICANT CHANGE UP (ref 3.5–5.3)
PROT SERPL-MCNC: 7.5 G/DL — SIGNIFICANT CHANGE UP (ref 6–8.3)
PROT UR-MCNC: ABNORMAL
PROTHROM AB SERPL-ACNC: 11.7 SEC — SIGNIFICANT CHANGE UP (ref 10.6–13.6)
RBC # BLD: 5.22 M/UL — HIGH (ref 3.8–5.2)
RBC # FLD: 19.8 % — HIGH (ref 10.3–14.5)
RBC CASTS # UR COMP ASSIST: 1 /HPF — SIGNIFICANT CHANGE UP (ref 0–4)
SALICYLATES SERPL-MCNC: <2 MG/DL — LOW (ref 15–30)
SAO2 % BLDV: 58 % — LOW (ref 67–88)
SODIUM SERPL-SCNC: 143 MMOL/L — SIGNIFICANT CHANGE UP (ref 135–145)
SP GR SPEC: 1.01 — SIGNIFICANT CHANGE UP (ref 1.01–1.02)
UROBILINOGEN FLD QL: NEGATIVE — SIGNIFICANT CHANGE UP
WBC # BLD: 6.38 K/UL — SIGNIFICANT CHANGE UP (ref 3.8–10.5)
WBC # FLD AUTO: 6.38 K/UL — SIGNIFICANT CHANGE UP (ref 3.8–10.5)
WBC UR QL: 2 /HPF — SIGNIFICANT CHANGE UP (ref 0–5)

## 2020-10-19 PROCEDURE — 71045 X-RAY EXAM CHEST 1 VIEW: CPT | Mod: 26

## 2020-10-19 PROCEDURE — 99285 EMERGENCY DEPT VISIT HI MDM: CPT

## 2020-10-19 PROCEDURE — 73080 X-RAY EXAM OF ELBOW: CPT | Mod: 26,RT

## 2020-10-19 PROCEDURE — 70450 CT HEAD/BRAIN W/O DYE: CPT | Mod: 26

## 2020-10-19 PROCEDURE — 73090 X-RAY EXAM OF FOREARM: CPT | Mod: 26,LT

## 2020-10-19 PROCEDURE — 93010 ELECTROCARDIOGRAM REPORT: CPT

## 2020-10-19 RX ORDER — SODIUM CHLORIDE 9 MG/ML
1000 INJECTION, SOLUTION INTRAVENOUS
Refills: 0 | Status: DISCONTINUED | OUTPATIENT
Start: 2020-10-19 | End: 2020-10-21

## 2020-10-19 RX ORDER — SODIUM CHLORIDE 9 MG/ML
1000 INJECTION, SOLUTION INTRAVENOUS ONCE
Refills: 0 | Status: COMPLETED | OUTPATIENT
Start: 2020-10-19 | End: 2020-10-19

## 2020-10-19 RX ORDER — ONDANSETRON 8 MG/1
4 TABLET, FILM COATED ORAL ONCE
Refills: 0 | Status: COMPLETED | OUTPATIENT
Start: 2020-10-19 | End: 2020-10-19

## 2020-10-19 RX ADMIN — ONDANSETRON 4 MILLIGRAM(S): 8 TABLET, FILM COATED ORAL at 23:42

## 2020-10-19 RX ADMIN — SODIUM CHLORIDE 1000 MILLILITER(S): 9 INJECTION, SOLUTION INTRAVENOUS at 22:15

## 2020-10-19 RX ADMIN — Medication 50 MILLIGRAM(S): at 22:10

## 2020-10-19 RX ADMIN — SODIUM CHLORIDE 1000 MILLILITER(S): 9 INJECTION, SOLUTION INTRAVENOUS at 23:37

## 2020-10-19 RX ADMIN — SODIUM CHLORIDE 100 MILLILITER(S): 9 INJECTION, SOLUTION INTRAVENOUS at 16:54

## 2020-10-19 RX ADMIN — SODIUM CHLORIDE 2000 MILLILITER(S): 9 INJECTION, SOLUTION INTRAVENOUS at 22:38

## 2020-10-19 RX ADMIN — Medication 1 TABLET(S): at 21:33

## 2020-10-19 NOTE — ED PROVIDER NOTE - OBJECTIVE STATEMENT
46 yo F PMH HTN, ethanol use presents for alcohol intoxication, with recent admission for the same. Patient unable to provide additional history at this time given intoxicated state. Will reach out to collateral

## 2020-10-19 NOTE — ED PROVIDER NOTE - PROGRESS NOTE DETAILS
Machelle Valerio MD PGY-3 called emergency contact Jaren Cuellar. Released from rehab 2 weeks ago from alcohol use, admitted last wednesday. Mother went to house today and founds patient unresponsive and incoherent, and mother called 911. PMH: alcoholism, ?"heart issues", ?pancreas disease, HTN. Patient is a physician's assistant. States patient occassionally mixes with xanax. mixes 4-5 bottles of vodka found in the last week. Patient lives alone sign out 1730, intoxicated, nml VS, more alert, tolerating PO, on CIWA/1:1, awaiting imaging/close reassessments for dispo decision -- Keaton Costa MD Resident Richard: re-eval with demonstration of persistent tachycardia, with improvement of initial intoxication. Pt now AO x 3, endorses drinking 0.5 of L on Absolut Vodka, denies any other toxic co-ingestants, IVDA. Denies any SI/HI at this time. Will re-administer fluids, and re-assess. increased agitation/delirium, tremor, HR low 100s, will need admission -- Keaton Costa MD Resident Richard: re-eval with demonstration of persistent tachycardia, with improvement of initial intoxication. Pt now AO x 2, endorses drinking 0.5 of L on Absolut Vodka, denies any other toxic co-ingestants, IVDA. Denies any SI/HI at this time. Will re-administer fluids, and re-assess.

## 2020-10-19 NOTE — ED PROVIDER NOTE - PHYSICAL EXAMINATION
PHYSICAL EXAM:   General: tired appearing  HEENT: NC/AT, no raccoon eyes or galicia signs, PERRLA (has one colored contact in left eye), airway patent  Cardiovascular: regular rate and rhythm, + S1/S2, no murmurs, rubs, gallops appreciated  Respiratory: clear to auscultation bilaterally anteriorly  Abdominal: soft  Extremities: ecchymosis noted to R elbow  Neuro: tired appearing, intermittently A&O to name. arousable to verbal stimuli but easily falls asleep. follows commands, moving all extremities, could not assess for nystagmus 2/2 limited patient effort.  pupils as noted above  Skin: as noted  -Machelle Valerio PGY-3

## 2020-10-19 NOTE — ED ADULT NURSE REASSESSMENT NOTE - NS ED NURSE REASSESS COMMENT FT1
Patient admitted to [facility]. Admission band applied to patient utilizing two patient identifiers. Patient updated on plan of care.

## 2020-10-19 NOTE — ED PROVIDER NOTE - ATTENDING CONTRIBUTION TO CARE
Attending MD Weeks:  I personally have seen and examined this patient.  Resident note reviewed and agree on plan of care and except where noted.

## 2020-10-19 NOTE — ED ADULT NURSE NOTE - OBJECTIVE STATEMENT
45y female arrived to ED BIBEMS for intox. Patient sent by her mother. Patient PMHx ETOH abuse, HTN. Patient nonverbal in ED, very droswy and responds only to painful stimuli. VS stable. No signs of trauma. Patient smells of ETOH. Patient unable to give Hx. Recent admission for intox.

## 2020-10-19 NOTE — ED PROVIDER NOTE - CLINICAL SUMMARY MEDICAL DECISION MAKING FREE TEXT BOX
Attending MD Weeks: 44 yo F PMH HTN, ethanol use presents for alcohol intoxication, with recent admission for the same. Patient unable to provide additional history at this time given intoxicated state.  No evidence of trauma to head or neck.  Will place IV, labs, thiamine, MVI, Folate and re-eval. CIWA high risk started.

## 2020-10-20 DIAGNOSIS — G47.00 INSOMNIA, UNSPECIFIED: ICD-10-CM

## 2020-10-20 DIAGNOSIS — F32.9 MAJOR DEPRESSIVE DISORDER, SINGLE EPISODE, UNSPECIFIED: ICD-10-CM

## 2020-10-20 DIAGNOSIS — I10 ESSENTIAL (PRIMARY) HYPERTENSION: ICD-10-CM

## 2020-10-20 DIAGNOSIS — F10.231 ALCOHOL DEPENDENCE WITH WITHDRAWAL DELIRIUM: ICD-10-CM

## 2020-10-20 LAB
ALBUMIN SERPL ELPH-MCNC: 3.3 G/DL — SIGNIFICANT CHANGE UP (ref 3.3–5)
ALBUMIN SERPL ELPH-MCNC: 3.7 G/DL — SIGNIFICANT CHANGE UP (ref 3.3–5)
ALP SERPL-CCNC: 44 U/L — SIGNIFICANT CHANGE UP (ref 40–120)
ALP SERPL-CCNC: 49 U/L — SIGNIFICANT CHANGE UP (ref 40–120)
ALT FLD-CCNC: 44 U/L — SIGNIFICANT CHANGE UP (ref 10–45)
ALT FLD-CCNC: 45 U/L — SIGNIFICANT CHANGE UP (ref 10–45)
ANION GAP SERPL CALC-SCNC: 13 MMOL/L — SIGNIFICANT CHANGE UP (ref 5–17)
ANION GAP SERPL CALC-SCNC: 13 MMOL/L — SIGNIFICANT CHANGE UP (ref 5–17)
ANION GAP SERPL CALC-SCNC: 8 MMOL/L — SIGNIFICANT CHANGE UP (ref 5–17)
AST SERPL-CCNC: 62 U/L — HIGH (ref 10–40)
AST SERPL-CCNC: 65 U/L — HIGH (ref 10–40)
BASOPHILS # BLD AUTO: 0.04 K/UL — SIGNIFICANT CHANGE UP (ref 0–0.2)
BASOPHILS NFR BLD AUTO: 1 % — SIGNIFICANT CHANGE UP (ref 0–2)
BILIRUB DIRECT SERPL-MCNC: 0.1 MG/DL — SIGNIFICANT CHANGE UP (ref 0–0.2)
BILIRUB DIRECT SERPL-MCNC: 0.2 MG/DL — SIGNIFICANT CHANGE UP (ref 0–0.2)
BILIRUB INDIRECT FLD-MCNC: 0.4 MG/DL — SIGNIFICANT CHANGE UP (ref 0.2–1)
BILIRUB INDIRECT FLD-MCNC: 0.5 MG/DL — SIGNIFICANT CHANGE UP (ref 0.2–1)
BILIRUB SERPL-MCNC: 0.5 MG/DL — SIGNIFICANT CHANGE UP (ref 0.2–1.2)
BILIRUB SERPL-MCNC: 0.7 MG/DL — SIGNIFICANT CHANGE UP (ref 0.2–1.2)
BUN SERPL-MCNC: 11 MG/DL — SIGNIFICANT CHANGE UP (ref 7–23)
BUN SERPL-MCNC: 11 MG/DL — SIGNIFICANT CHANGE UP (ref 7–23)
BUN SERPL-MCNC: 8 MG/DL — SIGNIFICANT CHANGE UP (ref 7–23)
CALCIUM SERPL-MCNC: 7.8 MG/DL — LOW (ref 8.4–10.5)
CALCIUM SERPL-MCNC: 8 MG/DL — LOW (ref 8.4–10.5)
CALCIUM SERPL-MCNC: 8.7 MG/DL — SIGNIFICANT CHANGE UP (ref 8.4–10.5)
CHLORIDE SERPL-SCNC: 100 MMOL/L — SIGNIFICANT CHANGE UP (ref 96–108)
CHLORIDE SERPL-SCNC: 95 MMOL/L — LOW (ref 96–108)
CHLORIDE SERPL-SCNC: 97 MMOL/L — SIGNIFICANT CHANGE UP (ref 96–108)
CO2 SERPL-SCNC: 26 MMOL/L — SIGNIFICANT CHANGE UP (ref 22–31)
CO2 SERPL-SCNC: 27 MMOL/L — SIGNIFICANT CHANGE UP (ref 22–31)
CO2 SERPL-SCNC: 28 MMOL/L — SIGNIFICANT CHANGE UP (ref 22–31)
CREAT SERPL-MCNC: 0.54 MG/DL — SIGNIFICANT CHANGE UP (ref 0.5–1.3)
CREAT SERPL-MCNC: 0.54 MG/DL — SIGNIFICANT CHANGE UP (ref 0.5–1.3)
CREAT SERPL-MCNC: 0.59 MG/DL — SIGNIFICANT CHANGE UP (ref 0.5–1.3)
EOSINOPHIL # BLD AUTO: 0.18 K/UL — SIGNIFICANT CHANGE UP (ref 0–0.5)
EOSINOPHIL NFR BLD AUTO: 4.5 % — SIGNIFICANT CHANGE UP (ref 0–6)
GLUCOSE SERPL-MCNC: 158 MG/DL — HIGH (ref 70–99)
GLUCOSE SERPL-MCNC: 173 MG/DL — HIGH (ref 70–99)
GLUCOSE SERPL-MCNC: 192 MG/DL — HIGH (ref 70–99)
HCT VFR BLD CALC: 28.2 % — LOW (ref 34.5–45)
HGB BLD-MCNC: 8.8 G/DL — LOW (ref 11.5–15.5)
IMM GRANULOCYTES NFR BLD AUTO: 0.5 % — SIGNIFICANT CHANGE UP (ref 0–1.5)
LYMPHOCYTES # BLD AUTO: 1.44 K/UL — SIGNIFICANT CHANGE UP (ref 1–3.3)
LYMPHOCYTES # BLD AUTO: 36.2 % — SIGNIFICANT CHANGE UP (ref 13–44)
MAGNESIUM SERPL-MCNC: 1.2 MG/DL — LOW (ref 1.6–2.6)
MAGNESIUM SERPL-MCNC: 1.6 MG/DL — SIGNIFICANT CHANGE UP (ref 1.6–2.6)
MCHC RBC-ENTMCNC: 22.9 PG — LOW (ref 27–34)
MCHC RBC-ENTMCNC: 31.2 GM/DL — LOW (ref 32–36)
MCV RBC AUTO: 73.4 FL — LOW (ref 80–100)
MONOCYTES # BLD AUTO: 0.25 K/UL — SIGNIFICANT CHANGE UP (ref 0–0.9)
MONOCYTES NFR BLD AUTO: 6.3 % — SIGNIFICANT CHANGE UP (ref 2–14)
NEUTROPHILS # BLD AUTO: 2.05 K/UL — SIGNIFICANT CHANGE UP (ref 1.8–7.4)
NEUTROPHILS NFR BLD AUTO: 51.5 % — SIGNIFICANT CHANGE UP (ref 43–77)
NRBC # BLD: 0 /100 WBCS — SIGNIFICANT CHANGE UP (ref 0–0)
PHOSPHATE SERPL-MCNC: 1.6 MG/DL — LOW (ref 2.5–4.5)
PHOSPHATE SERPL-MCNC: 2.1 MG/DL — LOW (ref 2.5–4.5)
PLATELET # BLD AUTO: 110 K/UL — LOW (ref 150–400)
POTASSIUM SERPL-MCNC: 3.1 MMOL/L — LOW (ref 3.5–5.3)
POTASSIUM SERPL-MCNC: 3.2 MMOL/L — LOW (ref 3.5–5.3)
POTASSIUM SERPL-MCNC: 4 MMOL/L — SIGNIFICANT CHANGE UP (ref 3.5–5.3)
POTASSIUM SERPL-SCNC: 3.1 MMOL/L — LOW (ref 3.5–5.3)
POTASSIUM SERPL-SCNC: 3.2 MMOL/L — LOW (ref 3.5–5.3)
POTASSIUM SERPL-SCNC: 4 MMOL/L — SIGNIFICANT CHANGE UP (ref 3.5–5.3)
PROT SERPL-MCNC: 5.8 G/DL — LOW (ref 6–8.3)
PROT SERPL-MCNC: 6.5 G/DL — SIGNIFICANT CHANGE UP (ref 6–8.3)
RBC # BLD: 3.84 M/UL — SIGNIFICANT CHANGE UP (ref 3.8–5.2)
RBC # FLD: 19.3 % — HIGH (ref 10.3–14.5)
SARS-COV-2 RNA SPEC QL NAA+PROBE: SIGNIFICANT CHANGE UP
SODIUM SERPL-SCNC: 135 MMOL/L — SIGNIFICANT CHANGE UP (ref 135–145)
SODIUM SERPL-SCNC: 136 MMOL/L — SIGNIFICANT CHANGE UP (ref 135–145)
SODIUM SERPL-SCNC: 136 MMOL/L — SIGNIFICANT CHANGE UP (ref 135–145)
WBC # BLD: 3.98 K/UL — SIGNIFICANT CHANGE UP (ref 3.8–10.5)
WBC # FLD AUTO: 3.98 K/UL — SIGNIFICANT CHANGE UP (ref 3.8–10.5)

## 2020-10-20 RX ORDER — ONDANSETRON 8 MG/1
8 TABLET, FILM COATED ORAL EVERY 8 HOURS
Refills: 0 | Status: DISCONTINUED | OUTPATIENT
Start: 2020-10-20 | End: 2020-10-26

## 2020-10-20 RX ORDER — POTASSIUM CHLORIDE 20 MEQ
40 PACKET (EA) ORAL EVERY 4 HOURS
Refills: 0 | Status: COMPLETED | OUTPATIENT
Start: 2020-10-20 | End: 2020-10-20

## 2020-10-20 RX ORDER — FOLIC ACID 0.8 MG
1 TABLET ORAL DAILY
Refills: 0 | Status: DISCONTINUED | OUTPATIENT
Start: 2020-10-20 | End: 2020-10-26

## 2020-10-20 RX ORDER — THIAMINE MONONITRATE (VIT B1) 100 MG
100 TABLET ORAL DAILY
Refills: 0 | Status: COMPLETED | OUTPATIENT
Start: 2020-10-20 | End: 2020-10-22

## 2020-10-20 RX ORDER — ONDANSETRON 8 MG/1
4 TABLET, FILM COATED ORAL ONCE
Refills: 0 | Status: COMPLETED | OUTPATIENT
Start: 2020-10-20 | End: 2020-10-20

## 2020-10-20 RX ORDER — INFLUENZA VIRUS VACCINE 15; 15; 15; 15 UG/.5ML; UG/.5ML; UG/.5ML; UG/.5ML
0.5 SUSPENSION INTRAMUSCULAR ONCE
Refills: 0 | Status: COMPLETED | OUTPATIENT
Start: 2020-10-20 | End: 2020-10-23

## 2020-10-20 RX ORDER — ACETAMINOPHEN 500 MG
650 TABLET ORAL ONCE
Refills: 0 | Status: COMPLETED | OUTPATIENT
Start: 2020-10-20 | End: 2020-10-20

## 2020-10-20 RX ORDER — MAGNESIUM SULFATE 500 MG/ML
1 VIAL (ML) INJECTION ONCE
Refills: 0 | Status: COMPLETED | OUTPATIENT
Start: 2020-10-20 | End: 2020-10-20

## 2020-10-20 RX ORDER — ONDANSETRON 8 MG/1
4 TABLET, FILM COATED ORAL EVERY 6 HOURS
Refills: 0 | Status: DISCONTINUED | OUTPATIENT
Start: 2020-10-20 | End: 2020-10-20

## 2020-10-20 RX ORDER — SODIUM CHLORIDE 9 MG/ML
1000 INJECTION, SOLUTION INTRAVENOUS
Refills: 0 | Status: DISCONTINUED | OUTPATIENT
Start: 2020-10-20 | End: 2020-10-23

## 2020-10-20 RX ORDER — ACETAMINOPHEN 500 MG
650 TABLET ORAL EVERY 6 HOURS
Refills: 0 | Status: DISCONTINUED | OUTPATIENT
Start: 2020-10-20 | End: 2020-10-26

## 2020-10-20 RX ADMIN — Medication 2 MILLIGRAM(S): at 00:36

## 2020-10-20 RX ADMIN — Medication 4 MILLIGRAM(S): at 14:35

## 2020-10-20 RX ADMIN — Medication 650 MILLIGRAM(S): at 08:11

## 2020-10-20 RX ADMIN — Medication 2 MILLIGRAM(S): at 08:11

## 2020-10-20 RX ADMIN — Medication 650 MILLIGRAM(S): at 08:41

## 2020-10-20 RX ADMIN — Medication 650 MILLIGRAM(S): at 01:35

## 2020-10-20 RX ADMIN — Medication 2 MILLIGRAM(S): at 03:29

## 2020-10-20 RX ADMIN — ONDANSETRON 4 MILLIGRAM(S): 8 TABLET, FILM COATED ORAL at 01:35

## 2020-10-20 RX ADMIN — Medication 40 MILLIEQUIVALENT(S): at 14:34

## 2020-10-20 RX ADMIN — Medication 4 MILLIGRAM(S): at 22:55

## 2020-10-20 RX ADMIN — ONDANSETRON 4 MILLIGRAM(S): 8 TABLET, FILM COATED ORAL at 12:52

## 2020-10-20 RX ADMIN — ONDANSETRON 4 MILLIGRAM(S): 8 TABLET, FILM COATED ORAL at 08:38

## 2020-10-20 RX ADMIN — Medication 2 MILLIGRAM(S): at 12:46

## 2020-10-20 RX ADMIN — Medication 1 MILLIGRAM(S): at 11:18

## 2020-10-20 RX ADMIN — Medication 40 MILLIEQUIVALENT(S): at 10:09

## 2020-10-20 RX ADMIN — SODIUM CHLORIDE 125 MILLILITER(S): 9 INJECTION, SOLUTION INTRAVENOUS at 02:45

## 2020-10-20 RX ADMIN — Medication 2 MILLIGRAM(S): at 05:36

## 2020-10-20 RX ADMIN — Medication 4 MILLIGRAM(S): at 18:02

## 2020-10-20 RX ADMIN — Medication 100 GRAM(S): at 10:09

## 2020-10-20 RX ADMIN — Medication 2 MILLIGRAM(S): at 13:04

## 2020-10-20 RX ADMIN — Medication 100 MILLIGRAM(S): at 12:04

## 2020-10-20 RX ADMIN — Medication 1 TABLET(S): at 11:18

## 2020-10-20 NOTE — PROVIDER CONTACT NOTE (OTHER) - ACTION/TREATMENT ORDERED:
NP ordered extra dose of ativan 2mg IVP, Zofran 4 mg IVP. medication given. Pt received total 4 mg ativan now. CIWA order set changed with tapering ativan dose.   Will continue to monitor the patient.

## 2020-10-20 NOTE — PROVIDER CONTACT NOTE (OTHER) - ASSESSMENT
VSS. CIWA score 15. Patient vomited x1. visible tremors. mildly anxious and c/o moderate headache. VSS. Patient A & O x 4.  Enhanced supervision continue.

## 2020-10-20 NOTE — PROVIDER CONTACT NOTE (OTHER) - ACTION/TREATMENT ORDERED:
NP notified, check leads, wipe with alcohol pads and reattach in correct location. Will continue to monitor. NP notified, NP assessed pt at bedside. Leads checked. EKG done. Pt given Zofran, Ativan, Tylenol given.  Will continue to monitor.

## 2020-10-20 NOTE — H&P ADULT - NSHPPHYSICALEXAM_GEN_ALL_CORE
GENERAL: NAD, AAOx2  HEAD:  Atraumatic, Normocephalic  EYES: EOMI,  conjunctiva and sclera clear  NECK: Supple, No LAD  CHEST/LUNG: Clear bilateral, No wheeze  HEART: Regular rate and rhythm; No murmurs, rubs, or gallops  ABDOMEN: Soft, Nontender, Nondistended; Bowel sounds present  EXTREMITIES: + Pulses, No clubbing, cyanosis, or edema  SKIN: No rashes or lesions

## 2020-10-20 NOTE — H&P ADULT - NSHPREVIEWOFSYSTEMS_GEN_ALL_CORE
REVIEW OF SYSTEMS:  General: no weakness, no fever/chills, no weight loss/gain  Skin/Breast: no rash, no jaundice  Ophthalmologic: no vision changes, no dry eyes   Respiratory and Thorax: no cough, no wheezing, no hemoptysis, no dyspnea  Cardiovascular: no chest pain, no shortness of breath, no orthopnea  Gastrointestinal: + n/v/d, no abdominal pain, no dysphagia   Genitourinary: no dysuria, no frequency, no nocturia, no hematuria  Musculoskeletal: no trauma, no sprain/strain, no myalgias, no arthralgias, no fracture  Neurological: no HA, no dizziness, no weakness, no numbness  Psychiatric: no depression, no SI/HI  Hematology/Lymphatics: no easy bruising  Endocrine: no heat or cold intolerance. no weight gain or loss  Allergic/Immunologic: no allergy or recent reaction

## 2020-10-20 NOTE — H&P ADULT - NSHPLABSRESULTS_GEN_ALL_CORE
LABS:                        11.6   6.38  )-----------( 165      ( 19 Oct 2020 16:16 )             39.6     10-19    143  |  100  |  15  ----------------------------<  112<H>  3.7   |  26  |  0.60    Ca    8.8      19 Oct 2020 16:16    TPro  7.5  /  Alb  4.2  /  TBili  0.3  /  DBili  x   /  AST  113<H>  /  ALT  67<H>  /  AlkPhos  62  10-19    PT/INR - ( 19 Oct 2020 16:16 )   PT: 11.7 sec;   INR: 0.97 ratio         PTT - ( 19 Oct 2020 16:16 )  PTT:27.3 sec  CAPILLARY BLOOD GLUCOSE      POCT Blood Glucose.: 238 mg/dL (19 Oct 2020 21:54)        Urinalysis Basic - ( 19 Oct 2020 20:58 )    Color: x / Appearance: x / SG: x / pH: x  Gluc: x / Ketone: x  / Bili: x / Urobili: x   Blood: x / Protein: x / Nitrite: x   Leuk Esterase: x / RBC: 1 /hpf / WBC 2 /HPF   Sq Epi: x / Non Sq Epi: 3 /hpf / Bacteria: Negative        RADIOLOGY & ADDITIONAL TESTS:    Imaging Personally Reviewed:  [x] YES  [ ] NO    Consultant(s) Notes Reviewed:  [x] YES  [ ] NO    Care Discussed with Consultants/Other Providers [x] YES  [ ] NO

## 2020-10-20 NOTE — H&P ADULT - PROBLEM SELECTOR PLAN 1
concerns for delirium , continue on IV fluids  MVI, Thiamine, folic acid for supplementation.   ativan as needed for CIWA evaluation.

## 2020-10-20 NOTE — PROVIDER CONTACT NOTE (OTHER) - ASSESSMENT
Pt denies cp, sob, palpitation. Pt AOx4. Pt denies cp, sob, palpitation. Pt has headache. Sinus Tach on tele. Pt with tremors, Ciwa score of 10. Complains of nausea with vomiting.

## 2020-10-20 NOTE — PROVIDER CONTACT NOTE (OTHER) - SITUATION
Vtach/vfib Pt found to be in vtach/vfib on tele. Tele rhythm not accurate due to false lead reading. Pt with tremors.

## 2020-10-20 NOTE — H&P ADULT - ATTENDING COMMENTS
45F with PMHx of alcohol abuse recently left AMA 1 week ago, presents for alcohol intoxication, has been drinking everyday, drank 1 pint vodka yestereday. admitted for alcohol intoxication and withdrawal.    Alcohol intoxication.    symptom-triggered aggressive CIWA protocol  CIWA taper  Aggressive IV hydration  High dose thiamine, MVI, folic acid  SW  psych    Dr. Martinez will take over care tomorrow.  Please contact with any questions or concerns [phone]. 45F with PMHx of alcohol abuse recently left AMA 1 week ago, presents for alcohol intoxication, has been drinking everyday, drank 1 pint vodka yestereday. admitted for alcohol intoxication and withdrawal.    Alcohol intoxication.    symptom-triggered aggressive CIWA protocol  CIWA taper  Aggressive IV hydration  High dose thiamine, MVI, folic acid  SW  psych  supplement hypokalemia and hypomag    Dr. Martinez will take over care tomorrow.  Please contact with any questions or concerns [phone].

## 2020-10-20 NOTE — H&P ADULT - ASSESSMENT
45yr old female with a pmhx of alcoholism presents with alcohol intoxication requiring supportive care for withdrawal.

## 2020-10-20 NOTE — CHART NOTE - NSCHARTNOTEFT_GEN_A_CORE
Notified by RN that patient on tele monitoring with ectopy of Vtach/ VFib x 60 secs. Patient  noted to be alert and oriented x3,  Vital sign : B/p 147/89  RR 24, Sat 97% on RA.  Patient in  no distress , denies chest pain, SOB, palpitation. C/o of nausea and mild headache.  Ectopy likely due to misplacement of leads and patient tremors.   44 y/o F PMH HTN, ethanol use presents for alcohol intoxication,  Current CIWA score of 15    EKG STAT with  Sinus tachy   Ativan 2 mg IV given for CIWA score of 15  Zofran for nausea  Tylenol for headache  Cont to monitor on Tele  patient on 1:1 for attempting to pul out IV  Follow up with Attending    YAIMA Hancock-C  Department of Medicine  [id number] Notified by RN that patient on tele monitoring with ectopy of Vtach/ VFib x 60 secs. Patient  noted to be alert and oriented x3,  Vital sign : B/p 147/89  RR 24, Sat 97% on RA.  Patient in  no distress , denies chest pain, SOB, palpitation. C/o of nausea and mild headache.  Ectopy likely due to misplacement of leads and patient tremors.     44 y/o F PMH HTN, ethanol use presents for alcohol intoxication. Noted visible tremors . Current CIWA score of 15    EKG STAT with  Sinus tachy   Ativan 2 mg IV given for CIWA score of 15  Zofran for nausea  Tylenol for headache  Cont to monitor on Tele  patient on 1:1 for attempting to pul out IV  Will have MICU consulted if no improvements after therapy  Follow up with Attending- Dr Marques notified of current events     Radhika Calhoun ANP-C  Department of Medicine  [id number] Notified by RN that patient on tele monitoring with ectopy of Vtach/ VFib x 60 secs. Patient  noted to be alert and oriented x3,  Vital sign : B/p 147/89  RR 24, Sat 97% on RA.  Patient in  no distress , denies chest pain, SOB, palpitation. C/o of nausea and mild headache.  Ectopy likely due to misplacement of leads and patient tremors.     44 y/o F PMH HTN, ethanol use presents for alcohol intoxication. Noted visible tremors . Current CIWA score of 15    EKG STAT with  Sinus tachy   Ativan 2 mg IV given for CIWA score of 15  Zofran for nausea  Tylenol for headache  Cont to monitor on Tele  patient on 1:1 for attempting to pul out IV  Will have MICU consulted if no improvements after therapy  Follow up with Attending- Dr Najera  notified of current events     Radhika Calhoun ANP-C  Department of Medicine  [id number] Notified by RN that patient on tele monitoring with ectopy of Vtach/ VFib x 60 secs. Evaluated patient at bedside,  patient noted to be alert and oriented x3,  Vital sign : B/p 147/89  RR 24, Sat 97% on RA.  Patient in  no distress , denies chest pain, SOB, palpitation, good tissue perfusion . C/o of nausea and mild headache.  Ectopy likely due to misplacement of leads and patient tremors.  Chest wall cleaned and leads re-attached in correct placement - with cardiac monitor now showing sinus tachycardia .    46 y/o F PMH HTN, ethanol use presents for alcohol intoxication. Noted visible tremors on assessment . Current CIWA score of 15    EKG , 12 lead STAT with  Sinus tachy   Delirium tremors -Ativan 2 mg IV given for CIWA score of 15  Cont CIWA protocol with Ativan PRN   Cont IVF for hydration   Zofran for nausea  Tylenol for headache  Cont to monitor on Tele  patient on 1:1 for attempting to pull out IV  Will have MICU consulted if no improvements after therapy  Follow up with Attending- Dr Najera  notified of current events     Radhika Calhoun ANP-C  Department of Medicine  [id number] Notified by RN that patient on tele monitoring with ectopy of Vtach/ VFib x 60 secs. Evaluated patient at bedside,  patient noted to be alert and oriented x3,  Vital sign : B/p 147/89  RR 24, Sat 97% on RA.  Patient  denies chest pain, SOB, palpitation, good tissue perfusion . C/o of nausea and mild headache.  Ectopy likely due to misplacement of leads and patient tremors.  Chest wall cleaned and leads re-attached in correct placement - with cardiac monitor now showing sinus tachycardia .    44 y/o F PMH HTN, ethanol use presents for alcohol intoxication. Noted visible tremors on assessment . Current CIWA score of 15    EKG , 12 lead STAT with  Sinus tachy   Delirium tremors -Ativan 2 mg IV given for CIWA score of 15  Cont CIWA protocol with Ativan PRN   Cont IVF for hydration   Zofran for nausea  Tylenol for headache  Cont to monitor on Tele  patient on 1:1 for attempting to pull out IV  Will have MICU consulted if no improvements after therapy  Follow up with Attending- Dr Najera  notified of current events     Radhika Calhoun ANP-C  Department of Medicine  [id number] Notified by RN that patient on tele monitoring with ectopy of Vtach/ VFib x 60 secs. Evaluated patient at bedside,  patient noted to be alert and oriented x3,  Vital sign : B/p 147/89  RR 24, Sat 97% on RA.  Patient  denies chest pain, SOB, palpitation, good tissue perfusion, capillary refill < 3 secs . C/o of nausea and mild headache.  Ectopy likely due to misplacement of leads and patient tremors.  Chest wall cleaned and leads re-attached in correct placement - with cardiac monitor now showing sinus tachycardia .    46 y/o F PMH HTN, ethanol use presents for alcohol intoxication. Noted visible tremors on assessment . Current CIWA score of 15    EKG , 12 lead STAT with  Sinus tachy   Delirium tremors -Ativan 2 mg IV given for CIWA score of 15  Cont CIWA protocol with Ativan PRN   Cont IVF for hydration   Zofran for nausea  Tylenol for headache  Cont to monitor on Tele  patient on 1:1 for attempting to pull out IV  Will have MICU consulted if no improvements after therapy  Follow up with Attending- Dr Najera  notified of current events     Radhika Calhoun, ANP-C  Department of Medicine  [id number]

## 2020-10-20 NOTE — H&P ADULT - HISTORY OF PRESENT ILLNESS
45 yr old female with a pmhx of alcohol abuse and intoxication on recurrent sincere F PMH HTN, ethanol use presents for alcohol intoxication, with recent admission for the same. Patient unable to provide additional history at this time given intoxicated state. Will reach out to collateral 45 yr old female with a pmhx of alcohol abuse presents for alcohol intoxication on recurrent admissions. Patient states she has had a very difficult time since she was discharged from the 30 day stay in an inpatient rehab. Patient was found in the house intoxicated and barely responding, so her mother called the ambulance and they brought her in for evaluation. Patient concerned about how to manage her finances given her rehab cost her 12K. She comes in with complaints of tremors. During the evaluation she is able to complete her sentences and give me full history. She states she was not expecting her fiance to suddenly leave her and move out to [place].

## 2020-10-21 LAB
ALBUMIN SERPL ELPH-MCNC: 3.5 G/DL — SIGNIFICANT CHANGE UP (ref 3.3–5)
ALP SERPL-CCNC: 50 U/L — SIGNIFICANT CHANGE UP (ref 40–120)
ALT FLD-CCNC: 37 U/L — SIGNIFICANT CHANGE UP (ref 10–45)
ANION GAP SERPL CALC-SCNC: 8 MMOL/L — SIGNIFICANT CHANGE UP (ref 5–17)
AST SERPL-CCNC: 45 U/L — HIGH (ref 10–40)
BILIRUB SERPL-MCNC: 0.5 MG/DL — SIGNIFICANT CHANGE UP (ref 0.2–1.2)
BUN SERPL-MCNC: 5 MG/DL — LOW (ref 7–23)
CALCIUM SERPL-MCNC: 9 MG/DL — SIGNIFICANT CHANGE UP (ref 8.4–10.5)
CHLORIDE SERPL-SCNC: 101 MMOL/L — SIGNIFICANT CHANGE UP (ref 96–108)
CO2 SERPL-SCNC: 26 MMOL/L — SIGNIFICANT CHANGE UP (ref 22–31)
CREAT SERPL-MCNC: 0.54 MG/DL — SIGNIFICANT CHANGE UP (ref 0.5–1.3)
GLUCOSE BLDC GLUCOMTR-MCNC: 170 MG/DL — HIGH (ref 70–99)
GLUCOSE SERPL-MCNC: 169 MG/DL — HIGH (ref 70–99)
MAGNESIUM SERPL-MCNC: 1.6 MG/DL — SIGNIFICANT CHANGE UP (ref 1.6–2.6)
PHOSPHATE SERPL-MCNC: 3 MG/DL — SIGNIFICANT CHANGE UP (ref 2.5–4.5)
POTASSIUM SERPL-MCNC: 3.5 MMOL/L — SIGNIFICANT CHANGE UP (ref 3.5–5.3)
POTASSIUM SERPL-SCNC: 3.5 MMOL/L — SIGNIFICANT CHANGE UP (ref 3.5–5.3)
PROT SERPL-MCNC: 6.3 G/DL — SIGNIFICANT CHANGE UP (ref 6–8.3)
SARS-COV-2 IGG SERPL QL IA: NEGATIVE — SIGNIFICANT CHANGE UP
SARS-COV-2 IGM SERPL IA-ACNC: 0.04 INDEX — SIGNIFICANT CHANGE UP
SODIUM SERPL-SCNC: 135 MMOL/L — SIGNIFICANT CHANGE UP (ref 135–145)

## 2020-10-21 RX ADMIN — Medication 4 MILLIGRAM(S): at 06:00

## 2020-10-21 RX ADMIN — Medication 4 MILLIGRAM(S): at 01:59

## 2020-10-21 RX ADMIN — Medication 1 MILLIGRAM(S): at 14:23

## 2020-10-21 RX ADMIN — Medication 100 MILLIGRAM(S): at 14:23

## 2020-10-21 RX ADMIN — Medication 3 MILLIGRAM(S): at 13:58

## 2020-10-21 RX ADMIN — SODIUM CHLORIDE 125 MILLILITER(S): 9 INJECTION, SOLUTION INTRAVENOUS at 06:02

## 2020-10-21 RX ADMIN — Medication 3 MILLIGRAM(S): at 10:15

## 2020-10-21 RX ADMIN — Medication 3 MILLIGRAM(S): at 21:44

## 2020-10-21 RX ADMIN — Medication 1 TABLET(S): at 14:23

## 2020-10-21 RX ADMIN — Medication 3 MILLIGRAM(S): at 17:56

## 2020-10-21 NOTE — SBIRT NOTE ADULT - NSSBIRTUNABLESCROTHER_GEN_A_CORE
Patient declines full assessment but reports increased drinking since her fiance left her 1 week ago and interest in inpatient rehab. Inpatient rehab admission being explored at this time

## 2020-10-21 NOTE — PROGRESS NOTE ADULT - SUBJECTIVE AND OBJECTIVE BOX
Patient is a 45y old  Female who presents with a chief complaint of alcohol intoxication (20 Oct 2020 02:39)      INTERVAL HPI/OVERNIGHT EVENTS: noted  pt seen and examined  feels well      Vital Signs Last 24 Hrs  T(C): 36.7 (21 Oct 2020 20:00), Max: 36.8 (21 Oct 2020 16:47)  T(F): 98 (21 Oct 2020 20:00), Max: 98.3 (21 Oct 2020 16:47)  HR: 82 (21 Oct 2020 20:00) (75 - 105)  BP: 132/93 (21 Oct 2020 20:00) (132/93 - 157/116)  BP(mean): --  RR: 18 (21 Oct 2020 20:00) (18 - 20)  SpO2: 99% (21 Oct 2020 20:00) (98% - 100%)    acetaminophen   Tablet .. 650 milliGRAM(s) Oral every 6 hours PRN  dextrose 5% + sodium chloride 0.45%. 1000 milliLiter(s) IV Continuous <Continuous>  folic acid 1 milliGRAM(s) Oral daily  influenza   Vaccine 0.5 milliLiter(s) IntraMuscular once  LORazepam   Injectable 2 milliGRAM(s) IV Push every 1 hour PRN  multivitamin 1 Tablet(s) Oral daily  ondansetron Injectable 8 milliGRAM(s) IV Push every 8 hours PRN  thiamine 100 milliGRAM(s) Oral daily      PHYSICAL EXAM:  GENERAL: NAD,   EYES: conjunctiva and sclera clear  ENMT: Moist mucous membranes  NECK: Supple, No JVD, Normal thyroid  CHEST/LUNG: non labored, cta b/l  HEART: Regular rate and rhythm; No murmurs, rubs, or gallops  ABDOMEN: Soft, Nontender, Nondistended; Bowel sounds present  EXTREMITIES:  2+ Peripheral Pulses, No clubbing, cyanosis, or edema  LYMPH: No lymphadenopathy noted  SKIN: No rashes or lesions    Consultant(s) Notes Reviewed:  [x ] YES  [ ] NO  Care Discussed with Consultants/Other Providers [ x] YES  [ ] NO    LABS:                        8.8    3.98  )-----------( 110      ( 20 Oct 2020 07:12 )             28.2     10-21    135  |  101  |  5<L>  ----------------------------<  169<H>  3.5   |  26  |  0.54    Ca    9.0      21 Oct 2020 09:32  Phos  3.0     10-21  Mg     1.6     10-21    TPro  6.3  /  Alb  3.5  /  TBili  0.5  /  DBili  x   /  AST  45<H>  /  ALT  37  /  AlkPhos  50  10-21        CAPILLARY BLOOD GLUCOSE      POCT Blood Glucose.: 170 mg/dL (21 Oct 2020 07:25)              RADIOLOGY & ADDITIONAL TESTS:    Imaging Personally Reviewed:  [x ] YES  [ ] NO

## 2020-10-22 ENCOUNTER — TRANSCRIPTION ENCOUNTER (OUTPATIENT)
Age: 45
End: 2020-10-22

## 2020-10-22 LAB
ALBUMIN SERPL ELPH-MCNC: 3.7 G/DL — SIGNIFICANT CHANGE UP (ref 3.3–5)
ALP SERPL-CCNC: 52 U/L — SIGNIFICANT CHANGE UP (ref 40–120)
ALT FLD-CCNC: 34 U/L — SIGNIFICANT CHANGE UP (ref 10–45)
ANION GAP SERPL CALC-SCNC: 10 MMOL/L — SIGNIFICANT CHANGE UP (ref 5–17)
AST SERPL-CCNC: 40 U/L — SIGNIFICANT CHANGE UP (ref 10–40)
BILIRUB SERPL-MCNC: 0.4 MG/DL — SIGNIFICANT CHANGE UP (ref 0.2–1.2)
BUN SERPL-MCNC: 14 MG/DL — SIGNIFICANT CHANGE UP (ref 7–23)
CALCIUM SERPL-MCNC: 9.5 MG/DL — SIGNIFICANT CHANGE UP (ref 8.4–10.5)
CHLORIDE SERPL-SCNC: 102 MMOL/L — SIGNIFICANT CHANGE UP (ref 96–108)
CO2 SERPL-SCNC: 25 MMOL/L — SIGNIFICANT CHANGE UP (ref 22–31)
CREAT SERPL-MCNC: 0.58 MG/DL — SIGNIFICANT CHANGE UP (ref 0.5–1.3)
GLUCOSE SERPL-MCNC: 131 MG/DL — HIGH (ref 70–99)
HCT VFR BLD CALC: 32.8 % — LOW (ref 34.5–45)
HGB BLD-MCNC: 9.9 G/DL — LOW (ref 11.5–15.5)
MCHC RBC-ENTMCNC: 23 PG — LOW (ref 27–34)
MCHC RBC-ENTMCNC: 30.2 GM/DL — LOW (ref 32–36)
MCV RBC AUTO: 76.1 FL — LOW (ref 80–100)
NRBC # BLD: 0 /100 WBCS — SIGNIFICANT CHANGE UP (ref 0–0)
PHOSPHATE SERPL-MCNC: 3.6 MG/DL — SIGNIFICANT CHANGE UP (ref 2.5–4.5)
PLATELET # BLD AUTO: 109 K/UL — LOW (ref 150–400)
POTASSIUM SERPL-MCNC: 3.8 MMOL/L — SIGNIFICANT CHANGE UP (ref 3.5–5.3)
POTASSIUM SERPL-SCNC: 3.8 MMOL/L — SIGNIFICANT CHANGE UP (ref 3.5–5.3)
PROT SERPL-MCNC: 6.4 G/DL — SIGNIFICANT CHANGE UP (ref 6–8.3)
RBC # BLD: 4.31 M/UL — SIGNIFICANT CHANGE UP (ref 3.8–5.2)
RBC # FLD: 19.9 % — HIGH (ref 10.3–14.5)
SODIUM SERPL-SCNC: 137 MMOL/L — SIGNIFICANT CHANGE UP (ref 135–145)
WBC # BLD: 3.6 K/UL — LOW (ref 3.8–10.5)
WBC # FLD AUTO: 3.6 K/UL — LOW (ref 3.8–10.5)

## 2020-10-22 RX ADMIN — Medication 100 MILLIGRAM(S): at 11:24

## 2020-10-22 RX ADMIN — Medication 2 MILLIGRAM(S): at 18:24

## 2020-10-22 RX ADMIN — Medication 1 TABLET(S): at 11:24

## 2020-10-22 RX ADMIN — Medication 2 MILLIGRAM(S): at 01:14

## 2020-10-22 RX ADMIN — Medication 2 MILLIGRAM(S): at 11:24

## 2020-10-22 RX ADMIN — Medication 1 MILLIGRAM(S): at 11:24

## 2020-10-22 RX ADMIN — Medication 2 MILLIGRAM(S): at 14:55

## 2020-10-22 RX ADMIN — Medication 2 MILLIGRAM(S): at 05:15

## 2020-10-22 RX ADMIN — Medication 2 MILLIGRAM(S): at 21:34

## 2020-10-22 NOTE — PROGRESS NOTE ADULT - SUBJECTIVE AND OBJECTIVE BOX
Patient is a 45y old  Female who presents with a chief complaint of alcohol intoxication (21 Oct 2020 23:32)      INTERVAL HPI/OVERNIGHT EVENTS: noted  pt seen and examined  feels well, no new events  on ativan taper      Vital Signs Last 24 Hrs  T(C): 36.4 (22 Oct 2020 11:29), Max: 36.8 (21 Oct 2020 16:47)  T(F): 97.5 (22 Oct 2020 11:29), Max: 98.3 (21 Oct 2020 16:47)  HR: 92 (22 Oct 2020 11:29) (82 - 105)  BP: 148/88 (22 Oct 2020 11:29) (118/81 - 148/88)  BP(mean): --  RR: 18 (22 Oct 2020 11:29) (18 - 18)  SpO2: 99% (22 Oct 2020 11:29) (98% - 100%)    acetaminophen   Tablet .. 650 milliGRAM(s) Oral every 6 hours PRN  dextrose 5% + sodium chloride 0.45%. 1000 milliLiter(s) IV Continuous <Continuous>  folic acid 1 milliGRAM(s) Oral daily  influenza   Vaccine 0.5 milliLiter(s) IntraMuscular once  LORazepam     Tablet 2 milliGRAM(s) Oral every 4 hours  LORazepam   Injectable 2 milliGRAM(s) IV Push every 1 hour PRN  multivitamin 1 Tablet(s) Oral daily  ondansetron Injectable 8 milliGRAM(s) IV Push every 8 hours PRN      PHYSICAL EXAM:  GENERAL: NAD,   EYES: conjunctiva and sclera clear  ENMT: Moist mucous membranes  NECK: Supple, No JVD, Normal thyroid  CHEST/LUNG: non labored, cta b/l  HEART: Regular rate and rhythm; No murmurs, rubs, or gallops  ABDOMEN: Soft, Nontender, Nondistended; Bowel sounds present  EXTREMITIES:  2+ Peripheral Pulses, No clubbing, cyanosis, or edema  LYMPH: No lymphadenopathy noted  SKIN: No rashes or lesions    Consultant(s) Notes Reviewed:  [x ] YES  [ ] NO  Care Discussed with Consultants/Other Providers [ x] YES  [ ] NO    LABS:                        9.9    3.60  )-----------( 109      ( 22 Oct 2020 10:47 )             32.8     10-22    137  |  102  |  14  ----------------------------<  131<H>  3.8   |  25  |  0.58    Ca    9.5      22 Oct 2020 10:47  Phos  3.6     10-22  Mg     1.6     10-21    TPro  6.4  /  Alb  3.7  /  TBili  0.4  /  DBili  x   /  AST  40  /  ALT  34  /  AlkPhos  52  10-22        CAPILLARY BLOOD GLUCOSE                  RADIOLOGY & ADDITIONAL TESTS:    Imaging Personally Reviewed:  [x ] YES  [ ] NO

## 2020-10-22 NOTE — PROGRESS NOTE ADULT - ATTENDING COMMENTS
pt decided to go to inpt alcohol rehab  plan to dc on monday after ativan taper completed    [facility]

## 2020-10-22 NOTE — DISCHARGE NOTE NURSING/CASE MANAGEMENT/SOCIAL WORK - PATIENT PORTAL LINK FT
You can access the FollowMyHealth Patient Portal offered by [facility] by registering at the following website: [email or web address]. By joining [email or web address]’s FollowMyHealth portal, you will also be able to view your health information using other applications (apps) compatible with our system.

## 2020-10-22 NOTE — DISCHARGE NOTE NURSING/CASE MANAGEMENT/SOCIAL WORK - NSDCCRTYPESERV_GEN_ALL_CORE_FT
You are accepted for inpatient rehab on Monday 10/26/2020. The facility will send transportation to pick you up at 10:00am on Monday.

## 2020-10-23 LAB — DRUG SCREEN, SERUM: SIGNIFICANT CHANGE UP

## 2020-10-23 RX ADMIN — INFLUENZA VIRUS VACCINE 0.5 MILLILITER(S): 15; 15; 15; 15 SUSPENSION INTRAMUSCULAR at 20:55

## 2020-10-23 RX ADMIN — Medication 1 MILLIGRAM(S): at 17:47

## 2020-10-23 RX ADMIN — Medication 1 MILLIGRAM(S): at 01:47

## 2020-10-23 RX ADMIN — Medication 1 MILLIGRAM(S): at 14:25

## 2020-10-23 RX ADMIN — Medication 1 MILLIGRAM(S): at 05:18

## 2020-10-23 RX ADMIN — Medication 1 TABLET(S): at 14:25

## 2020-10-23 RX ADMIN — Medication 1 MILLIGRAM(S): at 21:00

## 2020-10-23 RX ADMIN — Medication 1 MILLIGRAM(S): at 10:20

## 2020-10-23 NOTE — PROGRESS NOTE ADULT - ATTENDING COMMENTS
pt decided to go to inpt alcohol rehab  plan to dc on monday after ativan taper completed    [facility]

## 2020-10-23 NOTE — PROVIDER CONTACT NOTE (OTHER) - BACKGROUND
"2020    STANDARD ERG REPORT    Referring: Dr. Nakul Sow/[facility]    RE:  Fab Boyd  MRN:  [id number]  :  1972    ERG Date:  2020    CLINICAL HISTORY: Patient noted tunnel vision and foggy vision associated with floaters and flashes.  Flashes are constant with eyes open or shut-- white flashes going in circles at times  and brighter flashes in periphery.  Believes vision worse at night/dim light and noted for a while (~10 yrs) but worsening for the past 1-2 yrs.  Takes a long time for eyes to adjust from bright to dark and dark to light.  Increased light sensitivity where he needs a visor/cap and sungls if in very bright light situations.  Moved to [place] in  from [place] and saw eye doctors in [place] with a lot of testing.  From at least ~[id number], he still had good enough vision and depth perception and coordination to read, ride motorcycle, play sports, do target shooting. He always had difficulty with dark blue vs black and felt he was clumsy (tripping over things) but did not feel vision any different than others around him.  H/O older sister with vision loss but reason undetermined.  Pt states genetic testing done and was informed he has \"a recessive defect for RP\".    Dr. Sow advised Areds supplements twice daily (pt using once daily).  Under care of Dr. Abundio Kyle for glaucoma ~6yrs, using Latanoprost both eyes at bedtime (last dose 2 days ago).  S/P peripheral iridotomy with Dr. Kyle 2016 both eyes and again 2018 left eye (per Chart Review).     IMPRESSION:  Advanced diana-cone dystrophy                  Visual Acuity with glasses: Right Eye: 20/150, PHNI        +3.00 +1.00 x 160        Left Eye: 20/400, PHNI        +2.50 +0.75 x 104                                      ALL AVERAGED  Data for Full-Field ERG Right Eye   Left Eye    Dark-Adapted Patient Normal Patient   0.01 ERG (diana) amplitude( v) ---- 61.3-334.7 ----   0.01 ERG (diana) implicit time(ms) " ---- 75.1-108.0 ----   MMMMM      3.0 ERG (combined) a-wave amplitude( v) ---- -195.0 to -96.5 ----   3.0 ERG (combined) a-wave implicit time(ms) ---- 15.2-18.2 ----   3.0 ERG (combined) b-wave amplitude( v) ---- 115.0-446.3 ----   3.0 ERG (combined) b-wave implicit time(ms) ---- 30.0-50.3 ----   MMMMM      3.0 Oscillatory Potentials  Residual to nonmeasurable Present Residual to nonmeasurable    Light-Adapted      3.0 Flicker (30-Hz) amplitude( v) ---- 68.5-147.5 ----   3.0 Flicker (30-Hz) implicit time(ms) ---- 21.5-26.8 ----         3.0 ERG (cone) a-wave amplitude( v) ---- -59.8 to -24.4 ----   3.0 ERG (cone) a-wave implicit time(ms) ---- 14.9-18.0 ----   3.0 ERG (cone) b-wave amplitude( v) ---- 86.1-205.2 ----   3.0 ERG (cone) b-wave implicit time(ms) ---- 25.8-29.6 ----            * = manipulated cursors           parentheses = cursors at selected peaks           ---- = residual to non-measurable           xxxx = not tested      INTERPRETATION:  This full-field electroretinogram was performed according to ISCEV standards using the ESPION E3 system and DTL fiber-recording electrodes. The patient tolerated the testing well. The waveforms were residual to nonmeasurable. The normative values provided above represent the 95% confidence limits for a normal individual the age of the patient.     In dark-adapted conditions, the diana-specific responses and combined diana-cone responses were residual to nonmeasurable. The oscillatory potentials were were residual to nonmeasurable.  In light-adapted conditions, the 30-Hz flicker responses and single photopic responses were residual to non-measurable.    CONCLUSION: This represents an abnormal electroretinogram consistent with the diagnosis of advanced diana-cone dystrophy.  The scotopic and photopic responses were residual to nonmeasurable. The differential diagnoses include: post-inflammatory retinopathy, toxic retinopathy, and autoimmune retinopathy. Clinical correlation is  recommended.             STANDARD GVF REPORT           GVF Date:  8/4/2020    IMPRESSION:   Abnormal Goldmann visual field (GVF) with severe constriction    PRELIMINARY INTERPRETATION:    Right Eye:  Did not visualize I2e through II4e in central 30 degrees and periphery. Slow to static check with V4e and inconsistent to III4e. There is severe constriction to less than 20 central degrees V4e.    Left Eye: Did not visualize I2e through III4e in central 30 degrees and periphery. Did visualize IV4e and V4e with good static checks. There is severe constriction to less than 20 central degrees V4e.        Abdish, thank you for the opportunity to provide electrophysiologic services for this patient.  Please do not hesitate to call if there should be any questions regarding these results.    Sincerely,    Muriel Zapata MD     [facility]   Department of Ophthalmology & Visual Neurosciences   [facility]  Phone: [phone]   Fax: [phone]          44 yo F admitted for alcohol dependence with withdrawal delirium. Current [facility] protocol in place. PMH adenomyosis, pancreatitis, HTN, depression, ETOH abuse

## 2020-10-23 NOTE — PROGRESS NOTE ADULT - SUBJECTIVE AND OBJECTIVE BOX
Patient is a 45y old  Female who presents with a chief complaint of alcohol intoxication (22 Oct 2020 14:26)      INTERVAL HPI/OVERNIGHT EVENTS: noted  pt seen and examined  no new events/complaints      Vital Signs Last 24 Hrs  T(C): 36.7 (23 Oct 2020 12:28), Max: 36.9 (22 Oct 2020 20:49)  T(F): 98.1 (23 Oct 2020 12:28), Max: 98.4 (22 Oct 2020 20:49)  HR: 83 (23 Oct 2020 12:28) (83 - 106)  BP: 138/98 (23 Oct 2020 12:28) (118/83 - 138/98)  BP(mean): --  RR: 18 (23 Oct 2020 12:28) (18 - 18)  SpO2: 99% (23 Oct 2020 12:28) (95% - 99%)    acetaminophen   Tablet .. 650 milliGRAM(s) Oral every 6 hours PRN  dextrose 5% + sodium chloride 0.45%. 1000 milliLiter(s) IV Continuous <Continuous>  folic acid 1 milliGRAM(s) Oral daily  influenza   Vaccine 0.5 milliLiter(s) IntraMuscular once  LORazepam     Tablet 1 milliGRAM(s) Oral every 4 hours  LORazepam   Injectable 2 milliGRAM(s) IV Push every 1 hour PRN  multivitamin 1 Tablet(s) Oral daily  ondansetron Injectable 8 milliGRAM(s) IV Push every 8 hours PRN      PHYSICAL EXAM:  GENERAL: NAD,   EYES: conjunctiva and sclera clear  ENMT: Moist mucous membranes  NECK: Supple, No JVD, Normal thyroid  CHEST/LUNG: non labored, cta b/l  HEART: Regular rate and rhythm; No murmurs, rubs, or gallops  ABDOMEN: Soft, Nontender, Nondistended; Bowel sounds present  EXTREMITIES:  2+ Peripheral Pulses, No clubbing, cyanosis, or edema  LYMPH: No lymphadenopathy noted  SKIN: No rashes or lesions    Consultant(s) Notes Reviewed:  [x ] YES  [ ] NO  Care Discussed with Consultants/Other Providers [ x] YES  [ ] NO    LABS:                        9.9    3.60  )-----------( 109      ( 22 Oct 2020 10:47 )             32.8     10-22    137  |  102  |  14  ----------------------------<  131<H>  3.8   |  25  |  0.58    Ca    9.5      22 Oct 2020 10:47  Phos  3.6     10-22    TPro  6.4  /  Alb  3.7  /  TBili  0.4  /  DBili  x   /  AST  40  /  ALT  34  /  AlkPhos  52  10-22        CAPILLARY BLOOD GLUCOSE                  RADIOLOGY & ADDITIONAL TESTS:    Imaging Personally Reviewed:  [x ] YES  [ ] NO

## 2020-10-24 LAB
ANION GAP SERPL CALC-SCNC: 11 MMOL/L — SIGNIFICANT CHANGE UP (ref 5–17)
BUN SERPL-MCNC: 19 MG/DL — SIGNIFICANT CHANGE UP (ref 7–23)
CALCIUM SERPL-MCNC: 9.6 MG/DL — SIGNIFICANT CHANGE UP (ref 8.4–10.5)
CHLORIDE SERPL-SCNC: 99 MMOL/L — SIGNIFICANT CHANGE UP (ref 96–108)
CO2 SERPL-SCNC: 23 MMOL/L — SIGNIFICANT CHANGE UP (ref 22–31)
CREAT SERPL-MCNC: 0.66 MG/DL — SIGNIFICANT CHANGE UP (ref 0.5–1.3)
GLUCOSE SERPL-MCNC: 202 MG/DL — HIGH (ref 70–99)
HCT VFR BLD CALC: 34 % — LOW (ref 34.5–45)
HGB BLD-MCNC: 10.2 G/DL — LOW (ref 11.5–15.5)
MCHC RBC-ENTMCNC: 23.4 PG — LOW (ref 27–34)
MCHC RBC-ENTMCNC: 30 GM/DL — LOW (ref 32–36)
MCV RBC AUTO: 78 FL — LOW (ref 80–100)
NRBC # BLD: 0 /100 WBCS — SIGNIFICANT CHANGE UP (ref 0–0)
PLATELET # BLD AUTO: 164 K/UL — SIGNIFICANT CHANGE UP (ref 150–400)
POTASSIUM SERPL-MCNC: 3.6 MMOL/L — SIGNIFICANT CHANGE UP (ref 3.5–5.3)
POTASSIUM SERPL-SCNC: 3.6 MMOL/L — SIGNIFICANT CHANGE UP (ref 3.5–5.3)
RBC # BLD: 4.36 M/UL — SIGNIFICANT CHANGE UP (ref 3.8–5.2)
RBC # FLD: 20.8 % — HIGH (ref 10.3–14.5)
SODIUM SERPL-SCNC: 133 MMOL/L — LOW (ref 135–145)
WBC # BLD: 5.04 K/UL — SIGNIFICANT CHANGE UP (ref 3.8–10.5)
WBC # FLD AUTO: 5.04 K/UL — SIGNIFICANT CHANGE UP (ref 3.8–10.5)

## 2020-10-24 RX ADMIN — Medication 1 MILLIGRAM(S): at 05:27

## 2020-10-24 RX ADMIN — Medication 1 TABLET(S): at 12:12

## 2020-10-24 RX ADMIN — Medication 1 MILLIGRAM(S): at 12:12

## 2020-10-24 RX ADMIN — Medication 1 MILLIGRAM(S): at 17:53

## 2020-10-25 ENCOUNTER — TRANSCRIPTION ENCOUNTER (OUTPATIENT)
Age: 45
End: 2020-10-25

## 2020-10-25 RX ORDER — LANOLIN ALCOHOL/MO/W.PET/CERES
3 CREAM (GRAM) TOPICAL ONCE
Refills: 0 | Status: COMPLETED | OUTPATIENT
Start: 2020-10-25 | End: 2020-10-25

## 2020-10-25 RX ORDER — ACETAMINOPHEN 500 MG
2 TABLET ORAL
Qty: 0 | Refills: 0 | DISCHARGE
Start: 2020-10-25

## 2020-10-25 RX ORDER — FOLIC ACID 0.8 MG
1 TABLET ORAL
Qty: 0 | Refills: 0 | DISCHARGE
Start: 2020-10-25

## 2020-10-25 RX ADMIN — Medication 1 TABLET(S): at 12:34

## 2020-10-25 RX ADMIN — Medication 0.5 MILLIGRAM(S): at 06:19

## 2020-10-25 RX ADMIN — Medication 0.5 MILLIGRAM(S): at 18:40

## 2020-10-25 RX ADMIN — Medication 3 MILLIGRAM(S): at 01:17

## 2020-10-25 RX ADMIN — Medication 1 MILLIGRAM(S): at 12:35

## 2020-10-25 RX ADMIN — Medication 3 MILLIGRAM(S): at 23:02

## 2020-10-25 NOTE — PROGRESS NOTE ADULT - SUBJECTIVE AND OBJECTIVE BOX
Patient is a 45y old  Female who presents with a chief complaint of alcohol intoxication (25 Oct 2020 19:13)      INTERVAL HPI/OVERNIGHT EVENTS: noted  pt seen and examined  feels well      Vital Signs Last 24 Hrs  T(C): 36.7 (25 Oct 2020 20:39), Max: 36.8 (25 Oct 2020 17:53)  T(F): 98.1 (25 Oct 2020 20:39), Max: 98.3 (25 Oct 2020 17:53)  HR: 82 (25 Oct 2020 20:39) (82 - 97)  BP: 128/85 (25 Oct 2020 20:39) (124/86 - 128/85)  BP(mean): --  RR: 18 (25 Oct 2020 20:39) (18 - 18)  SpO2: 98% (25 Oct 2020 20:39) (98% - 100%)    acetaminophen   Tablet .. 650 milliGRAM(s) Oral every 6 hours PRN  artificial tears (preservative free) Ophthalmic Solution 1 Drop(s) Both EYES four times a day PRN  folic acid 1 milliGRAM(s) Oral daily  LORazepam   Injectable 2 milliGRAM(s) IV Push every 1 hour PRN  melatonin 3 milliGRAM(s) Oral once  multivitamin 1 Tablet(s) Oral daily  ondansetron Injectable 8 milliGRAM(s) IV Push every 8 hours PRN      PHYSICAL EXAM:  GENERAL: NAD,   EYES: conjunctiva and sclera clear  ENMT: Moist mucous membranes  NECK: Supple, No JVD, Normal thyroid  CHEST/LUNG: non labored, cta b/l  HEART: Regular rate and rhythm; No murmurs, rubs, or gallops  ABDOMEN: Soft, Nontender, Nondistended; Bowel sounds present  EXTREMITIES:  2+ Peripheral Pulses, No clubbing, cyanosis, or edema  LYMPH: No lymphadenopathy noted  SKIN: No rashes or lesions    Consultant(s) Notes Reviewed:  [x ] YES  [ ] NO  Care Discussed with Consultants/Other Providers [ x] YES  [ ] NO    LABS:                        10.2   5.04  )-----------( 164      ( 24 Oct 2020 09:59 )             34.0     10-24    133<L>  |  99  |  19  ----------------------------<  202<H>  3.6   |  23  |  0.66    Ca    9.6      24 Oct 2020 09:59          CAPILLARY BLOOD GLUCOSE                  RADIOLOGY & ADDITIONAL TESTS:    Imaging Personally Reviewed:  [x ] YES  [ ] NO

## 2020-10-25 NOTE — PROGRESS NOTE ADULT - ATTENDING COMMENTS
pt decided to go to inpt alcohol rehab  plan to dc on monday after ativan taper completed    [facility]

## 2020-10-25 NOTE — DISCHARGE NOTE PROVIDER - NSDCCPCAREPLAN_GEN_ALL_CORE_FT
PRINCIPAL DISCHARGE DIAGNOSIS  Diagnosis: Alcohol withdrawal with delirium  Assessment and Plan of Treatment: You are going to an inpatient alcohol rehab program.      SECONDARY DISCHARGE DIAGNOSES  Diagnosis: Hypertension  Assessment and Plan of Treatment: Low salt diet  Activity as tolerated.  Take all medication as prescribed.  Follow up with your medical doctor for routine blood pressure monitoring at your next visit.  Notify your doctor if you have any of the following symptoms:   Dizziness, Lightheadedness, Blurry vision, Headache, Chest pain, Shortness of breath      Diagnosis: Esophagitis  Assessment and Plan of Treatment: Follow up with GI outpatient.

## 2020-10-25 NOTE — DISCHARGE NOTE PROVIDER - NSDCMRMEDTOKEN_GEN_ALL_CORE_FT
acetaminophen 325 mg oral tablet: 2 tab(s) orally every 6 hours, As needed, Moderate Pain (4 - 6)  cholecalciferol oral tablet: 2000 unit(s) orally once a day  ferrous sulfate 325 mg (65 mg elemental iron) oral tablet: 1 tab(s) orally once a day  folic acid 1 mg oral tablet: 1 tab(s) orally once a day  Multiple Vitamins oral tablet: 1 tab(s) orally once a day  pantoprazole 40 mg oral delayed release tablet: 1 tab(s) orally once a day (before a meal)  thiamine 100 mg oral tablet: 1 tab(s) orally once a day   acetaminophen 325 mg oral tablet: 2 tab(s) orally every 6 hours, As needed, Moderate Pain (4 - 6)  cholecalciferol oral tablet: 2000 unit(s) orally once a day  ferrous sulfate 325 mg (65 mg elemental iron) oral tablet: 1 tab(s) orally once a day  folic acid 1 mg oral tablet: 1 tab(s) orally once a day  Multiple Vitamins oral tablet: 1 tab(s) orally once a day  ocular lubricant ophthalmic solution: 1 drop(s) to each affected eye 4 times a day, As needed, Dry Eyes  pantoprazole 40 mg oral delayed release tablet: 1 tab(s) orally once a day (before a meal)  thiamine 100 mg oral tablet: 1 tab(s) orally once a day

## 2020-10-25 NOTE — PROVIDER CONTACT NOTE (OTHER) - SITUATION
PIV not patent, needed to be removed. Patient does not want another IV placed. Patient being discharged tomorrow to rehab.

## 2020-10-25 NOTE — PROGRESS NOTE ADULT - ATTENDING COMMENTS
pt decided to go to inpt alcohol rehab  plan to dc on monday after ativan taper completed    [facility]

## 2020-10-25 NOTE — PROGRESS NOTE ADULT - SUBJECTIVE AND OBJECTIVE BOX
Patient is a 45y old  Female who presents with a chief complaint of alcohol intoxication (23 Oct 2020 15:51)      INTERVAL HPI/OVERNIGHT EVENTS: noted  pt seen and examined, feels well, no events/complaints      Vital Signs Last 24 Hrs  T(C): 36.8 (24 Oct 2020 18:11), Max: 36.9 (24 Oct 2020 05:28)  T(F): 98.3 (24 Oct 2020 18:11), Max: 98.5 (24 Oct 2020 05:28)  HR: 95 (24 Oct 2020 18:11) (82 - 95)  BP: 145/95 (24 Oct 2020 18:11) (123/87 - 145/95)  BP(mean): --  RR: 18 (24 Oct 2020 18:11) (18 - 18)  SpO2: 100% (24 Oct 2020 18:11) (100% - 100%)    acetaminophen   Tablet .. 650 milliGRAM(s) Oral every 6 hours PRN  folic acid 1 milliGRAM(s) Oral daily  LORazepam     Tablet 0.5 milliGRAM(s) Oral every 12 hours  LORazepam   Injectable 2 milliGRAM(s) IV Push every 1 hour PRN  multivitamin 1 Tablet(s) Oral daily  ondansetron Injectable 8 milliGRAM(s) IV Push every 8 hours PRN      PHYSICAL EXAM:  GENERAL: NAD,   EYES: conjunctiva and sclera clear  ENMT: Moist mucous membranes  NECK: Supple, No JVD, Normal thyroid  CHEST/LUNG: non labored, cta b/l  HEART: Regular rate and rhythm; No murmurs, rubs, or gallops  ABDOMEN: Soft, Nontender, Nondistended; Bowel sounds present  EXTREMITIES:  2+ Peripheral Pulses, No clubbing, cyanosis, or edema  LYMPH: No lymphadenopathy noted  SKIN: No rashes or lesions    Consultant(s) Notes Reviewed:  [x ] YES  [ ] NO  Care Discussed with Consultants/Other Providers [ x] YES  [ ] NO    LABS:                        10.2   5.04  )-----------( 164      ( 24 Oct 2020 09:59 )             34.0     10-24    133<L>  |  99  |  19  ----------------------------<  202<H>  3.6   |  23  |  0.66    Ca    9.6      24 Oct 2020 09:59          CAPILLARY BLOOD GLUCOSE                  RADIOLOGY & ADDITIONAL TESTS:    Imaging Personally Reviewed:  [x ] YES  [ ] NO

## 2020-10-25 NOTE — DISCHARGE NOTE PROVIDER - HOSPITAL COURSE
45 year old female PMH alcohol abuse presents for alcohol intoxication drinking 1 liter of vodka everyday. Patient recently finished detoxication program, but recently drank more heavily after fiance broke up with her. Alcohol level 437 and lactate elevated persistently at 6. Patient being admitted for impending alcohol withdrawal.  Patient was monitored on symptom-triggered aggressive CIWA protocol with ativan taper.  Psychiatry and social work consulted.  Lactate levels initially elevated and resolved prior to discharge. Patient decided to go to inpatient alcohol rehab.  Patient stable for discharge.  Follow up with GI outpatient.

## 2020-10-25 NOTE — PROVIDER CONTACT NOTE (OTHER) - BACKGROUND
44 yo female admitted for alcohol dependence with withdrawal delirium, PMH adenomysis , history pancreatitis, HTN, depression, ETOH abuse.

## 2020-10-26 VITALS
TEMPERATURE: 98 F | RESPIRATION RATE: 18 BRPM | HEART RATE: 82 BPM | OXYGEN SATURATION: 100 % | SYSTOLIC BLOOD PRESSURE: 118 MMHG | DIASTOLIC BLOOD PRESSURE: 70 MMHG

## 2020-10-26 PROCEDURE — 84132 ASSAY OF SERUM POTASSIUM: CPT

## 2020-10-26 PROCEDURE — 70450 CT HEAD/BRAIN W/O DYE: CPT

## 2020-10-26 PROCEDURE — 93005 ELECTROCARDIOGRAM TRACING: CPT

## 2020-10-26 PROCEDURE — 71045 X-RAY EXAM CHEST 1 VIEW: CPT

## 2020-10-26 PROCEDURE — 85610 PROTHROMBIN TIME: CPT

## 2020-10-26 PROCEDURE — 96365 THER/PROPH/DIAG IV INF INIT: CPT

## 2020-10-26 PROCEDURE — 82962 GLUCOSE BLOOD TEST: CPT

## 2020-10-26 PROCEDURE — 82947 ASSAY GLUCOSE BLOOD QUANT: CPT

## 2020-10-26 PROCEDURE — 80048 BASIC METABOLIC PNL TOTAL CA: CPT

## 2020-10-26 PROCEDURE — 80076 HEPATIC FUNCTION PANEL: CPT

## 2020-10-26 PROCEDURE — 82803 BLOOD GASES ANY COMBINATION: CPT

## 2020-10-26 PROCEDURE — 80307 DRUG TEST PRSMV CHEM ANLYZR: CPT

## 2020-10-26 PROCEDURE — 85025 COMPLETE CBC W/AUTO DIFF WBC: CPT

## 2020-10-26 PROCEDURE — 99285 EMERGENCY DEPT VISIT HI MDM: CPT | Mod: 25

## 2020-10-26 PROCEDURE — 85027 COMPLETE CBC AUTOMATED: CPT

## 2020-10-26 PROCEDURE — 86769 SARS-COV-2 COVID-19 ANTIBODY: CPT

## 2020-10-26 PROCEDURE — 85018 HEMOGLOBIN: CPT

## 2020-10-26 PROCEDURE — 83735 ASSAY OF MAGNESIUM: CPT

## 2020-10-26 PROCEDURE — 84295 ASSAY OF SERUM SODIUM: CPT

## 2020-10-26 PROCEDURE — 85730 THROMBOPLASTIN TIME PARTIAL: CPT

## 2020-10-26 PROCEDURE — 85014 HEMATOCRIT: CPT

## 2020-10-26 PROCEDURE — 82330 ASSAY OF CALCIUM: CPT

## 2020-10-26 PROCEDURE — 83605 ASSAY OF LACTIC ACID: CPT

## 2020-10-26 PROCEDURE — 73090 X-RAY EXAM OF FOREARM: CPT

## 2020-10-26 PROCEDURE — 73080 X-RAY EXAM OF ELBOW: CPT

## 2020-10-26 PROCEDURE — 82435 ASSAY OF BLOOD CHLORIDE: CPT

## 2020-10-26 PROCEDURE — 96366 THER/PROPH/DIAG IV INF ADDON: CPT

## 2020-10-26 PROCEDURE — 84100 ASSAY OF PHOSPHORUS: CPT

## 2020-10-26 PROCEDURE — 90686 IIV4 VACC NO PRSV 0.5 ML IM: CPT

## 2020-10-26 PROCEDURE — 84702 CHORIONIC GONADOTROPIN TEST: CPT

## 2020-10-26 PROCEDURE — U0003: CPT

## 2020-10-26 PROCEDURE — 81001 URINALYSIS AUTO W/SCOPE: CPT

## 2020-10-26 PROCEDURE — 80053 COMPREHEN METABOLIC PANEL: CPT

## 2020-10-30 ENCOUNTER — EMERGENCY (EMERGENCY)
Facility: HOSPITAL | Age: 45
LOS: 1 days | Discharge: HOME | End: 2020-10-30
Attending: STUDENT IN AN ORGANIZED HEALTH CARE EDUCATION/TRAINING PROGRAM | Admitting: STUDENT IN AN ORGANIZED HEALTH CARE EDUCATION/TRAINING PROGRAM
Payer: MEDICAID

## 2020-10-30 VITALS
RESPIRATION RATE: 18 BRPM | DIASTOLIC BLOOD PRESSURE: 95 MMHG | OXYGEN SATURATION: 100 % | HEART RATE: 94 BPM | TEMPERATURE: 98 F | SYSTOLIC BLOOD PRESSURE: 137 MMHG | HEIGHT: 67 IN

## 2020-10-30 VITALS
RESPIRATION RATE: 17 BRPM | OXYGEN SATURATION: 100 % | SYSTOLIC BLOOD PRESSURE: 128 MMHG | HEART RATE: 85 BPM | TEMPERATURE: 98 F | DIASTOLIC BLOOD PRESSURE: 85 MMHG

## 2020-10-30 DIAGNOSIS — Z98.890 OTHER SPECIFIED POSTPROCEDURAL STATES: Chronic | ICD-10-CM

## 2020-10-30 PROCEDURE — 99283 EMERGENCY DEPT VISIT LOW MDM: CPT

## 2020-10-30 PROCEDURE — 93010 ELECTROCARDIOGRAM REPORT: CPT

## 2020-10-30 RX ADMIN — Medication 50 MILLIGRAM(S): at 20:44

## 2020-10-30 NOTE — ED ADULT NURSE NOTE - NSIMPLEMENTINTERV_GEN_ALL_ED
Implemented All Fall with Harm Risk Interventions:  [place] to call system. Call bell, personal items and telephone within reach. Instruct patient to call for assistance. Room bathroom lighting operational. Non-slip footwear when patient is off stretcher. Physically safe environment: no spills, clutter or unnecessary equipment. Stretcher in lowest position, wheels locked, appropriate side rails in place. Provide visual cue, wrist band, yellow gown, etc. Monitor gait and stability. Monitor for mental status changes and reorient to person, place, and time. Review medications for side effects contributing to fall risk. Reinforce activity limits and safety measures with patient and family. Provide visual clues: red socks.

## 2020-10-30 NOTE — ED PROVIDER NOTE - NSFOLLOWUPINSTRUCTIONS_ED_ALL_ED_FT
Please follow up with your primary care physician. Please return to the emergency department for worsening of your symptoms.

## 2020-10-30 NOTE — ED ADULT NURSE REASSESSMENT NOTE - NS ED NURSE REASSESS COMMENT FT1
Pt sister name Azul, called expressing concern of pt recent behavior, requesting a psych eval. BRODY Cook made aware and phone number provided for collateral.

## 2020-10-30 NOTE — ED PROVIDER NOTE - FAMILY HISTORY
Father  Still living? Unknown  Family history of hypertension, Age at diagnosis: Age Unknown     Mother  Still living? Unknown  Family history of hypertension, Age at diagnosis: Age Unknown

## 2020-10-30 NOTE — ED ADULT NURSE NOTE - OBJECTIVE STATEMENT
Break coverage RN received pt to spot 27 A&Ox1 on assessment, unable to report what happened today. Said she drank a pint of vodka, drinks a pint of vodka 4+ times weekly. Denies any pain or complaints, denies PMH. Belongings placed across from 21, VS as documented, will continue to monitor.

## 2020-10-30 NOTE — ED PROVIDER NOTE - PATIENT PORTAL LINK FT
You can access the FollowMyHealth Patient Portal offered by [facility] by registering at the following website: [email or web address]. By joining [employer]’s FollowMyHealth portal, you will also be able to view your health information using other applications (apps) compatible with our system.

## 2020-10-30 NOTE — ED PROVIDER NOTE - OBJECTIVE STATEMENT
44 Y/O F denies PMH or PSH states that she suffered a breakup and drank alcohol today "to cope." 44 Y/O F denies PMH or PSH states that she suffered a breakup and drank alcohol today "to cope." Pt denies SI or HI, pt was admitted for ETOH once before. Pt denies falling or injuries. Pt denies any other sx or acute complaints.

## 2020-10-30 NOTE — ED ADULT TRIAGE NOTE - CCCP TRG CHIEF CMPLNT
pt brought in intoxicated  pt stoped drinking many years ago just started again/medical evaluation pt brought in intoxicated  pt stopped drinking many years ago just started again/medical evaluation

## 2020-10-30 NOTE — ED PROVIDER NOTE - ATTENDING CONTRIBUTION TO CARE
45F with pmh HTN, ETOH abuse, depression presenting with ETOH intoxication. Patient with hx of heavy drinking with recent admission for ETOH withdrawal. Per family found patient intoxicated and called EMS. Patient denies any complaints. States drank 1 pint.    GEN: NAD, awake, well appearing  HEENT: NCAT, MMM, normal conjunctiva, perrl  CHEST/LUNGS: Non-tachypneic, CTAB, bilateral breath sounds  CARDIAC: Non-tachycardic, s1s2, normal perfusion, no peripheral edema  ABDOMEN: Soft, NTND, No rebound/guarding  MSK: No joint tenderness, no gross deformity of extremities  SKIN: No rashes, no petechiae, no vesicles  NEURO: CN grossly intact, normal coordination, no focal motor or sensory deficits    Patient presenting ETOH intoxication. Exam benign, no signs of injury or trauma. No signs of withdrawal. Will observe til sober and reassess.

## 2020-10-30 NOTE — ED ADULT TRIAGE NOTE - CHIEF COMPLAINT QUOTE
family member went to house found pt passed out with 2 large bottles of vodka family member went to house found pt passed out with 2 large bottles of vodka empty family member went to house found pt passed out with 2 large bottles of vodka empty  pt given juice in triage

## 2020-10-30 NOTE — ED PROVIDER NOTE - PROGRESS NOTE DETAILS
Spencer Jenkins MD. pt signed out to me pending reassessment for sobriety. pt A&OX3, tolerated PO, ambualted w/o difficulty. pt denies any pain. pt to be discharged.

## 2020-10-30 NOTE — ED PROVIDER NOTE - CLINICAL SUMMARY MEDICAL DECISION MAKING FREE TEXT BOX
44 Y/O F denies PMH or PSH states that she suffered a breakup and drank alcohol today "to cope." Pt denies SI or HI, pt was admitted for ETOH once before. Pt denies falling or injuries. Pt denies SI or HI and is currently resting comfortably.

## 2020-10-30 NOTE — ED ADULT NURSE NOTE - CHIEF COMPLAINT QUOTE
family member went to house found pt passed out with 2 large bottles of vodka empty  pt given juice in triage

## 2020-10-30 NOTE — PROVIDER CONTACT NOTE (OTHER) - ASSESSMENT
[employer]anged [employer] [phone]. Pt going Home. [facility] Auth# [id number]. I put her in the taxi.

## 2020-12-09 ENCOUNTER — INPATIENT (INPATIENT)
Facility: HOSPITAL | Age: 45
LOS: 1 days | Discharge: HOME | DRG: 897 | End: 2020-12-11
Attending: STUDENT IN AN ORGANIZED HEALTH CARE EDUCATION/TRAINING PROGRAM | Admitting: STUDENT IN AN ORGANIZED HEALTH CARE EDUCATION/TRAINING PROGRAM
Payer: MEDICAID

## 2020-12-09 VITALS
SYSTOLIC BLOOD PRESSURE: 137 MMHG | RESPIRATION RATE: 20 BRPM | HEART RATE: 99 BPM | DIASTOLIC BLOOD PRESSURE: 106 MMHG | TEMPERATURE: 98 F | OXYGEN SATURATION: 95 %

## 2020-12-09 DIAGNOSIS — Z98.890 OTHER SPECIFIED POSTPROCEDURAL STATES: Chronic | ICD-10-CM

## 2020-12-09 LAB
ALBUMIN SERPL ELPH-MCNC: 4.4 G/DL — SIGNIFICANT CHANGE UP (ref 3.3–5)
ALP SERPL-CCNC: 76 U/L — SIGNIFICANT CHANGE UP (ref 40–120)
ALT FLD-CCNC: 24 U/L — SIGNIFICANT CHANGE UP (ref 10–45)
ANION GAP SERPL CALC-SCNC: 20 MMOL/L — HIGH (ref 5–17)
ANISOCYTOSIS BLD QL: SLIGHT — SIGNIFICANT CHANGE UP
APAP SERPL-MCNC: <15 UG/ML — SIGNIFICANT CHANGE UP (ref 10–30)
AST SERPL-CCNC: 41 U/L — HIGH (ref 10–40)
BASOPHILS # BLD AUTO: 0.05 K/UL — SIGNIFICANT CHANGE UP (ref 0–0.2)
BASOPHILS NFR BLD AUTO: 0.9 % — SIGNIFICANT CHANGE UP (ref 0–2)
BILIRUB SERPL-MCNC: 0.3 MG/DL — SIGNIFICANT CHANGE UP (ref 0.2–1.2)
BUN SERPL-MCNC: 17 MG/DL — SIGNIFICANT CHANGE UP (ref 7–23)
CALCIUM SERPL-MCNC: 9.4 MG/DL — SIGNIFICANT CHANGE UP (ref 8.4–10.5)
CHLORIDE SERPL-SCNC: 99 MMOL/L — SIGNIFICANT CHANGE UP (ref 96–108)
CO2 SERPL-SCNC: 21 MMOL/L — LOW (ref 22–31)
CREAT SERPL-MCNC: 0.62 MG/DL — SIGNIFICANT CHANGE UP (ref 0.5–1.3)
DACRYOCYTES BLD QL SMEAR: SLIGHT — SIGNIFICANT CHANGE UP
ELLIPTOCYTES BLD QL SMEAR: SLIGHT — SIGNIFICANT CHANGE UP
EOSINOPHIL # BLD AUTO: 0.14 K/UL — SIGNIFICANT CHANGE UP (ref 0–0.5)
EOSINOPHIL NFR BLD AUTO: 2.6 % — SIGNIFICANT CHANGE UP (ref 0–6)
ETHANOL SERPL-MCNC: 231 MG/DL — HIGH (ref 0–10)
GIANT PLATELETS BLD QL SMEAR: PRESENT — SIGNIFICANT CHANGE UP
GLUCOSE SERPL-MCNC: 86 MG/DL — SIGNIFICANT CHANGE UP (ref 70–99)
HCT VFR BLD CALC: 34.4 % — LOW (ref 34.5–45)
HGB BLD-MCNC: 10.3 G/DL — LOW (ref 11.5–15.5)
LYMPHOCYTES # BLD AUTO: 1.35 K/UL — SIGNIFICANT CHANGE UP (ref 1–3.3)
LYMPHOCYTES # BLD AUTO: 24.8 % — SIGNIFICANT CHANGE UP (ref 13–44)
MANUAL SMEAR VERIFICATION: SIGNIFICANT CHANGE UP
MCHC RBC-ENTMCNC: 21.9 PG — LOW (ref 27–34)
MCHC RBC-ENTMCNC: 29.9 GM/DL — LOW (ref 32–36)
MCV RBC AUTO: 73.2 FL — LOW (ref 80–100)
MICROCYTES BLD QL: SLIGHT — SIGNIFICANT CHANGE UP
MONOCYTES # BLD AUTO: 0.05 K/UL — SIGNIFICANT CHANGE UP (ref 0–0.9)
MONOCYTES NFR BLD AUTO: 0.9 % — LOW (ref 2–14)
NEUTROPHILS # BLD AUTO: 3.75 K/UL — SIGNIFICANT CHANGE UP (ref 1.8–7.4)
NEUTROPHILS NFR BLD AUTO: 68.1 % — SIGNIFICANT CHANGE UP (ref 43–77)
NEUTS BAND # BLD: 0.9 % — SIGNIFICANT CHANGE UP (ref 0–8)
PLAT MORPH BLD: NORMAL — SIGNIFICANT CHANGE UP
PLATELET # BLD AUTO: 298 K/UL — SIGNIFICANT CHANGE UP (ref 150–400)
POIKILOCYTOSIS BLD QL AUTO: SLIGHT — SIGNIFICANT CHANGE UP
POTASSIUM SERPL-MCNC: 4.5 MMOL/L — SIGNIFICANT CHANGE UP (ref 3.5–5.3)
POTASSIUM SERPL-SCNC: 4.5 MMOL/L — SIGNIFICANT CHANGE UP (ref 3.5–5.3)
PROT SERPL-MCNC: 8 G/DL — SIGNIFICANT CHANGE UP (ref 6–8.3)
RBC # BLD: 4.7 M/UL — SIGNIFICANT CHANGE UP (ref 3.8–5.2)
RBC # FLD: 18.3 % — HIGH (ref 10.3–14.5)
RBC BLD AUTO: ABNORMAL
SALICYLATES SERPL-MCNC: <2 MG/DL — LOW (ref 15–30)
SMUDGE CELLS # BLD: PRESENT — SIGNIFICANT CHANGE UP
SODIUM SERPL-SCNC: 140 MMOL/L — SIGNIFICANT CHANGE UP (ref 135–145)
SPHEROCYTES BLD QL SMEAR: SLIGHT — SIGNIFICANT CHANGE UP
VARIANT LYMPHS # BLD: 1.8 % — SIGNIFICANT CHANGE UP (ref 0–6)
WBC # BLD: 5.44 K/UL — SIGNIFICANT CHANGE UP (ref 3.8–10.5)
WBC # FLD AUTO: 5.44 K/UL — SIGNIFICANT CHANGE UP (ref 3.8–10.5)

## 2020-12-09 PROCEDURE — 93010 ELECTROCARDIOGRAM REPORT: CPT

## 2020-12-09 PROCEDURE — 99291 CRITICAL CARE FIRST HOUR: CPT

## 2020-12-09 RX ORDER — FOLIC ACID 0.8 MG
1 TABLET ORAL ONCE
Refills: 0 | Status: COMPLETED | OUTPATIENT
Start: 2020-12-09 | End: 2020-12-09

## 2020-12-09 RX ORDER — HALOPERIDOL DECANOATE 100 MG/ML
5 INJECTION INTRAMUSCULAR ONCE
Refills: 0 | Status: COMPLETED | OUTPATIENT
Start: 2020-12-09 | End: 2020-12-09

## 2020-12-09 RX ORDER — LISINOPRIL 2.5 MG/1
20 TABLET ORAL DAILY
Refills: 0 | Status: DISCONTINUED | OUTPATIENT
Start: 2020-12-09 | End: 2020-12-11

## 2020-12-09 RX ORDER — THIAMINE MONONITRATE (VIT B1) 100 MG
100 TABLET ORAL ONCE
Refills: 0 | Status: COMPLETED | OUTPATIENT
Start: 2020-12-09 | End: 2020-12-09

## 2020-12-09 RX ADMIN — Medication 25 MILLIGRAM(S): at 17:51

## 2020-12-09 RX ADMIN — HALOPERIDOL DECANOATE 5 MILLIGRAM(S): 100 INJECTION INTRAMUSCULAR at 16:10

## 2020-12-09 RX ADMIN — Medication 100 MILLIGRAM(S): at 17:50

## 2020-12-09 RX ADMIN — Medication 1 TABLET(S): at 17:51

## 2020-12-09 RX ADMIN — Medication 1 MILLIGRAM(S): at 17:50

## 2020-12-09 RX ADMIN — LISINOPRIL 20 MILLIGRAM(S): 2.5 TABLET ORAL at 22:12

## 2020-12-09 NOTE — ED ADULT NURSE REASSESSMENT NOTE - NS ED NURSE REASSESS COMMENT FT1
Pt sleeping at this time. Pt complaints with medication administration. VS as documented. CIWA remains at 5. Pt reports feeling drowsy. 1:1 at bedside. Bed locked and lowered. Comfort and safety measures maintained. Pt sleeping at this time. Pt complaints with medication administration. VS as documented. CIWA remains at 5. Pt reports feeling drowsy. 1:1 at bedside. MD Yi ramírez with pt not having IV access at this time. Bed locked and lowered. Comfort and safety measures maintained.

## 2020-12-09 NOTE — ED PROVIDER NOTE - PROGRESS NOTE DETAILS
Pt declined psychiatry. Pt impaired, slurring words, repetitive speech, unable to maintain eye contact, falling asleep during HPI. Pt nonviolent but is refusing care, asking to AMA and refusing blood work, attending and this PA spent ~15min explaining need for labs and course of care, pt continues to refuse care, pt unable to consent, 5mg Haldol IM given with pt easily retrained by staff, labs drawn shortly after without difficulty from pt. No contact info for mother available in chart or with SW from previous hospital visits. -Spencer Cannon PA-C s/w provided mothers, Aydin Estrada, phone# from previous chart attempted to call 3 times unsuccessfully -Spencer Cannon PA-C Sign out follow-up: Attempted to call pt's mother for collateral x2, no response. Pt awake and alert. Noted sinus tachycardia, hypertension, tremulousness. c/w alcohol withdrawal. Pt amenable to admission for alcohol withdrawal. Pt states she took 1 mg Xanax and drank vodka. Denies drug overdose. Denies SI/HI/AVH. CIWA ordered. No PCP. Paged for admission. CLAUDINE. Patient known to [facility]. Admitted to [facility].

## 2020-12-09 NOTE — ED ADULT NURSE NOTE - NSIMPLEMENTINTERV_GEN_ALL_ED
Implemented All Fall Risk Interventions:  [place] to call system. Call bell, personal items and telephone within reach. Instruct patient to call for assistance. Room bathroom lighting operational. Non-slip footwear when patient is off stretcher. Physically safe environment: no spills, clutter or unnecessary equipment. Stretcher in lowest position, wheels locked, appropriate side rails in place. Provide visual cue, wrist band, yellow gown, etc. Monitor gait and stability. Monitor for mental status changes and reorient to person, place, and time. Review medications for side effects contributing to fall risk. Reinforce activity limits and safety measures with patient and family.

## 2020-12-09 NOTE — ED PROVIDER NOTE - OBJECTIVE STATEMENT
45y F phmx HTN (noncompliant w/ meds Lisinopril), etoh abuse w/ recent rehab admission, presents to ED BIBEMS c/o sadness due to recent engagement breakup. Pt reports taking 1mg of xanax and a quarter bottle of vodka today. Mother called EMS who reports seeing empty medication bottles throughout the house. Pt report daily etoh consumption. Denies SI/HI. No other complaints. Seen in ED 10/30 for same complaint. See outpt psychiatrist.

## 2020-12-09 NOTE — ED PROVIDER NOTE - PHYSICAL EXAMINATION
GEN: Pt intoxicated, NAD, A&Ox3.  PSYCH: Affect restricted. No SI/HI. Delayed slurred speech.  EYES: Sclera white w/o injection, horizontal nystagmus    ENT: Head NCAT. MM dry. No tongue fasciculations. Airway patent.  RESP: No chest wall tenderness, CTA b/l, no wheezes, rales, or rhonchi.   CARDIAC: RRR, clear distinct S1, S2, no appreciable murmurs.  ABD: Abdomen soft, non-tender.  MSK: Moving all extremities.  NEURO: No focal motor or sensory deficits.  VASC: Radial and dorsalis pedis pulses 2+ b/l.  SKIN: No rashes or lesions.

## 2020-12-09 NOTE — ED ADULT NURSE REASSESSMENT NOTE - NS ED NURSE REASSESS COMMENT FT1
Security wanded pt. Valuable placed in safe, #[id number]. Security did not take clothing, remains with [facility] staff, and staff desk.

## 2020-12-09 NOTE — ED PROVIDER NOTE - CLINICAL SUMMARY MEDICAL DECISION MAKING FREE TEXT BOX
45yr F hx of HTN, etoh misuse w recent prior admission to rehab. reports feeling despair due to a recent break up and took 1mg of xanax and a quarter of bottle of vodka. her mother called EMS and they saw emtpy bottles all around the house.   pt has a restrictive affect and does not respond to most questions. reports daily drinking, but unable to be more specific. denies SI HI, and reports no priors.  on exam, notable for slow and slurred speech, eyes frequently close and unable to hold conversation. nystagmus noted on neurolgic exam, no tongue fasciculations, soft abd non distended, clear lungs s1-2, no other skin findings.   pt is unable to consent since visibly impaired and unable to make decisions. she insists she is ok but falls sleep during conversation. she is not aggressive but is insistent that her mother had no reason to be worried. when I explained our concerns and that she is unable to converse, she did not respond. she said she does not want to have IV placed and when I offered a butterfly stick for samples she refused as well.     plan for toxic met work up and ETCO2, one to one. if work up unremarkable and can collateral from mother confirm no concern for self or others harm, may go home. she refused the offer of speaking to a psychiatrist at this time.

## 2020-12-09 NOTE — ED ADULT NURSE REASSESSMENT NOTE - NS ED NURSE REASSESS COMMENT FT1
report received from BOBBY Seaman, refusing tele psych, 1-1 at bedside, awaiting update on plan of care.

## 2020-12-10 DIAGNOSIS — F10.10 ALCOHOL ABUSE, UNCOMPLICATED: ICD-10-CM

## 2020-12-10 DIAGNOSIS — I10 ESSENTIAL (PRIMARY) HYPERTENSION: ICD-10-CM

## 2020-12-10 DIAGNOSIS — F10.231 ALCOHOL DEPENDENCE WITH WITHDRAWAL DELIRIUM: ICD-10-CM

## 2020-12-10 DIAGNOSIS — F10.239 ALCOHOL DEPENDENCE WITH WITHDRAWAL, UNSPECIFIED: ICD-10-CM

## 2020-12-10 DIAGNOSIS — F10.230 ALCOHOL DEPENDENCE WITH WITHDRAWAL, UNCOMPLICATED: ICD-10-CM

## 2020-12-10 DIAGNOSIS — F32.9 MAJOR DEPRESSIVE DISORDER, SINGLE EPISODE, UNSPECIFIED: ICD-10-CM

## 2020-12-10 LAB
PCP SPEC-MCNC: SIGNIFICANT CHANGE UP
SARS-COV-2 RNA SPEC QL NAA+PROBE: SIGNIFICANT CHANGE UP

## 2020-12-10 PROCEDURE — 99222 1ST HOSP IP/OBS MODERATE 55: CPT

## 2020-12-10 RX ORDER — THIAMINE MONONITRATE (VIT B1) 100 MG
500 TABLET ORAL DAILY
Refills: 0 | Status: DISCONTINUED | OUTPATIENT
Start: 2020-12-10 | End: 2020-12-10

## 2020-12-10 RX ORDER — ACETAMINOPHEN 500 MG
650 TABLET ORAL EVERY 4 HOURS
Refills: 0 | Status: DISCONTINUED | OUTPATIENT
Start: 2020-12-10 | End: 2020-12-11

## 2020-12-10 RX ORDER — THIAMINE MONONITRATE (VIT B1) 100 MG
100 TABLET ORAL DAILY
Refills: 0 | Status: DISCONTINUED | OUTPATIENT
Start: 2020-12-10 | End: 2020-12-11

## 2020-12-10 RX ORDER — PREGABALIN 225 MG/1
1000 CAPSULE ORAL DAILY
Refills: 0 | Status: DISCONTINUED | OUTPATIENT
Start: 2020-12-10 | End: 2020-12-10

## 2020-12-10 RX ORDER — FOLIC ACID 0.8 MG
1 TABLET ORAL ONCE
Refills: 0 | Status: COMPLETED | OUTPATIENT
Start: 2020-12-10 | End: 2020-12-10

## 2020-12-10 RX ORDER — THIAMINE MONONITRATE (VIT B1) 100 MG
100 TABLET ORAL ONCE
Refills: 0 | Status: COMPLETED | OUTPATIENT
Start: 2020-12-10 | End: 2020-12-10

## 2020-12-10 RX ORDER — ONDANSETRON 8 MG/1
4 TABLET, FILM COATED ORAL EVERY 6 HOURS
Refills: 0 | Status: DISCONTINUED | OUTPATIENT
Start: 2020-12-10 | End: 2020-12-11

## 2020-12-10 RX ORDER — SODIUM CHLORIDE 9 MG/ML
1000 INJECTION, SOLUTION INTRAVENOUS
Refills: 0 | Status: DISCONTINUED | OUTPATIENT
Start: 2020-12-10 | End: 2020-12-11

## 2020-12-10 RX ORDER — FOLIC ACID 0.8 MG
1 TABLET ORAL DAILY
Refills: 0 | Status: DISCONTINUED | OUTPATIENT
Start: 2020-12-10 | End: 2020-12-11

## 2020-12-10 RX ADMIN — SODIUM CHLORIDE 75 MILLILITER(S): 9 INJECTION, SOLUTION INTRAVENOUS at 05:50

## 2020-12-10 RX ADMIN — Medication 2 MILLIGRAM(S): at 10:25

## 2020-12-10 RX ADMIN — Medication 2 MILLIGRAM(S): at 13:18

## 2020-12-10 RX ADMIN — SODIUM CHLORIDE 75 MILLILITER(S): 9 INJECTION, SOLUTION INTRAVENOUS at 13:11

## 2020-12-10 RX ADMIN — Medication 100 MILLIGRAM(S): at 13:11

## 2020-12-10 RX ADMIN — Medication 2 MILLIGRAM(S): at 01:54

## 2020-12-10 RX ADMIN — Medication 1 TABLET(S): at 05:49

## 2020-12-10 RX ADMIN — Medication 1 MILLIGRAM(S): at 05:49

## 2020-12-10 RX ADMIN — Medication 100 MILLIGRAM(S): at 05:49

## 2020-12-10 NOTE — H&P ADULT - PROBLEM SELECTOR PLAN 1
concerning for ongoing delirium. currently able to answer coherently. continue on supportive care. IV fluids , mvi, thiamine. ativan as needed.

## 2020-12-10 NOTE — H&P ADULT - ATTENDING COMMENTS
45F with PMHx of alcohol abuse multiple admissions presents for alcohol withdrawl, last drink Wednesday morning 1/2 pint vodke and 1mg ativan tablet. patient denies other overdose..    Alcohol intoxication and withdrawl  [facility] protocol  IV hydration  thiamine, MVI, folic acid  SW  psych  supplement hypokalemia and hypomag

## 2020-12-10 NOTE — BEHAVIORAL HEALTH ASSESSMENT NOTE - NSBHCONSULTFOLLOWAFTERCARE_PSY_A_CORE FT
- Please have SW connect patient to resources for abstinence including IOP.  - Recommend follow-up appointment with outpatient psychiatrist Dr. Kevin Gitlin upon discharge. Can refer patient to [facility] (substance abuse) clinic [phone].

## 2020-12-10 NOTE — BEHAVIORAL HEALTH ASSESSMENT NOTE - NSBHREFERDETAILS_PSY_A_CORE_FT
Consult for depression, SI, med mgt Consulted for SI expressed in ED Consulted for SI expressed in ED, med mgt

## 2020-12-10 NOTE — H&P ADULT - NSHPPHYSICALEXAM_GEN_ALL_CORE
GENERAL: NAD, AAOx3  HEAD:  Atraumatic, Normocephalic  EYES: EOMI, PERRLA, conjunctiva and sclera clear  NECK: Supple, No JVD, No LAD  CHEST/LUNG: CTABL, No wheeze  HEART: Regular rate and rhythm; No murmurs, rubs, or gallops  ABDOMEN: Soft, Nontender, Nondistended; Bowel sounds present  EXTREMITIES:  2+ Peripheral Pulses, No clubbing, cyanosis, or edema  SKIN: No rashes or lesions GENERAL: NAD, AAOx2, tremulous+  HEAD:  Atraumatic, Normocephalic  EYES: EOMI, conjunctiva and sclera clear  NECK: Supple, No JVD, No LAD  CHEST/LUNG: Clear bilaterally, No wheeze  HEART: Regular rapid rate and rhythm; No murmurs, rubs, or gallops  ABDOMEN: Soft, Nontender, Nondistended; Bowel sounds present  EXTREMITIES:+ Pulses, No clubbing, cyanosis, or edema  SKIN: No rashes or lesions

## 2020-12-10 NOTE — BEHAVIORAL HEALTH ASSESSMENT NOTE - CASE SUMMARY
This is a 45-y.o. Polish F patient, domiciled alone, , recently  from [facility], unemployed, w/ PPH of alcohol use disorder, w/ multiple ED visits for alcohol intox, w/multiple rehab admissions, most recently in rehab in [place] in september for 30 days, anxiety/panic attacks (on PRN xanax), no past psych hosp, hx prior SI when intoxicated, no known SA, sees outpt psychiatrist (Dr. Kevin Gitlin), w/ pmh of hepatic steatosis, who presented to ED on 10/13 for alcohol intoxication, , currently on CIWA for detox. Psychiatry consulted on 10/15 for consult for depression/no SI and alcohol relapse.     I have seen and evaluated this patient myself. Chart, labs, meds reviewed. I agree with resident's assessment and plan.

## 2020-12-10 NOTE — BEHAVIORAL HEALTH ASSESSMENT NOTE - VIOLENCE PROTECTIVE FACTORS:
Engagement in treatment/Residential stability/Relationship stability Engagement in treatment/Residential stability

## 2020-12-10 NOTE — BEHAVIORAL HEALTH ASSESSMENT NOTE - RISK ASSESSMENT
Low Acute Suicide Risk Low acute suicide risk at this time and does not merit inpatient psychiatric hospitalization at this time. Patient has no hx of recent or past sxs of SI/SA/SH. Patient has several acute and chronic risk factors including alcohol use disorder, with recent relapse, recent social isolation, some expressed hopelessness about relapsing. However patient's protective factors outweigh risk factors, including supportive family, close friends she can turn to, good therapeutic relationship with her psychiatrist, prior success at staying sober, and good coping skills as demonstrated by her interest in seeking help with sobriety. Low acute suicide risk at this time and does not merit inpatient psychiatric hospitalization at this time. Patient has no hx of recent or past sxs of SI/SA. Patient has several acute and chronic risk factors including alcohol use disorder, with recent relapse, recent social isolation, some expressed hopelessness about relapsing. However patient's protective factors outweigh risk factors, including supportive family, close friends she can turn to, good therapeutic relationship with her psychiatrist, prior success at staying sober, and good coping skills as demonstrated by her interest in seeking help with sobriety.

## 2020-12-10 NOTE — PATIENT PROFILE ADULT - FLU SEASON?
Detail Level: Zone
Continue Regimen: Folic acid 1 mg q po qd \\nTAC 0.5% topical cream bid x 2 weeks then biw
Modify Regimen: Decrease MTX to 6 po weekly, 7 po weekly sent to pharmacy
Yes...

## 2020-12-10 NOTE — BEHAVIORAL HEALTH ASSESSMENT NOTE - NSBHCHARTREVIEWVS_PSY_A_CORE FT
Vital Signs Last 24 Hrs  T(C): 36.8 (15 Oct 2020 11:04), Max: 37.2 (14 Oct 2020 14:56)  T(F): 98.3 (15 Oct 2020 11:04), Max: 98.9 (14 Oct 2020 14:56)  HR: 83 (15 Oct 2020 11:04) (83 - 109)  BP: 150/103 (15 Oct 2020 11:04) (132/94 - 150/103)  BP(mean): --  RR: 18 (15 Oct 2020 11:04) (17 - 18)  SpO2: 98% (15 Oct 2020 11:04) (95% - 99%) Vital Signs Last 24 Hrs  T(C): 36.5 (10 Dec 2020 12:16), Max: 36.7 (10 Dec 2020 03:41)  T(F): 97.7 (10 Dec 2020 12:16), Max: 98.1 (10 Dec 2020 04:34)  HR: 87 (10 Dec 2020 12:16) (87 - 122)  BP: 128/88 (10 Dec 2020 12:16) (120/86 - 161/119)  BP(mean): --  RR: 18 (10 Dec 2020 12:16) (15 - 20)  SpO2: 97% (10 Dec 2020 12:16) (95% - 100%)

## 2020-12-10 NOTE — BEHAVIORAL HEALTH ASSESSMENT NOTE - NSBHCHARTREVIEWLAB_PSY_A_CORE FT
10-15    134<L>  |  99  |  9   ----------------------------<  87  3.9   |  23  |  0.59    Ca    8.7      15 Oct 2020 06:47  Phos  2.1     10-14  Mg     2.7     10-14    TPro  6.4  /  Alb  3.5  /  TBili  0.3  /  DBili  x   /  AST  50<H>  /  ALT  33  /  AlkPhos  51  10-14                            9.9    4.92  )-----------( 199      ( 15 Oct 2020 06:47 )             32.7       Alcohol, Blood: 431 mg/dL (10.13.20 @ 17:58) 10.3   5.44  )-----------( 298      ( 09 Dec 2020 16:27 )             34.4   12-09    140  |  99  |  17  ----------------------------<  86  4.5   |  21<L>  |  0.62    Ca    9.4      09 Dec 2020 16:27    TPro  8.0  /  Alb  4.4  /  TBili  0.3  /  DBili  x   /  AST  41<H>  /  ALT  24  /  AlkPhos  76  12-09

## 2020-12-10 NOTE — BEHAVIORAL HEALTH ASSESSMENT NOTE - ACTIVATING EVENTS/STRESSORS
Perceived burden on family or others/Substance intoxication or withdrawal/Recent inpatient discharge/Triggering events leading to humiliation, shame, and/or despair (e.g. Loss of relationship, financial or health status) (real or anticipated)/Current or pending social isolation

## 2020-12-10 NOTE — H&P ADULT - ASSESSMENT
45 yr old female with a hx of previous admission for intoxication presents with reoccurrence of etoh intoxication with possible drug overdose.

## 2020-12-10 NOTE — H&P ADULT - NSICDXFAMILYHX_GEN_ALL_CORE_FT
FAMILY HISTORY:  Father  Still living? Unknown  Family history of hypertension, Age at diagnosis: Age Unknown    Mother  Still living? Unknown  Family history of hypertension, Age at diagnosis: Age Unknown

## 2020-12-10 NOTE — BEHAVIORAL HEALTH ASSESSMENT NOTE - DETAILS
Alcohol Rehabilitation program in september per above alcoholism - father Alcohol Rehabilitation program in November

## 2020-12-10 NOTE — BEHAVIORAL HEALTH ASSESSMENT NOTE - SUICIDE RISK FACTORS
Current mood episode/Mood Disorder current/past/Hopelessness or despair/Alcohol/Substance abuse disorders

## 2020-12-10 NOTE — BEHAVIORAL HEALTH ASSESSMENT NOTE - NS ED BHA DEMOGRAPHICS MEDICAL RECORD REVIEWED YES RECORDS
Overdose of naproxen with self induced vomiting X 1, Patient with suicide attempt 1.5 hours prior to arrival to ED
Hospital chart

## 2020-12-10 NOTE — BEHAVIORAL HEALTH ASSESSMENT NOTE - NSBHCONSULTRECOMMENDOTHER_PSY_A_CORE FT
RECOMMENDATIONS:  - Continue with IV thiamine 500 mg daily.  - Continue with CIWA protocol for alcohol withdrawal.   - Can consider trazodone 150 mg QHS for sleep.  - Please have SW connect patient to resources for abstinence including IOP.  - Recommend follow-up appointment with outpatient psychiatrist Dr. Kevin Gitlin upon discharge. Can refer patient to [facility] (substance abuse) clinic [phone].   - Would not start medication for depression/anxiety at this time. Recommend patient f/u with outpatient psychiatrist to discuss further.   - Dispo: per medical team once patient completes detox.  - Please contact with any questions.    ---    Note Authored by   Jo Ann Hamilton MD, PGY1, Psychiatry  Pager: [phone] or Teams      Case discussed with supervising psychiatry attending Dr. Carreon.

## 2020-12-10 NOTE — H&P ADULT - NSHPREVIEWOFSYSTEMS_GEN_ALL_CORE
REVIEW OF SYSTEMS:  General: no weakness, no fever/chills, no weight loss/gain  Skin/Breast: no rash, no jaundice  Ophthalmologic: no vision changes, no dry eyes   Respiratory and Thorax: no cough, no wheezing, no hemoptysis, no dyspnea  Cardiovascular: no chest pain, no shortness of breath, no orthopnea  Gastrointestinal: no n/v/d, no abdominal pain, no dysphagia   Genitourinary: no dysuria, no frequency, no nocturia, no hematuria  Musculoskeletal: no trauma, no sprain/strain, no myalgias, no arthralgias, no fracture  Neurological: no HA, no dizziness, no weakness, no numbness  Psychiatric: no depression, no SI/HI  Hematology/Lymphatics: no easy bruising  Endocrine: no heat or cold intolerance. no weight gain or loss  Allergic/Immunologic: no allergy or recent reaction REVIEW OF SYSTEMS:  General: no weakness, no fever/chills, no weight loss/gain  Skin/Breast: no rash, no jaundice  Ophthalmologic: no vision changes, no dry eyes   Respiratory and Thorax: no cough, no wheezing, no hemoptysis, no dyspnea  Cardiovascular: no chest pain, no shortness of breath, no orthopnea  Gastrointestinal: no n/v/d, no abdominal pain, no dysphagia   Genitourinary: no dysuria, no frequency, no nocturia, no hematuria  Musculoskeletal: no trauma, no sprain/strain, no myalgias, no arthralgias, no fracture  Neurological: no HA, no dizziness, no weakness, no numbness  Psychiatric: + depression, no SI/HI  Hematology/Lymphatics: no easy bruising  Endocrine: no heat or cold intolerance. no weight gain or loss  Allergic/Immunologic: no allergy or recent reaction

## 2020-12-10 NOTE — H&P ADULT - NSHPLABSRESULTS_GEN_ALL_CORE
LABS:                        10.3   5.44  )-----------( 298      ( 09 Dec 2020 16:27 )             34.4     12-09    140  |  99  |  17  ----------------------------<  86  4.5   |  21<L>  |  0.62    Ca    9.4      09 Dec 2020 16:27    TPro  8.0  /  Alb  4.4  /  TBili  0.3  /  DBili  x   /  AST  41<H>  /  ALT  24  /  AlkPhos  76  12-09      CAPILLARY BLOOD GLUCOSE                RADIOLOGY & ADDITIONAL TESTS:    Imaging Personally Reviewed:  [x] YES  [ ] NO    Consultant(s) Notes Reviewed:  [x] YES  [ ] NO    Care Discussed with Consultants/Other Providers [x] YES  [ ] NO

## 2020-12-10 NOTE — BEHAVIORAL HEALTH ASSESSMENT NOTE - HPI (INCLUDE ILLNESS QUALITY, SEVERITY, DURATION, TIMING, CONTEXT, MODIFYING FACTORS, ASSOCIATED SIGNS AND SYMPTOMS)
****************************  Note Authored by   Jo Ann Hamilton MD, PGY1, Psychiatry  Pager: [phone] or Teams  ****************************  H&P    Source: Patient, chart review, psychiatrist's office    AYLEEN DEY is a 46 yo Guayan[place] woman, domiciled alone, , recently  from [facility], unemployed, w/ PPH of alcohol use disorder, w/ multiple ED visits for alcohol intox, w/multiple rehab admissions, most recently in rehab in [place] in september for 30 days, anxiety/panic attacks (on PRN xanax), no past psych hosp, hx prior SI when intoxicated, no known SA, sees outpt psychiatrist (Dr. Kevin Gitlin), w/ pmh of hepatic steatosis, who presented to ED on 10/13 for alcohol intoxication, , currently on CIWA for detox. Psychiatry consulted on 10/15 for consult for depression/no SI and alcohol relapse.    Patient seen at bedside. Patient said she has been struggling with alcohol dependence for many years. She had a period of sobriety for 6 years until about a year ago after inpatient rehab and success with alcoholics anonymous and started following with a psychiatrist, Dr. Acuna. This lasted until a year ago, when she reports she began using marijuana with her fiance, which triggered her to relapse. 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 She sites several other propagating factors, including isolation from COVID and losing her job. In September patient reports that she decided to enroll in a 30 day inpatient rehab program in [place] and stayed sober until this past Monday, when patient said her fiance decided to leave her and move to [place]. Patient became tearful when describing that her fiance "unloaded many problems he had been holding inside" which she says "my drinking had also been to blame, but that wasn't fair to me." Per patient, "I have good coping skills but him leaving me overwhelmed them and became too much," and she had a relapse. Patient said she has never been a depressed person and is usually happy. She struggles with anxiety and panic attacks, for which she takes xanax PRN prescribed to her by her psychiatrist. She also takes medication sometimes for sleep. She says she rationally knows that she may be better off without her boyfriend, as he may have triggered her drinking, but she emotionally will need some time to heal. She describes having strained her relationships from drinking, but still sites family support, including her brother and sister-in-law, who offered to let her live with them for some time, her mother, and multiple close friends who she can confide in. She enjoys spending time with her nieces and nephews, describing herself as "the cool aunt" when she isn't drinking. She also describes a good relationship with her psychiatrist, who she speaks to once a month via telepsych visits. Patient shows strong motivation to get sober - she is interested in IOP program. She said that she cannot afford right now to spend money on another inpatient setting. She also said that she had the most success when she did alcoholics anonymous and was going to regular AA meetings with her sponsor when she lived in [place]. She also said that she has been discussing with her psychiatrist starting Pristiq as outpatient. She denies depressed mood, saying she mainly feels sad. No Hx of SA/SI. She currently is denying sxs of withdrawal such as tremors. No known hx of WD seizures. Offered patient emotional support throughout interview.     Psych ROS: (+) anxiety, PTSD (flashbacks) in relation to her divorce, (-) No SI/thoughts of self-harm/HI, hx of SA, hx of alexis, AH/VH. No hx of physical or sexual abuse. 46 yo [place] woman, domiciled alone, , recently  from fiance, unemployed, PPHx of alcohol use disorder w/ multiple ED visits for alcohol intox, w/multiple rehab admissions, most recently in rehab in [place] in November for 28 days, anxiety/panic attacks (on PRN xanax), no past psych hospitalizations, hx prior SI when intoxicated, no known SA, sees outpt psychiatrist (Dr. Kevin Gitlin), w/ PMH of hepatic steatosis and HTN who presented to ED for alcohol intoxication, currently on CIWA for detox. Psychiatry consulted for suicidal ideations expressed in ED.     On interview, pt states she never expressed suicidal ideations and states "I don't want to harm myself". She denies history of SI/SA, no weapons in the house. Pt has been struggling w/ alcohol dependence for years. She had a period of sobriety for 6 years until about a year and a half ago after inpatient rehab and success with alcoholics anonymous. Patient relapsed about a year and a half ago, drinking about 1-1.5 L of vodka per day, intermittently. 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 As per past notes, pt was in 30 day inpatient rehab program in September in [place] and relapsed in October, coming to the ED for withdrawal. Pt went back to a 28 day inpatient rehab program, [facility] in [place], in November and relapsed again last week. She states on Wednesday she drank 1/4 bottle of vodka and took one Xanax 1 mg b/c she c/o severe anxiety. Pt struggles w/ anxiety and panic attacks, and has been prescribed Xanax PRN by her psychiatrist, which she takes about 2 times/week. In addition, pt takes Ambien 10 mg qHS for sleep and was started recently on Pristiq 25 mg. She speaks about her fiance leaving her a few weeks ago which has been hard and that she is struggling with finances. She describes having strained her family relationships from drinking, but has a strong support system through friends. Patient is interested in going back to [facility] in [place]. Denied AVH, alexis.    Collateral obtained from mother, Aydin Estrada - states pt is a chronic EtOH and came home from rehab over a week ago. She states she visits the pt everyday for multiple hours. Pt has been drinking for 4 days straight and has not been eating. She called EMS b/c she found prescription bottle of Xanax next to pt, although mother does not know how much pt took. Mother is concerned that the pt keeps falling in the house. She states pt's neighbors speculate that pt has been driving drunk, although the mother has not witnessed this personally and denies hx of DUI. She states pt "drinks to the point of being numb" and pt verbalized in the past she wants mother "to help me die". Mother denies pt having history of suicide attempts and states she is not concerned that the pt will harm herself. Denies weapons in the house. Mother spoke to [facility] in [place] and states they are willing to take the pt upon discharge.

## 2020-12-10 NOTE — BEHAVIORAL HEALTH ASSESSMENT NOTE - SUICIDE PROTECTIVE FACTORS
Identifies reasons for living/Has future plans/Supportive social network of family or friends/Positive therapeutic relationships/Ability to cope with stress/Frustration tolerance/Responsibility to family and others Identifies reasons for living/Has future plans/Supportive social network of family or friends/Responsibility to family and others/Positive therapeutic relationships

## 2020-12-10 NOTE — BEHAVIORAL HEALTH ASSESSMENT NOTE - SUMMARY
AYLEEN DEY is a 44 [facility] woman, domiciled alone, , recently  from fiance, unemployed, w/ PPH of alcohol use disorder, w/ multiple ED visits for alcohol intox, w/multiple rehab admissions, most recently in rehab in [place] in september for 30 days, anxiety/panic attacks (on PRN xanax), no past psych hosp, hx prior SI when intoxicated, no known SA, sees outpt psychiatrist (Dr. Kevin Gitlin), w/ pmh of hepatic steatosis, who presented to ED on 10/13 for alcohol intoxication, , currently on CIWA for detox. Psychiatry consulted on 10/15 for consult for depression/no SI and alcohol relapse.     Patient presenting with alcohol relapse x2 days, no current signs of withdrawal, CIWA scores 0-1, and sxs of emotional lability, depressed mood, no SI, in the context of recent break-up with her long-term fiance. Her presentation is most consistent with adjustment disorder due to loss of relationship, which triggered her to relapse. Her recent alcohol binge is also a likely contributing factor to her mood. She has a hx of using alcohol as a coping mechanism for stressors, and her recent stressors, including COVID, loss of her partner, loss of her job, are likely precipitating factors. However she also demonstrates that she has gained coping skills, including a demonstrated positive outlook, saying her fiance may not have been right for her, and that she is tentatively optimistic about attending IOP and maintaining sobriety via AA meetings. She also says she has strong social support, as she is very close with her family and says she has several close friends. Patient would benefit from being connected to IOP program upon discharge and will follow up with her outpatient psychiatrist for continued support and management of her anxiety and depression. 44 yo [place] woman, domiciled alone, , recently  from [facility], unemployed, PPHx of alcohol use disorder w/ multiple ED visits for alcohol intox, w/multiple rehab admissions, most recently in rehab in [place] in November for 28 days, anxiety/panic attacks (on PRN xanax), no past psych hospitalizations, hx prior SI when intoxicated, no known SA, sees outpt psychiatrist (Dr. Kevin Gitlin), w/ PMH of hepatic steatosis and HTN who presented to ED for alcohol intoxication, currently on CIWA for detox. Psychiatry consulted for suicidal ideations expressed in ED. Pt denies expressing suicidal ideations and states "I don't want to harm myself". She denies history of SI/SA, no weapons in the house. Pt was recently at [facility] in [place] and is interested in going back after discharge.

## 2020-12-10 NOTE — H&P ADULT - HISTORY OF PRESENT ILLNESS
45y F phmx HTN (noncompliant w/ meds Lisinopril), etoh abuse w/ recent rehab admission, presents to ED BIBEMS c/o sadness due to recent engagement breakup. Pt reports taking 1mg of xanax and a quarter bottle of vodka today. Mother called EMS who reports seeing empty medication bottles throughout the house. Pt report daily etoh consumption. Denies SI/HI. No other complaints. Seen in ED 10/30 for same complaint. See outpt psychiatrist. 45 yr old female with a pmhx of recurrent admissions with Etoh abuse and a recent discharge from rehab admission presented to the Emergency room with concerns from her mother of intoxication. She continues to concern herself with sadness over her recent breakup. Her mother was visiting and staying with her and found her to have taken 1 xanax and a quarter bottle of vodka. Mother states she noticed multiple empty bottles through out the house.

## 2020-12-10 NOTE — BEHAVIORAL HEALTH ASSESSMENT NOTE - DIFFERENTIAL
adjustment disorder w/ mixed anxiety/depression vs major depressive episode vs alcohol-induced mood disorder    Alcohol WD alcohol-induced mood disorder vs major depressive episode     Alcohol WD

## 2020-12-10 NOTE — SBIRT NOTE ADULT - NSSBIRTUNABLESCROTHER_GEN_A_CORE
LMSW met with patient at bedside to provide supportive intervention, patient sleepy/declined intervention and did not wish to discuss ETOH consumption at this time. Made patient aware of ongoing SW availability

## 2020-12-10 NOTE — BEHAVIORAL HEALTH ASSESSMENT NOTE - DESCRIPTION (FIRST USE, LAST USE, QUANTITY, FREQUENCY, DURATION)
Drinks 1-1/5L daily for many years, 6 years of sobriety, started drinking again one year ago. Rehab in september, stopped using until last week. Last drink was 10/12. infrequently Drinks 1-1/5L daily for many years, 6 years of sobriety, started drinking again one year ago. Rehab in November, stopped using until last week. Last drink was 12/9.

## 2020-12-11 ENCOUNTER — TRANSCRIPTION ENCOUNTER (OUTPATIENT)
Age: 45
End: 2020-12-11

## 2020-12-11 VITALS
RESPIRATION RATE: 18 BRPM | TEMPERATURE: 98 F | OXYGEN SATURATION: 100 % | HEART RATE: 70 BPM | SYSTOLIC BLOOD PRESSURE: 134 MMHG | DIASTOLIC BLOOD PRESSURE: 96 MMHG

## 2020-12-11 LAB
ALBUMIN SERPL ELPH-MCNC: 4.2 G/DL — SIGNIFICANT CHANGE UP (ref 3.3–5)
ALP SERPL-CCNC: 69 U/L — SIGNIFICANT CHANGE UP (ref 40–120)
ALT FLD-CCNC: 18 U/L — SIGNIFICANT CHANGE UP (ref 10–45)
ANION GAP SERPL CALC-SCNC: 15 MMOL/L — SIGNIFICANT CHANGE UP (ref 5–17)
AST SERPL-CCNC: 29 U/L — SIGNIFICANT CHANGE UP (ref 10–40)
BASOPHILS # BLD AUTO: 0.02 K/UL — SIGNIFICANT CHANGE UP (ref 0–0.2)
BASOPHILS NFR BLD AUTO: 0.4 % — SIGNIFICANT CHANGE UP (ref 0–2)
BILIRUB DIRECT SERPL-MCNC: 0.1 MG/DL — SIGNIFICANT CHANGE UP (ref 0–0.2)
BILIRUB INDIRECT FLD-MCNC: 0.5 MG/DL — SIGNIFICANT CHANGE UP (ref 0.2–1)
BILIRUB SERPL-MCNC: 0.6 MG/DL — SIGNIFICANT CHANGE UP (ref 0.2–1.2)
BUN SERPL-MCNC: 11 MG/DL — SIGNIFICANT CHANGE UP (ref 7–23)
CALCIUM SERPL-MCNC: 9.4 MG/DL — SIGNIFICANT CHANGE UP (ref 8.4–10.5)
CHLORIDE SERPL-SCNC: 99 MMOL/L — SIGNIFICANT CHANGE UP (ref 96–108)
CO2 SERPL-SCNC: 23 MMOL/L — SIGNIFICANT CHANGE UP (ref 22–31)
CREAT SERPL-MCNC: 0.59 MG/DL — SIGNIFICANT CHANGE UP (ref 0.5–1.3)
EOSINOPHIL # BLD AUTO: 0.23 K/UL — SIGNIFICANT CHANGE UP (ref 0–0.5)
EOSINOPHIL NFR BLD AUTO: 5.1 % — SIGNIFICANT CHANGE UP (ref 0–6)
GLUCOSE SERPL-MCNC: 94 MG/DL — SIGNIFICANT CHANGE UP (ref 70–99)
HCT VFR BLD CALC: 32.1 % — LOW (ref 34.5–45)
HCT VFR BLD CALC: 32.2 % — LOW (ref 34.5–45)
HGB BLD-MCNC: 9.5 G/DL — LOW (ref 11.5–15.5)
HGB BLD-MCNC: 9.6 G/DL — LOW (ref 11.5–15.5)
IMM GRANULOCYTES NFR BLD AUTO: 0.4 % — SIGNIFICANT CHANGE UP (ref 0–1.5)
LYMPHOCYTES # BLD AUTO: 1.05 K/UL — SIGNIFICANT CHANGE UP (ref 1–3.3)
LYMPHOCYTES # BLD AUTO: 23.5 % — SIGNIFICANT CHANGE UP (ref 13–44)
MAGNESIUM SERPL-MCNC: 1.8 MG/DL — SIGNIFICANT CHANGE UP (ref 1.6–2.6)
MCHC RBC-ENTMCNC: 22.1 PG — LOW (ref 27–34)
MCHC RBC-ENTMCNC: 22.4 PG — LOW (ref 27–34)
MCHC RBC-ENTMCNC: 29.6 GM/DL — LOW (ref 32–36)
MCHC RBC-ENTMCNC: 29.8 GM/DL — LOW (ref 32–36)
MCV RBC AUTO: 74.7 FL — LOW (ref 80–100)
MCV RBC AUTO: 75.2 FL — LOW (ref 80–100)
MONOCYTES # BLD AUTO: 0.23 K/UL — SIGNIFICANT CHANGE UP (ref 0–0.9)
MONOCYTES NFR BLD AUTO: 5.1 % — SIGNIFICANT CHANGE UP (ref 2–14)
NEUTROPHILS # BLD AUTO: 2.92 K/UL — SIGNIFICANT CHANGE UP (ref 1.8–7.4)
NEUTROPHILS NFR BLD AUTO: 65.5 % — SIGNIFICANT CHANGE UP (ref 43–77)
NRBC # BLD: 0 /100 WBCS — SIGNIFICANT CHANGE UP (ref 0–0)
NRBC # BLD: 0 /100 WBCS — SIGNIFICANT CHANGE UP (ref 0–0)
PHOSPHATE SERPL-MCNC: 2.8 MG/DL — SIGNIFICANT CHANGE UP (ref 2.5–4.5)
PLATELET # BLD AUTO: 221 K/UL — SIGNIFICANT CHANGE UP (ref 150–400)
PLATELET # BLD AUTO: 224 K/UL — SIGNIFICANT CHANGE UP (ref 150–400)
POTASSIUM SERPL-MCNC: 3.6 MMOL/L — SIGNIFICANT CHANGE UP (ref 3.5–5.3)
POTASSIUM SERPL-SCNC: 3.6 MMOL/L — SIGNIFICANT CHANGE UP (ref 3.5–5.3)
PROT SERPL-MCNC: 7 G/DL — SIGNIFICANT CHANGE UP (ref 6–8.3)
RBC # BLD: 4.28 M/UL — SIGNIFICANT CHANGE UP (ref 3.8–5.2)
RBC # BLD: 4.3 M/UL — SIGNIFICANT CHANGE UP (ref 3.8–5.2)
RBC # FLD: 18.5 % — HIGH (ref 10.3–14.5)
RBC # FLD: 18.6 % — HIGH (ref 10.3–14.5)
SARS-COV-2 IGG SERPL QL IA: NEGATIVE — SIGNIFICANT CHANGE UP
SARS-COV-2 IGM SERPL IA-ACNC: 0.12 INDEX — SIGNIFICANT CHANGE UP
SODIUM SERPL-SCNC: 137 MMOL/L — SIGNIFICANT CHANGE UP (ref 135–145)
WBC # BLD: 4.4 K/UL — SIGNIFICANT CHANGE UP (ref 3.8–10.5)
WBC # BLD: 4.47 K/UL — SIGNIFICANT CHANGE UP (ref 3.8–10.5)
WBC # FLD AUTO: 4.4 K/UL — SIGNIFICANT CHANGE UP (ref 3.8–10.5)
WBC # FLD AUTO: 4.47 K/UL — SIGNIFICANT CHANGE UP (ref 3.8–10.5)

## 2020-12-11 PROCEDURE — 80053 COMPREHEN METABOLIC PANEL: CPT

## 2020-12-11 PROCEDURE — U0003: CPT

## 2020-12-11 PROCEDURE — 82248 BILIRUBIN DIRECT: CPT

## 2020-12-11 PROCEDURE — 83735 ASSAY OF MAGNESIUM: CPT

## 2020-12-11 PROCEDURE — 80307 DRUG TEST PRSMV CHEM ANLYZR: CPT

## 2020-12-11 PROCEDURE — 85027 COMPLETE CBC AUTOMATED: CPT

## 2020-12-11 PROCEDURE — 86769 SARS-COV-2 COVID-19 ANTIBODY: CPT

## 2020-12-11 PROCEDURE — 85025 COMPLETE CBC W/AUTO DIFF WBC: CPT

## 2020-12-11 PROCEDURE — 84100 ASSAY OF PHOSPHORUS: CPT

## 2020-12-11 RX ORDER — LISINOPRIL 2.5 MG/1
1 TABLET ORAL
Qty: 0 | Refills: 0 | DISCHARGE
Start: 2020-12-11

## 2020-12-11 RX ADMIN — Medication 1 TABLET(S): at 12:23

## 2020-12-11 RX ADMIN — Medication 1 MILLIGRAM(S): at 12:23

## 2020-12-11 RX ADMIN — LISINOPRIL 20 MILLIGRAM(S): 2.5 TABLET ORAL at 05:16

## 2020-12-11 RX ADMIN — Medication 100 MILLIGRAM(S): at 12:31

## 2020-12-11 NOTE — PROGRESS NOTE ADULT - SUBJECTIVE AND OBJECTIVE BOX
Patient is a 45y old  Female who presents with a chief complaint of intoxication (10 Dec 2020 03:51)      SUBJECTIVE / OVERNIGHT EVENTS:    Patient seen and examined. denies cp sob. calm and alert today. requesting to be discharged as starting new job tomorrow. she has arranged a sober house to stay at after discharge.      Vital Signs Last 24 Hrs  T(C): 36.7 (11 Dec 2020 07:47), Max: 36.8 (10 Dec 2020 15:15)  T(F): 98.1 (11 Dec 2020 07:47), Max: 98.2 (10 Dec 2020 15:15)  HR: 78 (11 Dec 2020 07:47) (73 - 114)  BP: 134/90 (11 Dec 2020 07:47) (120/83 - 145/98)  BP(mean): --  RR: 18 (11 Dec 2020 07:47) (18 - 19)  SpO2: 100% (11 Dec 2020 07:47) (96% - 100%)  I&O's Summary    10 Dec 2020 07:01  -  11 Dec 2020 07:00  --------------------------------------------------------  IN: 1480 mL / OUT: 402 mL / NET: 1078 mL    11 Dec 2020 07:01  -  11 Dec 2020 11:49  --------------------------------------------------------  IN: 180 mL / OUT: 0 mL / NET: 180 mL        PE:  GENERAL: NAD, AAOx3  HEAD:  Atraumatic, Normocephalic  CHEST/LUNG: CTABL, No wheeze  HEART: Regular rate and rhythm; no murmur  ABDOMEN: Soft, Nontender, Nondistended; Bowel sounds present  EXTREMITIES:  2+ Peripheral Pulses, No clubbing, cyanosis, or edema  SKIN: No rashes or lesions  NEURO: No focal deficits, no tremors    LABS:                        9.6    4.47  )-----------( 224      ( 11 Dec 2020 07:00 )             32.2     12-11    137  |  99  |  11  ----------------------------<  94  3.6   |  23  |  0.59    Ca    9.4      11 Dec 2020 07:11  Phos  2.8     12-11  Mg     1.8     12-11    TPro  7.0  /  Alb  4.2  /  TBili  0.6  /  DBili  0.1  /  AST  29  /  ALT  18  /  AlkPhos  69  12-11      CAPILLARY BLOOD GLUCOSE                RADIOLOGY & ADDITIONAL TESTS:    Imaging Personally Reviewed:  [x] YES  [ ] NO    Consultant(s) Notes Reviewed:  [x] YES  [ ] NO    MEDICATIONS  (STANDING):  dextrose 5% + sodium chloride 0.45%. 1000 milliLiter(s) (75 mL/Hr) IV Continuous <Continuous>  folic acid 1 milliGRAM(s) Oral daily  lisinopril 20 milliGRAM(s) Oral daily  multivitamin 1 Tablet(s) Oral daily  thiamine Injectable 100 milliGRAM(s) IV Push daily    MEDICATIONS  (PRN):  acetaminophen   Tablet .. 650 milliGRAM(s) Oral every 4 hours PRN Mild Pain (1 - 3)  LORazepam     Tablet 2 milliGRAM(s) Oral every 2 hours PRN Symptom-triggered 2 point increase in CIWA-Ar  LORazepam   Injectable 2 milliGRAM(s) IV Push every 1 hour PRN Symptom-triggered: each CIWA -Ar score 8 or GREATER  ondansetron Injectable 4 milliGRAM(s) IV Push every 6 hours PRN Nausea      Care Discussed with Consultants/Other Providers [x] YES  [ ] NO    HEALTH ISSUES - PROBLEM Dx:  Alcohol withdrawal syndrome without complication    ETOH Abuse  ETOH Abuse    Depression, unspecified depression type  Depression, unspecified depression type    Hypertension, unspecified type  Hypertension, unspecified type    Alcohol withdrawal syndrome, with delirium  Alcohol withdrawal syndrome, with delirium

## 2020-12-11 NOTE — PROGRESS NOTE ADULT - ASSESSMENT
45 yr old female with a hx of previous admission for intoxication presents with reoccurrence of etoh intoxication

## 2020-12-11 NOTE — DISCHARGE NOTE PROVIDER - NSDCCPCAREPLAN_GEN_ALL_CORE_FT
PRINCIPAL DISCHARGE DIAGNOSIS  Diagnosis: Alcohol withdrawal  Assessment and Plan of Treatment: -Follow-up with Dr. Kevin Gitlin ([phone]).   -Please call [facility] to schedule outpt treated.   -Please abstain from alcohol use   -Follow-up with your primary care provder next week.          SECONDARY DISCHARGE DIAGNOSES  Diagnosis: Depression, unspecified depression type  Assessment and Plan of Treatment: Follow-up with Dr. Kevin Gitlin [phone]---call for appointment    Diagnosis: HTN (hypertension)  Assessment and Plan of Treatment: Low salt diet  Activity as tolerated.  Take all medication as prescribed.  Follow up with your medical doctor for routine blood pressure monitoring at your next visit.  Notify your doctor if you have any of the following symptoms:   Dizziness, Lightheadedness, Blurry vision, Headache, Chest pain, Shortness of breath

## 2020-12-11 NOTE — DISCHARGE NOTE PROVIDER - NSDCMRMEDTOKEN_GEN_ALL_CORE_FT
acetaminophen 325 mg oral tablet: 2 tab(s) orally every 6 hours, As needed, Moderate Pain (4 - 6)  cholecalciferol oral tablet: 2000 unit(s) orally once a day  ferrous sulfate 325 mg (65 mg elemental iron) oral tablet: 1 tab(s) orally once a day  folic acid 1 mg oral tablet: 1 tab(s) orally once a day  lisinopril 20 mg oral tablet: 1 tab(s) orally once a day  Multiple Vitamins oral tablet: 1 tab(s) orally once a day  ocular lubricant ophthalmic solution: 1 drop(s) to each affected eye 4 times a day, As needed, Dry Eyes  pantoprazole 40 mg oral delayed release tablet: 1 tab(s) orally once a day (before a meal)  thiamine 100 mg oral tablet: 1 tab(s) orally once a day

## 2020-12-11 NOTE — PROGRESS NOTE ADULT - PROBLEM SELECTOR PLAN 1
patient is AAOx3  understands hospitalization and risk of leaving hospital  requesting to be discharged as starting new job tomorrow and this is her last chance.  she has arranged sober house to stay at and live after discharged  CIWA 0 since yesterday 6pm  if remains 0 CIWA dc home

## 2020-12-11 NOTE — DISCHARGE NOTE NURSING/CASE MANAGEMENT/SOCIAL WORK - PATIENT PORTAL LINK FT
You can access the FollowMyHealth Patient Portal offered by [facility] by registering at the following website: [email or web address]. By joining [facility]’s FollowMyHealth portal, you will also be able to view your health information using other applications (apps) compatible with our system.

## 2020-12-11 NOTE — DISCHARGE NOTE PROVIDER - HOSPITAL COURSE
45 yr old female with a hx of previous admission for intoxication presents with reoccurrence of ETOH intoxication and alcohol withdrawal syndrome, with delirium. Patient seen by  and psychiatry. Pt is requesting to be discharge home; Patient symptoms has now improved; ciwa score 0 at present and she was medically cleared for discharge  by Dr. Davidson with follow-up as advised. ETHO abuse outpt treatment resources provided to patient Patient will follow-up with Dr. Kevin Gitlin ([phone])out patient psych and reported that she will contact [facility] for treatment.

## 2020-12-18 ENCOUNTER — INPATIENT (INPATIENT)
Facility: HOSPITAL | Age: 45
LOS: 2 days | Discharge: HOME | DRG: 897 | End: 2020-12-21
Attending: INTERNAL MEDICINE | Admitting: INTERNAL MEDICINE
Payer: MEDICAID

## 2020-12-18 VITALS
SYSTOLIC BLOOD PRESSURE: 122 MMHG | OXYGEN SATURATION: 95 % | HEART RATE: 104 BPM | HEIGHT: 67 IN | TEMPERATURE: 98 F | RESPIRATION RATE: 20 BRPM | WEIGHT: 134.92 LBS | DIASTOLIC BLOOD PRESSURE: 89 MMHG

## 2020-12-18 DIAGNOSIS — Z98.890 OTHER SPECIFIED POSTPROCEDURAL STATES: Chronic | ICD-10-CM

## 2020-12-18 DIAGNOSIS — F10.929 ALCOHOL USE, UNSPECIFIED WITH INTOXICATION, UNSPECIFIED: ICD-10-CM

## 2020-12-18 LAB
ALBUMIN SERPL ELPH-MCNC: 4.3 G/DL — SIGNIFICANT CHANGE UP (ref 3.3–5)
ALP SERPL-CCNC: 66 U/L — SIGNIFICANT CHANGE UP (ref 40–120)
ALT FLD-CCNC: 25 U/L — SIGNIFICANT CHANGE UP (ref 10–45)
ANION GAP SERPL CALC-SCNC: 20 MMOL/L — HIGH (ref 5–17)
APAP SERPL-MCNC: <15 UG/ML — SIGNIFICANT CHANGE UP (ref 10–30)
APTT BLD: 30.1 SEC — SIGNIFICANT CHANGE UP (ref 27.5–35.5)
AST SERPL-CCNC: 38 U/L — SIGNIFICANT CHANGE UP (ref 10–40)
BASOPHILS # BLD AUTO: 0 K/UL — SIGNIFICANT CHANGE UP (ref 0–0.2)
BASOPHILS NFR BLD AUTO: 0 % — SIGNIFICANT CHANGE UP (ref 0–2)
BILIRUB SERPL-MCNC: 0.2 MG/DL — SIGNIFICANT CHANGE UP (ref 0.2–1.2)
BUN SERPL-MCNC: 12 MG/DL — SIGNIFICANT CHANGE UP (ref 7–23)
CALCIUM SERPL-MCNC: 8.6 MG/DL — SIGNIFICANT CHANGE UP (ref 8.4–10.5)
CHLORIDE SERPL-SCNC: 101 MMOL/L — SIGNIFICANT CHANGE UP (ref 96–108)
CO2 SERPL-SCNC: 21 MMOL/L — LOW (ref 22–31)
CREAT SERPL-MCNC: 0.59 MG/DL — SIGNIFICANT CHANGE UP (ref 0.5–1.3)
EOSINOPHIL # BLD AUTO: 0.19 K/UL — SIGNIFICANT CHANGE UP (ref 0–0.5)
EOSINOPHIL NFR BLD AUTO: 4.5 % — SIGNIFICANT CHANGE UP (ref 0–6)
ETHANOL SERPL-MCNC: 466 MG/DL — HIGH (ref 0–10)
GLUCOSE SERPL-MCNC: 95 MG/DL — SIGNIFICANT CHANGE UP (ref 70–99)
HCT VFR BLD CALC: 37 % — SIGNIFICANT CHANGE UP (ref 34.5–45)
HGB BLD-MCNC: 11 G/DL — LOW (ref 11.5–15.5)
INR BLD: 1.02 RATIO — SIGNIFICANT CHANGE UP (ref 0.88–1.16)
LIDOCAIN IGE QN: 29 U/L — SIGNIFICANT CHANGE UP (ref 7–60)
LYMPHOCYTES # BLD AUTO: 1.14 K/UL — SIGNIFICANT CHANGE UP (ref 1–3.3)
LYMPHOCYTES # BLD AUTO: 27.3 % — SIGNIFICANT CHANGE UP (ref 13–44)
MAGNESIUM SERPL-MCNC: 1.8 MG/DL — SIGNIFICANT CHANGE UP (ref 1.6–2.6)
MCHC RBC-ENTMCNC: 22 PG — LOW (ref 27–34)
MCHC RBC-ENTMCNC: 29.7 GM/DL — LOW (ref 32–36)
MCV RBC AUTO: 73.9 FL — LOW (ref 80–100)
MONOCYTES # BLD AUTO: 0.08 K/UL — SIGNIFICANT CHANGE UP (ref 0–0.9)
MONOCYTES NFR BLD AUTO: 1.8 % — LOW (ref 2–14)
NEUTROPHILS # BLD AUTO: 2.78 K/UL — SIGNIFICANT CHANGE UP (ref 1.8–7.4)
NEUTROPHILS NFR BLD AUTO: 66.4 % — SIGNIFICANT CHANGE UP (ref 43–77)
PHOSPHATE SERPL-MCNC: 3.5 MG/DL — SIGNIFICANT CHANGE UP (ref 2.5–4.5)
PLATELET # BLD AUTO: 170 K/UL — SIGNIFICANT CHANGE UP (ref 150–400)
POTASSIUM SERPL-MCNC: 4.1 MMOL/L — SIGNIFICANT CHANGE UP (ref 3.5–5.3)
POTASSIUM SERPL-SCNC: 4.1 MMOL/L — SIGNIFICANT CHANGE UP (ref 3.5–5.3)
PROT SERPL-MCNC: 7.5 G/DL — SIGNIFICANT CHANGE UP (ref 6–8.3)
PROTHROM AB SERPL-ACNC: 12.2 SEC — SIGNIFICANT CHANGE UP (ref 10.6–13.6)
RBC # BLD: 5.01 M/UL — SIGNIFICANT CHANGE UP (ref 3.8–5.2)
RBC # FLD: 20 % — HIGH (ref 10.3–14.5)
SALICYLATES SERPL-MCNC: <2 MG/DL — LOW (ref 15–30)
SODIUM SERPL-SCNC: 142 MMOL/L — SIGNIFICANT CHANGE UP (ref 135–145)
WBC # BLD: 4.18 K/UL — SIGNIFICANT CHANGE UP (ref 3.8–10.5)
WBC # FLD AUTO: 4.18 K/UL — SIGNIFICANT CHANGE UP (ref 3.8–10.5)

## 2020-12-18 PROCEDURE — 93010 ELECTROCARDIOGRAM REPORT: CPT

## 2020-12-18 PROCEDURE — 99285 EMERGENCY DEPT VISIT HI MDM: CPT

## 2020-12-18 RX ORDER — FOLIC ACID 0.8 MG
1 TABLET ORAL DAILY
Refills: 0 | Status: DISCONTINUED | OUTPATIENT
Start: 2020-12-18 | End: 2020-12-19

## 2020-12-18 RX ORDER — THIAMINE MONONITRATE (VIT B1) 100 MG
500 TABLET ORAL EVERY 8 HOURS
Refills: 0 | Status: DISCONTINUED | OUTPATIENT
Start: 2020-12-18 | End: 2020-12-21

## 2020-12-18 RX ADMIN — Medication 105 MILLIGRAM(S): at 19:09

## 2020-12-18 RX ADMIN — Medication 25 MILLIGRAM(S): at 20:15

## 2020-12-18 RX ADMIN — Medication 25 MILLIGRAM(S): at 21:47

## 2020-12-18 RX ADMIN — Medication 2 MILLIGRAM(S): at 23:44

## 2020-12-18 NOTE — ED PROVIDER NOTE - ATTENDING CONTRIBUTION TO CARE
Pt with recurrent ED visit for alcohol intoxication, also report of Xanax use.  Pt unable to give good history due to intoxication.  No signs of trauma, clear lungs, RRR, ab soft, nt.

## 2020-12-18 NOTE — ED ADULT NURSE NOTE - OBJECTIVE STATEMENT
pt 44 yo female via ems for intoxication pt known to  social work frequent visitor per EMS mother called pt found with 4 bottles liquor surrounding her pt responsive to painful stimuli cannot understand her low mumbling unkept appearance pt placed on pulse ox cardiac monitering undressed placed in gown labs sent as ordered pt with hx of signing out when able to and refusing further treatment pt vitals stable labs sent as ordered NSR on moniter rate in 90's abd soft non tender on palpation no skin breakdown

## 2020-12-18 NOTE — ED PROVIDER NOTE - OBJECTIVE STATEMENT
44yo F with PMHx of EtOH abuse p/w likely intoxication. EMS was called by the mother and the patient was found minimally conscious with vomitus on her shirt with four empty 1L vodka bottles around her while in her home. She was brought here. She is prescribed xanax for withdrawals, but no bottles were found at home. She has been here multiple times with similar presentations and left AMA multiple times.

## 2020-12-18 NOTE — ED PROVIDER NOTE - CLINICAL SUMMARY MEDICAL DECISION MAKING FREE TEXT BOX
44yo F with multiple admission for EtOH and withdrawal p/w likely similar syndrome. No signs of physical trauma. Will obtain tox screen, labs and start CIWA. Will require an admission.

## 2020-12-18 NOTE — ED PROVIDER NOTE - PROGRESS NOTE DETAILS
Dr. Ashby Note: pt awake, answers questions, avoids eye contact, appears depressed.  Pt denies SI.  Denies taking Xanax today.  Does want rehab.  Will continue to observe in ED.  Would start Librium given pt very high risk for withdrawal even at higher alcohol levels.

## 2020-12-19 DIAGNOSIS — R07.9 CHEST PAIN, UNSPECIFIED: ICD-10-CM

## 2020-12-19 DIAGNOSIS — I10 ESSENTIAL (PRIMARY) HYPERTENSION: ICD-10-CM

## 2020-12-19 DIAGNOSIS — F32.9 MAJOR DEPRESSIVE DISORDER, SINGLE EPISODE, UNSPECIFIED: ICD-10-CM

## 2020-12-19 DIAGNOSIS — F10.929 ALCOHOL USE, UNSPECIFIED WITH INTOXICATION, UNSPECIFIED: ICD-10-CM

## 2020-12-19 DIAGNOSIS — D64.9 ANEMIA, UNSPECIFIED: ICD-10-CM

## 2020-12-19 DIAGNOSIS — F10.10 ALCOHOL ABUSE, UNCOMPLICATED: ICD-10-CM

## 2020-12-19 LAB
CK MB CFR SERPL CALC: <1 NG/ML — SIGNIFICANT CHANGE UP (ref 0–3.8)
CK SERPL-CCNC: 100 U/L — SIGNIFICANT CHANGE UP (ref 25–170)
SARS-COV-2 IGG SERPL QL IA: NEGATIVE — SIGNIFICANT CHANGE UP
SARS-COV-2 IGM SERPL IA-ACNC: 0.06 INDEX — SIGNIFICANT CHANGE UP
SARS-COV-2 RNA SPEC QL NAA+PROBE: SIGNIFICANT CHANGE UP
TROPONIN T, HIGH SENSITIVITY RESULT: 8 NG/L — SIGNIFICANT CHANGE UP (ref 0–51)

## 2020-12-19 PROCEDURE — 99223 1ST HOSP IP/OBS HIGH 75: CPT

## 2020-12-19 RX ORDER — PANTOPRAZOLE SODIUM 20 MG/1
40 TABLET, DELAYED RELEASE ORAL
Refills: 0 | Status: DISCONTINUED | OUTPATIENT
Start: 2020-12-19 | End: 2020-12-21

## 2020-12-19 RX ORDER — LISINOPRIL 2.5 MG/1
20 TABLET ORAL DAILY
Refills: 0 | Status: DISCONTINUED | OUTPATIENT
Start: 2020-12-19 | End: 2020-12-21

## 2020-12-19 RX ORDER — FOLIC ACID 0.8 MG
1 TABLET ORAL DAILY
Refills: 0 | Status: DISCONTINUED | OUTPATIENT
Start: 2020-12-19 | End: 2020-12-21

## 2020-12-19 RX ADMIN — Medication 2 MILLIGRAM(S): at 22:36

## 2020-12-19 RX ADMIN — Medication 2 MILLIGRAM(S): at 16:00

## 2020-12-19 RX ADMIN — Medication 2 MILLIGRAM(S): at 18:47

## 2020-12-19 RX ADMIN — Medication 105 MILLIGRAM(S): at 22:46

## 2020-12-19 RX ADMIN — Medication 2 MILLIGRAM(S): at 11:44

## 2020-12-19 RX ADMIN — LISINOPRIL 20 MILLIGRAM(S): 2.5 TABLET ORAL at 05:47

## 2020-12-19 RX ADMIN — PANTOPRAZOLE SODIUM 40 MILLIGRAM(S): 20 TABLET, DELAYED RELEASE ORAL at 05:47

## 2020-12-19 RX ADMIN — Medication 105 MILLIGRAM(S): at 10:43

## 2020-12-19 RX ADMIN — Medication 2 MILLIGRAM(S): at 05:47

## 2020-12-19 RX ADMIN — Medication 1 TABLET(S): at 11:48

## 2020-12-19 RX ADMIN — Medication 2 MILLIGRAM(S): at 08:38

## 2020-12-19 RX ADMIN — Medication 2 MILLIGRAM(S): at 14:14

## 2020-12-19 RX ADMIN — Medication 105 MILLIGRAM(S): at 16:53

## 2020-12-19 RX ADMIN — Medication 2 MILLIGRAM(S): at 02:13

## 2020-12-19 RX ADMIN — Medication 2 MILLIGRAM(S): at 20:11

## 2020-12-19 RX ADMIN — Medication 105 MILLIGRAM(S): at 00:35

## 2020-12-19 RX ADMIN — Medication 1 MILLIGRAM(S): at 11:48

## 2020-12-19 NOTE — H&P ADULT - NSICDXFAMILYHX_GEN_ALL_CORE_FT
Pt was seem at [place] yesterday by Dr. Couch [facility] in [place]. The couple has four adult children and six grandchildren. The pt has [employer] health insurance. He is employed at [employer] as a [employer]; the pt is hopeful to continue to work. He is eligible for [employer] as the has been with the company for 28 years. His wife works for the [facility] [employer].     OSW explained her role and provided contact information. The pt declined the need for support information at this time.    OSW remains available as needed.    Adele Loyola LCSW  Oncology Social Worker   FAMILY HISTORY:  Father  Still living? Unknown  Family history of hypertension, Age at diagnosis: Age Unknown    Mother  Still living? Unknown  Family history of hypertension, Age at diagnosis: Age Unknown

## 2020-12-19 NOTE — H&P ADULT - PROBLEM SELECTOR PLAN 1
Will start CIWA monitoring.   Start symptom triggered ativan dosing.  Continue thiamine qdaily, folic acid 1mg PO qdaily. Will start CIWA monitoring.   Start symptom triggered ativan dosing since patient had not experienced severe withdrawal symptoms while on symptom triggered ativan during previous admissions.   Continue thiamine qdaily, folic acid 1mg PO qdaily.

## 2020-12-19 NOTE — H&P ADULT - PROBLEM SELECTOR PLAN 4
Review of ISTOP found no prescribed controlled medications- Reference #: [id number].   During the patient's last hospital stay, she was seen by behavioral health team for concern for suicidal ideation expressed while she was in the ED.   The patient denies SI or HI at this time, however she has multiple stressors (concerns about losing her job, recent end of her relationship with her fiance, paying the mortgage).  Patient should have close follow up with her psychiatrist. Review of ISTOP found no prescribed controlled medications- Reference #: [id number].   During the patient's last hospital stay, she was seen by behavioral health team for concern for suicidal ideation expressed while she was in the ED.   The patient denies SI or HI at this time, however she has multiple stressors (concerns about losing her job, recent end of her relationship with her fiance, paying the mortgage).  Patient should have close follow up with her psychiatrist.  Follow up utox result.

## 2020-12-19 NOTE — H&P ADULT - PROBLEM SELECTOR PLAN 3
The patient endorsed mild chest and abdominal pain for the last 2 days. She not been eating during her period of alcohol use.   Will continue previously prescribed protonix for possible gastritis.  Check troponin.  EKG found sinus , QTc 477, LAD, with T waves in III.

## 2020-12-19 NOTE — H&P ADULT - ASSESSMENT
This patient is a 46yo lady with PMH of alcohol abuse, depression and pancreatitis who presents to the ED with alcohol intoxication. Per ED report, the patient was found at home by her mother, surrounded by 4 empty 1L vodka bottles. The patient states that she last drank yesterday, and that she has been drinking a liter of vodka daily. She has history of DT and seizures. She sees outpatient psychiatrist, Dr. Gitlin, and had been prescribed alprazolam to address withdrawal, and two days ago, she took alprazolam 1mg, TID.  The patient endorses headache, tremor, mild chest and abdominal pain, and nausea.  The patient had attended inpatient rehab in September in [place], relapsed in October, and was hospitalized. She then went to inpatient rehab in [place] in November for 28 days. After discharge, she relapsed again and was   hospitalized from 12/10-12/11/2020 for alcohol intoxication.   The patient is concerned about paying her mortgage. She lives alone, as her fiance recently left her. 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 She is interested in going to a sober house. The patient had been sober for 6 years before relaping earlier this year.     This patient is a 46yo lady with PMH of alcohol abuse, depression and pancreatitis who presents to the ED with alcohol intoxication.

## 2020-12-19 NOTE — H&P ADULT - NSHPLABSRESULTS_GEN_ALL_CORE
Labs, imaging  personally reviewed by me.     CBC found anemia with Hgb of 11, and MCV of 73.9. Abnormal RBC morphology noted.  INR WNL.  CMP found elevated AG of 20.     Blood alcohol level 466.  Serum acetaminophen< 15. Serum salicylate < 2.    LABS:                        11.0   4.18  )-----------( 170      ( 18 Dec 2020 17:49 )             37.0     Hgb Trend: 11.0<--  12-18    142  |  101  |  12  ----------------------------<  95  4.1   |  21<L>  |  0.59    Ca    8.6      18 Dec 2020 17:49  Phos  3.5     12-18  Mg     1.8     12-18    TPro  7.5  /  Alb  4.3  /  TBili  0.2  /  DBili  x   /  AST  38  /  ALT  25  /  AlkPhos  66  12-18    Creatinine Trend: 0.59<--, 0.59<--, 0.62<--  PT/INR - ( 18 Dec 2020 17:49 )   PT: 12.2 sec;   INR: 1.02 ratio         PTT - ( 18 Dec 2020 17:49 )  PTT:30.1 sec

## 2020-12-19 NOTE — H&P ADULT - HISTORY OF PRESENT ILLNESS
This patient is a 44yo lady with PMH of alcohol abuse, depression and pancreatitis who presents to the ED with alcohol intoxication. The patient was found at home by her mother, surrounded by 4 empty 1L vodka bottles.    She was previously hospitalized from 12/10-12/11/2020 for alcohol intoxication.     The patient had attended inpatient rehab in [facility], relapsed in [place], and was hospitalized. She then went to inpatient rehab in [place] in November for 28 days. After discharge, she relapsed again.   She sees outpatient psychiatrist, dr. Gitlin.  This patient is a 44yo lady with PMH of alcohol abuse, depression and pancreatitis who presents to the ED with alcohol intoxication. Per ED report, the patient was found at home by her mother, surrounded by 4 empty 1L vodka bottles. The patient states that she last drank yesterday, and that she has been drinking a liter of vodka daily. She has history of DT and seizures. She sees outpatient psychiatrist, Dr. Gitlin, and had been prescribed alprazolam to address withdrawal, and two days ago, she took alprazolam 1mg, TID.  The patient endorses headache, tremor, mild chest and abdominal pain, and nausea.  The patient had attended inpatient rehab in September in [place], relapsed in October, and was hospitalized. She then went to inpatient rehab in [place] in November for 28 days. After discharge, she relapsed again and was   hospitalized from 12/10-12/11/2020 for alcohol intoxication.   The patient is concerned about paying her mortgage. She lives alone, as her fiance recently left her. 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 She is interested in going to a sober house. The patient had been sober for 6 years before relaping earlier this year.   This patient is a 46yo lady with PMH of alcohol abuse, depression and pancreatitis who presents to the ED with alcohol intoxication. Per ED report, the patient was found at home by her mother, surrounded by 4 empty 1L vodka bottles. The patient states that she last drank yesterday, and that she has been drinking a liter of vodka daily. She has history of DT and seizures. She sees outpatient psychiatrist, Dr. Gitlin, and had been prescribed alprazolam to address withdrawal, and two days ago, she took alprazolam 1mg, TID.  The patient endorses headache, tremor, mild chest and abdominal pain, and nausea.  The patient had attended inpatient rehab in September in [place], relapsed in October, and was hospitalized. She then went to inpatient rehab in [place] in November for 28 days. After discharge, she relapsed again and was hospitalized from 12/10-12/11/2020 for alcohol intoxication.  She states she has only been taking ambien 10mg PO qhs and alprazolam as noted, and denies taking other medications, however she had been discharged on folate, lisinopril, pantoprazole and vitamin D at the time of her last hospitalization.  The patient is concerned about paying her mortgage. She lives alone, as her fiance recently left her. 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 She is interested in going to a sober house. The patient had been sober for 6 years before relaping earlier this year.

## 2020-12-19 NOTE — H&P ADULT - NSHPREVIEWOFSYSTEMS_GEN_ALL_CORE
REVIEW OF SYSTEMS  CONSTITUTIONAL: No fever, no chills  EYES: No eye pain, no vision changes  ENMT:  No difficulty hearing, no throat pain  RESPIRATORY: No cough, No shortness of breath  CARDIOVASCULAR: + mild chest pain, no FOWLER, no leg swelling  GASTROINTESTINAL: + mild abdominal pain, + nausea, no vomiting, no diarrhea, no constipation, no bloody stool  GENITOURINARY: No dysuria, no hematuria  NEUROLOGICAL: + headaches, no loss of strength  SKIN: No itching, no rashes, no lesions   MUSCULOSKELETAL: No joint pain, no joint swelling  HEME/LYMPH: No easy bruising, bleeding

## 2020-12-19 NOTE — H&P ADULT - PROBLEM SELECTOR PLAN 5
BP is elevated which may be 2/2 alcohol withdrawal or known hx of htn.   Restart lisinopril 20mg PO qdaily, which had been prescribed during her previous hospitalization.

## 2020-12-19 NOTE — H&P ADULT - NSHPSOCIALHISTORY_GEN_ALL_CORE
The patient lives alone.   She endorses alcohol use, but denies tobacco use.  Her fiance recently left her.   She works as a physician assistant.

## 2020-12-19 NOTE — H&P ADULT - NSHPPHYSICALEXAM_GEN_ALL_CORE
T(C): 36.8 (12-19-20 @ 01:35), Max: 37.1 (12-18-20 @ 21:19)  HR: 109 (12-19-20 @ 01:35) (87 - 114)  BP: 144/99 (12-19-20 @ 01:35) (122/89 - 153/93)  RR: 16 (12-19-20 @ 01:35) (16 - 21)  SpO2: 97% (12-19-20 @ 01:35) (95% - 99%)  Wt(kg): --    PHYSICAL EXAM:  GENERAL: somnolent, NAD  HEAD:  Atraumatic, Normocephalic  EYES: EOMI, PERRLA, conjunctiva and sclera clear  ENMT: No oropharyngeal exudates, erythema or lesions,  Moist mucous membranes  NECK: Supple, no cervical lymphadenopathy  NERVOUS SYSTEM: somnolent but arouses to voice and tactile stimuli, Alert & Oriented X3, CN II-XII intact, 5/5 BUE and BLE motor strength, full sensation to light touch, mild tremor in upper extremities appears self-induced. slow movement   PSYCH: slow very quiet speech, flat affect, psychomotor slowing, denies SI/HI.   CHEST/LUNG: Clear to auscultation bilaterally anteriorly ; No rales, no  rhonchi, no wheezing  HEART: Regular rate and rhythm; No murmurs, rubs, or gallops  ABDOMEN: Soft, Nontender, Nondistended  EXTREMITIES:  2+ Peripheral Pulses, No  cyanosis, or edema  SKIN: warm, dry

## 2020-12-20 LAB
FERRITIN SERPL-MCNC: 39 NG/ML — SIGNIFICANT CHANGE UP (ref 15–150)
HCT VFR BLD CALC: 35.1 % — SIGNIFICANT CHANGE UP (ref 34.5–45)
HGB BLD-MCNC: 10.6 G/DL — LOW (ref 11.5–15.5)
IRON SATN MFR SERPL: 197 UG/DL — HIGH (ref 30–160)
IRON SATN MFR SERPL: 52 % — HIGH (ref 14–50)
MCHC RBC-ENTMCNC: 22.3 PG — LOW (ref 27–34)
MCHC RBC-ENTMCNC: 30.2 GM/DL — LOW (ref 32–36)
MCV RBC AUTO: 73.7 FL — LOW (ref 80–100)
NRBC # BLD: 0 /100 WBCS — SIGNIFICANT CHANGE UP (ref 0–0)
PLATELET # BLD AUTO: 123 K/UL — LOW (ref 150–400)
RBC # BLD: 4.76 M/UL — SIGNIFICANT CHANGE UP (ref 3.8–5.2)
RBC # FLD: 19.7 % — HIGH (ref 10.3–14.5)
TIBC SERPL-MCNC: 382 UG/DL — SIGNIFICANT CHANGE UP (ref 220–430)
UIBC SERPL-MCNC: 185 UG/DL — SIGNIFICANT CHANGE UP (ref 110–370)
WBC # BLD: 3.63 K/UL — LOW (ref 3.8–10.5)
WBC # FLD AUTO: 3.63 K/UL — LOW (ref 3.8–10.5)

## 2020-12-20 RX ORDER — THIAMINE MONONITRATE (VIT B1) 100 MG
100 TABLET ORAL ONCE
Refills: 0 | Status: COMPLETED | OUTPATIENT
Start: 2020-12-20 | End: 2020-12-20

## 2020-12-20 RX ADMIN — Medication 2 MILLIGRAM(S): at 12:00

## 2020-12-20 RX ADMIN — Medication 1 MILLIGRAM(S): at 11:34

## 2020-12-20 RX ADMIN — Medication 2 MILLIGRAM(S): at 04:54

## 2020-12-20 RX ADMIN — Medication 1 TABLET(S): at 11:34

## 2020-12-20 RX ADMIN — Medication 2 MILLIGRAM(S): at 06:15

## 2020-12-20 RX ADMIN — Medication 105 MILLIGRAM(S): at 06:15

## 2020-12-20 RX ADMIN — Medication 105 MILLIGRAM(S): at 22:09

## 2020-12-20 RX ADMIN — LISINOPRIL 20 MILLIGRAM(S): 2.5 TABLET ORAL at 06:15

## 2020-12-20 RX ADMIN — PANTOPRAZOLE SODIUM 40 MILLIGRAM(S): 20 TABLET, DELAYED RELEASE ORAL at 06:15

## 2020-12-20 RX ADMIN — Medication 105 MILLIGRAM(S): at 11:37

## 2020-12-20 NOTE — PROGRESS NOTE ADULT - PROBLEM SELECTOR PLAN 1
Cont CIWA monitoring.   Cont symptom triggered ativan dosing since patient had not experienced severe withdrawal symptoms while on symptom triggered ativan during previous admissions.   Continue thiamine qdaily, folic acid 1mg PO qdaily.

## 2020-12-20 NOTE — PROGRESS NOTE ADULT - PROBLEM SELECTOR PLAN 6
Patient denies hematuria or bloody stool. She endorses menorrhagia.  Check iron studies and ferritin.

## 2020-12-20 NOTE — PROGRESS NOTE ADULT - PROBLEM SELECTOR PLAN 4
Review of ISTOP found no prescribed controlled medications- Reference #: [id number].   During the patient's last hospital stay, she was seen by behavioral health team for concern for suicidal ideation expressed while she was in the ED.   The patient denies SI or HI at this time, however she has multiple stressors (concerns about losing her job, recent end of her relationship with her fiance, paying the mortgage).  Patient should have close follow up with her psychiatrist.  Follow up utox result.

## 2020-12-20 NOTE — PROGRESS NOTE ADULT - SUBJECTIVE AND OBJECTIVE BOX
Sleeping but arousable  "I don't feel good"  Has triggered CWAS several times during the night    Vital Signs Last 24 Hrs  T(C): 36.8 (20 Dec 2020 06:00), Max: 37.4 (19 Dec 2020 19:36)  T(F): 98.2 (20 Dec 2020 06:00), Max: 99.4 (19 Dec 2020 19:36)  HR: 92 (20 Dec 2020 07:00) (85 - 117)  BP: 124/66 (20 Dec 2020 07:00) (113/79 - 130/82)  BP(mean): --  RR: 18 (20 Dec 2020 07:00) (17 - 18)  SpO2: 97% (20 Dec 2020 07:00) (96% - 100%)    I&O's Summary    12-19-20 @ 07:01  -  12-20-20 @ 07:00  --------------------------------------------------------  IN: 940 mL / OUT: 0 mL / NET: 940 mL        GENERAL: somnolent, NAD  HEAD:  Atraumatic, Normocephalic  EYES: EOMI, PERRLA, conjunctiva and sclera clear  ENMT: No oropharyngeal exudates, erythema or lesions,  Moist mucous membranes  NECK: Supple, no cervical lymphadenopathy  NERVOUS SYSTEM: somnolent but arouses to voice and tactile stimuli, Alert & Oriented X3, CN II-XII intact, 5/5 BUE and BLE motor strength, full sensation to light touch, mild tremor in upper extremities appears self-induced. slow movement   PSYCH: slow very quiet speech, flat affect, psychomotor slowing, denies SI/HI.   CHEST/LUNG: Clear to auscultation bilaterally anteriorly ; No rales, no  rhonchi, no wheezing  HEART: Regular rate and rhythm; No murmurs, rubs, or gallops  ABDOMEN: Soft, Nontender, Nondistended  EXTREMITIES:  2+ Peripheral Pulses, No  cyanosis, or edema  SKIN: warm, dry    LABS:                        10.6   3.63  )-----------( 123      ( 20 Dec 2020 06:11 )             35.1     12-18    142  |  101  |  12  ----------------------------<  95  4.1   |  21<L>  |  0.59    Ca    8.6      18 Dec 2020 17:49  Phos  3.5     12-18  Mg     1.8     12-18    TPro  7.5  /  Alb  4.3  /  TBili  0.2  /  DBili  x   /  AST  38  /  ALT  25  /  AlkPhos  66  12-18    PT/INR - ( 18 Dec 2020 17:49 )   PT: 12.2 sec;   INR: 1.02 ratio         PTT - ( 18 Dec 2020 17:49 )  PTT:30.1 sec  CAPILLARY BLOOD GLUCOSE        CARDIAC MARKERS ( 19 Dec 2020 06:32 )  x     / x     / 100 U/L / x     / <1.0 ng/mL          RADIOLOGY & ADDITIONAL TESTS:    Imaging Personally Reviewed:  [x] YES  [ ] NO    Consultant(s) Notes Reviewed:  [x] YES  [ ] NO

## 2020-12-20 NOTE — PROGRESS NOTE ADULT - ASSESSMENT
This patient is a 44yo lady with PMH of alcohol abuse, depression and pancreatitis who presents to the ED with alcohol intoxication.

## 2020-12-21 ENCOUNTER — TRANSCRIPTION ENCOUNTER (OUTPATIENT)
Age: 45
End: 2020-12-21

## 2020-12-21 VITALS
HEART RATE: 89 BPM | DIASTOLIC BLOOD PRESSURE: 80 MMHG | OXYGEN SATURATION: 98 % | SYSTOLIC BLOOD PRESSURE: 110 MMHG | TEMPERATURE: 98 F | RESPIRATION RATE: 18 BRPM

## 2020-12-21 PROCEDURE — 96374 THER/PROPH/DIAG INJ IV PUSH: CPT

## 2020-12-21 PROCEDURE — 82550 ASSAY OF CK (CPK): CPT

## 2020-12-21 PROCEDURE — 84484 ASSAY OF TROPONIN QUANT: CPT

## 2020-12-21 PROCEDURE — 80307 DRUG TEST PRSMV CHEM ANLYZR: CPT

## 2020-12-21 PROCEDURE — 85730 THROMBOPLASTIN TIME PARTIAL: CPT

## 2020-12-21 PROCEDURE — 86769 SARS-COV-2 COVID-19 ANTIBODY: CPT

## 2020-12-21 PROCEDURE — 84100 ASSAY OF PHOSPHORUS: CPT

## 2020-12-21 PROCEDURE — 83540 ASSAY OF IRON: CPT

## 2020-12-21 PROCEDURE — 85610 PROTHROMBIN TIME: CPT

## 2020-12-21 PROCEDURE — 85027 COMPLETE CBC AUTOMATED: CPT

## 2020-12-21 PROCEDURE — 83550 IRON BINDING TEST: CPT

## 2020-12-21 PROCEDURE — 82728 ASSAY OF FERRITIN: CPT

## 2020-12-21 PROCEDURE — 83690 ASSAY OF LIPASE: CPT

## 2020-12-21 PROCEDURE — 83735 ASSAY OF MAGNESIUM: CPT

## 2020-12-21 PROCEDURE — 99285 EMERGENCY DEPT VISIT HI MDM: CPT | Mod: 25

## 2020-12-21 PROCEDURE — 85025 COMPLETE CBC W/AUTO DIFF WBC: CPT

## 2020-12-21 PROCEDURE — U0003: CPT

## 2020-12-21 PROCEDURE — 93005 ELECTROCARDIOGRAM TRACING: CPT

## 2020-12-21 PROCEDURE — 80053 COMPREHEN METABOLIC PANEL: CPT

## 2020-12-21 PROCEDURE — 82553 CREATINE MB FRACTION: CPT

## 2020-12-21 RX ADMIN — Medication 1 MILLIGRAM(S): at 11:42

## 2020-12-21 RX ADMIN — Medication 2 MILLIGRAM(S): at 11:42

## 2020-12-21 RX ADMIN — LISINOPRIL 20 MILLIGRAM(S): 2.5 TABLET ORAL at 06:09

## 2020-12-21 RX ADMIN — PANTOPRAZOLE SODIUM 40 MILLIGRAM(S): 20 TABLET, DELAYED RELEASE ORAL at 06:09

## 2020-12-21 RX ADMIN — Medication 100 MILLIGRAM(S): at 00:08

## 2020-12-21 RX ADMIN — Medication 1 TABLET(S): at 11:42

## 2020-12-21 NOTE — DISCHARGE NOTE PROVIDER - NSDCMRMEDTOKEN_GEN_ALL_CORE_FT
ALPRAZolam 1 mg oral tablet: 1 tab(s) orally 3 times a day  Ambien 10 mg oral tablet: 1 tab(s) orally once a day (at bedtime)  Multiple Vitamins oral tablet: 1 tab(s) orally once a day

## 2020-12-21 NOTE — DISCHARGE NOTE NURSING/CASE MANAGEMENT/SOCIAL WORK - PATIENT PORTAL LINK FT
You can access the FollowMyHealth Patient Portal offered by [facility] by registering at the following website: [email or web address]. By joining [employer]’s FollowMyHealth portal, you will also be able to view your health information using other applications (apps) compatible with our system.

## 2020-12-21 NOTE — DISCHARGE NOTE PROVIDER - NSDCCPCAREPLAN_GEN_ALL_CORE_FT
PRINCIPAL DISCHARGE DIAGNOSIS  Diagnosis: Alcohol intoxication  Assessment and Plan of Treatment: Follow up with inpatient detoxicifcation facility.  Alcohol intoxication occurs when the amount of alcohol that a person has consumed impairs his or her ability to mentally and physically function. Chronic alcohol consumption can also lead to a variety of health issues including neurological disease, stomach disease, heart disease, liver disease, etc. Do not drive after drinking alcohol. Drinking enough alcohol to end up in an Emergency Room suggests you may have an alcohol abuse problem. Seek help at a drug addiction center.  SEEK IMMEDIATE MEDICAL CARE IF YOU HAVE ANY OF THE FOLLOWING SYMPTOMS: seizures, vomiting blood, blood in your stool, lightheadedness/dizziness, or becoming shaky to tremulous when you stop drinking.      SECONDARY DISCHARGE DIAGNOSES  Diagnosis: Depression  Assessment and Plan of Treatment: Continue taking medications as prescribed.  SEEK IMMEDIATE CARE IF  You have thoughts about hurting yourself or others.  You lose touch with reality (have psychotic symptoms).  You are taking medicine for depression and have a serious side effect

## 2020-12-21 NOTE — DISCHARGE NOTE PROVIDER - NSFOLLOWUPCLINICS_GEN_ALL_ED_FT
[facility] General Internal Medicine  General Internal Medicine  [address] [id number]  Phone: [phone]  Fax:   Follow Up Time:

## 2020-12-21 NOTE — DISCHARGE NOTE PROVIDER - NSDCFUADDAPPT_GEN_ALL_CORE_FT
Please follow up with your primary care provider after detoxification program. Please follow up with the primary care clinic and psychiatrist after detoxification program.

## 2020-12-21 NOTE — PROVIDER CONTACT NOTE (OTHER) - ACTION/TREATMENT ORDERED:
PA notified. Changed order from thiamine IVPB 500mg to Thiamine 100 mg oral. PA will be following up with day team. will continue to monitor.

## 2020-12-21 NOTE — DISCHARGE NOTE PROVIDER - HOSPITAL COURSE
46 y/o lady with PMH of alcohol abuse, depression and pancreatitis who presents to the ED with alcohol intoxication. Pt on CIWA but has no symptoms requiring Ativan.    Plans and medications discussed with Dr. Paige. Patient cleared for discharge. 44 y/o lady with PMH of alcohol abuse, depression and pancreatitis who presents to the ED with alcohol intoxication. Pt on CIWA but has no symptoms requiring Ativan. Patient will be going to inpatient detoxification program.    Plans and medications discussed with Dr. Paige. Patient cleared for discharge.

## 2021-02-12 ENCOUNTER — INPATIENT (INPATIENT)
Facility: HOSPITAL | Age: 46
LOS: 3 days | Discharge: HOME | End: 2021-02-16
Attending: INTERNAL MEDICINE | Admitting: INTERNAL MEDICINE
Payer: MEDICAID

## 2021-02-12 VITALS
RESPIRATION RATE: 18 BRPM | TEMPERATURE: 99 F | SYSTOLIC BLOOD PRESSURE: 150 MMHG | HEART RATE: 125 BPM | DIASTOLIC BLOOD PRESSURE: 114 MMHG | OXYGEN SATURATION: 97 % | HEIGHT: 67 IN

## 2021-02-12 DIAGNOSIS — Z98.890 OTHER SPECIFIED POSTPROCEDURAL STATES: Chronic | ICD-10-CM

## 2021-02-12 LAB — TOXICOLOGY SCREEN, DRUGS OF ABUSE, SERUM RESULT: SIGNIFICANT CHANGE UP

## 2021-02-12 PROCEDURE — 99285 EMERGENCY DEPT VISIT HI MDM: CPT

## 2021-02-12 RX ORDER — SODIUM CHLORIDE 9 MG/ML
1000 INJECTION, SOLUTION INTRAVENOUS ONCE
Refills: 0 | Status: COMPLETED | OUTPATIENT
Start: 2021-02-12 | End: 2021-02-12

## 2021-02-12 RX ADMIN — SODIUM CHLORIDE 1000 MILLILITER(S): 9 INJECTION, SOLUTION INTRAVENOUS at 23:29

## 2021-02-12 NOTE — ED ADULT NURSE NOTE - OBJECTIVE STATEMENT
Pt received to TRB lethargic but easily arrousable. pt ws brought in by EMS after a friend called to do a wellness check. As per triage note, EMS states friend told them pt was withdrawn, making loose suicidal thoughts and has lost a lot of weight. pt c/o intox. Pt states she has been drinking a half of a liter of vodka a day for the past 2 weeks. pt also admits to taking 10mg of ambien at 5pm today that's she states is prescribed to her and took a xanax yesterday. Pt states she has withdrawn from alcohol before, having seizures. Respirations even and unlabored. Lung sounds clear with equal chest rise bilaterally. ABD is soft, non tender, non distended with normal active bowel sounds No complaints of chest pain, headache, nausea, dizziness, vomiting  SOB, fever, chills verbalized. Pt denies SI/HI.  Pt denies visual or auditory hallucinations or other complaints. Pt belongings checked and secured by staff. Pt changed into gown and scrubs. pt placed on cardiac monitor in sinus tach.

## 2021-02-12 NOTE — ED PROVIDER NOTE - PHYSICAL EXAMINATION
Gen: AAOx3, intoxicated appearing   Head: NCAT  HEENT: EOMI, oral mucosa moist, normal conjunctiva  Lung: CTAB, no respiratory distress, no wheezes/rhonchi/rales B/L, speaking in full sentences  CV: tachycardic  Abd: soft, NTND  MSK: no visible deformities  Neuro: No focal sensory or motor deficits  Skin: Warm, well perfused, no rash  Psych: normal affect.     Casey Reynoso PGY3

## 2021-02-12 NOTE — ED ADULT TRIAGE NOTE - CHIEF COMPLAINT QUOTE
EMS brings pt to ED after a friend did a wellness check on pt. EMS was told by pt's friend that the patient "has been withdrawn, not answering phone calls, making "loose" suicidal ideations, and has lost a lot of weight." Pt appears withdrawn in triage, delay in answering questions. Denies SI/HI. Admits to drinking "1 liter of liquor" everyday, last drink was this morning. Also reports testing positive for covid 2 weeks ago. Redness noted to pt's left eye. Tachycardic in triage to 125 and tachy to 141 bpm in field.

## 2021-02-12 NOTE — ED PROVIDER NOTE - OBJECTIVE STATEMENT
45F with PMH including alcohol abuse, alcohol withdrawal with seizure. 45F with PMH including alcohol abuse and alcohol withdrawal/seizures presents to the ER after her friend called EMS to do a well check on her. The friend was reportedly worried about the pt's excessive drinking and that she has been making vaguely suicidal comments recently. Pt denies any SI/HI, hallucinations, ingestions, or any symptoms including HA, chest pain, SOB, abd pain, N/V/D. She does endorse drinking a half pint a vodka per day, last drink 5 PM. Also took 10 mg of ambien several hours ago but denies taking benzos regularly.

## 2021-02-12 NOTE — ED PROVIDER NOTE - CLINICAL SUMMARY MEDICAL DECISION MAKING FREE TEXT BOX
Alcohol intoxication with reported suicidal ideation. Pt denies SI or any symptoms currently. No signs of withdrawal on exam. Will check basic labs, tox screen, obtain collateral from friend, and reassess need for psych eval once clinically sober.

## 2021-02-12 NOTE — ED PROVIDER NOTE - NS ED ROS FT

## 2021-02-13 DIAGNOSIS — R79.89 OTHER SPECIFIED ABNORMAL FINDINGS OF BLOOD CHEMISTRY: ICD-10-CM

## 2021-02-13 DIAGNOSIS — F10.239 ALCOHOL DEPENDENCE WITH WITHDRAWAL, UNSPECIFIED: ICD-10-CM

## 2021-02-13 DIAGNOSIS — E87.1 HYPO-OSMOLALITY AND HYPONATREMIA: ICD-10-CM

## 2021-02-13 DIAGNOSIS — H10.32 UNSPECIFIED ACUTE CONJUNCTIVITIS, LEFT EYE: ICD-10-CM

## 2021-02-13 DIAGNOSIS — Z29.9 ENCOUNTER FOR PROPHYLACTIC MEASURES, UNSPECIFIED: ICD-10-CM

## 2021-02-13 DIAGNOSIS — F32.9 MAJOR DEPRESSIVE DISORDER, SINGLE EPISODE, UNSPECIFIED: ICD-10-CM

## 2021-02-13 DIAGNOSIS — U07.1 COVID-19: ICD-10-CM

## 2021-02-13 LAB
ALBUMIN SERPL ELPH-MCNC: 3.8 G/DL — SIGNIFICANT CHANGE UP (ref 3.3–5)
ALP SERPL-CCNC: 54 U/L — SIGNIFICANT CHANGE UP (ref 40–120)
ALT FLD-CCNC: 42 U/L — HIGH (ref 4–33)
ANION GAP SERPL CALC-SCNC: 16 MMOL/L — HIGH (ref 7–14)
ANION GAP SERPL CALC-SCNC: 21 MMOL/L — HIGH (ref 7–14)
AST SERPL-CCNC: 96 U/L — HIGH (ref 4–32)
BASOPHILS # BLD AUTO: 0.08 K/UL — SIGNIFICANT CHANGE UP (ref 0–0.2)
BASOPHILS NFR BLD AUTO: 0.9 % — SIGNIFICANT CHANGE UP (ref 0–2)
BILIRUB SERPL-MCNC: 0.6 MG/DL — SIGNIFICANT CHANGE UP (ref 0.2–1.2)
BUN SERPL-MCNC: 15 MG/DL — SIGNIFICANT CHANGE UP (ref 7–23)
BUN SERPL-MCNC: 17 MG/DL — SIGNIFICANT CHANGE UP (ref 7–23)
CALCIUM SERPL-MCNC: 8.7 MG/DL — SIGNIFICANT CHANGE UP (ref 8.4–10.5)
CALCIUM SERPL-MCNC: 8.7 MG/DL — SIGNIFICANT CHANGE UP (ref 8.4–10.5)
CHLORIDE SERPL-SCNC: 88 MMOL/L — LOW (ref 98–107)
CHLORIDE SERPL-SCNC: 90 MMOL/L — LOW (ref 98–107)
CO2 SERPL-SCNC: 21 MMOL/L — LOW (ref 22–31)
CO2 SERPL-SCNC: 24 MMOL/L — SIGNIFICANT CHANGE UP (ref 22–31)
CREAT SERPL-MCNC: 0.46 MG/DL — LOW (ref 0.5–1.3)
CREAT SERPL-MCNC: 0.48 MG/DL — LOW (ref 0.5–1.3)
EOSINOPHIL # BLD AUTO: 0 K/UL — SIGNIFICANT CHANGE UP (ref 0–0.5)
EOSINOPHIL NFR BLD AUTO: 0 % — SIGNIFICANT CHANGE UP (ref 0–6)
GLUCOSE SERPL-MCNC: 101 MG/DL — HIGH (ref 70–99)
GLUCOSE SERPL-MCNC: 89 MG/DL — SIGNIFICANT CHANGE UP (ref 70–99)
HCT VFR BLD CALC: 39.1 % — SIGNIFICANT CHANGE UP (ref 34.5–45)
HGB BLD-MCNC: 11.3 G/DL — LOW (ref 11.5–15.5)
IANC: 7.61 K/UL — SIGNIFICANT CHANGE UP (ref 1.5–8.5)
LYMPHOCYTES # BLD AUTO: 1.73 K/UL — SIGNIFICANT CHANGE UP (ref 1–3.3)
LYMPHOCYTES # BLD AUTO: 18.4 % — SIGNIFICANT CHANGE UP (ref 13–44)
MAGNESIUM SERPL-MCNC: 1.6 MG/DL — SIGNIFICANT CHANGE UP (ref 1.6–2.6)
MCHC RBC-ENTMCNC: 20.2 PG — LOW (ref 27–34)
MCHC RBC-ENTMCNC: 28.9 GM/DL — LOW (ref 32–36)
MCV RBC AUTO: 69.9 FL — LOW (ref 80–100)
MONOCYTES # BLD AUTO: 0.24 K/UL — SIGNIFICANT CHANGE UP (ref 0–0.9)
MONOCYTES NFR BLD AUTO: 2.6 % — SIGNIFICANT CHANGE UP (ref 2–14)
NEUTROPHILS # BLD AUTO: 7.08 K/UL — SIGNIFICANT CHANGE UP (ref 1.8–7.4)
NEUTROPHILS NFR BLD AUTO: 75.5 % — SIGNIFICANT CHANGE UP (ref 43–77)
PHOSPHATE SERPL-MCNC: 3.5 MG/DL — SIGNIFICANT CHANGE UP (ref 2.5–4.5)
PLATELET # BLD AUTO: 134 K/UL — LOW (ref 150–400)
POTASSIUM SERPL-MCNC: 3.9 MMOL/L — SIGNIFICANT CHANGE UP (ref 3.5–5.3)
POTASSIUM SERPL-MCNC: 5.9 MMOL/L — HIGH (ref 3.5–5.3)
POTASSIUM SERPL-SCNC: 3.9 MMOL/L — SIGNIFICANT CHANGE UP (ref 3.5–5.3)
POTASSIUM SERPL-SCNC: 5.9 MMOL/L — HIGH (ref 3.5–5.3)
PROT SERPL-MCNC: 7.9 G/DL — SIGNIFICANT CHANGE UP (ref 6–8.3)
RBC # BLD: 5.59 M/UL — HIGH (ref 3.8–5.2)
RBC # FLD: 21.3 % — HIGH (ref 10.3–14.5)
SARS-COV-2 RNA SPEC QL NAA+PROBE: DETECTED
SODIUM SERPL-SCNC: 130 MMOL/L — LOW (ref 135–145)
SODIUM SERPL-SCNC: 130 MMOL/L — LOW (ref 135–145)
WBC # BLD: 9.38 K/UL — SIGNIFICANT CHANGE UP (ref 3.8–10.5)
WBC # FLD AUTO: 9.38 K/UL — SIGNIFICANT CHANGE UP (ref 3.8–10.5)

## 2021-02-13 PROCEDURE — 71045 X-RAY EXAM CHEST 1 VIEW: CPT | Mod: 26

## 2021-02-13 PROCEDURE — 99223 1ST HOSP IP/OBS HIGH 75: CPT

## 2021-02-13 RX ORDER — ERYTHROMYCIN BASE 5 MG/GRAM
1 OINTMENT (GRAM) OPHTHALMIC (EYE) AT BEDTIME
Refills: 0 | Status: DISCONTINUED | OUTPATIENT
Start: 2021-02-13 | End: 2021-02-16

## 2021-02-13 RX ORDER — TOBRAMYCIN SULFATE 40 MG/ML
1 VIAL (ML) INJECTION
Refills: 0 | Status: DISCONTINUED | OUTPATIENT
Start: 2021-02-13 | End: 2021-02-16

## 2021-02-13 RX ORDER — OFLOXACIN 0.3 %
1 DROPS OPHTHALMIC (EYE)
Refills: 0 | Status: DISCONTINUED | OUTPATIENT
Start: 2021-02-13 | End: 2021-02-13

## 2021-02-13 RX ORDER — SODIUM CHLORIDE 9 MG/ML
1000 INJECTION, SOLUTION INTRAVENOUS ONCE
Refills: 0 | Status: COMPLETED | OUTPATIENT
Start: 2021-02-13 | End: 2021-02-13

## 2021-02-13 RX ORDER — THIAMINE MONONITRATE (VIT B1) 100 MG
100 TABLET ORAL DAILY
Refills: 0 | Status: COMPLETED | OUTPATIENT
Start: 2021-02-13 | End: 2021-02-15

## 2021-02-13 RX ORDER — ONDANSETRON 8 MG/1
4 TABLET, FILM COATED ORAL EVERY 6 HOURS
Refills: 0 | Status: DISCONTINUED | OUTPATIENT
Start: 2021-02-13 | End: 2021-02-16

## 2021-02-13 RX ORDER — ENOXAPARIN SODIUM 100 MG/ML
40 INJECTION SUBCUTANEOUS DAILY
Refills: 0 | Status: DISCONTINUED | OUTPATIENT
Start: 2021-02-13 | End: 2021-02-16

## 2021-02-13 RX ORDER — OFLOXACIN 0.3 %
1 DROPS OPHTHALMIC (EYE) ONCE
Refills: 0 | Status: COMPLETED | OUTPATIENT
Start: 2021-02-13 | End: 2021-02-13

## 2021-02-13 RX ORDER — VANCOMYCIN HCL 1 G
1 VIAL (EA) INTRAVENOUS
Refills: 0 | Status: DISCONTINUED | OUTPATIENT
Start: 2021-02-13 | End: 2021-02-16

## 2021-02-13 RX ORDER — ERYTHROMYCIN BASE 5 MG/GRAM
1 OINTMENT (GRAM) OPHTHALMIC (EYE) EVERY 6 HOURS
Refills: 0 | Status: DISCONTINUED | OUTPATIENT
Start: 2021-02-13 | End: 2021-02-13

## 2021-02-13 RX ORDER — OFLOXACIN 0.3 %
1 DROPS OPHTHALMIC (EYE) EVERY 4 HOURS
Refills: 0 | Status: DISCONTINUED | OUTPATIENT
Start: 2021-02-13 | End: 2021-02-13

## 2021-02-13 RX ORDER — FOLIC ACID 0.8 MG
1 TABLET ORAL DAILY
Refills: 0 | Status: DISCONTINUED | OUTPATIENT
Start: 2021-02-13 | End: 2021-02-16

## 2021-02-13 RX ADMIN — ONDANSETRON 4 MILLIGRAM(S): 8 TABLET, FILM COATED ORAL at 14:11

## 2021-02-13 RX ADMIN — Medication 1 DROP(S): at 20:00

## 2021-02-13 RX ADMIN — Medication 1 APPLICATION(S): at 22:11

## 2021-02-13 RX ADMIN — SODIUM CHLORIDE 1000 MILLILITER(S): 9 INJECTION, SOLUTION INTRAVENOUS at 04:07

## 2021-02-13 RX ADMIN — Medication 1 DROP(S): at 12:54

## 2021-02-13 RX ADMIN — Medication 1 DROP(S): at 19:58

## 2021-02-13 RX ADMIN — Medication 1 MILLIGRAM(S): at 12:53

## 2021-02-13 RX ADMIN — Medication 2 MILLIGRAM(S): at 09:04

## 2021-02-13 RX ADMIN — Medication 2 MILLIGRAM(S): at 14:11

## 2021-02-13 RX ADMIN — Medication 50 MILLIGRAM(S): at 01:29

## 2021-02-13 RX ADMIN — Medication 1 DROP(S): at 04:07

## 2021-02-13 RX ADMIN — Medication 1 DROP(S): at 21:43

## 2021-02-13 RX ADMIN — Medication 1 DROP(S): at 17:46

## 2021-02-13 RX ADMIN — Medication 1 DROP(S): at 23:17

## 2021-02-13 RX ADMIN — Medication 1 DROP(S): at 19:12

## 2021-02-13 RX ADMIN — Medication 1 DROP(S): at 22:11

## 2021-02-13 RX ADMIN — Medication 1 DROP(S): at 17:47

## 2021-02-13 RX ADMIN — Medication 1 DROP(S): at 16:39

## 2021-02-13 RX ADMIN — Medication 1 DROP(S): at 12:53

## 2021-02-13 RX ADMIN — Medication 1 TABLET(S): at 12:53

## 2021-02-13 RX ADMIN — ENOXAPARIN SODIUM 40 MILLIGRAM(S): 100 INJECTION SUBCUTANEOUS at 12:53

## 2021-02-13 RX ADMIN — Medication 2 MILLIGRAM(S): at 04:07

## 2021-02-13 RX ADMIN — Medication 2 MILLIGRAM(S): at 11:03

## 2021-02-13 RX ADMIN — Medication 100 MILLIGRAM(S): at 12:53

## 2021-02-13 RX ADMIN — Medication 50 MILLIGRAM(S): at 10:03

## 2021-02-13 RX ADMIN — Medication 2 MILLIGRAM(S): at 07:12

## 2021-02-13 RX ADMIN — Medication 1 DROP(S): at 19:11

## 2021-02-13 RX ADMIN — Medication 50 MILLIGRAM(S): at 16:38

## 2021-02-13 RX ADMIN — Medication 2 MILLIGRAM(S): at 22:10

## 2021-02-13 NOTE — H&P ADULT - ASSESSMENT
45F h/o alcohol abuse and alcohol withdrawal/seizures, pancreatitis, depression, recent diagnosis of COVID, presented w/ alcohol intoxication and withdrawal.

## 2021-02-13 NOTE — H&P ADULT - NSHPPHYSICALEXAM_GEN_ALL_CORE
Vital Signs Last 24 Hrs  T(C): 36.8 (13 Feb 2021 09:00), Max: 37.2 (12 Feb 2021 21:26)  T(F): 98.2 (13 Feb 2021 09:00), Max: 98.9 (12 Feb 2021 21:26)  HR: 108 (13 Feb 2021 09:00) (106 - 125)  BP: 156/107 (13 Feb 2021 09:00) (129/85 - 159/101)  BP(mean): --  RR: 18 (13 Feb 2021 09:00) (16 - 18)  SpO2: 95% (13 Feb 2021 09:00) (95% - 100%)

## 2021-02-13 NOTE — H&P ADULT - PROBLEM SELECTOR PLAN 3
Diagnosed last week  Airborne isolation   Sating well on RA, no indication for Remdesivir or Dexamethasone at this time   Check CXR Diagnosed last week  Airborne isolation   Sating well on RA, no indication for Remdesivir or Dexamethasone at this time   Check CXR  Check CRP, Ferritin, Procalcitonin, D-dimer

## 2021-02-13 NOTE — H&P ADULT - NSHPSOCIALHISTORY_GEN_ALL_CORE
No smoking  Occasionally smokes marijuana   Drinks half a pint of Vodka every day  Works at an urgent care

## 2021-02-13 NOTE — CONSULT NOTE ADULT - ASSESSMENT
Assessment and Recommendations:  45y female w/ pmhx/ochx of alcohol abuse and alcohol withdrawal/seizures, pancreatitis, depression, recent diagnosis of COVID and contact lens user consulted for left red eye, found to have bacterial keratitis OS.     #bacterial keratitis left eye - final recs pending  - given hx of contact lens use in both eyes, will need to start moxifloxacin q1h left eye and fortified tobramycin  (15mg/ml) every hour around the clock alternating with fortified vancomycin (25mg/ml)  - erythromycin ointment QID left eye  - FU cultures  - ophtho will closely follow    Outpatient follow-up: Patient should follow-up with his/her ophthalmologist or with [facility] Department of Ophthalmology within 1 week of after discharge at:    [address]  [place] [address]  [phone]    Leon Barrow MD, PGY-2   Assessment and Recommendations:  45y female w/ pmhx/ochx of alcohol abuse and alcohol withdrawal/seizures, pancreatitis, depression, recent diagnosis of COVID and contact lens user consulted for left red eye, found to have bacterial keratitis OS.     #bacterial keratitis left eye   - given hx of contact lens use in both eyes, will need to start q1h left eye fortified tobramycin  (15mg/ml) every hour around the clock alternating with fortified vancomycin (25mg/ml)  - attempted corneal sensitivity testing and pt notes severe pain in left eye when cotton wisp was touched against left cornea. Denies decreased sensation left eye.   - erythromycin ointment qhs left eye  - FU cultures  - ophtho will closely follow  - please notify ophtho if worsening vision, increasing pain, or new discharge    s/d/w backup Dr. Deluca  d/w Dr. Herman    Outpatient follow-up: Patient should follow-up with his/her ophthalmologist or with [facility] Department of Ophthalmology within 1 week of after discharge at:    [address]  [place] [address]  [phone]    Leon Barrow MD, PGY-2   Assessment and Recommendations:  45y female w/ pmhx/ochx of alcohol abuse and alcohol withdrawal/seizures, pancreatitis, depression, recent diagnosis of COVID and contact lens user consulted for left red eye, found to have bacterial keratitis OS.     #bacterial keratitis left eye   - given hx of contact lens use in both eyes, will need to start q1h left eye fortified tobramycin  (15mg/ml) every hour around the clock alternating with fortified vancomycin (25mg/ml)  - attempted corneal sensitivity testing and pt notes severe pain in left eye when cotton wisp was touched against left cornea. Denies decreased sensation left eye.   - erythromycin ointment qhs left eye  - FU cultures  - ophtho will closely follow  - please notify ophtho if worsening vision, increasing pain, or new discharge  - strongly advised against eyelid rubbing to the pt  - contact lens holiday  - communicated with the primary team the importance of adhering to eyedrop schedule and advised against pt putting in the eyedrops herself    s/d/w backup Dr. Deluca  d/w Dr. Herman  d/w primary team Dr. Light the plan    Outpatient follow-up: Patient should follow-up with his/her ophthalmologist or with [facility] Department of Ophthalmology within 1 week of after discharge at:    [address]  [place] [address]  [phone]    Leon Barrow MD, PGY-2   Assessment and Recommendations:  45y female w/ pmhx/ochx of alcohol abuse and alcohol withdrawal/seizures, pancreatitis, depression, recent diagnosis of COVID and contact lens user consulted for left red eye, found to have bacterial keratitis OS.     #bacterial keratitis left eye   - given hx of contact lens use in both eyes, will need to start q1h left eye fortified tobramycin  (15mg/ml) every hour around the clock alternating with fortified vancomycin (25mg/ml)  - attempted corneal sensitivity testing and pt notes severe pain in left eye when cotton wisp was touched against left cornea. Denies decreased sensation left eye.   - erythromycin ointment qhs left eye  - FU cultures  - ophtho will closely follow  - please notify ophtho if worsening vision, increasing pain, or new discharge  - strongly advised against eyelid rubbing to the pt  - contact lens holiday  - communicated with the primary team the importance of adhering to eyedrop schedule and advised against pt putting in the eyedrops herself  - communicated with primary team to DC ocuflox    s/d/w backup Dr. Deluca  d/w Dr. Herman  d/w primary team Dr. Light the plan    Outpatient follow-up: Patient should follow-up with his/her ophthalmologist or with [facility] Department of Ophthalmology within 1 week of after discharge at:    [address]  [place] [address]  [phone]    Leon Barrow MD, PGY-2

## 2021-02-13 NOTE — H&P ADULT - PROBLEM SELECTOR PLAN 1
Alcohol withdrawal   CIWA protocol   Librium taper w/ symptom triggered Ativan prn   MVI/Folate/Thiamine   Social work eval
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
## 2021-02-13 NOTE — H&P ADULT - PROBLEM SELECTOR PLAN 2
Artificial tears   Continue Ofloxacin eye drops   No changes to visual acuity Artificial tears   Erythromycin ointment to L eye   No visual acuity changes, able to open eye, no foreign body sensation Artificial tears   Erythromycin ointment to L eye   No visual acuity changes, able to open eye, no foreign body sensation  Consult ophtho if symptoms worsen Artificial tears   Ofloxacin eye drops to L eye  No visual acuity changes, able to open eye, no foreign body sensation  Consult ophtho if symptoms worsen Artificial tears   Ofloxacin eye drops to L eye  No visual acuity changes, able to open eye, no foreign body sensation - doubt infectious keratitis   Consult ophtho if symptoms worsen No visual acuity changes, able to open eye, no foreign body sensation - doubt infectious keratitis   Artificial tears   Ofloxacin eye drops to L eye  Consulted ophtho

## 2021-02-13 NOTE — H&P ADULT - PROBLEM SELECTOR PLAN 4
Denies any SI/HI at this time   No need for constant observation   Not on any anti-depressants, hold Xanax/Ambien while on Librium   Consider psych eval while fully sober Denies any SI/HI at this time   No need for constant observation   Not on any anti-depressants, hold Xanax/Ambien while on Librium   Consider psych eval once acute withdrawal improves

## 2021-02-13 NOTE — H&P ADULT - HISTORY OF PRESENT ILLNESS
45F h/o alcohol abuse and alcohol withdrawal/seizures, pancreatitis, depression, recent diagnosis of COVID, presented w/ alcohol intoxication. Patient's friend called EMS due to concerns about her excessive drinking and passive suicidal ideations. Patient states that she has been drinking half a liter of Vodka every day for the past 10 days. Last drink yesterday at 2pm. Currently she feels very shaky. She is c/o nausea and a headache. Patient currently denies any suicidal or homicidal ideations. Patient states that she was diagnosed w/ COVID last week after experiencing chills and cough. 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 She is also c/o irritation to L eye that she noticed one day ago. Patient reports discharge from L eye and mild pain. No visual changes, visual acuity at baseline. She states that she left her contacts in for a couple of days.

## 2021-02-13 NOTE — H&P ADULT - NSHPLABSRESULTS_GEN_ALL_CORE
11.3   9.38  )-----------( 134      ( 12 Feb 2021 23:48 )             39.1   02-13    130<L>  |  90<L>  |  15  ----------------------------<  89  3.9   |  24  |  0.46<L>    Ca    8.7      13 Feb 2021 02:00  Phos  3.5     02-12  Mg     1.6     02-12    TPro  7.9  /  Alb  3.8  /  TBili  0.6  /  DBili  x   /  AST  96<H>  /  ALT  42<H>  /  AlkPhos  54  02-12    EKG tracing reviewed and interpreted: Sinus tach     CXR ordered

## 2021-02-14 LAB
ALBUMIN SERPL ELPH-MCNC: 3.3 G/DL — SIGNIFICANT CHANGE UP (ref 3.3–5)
ALP SERPL-CCNC: 55 U/L — SIGNIFICANT CHANGE UP (ref 40–120)
ALT FLD-CCNC: 26 U/L — SIGNIFICANT CHANGE UP (ref 4–33)
ANION GAP SERPL CALC-SCNC: 18 MMOL/L — HIGH (ref 7–14)
AST SERPL-CCNC: 40 U/L — HIGH (ref 4–32)
BASOPHILS # BLD AUTO: 0.02 K/UL — SIGNIFICANT CHANGE UP (ref 0–0.2)
BASOPHILS NFR BLD AUTO: 0.5 % — SIGNIFICANT CHANGE UP (ref 0–2)
BILIRUB SERPL-MCNC: 0.5 MG/DL — SIGNIFICANT CHANGE UP (ref 0.2–1.2)
BUN SERPL-MCNC: 15 MG/DL — SIGNIFICANT CHANGE UP (ref 7–23)
CALCIUM SERPL-MCNC: 8.9 MG/DL — SIGNIFICANT CHANGE UP (ref 8.4–10.5)
CHLORIDE SERPL-SCNC: 93 MMOL/L — LOW (ref 98–107)
CO2 SERPL-SCNC: 25 MMOL/L — SIGNIFICANT CHANGE UP (ref 22–31)
CREAT SERPL-MCNC: 0.49 MG/DL — LOW (ref 0.5–1.3)
CRP SERPL-MCNC: 11 MG/L — HIGH
D DIMER BLD IA.RAPID-MCNC: 253 NG/ML DDU — HIGH
EOSINOPHIL # BLD AUTO: 0.14 K/UL — SIGNIFICANT CHANGE UP (ref 0–0.5)
EOSINOPHIL NFR BLD AUTO: 3.5 % — SIGNIFICANT CHANGE UP (ref 0–6)
FERRITIN SERPL-MCNC: 65 NG/ML — SIGNIFICANT CHANGE UP (ref 15–150)
GLUCOSE SERPL-MCNC: 95 MG/DL — SIGNIFICANT CHANGE UP (ref 70–99)
GRAM STN FLD: SIGNIFICANT CHANGE UP
HCT VFR BLD CALC: 32 % — LOW (ref 34.5–45)
HGB BLD-MCNC: 9.5 G/DL — LOW (ref 11.5–15.5)
IANC: 2.7 K/UL — SIGNIFICANT CHANGE UP (ref 1.5–8.5)
IMM GRANULOCYTES NFR BLD AUTO: 1.2 % — SIGNIFICANT CHANGE UP (ref 0–1.5)
LG PLATELETS BLD QL AUTO: SLIGHT — SIGNIFICANT CHANGE UP
LYMPHOCYTES # BLD AUTO: 0.89 K/UL — LOW (ref 1–3.3)
LYMPHOCYTES # BLD AUTO: 22.1 % — SIGNIFICANT CHANGE UP (ref 13–44)
MAGNESIUM SERPL-MCNC: 1.4 MG/DL — LOW (ref 1.6–2.6)
MCHC RBC-ENTMCNC: 20.8 PG — LOW (ref 27–34)
MCHC RBC-ENTMCNC: 29.7 GM/DL — LOW (ref 32–36)
MCV RBC AUTO: 70 FL — LOW (ref 80–100)
MONOCYTES # BLD AUTO: 0.23 K/UL — SIGNIFICANT CHANGE UP (ref 0–0.9)
MONOCYTES NFR BLD AUTO: 5.7 % — SIGNIFICANT CHANGE UP (ref 2–14)
NEUTROPHILS # BLD AUTO: 2.7 K/UL — SIGNIFICANT CHANGE UP (ref 1.8–7.4)
NEUTROPHILS NFR BLD AUTO: 67 % — SIGNIFICANT CHANGE UP (ref 43–77)
NRBC # BLD: 0 /100 WBCS — SIGNIFICANT CHANGE UP
NRBC # FLD: 0 K/UL — SIGNIFICANT CHANGE UP
PHOSPHATE SERPL-MCNC: 2.8 MG/DL — SIGNIFICANT CHANGE UP (ref 2.5–4.5)
PLAT MORPH BLD: ABNORMAL
PLATELET # BLD AUTO: 85 K/UL — LOW (ref 150–400)
PLATELET COUNT - ESTIMATE: ABNORMAL
POTASSIUM SERPL-MCNC: 3.4 MMOL/L — LOW (ref 3.5–5.3)
POTASSIUM SERPL-SCNC: 3.4 MMOL/L — LOW (ref 3.5–5.3)
PROCALCITONIN SERPL-MCNC: 0.13 NG/ML — HIGH (ref 0.02–0.1)
PROT SERPL-MCNC: 6.6 G/DL — SIGNIFICANT CHANGE UP (ref 6–8.3)
RBC # BLD: 4.57 M/UL — SIGNIFICANT CHANGE UP (ref 3.8–5.2)
RBC # FLD: 21.5 % — HIGH (ref 10.3–14.5)
RBC BLD AUTO: SIGNIFICANT CHANGE UP
SODIUM SERPL-SCNC: 136 MMOL/L — SIGNIFICANT CHANGE UP (ref 135–145)
SPECIMEN SOURCE: SIGNIFICANT CHANGE UP
WBC # BLD: 4.03 K/UL — SIGNIFICANT CHANGE UP (ref 3.8–10.5)
WBC # FLD AUTO: 4.03 K/UL — SIGNIFICANT CHANGE UP (ref 3.8–10.5)

## 2021-02-14 RX ORDER — POTASSIUM CHLORIDE 20 MEQ
40 PACKET (EA) ORAL EVERY 4 HOURS
Refills: 0 | Status: COMPLETED | OUTPATIENT
Start: 2021-02-14 | End: 2021-02-14

## 2021-02-14 RX ADMIN — Medication 1 DROP(S): at 02:06

## 2021-02-14 RX ADMIN — Medication 1 DROP(S): at 16:54

## 2021-02-14 RX ADMIN — Medication 1 DROP(S): at 10:23

## 2021-02-14 RX ADMIN — Medication 1 DROP(S): at 06:04

## 2021-02-14 RX ADMIN — Medication 1 DROP(S): at 07:48

## 2021-02-14 RX ADMIN — Medication 1 DROP(S): at 14:33

## 2021-02-14 RX ADMIN — Medication 1 DROP(S): at 03:00

## 2021-02-14 RX ADMIN — Medication 1 DROP(S): at 07:56

## 2021-02-14 RX ADMIN — Medication 1 DROP(S): at 00:05

## 2021-02-14 RX ADMIN — Medication 1 DROP(S): at 18:36

## 2021-02-14 RX ADMIN — Medication 1 MILLIGRAM(S): at 11:49

## 2021-02-14 RX ADMIN — Medication 1 DROP(S): at 10:22

## 2021-02-14 RX ADMIN — Medication 1 DROP(S): at 11:47

## 2021-02-14 RX ADMIN — Medication 50 MILLIGRAM(S): at 14:39

## 2021-02-14 RX ADMIN — Medication 1 TABLET(S): at 11:48

## 2021-02-14 RX ADMIN — Medication 1 DROP(S): at 04:03

## 2021-02-14 RX ADMIN — Medication 1 DROP(S): at 21:49

## 2021-02-14 RX ADMIN — Medication 1 DROP(S): at 18:37

## 2021-02-14 RX ADMIN — Medication 1 DROP(S): at 14:36

## 2021-02-14 RX ADMIN — Medication 1 DROP(S): at 15:25

## 2021-02-14 RX ADMIN — Medication 1 DROP(S): at 01:03

## 2021-02-14 RX ADMIN — Medication 1 DROP(S): at 05:00

## 2021-02-14 RX ADMIN — Medication 100 MILLIGRAM(S): at 11:49

## 2021-02-14 RX ADMIN — Medication 1 DROP(S): at 22:05

## 2021-02-14 RX ADMIN — Medication 1 DROP(S): at 11:49

## 2021-02-14 RX ADMIN — Medication 1 DROP(S): at 07:57

## 2021-02-14 RX ADMIN — Medication 40 MILLIEQUIVALENT(S): at 18:41

## 2021-02-14 RX ADMIN — Medication 1 APPLICATION(S): at 19:30

## 2021-02-14 RX ADMIN — Medication 1 DROP(S): at 16:51

## 2021-02-14 RX ADMIN — Medication 1 DROP(S): at 15:27

## 2021-02-14 RX ADMIN — ENOXAPARIN SODIUM 40 MILLIGRAM(S): 100 INJECTION SUBCUTANEOUS at 11:49

## 2021-02-14 RX ADMIN — Medication 1 DROP(S): at 14:34

## 2021-02-14 RX ADMIN — Medication 50 MILLIGRAM(S): at 22:05

## 2021-02-14 RX ADMIN — Medication 1 DROP(S): at 00:04

## 2021-02-14 RX ADMIN — Medication 40 MILLIEQUIVALENT(S): at 14:34

## 2021-02-14 NOTE — PROGRESS NOTE ADULT - ASSESSMENT
Assessment and Recommendations:  45y female w/ pmhx/ochx of alcohol abuse and alcohol withdrawal/seizures, pancreatitis, depression, recent diagnosis of COVID and contact lens user consulted for left red eye, found to have bacterial keratitis OS. Pt eye pain and discharge OS improving with smaller size in ulcer.    #bacterial keratitis left eye   - given hx of contact lens use in both eyes, will need to start q1h left eye fortified tobramycin  (15mg/ml) every hour around the clock alternating with fortified vancomycin (25mg/ml) - c/w q1h eyedrops from 11am to 6pm and can do q2h eyedrops from 6pm-11am   - attempted corneal sensitivity testing and pt notes severe pain in left eye when cotton wisp was touched against left cornea. Denies decreased sensation left eye.   - erythromycin ointment qhs left eye  - FU cultures - gram stain shows no PMNs and shows rare gram negative rods. Other cultures not back yet.   - ophtho will closely follow  - please notify ophtho if worsening vision, increasing pain, or new discharge  - strongly advised against eyelid rubbing to the pt  - contact lens holiday  - communicated with the primary team the importance of adhering to eyedrop schedule and advised against pt putting in the eyedrops herself    s/d/w backup Dr. Reynolds  d/w Dr. Herman    Outpatient follow-up: Patient should follow-up with his/her ophthalmologist or with [facility] Department of Ophthalmology within 1 week of after discharge at:    [address]  [place] [address]  [phone]    Leon Barrow MD, PGY-2

## 2021-02-14 NOTE — CHART NOTE - NSCHARTNOTEFT_GEN_A_CORE
Received a call early evening regarding patient compliant of worsening pain OS in s/o k ulcer. No change in vision per patient. She has been getting fortified vanc/tobra every 1 hr, explained to patient that worsening pain/erythema commonly seen in s/o abx toxicity. Recommended erythromycin ointment QHS, explained can use TID if needed though MUST be given after antibiotic drops and would highly recommend not using it more frequently than recommended b/c it can interfere with absorption of the fortified antibiotics. Can also use artificial tears 3-5 minutes after vanc/tobra to help with comfort. Explained plan to patient, and to covering nurse, who expressed understanding. Case was discussed with Dr. Batsheva Medley (PGY-3 resident and attending). Please page if any change in visual status, but regardless, will perform anterior exam tomorrow (2/15) AM.

## 2021-02-15 DIAGNOSIS — H16.9 UNSPECIFIED KERATITIS: ICD-10-CM

## 2021-02-15 LAB
-  AMPICILLIN/SULBACTAM: SIGNIFICANT CHANGE UP
-  CEFAZOLIN: SIGNIFICANT CHANGE UP
-  CLINDAMYCIN: SIGNIFICANT CHANGE UP
-  ERYTHROMYCIN: SIGNIFICANT CHANGE UP
-  GENTAMICIN: SIGNIFICANT CHANGE UP
-  OXACILLIN: SIGNIFICANT CHANGE UP
-  PENICILLIN: SIGNIFICANT CHANGE UP
-  RIFAMPIN: SIGNIFICANT CHANGE UP
-  TETRACYCLINE: SIGNIFICANT CHANGE UP
-  TRIMETHOPRIM/SULFAMETHOXAZOLE: SIGNIFICANT CHANGE UP
-  VANCOMYCIN: SIGNIFICANT CHANGE UP
METHOD TYPE: SIGNIFICANT CHANGE UP

## 2021-02-15 RX ORDER — LANOLIN ALCOHOL/MO/W.PET/CERES
3 CREAM (GRAM) TOPICAL AT BEDTIME
Refills: 0 | Status: DISCONTINUED | OUTPATIENT
Start: 2021-02-15 | End: 2021-02-16

## 2021-02-15 RX ADMIN — Medication 1 DROP(S): at 06:16

## 2021-02-15 RX ADMIN — Medication 1 TABLET(S): at 11:58

## 2021-02-15 RX ADMIN — Medication 1 DROP(S): at 18:21

## 2021-02-15 RX ADMIN — Medication 1 DROP(S): at 08:15

## 2021-02-15 RX ADMIN — Medication 1 MILLIGRAM(S): at 11:58

## 2021-02-15 RX ADMIN — Medication 1 DROP(S): at 09:34

## 2021-02-15 RX ADMIN — Medication 1 APPLICATION(S): at 22:14

## 2021-02-15 RX ADMIN — Medication 1 DROP(S): at 13:46

## 2021-02-15 RX ADMIN — Medication 50 MILLIGRAM(S): at 18:33

## 2021-02-15 RX ADMIN — Medication 1 DROP(S): at 14:42

## 2021-02-15 RX ADMIN — Medication 1 DROP(S): at 20:21

## 2021-02-15 RX ADMIN — Medication 1 DROP(S): at 04:39

## 2021-02-15 RX ADMIN — Medication 1 DROP(S): at 22:12

## 2021-02-15 RX ADMIN — Medication 1 DROP(S): at 11:57

## 2021-02-15 RX ADMIN — Medication 1 DROP(S): at 15:42

## 2021-02-15 RX ADMIN — Medication 1 DROP(S): at 11:56

## 2021-02-15 RX ADMIN — Medication 50 MILLIGRAM(S): at 06:15

## 2021-02-15 RX ADMIN — Medication 1 DROP(S): at 02:06

## 2021-02-15 RX ADMIN — Medication 1 DROP(S): at 18:22

## 2021-02-15 RX ADMIN — Medication 1 DROP(S): at 13:48

## 2021-02-15 RX ADMIN — Medication 1 DROP(S): at 17:05

## 2021-02-15 RX ADMIN — Medication 1 DROP(S): at 10:34

## 2021-02-15 RX ADMIN — Medication 1 DROP(S): at 12:02

## 2021-02-15 RX ADMIN — Medication 100 MILLIGRAM(S): at 11:58

## 2021-02-15 RX ADMIN — Medication 1 DROP(S): at 14:41

## 2021-02-15 RX ADMIN — Medication 1 DROP(S): at 10:33

## 2021-02-15 RX ADMIN — Medication 3 MILLIGRAM(S): at 22:12

## 2021-02-15 RX ADMIN — Medication 1 DROP(S): at 08:16

## 2021-02-15 RX ADMIN — Medication 1 DROP(S): at 02:07

## 2021-02-15 RX ADMIN — Medication 1 DROP(S): at 15:43

## 2021-02-15 RX ADMIN — Medication 1 DROP(S): at 20:22

## 2021-02-15 RX ADMIN — Medication 1 DROP(S): at 17:06

## 2021-02-15 NOTE — PROGRESS NOTE ADULT - PROBLEM SELECTOR PLAN 4
Denies any SI/HI at this time   No need for constant observation   Not on any anti-depressants, hold Xanax/Ambien while on Librium   Consider psych eval once acute withdrawal improves

## 2021-02-15 NOTE — PROGRESS NOTE ADULT - PROBLEM SELECTOR PLAN 3
Diagnosed last week  Airborne isolation   Saturating well on RA, no indication for Remdesivir or Dexamethasone at this time

## 2021-02-15 NOTE — PROGRESS NOTE ADULT - PROBLEM SELECTOR PLAN 2
2' contact lense being left in x 2 weeks  Cont vanco/tobramycin eye drops  Cont erythromycin ointment  Cont artificial teras  Appreciate ophtho

## 2021-02-15 NOTE — PROGRESS NOTE ADULT - SUBJECTIVE AND OBJECTIVE BOX
[facility] DEPARTMENT OF OPHTHALMOLOGY  ------------------------------------------------------------------------------  Maude Louis MD, MPH, PGY-2  Pager: [phone]  ------------------------------------------------------------------------------  HPI: 45y female w/ pmhx/ochx of alcohol abuse and alcohol withdrawal/seizures, pancreatitis, depression, recent diagnosis of COVID and avid contact lens user consulted for left red eye, found to have bacterial keratitis OS with inferior ulcer.     Cx growing rare staph aureus (pre-garcia read, specimen collected 2/13).     Interval History: Patient endorses improving photosensitivity but notes that vision in left eye is blurry compared to yesterday. Pt complaining of eye redness/pain and notes that erythromycin is not significantly helping, but she has some relief with artificial tears.     MEDICATIONS  (STANDING):  artificial  tears Solution 1 Drop(s) Both EYES four times a day  chlordiazePOXIDE 50 milliGRAM(s) Oral every 12 hours  chlordiazePOXIDE   Oral   enoxaparin Injectable 40 milliGRAM(s) SubCutaneous daily  erythromycin   Ointment 1 Application(s) Left EYE at bedtime  folic acid 1 milliGRAM(s) Oral daily  multivitamin 1 Tablet(s) Oral daily  tobramycin 14 mG/mL Fortified Ophthalmic 1 Drop(s) Left EYE every 1 hour  vancomycin 25 mG/mL Fortified Ophthalmic 1 Drop(s) Left EYE every 1 hour    MEDICATIONS  (PRN):  LORazepam   Injectable 2 milliGRAM(s) IV Push every 1 hour PRN Symptom-triggered: each CIWA -Ar score 8 or GREATER  ondansetron Injectable 4 milliGRAM(s) IV Push every 6 hours PRN Nausea    VITALS: T(C): 37 (02-15-21 @ 09:57)  T(F): 98.6 (02-15-21 @ 09:57), Max: 98.6 (02-15-21 @ 09:57)  HR: 78 (02-15-21 @ 09:57) (74 - 94)  BP: 124/87 (02-15-21 @ 09:57) (112/64 - 138/87)  RR:  (16 - 20)  SpO2:  (99% - 100%)  Wt(kg): --  General: AAO x 3, appropriate mood and affect    Ophthalmology Exam:  Visual acuity (sc): 20/20 OD, 20/25+2 OS   Pupils: PERRL OU, no APD  Ttono: 15/17  Extraocular movements (EOMs): Full OU, no pain, no diplopia  Confrontational Visual Field (CVF): Full OU  Color plates: full OU    Pen Light Exam (PLE)  External: Flat OU  Lids/Lashes/Lacrimal Ducts: Flat OU    Sclera/Conjunctiva: W+Q OU  Cornea: Cl OD, 1.1mm H x 3.3mm W focal white opacity along inferior cornea with fluorescein staining revealing epithelial defect  Anterior Chamber: D+F OU . No hypopyon OS.  Iris: Flat OU  Lens: Cl OU    DFE deferred in the setting of COVID positive     A/P: 45y female w/ pmhx/ochx of alcohol abuse and alcohol withdrawal/seizures, pancreatitis, depression, recent diagnosis of COVID and contact lens user consulted for left red eye, found to have bacterial keratitis OS. Pt eye pain and discharge OS improving with stable size of ulcer today. Pain and blurry vision likely in s/o abx toxicity, which AT and erythromycin ointment will help to alleviate. Ulcer and epi defect stable in size and appearance on exam today, VA grossly stable. Please see recommendations below:     #bacterial keratitis left eye with inferior ulcer, stable   - given hx of contact lens use in both eyes, will need to start q1h left eye fortified tobramycin  (15mg/ml) every hour around the clock alternating with fortified vancomycin (25mg/ml) - c/w q1h eyedrops from 11am to 6 p.m, then q2hr from 6 to 10 p.m, then can just use erythromycin ointment before bedtime. Only continue vanc/tobra while patient awake.  - ATPF PRN (ensure 3-5 min spacing between AT and fortified abx)  - FU cultures - prelim read 2/15 with rare staph aureus   - ophtho will follow daily   - please notify ophtho if worsening vision, increasing pain, or new discharge  - strongly advised against eyelid rubbing to the pt  - contact lens holiday  - previously communicated with the primary team the importance of adhering to eyedrop schedule and advised against pt putting in the eyedrops herself    Case d/w and photos sent to Dr. Herman, attending; and Dr. Krishna, PGY-3.     Outpatient follow-up: Patient should follow-up with his/her ophthalmologist or with [facility] Department of Ophthalmology within 1 week of after discharge at:    [address]  [place], [place] [address] [phone]      
[facility] DEPARTMENT OF OPHTHALMOLOGY  ------------------------------------------------------------------------------  Leon Barrow MD PGY-2  ------------------------------------------------------------------------------    Interval History: No acute events overnight. Today patient denying blurred vision, flashes, floaters, FBS. Pt reports improvement in eye pain OS and discharge. Vision unchanged as per patient    MEDICATIONS  (STANDING):  artificial  tears Solution 1 Drop(s) Both EYES four times a day  chlordiazePOXIDE 50 milliGRAM(s) Oral every 8 hours  chlordiazePOXIDE   Oral   enoxaparin Injectable 40 milliGRAM(s) SubCutaneous daily  erythromycin   Ointment 1 Application(s) Left EYE at bedtime  folic acid 1 milliGRAM(s) Oral daily  multivitamin 1 Tablet(s) Oral daily  thiamine 100 milliGRAM(s) Oral daily  tobramycin 14 mG/mL Fortified Ophthalmic 1 Drop(s) Left EYE every 1 hour  vancomycin 25 mG/mL Fortified Ophthalmic 1 Drop(s) Left EYE every 1 hour    MEDICATIONS  (PRN):  LORazepam   Injectable 2 milliGRAM(s) IV Push every 1 hour PRN Symptom-triggered: each CIWA -Ar score 8 or GREATER  ondansetron Injectable 4 milliGRAM(s) IV Push every 6 hours PRN Nausea      VITALS: T(C): 36.8 (02-13-21 @ 21:16)  T(F): 98.2 (02-13-21 @ 21:16), Max: 98.6 (02-13-21 @ 12:46)  HR: 105 (02-13-21 @ 21:16) (105 - 113)  BP: 140/90 (02-13-21 @ 21:16) (137/87 - 150/93)  RR:  (18 - 20)  SpO2:  (95% - 100%)  Wt(kg): --  General: AAO x 3, appropriate mood and affect    Ophthalmology Exam:  Visual acuity (sc): 20/20 OU  Pupils: PERRL OU, no APD  Ttono: 15 and 14  Extraocular movements (EOMs): Full OU, no pain, no diplopia  Confrontational Visual Field (CVF): Full OU    Pen Light Exam (PLE)  External: Flat OU  Lids/Lashes/Lacrimal Ducts: Flat OU    Sclera/Conjunctiva: W+Q OU  Cornea: Cl OD, 1mm H x 3mm W focal white opacity along inferior cornea with fluorescein staining revealing epithelial defect  Anterior Chamber: D+F OU . No hypopyon OS.  Iris: Flat OU  Lens: Cl OU    DFE deferred in the setting of COVID positive     
Patient is a 45y old  Female who presents with a chief complaint of alcohol use (14 Feb 2021 11:16)      SUBJECTIVE / OVERNIGHT EVENTS:    Less burning in left eye  No acute visual changes  Did not require any ativan IVP the whole day yesterday    Vital Signs Last 24 Hrs  T(C): 37 (15 Feb 2021 09:57), Max: 37.1 (14 Feb 2021 12:16)  T(F): 98.6 (15 Feb 2021 09:57), Max: 98.7 (14 Feb 2021 12:16)  HR: 78 (15 Feb 2021 09:57) (74 - 94)  BP: 124/87 (15 Feb 2021 09:57) (112/64 - 138/87)  BP(mean): --  RR: 18 (15 Feb 2021 09:57) (16 - 20)  SpO2: 100% (15 Feb 2021 09:57) (99% - 100%)  I&O's Summary    14 Feb 2021 07:01  -  15 Feb 2021 07:00  --------------------------------------------------------  IN: 360 mL / OUT: 800 mL / NET: -440 mL        GENERAL: NAD, well-developed  HEAD:  Atraumatic, Normocephalic  EYES: EOMI, PERRLA, left corneal epithelial defect  NECK: Supple, No JVD, No LAD, No carotid bruits  CHEST/LUNG: Clear to auscultation bilaterally; No wheezes  HEART: Regular rate and rhythm; No murmurs, rubs, or gallops  ABDOMEN: Soft, Nontender, Nondistended; Bowel sounds present  EXTREMITIES:  2+ Peripheral Pulses, No clubbing, cyanosis, or edema  SKIN: No rashes or lesions  NEURO: A+O x 3; nonfocal CN/motor/sensory/reflexes  PSYCH: Nl affect; no agitation or delirium; no suicidal or homicidal ideation    LABS:                        9.5    4.03  )-----------( 85       ( 14 Feb 2021 08:01 )             32.0     02-14    136  |  93<L>  |  15  ----------------------------<  95  3.4<L>   |  25  |  0.49<L>    Ca    8.9      14 Feb 2021 08:01  Phos  2.8     02-14  Mg     1.4     02-14    TPro  6.6  /  Alb  3.3  /  TBili  0.5  /  DBili  x   /  AST  40<H>  /  ALT  26  /  AlkPhos  55  02-14      CAPILLARY BLOOD GLUCOSE                RADIOLOGY & ADDITIONAL TESTS:    Imaging Personally Reviewed:  [x] YES  [ ] NO    Consultant(s) Notes Reviewed:  [x] YES  [ ] NO      MEDICATIONS  (STANDING):  artificial  tears Solution 1 Drop(s) Both EYES four times a day  chlordiazePOXIDE 50 milliGRAM(s) Oral every 12 hours  chlordiazePOXIDE   Oral   enoxaparin Injectable 40 milliGRAM(s) SubCutaneous daily  erythromycin   Ointment 1 Application(s) Left EYE at bedtime  folic acid 1 milliGRAM(s) Oral daily  multivitamin 1 Tablet(s) Oral daily  thiamine 100 milliGRAM(s) Oral daily  tobramycin 14 mG/mL Fortified Ophthalmic 1 Drop(s) Left EYE every 1 hour  vancomycin 25 mG/mL Fortified Ophthalmic 1 Drop(s) Left EYE every 1 hour    MEDICATIONS  (PRN):  LORazepam   Injectable 2 milliGRAM(s) IV Push every 1 hour PRN Symptom-triggered: each CIWA -Ar score 8 or GREATER  ondansetron Injectable 4 milliGRAM(s) IV Push every 6 hours PRN Nausea      Care Discussed with Consultants/Other Providers [x] YES  [ ] NO    HEALTH ISSUES - PROBLEM Dx:  Need for prophylactic measure  Need for prophylactic measure    Hyponatremia  Hyponatremia    Elevated LFTs  Elevated LFTs    Depression, unspecified depression type  Depression, unspecified depression type    COVID-19  COVID-19    Acute conjunctivitis of left eye, unspecified acute conjunctivitis type  Acute conjunctivitis of left eye, unspecified acute conjunctivitis type    Alcohol withdrawal syndrome with complication  Alcohol withdrawal syndrome with complication

## 2021-02-16 ENCOUNTER — TRANSCRIPTION ENCOUNTER (OUTPATIENT)
Age: 46
End: 2021-02-16

## 2021-02-16 VITALS
RESPIRATION RATE: 18 BRPM | DIASTOLIC BLOOD PRESSURE: 84 MMHG | SYSTOLIC BLOOD PRESSURE: 115 MMHG | OXYGEN SATURATION: 100 % | TEMPERATURE: 98 F | HEART RATE: 80 BPM

## 2021-02-16 RX ORDER — LANOLIN ALCOHOL/MO/W.PET/CERES
1 CREAM (GRAM) TOPICAL
Qty: 0 | Refills: 0 | DISCHARGE
Start: 2021-02-16

## 2021-02-16 RX ORDER — FOLIC ACID 0.8 MG
1 TABLET ORAL
Qty: 0 | Refills: 0 | DISCHARGE
Start: 2021-02-16

## 2021-02-16 RX ORDER — TOBRAMYCIN SULFATE 40 MG/ML
0 VIAL (ML) INJECTION
Qty: 0 | Refills: 0 | DISCHARGE
Start: 2021-02-16

## 2021-02-16 RX ORDER — ALPRAZOLAM 0.25 MG
1 TABLET ORAL
Qty: 0 | Refills: 0 | DISCHARGE

## 2021-02-16 RX ORDER — ERYTHROMYCIN BASE 5 MG/GRAM
1 OINTMENT (GRAM) OPHTHALMIC (EYE)
Qty: 1 | Refills: 0
Start: 2021-02-16 | End: 2021-02-26

## 2021-02-16 RX ORDER — ZOLPIDEM TARTRATE 10 MG/1
1 TABLET ORAL
Qty: 0 | Refills: 0 | DISCHARGE

## 2021-02-16 RX ORDER — VANCOMYCIN HCL 1 G
0 VIAL (EA) INTRAVENOUS
Qty: 0 | Refills: 0 | DISCHARGE
Start: 2021-02-16

## 2021-02-16 RX ORDER — TOBRAMYCIN SULFATE 40 MG/ML
1 VIAL (ML) INJECTION
Refills: 0 | Status: DISCONTINUED | OUTPATIENT
Start: 2021-02-16 | End: 2021-02-16

## 2021-02-16 RX ORDER — VANCOMYCIN HCL 1 G
1 VIAL (EA) INTRAVENOUS
Refills: 0 | Status: DISCONTINUED | OUTPATIENT
Start: 2021-02-16 | End: 2021-02-16

## 2021-02-16 RX ADMIN — Medication 50 MILLIGRAM(S): at 06:13

## 2021-02-16 RX ADMIN — Medication 1 DROP(S): at 01:53

## 2021-02-16 RX ADMIN — Medication 1 DROP(S): at 00:06

## 2021-02-16 RX ADMIN — Medication 1 DROP(S): at 09:02

## 2021-02-16 RX ADMIN — Medication 1 DROP(S): at 13:30

## 2021-02-16 RX ADMIN — Medication 1 DROP(S): at 01:55

## 2021-02-16 RX ADMIN — Medication 1 DROP(S): at 07:59

## 2021-02-16 RX ADMIN — Medication 1 TABLET(S): at 13:49

## 2021-02-16 RX ADMIN — Medication 1 DROP(S): at 04:12

## 2021-02-16 RX ADMIN — Medication 1 DROP(S): at 06:14

## 2021-02-16 RX ADMIN — Medication 1 DROP(S): at 10:12

## 2021-02-16 RX ADMIN — Medication 1 DROP(S): at 06:11

## 2021-02-16 RX ADMIN — Medication 1 DROP(S): at 13:48

## 2021-02-16 RX ADMIN — Medication 1 DROP(S): at 11:11

## 2021-02-16 RX ADMIN — Medication 1 MILLIGRAM(S): at 13:49

## 2021-02-16 NOTE — DISCHARGE NOTE NURSING/CASE MANAGEMENT/SOCIAL WORK - NSDCFUADDAPPT_GEN_ALL_CORE_FT
[facility] Department of Ophthalmology # [phone]    * You have an appointment scheduled at the private practice located in [place] on 2/25 at 12:45PM *

## 2021-02-16 NOTE — DISCHARGE NOTE PROVIDER - NSDCMRMEDTOKEN_GEN_ALL_CORE_FT
ALPRAZolam 1 mg oral tablet: 1 tab(s) orally 3 times a day  Ambien 10 mg oral tablet: 1 tab(s) orally once a day (at bedtime)   ALPRAZolam 1 mg oral tablet: 1 tab(s) orally 3 times a day  Ambien 10 mg oral tablet: 1 tab(s) orally once a day (at bedtime)  folic acid 1 mg oral tablet: 1 tab(s) orally once a day  melatonin 3 mg oral tablet: 1 tab(s) orally once a day (at bedtime)  Multiple Vitamins oral tablet: 1 tab(s) orally once a day  ocular lubricant ophthalmic solution: 1 drop(s) to each affected eye 4 times a day   ALPRAZolam 1 mg oral tablet: 1 tab(s) orally 2 times a day, As Needed for anxiety   erythromycin 0.5% ophthalmic ointment: 1 application to each affected eye once a day (at bedtime) thru 2/27  folic acid 1 mg oral tablet: 1 tab(s) orally once a day  melatonin 3 mg oral tablet: 1 tab(s) orally once a day (at bedtime)  Multiple Vitamins oral tablet: 1 tab(s) orally once a day  ocular lubricant ophthalmic solution: 1 drop(s) to each affected eye 4 times a day  tobramycin: Called into pharmacy   [facility] pharmacy   vancomycin: Called into pharmacy at [facility]

## 2021-02-16 NOTE — DISCHARGE NOTE PROVIDER - NSDCFUADDAPPT_GEN_ALL_CORE_FT
[facility] Department of Ophthalmology within 1 week of after discharge at:  [address]  [place] [address]  [phone]   [facility] Department of Ophthalmology # [phone]    * You have an appointment scheduled at the private practice located in [place] on 2/25 at 12:45PM *

## 2021-02-16 NOTE — CHART NOTE - NSCHARTNOTEFT_GEN_A_CORE
This letter is to certify that Ms. Catherine Estrada was hospitalized as an inpatient at [facility] from February 13, 2021 to February 16, 2021, under the care of the general internal medicine service.

## 2021-02-16 NOTE — DISCHARGE NOTE PROVIDER - NSDCCPCAREPLAN_GEN_ALL_CORE_FT
PRINCIPAL DISCHARGE DIAGNOSIS  Diagnosis: Alcohol withdrawal  Assessment and Plan of Treatment: In order to optimize your overall health and prevent adverse events, please abstain from ingesting alcohol upon discharge from  Continue to supplement with recommended vitamins and follow-up with your primary care provider for further medical care.   It is highly recommended to attend AA meetings to help create a sober lifestyle. If you need immediate assistance with substance abuse you may contact the [facility] by calling [phone].      SECONDARY DISCHARGE DIAGNOSES  Diagnosis: Keratitis  Assessment and Plan of Treatment: Continue drops/ointment as directed by ophthalmology and follow-up [facility] ophthalmology within 1 week of hospital discharge.  Avoid contact lenses for now.  Instructions: Left eye fortified Tobramycin (15mg/ml) every hour around the clock alternating with fortified Vancomycin (25mg/ml)   ** Continue eyedrops from 11am to 6 p.m every 1 hour then every 2 hour from 6 to 10 p.m then can just use Erythromycin ointment before bedtime. ** Only continue Vancomycin and Tobramycin while awake.  If any wosening of vision please go to emergency room.    Diagnosis: COVID-19  Assessment and Plan of Treatment: You were found to be positive for the COVID 19 virus and treated accordingly. Please self quarantine at home for 14 days from discharge and stay 6 feet away minimum from other individuals or animals. Continue to wear a mask when around other people being sure to cover your nose. Do not go to work, school, public areas, or use public transportaion until cleared by your primary care provider. Please call ahead if you have an appointment with your doctor to inform them of your COVID positive status. Cover your sneezes and coughs, and wash hands with soap and water for at least 20 seconds frequently. Avoid sharing personal household items. Clean all surfaces where you contact frequently such as coutners and doorknobs frequently.     If you have any worsening breathing, faster breathing or trouble with breathing call 911 immediately or your healthcare provider ahead of time and wear facemask before being seen by medical personel.   Take tylenol for your fevers and stay hydrated. Please follow state and local rules and regulations regarding coming off of isolation.    Diagnosis: Depression, unspecified depression type  Assessment and Plan of Treatment: Continue your medications as directed and follow up with your primary care provider/psychiatrist for further management.   If you are ever in need of immediate psychiatric assistance you may reach out to the Adult [facility] Crisis Center at [facility] ([address] [place] [address]) # [phone] operates M-F 9am-3pm, walk in or call for same-day/next-day appointment.     PRINCIPAL DISCHARGE DIAGNOSIS  Diagnosis: Alcohol withdrawal  Assessment and Plan of Treatment: In order to optimize your overall health and prevent adverse events, please abstain from ingesting alcohol upon discharge from  Continue to supplement with recommended vitamins and follow-up with your primary care provider for further medical care.   It is highly recommended to attend AA meetings to help create a sober lifestyle. If you need immediate assistance with substance abuse you may contact the [facility] by calling [phone].      SECONDARY DISCHARGE DIAGNOSES  Diagnosis: Keratitis  Assessment and Plan of Treatment: Continue drops/ointment as directed by ophthalmology and follow-up with ophthalmology at your scheduled appointment 2/25 12:45PM with the private practice located on [facility].  Avoid contact lenses for now.  Instructions: Left eye fortified Tobramycin (15mg/ml) every hour around the clock alternating with fortified Vancomycin (25mg/ml)   ** Continue eyedrops from 11am to 6 p.m every 1 hour then every 2 hour from 6 to 10 p.m then can just use Erythromycin ointment before bedtime. ** Only continue Vancomycin and Tobramycin while awake.  Continue drops through 2/27/21 (2 week course).  If any wosening of vision please go to emergency room.    Diagnosis: COVID-19  Assessment and Plan of Treatment: You were found to be positive for the COVID 19 virus and treated accordingly. Please self quarantine at home for 14 days from discharge and stay 6 feet away minimum from other individuals or animals. Continue to wear a mask when around other people being sure to cover your nose. Do not go to work, school, public areas, or use public transportaion until cleared by your primary care provider. Please call ahead if you have an appointment with your doctor to inform them of your COVID positive status. Cover your sneezes and coughs, and wash hands with soap and water for at least 20 seconds frequently. Avoid sharing personal household items. Clean all surfaces where you contact frequently such as coutners and doorknobs frequently.     If you have any worsening breathing, faster breathing or trouble with breathing call 911 immediately or your healthcare provider ahead of time and wear facemask before being seen by medical personel.   Take tylenol for your fevers and stay hydrated. Please follow state and local rules and regulations regarding coming off of isolation.    Diagnosis: Depression, unspecified depression type  Assessment and Plan of Treatment: Continue your medications as directed and follow up with your primary care provider/psychiatrist for further management.   If you are ever in need of immediate psychiatric assistance you may reach out to the Adult [facility] Center at [facility] ([address] [place] [address]) # [phone] operates M-F 9am-3pm, walk in or call for same-day/next-day appointment.  You can also follow-up with your psychiatrist upon discharge.     PRINCIPAL DISCHARGE DIAGNOSIS  Diagnosis: Alcohol withdrawal  Assessment and Plan of Treatment: In order to optimize your overall health and prevent adverse events, please abstain from ingesting alcohol upon discharge from  Continue to supplement with recommended vitamins and follow-up with your primary care provider for further medical care.   It is highly recommended to attend AA meetings to help create a sober lifestyle. If you need immediate assistance with substance abuse you may contact the [facility] by calling [phone].      SECONDARY DISCHARGE DIAGNOSES  Diagnosis: Keratitis  Assessment and Plan of Treatment: Continue drops/ointment as directed by ophthalmology and follow-up with ophthalmology at your scheduled appointment 2/25 12:45PM with the private practice located on [facility].  Avoid contact lenses for now.  Instructions: Left eye fortified Tobramycin (15mg/ml) every hour around the clock alternating with fortified Vancomycin (25mg/ml)   ** Continue eyedrops from 11am to 6 p.m every 1 hour then every 2 hour from 6 to 10 p.m then can just use Erythromycin ointment before bedtime. ** Only continue Vancomycin and Tobramycin while awake.  Continue drops through 2/27/21 (2 week course).  ** Vancomycin and Tobramycin drops at [facility] pharmacy [address] [place] [address] in [place] # [phone] - Price is $50 for each bottle - Pharmacy does not accept insurance and you accepted this price.  Erythromycin ointment sent to your preferred pharmacy at [facility].    Diagnosis: COVID-19  Assessment and Plan of Treatment: You were found to be positive for the COVID 19 virus and treated accordingly. Please self quarantine at home for 14 days from discharge and stay 6 feet away minimum from other individuals or animals. Continue to wear a mask when around other people being sure to cover your nose. Do not go to work, school, public areas, or use public transportaion until cleared by your primary care provider. Please call ahead if you have an appointment with your doctor to inform them of your COVID positive status. Cover your sneezes and coughs, and wash hands with soap and water for at least 20 seconds frequently. Avoid sharing personal household items. Clean all surfaces where you contact frequently such as coutners and doorknobs frequently.     If you have any worsening breathing, faster breathing or trouble with breathing call 911 immediately or your healthcare provider ahead of time and wear facemask before being seen by medical personel.   Take tylenol for your fevers and stay hydrated. Please follow state and local rules and regulations regarding coming off of isolation.    Diagnosis: Depression, unspecified depression type  Assessment and Plan of Treatment: Continue your medications as directed and follow up with your primary care provider/psychiatrist for further management.   If you are ever in need of immediate psychiatric assistance you may reach out to the Adult [facility] Center at [facility] ([address] [place] [address]) # [phone] operates M-F 9am-3pm, walk in or call for same-day/next-day appointment.  You can continue your Xanax as needed as prescribed by your psychiatrist.

## 2021-02-16 NOTE — DISCHARGE NOTE PROVIDER - HOSPITAL COURSE
45F h/o ETOH abuse (H/o withdrawal/seizures and pancreatitis), depression, recent diagnosis of COVID p/w ETOH intoxication and withdrawal (Last drink 2/12 at 2PM)    Alcohol withdrawal syndrome   - CIWA protocol   - Librium taper w/ symptom triggered Ativan PRN   - MVI/Folate/Thiamine   - Social work eval.     Acute conjunctivitis of left eye  - No visual acuity changes, able to open eye, no foreign body sensation   - Ophtho consulted  - Recommended for L keratits: Tobramycin gtt and Vancomycin drop q1h and Erythromycin ointment at bedtime    COVID-19.    - Diagnosed last week and confirmed on 2/12  - Contact/Airborne isolation   - Sating well on RA, no indication for Remdesivir or Dexamethasone at this time   - CXR 2/13 negative    Depression    - Denies any SI/HI at this time   - No need for constant observation   - Not on any anti-depressants, hold Xanax/Ambien while on Librium     Dispo - 45F h/o ETOH abuse (H/o withdrawal/seizures and pancreatitis), depression, recent diagnosis of COVID p/w ETOH intoxication and withdrawal (Last drink 2/12 at 2PM)    Alcohol withdrawal syndrome   - CIWA protocol   - Librium taper w/ symptom triggered Ativan PRN   - MVI/Folate/Thiamine   - Social work eval.     Acute conjunctivitis of left eye  - No visual acuity changes, able to open eye, no foreign body sensation   - Ophtho consulted  - Recommended for L keratits: Tobramycin gtt and Vancomycin drop q1h and Erythromycin ointment at bedtime    COVID-19.    - Diagnosed last week and confirmed on 2/12  - Contact/Airborne isolation   - Sating well on RA, no indication for Remdesivir or Dexamethasone at this time   - CXR 2/13 negative    Depression    - Denies any SI/HI at this time   - No need for constant observation   - Not on any anti-depressants, hold Xanax/Ambien while on Librium     Dispo - Home; Patient being sent with eye drops, letter for work, and outpatient ophthalmology appointment scheduled 45F h/o ETOH abuse (H/o withdrawal/seizures and pancreatitis), depression, recent diagnosis of COVID p/w ETOH intoxication and withdrawal (Last drink 2/12 at 2PM)    Alcohol withdrawal syndrome   - CIWA protocol   - Librium taper w/ symptom triggered Ativan PRN   - MVI/Folate/Thiamine   - Social work eval.     Acute conjunctivitis of left eye  - No visual acuity changes, able to open eye, no foreign body sensation   - Ophtho consulted  - Recommended for L keratits: Tobramycin gtt and Vancomycin drop q1h and Erythromycin ointment at bedtime    COVID-19.    - Diagnosed last week and confirmed on 2/12  - Contact/Airborne isolation   - Sating well on RA, no indication for Remdesivir or Dexamethasone at this time   - CXR 2/13 negative    Depression    - Denies any SI/HI at this time   - No need for constant observation   - Not on any anti-depressants, hold Xanax/Ambien while on Librium   - Patient says she has Xanax at home and only takes PRN - She will f/u w/ her psychiatrist    Dispo - Home; Patient being sent with eye drops, letter for work, and outpatient ophthalmology appointment scheduled   Called Vancomycin and Tobramycin fortified drops as prescribed during admission to [facility] pharmacy in [place] as needed to be compounded

## 2021-02-17 ENCOUNTER — NON-APPOINTMENT (OUTPATIENT)
Age: 46
End: 2021-02-17

## 2021-02-17 ENCOUNTER — TRANSCRIPTION ENCOUNTER (OUTPATIENT)
Age: 46
End: 2021-02-17

## 2021-02-18 ENCOUNTER — TRANSCRIPTION ENCOUNTER (OUTPATIENT)
Age: 46
End: 2021-02-18

## 2021-02-18 LAB
CULTURE RESULTS: SIGNIFICANT CHANGE UP
ORGANISM # SPEC MICROSCOPIC CNT: SIGNIFICANT CHANGE UP
ORGANISM # SPEC MICROSCOPIC CNT: SIGNIFICANT CHANGE UP
SPECIMEN SOURCE: SIGNIFICANT CHANGE UP

## 2021-02-19 ENCOUNTER — APPOINTMENT (OUTPATIENT)
Dept: OPHTHALMOLOGY | Facility: CLINIC | Age: 46
End: 2021-02-19

## 2021-02-23 ENCOUNTER — NON-APPOINTMENT (OUTPATIENT)
Age: 46
End: 2021-02-23

## 2021-02-23 ENCOUNTER — APPOINTMENT (OUTPATIENT)
Dept: OPHTHALMOLOGY | Facility: CLINIC | Age: 46
End: 2021-02-23
Payer: MEDICAID

## 2021-02-23 PROCEDURE — 92012 INTRM OPH EXAM EST PATIENT: CPT

## 2021-02-23 PROCEDURE — 99072 ADDL SUPL MATRL&STAF TM PHE: CPT

## 2021-02-25 ENCOUNTER — APPOINTMENT (OUTPATIENT)
Dept: OPHTHALMOLOGY | Facility: CLINIC | Age: 46
End: 2021-02-25

## 2021-02-26 ENCOUNTER — TRANSCRIPTION ENCOUNTER (OUTPATIENT)
Age: 46
End: 2021-02-26

## 2021-03-02 ENCOUNTER — NON-APPOINTMENT (OUTPATIENT)
Age: 46
End: 2021-03-02

## 2021-03-02 ENCOUNTER — APPOINTMENT (OUTPATIENT)
Dept: OPHTHALMOLOGY | Facility: CLINIC | Age: 46
End: 2021-03-02
Payer: MEDICAID

## 2021-03-02 PROCEDURE — 92012 INTRM OPH EXAM EST PATIENT: CPT

## 2021-03-02 PROCEDURE — 99072 ADDL SUPL MATRL&STAF TM PHE: CPT

## 2021-03-04 ENCOUNTER — APPOINTMENT (OUTPATIENT)
Dept: OPHTHALMOLOGY | Facility: CLINIC | Age: 46
End: 2021-03-04

## 2021-03-09 ENCOUNTER — NON-APPOINTMENT (OUTPATIENT)
Age: 46
End: 2021-03-09

## 2021-03-09 ENCOUNTER — APPOINTMENT (OUTPATIENT)
Dept: OPHTHALMOLOGY | Facility: CLINIC | Age: 46
End: 2021-03-09

## 2021-03-09 ENCOUNTER — APPOINTMENT (OUTPATIENT)
Dept: OPHTHALMOLOGY | Facility: CLINIC | Age: 46
End: 2021-03-09
Payer: MEDICAID

## 2021-03-09 PROCEDURE — 92012 INTRM OPH EXAM EST PATIENT: CPT

## 2021-03-09 PROCEDURE — 99072 ADDL SUPL MATRL&STAF TM PHE: CPT

## 2021-03-18 ENCOUNTER — APPOINTMENT (OUTPATIENT)
Dept: OPHTHALMOLOGY | Facility: CLINIC | Age: 46
End: 2021-03-18
Payer: MEDICAID

## 2021-03-18 ENCOUNTER — NON-APPOINTMENT (OUTPATIENT)
Age: 46
End: 2021-03-18

## 2021-03-18 PROCEDURE — 92012 INTRM OPH EXAM EST PATIENT: CPT

## 2021-03-18 PROCEDURE — 99072 ADDL SUPL MATRL&STAF TM PHE: CPT

## 2021-04-12 ENCOUNTER — APPOINTMENT (OUTPATIENT)
Dept: OPHTHALMOLOGY | Facility: CLINIC | Age: 46
End: 2021-04-12

## 2021-04-15 ENCOUNTER — APPOINTMENT (OUTPATIENT)
Dept: OPHTHALMOLOGY | Facility: CLINIC | Age: 46
End: 2021-04-15

## 2021-04-19 ENCOUNTER — INPATIENT (INPATIENT)
Facility: HOSPITAL | Age: 46
LOS: 2 days | Discharge: HOME | End: 2021-04-22
Attending: INTERNAL MEDICINE | Admitting: INTERNAL MEDICINE
Payer: MEDICAID

## 2021-04-19 VITALS
OXYGEN SATURATION: 98 % | TEMPERATURE: 98 F | RESPIRATION RATE: 18 BRPM | HEART RATE: 98 BPM | DIASTOLIC BLOOD PRESSURE: 95 MMHG | HEIGHT: 67 IN | SYSTOLIC BLOOD PRESSURE: 134 MMHG

## 2021-04-19 DIAGNOSIS — Z98.890 OTHER SPECIFIED POSTPROCEDURAL STATES: Chronic | ICD-10-CM

## 2021-04-19 DIAGNOSIS — F10.231 ALCOHOL DEPENDENCE WITH WITHDRAWAL DELIRIUM: ICD-10-CM

## 2021-04-19 LAB
ALBUMIN SERPL ELPH-MCNC: 4.6 G/DL — SIGNIFICANT CHANGE UP (ref 3.3–5)
ALP SERPL-CCNC: 75 U/L — SIGNIFICANT CHANGE UP (ref 40–120)
ALT FLD-CCNC: 44 U/L — HIGH (ref 4–33)
ANION GAP SERPL CALC-SCNC: 20 MMOL/L — HIGH (ref 7–14)
AST SERPL-CCNC: 78 U/L — HIGH (ref 4–32)
BASOPHILS # BLD AUTO: 0.05 K/UL — SIGNIFICANT CHANGE UP (ref 0–0.2)
BASOPHILS NFR BLD AUTO: 0.7 % — SIGNIFICANT CHANGE UP (ref 0–2)
BILIRUB SERPL-MCNC: 0.5 MG/DL — SIGNIFICANT CHANGE UP (ref 0.2–1.2)
BLOOD GAS VENOUS COMPREHENSIVE RESULT: SIGNIFICANT CHANGE UP
BLOOD GAS VENOUS COMPREHENSIVE RESULT: SIGNIFICANT CHANGE UP
BUN SERPL-MCNC: 13 MG/DL — SIGNIFICANT CHANGE UP (ref 7–23)
CALCIUM SERPL-MCNC: 9.4 MG/DL — SIGNIFICANT CHANGE UP (ref 8.4–10.5)
CHLORIDE SERPL-SCNC: 93 MMOL/L — LOW (ref 98–107)
CO2 SERPL-SCNC: 27 MMOL/L — SIGNIFICANT CHANGE UP (ref 22–31)
CREAT SERPL-MCNC: 0.54 MG/DL — SIGNIFICANT CHANGE UP (ref 0.5–1.3)
EOSINOPHIL # BLD AUTO: 0.09 K/UL — SIGNIFICANT CHANGE UP (ref 0–0.5)
EOSINOPHIL NFR BLD AUTO: 1.2 % — SIGNIFICANT CHANGE UP (ref 0–6)
GLUCOSE SERPL-MCNC: 101 MG/DL — HIGH (ref 70–99)
HCT VFR BLD CALC: 32.5 % — LOW (ref 34.5–45)
HGB BLD-MCNC: 9.6 G/DL — LOW (ref 11.5–15.5)
IANC: 4.78 K/UL — SIGNIFICANT CHANGE UP (ref 1.5–8.5)
IMM GRANULOCYTES NFR BLD AUTO: 0.4 % — SIGNIFICANT CHANGE UP (ref 0–1.5)
LIDOCAIN IGE QN: 77 U/L — HIGH (ref 7–60)
LYMPHOCYTES # BLD AUTO: 2.15 K/UL — SIGNIFICANT CHANGE UP (ref 1–3.3)
LYMPHOCYTES # BLD AUTO: 28.6 % — SIGNIFICANT CHANGE UP (ref 13–44)
MAGNESIUM SERPL-MCNC: 2.1 MG/DL — SIGNIFICANT CHANGE UP (ref 1.6–2.6)
MCHC RBC-ENTMCNC: 20.8 PG — LOW (ref 27–34)
MCHC RBC-ENTMCNC: 29.5 GM/DL — LOW (ref 32–36)
MCV RBC AUTO: 70.3 FL — LOW (ref 80–100)
MONOCYTES # BLD AUTO: 0.43 K/UL — SIGNIFICANT CHANGE UP (ref 0–0.9)
MONOCYTES NFR BLD AUTO: 5.7 % — SIGNIFICANT CHANGE UP (ref 2–14)
NEUTROPHILS # BLD AUTO: 4.78 K/UL — SIGNIFICANT CHANGE UP (ref 1.8–7.4)
NEUTROPHILS NFR BLD AUTO: 63.4 % — SIGNIFICANT CHANGE UP (ref 43–77)
NRBC # BLD: 0 /100 WBCS — SIGNIFICANT CHANGE UP
NRBC # FLD: 0 K/UL — SIGNIFICANT CHANGE UP
PLATELET # BLD AUTO: 214 K/UL — SIGNIFICANT CHANGE UP (ref 150–400)
POTASSIUM SERPL-MCNC: 3.9 MMOL/L — SIGNIFICANT CHANGE UP (ref 3.5–5.3)
POTASSIUM SERPL-SCNC: 3.9 MMOL/L — SIGNIFICANT CHANGE UP (ref 3.5–5.3)
PROT SERPL-MCNC: 8.8 G/DL — HIGH (ref 6–8.3)
RBC # BLD: 4.62 M/UL — SIGNIFICANT CHANGE UP (ref 3.8–5.2)
RBC # FLD: 22.6 % — HIGH (ref 10.3–14.5)
SODIUM SERPL-SCNC: 140 MMOL/L — SIGNIFICANT CHANGE UP (ref 135–145)
WBC # BLD: 7.53 K/UL — SIGNIFICANT CHANGE UP (ref 3.8–10.5)
WBC # FLD AUTO: 7.53 K/UL — SIGNIFICANT CHANGE UP (ref 3.8–10.5)

## 2021-04-19 PROCEDURE — 93010 ELECTROCARDIOGRAM REPORT: CPT

## 2021-04-19 PROCEDURE — 99285 EMERGENCY DEPT VISIT HI MDM: CPT | Mod: 25

## 2021-04-19 RX ORDER — SODIUM CHLORIDE 9 MG/ML
1000 INJECTION, SOLUTION INTRAVENOUS ONCE
Refills: 0 | Status: COMPLETED | OUTPATIENT
Start: 2021-04-19 | End: 2021-04-19

## 2021-04-19 RX ORDER — SODIUM CHLORIDE 9 MG/ML
1000 INJECTION, SOLUTION INTRAVENOUS
Refills: 0 | Status: DISCONTINUED | OUTPATIENT
Start: 2021-04-19 | End: 2021-04-19

## 2021-04-19 RX ORDER — FOLIC ACID 0.8 MG
1 TABLET ORAL ONCE
Refills: 0 | Status: COMPLETED | OUTPATIENT
Start: 2021-04-19 | End: 2021-04-19

## 2021-04-19 RX ORDER — ONDANSETRON 8 MG/1
4 TABLET, FILM COATED ORAL ONCE
Refills: 0 | Status: COMPLETED | OUTPATIENT
Start: 2021-04-19 | End: 2021-04-19

## 2021-04-19 RX ORDER — THIAMINE MONONITRATE (VIT B1) 100 MG
100 TABLET ORAL ONCE
Refills: 0 | Status: COMPLETED | OUTPATIENT
Start: 2021-04-19 | End: 2021-04-19

## 2021-04-19 RX ADMIN — SODIUM CHLORIDE 1000 MILLILITER(S): 9 INJECTION, SOLUTION INTRAVENOUS at 20:00

## 2021-04-19 RX ADMIN — SODIUM CHLORIDE 1000 MILLILITER(S): 9 INJECTION, SOLUTION INTRAVENOUS at 20:51

## 2021-04-19 RX ADMIN — Medication 1 TABLET(S): at 20:51

## 2021-04-19 RX ADMIN — Medication 25 MILLIGRAM(S): at 23:35

## 2021-04-19 RX ADMIN — Medication 100 MILLIGRAM(S): at 20:51

## 2021-04-19 RX ADMIN — Medication 1 MILLIGRAM(S): at 23:35

## 2021-04-19 RX ADMIN — Medication 25 MILLIGRAM(S): at 20:00

## 2021-04-19 RX ADMIN — SODIUM CHLORIDE 1000 MILLILITER(S): 9 INJECTION, SOLUTION INTRAVENOUS at 23:35

## 2021-04-19 RX ADMIN — Medication 25 MILLIGRAM(S): at 22:42

## 2021-04-19 RX ADMIN — ONDANSETRON 4 MILLIGRAM(S): 8 TABLET, FILM COATED ORAL at 20:00

## 2021-04-19 NOTE — ED ADULT TRIAGE NOTE - CHIEF COMPLAINT QUOTE
p/t with hx ETOH here for eval, p/t admits for binge drinking for past 2 weeks, p/t is awake and responsive, last alcohol intake this afternoon, denies SI

## 2021-04-19 NOTE — ED PROVIDER NOTE - OBJECTIVE STATEMENT
46yo female with pmh etoh abuse with withdrawal seizures, pancreatitis presenting with etoh withdrawal.  Last drink yesterday at 10pm.  She has been drinking 1 pint vodka daily for last few weeks.  Mom called ems for her.  Patient is endorsing nausea, tremors, epigastric discomfort.  No fevers.  No other drugs, medications.  Denies SI/ HI.

## 2021-04-19 NOTE — ED PROVIDER NOTE - CLINICAL SUMMARY MEDICAL DECISION MAKING FREE TEXT BOX
46yo female with pmh etoh abuse with withdrawal seizures, pancreatitis presenting with etoh withdrawal.  + Tremors, tongue fasciculations.  Will check labs to r/o pancreatitis, acidosis.  Dehydrated so will give fluids.  Treat withdrawal symptoms and reassess.  May require admission.

## 2021-04-19 NOTE — ED PROVIDER NOTE - PHYSICAL EXAMINATION
General appearance: NAD, conversant, afebrile    Eyes: anicteric sclerae, SEMAJ, EOMI   HENT: Atraumatic; oropharynx clear, dry MMM and no ulcerations, no pharyngeal erythema or exudate, + tongue fasiculations   Neck: Trachea midline; Full range of motion, supple   Pulm: CTA bl, normal respiratory effort and no intercostal retractions, normal work of breathing   CV: Tachycardic, regular, No murmurs, rubs, or gallops   Abdomen: Soft, epigastric tender, non-distended; no guarding or rebound   Extremities: No peripheral edema   Skin: Dry, normal temperature, turgor and texture; no rash   Psych: Appropriate affect, cooperative; alert and oriented to person, place and time

## 2021-04-19 NOTE — ED PROVIDER NOTE - ATTENDING CONTRIBUTION TO CARE
Patient is a 44 yo F with history of depression, ETOH abuse, pancreatitis here for evaluation of ETOH withdrawal. Patient reports she last drank yesterday at 10 pm. She states her mother called EMS. She states her mother just returned from [place] and called EMS when she saw she had been drinking heavily. Patient reports drinking about 1 pint daily for 2 weeks. She states she had been sober for a few months and prior to then about 6 years. She states she went through a break up. She is not clear as to whether she has been eating. Denies vomiting. + nausea. + epigastric pain. Denies drug use. Denies chest pain, shortness of breath.    VS noted  Gen: tremulous  HEENT: EOMI, dry MM, + tongue fasciculations  Lungs: CTAB/L no C/ W /R   CVS: tachycardic  Abd; Soft, ttp in epigastric region  Ext: no edema  Skin: no rash  Neuro AAOx3 non focal clear speech  a/p: ETOH withdrawal - chart history shows patient has a history of withdrawal seizures. Will check labs to assess for pancreatitis, alcoholic ketoacidosis, electrolyte abnormalities,  dehydration. Will give Librium. IVF. Patient likely to be admitted.   - Nadine MORSE

## 2021-04-19 NOTE — ED ADULT NURSE NOTE - OBJECTIVE STATEMENT
Break Coverage - Patient sent to ED for evaluation of self-reported heavy "binge drinking" over past 2 weeks.  States her mom was out of town in [place] and came home today, sent patient to ED to be examined.  Patient reports has long history of sobriety, but recently over past 2 weeks has been drinking 1 pt vodka per day.  States last alcohol intake was last night.  Denies abdominal pain, nausea/vomiting, hallucinations, tremors.  Pt received changed into hospital gown, belongings previously secured in triage and placed in closet across from room 21.  Vitals stable, no acute distress at present, awaiting provider evaluation and further plan of care.  Safety & fall measures in place, Will monitor.

## 2021-04-19 NOTE — ED PROCEDURE NOTE - ATTENDING CONTRIBUTION TO CARE
I have participated in and supervised all key portions of the above procedures and agree with the above documentation. - Nadine MORSE

## 2021-04-19 NOTE — ED ADULT NURSE NOTE - INTERVENTIONS DEFINITIONS
[place] to call system/Instruct patient to call for assistance/Non-slip footwear when patient is off stretcher/Physically safe environment: no spills, clutter or unnecessary equipment/Stretcher in lowest position, wheels locked, appropriate side rails in place/Monitor gait and stability/Review medications for side effects contributing to fall risk/Reinforce activity limits and safety measures with patient and family/Provide visual clues: red socks

## 2021-04-20 DIAGNOSIS — D64.9 ANEMIA, UNSPECIFIED: ICD-10-CM

## 2021-04-20 DIAGNOSIS — Z29.9 ENCOUNTER FOR PROPHYLACTIC MEASURES, UNSPECIFIED: ICD-10-CM

## 2021-04-20 DIAGNOSIS — F10.230 ALCOHOL DEPENDENCE WITH WITHDRAWAL, UNCOMPLICATED: ICD-10-CM

## 2021-04-20 DIAGNOSIS — I10 ESSENTIAL (PRIMARY) HYPERTENSION: ICD-10-CM

## 2021-04-20 LAB
ALBUMIN SERPL ELPH-MCNC: 3.5 G/DL — SIGNIFICANT CHANGE UP (ref 3.3–5)
ALP SERPL-CCNC: 57 U/L — SIGNIFICANT CHANGE UP (ref 40–120)
ALT FLD-CCNC: 34 U/L — HIGH (ref 4–33)
ANION GAP SERPL CALC-SCNC: 12 MMOL/L — SIGNIFICANT CHANGE UP (ref 7–14)
AST SERPL-CCNC: 65 U/L — HIGH (ref 4–32)
BILIRUB DIRECT SERPL-MCNC: <0.2 MG/DL — SIGNIFICANT CHANGE UP (ref 0–0.2)
BILIRUB INDIRECT FLD-MCNC: >0.5 MG/DL — SIGNIFICANT CHANGE UP (ref 0–1)
BILIRUB SERPL-MCNC: 0.7 MG/DL — SIGNIFICANT CHANGE UP (ref 0.2–1.2)
BUN SERPL-MCNC: 11 MG/DL — SIGNIFICANT CHANGE UP (ref 7–23)
CALCIUM SERPL-MCNC: 8.8 MG/DL — SIGNIFICANT CHANGE UP (ref 8.4–10.5)
CHLORIDE SERPL-SCNC: 96 MMOL/L — LOW (ref 98–107)
CO2 SERPL-SCNC: 28 MMOL/L — SIGNIFICANT CHANGE UP (ref 22–31)
CREAT SERPL-MCNC: 0.53 MG/DL — SIGNIFICANT CHANGE UP (ref 0.5–1.3)
GLUCOSE SERPL-MCNC: 95 MG/DL — SIGNIFICANT CHANGE UP (ref 70–99)
HCT VFR BLD CALC: 27.3 % — LOW (ref 34.5–45)
HCT VFR BLD CALC: 27.5 % — LOW (ref 34.5–45)
HGB BLD-MCNC: 7.9 G/DL — LOW (ref 11.5–15.5)
HGB BLD-MCNC: 7.9 G/DL — LOW (ref 11.5–15.5)
LACTATE SERPL-SCNC: 1.2 MMOL/L — SIGNIFICANT CHANGE UP (ref 0.5–2)
MAGNESIUM SERPL-MCNC: 1.7 MG/DL — SIGNIFICANT CHANGE UP (ref 1.6–2.6)
MCHC RBC-ENTMCNC: 20.5 PG — LOW (ref 27–34)
MCHC RBC-ENTMCNC: 20.5 PG — LOW (ref 27–34)
MCHC RBC-ENTMCNC: 28.7 GM/DL — LOW (ref 32–36)
MCHC RBC-ENTMCNC: 28.9 GM/DL — LOW (ref 32–36)
MCV RBC AUTO: 70.7 FL — LOW (ref 80–100)
MCV RBC AUTO: 71.4 FL — LOW (ref 80–100)
NRBC # BLD: 0 /100 WBCS — SIGNIFICANT CHANGE UP
NRBC # BLD: 0 /100 WBCS — SIGNIFICANT CHANGE UP
NRBC # FLD: 0 K/UL — SIGNIFICANT CHANGE UP
NRBC # FLD: 0 K/UL — SIGNIFICANT CHANGE UP
PHOSPHATE SERPL-MCNC: 2 MG/DL — LOW (ref 2.5–4.5)
PLATELET # BLD AUTO: 147 K/UL — LOW (ref 150–400)
PLATELET # BLD AUTO: 150 K/UL — SIGNIFICANT CHANGE UP (ref 150–400)
POTASSIUM SERPL-MCNC: 3.8 MMOL/L — SIGNIFICANT CHANGE UP (ref 3.5–5.3)
POTASSIUM SERPL-SCNC: 3.8 MMOL/L — SIGNIFICANT CHANGE UP (ref 3.5–5.3)
PROT SERPL-MCNC: 7.2 G/DL — SIGNIFICANT CHANGE UP (ref 6–8.3)
RBC # BLD: 3.85 M/UL — SIGNIFICANT CHANGE UP (ref 3.8–5.2)
RBC # BLD: 3.86 M/UL — SIGNIFICANT CHANGE UP (ref 3.8–5.2)
RBC # FLD: 22.1 % — HIGH (ref 10.3–14.5)
RBC # FLD: 22.1 % — HIGH (ref 10.3–14.5)
SARS-COV-2 RNA SPEC QL NAA+PROBE: SIGNIFICANT CHANGE UP
SODIUM SERPL-SCNC: 136 MMOL/L — SIGNIFICANT CHANGE UP (ref 135–145)
WBC # BLD: 5.26 K/UL — SIGNIFICANT CHANGE UP (ref 3.8–10.5)
WBC # BLD: 5.54 K/UL — SIGNIFICANT CHANGE UP (ref 3.8–10.5)
WBC # FLD AUTO: 5.26 K/UL — SIGNIFICANT CHANGE UP (ref 3.8–10.5)
WBC # FLD AUTO: 5.54 K/UL — SIGNIFICANT CHANGE UP (ref 3.8–10.5)

## 2021-04-20 PROCEDURE — 99223 1ST HOSP IP/OBS HIGH 75: CPT

## 2021-04-20 RX ORDER — SODIUM CHLORIDE 9 MG/ML
1000 INJECTION, SOLUTION INTRAVENOUS ONCE
Refills: 0 | Status: COMPLETED | OUTPATIENT
Start: 2021-04-20 | End: 2021-04-20

## 2021-04-20 RX ORDER — ALPRAZOLAM 0.25 MG
1 TABLET ORAL
Qty: 0 | Refills: 0 | DISCHARGE

## 2021-04-20 RX ORDER — SODIUM CHLORIDE 9 MG/ML
1000 INJECTION INTRAMUSCULAR; INTRAVENOUS; SUBCUTANEOUS
Refills: 0 | Status: DISCONTINUED | OUTPATIENT
Start: 2021-04-20 | End: 2021-04-20

## 2021-04-20 RX ORDER — FOLIC ACID 0.8 MG
1 TABLET ORAL DAILY
Refills: 0 | Status: DISCONTINUED | OUTPATIENT
Start: 2021-04-20 | End: 2021-04-22

## 2021-04-20 RX ORDER — SODIUM CHLORIDE 9 MG/ML
1000 INJECTION, SOLUTION INTRAVENOUS
Refills: 0 | Status: DISCONTINUED | OUTPATIENT
Start: 2021-04-20 | End: 2021-04-20

## 2021-04-20 RX ORDER — ONDANSETRON 8 MG/1
4 TABLET, FILM COATED ORAL EVERY 6 HOURS
Refills: 0 | Status: DISCONTINUED | OUTPATIENT
Start: 2021-04-20 | End: 2021-04-22

## 2021-04-20 RX ORDER — SODIUM CHLORIDE 9 MG/ML
1000 INJECTION, SOLUTION INTRAVENOUS
Refills: 0 | Status: DISCONTINUED | OUTPATIENT
Start: 2021-04-20 | End: 2021-04-22

## 2021-04-20 RX ORDER — MAGNESIUM SULFATE 500 MG/ML
1 VIAL (ML) INJECTION ONCE
Refills: 0 | Status: COMPLETED | OUTPATIENT
Start: 2021-04-20 | End: 2021-04-20

## 2021-04-20 RX ORDER — HYDRALAZINE HCL 50 MG
2.5 TABLET ORAL ONCE
Refills: 0 | Status: COMPLETED | OUTPATIENT
Start: 2021-04-20 | End: 2021-04-20

## 2021-04-20 RX ORDER — THIAMINE MONONITRATE (VIT B1) 100 MG
100 TABLET ORAL DAILY
Refills: 0 | Status: COMPLETED | OUTPATIENT
Start: 2021-04-20 | End: 2021-04-22

## 2021-04-20 RX ORDER — ENOXAPARIN SODIUM 100 MG/ML
40 INJECTION SUBCUTANEOUS DAILY
Refills: 0 | Status: DISCONTINUED | OUTPATIENT
Start: 2021-04-20 | End: 2021-04-22

## 2021-04-20 RX ORDER — ONDANSETRON 8 MG/1
4 TABLET, FILM COATED ORAL ONCE
Refills: 0 | Status: COMPLETED | OUTPATIENT
Start: 2021-04-20 | End: 2021-04-20

## 2021-04-20 RX ADMIN — Medication 50 MILLIGRAM(S): at 14:08

## 2021-04-20 RX ADMIN — Medication 63.75 MILLIMOLE(S): at 13:09

## 2021-04-20 RX ADMIN — Medication 50 MILLIGRAM(S): at 02:09

## 2021-04-20 RX ADMIN — Medication 1 MILLIGRAM(S): at 14:08

## 2021-04-20 RX ADMIN — ONDANSETRON 4 MILLIGRAM(S): 8 TABLET, FILM COATED ORAL at 07:54

## 2021-04-20 RX ADMIN — Medication 50 MILLIGRAM(S): at 08:47

## 2021-04-20 RX ADMIN — Medication 2 MILLIGRAM(S): at 11:50

## 2021-04-20 RX ADMIN — Medication 1 TABLET(S): at 14:08

## 2021-04-20 RX ADMIN — ONDANSETRON 4 MILLIGRAM(S): 8 TABLET, FILM COATED ORAL at 04:49

## 2021-04-20 RX ADMIN — Medication 2 MILLIGRAM(S): at 07:21

## 2021-04-20 RX ADMIN — ONDANSETRON 4 MILLIGRAM(S): 8 TABLET, FILM COATED ORAL at 18:21

## 2021-04-20 RX ADMIN — Medication 100 MILLIGRAM(S): at 14:08

## 2021-04-20 RX ADMIN — SODIUM CHLORIDE 1000 MILLILITER(S): 9 INJECTION, SOLUTION INTRAVENOUS at 10:32

## 2021-04-20 RX ADMIN — Medication 2 MILLIGRAM(S): at 00:49

## 2021-04-20 RX ADMIN — Medication 100 GRAM(S): at 11:50

## 2021-04-20 RX ADMIN — SODIUM CHLORIDE 100 MILLILITER(S): 9 INJECTION, SOLUTION INTRAVENOUS at 17:33

## 2021-04-20 RX ADMIN — Medication 50 MILLIGRAM(S): at 20:12

## 2021-04-20 RX ADMIN — Medication 2 MILLIGRAM(S): at 15:20

## 2021-04-20 RX ADMIN — Medication 2.5 MILLIGRAM(S): at 04:49

## 2021-04-20 NOTE — H&P ADULT - NSHPREVIEWOFSYSTEMS_GEN_ALL_CORE
Constitutional Symptoms: No weakness, fevers, chills, weight loss  Eyes: No visual changes, headache, eye pain, double vision  Ears, Nose, Mouth, Throat: No runny nose, sinus pain, ear pain, tinnitus, sore throat, dysphagia, odynophagia  Cardiovascular: No chest pain, palpitations, edema  Respiratory: No cough, wheezing, hemoptysis, shortness of breath  Gastrointestinal: +nausea, no abdominal pain, dysphagia, anorexia, vomiting, diarrhea/constipation, hematemesis, BRBPR, melena  Genitourinary: No dysuria, frequency, hematuria  Musculoskeletal: No joint pain, joint swelling, decreased ROM  Skin: No pruritus, rashes, lesions, wounds  Neurologic:  No seizures, headache, paraesthesia, numbness, limb weakness    Positives and pertinent negatives noted and all other systems negative.

## 2021-04-20 NOTE — SBIRT NOTE ADULT - NSSBIRTBRIEFINTDET_GEN_A_CORE
Provided SBIRT services: Full screen positive. Brief Intervention Performed. Screening results were reviewed with the patient and patient was provided information about healthy guidelines and potential negative consequences associated with level of risk. Motivation and readiness to reduce or stop use was discussed and goals and activities to make changes were suggested/offered.  Pt states she has been to inpatient rehabs and sober houses, is currently meeting with mental health counselors, has attended AA meeting with sponsor, however, has difficulty maintaining sobriety. Pt expresses she would like to connect with AA sponsor again and will make that a priority. Pt enrolled in [facility] for follow-up and external navigation.

## 2021-04-20 NOTE — ED ADULT NURSE REASSESSMENT NOTE - NS ED NURSE REASSESS COMMENT FT1
pt calm and cooperative at this time. Admitting MD at bedside assessing patient. Pt has moderate tremors with arms extended also c/o mild nausea, no vomiting, MD notified and aware. Respirations even and unlabored, no accessory muscle use.

## 2021-04-20 NOTE — H&P ADULT - PROBLEM SELECTOR PLAN 1
start librium taper as per prev admissions, tolerated well w/ ativan PRN in place, zofran PRN, repeat EKG in AM monitor QTc

## 2021-04-20 NOTE — H&P ADULT - NSHPPHYSICALEXAM_GEN_ALL_CORE
T(C): 36.7 (04-20-21 @ 00:39), Max: 36.8 (04-19-21 @ 18:45)  HR: 95 (04-20-21 @ 00:39) (81 - 98)  BP: 140/101 (04-20-21 @ 00:39) (130/89 - 145/92)  RR: 20 (04-20-21 @ 00:39) (18 - 20)  SpO2: 98% (04-20-21 @ 00:39) (97% - 98%)    Constitutional: NAD, well-developed, well-nourished  Ears, Nose, Mouth, and Throat: normal external ears and nose, normal hearing, moist oral mucosa  Eyes: normal conjunctiva, EOMI, PERRL  Neck: supple, no JVD  Respiratory: Clear to auscultation bilaterally. No wheezes, rales or rhonchi. Normal respiratory effort  Cardiovascular: RRR, no M/R/G, no edema, 2+ Peripheral Pulses  Gastrointestinal: soft, nontender, nondistended, +BS, no hernia  Skin: warm, dry, no rash  Neurologic: sensation grossly intact, CN grossly intact, non-focal exam  Musculoskeletal: no clubbing, no cyanosis, no joint swelling  Psychiatric: AOX3, appropriate mood, affect

## 2021-04-20 NOTE — H&P ADULT - HISTORY OF PRESENT ILLNESS
Patient is a 44 y/o F PMH EtOH abuse w/ withdrawal seizures, pancreatitis, depression p/w EtOH withdrawal. Reports drinking 1 pint of vodka for the past few weeks, last drink 4/18 at 10pm. Reports nausea w/ no vomiting, tremors, anxiety, otherwise no other complaints.

## 2021-04-20 NOTE — ED ADULT NURSE REASSESSMENT NOTE - NS ED NURSE REASSESS COMMENT FT1
Report [facility] Respirations even and unlabored, no accessory muscle use. 18g IV to R AC intact

## 2021-04-20 NOTE — H&P ADULT - NSHPLABSRESULTS_GEN_ALL_CORE
04-19    140  |  93<L>  |  13  ----------------------------<  101<H>  3.9   |  27  |  0.54    Ca    9.4      19 Apr 2021 20:06  Mg     2.1     04-19    TPro  8.8<H>  /  Alb  4.6  /  TBili  0.5  /  DBili  x   /  AST  78<H>  /  ALT  44<H>  /  AlkPhos  75  04-19                          9.6    7.53  )-----------( 214      ( 19 Apr 2021 20:06 )             32.5     LIVER FUNCTIONS - ( 19 Apr 2021 20:06 )  Alb: 4.6 g/dL / Pro: 8.8 g/dL / ALK PHOS: 75 U/L / ALT: 44 U/L / AST: 78 U/L / GGT: x             EKG personally reviewed, NSR, QTc 486 w/ TWI in V1, no sig change from prev EKG, otherwise no acute findings

## 2021-04-21 LAB
ALBUMIN SERPL ELPH-MCNC: 3.3 G/DL — SIGNIFICANT CHANGE UP (ref 3.3–5)
ALP SERPL-CCNC: 60 U/L — SIGNIFICANT CHANGE UP (ref 40–120)
ALT FLD-CCNC: 27 U/L — SIGNIFICANT CHANGE UP (ref 4–33)
ANION GAP SERPL CALC-SCNC: 11 MMOL/L — SIGNIFICANT CHANGE UP (ref 7–14)
APPEARANCE UR: CLEAR — SIGNIFICANT CHANGE UP
AST SERPL-CCNC: 41 U/L — HIGH (ref 4–32)
BILIRUB SERPL-MCNC: 0.6 MG/DL — SIGNIFICANT CHANGE UP (ref 0.2–1.2)
BILIRUB UR-MCNC: NEGATIVE — SIGNIFICANT CHANGE UP
BUN SERPL-MCNC: 6 MG/DL — LOW (ref 7–23)
CALCIUM SERPL-MCNC: 8.9 MG/DL — SIGNIFICANT CHANGE UP (ref 8.4–10.5)
CHLORIDE SERPL-SCNC: 98 MMOL/L — SIGNIFICANT CHANGE UP (ref 98–107)
CO2 SERPL-SCNC: 26 MMOL/L — SIGNIFICANT CHANGE UP (ref 22–31)
COLOR SPEC: SIGNIFICANT CHANGE UP
COVID-19 SPIKE DOMAIN AB INTERP: POSITIVE
COVID-19 SPIKE DOMAIN ANTIBODY RESULT: >250 U/ML — HIGH
CREAT SERPL-MCNC: 0.56 MG/DL — SIGNIFICANT CHANGE UP (ref 0.5–1.3)
DIFF PNL FLD: ABNORMAL
FERRITIN SERPL-MCNC: 32 NG/ML — SIGNIFICANT CHANGE UP (ref 15–150)
FOLATE SERPL-MCNC: 17.4 NG/ML — SIGNIFICANT CHANGE UP (ref 3.1–17.5)
GLUCOSE SERPL-MCNC: 153 MG/DL — HIGH (ref 70–99)
GLUCOSE UR QL: NEGATIVE — SIGNIFICANT CHANGE UP
HCT VFR BLD CALC: 27.9 % — LOW (ref 34.5–45)
HGB BLD-MCNC: 8.3 G/DL — LOW (ref 11.5–15.5)
IRON SATN MFR SERPL: 11 % — LOW (ref 14–50)
IRON SATN MFR SERPL: 30 UG/DL — SIGNIFICANT CHANGE UP (ref 30–160)
KETONES UR-MCNC: NEGATIVE — SIGNIFICANT CHANGE UP
LEUKOCYTE ESTERASE UR-ACNC: NEGATIVE — SIGNIFICANT CHANGE UP
MAGNESIUM SERPL-MCNC: 1.6 MG/DL — SIGNIFICANT CHANGE UP (ref 1.6–2.6)
MCHC RBC-ENTMCNC: 20.9 PG — LOW (ref 27–34)
MCHC RBC-ENTMCNC: 29.7 GM/DL — LOW (ref 32–36)
MCV RBC AUTO: 70.3 FL — LOW (ref 80–100)
NITRITE UR-MCNC: NEGATIVE — SIGNIFICANT CHANGE UP
NRBC # BLD: 0 /100 WBCS — SIGNIFICANT CHANGE UP
NRBC # FLD: 0 K/UL — SIGNIFICANT CHANGE UP
PH UR: 8.5 — HIGH (ref 5–8)
PHOSPHATE SERPL-MCNC: 3.2 MG/DL — SIGNIFICANT CHANGE UP (ref 2.5–4.5)
PLATELET # BLD AUTO: 133 K/UL — LOW (ref 150–400)
POTASSIUM SERPL-MCNC: 3.3 MMOL/L — LOW (ref 3.5–5.3)
POTASSIUM SERPL-SCNC: 3.3 MMOL/L — LOW (ref 3.5–5.3)
PROT SERPL-MCNC: 6.8 G/DL — SIGNIFICANT CHANGE UP (ref 6–8.3)
PROT UR-MCNC: NEGATIVE — SIGNIFICANT CHANGE UP
RBC # BLD: 3.97 M/UL — SIGNIFICANT CHANGE UP (ref 3.8–5.2)
RBC # FLD: 22.2 % — HIGH (ref 10.3–14.5)
SARS-COV-2 IGG+IGM SERPL QL IA: >250 U/ML — HIGH
SARS-COV-2 IGG+IGM SERPL QL IA: POSITIVE
SODIUM SERPL-SCNC: 135 MMOL/L — SIGNIFICANT CHANGE UP (ref 135–145)
SP GR SPEC: 1.01 — SIGNIFICANT CHANGE UP (ref 1.01–1.02)
TIBC SERPL-MCNC: 278 UG/DL — SIGNIFICANT CHANGE UP (ref 220–430)
UIBC SERPL-MCNC: 248 UG/DL — SIGNIFICANT CHANGE UP (ref 110–370)
UROBILINOGEN FLD QL: SIGNIFICANT CHANGE UP
VIT B12 SERPL-MCNC: 916 PG/ML — HIGH (ref 200–900)
WBC # BLD: 4.53 K/UL — SIGNIFICANT CHANGE UP (ref 3.8–10.5)
WBC # FLD AUTO: 4.53 K/UL — SIGNIFICANT CHANGE UP (ref 3.8–10.5)

## 2021-04-21 RX ORDER — POTASSIUM CHLORIDE 20 MEQ
40 PACKET (EA) ORAL ONCE
Refills: 0 | Status: COMPLETED | OUTPATIENT
Start: 2021-04-21 | End: 2021-04-21

## 2021-04-21 RX ADMIN — SODIUM CHLORIDE 100 MILLILITER(S): 9 INJECTION, SOLUTION INTRAVENOUS at 14:15

## 2021-04-21 RX ADMIN — Medication 1 TABLET(S): at 12:10

## 2021-04-21 RX ADMIN — SODIUM CHLORIDE 100 MILLILITER(S): 9 INJECTION, SOLUTION INTRAVENOUS at 12:10

## 2021-04-21 RX ADMIN — Medication 1 MILLIGRAM(S): at 12:10

## 2021-04-21 RX ADMIN — Medication 50 MILLIGRAM(S): at 05:57

## 2021-04-21 RX ADMIN — Medication 50 MILLIGRAM(S): at 22:06

## 2021-04-21 RX ADMIN — Medication 50 MILLIGRAM(S): at 14:14

## 2021-04-21 RX ADMIN — Medication 40 MILLIEQUIVALENT(S): at 09:41

## 2021-04-21 RX ADMIN — Medication 100 MILLIGRAM(S): at 12:10

## 2021-04-21 NOTE — PROGRESS NOTE ADULT - ASSESSMENT
45 F PMH EtOH abuse w/ withdrawal seizures, pancreatitis, depression p/w EtOH withdrawal
45 F PMH EtOH abuse w/ withdrawal seizures, pancreatitis, depression p/w EtOH withdrawal

## 2021-04-21 NOTE — PROGRESS NOTE ADULT - PROBLEM SELECTOR PLAN 1
start librium taper   tolerated well w/ ativan PRN in place   zofran PRN,  serial EKG  folate and thiamin  IV hydration   fall precautions  monitor closely for DT  low threshold for mICU eval
start librium taper   tolerated well w/ ativan PRN in place   zofran PRN,  serial EKG  folate and thiamin  IV hydration   fall precautions  monitor closely for DT  low threshold for mICU eval

## 2021-04-21 NOTE — PROGRESS NOTE ADULT - PROBLEM SELECTOR PLAN 2
microcytic anemia   drop in hgb level from prior admissions , stable   anemia W/U, noted   outpatient follow up

## 2021-04-21 NOTE — PROGRESS NOTE ADULT - SUBJECTIVE AND OBJECTIVE BOX
Name of Patient : AYLEEN DEY  MRN: [id number]  DATE OF SERVICE: 04-20-21     Subjective: Patient seen and examined. No new events except as noted.   doing okay  mild shake UE     REVIEW OF SYSTEMS:    CONSTITUTIONAL: No weakness, fevers or chills  EYES/ENT: No visual changes;  No vertigo or throat pain   NECK: No pain or stiffness  RESPIRATORY: No cough, wheezing, hemoptysis; No shortness of breath  CARDIOVASCULAR: No chest pain or palpitations  GASTROINTESTINAL: No abdominal or epigastric pain. No nausea, vomiting, or hematemesis; No diarrhea or constipation. No melena or hematochezia.  GENITOURINARY: No dysuria, frequency or hematuria  NEUROLOGICAL: No numbness or weakness  SKIN: No itching, burning, rashes, or lesions   All other review of systems is negative unless indicated above.    MEDICATIONS:  MEDICATIONS  (STANDING):  chlordiazePOXIDE   Oral   dextrose 5% + sodium chloride 0.9%. 1000 milliLiter(s) (100 mL/Hr) IV Continuous <Continuous>  enoxaparin Injectable 40 milliGRAM(s) SubCutaneous daily  folic acid 1 milliGRAM(s) Oral daily  multivitamin 1 Tablet(s) Oral daily  thiamine 100 milliGRAM(s) Oral daily      PHYSICAL EXAM:  T(C): 36.8 (04-20-21 @ 22:54), Max: 37.1 (04-20-21 @ 15:13)  HR: 92 (04-20-21 @ 22:54) (92 - 112)  BP: 149/104 (04-20-21 @ 22:54) (140/83 - 164/107)  RR: 17 (04-20-21 @ 22:54) (17 - 20)  SpO2: 99% (04-20-21 @ 22:54) (96% - 100%)  Wt(kg): --  I&O's Summary        Appearance: Normal	  HEENT:  PERRLA   Lymphatic: No lymphadenopathy   Cardiovascular: Normal S1 S2, no JVD  Respiratory: normal effort , clear  Gastrointestinal:  Soft, Non-tender  Skin: No rashes,  warm to touch  Psychiatry:  Mood & affect appropriate  Musculuskeletal: No edema      All labs, Imaging and EKGs personally reviewed                             7.9    5.26  )-----------( 150      ( 20 Apr 2021 15:26 )             27.5               04-20    136  |  96<L>  |  11  ----------------------------<  95  3.8   |  28  |  0.53    Ca    8.8      20 Apr 2021 08:06  Phos  2.0     04-20  Mg     1.7     04-20    TPro  7.2  /  Alb  3.5  /  TBili  0.7  /  DBili  <0.2  /  AST  65<H>  /  ALT  34<H>  /  AlkPhos  57  04-20                                         
Name of Patient : AYLEEN DEY  MRN: [id number]  DATE OF SERVICE: 21     Subjective: Patient seen and examined. No new events except as noted.   calm   mild tremors UE     REVIEW OF SYSTEMS:    CONSTITUTIONAL: No weakness, fevers or chills  EYES/ENT: No visual changes;  No vertigo or throat pain   NECK: No pain or stiffness  RESPIRATORY: No cough, wheezing, hemoptysis; No shortness of breath  CARDIOVASCULAR: No chest pain or palpitations  GASTROINTESTINAL: No abdominal or epigastric pain. No nausea, vomiting, or hematemesis; No diarrhea or constipation. No melena or hematochezia.  GENITOURINARY: No dysuria, frequency or hematuria  NEUROLOGICAL: No numbness or weakness  SKIN: No itching, burning, rashes, or lesions   All other review of systems is negative unless indicated above.    MEDICATIONS:  MEDICATIONS  (STANDING):  chlordiazePOXIDE   Oral   dextrose 5% + sodium chloride 0.9%. 1000 milliLiter(s) (100 mL/Hr) IV Continuous <Continuous>  enoxaparin Injectable 40 milliGRAM(s) SubCutaneous daily  folic acid 1 milliGRAM(s) Oral daily  multivitamin 1 Tablet(s) Oral daily  thiamine 100 milliGRAM(s) Oral daily      PHYSICAL EXAM:  T(C): 37 (21 @ 19:54), Max: 37 (21 @ 15:54)  HR: 87 (21 @ 19:54) (81 - 118)  BP: 148/99 (21 @ 19:54) (136/97 - 157/113)  RR: 17 (21 @ 19:54) (16 - 18)  SpO2: 100% (21 @ 19:54) (95% - 100%)  Wt(kg): --  I&O's Summary    2021 07:01  -  2021 00:02  --------------------------------------------------------  IN: 1300 mL / OUT: 600 mL / NET: 700 mL        Weight (kg): 68.3 ( @ 06:51)    Appearance: Normal	  HEENT:  PERRLA   Lymphatic: No lymphadenopathy   Cardiovascular: Normal S1 S2, no JVD  Respiratory: normal effort , clear  Gastrointestinal:  Soft, Non-tender  Skin: No rashes,  warm to touch  Psychiatry:  Mood & affect appropriate  Musculuskeletal: No edema      All labs, Imaging and EKGs personally reviewed         21 @ 07:01  -  21 @ 00:02  --------------------------------------------------------  IN: 1300 mL / OUT: 600 mL / NET: 700 mL                          8.3    4.53  )-----------( 133      ( 2021 06:15 )             27.9                   135  |  98  |  6<L>  ----------------------------<  153<H>  3.3<L>   |  26  |  0.56    Ca    8.9      2021 06:15  Phos  3.2       Mg     1.6         TPro  6.8  /  Alb  3.3  /  TBili  0.6  /  DBili  x   /  AST  41<H>  /  ALT  27  /  AlkPhos  60                         Urinalysis Basic - ( 2021 18:02 )    Color: Light Yellow / Appearance: Clear / S.011 / pH: x  Gluc: x / Ketone: Negative  / Bili: Negative / Urobili: <2 mg/dL   Blood: x / Protein: Negative / Nitrite: Negative   Leuk Esterase: Negative / RBC: 1 /HPF / WBC 0 /HPF   Sq Epi: x / Non Sq Epi: 1 /HPF / Bacteria: Negative

## 2021-04-22 ENCOUNTER — TRANSCRIPTION ENCOUNTER (OUTPATIENT)
Age: 46
End: 2021-04-22

## 2021-04-22 VITALS
TEMPERATURE: 98 F | HEART RATE: 90 BPM | RESPIRATION RATE: 18 BRPM | SYSTOLIC BLOOD PRESSURE: 128 MMHG | DIASTOLIC BLOOD PRESSURE: 96 MMHG | OXYGEN SATURATION: 100 %

## 2021-04-22 LAB
ANION GAP SERPL CALC-SCNC: 10 MMOL/L — SIGNIFICANT CHANGE UP (ref 7–14)
BUN SERPL-MCNC: 6 MG/DL — LOW (ref 7–23)
CALCIUM SERPL-MCNC: 8.9 MG/DL — SIGNIFICANT CHANGE UP (ref 8.4–10.5)
CHLORIDE SERPL-SCNC: 103 MMOL/L — SIGNIFICANT CHANGE UP (ref 98–107)
CO2 SERPL-SCNC: 23 MMOL/L — SIGNIFICANT CHANGE UP (ref 22–31)
CREAT SERPL-MCNC: 0.51 MG/DL — SIGNIFICANT CHANGE UP (ref 0.5–1.3)
GLUCOSE SERPL-MCNC: 158 MG/DL — HIGH (ref 70–99)
HCT VFR BLD CALC: 28 % — LOW (ref 34.5–45)
HGB BLD-MCNC: 8.1 G/DL — LOW (ref 11.5–15.5)
MAGNESIUM SERPL-MCNC: 1.7 MG/DL — SIGNIFICANT CHANGE UP (ref 1.6–2.6)
MCHC RBC-ENTMCNC: 21 PG — LOW (ref 27–34)
MCHC RBC-ENTMCNC: 28.9 GM/DL — LOW (ref 32–36)
MCV RBC AUTO: 72.7 FL — LOW (ref 80–100)
NRBC # BLD: 0 /100 WBCS — SIGNIFICANT CHANGE UP
NRBC # FLD: 0 K/UL — SIGNIFICANT CHANGE UP
PHOSPHATE SERPL-MCNC: 3.6 MG/DL — SIGNIFICANT CHANGE UP (ref 2.5–4.5)
PLATELET # BLD AUTO: 121 K/UL — LOW (ref 150–400)
POTASSIUM SERPL-MCNC: 3.7 MMOL/L — SIGNIFICANT CHANGE UP (ref 3.5–5.3)
POTASSIUM SERPL-SCNC: 3.7 MMOL/L — SIGNIFICANT CHANGE UP (ref 3.5–5.3)
RBC # BLD: 3.85 M/UL — SIGNIFICANT CHANGE UP (ref 3.8–5.2)
RBC # FLD: 22.5 % — HIGH (ref 10.3–14.5)
SODIUM SERPL-SCNC: 136 MMOL/L — SIGNIFICANT CHANGE UP (ref 135–145)
WBC # BLD: 4.04 K/UL — SIGNIFICANT CHANGE UP (ref 3.8–10.5)
WBC # FLD AUTO: 4.04 K/UL — SIGNIFICANT CHANGE UP (ref 3.8–10.5)

## 2021-04-22 RX ORDER — FOLIC ACID 0.8 MG
1 TABLET ORAL
Qty: 0 | Refills: 0 | DISCHARGE
Start: 2021-04-22

## 2021-04-22 RX ORDER — THIAMINE MONONITRATE (VIT B1) 100 MG
1 TABLET ORAL
Qty: 0 | Refills: 0 | DISCHARGE
Start: 2021-04-22

## 2021-04-22 RX ADMIN — Medication 100 MILLIGRAM(S): at 11:57

## 2021-04-22 RX ADMIN — Medication 1 MILLIGRAM(S): at 11:57

## 2021-04-22 RX ADMIN — Medication 1 TABLET(S): at 11:57

## 2021-04-22 NOTE — DISCHARGE NOTE PROVIDER - NSFOLLOWUPCLINICS_GEN_ALL_ED_FT
[facility]  Gastroenterology  [address] [place] [address] [address]  [place] [address]  Phone: [phone]  Fax:

## 2021-04-22 NOTE — PROVIDER CONTACT NOTE (OTHER) - BACKGROUND
Patient A&Ox4, admitted for alcohol withdrawal
patient admitted for alcohol withdrawal
s/p cath R radial site, band remains on d/t continued bleeding.
Patient admitted for CIWA withdrawal

## 2021-04-22 NOTE — DISCHARGE NOTE PROVIDER - NSDCFUADDAPPT_GEN_ALL_CORE_FT
If you are in need of a general medicine physician and post-discharge medical follow-up for further care/recommendations you may contact the [facility] for an appointment [phone]

## 2021-04-22 NOTE — DISCHARGE NOTE PROVIDER - NSDCCPCAREPLAN_GEN_ALL_CORE_FT
PRINCIPAL DISCHARGE DIAGNOSIS  Diagnosis: Alcohol withdrawal syndrome without complication  Assessment and Plan of Treatment: You came to the hospital for alcohol withdrawal. You were monitored closely on a librium taper and have improved. Please abstain from drinking to prevent health complications.      SECONDARY DISCHARGE DIAGNOSES  Diagnosis: Anemia  Assessment and Plan of Treatment: Anemia    Diagnosis: HTN (hypertension)  Assessment and Plan of Treatment: HTN (hypertension)     PRINCIPAL DISCHARGE DIAGNOSIS  Diagnosis: Alcohol withdrawal syndrome without complication  Assessment and Plan of Treatment: You came to the hospital for alcohol withdrawal. You were monitored closely on a librium taper and have improved. Please abstain from drinking to prevent health complications. Take a daily multivitamin, folate and thiamine.      SECONDARY DISCHARGE DIAGNOSES  Diagnosis: Anemia  Assessment and Plan of Treatment: You were found to be more anemic than your normal blood counts. You are not having any episodes of bleeding. It is advised you follow up with your primary care physician for further anemia workup. You should see a gastroenterologist (GI doctor).

## 2021-04-22 NOTE — DISCHARGE NOTE PROVIDER - NSDCMRMEDTOKEN_GEN_ALL_CORE_FT
Ambien 10 mg oral tablet: 1 tab(s) orally once a day (at bedtime), As Needed   Ambien 10 mg oral tablet: 1 tab(s) orally once a day (at bedtime), As Needed  folic acid 1 mg oral tablet: 1 tab(s) orally once a day  Multiple Vitamins oral tablet: 1 tab(s) orally once a day  thiamine 100 mg oral tablet: 1 tab(s) orally once a day

## 2021-04-22 NOTE — DISCHARGE NOTE PROVIDER - HOSPITAL COURSE
44 y/o F with PMHx of EtOH abuse w/ withdrawal seizures, pancreatitis, depression p/w EtOH withdrawal. Monitored on CIWA protocol and librium taper without complications. Per Dr. Guajardo, ok to discharge patient prior to completion of librium taper. Patient with anemia (Hgb 8-9; last admission Hgb 11). Not symptomatic and without melena or hematochezia. Outpatient PCP and GI follow up advised.     Case discussed with Dr. Guajardo on 4/22/2021. The patient is medically stable for discharge.

## 2021-04-22 NOTE — DISCHARGE NOTE NURSING/CASE MANAGEMENT/SOCIAL WORK - NSDCFUADDAPPT_GEN_ALL_CORE_FT
If you are in need of a general medicine physician and post-discharge medical follow-up for further care/recommendations you may contact the [facility] for an appointment [phone]

## 2021-04-22 NOTE — PROVIDER CONTACT NOTE (OTHER) - ASSESSMENT
CIWA score 8
/73  HR 75 bpm   O2 sat 100% on RA   Temp 97.5 oral    +2 pulses bilateral UE
Patient A&Ox4, no S/S of HTN noted or voiced. Patient laying in bed.
Patient A&Ox4, no S/S of HTN noted or voiced. Patient laying in bed.

## 2021-04-22 NOTE — PROVIDER CONTACT NOTE (OTHER) - SITUATION
[facility] Room 402A  Pt. complained of left sided chest pain. Pain rating of 5. Pt. states it radiates to back.
, /109
/100
Patients /106

## 2021-04-22 NOTE — DISCHARGE NOTE NURSING/CASE MANAGEMENT/SOCIAL WORK - PATIENT PORTAL LINK FT
You can access the FollowMyHealth Patient Portal offered by [facility] by registering at the following website: [email or web address]. By joining [facility]’s FollowMyHealth portal, you will also be able to view your health information using other applications (apps) compatible with our system.

## 2021-04-22 NOTE — PROVIDER CONTACT NOTE (OTHER) - ACTION/TREATMENT ORDERED:
EKG performed at bedside
Continue to monitor.
Will talk to attending, continue to monitor.
no actions ordered. will give Ativan as per PRN order

## 2021-05-05 ENCOUNTER — INPATIENT (INPATIENT)
Facility: HOSPITAL | Age: 46
LOS: 2 days | Discharge: HOME | DRG: 897 | End: 2021-05-08
Attending: INTERNAL MEDICINE | Admitting: INTERNAL MEDICINE
Payer: MEDICAID

## 2021-05-05 VITALS
DIASTOLIC BLOOD PRESSURE: 98 MMHG | HEIGHT: 67 IN | RESPIRATION RATE: 20 BRPM | HEART RATE: 116 BPM | SYSTOLIC BLOOD PRESSURE: 138 MMHG | WEIGHT: 139.99 LBS | OXYGEN SATURATION: 98 % | TEMPERATURE: 99 F
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
## 2021-05-05 DIAGNOSIS — R09.89 OTHER SPECIFIED SYMPTOMS AND SIGNS INVOLVING THE CIRCULATORY AND RESPIRATORY SYSTEMS: ICD-10-CM

## 2021-05-05 DIAGNOSIS — K76.0 FATTY (CHANGE OF) LIVER, NOT ELSEWHERE CLASSIFIED: ICD-10-CM

## 2021-05-05 DIAGNOSIS — Z98.890 OTHER SPECIFIED POSTPROCEDURAL STATES: Chronic | ICD-10-CM

## 2021-05-05 DIAGNOSIS — S02.32XA FRACTURE OF ORBITAL FLOOR, LEFT SIDE, INITIAL ENCOUNTER FOR CLOSED FRACTURE: ICD-10-CM

## 2021-05-05 DIAGNOSIS — D64.9 ANEMIA, UNSPECIFIED: ICD-10-CM

## 2021-05-05 DIAGNOSIS — F10.230 ALCOHOL DEPENDENCE WITH WITHDRAWAL, UNCOMPLICATED: ICD-10-CM

## 2021-05-05 LAB
ALBUMIN SERPL ELPH-MCNC: 3.6 G/DL — SIGNIFICANT CHANGE UP (ref 3.3–5)
ALP SERPL-CCNC: 77 U/L — SIGNIFICANT CHANGE UP (ref 40–120)
ALT FLD-CCNC: 74 U/L — HIGH (ref 10–45)
ANION GAP SERPL CALC-SCNC: 17 MMOL/L — SIGNIFICANT CHANGE UP (ref 5–17)
ANION GAP SERPL CALC-SCNC: 23 MMOL/L — HIGH (ref 5–17)
ANISOCYTOSIS BLD QL: SLIGHT — SIGNIFICANT CHANGE UP
AST SERPL-CCNC: 169 U/L — HIGH (ref 10–40)
BASE EXCESS BLDV CALC-SCNC: 3.4 MMOL/L — HIGH (ref -2–2)
BASOPHILS # BLD AUTO: 0.08 K/UL — SIGNIFICANT CHANGE UP (ref 0–0.2)
BASOPHILS NFR BLD AUTO: 1.8 % — SIGNIFICANT CHANGE UP (ref 0–2)
BILIRUB SERPL-MCNC: 0.6 MG/DL — SIGNIFICANT CHANGE UP (ref 0.2–1.2)
BUN SERPL-MCNC: 11 MG/DL — SIGNIFICANT CHANGE UP (ref 7–23)
BUN SERPL-MCNC: 12 MG/DL — SIGNIFICANT CHANGE UP (ref 7–23)
CA-I SERPL-SCNC: 1.08 MMOL/L — LOW (ref 1.12–1.3)
CALCIUM SERPL-MCNC: 8.4 MG/DL — SIGNIFICANT CHANGE UP (ref 8.4–10.5)
CALCIUM SERPL-MCNC: 8.8 MG/DL — SIGNIFICANT CHANGE UP (ref 8.4–10.5)
CHLORIDE BLDV-SCNC: 101 MMOL/L — SIGNIFICANT CHANGE UP (ref 96–108)
CHLORIDE SERPL-SCNC: 93 MMOL/L — LOW (ref 96–108)
CHLORIDE SERPL-SCNC: 97 MMOL/L — SIGNIFICANT CHANGE UP (ref 96–108)
CO2 BLDV-SCNC: 29 MMOL/L — SIGNIFICANT CHANGE UP (ref 22–30)
CO2 SERPL-SCNC: 20 MMOL/L — LOW (ref 22–31)
CO2 SERPL-SCNC: 23 MMOL/L — SIGNIFICANT CHANGE UP (ref 22–31)
CREAT SERPL-MCNC: 0.5 MG/DL — SIGNIFICANT CHANGE UP (ref 0.5–1.3)
CREAT SERPL-MCNC: 0.51 MG/DL — SIGNIFICANT CHANGE UP (ref 0.5–1.3)
DACRYOCYTES BLD QL SMEAR: SLIGHT — SIGNIFICANT CHANGE UP
ELLIPTOCYTES BLD QL SMEAR: SLIGHT — SIGNIFICANT CHANGE UP
EOSINOPHIL # BLD AUTO: 0.04 K/UL — SIGNIFICANT CHANGE UP (ref 0–0.5)
EOSINOPHIL NFR BLD AUTO: 0.9 % — SIGNIFICANT CHANGE UP (ref 0–6)
ETHANOL SERPL-MCNC: SIGNIFICANT CHANGE UP MG/DL (ref 0–10)
GAS PNL BLDV: 133 MMOL/L — LOW (ref 135–145)
GAS PNL BLDV: SIGNIFICANT CHANGE UP
GAS PNL BLDV: SIGNIFICANT CHANGE UP
GIANT PLATELETS BLD QL SMEAR: PRESENT — SIGNIFICANT CHANGE UP
GLUCOSE BLDV-MCNC: 68 MG/DL — LOW (ref 70–99)
GLUCOSE SERPL-MCNC: 68 MG/DL — LOW (ref 70–99)
GLUCOSE SERPL-MCNC: 74 MG/DL — SIGNIFICANT CHANGE UP (ref 70–99)
HCG SERPL-ACNC: <2 MIU/ML — SIGNIFICANT CHANGE UP
HCO3 BLDV-SCNC: 28 MMOL/L — SIGNIFICANT CHANGE UP (ref 21–29)
HCT VFR BLD CALC: 29.8 % — LOW (ref 34.5–45)
HCT VFR BLDA CALC: 26 % — LOW (ref 39–50)
HGB BLD CALC-MCNC: 8.3 G/DL — LOW (ref 11.5–15.5)
HGB BLD-MCNC: 8.7 G/DL — LOW (ref 11.5–15.5)
HYPOCHROMIA BLD QL: SIGNIFICANT CHANGE UP
INR BLD: 1.11 RATIO — SIGNIFICANT CHANGE UP (ref 0.88–1.16)
LACTATE BLDV-MCNC: 1.4 MMOL/L — SIGNIFICANT CHANGE UP (ref 0.7–2)
LYMPHOCYTES # BLD AUTO: 0.29 K/UL — LOW (ref 1–3.3)
LYMPHOCYTES # BLD AUTO: 6.2 % — LOW (ref 13–44)
MANUAL SMEAR VERIFICATION: SIGNIFICANT CHANGE UP
MCHC RBC-ENTMCNC: 20.7 PG — LOW (ref 27–34)
MCHC RBC-ENTMCNC: 29.2 GM/DL — LOW (ref 32–36)
MCV RBC AUTO: 71 FL — LOW (ref 80–100)
MICROCYTES BLD QL: SLIGHT — SIGNIFICANT CHANGE UP
MONOCYTES # BLD AUTO: 0.12 K/UL — SIGNIFICANT CHANGE UP (ref 0–0.9)
MONOCYTES NFR BLD AUTO: 2.6 % — SIGNIFICANT CHANGE UP (ref 2–14)
NEUTROPHILS # BLD AUTO: 4.13 K/UL — SIGNIFICANT CHANGE UP (ref 1.8–7.4)
NEUTROPHILS NFR BLD AUTO: 88.5 % — HIGH (ref 43–77)
PCO2 BLDV: 43 MMHG — SIGNIFICANT CHANGE UP (ref 35–50)
PH BLDV: 7.42 — SIGNIFICANT CHANGE UP (ref 7.35–7.45)
PLAT MORPH BLD: ABNORMAL
PLATELET # BLD AUTO: 124 K/UL — LOW (ref 150–400)
PO2 BLDV: 25 MMHG — SIGNIFICANT CHANGE UP (ref 25–45)
POIKILOCYTOSIS BLD QL AUTO: SLIGHT — SIGNIFICANT CHANGE UP
POLYCHROMASIA BLD QL SMEAR: SLIGHT — SIGNIFICANT CHANGE UP
POTASSIUM BLDV-SCNC: 3.9 MMOL/L — SIGNIFICANT CHANGE UP (ref 3.5–5.3)
POTASSIUM SERPL-MCNC: 3.6 MMOL/L — SIGNIFICANT CHANGE UP (ref 3.5–5.3)
POTASSIUM SERPL-MCNC: 4.2 MMOL/L — SIGNIFICANT CHANGE UP (ref 3.5–5.3)
POTASSIUM SERPL-SCNC: 3.6 MMOL/L — SIGNIFICANT CHANGE UP (ref 3.5–5.3)
POTASSIUM SERPL-SCNC: 4.2 MMOL/L — SIGNIFICANT CHANGE UP (ref 3.5–5.3)
PROT SERPL-MCNC: 7.4 G/DL — SIGNIFICANT CHANGE UP (ref 6–8.3)
PROTHROM AB SERPL-ACNC: 13.3 SEC — SIGNIFICANT CHANGE UP (ref 10.6–13.6)
RBC # BLD: 4.2 M/UL — SIGNIFICANT CHANGE UP (ref 3.8–5.2)
RBC # FLD: 22 % — HIGH (ref 10.3–14.5)
RBC BLD AUTO: ABNORMAL
SAO2 % BLDV: 32 % — LOW (ref 67–88)
SMUDGE CELLS # BLD: PRESENT — SIGNIFICANT CHANGE UP
SODIUM SERPL-SCNC: 136 MMOL/L — SIGNIFICANT CHANGE UP (ref 135–145)
SODIUM SERPL-SCNC: 137 MMOL/L — SIGNIFICANT CHANGE UP (ref 135–145)
WBC # BLD: 4.67 K/UL — SIGNIFICANT CHANGE UP (ref 3.8–10.5)
WBC # FLD AUTO: 4.67 K/UL — SIGNIFICANT CHANGE UP (ref 3.8–10.5)

## 2021-05-05 PROCEDURE — 70486 CT MAXILLOFACIAL W/O DYE: CPT | Mod: 26,MA

## 2021-05-05 PROCEDURE — 76377 3D RENDER W/INTRP POSTPROCES: CPT | Mod: 26

## 2021-05-05 PROCEDURE — 99285 EMERGENCY DEPT VISIT HI MDM: CPT

## 2021-05-05 PROCEDURE — 74177 CT ABD & PELVIS W/CONTRAST: CPT | Mod: 26,MA

## 2021-05-05 PROCEDURE — 72125 CT NECK SPINE W/O DYE: CPT | Mod: 26

## 2021-05-05 PROCEDURE — 99222 1ST HOSP IP/OBS MODERATE 55: CPT

## 2021-05-05 PROCEDURE — 71260 CT THORAX DX C+: CPT | Mod: 26,MA

## 2021-05-05 PROCEDURE — 70450 CT HEAD/BRAIN W/O DYE: CPT | Mod: 26

## 2021-05-05 PROCEDURE — 73110 X-RAY EXAM OF WRIST: CPT | Mod: 26,RT

## 2021-05-05 RX ORDER — SODIUM CHLORIDE 9 MG/ML
1000 INJECTION INTRAMUSCULAR; INTRAVENOUS; SUBCUTANEOUS
Refills: 0 | Status: DISCONTINUED | OUTPATIENT
Start: 2021-05-05 | End: 2021-05-08

## 2021-05-05 RX ORDER — SODIUM CHLORIDE 9 MG/ML
1000 INJECTION INTRAMUSCULAR; INTRAVENOUS; SUBCUTANEOUS ONCE
Refills: 0 | Status: COMPLETED | OUTPATIENT
Start: 2021-05-05 | End: 2021-05-05

## 2021-05-05 RX ORDER — FOLIC ACID 0.8 MG
1 TABLET ORAL DAILY
Refills: 0 | Status: DISCONTINUED | OUTPATIENT
Start: 2021-05-05 | End: 2021-05-08

## 2021-05-05 RX ORDER — ONDANSETRON 8 MG/1
4 TABLET, FILM COATED ORAL EVERY 8 HOURS
Refills: 0 | Status: DISCONTINUED | OUTPATIENT
Start: 2021-05-05 | End: 2021-05-08

## 2021-05-05 RX ORDER — THIAMINE MONONITRATE (VIT B1) 100 MG
100 TABLET ORAL DAILY
Refills: 0 | Status: DISCONTINUED | OUTPATIENT
Start: 2021-05-05 | End: 2021-05-08

## 2021-05-05 RX ADMIN — Medication 4 MILLIGRAM(S): at 23:57

## 2021-05-05 RX ADMIN — Medication 2 MILLIGRAM(S): at 13:31

## 2021-05-05 RX ADMIN — Medication 2 MILLIGRAM(S): at 21:21

## 2021-05-05 RX ADMIN — Medication 2 MILLIGRAM(S): at 15:33

## 2021-05-05 RX ADMIN — SODIUM CHLORIDE 75 MILLILITER(S): 9 INJECTION INTRAMUSCULAR; INTRAVENOUS; SUBCUTANEOUS at 23:57

## 2021-05-05 RX ADMIN — SODIUM CHLORIDE 1000 MILLILITER(S): 9 INJECTION INTRAMUSCULAR; INTRAVENOUS; SUBCUTANEOUS at 13:32

## 2021-05-05 NOTE — H&P ADULT - PROBLEM SELECTOR PLAN 2
mechanical fall with L eye trama, imaging showing acute orbital floor fracture  - OMFS recs appreciated - no surgical intervention   - optho consulted, will f/u recs   - pain control

## 2021-05-05 NOTE — ED PROVIDER NOTE - PROGRESS NOTE DETAILS
Discussed with Dr Guajardo if cleard by trauma tba Dr Bennett Arredondo MD, Facep Endorsed to Dr Nadine Arredondo MD, Facep Nadine MORSE: Patient evaluated by trauma, omfs. No acute intervention at this time. Pending ophthalmology evaluation and admisison to medicine. Nadine MORSE: Patient evaluated by trauma, omfs. No acute intervention at this time. Surgery recs pending ophthalmology evaluation and admission to medicine. Nadine MORSE: Ophthalmology at bedside.

## 2021-05-05 NOTE — CONSULT NOTE ADULT - SUBJECTIVE AND OBJECTIVE BOX
[facility] DEPARTMENT OF OPHTHALMOLOGY - INITIAL ADULT CONSULT  -----------------------------------------------------------------------------  Maude Louis MD, MPH, PGY-2  Pager: [phone]  -----------------------------------------------------------------------------    HPI:    Interval History: ***    PMH: ***  POcHx: denies surg/laser  FH: denies glc/amd  Social History: denies etoh/tobacco  Ophthalmic Medications: none  Allergies: NKDA    Review of Systems:  Constitutional: No fever, chills  Eyes: No blurry vision, flashes, floaters, FBS, erythema, discharge, double vision, OU  Neuro: No tremors  Cardiovascular: No chest pain, palpitations  Respiratory: No SOB, no cough  GI: No nausea, vomiting, abdominal pain  : No dysuria  Skin: no rash  Psych: no depression  Endocrine: no polyuria, polydipsia  Heme/lymph: no swelling    VITALS: T(C): 36.7 (05-05-21 @ 18:00)  T(F): 98 (05-05-21 @ 18:00), Max: 98.9 (05-05-21 @ 12:34)  HR: 105 (05-05-21 @ 18:00) (104 - 116)  BP: 147/100 (05-05-21 @ 18:00) (138/98 - 154/92)  RR:  (20 - 20)  SpO2:  (98% - 98%)  Wt(kg): --  General: AAO x 3, appropriate mood and affect    Ophthalmology Exam:  Visual acuity (sc): 20/20 OU  Pupils: PERRL OU, no APD  Ttono: 16 OU  Extraocular movements (EOMs): Full OU, no pain, no diplopia  Confrontational Visual Field (CVF): Full OU  Color Plates: 12/12 OU    Pen Light Exam (PLE)  External: Flat OU  Lids/Lashes/Lacrimal Ducts: Flat OU    Sclera/Conjunctiva: W+Q OU  Cornea: Cl OU  Anterior Chamber: D+F OU    Iris: Flat OU  Lens: Cl OU    Fundus Exam: dilated with 1% tropicamide and 2.5% phenylephrine  Approval obtained from primary team for dilation  Patient aware that pupils can remained dilated for at least 4-6 hours  Exam performed with 20D lens    Vitreous: wnl OU  Disc, cup/disc: sharp and pink, 0.4 OU  Macula: wnl OU  Vessels: wnl OU  Periphery: wnl OU    Labs/Imaging:  *** [facility] DEPARTMENT OF OPHTHALMOLOGY - INITIAL ADULT CONSULT  -----------------------------------------------------------------------------  Maude Louis MD, MPH, PGY-2  Pager: [phone]  -----------------------------------------------------------------------------    HPI: 45F with EtOH abuse with hx of withdrawal, depression p/w fall with left eye trauma and alcohol withdrawal. Pt states she was attempting to detox herself from alcohol, with last drink sometime on Monday 5/3/21. Yesterday had a fall, where pt tripped at the top of the stairs and fell backwards down the stairs; she states she lost consciousness, does not know how long she was down, but recalls waking up on the floor. She was in pain but was able to get up - notes pain in R side of her head, R wrist and R hip; noted some swelling of L eye which she iced overnight. This morning 5/5, she woke up and noted worsening L eye swelling and blurry vision and ongoing R sided aches/pain; also noted worsening tremulousness, which prompted her to come to ED. Pt was most recently drinking up to 2pints of vodka per day. Was recently hospitalized 2 weeks ago with etoh withdrawal.     Pt denies diplopia, endorses mild pain with supraduction OS but no associated N/V. States that she feels a bit nauseous in general  but she know that this is from her ETOH withdrawal and clarified that it is not related at all to her EOM. Endorses mild blurry vision OS. Denies flashes/floaters. No other eye complaints.   Pt was recently treated for a k ulcer OS that has since resolved, pt now on AT, followed in our ophthalmology clinic.     PMH: per HPI   POcHx: denies surg/laser  FH: denies glc/amd  Social History: + ETOH   Ophthalmic Medications: AT  Allergies: NKDA    Review of Systems: per HPI     VITALS: T(C): 36.7 (05-05-21 @ 18:00)  T(F): 98 (05-05-21 @ 18:00), Max: 98.9 (05-05-21 @ 12:34)  HR: 105 (05-05-21 @ 18:00) (104 - 116)  BP: 147/100 (05-05-21 @ 18:00) (138/98 - 154/92)  RR:  (20 - 20)  SpO2:  (98% - 98%)  Wt(kg): --  General: AAO x 3, appropriate mood and affect    Ophthalmology Exam:  Visual acuity (sc): 20/20-1 OU   Pupils: PERRL OU, no APD  Ttono: 12, 10   Extraocular movements (EOMs): Full OD, approx 20% limitation on supraduction OS otherwise full   Confrontational Visual Field (CVF): Full OD, supranasal defect OS likely in s/o eyelid swelling  Color Plates: 12/12 OU    No RTR or proptosis.     Pen Light Exam (PLE)  External: Flat OD, periorbital and left zygomatic ecchymosis  Lids/Lashes/Lacrimal Ducts: Flat OD, 2-3+ upper>lower lid edema OS    Sclera/Conjunctiva: W+Q OD, subconj heme 2-9 o'clock with no conjunctival lacerations OS   Cornea: Cl OU  Anterior Chamber: D+F OU    Iris: Flat OU  Lens: NS OU     Fundus Exam: dilated with 1% tropicamide and 2.5% phenylephrine  Approval obtained from primary team for dilation  Patient aware that pupils can remained dilated for at least 4-6 hours  Exam performed with 20D lens    Vitreous: wnl OU  Disc, cup/disc: sharp and pink, 0.85 OU   Macula: wnl OU  Vessels: wnl OU  Periphery: wnl OU    Labs/Imaging:  EXAM:  CT CERVICAL SPINE                          EXAM:  CT MAXILLOFACIAL                          EXAM:  CT BRAIN                          EXAM:  CT 3D RECONSTRUCT THA CASTELAN                            PROCEDURE DATE:  05/05/2021      INTERPRETATION:  CT OF THE HEAD WITHOUT CONTRAST  CT CERVICAL SPINE WITHOUT CONTRAST  CT MAXILLOFACIAL WITHOUT CONTRAST    CLINICAL INDICATION: Fall.    TECHNIQUE: Volumetric CT acquisition was performed through the brain and reviewed using brain and bone window technique. Dose optimization techniques were utilized including kVp/mA modulation along with iterative reconstructions.  Thin section CT images were obtained through cervical spine with overlapping reconstructions.  Sagittal, coronal and bilateral oblique 2D reformats were then generated from the initial images. Dose reduction techniques were utilized including kVp/mA dose modulation based on patient size and iterative reconstruction.  Axial CT imaging was performed through the facial bones without the administration of intravenous contrast.. Coronal and sagittal reformatted images were also obtained. Dose optimization techniques were utilized including kVp/mA modulation along with iterative reconstructions.   3-D reconstructions of the facial bones were performed on a separate workstation and reviewed.    COMPARISON: CT head 10/19/2020    FINDINGS:  CT head:    Moderate right paramedian parietal scalp hematoma and swelling.  The ventricular and sulcal size and configuration is age appropriate.   There is no acute loss of gray-white differentiation.    There is no evidence of mass effect, midline shift, acute intracranial hemorrhage, or extra-axial collections.     The calvarium is intact.    CT cervical spine:    There is straightening of usual cervical lordosis. There is no significant subluxation. Vertebral body height is preserved throughout the cervical spine. There is moderate multilevel loss of intervertebral disc height throughout the cervical spine.    Multilevel diffuse disc osteophyte complexes, facet hypertrophy and uncovertebral spurring contribute to central canal and neural foraminal narrowing to varying degrees. There is no high-grade central canal stenosis.    The paravertebral soft tissues are unremarkable.    CT maxillofacial:  There is moderate left periorbital and left facial hematoma and swelling. There is acute fracture of the left orbital floor. There is prolapse of minimal extraconal fat through the fracture defect. The globes are symmetric and intact. There is no retrobulbar hematoma. The extraocular muscles and optic nerve sheath complexes are unremarkable. There is associated small hemorrhagic fluid level in the left maxillary sinus.    The nasal bones are intact. The mandible, maxilla, zygoma and pterygoid plates are intact.    Evaluation of the remaining paranasal sinuses demonstrate patchy mucosal thickening within the ethmoid sinuses. Bubbly secretions within the left sphenoid sinus and mild mucosal thickening within both maxillary sinuses. The visualized mastoid air cells are clear.      IMPRESSION:  CT HEAD: Moderate right paramedian parietal scalp hematoma and swelling. There is no acute intracranial hemorrhage or depressed calvarial fracture.    CT CERVICAL SPINE: No fracture or acute traumatic malalignment.    CT maxillofacial: Moderate left periorbital and left facial hematoma and swelling. Acute fracture of the left orbital floor. There is prolapse of minimal extraconal fat through the fracture defect. The globes are intact. No retrobulbar hematoma. Associated small hemorrhagic fluid level in left maxillary sinus.    NUNO SAUL M.D., ATTENDING RADIOLOGIST    A/P: 45F with EtOH abuse with hx of withdrawal, depression p/w fall with left eye trauma and alcohol withdrawal. Pt states she was attempting to detox herself from alcohol, with last drink sometime on Monday 5/3/21. Yesterday had a fall, where pt tripped at the top of the stairs and fell backwards down the stairs; CT max/face with acute fracture of the left orbital floor. No signs of entrapment on clinical exam, pain with supraduction likely in s/o local swelling 2/2 fracture. Please see recommendations below:     #Orbital floor fracture OS   - HOB elevation and gentle ice packs for 20 min every 1 hr as tolerated for the first 24-48 hrs to help reduce swelling   - Would avoid NSAIDs at this time, can use tylenol for pain, alternatives per primary team   - Pt to be admitted for impending ETOH withdrawal -- if any new diplopia, worsening pain or N/V with EOM, patient strictly advised to reach out to primary team to page the on-call ophthalmology resident   - No acute ophthalmic intervention at this time  - Nose-blowing precautions, can use afrin nasal decongestant PRN  - Appreciate OMFS consult   - No radiographic evidence RBH, no RTR or proptosis, IOP wnl   - No evidence lid laceration OS   - ABx per primary team and OMFS    #Subconjunctival hemorrhage OS   - Artificial tears, preservative-free at least QID OD   - Will likely resolve within 2-3 weeks  - Cool compresses/ice packs     #Increased CDR OU   - Outpatient work-up (OCT-nerve and HVF 24-2 OU) recommended   - No acute intervention   - IOP wnl OU     Case d/w Dr. Mak, Chief Resident.    Recommend patient follow-up within one week of discharge at the address below:     [address] [place] [address]  [phone]  [facility] DEPARTMENT OF OPHTHALMOLOGY - INITIAL ADULT CONSULT  -----------------------------------------------------------------------------  Maude Louis MD, MPH, PGY-2  Pager: [phone]  -----------------------------------------------------------------------------    HPI: 45F with EtOH abuse with hx of withdrawal, depression p/w fall with left eye trauma and alcohol withdrawal. Pt states she was attempting to detox herself from alcohol, with last drink sometime on Monday 5/3/21. Yesterday had a fall, where pt tripped at the top of the stairs and fell backwards down the stairs; she states she lost consciousness, does not know how long she was down, but recalls waking up on the floor. She was in pain but was able to get up - notes pain in R side of her head, R wrist and R hip; noted some swelling of L eye which she iced overnight. This morning 5/5, she woke up and noted worsening L eye swelling and blurry vision and ongoing R sided aches/pain; also noted worsening tremulousness, which prompted her to come to ED. Pt was most recently drinking up to 2pints of vodka per day. Was recently hospitalized 2 weeks ago with etoh withdrawal.     Pt denies diplopia, endorses mild pain with supraduction OS but no associated N/V. States that she feels a bit nauseous in general  but she know that this is from her ETOH withdrawal and clarified that it is not related at all to her EOM. Endorses mild blurry vision OS. Denies flashes/floaters. No other eye complaints.   Pt was recently treated for a k ulcer OS that has since resolved, pt now on AT, followed in our ophthalmology clinic.     PMH: per HPI   POcHx: denies surg/laser  FH: denies glc/amd  Social History: + ETOH   Ophthalmic Medications: AT  Allergies: NKDA    Review of Systems: per HPI     VITALS: T(C): 36.7 (05-05-21 @ 18:00)  T(F): 98 (05-05-21 @ 18:00), Max: 98.9 (05-05-21 @ 12:34)  HR: 105 (05-05-21 @ 18:00) (104 - 116)  BP: 147/100 (05-05-21 @ 18:00) (138/98 - 154/92)  RR:  (20 - 20)  SpO2:  (98% - 98%)  Wt(kg): --  General: AAO x 3, tremulous on exam (states 'i know i am going into withdrawal')     Ophthalmology Exam:  Visual acuity (sc): 20/20-1 OU   Pupils: PERRL OU, no APD  Ttono: 12, 10   Extraocular movements (EOMs): Full OD, approx 20% limitation on supraduction OS otherwise full   Confrontational Visual Field (CVF): Full OD, supranasal defect OS likely in s/o eyelid swelling  Color Plates: 12/12 OU    No RTR or proptosis.     Pen Light Exam (PLE)  External: Flat OD, periorbital and left zygomatic ecchymosis  Lids/Lashes/Lacrimal Ducts: Flat OD, 2-3+ upper>lower lid edema OS    Sclera/Conjunctiva: W+Q OD, subconj heme 2-9 o'clock with no conjunctival lacerations OS   Cornea: Cl OU  Anterior Chamber: D+F OU    Iris: Flat OU  Lens: NS OU     Fundus Exam: dilated with 1% tropicamide and 2.5% phenylephrine  Approval obtained from primary team for dilation  Patient aware that pupils can remained dilated for at least 4-6 hours  Exam performed with 20D lens    Vitreous: wnl OU  Disc, cup/disc: sharp and pink, 0.85 OU   Macula: wnl OU  Vessels: wnl OU  Periphery: wnl OU    Labs/Imaging:  EXAM:  CT CERVICAL SPINE                          EXAM:  CT MAXILLOFACIAL                          EXAM:  CT BRAIN                          EXAM:  CT 3D RECONSTRUCT W ANNELISE                            PROCEDURE DATE:  05/05/2021      INTERPRETATION:  CT OF THE HEAD WITHOUT CONTRAST  CT CERVICAL SPINE WITHOUT CONTRAST  CT MAXILLOFACIAL WITHOUT CONTRAST    CLINICAL INDICATION: Fall.    TECHNIQUE: Volumetric CT acquisition was performed through the brain and reviewed using brain and bone window technique. Dose optimization techniques were utilized including kVp/mA modulation along with iterative reconstructions.  Thin section CT images were obtained through cervical spine with overlapping reconstructions.  Sagittal, coronal and bilateral oblique 2D reformats were then generated from the initial images. Dose reduction techniques were utilized including kVp/mA dose modulation based on patient size and iterative reconstruction.  Axial CT imaging was performed through the facial bones without the administration of intravenous contrast.. Coronal and sagittal reformatted images were also obtained. Dose optimization techniques were utilized including kVp/mA modulation along with iterative reconstructions.   3-D reconstructions of the facial bones were performed on a separate workstation and reviewed.    COMPARISON: CT head 10/19/2020    FINDINGS:  CT head:    Moderate right paramedian parietal scalp hematoma and swelling.  The ventricular and sulcal size and configuration is age appropriate.   There is no acute loss of gray-white differentiation.    There is no evidence of mass effect, midline shift, acute intracranial hemorrhage, or extra-axial collections.     The calvarium is intact.    CT cervical spine:    There is straightening of usual cervical lordosis. There is no significant subluxation. Vertebral body height is preserved throughout the cervical spine. There is moderate multilevel loss of intervertebral disc height throughout the cervical spine.    Multilevel diffuse disc osteophyte complexes, facet hypertrophy and uncovertebral spurring contribute to central canal and neural foraminal narrowing to varying degrees. There is no high-grade central canal stenosis.    The paravertebral soft tissues are unremarkable.    CT maxillofacial:  There is moderate left periorbital and left facial hematoma and swelling. There is acute fracture of the left orbital floor. There is prolapse of minimal extraconal fat through the fracture defect. The globes are symmetric and intact. There is no retrobulbar hematoma. The extraocular muscles and optic nerve sheath complexes are unremarkable. There is associated small hemorrhagic fluid level in the left maxillary sinus.    The nasal bones are intact. The mandible, maxilla, zygoma and pterygoid plates are intact.    Evaluation of the remaining paranasal sinuses demonstrate patchy mucosal thickening within the ethmoid sinuses. Bubbly secretions within the left sphenoid sinus and mild mucosal thickening within both maxillary sinuses. The visualized mastoid air cells are clear.      IMPRESSION:  CT HEAD: Moderate right paramedian parietal scalp hematoma and swelling. There is no acute intracranial hemorrhage or depressed calvarial fracture.    CT CERVICAL SPINE: No fracture or acute traumatic malalignment.    CT maxillofacial: Moderate left periorbital and left facial hematoma and swelling. Acute fracture of the left orbital floor. There is prolapse of minimal extraconal fat through the fracture defect. The globes are intact. No retrobulbar hematoma. Associated small hemorrhagic fluid level in left maxillary sinus.    NUNO SAUL M.D., ATTENDING RADIOLOGIST    A/P: 45F with EtOH abuse with hx of withdrawal, depression p/w fall with left eye trauma and alcohol withdrawal. Pt states she was attempting to detox herself from alcohol, with last drink sometime on Monday 5/3/21. Yesterday had a fall, where pt tripped at the top of the stairs and fell backwards down the stairs; CT max/face with acute fracture of the left orbital floor. No signs of entrapment on clinical exam, pain with supraduction likely in s/o local swelling 2/2 fracture. Please see recommendations below:     #Orbital floor fracture OS   - HOB elevation and gentle ice packs for 20 min every 1 hr as tolerated for the first 24-48 hrs to help reduce swelling   - Would avoid NSAIDs at this time, can use tylenol for pain, alternatives per primary team   - Pt to be admitted for impending ETOH withdrawal -- if any new diplopia, worsening pain or N/V with EOM, patient strictly advised to reach out to primary team to page the on-call ophthalmology resident   - No acute ophthalmic intervention at this time  - Nose-blowing precautions, can use afrin nasal decongestant PRN  - Appreciate OMFS consult   - No radiographic evidence RBH, no RTR or proptosis, IOP wnl   - No evidence lid laceration OS   - ABx per primary team and OMFS    #Subconjunctival hemorrhage OS   - Artificial tears, preservative-free at least QID OD   - Will likely resolve within 2-3 weeks  - Cool compresses/ice packs     #Increased CDR OU   - Outpatient work-up (OCT-nerve and HVF 24-2 OU) recommended   - No acute intervention   - IOP wnl OU     Case d/w Dr. Mak, Chief Resident.    Recommend patient follow-up within one week of discharge at the address below:     [address] [place] [address]  [phone]

## 2021-05-05 NOTE — H&P ADULT - PROBLEM SELECTOR PLAN 5
transaminitis with AST>ALT, imaging with hepatic steatosis, likely 2/2 chronic etoh abuse   c/t trend and monitor   outpt PMD f/u

## 2021-05-05 NOTE — H&P ADULT - NSHPPHYSICALEXAM_GEN_ALL_CORE
Vital Signs Last 24 Hrs  T(C): 37.2 (05 May 2021 19:32), Max: 37.2 (05 May 2021 12:34)  T(F): 98.9 (05 May 2021 19:32), Max: 98.9 (05 May 2021 12:34)  HR: 98 (05 May 2021 22:30) (92 - 116)  BP: 156/109 (05 May 2021 22:30) (138/98 - 162/115)  BP(mean): --  RR: 20 (05 May 2021 22:30) (16 - 20)  SpO2: 98% (05 May 2021 22:30) (93% - 100%)    PHYSICAL EXAM:  GENERAL: NAD, well-developed  HEAD:  +L eye ecchymoses, normocephalic  EYES: L eye ecchymoses with significant swelling, +conjunctival erythema; R eye intact/EOMI    NECK: Supple, no JVD  CHEST/LUNG: Clear to auscultation bilaterally; no wheezing or rales  HEART: Regular rate and rhythm; no murmurs  ABDOMEN: Soft, nontender, nondistended; bowel sounds present  EXTREMITIES:  2+ Peripheral Pulses, no edema, +tremulous   PSYCH: calm affect, +anxious  NEUROLOGY: non-focal, AAOx3  SKIN: No rashes or lesions  MUSCULOSKELETAL: no back pain, moving all extremities

## 2021-05-05 NOTE — H&P ADULT - HISTORY OF PRESENT ILLNESS
45F with EtOH abuse with hx of withdrawal, depression p/w fall with eye trauma and alcohol withdrawal. Pt states she was attempting to detox herself from alcohol, with last drink sometime on Monday 5/3/21. Yesterday had a fall, where pt tripped at the top of the stairs and fell backwards down the stairs; she states she lost consciousness, does not know how long she was down, but recalls waking up on the floor. She was in pain but was able to get up - notes pain in R side of her head, R wrist and R hip; noted some mild swelling of L eye which she iced overnight. This morning, she woke up and noted worsening L eye swelling and blurry vision and ongoing R sided aches/pain; also noted worsening tremulousness, which prompted her to come to ED. Pt was most recently drinking up to 2pints of vodka per day. Was recently hospitalized 2 weeks ago with etoh withdrawal.   Currently endorsing all over body aches, worse in R hip and wrist, +r sided headache; denies L eye blurry vision but endorses pain with eye movement; +nausea, no vomiting, no CP, no SOB, no abd pain, no diarrhea, no LE swelling, no fevers or chills. Rest of ROS negative except as per HPI.

## 2021-05-05 NOTE — ED ADULT TRIAGE NOTE - CHIEF COMPLAINT QUOTE
fall last night. Pt reports LOC. Woke up this morning with swollen left eye. C/o of nausea, dizziness, and Headache. Pt detoxing "herself" at home from alcohol.  Last drink 48 hrs ago, has visible tremors.

## 2021-05-05 NOTE — H&P ADULT - PROBLEM SELECTOR PLAN 3
mechanical fall while pt was trying to detox herself from etoh  CTH negative for acute pathology, +scalp hematoma   CT orbits noted as above, otherwise all other imaging neg for acute pathology   fall precautions, SW consult   PT eval

## 2021-05-05 NOTE — H&P ADULT - ASSESSMENT
45F with EtOH abuse with hx of withdrawal, depression p/w fall with eye trauma while trying to detox from etoh - a/w etoh withdrawal and acute L orbital floor fracture.

## 2021-05-05 NOTE — ED PROVIDER NOTE - PHYSICAL EXAMINATION
PHYSICAL EXAM:    GENERAL: Lying in bed comfortably, appears tremulous (undergoing alcohol detoxification)   HEAD:  Atraumatic  EYES: Has left eye swelling and bruising. Left eye conjunctival edema. EOMI, PERRLA  ENT: No erythema/pallor/petechiae/lesions  NECK: Tender at base of neck (C7). No JVD  LUNG: CTA b/l; no r/r/w  HEART: RRR, +S1/S2; No m/r/g  ABDOMEN: soft, NT/ND; BS audible   EXTREMITIES:  No clubbing, cyanosis, or edema  NERVOUS SYSTEM:  AAOx3, speech clear. No sensory/motor deficits   MSK: Right wrist pain and restricted ROM. Also right hip pain h/e no restricted ROM.   SKIN: No rashes or lesions

## 2021-05-05 NOTE — ED ADULT NURSE NOTE - NSIMPLEMENTINTERV_GEN_ALL_ED
Implemented All Fall Risk Interventions:  [place] to call system. Call bell, personal items and telephone within reach. Instruct patient to call for assistance. Room bathroom lighting operational. Non-slip footwear when patient is off stretcher. Physically safe environment: no spills, clutter or unnecessary equipment. Stretcher in lowest position, wheels locked, appropriate side rails in place. Provide visual cue, wrist band, yellow gown, etc. Monitor gait and stability. Monitor for mental status changes and reorient to person, place, and time. Review medications for side effects contributing to fall risk. Reinforce activity limits and safety measures with patient and family.

## 2021-05-05 NOTE — H&P ADULT - PROBLEM SELECTOR PLAN 4
microcytic anemia, Hb stable from recent admission - prior anemia labs showing likely iron deficiency anemia, possibly with BM suppression from chronic etoh use. Denies bleeding.   - start on iron tabs  - check retic count   - monitor CBC, defer pharm AC  - outpt PMD f/u

## 2021-05-05 NOTE — ED PROVIDER NOTE - NS ED ROS FT
CONSTITUTIONAL: No weakness, fevers or chills  EYES/ENT: c/o right eye visual change;  No hearing deficits, vertigo or throat pain   NECK: c/o pain at base of neck  RESPIRATORY: No cough, shortness of breath  CARDIOVASCULAR: No chest pain, palpitations  GASTROINTESTINAL: No abdominal pain, nausea, vomiting, diarrhea or constipation.   GENITOURINARY: No dysuria, frequency   NEUROLOGICAL: No numbness or weakness  SKIN: No bruising, rashes    All other review of systems is negative unless indicated above.

## 2021-05-05 NOTE — ED PROVIDER NOTE - OBJECTIVE STATEMENT
45 Yr female undergoing  alcohol detoxification last drink 24 hours ago   Presents with h/o fall, accidental slip fell backwards from 12 steps.   Was unconscious, does not know time, no witness to event as lives by herself   Was able to get up and take steps, complaining of right wrist and hip pain.   Also c/o left eye progressive swelling since event. Describes slight blurring of vision from affected eye.

## 2021-05-05 NOTE — ED PROVIDER NOTE - CLINICAL SUMMARY MEDICAL DECISION MAKING FREE TEXT BOX
45 Yr female undergoing  alcohol detoxification last drink 24 hours ago, presents with h/o fall, accidental slip fell backwards from 12 steps.   VS stable, PE left eye bruising and swelling, Cervical pain, right wrist and hip pain.   Labs, Imaging, Meds, Observe 45 Yr female undergoing  alcohol detoxification last drink 24 hours ago, presents with h/o fall, accidental slip fell backwards from 12 steps.   VS stable, PE left eye bruising and swelling, Cervical pain, right wrist and hip pain.   Labs, Imaging, Meds, Observe  OMFS, Trauma Surgery, Ophtho, no acute surgical intervention

## 2021-05-05 NOTE — H&P ADULT - PROBLEM SELECTOR PLAN 1
alcohol withdrawal, drinks 2 pints/day, last drink 1.5 days ago, +nausea/HA/tremulous, denies VH/AH or tactile hallucinations. Last CIWA 9   - CIWA monitoring,   - start on ativan taper  - symptom triggered ativan as needed   - MV, thiamine, folate   -  consult in AM  - IVF overnight  - fall precautions

## 2021-05-05 NOTE — CONSULT NOTE ADULT - SUBJECTIVE AND OBJECTIVE BOX
Trauma Consult    CC: Patient is a 45y old  Female who presents with a chief complaint of fall down stairs.     HPI: 45 year old female with PMH of alcohol abuse, previous episodes of withdrawal with seizures requiring hospitalization, alcoholic pancreatitis presenting after fall backwards down stairs with LOC. She reports that she is currently detoxing from alcohol and was shaking and fell down the stairs. She complains of pain in her left eye, right wrist, and right hip. She reports that she drinks 2 pints of vodka a day and her last drink was 2 days ago.     Primary Survey  A - airway intact  B - bilateral breath sounds and good chest rise  C - initially BP: 155/101 (05-05-21 @ 19:32), palpable pulses in all extremities  D - GCS 15 on arrival  Exposure obtained    Secondary survey  Gen: Shaking, uncomfortable  HEENT: large ecchymosis over left eye, full ROM  CV: s1, s2, RRR  Pulm: CTA B/L  Chest: No TTP  Abd: Soft, ND, NT, no rebound, no guarding  Groin: Normal appearing  Ext: Palp radial b/l, palp DP b/l, small abrasion on the right wrist   Back: no TTP, no palpable runoff, stepoff, or deformity    PMH  ETOH Abuse    Depression    HTN (hypertension)    History of pancreatitis    Adenomyosis      PSH  S/P Tonsillectomy    S/P laparoscopy      MEDS    Allergies    No Known Allergies    Intolerances        Social    Labs:                        8.7    4.67  )-----------( 124      ( 05 May 2021 13:21 )             29.8     05-05    137  |  97  |  11  ----------------------------<  68<L>  4.2   |  23  |  0.51    Ca    8.4      05 May 2021 15:47    TPro  7.4  /  Alb  3.6  /  TBili  0.6  /  DBili  x   /  AST  169<H>  /  ALT  74<H>  /  AlkPhos  77  05-05    PT/INR - ( 05 May 2021 13:21 )   PT: 13.3 sec;   INR: 1.11 ratio               Imaging    < from: CT Cervical Spine No Cont (05.05.21 @ 17:42) >  FINDINGS:  CT head:    Moderate right paramedian parietal scalp hematoma and swelling.  The ventricular and sulcal size and configuration is age appropriate.   There is no acute loss of gray-white differentiation.    There is no evidence of mass effect, midline shift, acute intracranial hemorrhage, or extra-axial collections.     The calvarium is intact.      CT cervical spine:    There is straightening of usual cervical lordosis. There is no significant subluxation. Vertebral body height is preserved throughout the cervical spine. There is moderate multilevel loss of intervertebral disc height throughout the cervical spine.    Multilevel diffuse disc osteophyte complexes, facet hypertrophy and uncovertebral spurring contribute to central canal and neural foraminal narrowing to varying degrees. There is no high-grade central canal stenosis.    The paravertebral soft tissues are unremarkable.    CT maxillofacial:  There is moderate left periorbital and left facial hematoma and swelling. There is acute fracture of the left orbital floor. There is prolapse of minimal extraconal fat through the fracture defect. The globes are symmetricand intact. There is no retrobulbar hematoma. The extraocular muscles and optic nerve sheath complexes are unremarkable. There is associated small hemorrhagic fluid level in the left maxillary sinus.    The nasal bones are intact. The mandible, maxilla, zygoma and pterygoid plates are intact.    Evaluation of the remaining paranasal sinuses demonstrate patchy mucosal thickening within the ethmoid sinuses. Bubbly secretions within the left sphenoid sinus and mild mucosal thickening within both maxillary sinuses. The visualized mastoid air cells are clear.      IMPRESSION:  CT HEAD: Moderate right paramedian parietal scalp hematoma and swelling. There is no acute intracranial hemorrhage or depressed calvarial fracture.    CT CERVICAL SPINE: No fracture or acute traumatic malalignment.    CT maxillofacial: Moderate left periorbital and left facial hematoma and swelling. Acute fracture of the left orbital floor. There is prolapse of minimal extraconal fat through the fracture defect. The globes are intact. No retrobulbar hematoma. Associated small hemorrhagic fluid level in left maxillary sinus.    < end of copied text >    < from: Xray Wrist 3 Views, Right (05.05.21 @ 13:27) >    INTERPRETATION:  Clinical indications: History of fall. Pain.    3 views of the right wrist are without comparison.    IMPRESSION: There is noacute fracture or dislocation. The osseous alignment is maintained.    < end of copied text >

## 2021-05-05 NOTE — CONSULT NOTE ADULT - ATTENDING COMMENTS
fall down stairs as noted  patient showing signs of alcohol withdraw with tremors  is breathing comfortable  injury to orbital floor - seen by OMFS and plastics - no surgical intervention planned  no acute trauma related concerns unveiled would need tertiary survey  will need intervention for alcohol withdraw

## 2021-05-05 NOTE — CONSULT NOTE ADULT - ASSESSMENT
45 year old female with PMH of alcohol abuse and previous withdrawal seizures presenting after a fall down stairs. Injuries include L orbital floor fracture, no other major injuries noted     Plan:  - Patient appears to be actively in withdrawal, will require admission for withdrawal  - Follow up plan for ophtho regarding surgical management of orbital floor fracture  - Tertiary survey in AM    Seen and discussed with Dr Lal
A/P: 45 year old female ongoing alcohol detox s/p fall sustaining L orbital floor fx - EOMI and clinically stable    - no acute OMFS intervention indicated, no evidence of entrapment  - appreciate optho reccs  - recc admit to Medicine  - sinus precautions (sneeze with mouth open, no using straws, no spitting)  - please have patient f/u with OMFS [facility] in 1 week, call [phone] to schedule appointment

## 2021-05-05 NOTE — H&P ADULT - NSHPLABSRESULTS_GEN_ALL_CORE
Labs, imaging and EKG personally reviewed and interpreted by me.                           8.7    4.67  )-----------( 124      ( 05 May 2021 13:21 )             29.8     05-05    137  |  97  |  11  ----------------------------<  68<L>  4.2   |  23  |  0.51    Ca    8.4      05 May 2021 15:47    TPro  7.4  /  Alb  3.6  /  TBili  0.6  /  DBili  x   /  AST  169<H>  /  ALT  74<H>  /  AlkPhos  77  05-05        PT/INR - ( 05 May 2021 13:21 )   PT: 13.3 sec;   INR: 1.11 ratio         < from: CT 3D Reconstruct w/ Workstation (05.05.21 @ 17:41) >    IMPRESSION:  CT HEAD: Moderate right paramedian parietal scalp hematoma and swelling. There is no acute intracranial hemorrhage or depressed calvarial fracture.    CT CERVICAL SPINE: No fracture or acute traumatic malalignment.    CT maxillofacial: Moderate left periorbital and left facial hematoma and swelling. Acute fracture of the left orbital floor. There is prolapse of minimal extraconal fat through the fracture defect. The globes are intact. No retrobulbar hematoma. Associated small hemorrhagic fluid level in left maxillary sinus.    < end of copied text >

## 2021-05-05 NOTE — ED ADULT NURSE REASSESSMENT NOTE - NS ED NURSE REASSESS COMMENT FT1
Report received from BOBBY Barragan. Pt A+Ox3, c/o venice. Oral Surgery MD and Opthalmology MD at bedside.

## 2021-05-05 NOTE — ED PROVIDER NOTE - ATTENDING CONTRIBUTION TO CARE
PMD Schmeck PCP/[place]  45y female PMH ETOH Admitted to [facility] recently for Etoh withdrawl.Pancreatits, Ademomyosis.Withdrawl seizure. Pt comes to ed c/o "I fell down the stairs last night w loc" Unknown duration of loc. Pt has been withdrawning from etoh last drink 1.5d ago.Pt co pain left face rt hip/wrist. PE WDWN feamle c-collar in place looking acutley ill, NC +left priorbital swelling tender. neck +ttp post, chest clear ap cv tachy abd soft mild ttp rt hip w pain on flexion, distal power sensation intact all 4 extr\  Gonzalo Arredondo MD, Facep PMD Schmeck PCP/[place]  45y female PMH ETOH Admitted to [facility] recently for Etoh withdrawl. PMH Pancreatits, Ademomyosis.Withdrawl seizure. Pt comes to ed c/o "I fell down the stairs last night w loc" Unknown duration of loc. Pt has been withdrawning from etoh last drink 1.5d ago.Pt co pain left face rt hip/wrist. PE WDWN feamle c-collar in place looking acutley ill, NC +left priorbital swelling tender. neck +ttp post, chest clear ap cv tachy abd soft mild ttp rt hip w pain on flexion, distal power sensation intact all 4 extr\  Gonzalo Arredondo MD, Facep

## 2021-05-05 NOTE — ED ADULT NURSE NOTE - OBJECTIVE STATEMENT
45 yr old female with a PMH of ETOH and pancreatitis BIBEMS s/p fall down 12 steps last night at home. Pt states that she tripped and fell backwards down the dtairs and woke up lying on the floor. Pt states her last drink was 1 1/2 days ago. Pt reports that she drinks 2pints of vodka daily over the last 2 years. CIWA in place. Pt has mild tremors and nausea, Pt denies any hallucinations or visual changes, SOB, fever, chills, diarrhea, or CP at current time. VSS. Pt A&Ox3. Pt has pain in the Right wrist, Right hip pain and left eye ecchymosis. Pts skin intact and normal for race. Abdomen soft to touch with normoactive bowel sounds in all four quadrants. PO ativan given for tremors. PIV placed. Labs sent. IV fluids given. Safety maintained. Will continue to monitor.

## 2021-05-05 NOTE — CONSULT NOTE ADULT - SUBJECTIVE AND OBJECTIVE BOX
45 Yr female undergoing  alcohol detoxification last drink 24-48 hours ago Presento [facility] ED s/p fall, accidental slip fell backwards from 12 steps in her building. Patient endorses unconscious, does not know time, no witness to event as lives by herself . Was able to get up and take steps, complaining of right wrist and hip pain and left orbital pain and swelling.  Describes slight blurring of vision from affected eye. No nausea, vomiting. Requests Ativan in ED due to body shakes from ongoing alcohol detox.    Vital Signs Last 24 Hrs  T(C): 36.7 (05 May 2021 18:00), Max: 37.2 (05 May 2021 12:34)  T(F): 98 (05 May 2021 18:00), Max: 98.9 (05 May 2021 12:34)  HR: 105 (05 May 2021 18:00) (104 - 116)  BP: 147/100 (05 May 2021 18:00) (138/98 - 154/92)  BP(mean): --  RR: 20 (05 May 2021 18:00) (20 - 20)  SpO2: 98% (05 May 2021 18:00) (98% - 98%)    PAST MEDICAL & SURGICAL HISTORY:  ETOH Abuse  Depression  HTN (hypertension)  History of pancreatitis  Adenomyosis  S/P Tonsillectomy  S/P laparoscopy  for heavy menstrual bleeding    Home Medications:  Ambien 10 mg oral tablet: 1 tab(s) orally once a day (at bedtime), As Needed (20 Apr 2021 01:57)  folic acid 1 mg oral tablet: 1 tab(s) orally once a day (22 Apr 2021 11:51)  Multiple Vitamins oral tablet: 1 tab(s) orally once a day (22 Apr 2021 11:51)  thiamine 100 mg oral tablet: 1 tab(s) orally once a day (22 Apr 2021 11:51)    Limited Maxillofacial Exam  EOE: right scalp hematoma. Left periorbital swelling and ecchymosis. Left orbital subconj heme. EOMI. no evidence of entrapment, pain upon upward gaze. Endorsing blurry vision. No ottorhea, rhinorrhea, no galicia sign. endorses generalized body pain with R wrist and R hip pain.  IOE: adult dentition. no mobile teeth, uvula midline, FOM soft non-tender.    EXAM: CT CERVICAL SPINE  EXAM: CT MAXILLOFACIAL  EXAM: CT BRAIN  EXAM: CT 3D RECONSTRUCT THA CASTELAN  PROCEDURE DATE: 05/05/2021  INTERPRETATION: CT OF THE HEAD WITHOUT CONTRAST  CT CERVICAL SPINE WITHOUT CONTRAST  CT MAXILLOFACIAL WITHOUT CONTRAST  CLINICAL INDICATION: Fall.  TECHNIQUE: Volumetric CT acquisition was performed through the brain and reviewed using brain and bone window technique. Dose optimization techniques were utilized including kVp/mA modulation along with iterative reconstructions.  Thin section CT images were obtained through cervical spine with overlapping reconstructions. Sagittal, coronal and bilateral oblique 2D reformats were then generated from the initial images. Dose reduction techniques were utilized including kVp/mA dose modulation based on patient size and iterative reconstruction.  Axial CT imaging was performed through the facial bones without the administration of intravenous contrast.. Coronal and sagittal reformatted images were also obtained. Dose optimization techniques were utilized including kVp/mA modulation along with iterative reconstructions. 3-D reconstructions of the facial bones were performed on a separate workstation and reviewed.    COMPARISON: CT head 10/19/2020  FINDINGS:  CT head:  Moderate right paramedian parietal scalp hematoma and swelling.  The ventricular and sulcal size and configuration is age appropriate. There is no acute loss of gray-white differentiation.  There is no evidence of mass effect, midline shift, acute intracranial hemorrhage, or extra-axial collections.  The calvarium is intact.    CT cervical spine:  There is straightening of usual cervical lordosis. There is no significant subluxation. Vertebral body height is preserved throughout the cervical spine. There is moderate multilevel loss of intervertebral disc height throughout the cervical spine.  Multilevel diffuse disc osteophyte complexes, facet hypertrophy and uncovertebral spurring contribute to central canal and neural foraminal narrowing to varying degrees. There is no high-grade central canal stenosis.  The paravertebral soft tissues are unremarkable.  CT maxillofacial:  There is moderate left periorbital and left facial hematoma and swelling. There is acute fracture of the left orbital floor. There is prolapse of minimal extraconal fat through the fracture defect. The globes are symmetric and intact. There is no retrobulbar hematoma. The extraocular muscles and optic nerve sheath complexes are unremarkable. There is associated small hemorrhagic fluid level in the left maxillary sinus.  The nasal bones are intact. The mandible, maxilla, zygoma and pterygoid plates are intact.  Evaluation of the remaining paranasal sinuses demonstrate patchy mucosal thickening within the ethmoid sinuses. Bubbly secretions within the left sphenoid sinus and mild mucosal thickening within both maxillary sinuses. The visualized mastoid air cells are clear.    IMPRESSION:  CT HEAD: Moderate right paramedian parietal scalp hematoma and swelling. There is no acute intracranial hemorrhage or depressed calvarial fracture.  CT CERVICAL SPINE: No fracture or acute traumatic malalignment.  CT maxillofacial: Moderate left periorbital and left facial hematoma and swelling. Acute fracture of the left orbital floor. There is prolapse of minimal extraconal fat through the fracture defect. The globes are intact. No retrobulbar hematoma. Associated small hemorrhagic fluid level in left maxillary sinus.    NUNO SAUL M.D., ATTENDING RADIOLOGIST  This document has been electronically signed. May 5 2021 5:35PM

## 2021-05-06 LAB
COVID-19 SPIKE DOMAIN AB INTERP: POSITIVE
COVID-19 SPIKE DOMAIN ANTIBODY RESULT: >250 U/ML — HIGH
SARS-COV-2 IGG+IGM SERPL QL IA: >250 U/ML — HIGH
SARS-COV-2 IGG+IGM SERPL QL IA: POSITIVE
SARS-COV-2 RNA SPEC QL NAA+PROBE: SIGNIFICANT CHANGE UP

## 2021-05-06 RX ORDER — ACETAMINOPHEN 500 MG
650 TABLET ORAL ONCE
Refills: 0 | Status: COMPLETED | OUTPATIENT
Start: 2021-05-06 | End: 2021-05-06

## 2021-05-06 RX ADMIN — Medication 4 MILLIGRAM(S): at 06:00

## 2021-05-06 RX ADMIN — Medication 1 TABLET(S): at 12:13

## 2021-05-06 RX ADMIN — Medication 4 MILLIGRAM(S): at 17:57

## 2021-05-06 RX ADMIN — ONDANSETRON 4 MILLIGRAM(S): 8 TABLET, FILM COATED ORAL at 06:21

## 2021-05-06 RX ADMIN — Medication 4 MILLIGRAM(S): at 12:13

## 2021-05-06 RX ADMIN — Medication 2 MILLIGRAM(S): at 20:55

## 2021-05-06 RX ADMIN — Medication 650 MILLIGRAM(S): at 23:58

## 2021-05-06 RX ADMIN — Medication 100 MILLIGRAM(S): at 17:57

## 2021-05-06 RX ADMIN — Medication 650 MILLIGRAM(S): at 04:12

## 2021-05-06 RX ADMIN — Medication 1 MILLIGRAM(S): at 12:13

## 2021-05-06 RX ADMIN — Medication 2 MILLIGRAM(S): at 03:43

## 2021-05-06 RX ADMIN — Medication 650 MILLIGRAM(S): at 22:45

## 2021-05-06 NOTE — PHYSICAL THERAPY INITIAL EVALUATION ADULT - ADDITIONAL COMMENTS
Pt. lives alone in apt. 4 steps to get in, 12 steps inside. Patient ambulated without AD independent.

## 2021-05-06 NOTE — CHART NOTE - NSCHARTNOTEFT_GEN_A_CORE
Tertiary Trauma Survey (TTS)    Date of TTS: 5/6                             Admit Date: 5/5                                  HPI:  45F with EtOH abuse with hx of withdrawal, depression p/w fall with eye trauma and alcohol withdrawal. Pt states she was attempting to detox herself from alcohol, with last drink sometime on Monday 5/3/21. Yesterday had a fall, where pt tripped at the top of the stairs and fell backwards down the stairs; she states she lost consciousness, does not know how long she was down, but recalls waking up on the floor. She was in pain but was able to get up - notes pain in R side of her head, R wrist and R hip; noted some mild swelling of L eye which she iced overnight. This morning, she woke up and noted worsening L eye swelling and blurry vision and ongoing R sided aches/pain; also noted worsening tremulousness, which prompted her to come to ED. Pt was most recently drinking up to 2pints of vodka per day. Was recently hospitalized 2 weeks ago with etoh withdrawal.   Currently endorsing all over body aches, worse in R hip and wrist, +r sided headache; denies L eye blurry vision but endorses pain with eye movement; +nausea, no vomiting, no CP, no SOB, no abd pain, no diarrhea, no LE swelling, no fevers or chills. Rest of ROS negative except as per HPI.     (05 May 2021 22:37)      PAST MEDICAL & SURGICAL HISTORY:  ETOH Abuse    Depression    HTN (hypertension)    History of pancreatitis    Adenomyosis    S/P Tonsillectomy        S/P laparoscopy  for heavy menstrual bleeding      [  ] No significant past history as reviewed with the patient and family    FAMILY HISTORY:  Family history of hypertension (Father, Mother)      [  ] Family history not pertinent as reviewed with the patient and family    SOCIAL HISTORY:    Medications (inpatient): folic acid 1 milliGRAM(s) Oral daily  LORazepam   Injectable 4 milliGRAM(s) IV Push every 6 hours  LORazepam   Injectable   IV Push   multivitamin 1 Tablet(s) Oral daily  sodium chloride 0.9%. 1000 milliLiter(s) IV Continuous <Continuous>  thiamine 100 milliGRAM(s) Oral daily    Medications (PRN):LORazepam   Injectable 2 milliGRAM(s) IV Push every 2 hours PRN  ondansetron Injectable 4 milliGRAM(s) IV Push every 8 hours PRN    Allergies: No Known Allergies  (Intolerances: )    Vital Signs Last 24 Hrs  T(C): 36.7 (06 May 2021 08:09), Max: 37.2 (05 May 2021 12:34)  T(F): 98.1 (06 May 2021 08:09), Max: 98.9 (05 May 2021 12:34)  HR: 80 (06 May 2021 08:09) (80 - 116)  BP: 137/94 (06 May 2021 08:09) (137/94 - 162/115)  BP(mean): --  RR: 18 (06 May 2021 08:09) (16 - 20)  SpO2: 100% (06 May 2021 08:09) (93% - 100%)  Drug Dosing Weight  Height (cm): 170.2 (05 May 2021 12:34)  Weight (kg): 63.5 (05 May 2021 12:34)  BMI (kg/m2): 21.9 (05 May 2021 12:34)  BSA (m2): 1.74 (05 May 2021 12:34)                          8.7    4.67  )-----------( 124      ( 05 May 2021 13:21 )             29.8     05-05    137  |  97  |  11  ----------------------------<  68<L>  4.2   |  23  |  0.51    Ca    8.4      05 May 2021 15:47    TPro  7.4  /  Alb  3.6  /  TBili  0.6  /  DBili  x   /  AST  169<H>  /  ALT  74<H>  /  AlkPhos  77  05-05    PT/INR - ( 05 May 2021 13:21 )   PT: 13.3 sec;   INR: 1.11 ratio               List Injuries Identified to Date: Moderate left periorbital and left facial hematoma and swelling. Acute fracture of the left orbital floor    List Operative and Interventional Radiological Procedures: none    Consults (Date):  [  ] Neurosurgery   [  ] Orthopedics  [  ] Plastics  [  ] Urology  [  ] PM&R  [  ] Social Work    RADIOLOGICAL FINDINGS REVIEW:  CXR:    Pelvis Films:     C-Spine Films:    T/L/S Spine Films:    Extremity Films:    EXAM:  WRIST COMPLETE RIGHT-MIN 3 VIEWS                            PROCEDURE DATE:  05/05/2021            INTERPRETATION:  Clinical indications: History of fall. Pain.    3 views of the right wrist are without comparison.    IMPRESSION: There is noacute fracture or dislocation. The osseous alignment is maintained.      Head CT:    C-Spine CT:    EXAM:  CT CERVICAL SPINE                          EXAM:  CT MAXILLOFACIAL                          EXAM:  CT BRAIN                          EXAM:  CT 3D RECONSTRUCT THA CASTELAN                            PROCEDURE DATE:  05/05/2021            INTERPRETATION:  CT OF THE HEAD WITHOUT CONTRAST  CT CERVICAL SPINE WITHOUT CONTRAST  CT MAXILLOFACIAL WITHOUT CONTRAST    CLINICAL INDICATION: Fall.    TECHNIQUE: Volumetric CT acquisition was performed through the brain and reviewed using brain and bone window technique. Dose optimization techniques were utilized including kVp/mA modulation along with iterative reconstructions.  Thin section CT images were obtained through cervical spine with overlapping reconstructions.  Sagittal, coronal and bilateral oblique 2D reformats were then generated from the initial images. Dose reduction techniques were utilized including kVp/mA dose modulation based on patient size and iterative reconstruction.  Axial CT imaging was performed through the facial boneswithout the administration of intravenous contrast.. Coronal and sagittal reformatted images were also obtained. Dose optimization techniques were utilized including kVp/mA modulation along with iterative reconstructions.   3-D reconstructions of thefacial bones were performed on a separate workstation and reviewed.    COMPARISON: CT head 10/19/2020    FINDINGS:  CT head:    Moderate right paramedian parietal scalp hematoma and swelling.  The ventricular and sulcal size and configuration is age appropriate.   There is no acute loss of gray-white differentiation.    There is no evidence of mass effect, midline shift, acute intracranial hemorrhage, or extra-axial collections.     The calvarium is intact.      CT cervical spine:    There is straightening of usual cervical lordosis. There is no significant subluxation. Vertebral body height is preserved throughout the cervical spine. There is moderate multilevel loss of intervertebral disc height throughout the cervical spine.    Multilevel diffuse disc osteophyte complexes, facet hypertrophy and uncovertebral spurring contribute to central canal and neural foraminal narrowing to varying degrees. There is no high-grade central canal stenosis.    The paravertebral soft tissues are unremarkable.    CT maxillofacial:  There is moderate left periorbital and left facial hematoma and swelling. There is acute fracture of the left orbital floor. There is prolapse of minimal extraconal fat through the fracture defect. The globes are symmetricand intact. There is no retrobulbar hematoma. The extraocular muscles and optic nerve sheath complexes are unremarkable. There is associated small hemorrhagic fluid level in the left maxillary sinus.    The nasal bones are intact. The mandible, maxilla, zygoma and pterygoid plates are intact.    Evaluation of the remaining paranasal sinuses demonstrate patchy mucosal thickening within the ethmoid sinuses. Bubbly secretions within the left sphenoid sinus and mild mucosal thickening within both maxillary sinuses. The visualized mastoid air cells are clear.      IMPRESSION:  CT HEAD: Moderate right paramedian parietal scalp hematoma and swelling. There is no acute intracranial hemorrhage or depressed calvarial fracture.    CT CERVICAL SPINE: No fracture or acute traumatic malalignment.    CT maxillofacial: Moderate left periorbital and left facial hematoma and swelling. Acute fracture of the left orbital floor. There is prolapse of minimal extraconal fat through the fracture defect. The globes are intact. No retrobulbar hematoma. Associated small hemorrhagic fluid level in left maxillary sinus.      Neck CT:    Chest CT:    EXAM:  CT ABDOMEN AND PELVIS IC                          EXAM:  CT CHEST IC                            PROCEDURE DATE:  05/05/2021            INTERPRETATION:  CLINICAL INFORMATION: Status post fall.    COMPARISON: Contrast-enhanced CT of the abdomen and pelvis dated October 13, 2020.    CONTRAST/COMPLICATIONS:  IV Contrast:  Oral Contrast:  Complications:    PROCEDURE:  CT of the Chest, Abdomen and Pelvis was performed.  Dual phase, arterial and portal venous phase imaging was performed throughthe upper abdomen.  Sagittal and coronal reformats were performed.    FINDINGS:  CHEST:  LUNGS AND LARGE AIRWAYS: Patent central airways. No pulmonary nodules.  PLEURA: No pleural effusion.  VESSELS: Within normal limits.  HEART: Heart size is normal. No pericardial effusion.  MEDIASTINUM AND ANNE: No lymphadenopathy.  CHEST WALL AND LOWER NECK: Within normal limits.    ABDOMEN AND PELVIS:  LIVER: There is marked diffuse decreased attenuation of the liver, compatible with profound steatosis. Otherwise, the liver is unremarkable in appearance and enhancement. The hepatic veins and portal vein are patent.  BILE DUCTS: Normal caliber.  GALLBLADDER: Within normal limits.  SPLEEN: Within normal limits.  PANCREAS: Mildly atrophic and fatty replaced.  ADRENALS: Within normal limits.  KIDNEYS/URETERS: Within normal limits.    BLADDER: Within normal limits.  REPRODUCTIVE ORGANS: Stable 5 cm rounded heterogeneously enhancing lesion in the uterus, favored to represent a large fibroid. The adnexa areunremarkable by CT criteria.    BOWEL: Large diverticulum of the second portion of the duodenum. No bowel obstruction.  PERITONEUM: No ascites.  VESSELS: Within normal limits.  RETROPERITONEUM/LYMPH NODES: No lymphadenopathy.  ABDOMINAL WALL: Within normal limits.  BONES: Degenerative disc disease and endplate remodeling at L5-S1.    IMPRESSION: No evidence of acute visceral organ or bony injury. Profound hepatic steatosis. Mildly atrophic pancreas. Uterine fibroid.      ABD/Pelvis CT:    Other:    Physical Exam:  General: NAD, resting comfortably  HEENT: NC/AT, EOMI, normal hearing, no oral lesions, no LAD, neck supple  Pulmonary: normal resp effort, CTA-B  Cardiovascular: NSR, no murmurs  Abdominal: soft, ND/NT, no organomegaly  Extremities: WWP, normal strength, no clubbing/cyanosis/edema  Neuro: A/O x 3, CNs II-XII grossly intact, normal sensation, no focal deficits  Pulses: palpable distal pulses      Interpretation of Findings: Tertiary Trauma Survey (TTS)    Date of TTS: 5/6                             Admit Date: 5/5                                  HPI:  45F with EtOH abuse with hx of withdrawal, depression p/w fall with eye trauma and alcohol withdrawal. Pt states she was attempting to detox herself from alcohol, with last drink sometime on Monday 5/3/21. Yesterday had a fall, where pt tripped at the top of the stairs and fell backwards down the stairs; she states she lost consciousness, does not know how long she was down, but recalls waking up on the floor. She was in pain but was able to get up - notes pain in R side of her head, R wrist and R hip; noted some mild swelling of L eye which she iced overnight. This morning, she woke up and noted worsening L eye swelling and blurry vision and ongoing R sided aches/pain; also noted worsening tremulousness, which prompted her to come to ED. Pt was most recently drinking up to 2pints of vodka per day. Was recently hospitalized 2 weeks ago with etoh withdrawal.   Currently endorsing all over body aches, worse in R hip and wrist, +r sided headache; denies L eye blurry vision but endorses pain with eye movement; +nausea, no vomiting, no CP, no SOB, no abd pain, no diarrhea, no LE swelling, no fevers or chills. Rest of ROS negative except as per HPI.     (05 May 2021 22:37)      PAST MEDICAL & SURGICAL HISTORY:  ETOH Abuse    Depression    HTN (hypertension)    History of pancreatitis    Adenomyosis    S/P Tonsillectomy        S/P laparoscopy  for heavy menstrual bleeding      [  ] No significant past history as reviewed with the patient and family    FAMILY HISTORY:  Family history of hypertension (Father, Mother)      [  ] Family history not pertinent as reviewed with the patient and family    SOCIAL HISTORY:    Medications (inpatient): folic acid 1 milliGRAM(s) Oral daily  LORazepam   Injectable 4 milliGRAM(s) IV Push every 6 hours  LORazepam   Injectable   IV Push   multivitamin 1 Tablet(s) Oral daily  sodium chloride 0.9%. 1000 milliLiter(s) IV Continuous <Continuous>  thiamine 100 milliGRAM(s) Oral daily    Medications (PRN):LORazepam   Injectable 2 milliGRAM(s) IV Push every 2 hours PRN  ondansetron Injectable 4 milliGRAM(s) IV Push every 8 hours PRN    Allergies: No Known Allergies  (Intolerances: )    Vital Signs Last 24 Hrs  T(C): 36.7 (06 May 2021 08:09), Max: 37.2 (05 May 2021 12:34)  T(F): 98.1 (06 May 2021 08:09), Max: 98.9 (05 May 2021 12:34)  HR: 80 (06 May 2021 08:09) (80 - 116)  BP: 137/94 (06 May 2021 08:09) (137/94 - 162/115)  BP(mean): --  RR: 18 (06 May 2021 08:09) (16 - 20)  SpO2: 100% (06 May 2021 08:09) (93% - 100%)  Drug Dosing Weight  Height (cm): 170.2 (05 May 2021 12:34)  Weight (kg): 63.5 (05 May 2021 12:34)  BMI (kg/m2): 21.9 (05 May 2021 12:34)  BSA (m2): 1.74 (05 May 2021 12:34)                          8.7    4.67  )-----------( 124      ( 05 May 2021 13:21 )             29.8     05-05    137  |  97  |  11  ----------------------------<  68<L>  4.2   |  23  |  0.51    Ca    8.4      05 May 2021 15:47    TPro  7.4  /  Alb  3.6  /  TBili  0.6  /  DBili  x   /  AST  169<H>  /  ALT  74<H>  /  AlkPhos  77  05-05    PT/INR - ( 05 May 2021 13:21 )   PT: 13.3 sec;   INR: 1.11 ratio               List Injuries Identified to Date: Moderate left periorbital and left facial hematoma and swelling. Acute fracture of the left orbital floor    List Operative and Interventional Radiological Procedures: none    Consults (Date):  [  ] Neurosurgery   [  ] Orthopedics  [  ] Plastics  [  ] Urology  [  ] PM&R  [  ] Social Work    RADIOLOGICAL FINDINGS REVIEW:  CXR:    Pelvis Films:     C-Spine Films:    T/L/S Spine Films:    Extremity Films:    EXAM:  WRIST COMPLETE RIGHT-MIN 3 VIEWS                            PROCEDURE DATE:  05/05/2021            INTERPRETATION:  Clinical indications: History of fall. Pain.    3 views of the right wrist are without comparison.    IMPRESSION: There is noacute fracture or dislocation. The osseous alignment is maintained.      Head CT:    C-Spine CT:    EXAM:  CT CERVICAL SPINE                          EXAM:  CT MAXILLOFACIAL                          EXAM:  CT BRAIN                          EXAM:  CT 3D RECONSTRUCT THA CASTELAN                            PROCEDURE DATE:  05/05/2021            INTERPRETATION:  CT OF THE HEAD WITHOUT CONTRAST  CT CERVICAL SPINE WITHOUT CONTRAST  CT MAXILLOFACIAL WITHOUT CONTRAST    CLINICAL INDICATION: Fall.    TECHNIQUE: Volumetric CT acquisition was performed through the brain and reviewed using brain and bone window technique. Dose optimization techniques were utilized including kVp/mA modulation along with iterative reconstructions.  Thin section CT images were obtained through cervical spine with overlapping reconstructions.  Sagittal, coronal and bilateral oblique 2D reformats were then generated from the initial images. Dose reduction techniques were utilized including kVp/mA dose modulation based on patient size and iterative reconstruction.  Axial CT imaging was performed through the facial boneswithout the administration of intravenous contrast.. Coronal and sagittal reformatted images were also obtained. Dose optimization techniques were utilized including kVp/mA modulation along with iterative reconstructions.   3-D reconstructions of thefacial bones were performed on a separate workstation and reviewed.    COMPARISON: CT head 10/19/2020    FINDINGS:  CT head:    Moderate right paramedian parietal scalp hematoma and swelling.  The ventricular and sulcal size and configuration is age appropriate.   There is no acute loss of gray-white differentiation.    There is no evidence of mass effect, midline shift, acute intracranial hemorrhage, or extra-axial collections.     The calvarium is intact.      CT cervical spine:    There is straightening of usual cervical lordosis. There is no significant subluxation. Vertebral body height is preserved throughout the cervical spine. There is moderate multilevel loss of intervertebral disc height throughout the cervical spine.    Multilevel diffuse disc osteophyte complexes, facet hypertrophy and uncovertebral spurring contribute to central canal and neural foraminal narrowing to varying degrees. There is no high-grade central canal stenosis.    The paravertebral soft tissues are unremarkable.    CT maxillofacial:  There is moderate left periorbital and left facial hematoma and swelling. There is acute fracture of the left orbital floor. There is prolapse of minimal extraconal fat through the fracture defect. The globes are symmetricand intact. There is no retrobulbar hematoma. The extraocular muscles and optic nerve sheath complexes are unremarkable. There is associated small hemorrhagic fluid level in the left maxillary sinus.    The nasal bones are intact. The mandible, maxilla, zygoma and pterygoid plates are intact.    Evaluation of the remaining paranasal sinuses demonstrate patchy mucosal thickening within the ethmoid sinuses. Bubbly secretions within the left sphenoid sinus and mild mucosal thickening within both maxillary sinuses. The visualized mastoid air cells are clear.      IMPRESSION:  CT HEAD: Moderate right paramedian parietal scalp hematoma and swelling. There is no acute intracranial hemorrhage or depressed calvarial fracture.    CT CERVICAL SPINE: No fracture or acute traumatic malalignment.    CT maxillofacial: Moderate left periorbital and left facial hematoma and swelling. Acute fracture of the left orbital floor. There is prolapse of minimal extraconal fat through the fracture defect. The globes are intact. No retrobulbar hematoma. Associated small hemorrhagic fluid level in left maxillary sinus.      Neck CT:    Chest CT:    EXAM:  CT ABDOMEN AND PELVIS IC                          EXAM:  CT CHEST IC                            PROCEDURE DATE:  05/05/2021            INTERPRETATION:  CLINICAL INFORMATION: Status post fall.    COMPARISON: Contrast-enhanced CT of the abdomen and pelvis dated October 13, 2020.    CONTRAST/COMPLICATIONS:  IV Contrast:  Oral Contrast:  Complications:    PROCEDURE:  CT of the Chest, Abdomen and Pelvis was performed.  Dual phase, arterial and portal venous phase imaging was performed throughthe upper abdomen.  Sagittal and coronal reformats were performed.    FINDINGS:  CHEST:  LUNGS AND LARGE AIRWAYS: Patent central airways. No pulmonary nodules.  PLEURA: No pleural effusion.  VESSELS: Within normal limits.  HEART: Heart size is normal. No pericardial effusion.  MEDIASTINUM AND ANNE: No lymphadenopathy.  CHEST WALL AND LOWER NECK: Within normal limits.    ABDOMEN AND PELVIS:  LIVER: There is marked diffuse decreased attenuation of the liver, compatible with profound steatosis. Otherwise, the liver is unremarkable in appearance and enhancement. The hepatic veins and portal vein are patent.  BILE DUCTS: Normal caliber.  GALLBLADDER: Within normal limits.  SPLEEN: Within normal limits.  PANCREAS: Mildly atrophic and fatty replaced.  ADRENALS: Within normal limits.  KIDNEYS/URETERS: Within normal limits.    BLADDER: Within normal limits.  REPRODUCTIVE ORGANS: Stable 5 cm rounded heterogeneously enhancing lesion in the uterus, favored to represent a large fibroid. The adnexa areunremarkable by CT criteria.    BOWEL: Large diverticulum of the second portion of the duodenum. No bowel obstruction.  PERITONEUM: No ascites.  VESSELS: Within normal limits.  RETROPERITONEUM/LYMPH NODES: No lymphadenopathy.  ABDOMINAL WALL: Within normal limits.  BONES: Degenerative disc disease and endplate remodeling at L5-S1.    IMPRESSION: No evidence of acute visceral organ or bony injury. Profound hepatic steatosis. Mildly atrophic pancreas. Uterine fibroid.      ABD/Pelvis CT:    Other:    Physical Exam:  General: NAD, pt states she feels sore all over  HEENT: NC, EOMI, normal hearing, neck supple, L eye ecchymosis and swelling  Pulmonary: normal resp effort, CTA-B  Cardiovascular: NSR, no murmurs  Abdominal: soft, ND/NT, no organomegaly  Extremities: WWP, 4/5 strengh b/l upper and lower extremities  Neuro: A/O x 3, CNs II-XII grossly intact, normal sensation, no focal deficits  Pulses: palpable distal pulses      Interpretation of Findings: No significant changes from prior surveys.

## 2021-05-06 NOTE — PHYSICAL THERAPY INITIAL EVALUATION ADULT - PATIENT/FAMILY AGREES WITH PLAN
anticipate home with home PT pending further amb/stair neg./yes Anticipate home with home PT pending further amb/stair neg./yes

## 2021-05-06 NOTE — PROGRESS NOTE ADULT - PROBLEM SELECTOR PLAN 2
mechanical fall with L eye trama, imaging showing acute orbital floor fracture  - OMFS recs appreciated - no surgical intervention   - optho consult appreciated   - pain control

## 2021-05-06 NOTE — ED ADULT NURSE REASSESSMENT NOTE - NS ED NURSE REASSESS COMMENT FT1
Pt A+Ox3, medication administered, IVF infusing. Pt resting comfortably in stretcher, call bell in reach, aware of plan of care. Pending admission bed.

## 2021-05-06 NOTE — CHART NOTE - NSCHARTNOTEFT_GEN_A_CORE
During this admission, an incidental finding was found on the CT Abdomen and Pelvis Scan, a 5cm uterine fibroid.    PLAN for primary team:  - Incidental finding was discussed with pt and pt was given a copy of the CT scan results  - Spoke with LESLY Pacheco on Dr. Guajardo's team. Discussed that pt needs a follow up appointment in the medicine clinic to workup these findings. ACP will make a note in discharge summary per conversation  - Please page [phone] if there are any questions    ACS  [phone]

## 2021-05-06 NOTE — PROVIDER CONTACT NOTE (OTHER) - ACTION/TREATMENT ORDERED:
Provider made aware, no pain, pt refuses tylenol at this time, no further orders provided at this time. Safety maintained, will cont to monitor
Provider made aware, stated to repeat BP in 1 hour. Safety maintained, will cont to monitor

## 2021-05-06 NOTE — PHYSICAL THERAPY INITIAL EVALUATION ADULT - PERTINENT HX OF CURRENT PROBLEM, REHAB EVAL
45F with EtOH abuse with hx of withdrawal, depression p/w fall with eye trauma and alcohol withdrawal. CT HEAD: Moderate right paramedian parietal scalp hematoma and swelling. There is no acute intracranial hemorrhage or depressed calvarial fracture. CT CERVICAL SPINE: No fracture or acute traumatic malalignment. x RAY r WRIST- There is no acute fracture or dislocation.  injury to orbital floor - seen by OMFS and plastics - no surgical intervention planned CT chest, deg disc disease,

## 2021-05-06 NOTE — PROVIDER CONTACT NOTE (OTHER) - BACKGROUND
Pt /108, CIWA q2, minimally anxious
Pt BP elevated, CIWA q4, ativan 2mg given for a CIWA score increase by 2 and midly anxious

## 2021-05-06 NOTE — PHYSICAL THERAPY INITIAL EVALUATION ADULT - PLANNED THERAPY INTERVENTIONS, PT EVAL
GOAL: Pt will negotiate 5 steps  up and down using HR support, independent  within 2-3 weeks./balance training/gait training/strengthening/transfer training

## 2021-05-07 ENCOUNTER — TRANSCRIPTION ENCOUNTER (OUTPATIENT)
Age: 46
End: 2021-05-07

## 2021-05-07 LAB
ALBUMIN SERPL ELPH-MCNC: 3.3 G/DL — SIGNIFICANT CHANGE UP (ref 3.3–5)
ALP SERPL-CCNC: 70 U/L — SIGNIFICANT CHANGE UP (ref 40–120)
ALT FLD-CCNC: 48 U/L — HIGH (ref 10–45)
ANION GAP SERPL CALC-SCNC: 14 MMOL/L — SIGNIFICANT CHANGE UP (ref 5–17)
AST SERPL-CCNC: 95 U/L — HIGH (ref 10–40)
BASOPHILS # BLD AUTO: 0.02 K/UL — SIGNIFICANT CHANGE UP (ref 0–0.2)
BASOPHILS NFR BLD AUTO: 0.6 % — SIGNIFICANT CHANGE UP (ref 0–2)
BILIRUB SERPL-MCNC: 0.5 MG/DL — SIGNIFICANT CHANGE UP (ref 0.2–1.2)
BUN SERPL-MCNC: 8 MG/DL — SIGNIFICANT CHANGE UP (ref 7–23)
CALCIUM SERPL-MCNC: 8.7 MG/DL — SIGNIFICANT CHANGE UP (ref 8.4–10.5)
CHLORIDE SERPL-SCNC: 97 MMOL/L — SIGNIFICANT CHANGE UP (ref 96–108)
CO2 SERPL-SCNC: 24 MMOL/L — SIGNIFICANT CHANGE UP (ref 22–31)
CREAT SERPL-MCNC: 0.51 MG/DL — SIGNIFICANT CHANGE UP (ref 0.5–1.3)
EOSINOPHIL # BLD AUTO: 0.14 K/UL — SIGNIFICANT CHANGE UP (ref 0–0.5)
EOSINOPHIL NFR BLD AUTO: 3.9 % — SIGNIFICANT CHANGE UP (ref 0–6)
GLUCOSE SERPL-MCNC: 118 MG/DL — HIGH (ref 70–99)
HCT VFR BLD CALC: 28.1 % — LOW (ref 34.5–45)
HGB BLD-MCNC: 8.3 G/DL — LOW (ref 11.5–15.5)
IMM GRANULOCYTES NFR BLD AUTO: 1.1 % — SIGNIFICANT CHANGE UP (ref 0–1.5)
LYMPHOCYTES # BLD AUTO: 0.69 K/UL — LOW (ref 1–3.3)
LYMPHOCYTES # BLD AUTO: 19.3 % — SIGNIFICANT CHANGE UP (ref 13–44)
MAGNESIUM SERPL-MCNC: 1.2 MG/DL — LOW (ref 1.6–2.6)
MCHC RBC-ENTMCNC: 21.2 PG — LOW (ref 27–34)
MCHC RBC-ENTMCNC: 29.5 GM/DL — LOW (ref 32–36)
MCV RBC AUTO: 71.7 FL — LOW (ref 80–100)
MONOCYTES # BLD AUTO: 0.25 K/UL — SIGNIFICANT CHANGE UP (ref 0–0.9)
MONOCYTES NFR BLD AUTO: 7 % — SIGNIFICANT CHANGE UP (ref 2–14)
NEUTROPHILS # BLD AUTO: 2.44 K/UL — SIGNIFICANT CHANGE UP (ref 1.8–7.4)
NEUTROPHILS NFR BLD AUTO: 68.1 % — SIGNIFICANT CHANGE UP (ref 43–77)
NRBC # BLD: 0 /100 WBCS — SIGNIFICANT CHANGE UP (ref 0–0)
PHOSPHATE SERPL-MCNC: 3.3 MG/DL — SIGNIFICANT CHANGE UP (ref 2.5–4.5)
PLATELET # BLD AUTO: 111 K/UL — LOW (ref 150–400)
POTASSIUM SERPL-MCNC: 3.4 MMOL/L — LOW (ref 3.5–5.3)
POTASSIUM SERPL-SCNC: 3.4 MMOL/L — LOW (ref 3.5–5.3)
PROT SERPL-MCNC: 6.9 G/DL — SIGNIFICANT CHANGE UP (ref 6–8.3)
RBC # BLD: 3.92 M/UL — SIGNIFICANT CHANGE UP (ref 3.8–5.2)
RBC # FLD: 21.5 % — HIGH (ref 10.3–14.5)
SODIUM SERPL-SCNC: 135 MMOL/L — SIGNIFICANT CHANGE UP (ref 135–145)
WBC # BLD: 3.58 K/UL — LOW (ref 3.8–10.5)
WBC # FLD AUTO: 3.58 K/UL — LOW (ref 3.8–10.5)

## 2021-05-07 RX ORDER — POTASSIUM CHLORIDE 20 MEQ
40 PACKET (EA) ORAL ONCE
Refills: 0 | Status: COMPLETED | OUTPATIENT
Start: 2021-05-07 | End: 2021-05-07

## 2021-05-07 RX ORDER — MAGNESIUM SULFATE 500 MG/ML
2 VIAL (ML) INJECTION ONCE
Refills: 0 | Status: COMPLETED | OUTPATIENT
Start: 2021-05-07 | End: 2021-05-07

## 2021-05-07 RX ADMIN — Medication 3 MILLIGRAM(S): at 00:32

## 2021-05-07 RX ADMIN — Medication 1 TABLET(S): at 12:37

## 2021-05-07 RX ADMIN — ONDANSETRON 4 MILLIGRAM(S): 8 TABLET, FILM COATED ORAL at 12:35

## 2021-05-07 RX ADMIN — Medication 3 MILLIGRAM(S): at 06:40

## 2021-05-07 RX ADMIN — Medication 40 MILLIEQUIVALENT(S): at 12:36

## 2021-05-07 RX ADMIN — Medication 50 GRAM(S): at 13:19

## 2021-05-07 RX ADMIN — Medication 3 MILLIGRAM(S): at 18:16

## 2021-05-07 RX ADMIN — Medication 2 MILLIGRAM(S): at 23:27

## 2021-05-07 RX ADMIN — Medication 100 MILLIGRAM(S): at 12:37

## 2021-05-07 RX ADMIN — Medication 3 MILLIGRAM(S): at 12:36

## 2021-05-07 RX ADMIN — Medication 1 MILLIGRAM(S): at 12:37

## 2021-05-07 NOTE — DISCHARGE NOTE PROVIDER - NSDCCPCAREPLAN_GEN_ALL_CORE_FT
PRINCIPAL DISCHARGE DIAGNOSIS  Diagnosis: Fall  Assessment and Plan of Treatment:   45F with EtOH abuse with hx of withdrawal, depression p/w fall with eye trauma and alcohol withdrawal. Pt states she was attempting to detox herself from alcohol, with last drink sometime on Monday 5/3/21. Yesterday had a fall, where pt tripped at the top of the stairs and fell backwards down the stairs; she states she lost consciousness, does not know how long she was down, but recalls waking up on the floor. She was in pain but was able to get up - notes pain in R side of her head, R wrist and R hip; noted some mild swelling of L eye which she iced overnight. This morning, she woke up and noted worsening L eye swelling and blurry vision and ongoing R sided aches/pain; also noted worsening tremulousness, which prompted her to come to ED. Pt was most recently drinking up to 2pints of vodka per day. Was recently hospitalized 2 weeks ago with etoh withdrawal.   Currently endorsing all over body aches, worse in R hip and wrist, +r sided headache; denies L eye blurry vision but endorses pain with eye movement; +nausea, no vomiting, no CP, no SOB, no abd pain, no diarrhea, no LE swelling, no fevers or chills. Rest of ROS negative except as per HPI.   Patient was  p/w fall with eye trauma while trying to detox from etoh - a/w etoh withdrawal and acute L orbital floor fracture. Patient was evaluated by Trauma for :    #1 CT HEAD: Moderate right paramedian parietal scalp hematoma and swelling. There is no acute intracranial hemorrhage or depressed calvarial fracture.  #2 CT maxillofacial: Moderate left periorbital and left facial hematoma and swelling. Acute fracture of the left orbital floor. There is prolapse of minimal extraconal fat through the fracture defect. The globes are intact. No retrobulbar hematoma. Associated small hemorrhagic fluid level in left maxillary sinus.   #3 Moderate left periorbital and left facial hematoma and swelling. Acute fracture of the left orbital floor  #4 Moderate right paramedian parietal scalp hematoma and swelling. No acute surgical interven      SECONDARY DISCHARGE DIAGNOSES  Diagnosis: ETOH abuse  Assessment and Plan of Treatment:      PRINCIPAL DISCHARGE DIAGNOSIS  Diagnosis: Fall  Assessment and Plan of Treatment:   45F with EtOH abuse with hx of withdrawal, depression p/w fall with eye trauma and alcohol withdrawal. Pt states she was attempting to detox herself from alcohol, with last drink sometime on Monday 5/3/21. Yesterday had a fall, where pt tripped at the top of the stairs and fell backwards down the stairs; she states she lost consciousness, does not know how long she was down, but recalls waking up on the floor. She was in pain but was able to get up - notes pain in R side of her head, R wrist and R hip; noted some mild swelling of L eye which she iced overnight. This morning, she woke up and noted worsening L eye swelling and blurry vision and ongoing R sided aches/pain; also noted worsening tremulousness, which prompted her to come to ED. Pt was most recently drinking up to 2pints of vodka per day. Was recently hospitalized 2 weeks ago with etoh withdrawal.   Currently endorsing all over body aches, worse in R hip and wrist, +r sided headache; denies L eye blurry vision but endorses pain with eye movement; +nausea, no vomiting, no CP, no SOB, no abd pain, no diarrhea, no LE swelling, no fevers or chills. Rest of ROS negative except as per HPI.   Patient was  p/w fall with eye trauma while trying to detox from etoh - a/w etoh withdrawal and acute L orbital floor fracture. Patient was evaluated by Trauma for :    #1 CT HEAD: Moderate right paramedian parietal scalp hematoma and swelling. There is no acute intracranial hemorrhage or depressed calvarial fracture.  #2 CT maxillofacial: Moderate left periorbital and left facial hematoma and swelling. Acute fracture of the left orbital floor. There is prolapse of minimal extraconal fat through the fracture defect. The globes are intact. No retrobulbar hematoma. Associated small hemorrhagic fluid level in left maxillary sinus.   #3 Moderate left periorbital and left facial hematoma and swelling. Acute fracture of the left orbital floor  #4 Moderate right paramedian parietal scalp hematoma and swelling. No acute surgical interven      SECONDARY DISCHARGE DIAGNOSES  Diagnosis: Alcohol withdrawal syndrome without complication  Assessment and Plan of Treatment: continue with ativan tapering as ordered to prevent seizures and withdrawal symptoms    Diagnosis: ETOH abuse  Assessment and Plan of Treatment: patient was seen by  and community resources provided     PRINCIPAL DISCHARGE DIAGNOSIS  Diagnosis: Fall  Assessment and Plan of Treatment:   45F with EtOH abuse with hx of withdrawal, depression p/w fall with eye trauma and alcohol withdrawal. Pt states she was attempting to detox herself from alcohol, with last drink sometime on Monday 5/3/21. Yesterday had a fall, where pt tripped at the top of the stairs and fell backwards down the stairs; she states she lost consciousness, does not know how long she was down, but recalls waking up on the floor. She was in pain but was able to get up - notes pain in R side of her head, R wrist and R hip; noted some mild swelling of L eye which she iced overnight. This morning, she woke up and noted worsening L eye swelling and blurry vision and ongoing R sided aches/pain; also noted worsening tremulousness, which prompted her to come to ED. Pt was most recently drinking up to 2pints of vodka per day. Was recently hospitalized 2 weeks ago with etoh withdrawal.   Currently endorsing all over body aches, worse in R hip and wrist, +r sided headache; denies L eye blurry vision but endorses pain with eye movement; +nausea, no vomiting, no CP, no SOB, no abd pain, no diarrhea, no LE swelling, no fevers or chills. Rest of ROS negative except as per HPI.   Patient was  p/w fall with eye trauma while trying to detox from etoh - a/w etoh withdrawal and acute L orbital floor fracture. Patient was evaluated by Trauma for :    #1 CT HEAD: Moderate right paramedian parietal scalp hematoma and swelling. There is no acute intracranial hemorrhage or depressed calvarial fracture.  #2 CT maxillofacial: Moderate left periorbital and left facial hematoma and swelling. Acute fracture of the left orbital floor. There is prolapse of minimal extraconal fat through the fracture defect. The globes are intact. No retrobulbar hematoma. Associated small hemorrhagic fluid level in left maxillary sinus.   #3 Moderate left periorbital and left facial hematoma and swelling. Acute fracture of the left orbital floor  #4 Moderate right paramedian parietal scalp hematoma and swelling. No acute surgical interven      SECONDARY DISCHARGE DIAGNOSES  Diagnosis: Fall, initial encounter  Assessment and Plan of Treatment: seen by Physical therapy and no physical therapy needs required.    Diagnosis: Alcohol withdrawal syndrome without complication  Assessment and Plan of Treatment: continue with ativan tapering as ordered to prevent seizures and withdrawal symptoms.    Diagnosis: ETOH abuse  Assessment and Plan of Treatment: patient was seen by  and community resources provided     PRINCIPAL DISCHARGE DIAGNOSIS  Diagnosis: Fall  Assessment and Plan of Treatment:   45F with EtOH abuse with hx of withdrawal, depression p/w fall with eye trauma and alcohol withdrawal. Pt states she was attempting to detox herself from alcohol, with last drink sometime on Monday 5/3/21. Yesterday had a fall, where pt tripped at the top of the stairs and fell backwards down the stairs; she states she lost consciousness, does not know how long she was down, but recalls waking up on the floor. She was in pain but was able to get up - notes pain in R side of her head, R wrist and R hip; noted some mild swelling of L eye which she iced overnight. This morning, she woke up and noted worsening L eye swelling and blurry vision and ongoing R sided aches/pain; also noted worsening tremulousness, which prompted her to come to ED. Pt was most recently drinking up to 2pints of vodka per day. Was recently hospitalized 2 weeks ago with etoh withdrawal.   Currently endorsing all over body aches, worse in R hip and wrist, +r sided headache; denies L eye blurry vision but endorses pain with eye movement; +nausea, no vomiting, no CP, no SOB, no abd pain, no diarrhea, no LE swelling, no fevers or chills. Rest of ROS negative except as per HPI.   Patient was  p/w fall with eye trauma while trying to detox from etoh - a/w etoh withdrawal and acute L orbital floor fracture. Patient was evaluated by Trauma for :    #1 CT HEAD: Moderate right paramedian parietal scalp hematoma and swelling. There is no acute intracranial hemorrhage or depressed calvarial fracture.  #2 CT maxillofacial: Moderate left periorbital and left facial hematoma and swelling. Acute fracture of the left orbital floor. There is prolapse of minimal extraconal fat through the fracture defect. The globes are intact. No retrobulbar hematoma. Associated small hemorrhagic fluid level in left maxillary sinus.   #3 Moderate left periorbital and left facial hematoma and swelling. Acute fracture of the left orbital floor  #4 Moderate right paramedian parietal scalp hematoma and swelling. No acute surgical interven      SECONDARY DISCHARGE DIAGNOSES  Diagnosis: Uterine fibroid  Assessment and Plan of Treatment: incidental finding, need op evaluation, spoke to the pt--she will find an OB/GYN doctor    Diagnosis: ETOH abuse  Assessment and Plan of Treatment: patient was seen by  and community resources provided    Diagnosis: Alcohol withdrawal syndrome without complication  Assessment and Plan of Treatment: continue with ativan tapering as ordered to prevent seizures and withdrawal symptoms.    Diagnosis: Fall, initial encounter  Assessment and Plan of Treatment: seen by Physical therapy and no physical therapy needs required.

## 2021-05-07 NOTE — PROGRESS NOTE ADULT - ASSESSMENT
45F with EtOH abuse with hx of withdrawal, depression p/w fall with eye trauma while trying to detox from etoh - a/w etoh withdrawal and acute L orbital floor fracture. 
45F with EtOH abuse with hx of withdrawal, depression p/w fall with eye trauma while trying to detox from etoh - a/w etoh withdrawal and acute L orbital floor fracture.

## 2021-05-07 NOTE — DISCHARGE NOTE PROVIDER - CARE PROVIDER_API CALL
Simba Shah  Internal Medicine  [facility], [address] [place] [id number]  Phone: [phone]  Fax: [phone]  Follow Up Time:

## 2021-05-07 NOTE — DISCHARGE NOTE PROVIDER - NSDCMRMEDTOKEN_GEN_ALL_CORE_FT
Ambien 10 mg oral tablet: 1 tab(s) orally once a day (at bedtime), As Needed   Ambien 10 mg oral tablet: 1 tab(s) orally once a day (at bedtime), As Needed  folic acid 1 mg oral tablet: 1 tab(s) orally once a day  Multiple Vitamins oral tablet: 1 tab(s) orally once a day

## 2021-05-07 NOTE — PROGRESS NOTE ADULT - PROBLEM SELECTOR PROBLEM 2
Closed fracture of left orbital floor, initial encounter
Closed fracture of left orbital floor, initial encounter

## 2021-05-07 NOTE — PROGRESS NOTE ADULT - PROBLEM SELECTOR PLAN 1
alcohol withdrawal, drinks 2 pints/day, last drink 1.5 days ago, +nausea/HA/tremulous, denies VH/AH or tactile hallucinations. Last CIWA 9   - CIWA monitoring,   - start on ativan taper  - symptom triggered ativan as needed   - MV, thiamine, folate   - SW consult   - IVF as tolerated   - fall precautions
alcohol withdrawal, drinks 2 pints/day, last drink 1.5 days ago, +nausea/HA/tremulous, denies VH/AH or tactile hallucinations. Last CIWA 9   - CIWA monitoring,   - start on ativan taper  - symptom triggered ativan as needed   - MV, thiamine, folate   - SW consult   - IVF as tolerated   - fall precautions

## 2021-05-07 NOTE — DISCHARGE NOTE PROVIDER - NSDCCAREPROVSEEN_GEN_ALL_CORE_FT
Chief Complaint   Patient presents with     Prenatal Care     [id number]       See DIANA De Jesus 1/6/2020  
Charles Guajardo Rusmat

## 2021-05-07 NOTE — DISCHARGE NOTE PROVIDER - HOSPITAL COURSE
45F with EtOH abuse with hx of withdrawal, depression p/w fall with eye trauma and alcohol withdrawal. Pt states she was attempting to detox herself from alcohol, with last drink sometime on Monday 5/3/21. Yesterday had a fall, where pt tripped at the top of the stairs and fell backwards down the stairs; she states she lost consciousness, does not know how long she was down, but recalls waking up on the floor. She was in pain but was able to get up - notes pain in R side of her head, R wrist and R hip; noted some mild swelling of L eye which she iced overnight. This morning, she woke up and noted worsening L eye swelling and blurry vision and ongoing R sided aches/pain; also noted worsening tremulousness, which prompted her to come to ED. Pt was most recently drinking up to 2pints of vodka per day. Was recently hospitalized 2 weeks ago with etoh withdrawal.   Currently endorsing all over body aches, worse in R hip and wrist, +r sided headache; denies L eye blurry vision but endorses pain with eye movement; +nausea, no vomiting, no CP, no SOB, no abd pain, no diarrhea, no LE swelling, no fevers or chills. Rest of ROS negative except as per HPI.   Patient was  p/w fall with eye trauma while trying to detox from etoh - a/w etoh withdrawal and acute L orbital floor fracture. Patient was evaluated by Trauma for :    #1 CT HEAD: Moderate right paramedian parietal scalp hematoma and swelling. There is no acute intracranial hemorrhage or depressed calvarial fracture.  #2 CT maxillofacial: Moderate left periorbital and left facial hematoma and swelling. Acute fracture of the left orbital floor. There is prolapse of minimal extraconal fat through the fracture defect. The globes are intact. No retrobulbar hematoma. Associated small hemorrhagic fluid level in left maxillary sinus.   #3 Moderate left periorbital and left facial hematoma and swelling. Acute fracture of the left orbital floor  #4 Moderate right paramedian parietal scalp hematoma and swelling. No acute surgical intervention needed Incidental finding from Ct scan of the abdomen show uterine fibroid. Patient will follow up outpatient with GYN for care.    Patient had sustained left eye injury from fall and seen by opthalmology , she was noted to have increased cup to disc ratio , opthalmology advised to follow up   within one week of discharge. Patient was seen by  for her alcohol abuse.

## 2021-05-07 NOTE — PROGRESS NOTE ADULT - PROBLEM SELECTOR PLAN 3
mechanical fall while pt was trying to detox herself from etoh  CTH negative for acute pathology, +scalp hematoma   CT orbits noted as above, otherwise all other imaging neg for acute pathology   fall precautions, SW consult   PT eval
mechanical fall while pt was trying to detox herself from etoh  CTH negative for acute pathology, +scalp hematoma   CT orbits noted as above, otherwise all other imaging neg for acute pathology   fall precautions, SW consult   PT eval

## 2021-05-07 NOTE — PROGRESS NOTE ADULT - SUBJECTIVE AND OBJECTIVE BOX
Name of Patient : AYLEEN DEY  MRN: [id number]  DATE OF SERVICE: 05-07-21     Subjective: Patient seen and examined. No new events except as noted.   headache  mild tremrs     REVIEW OF SYSTEMS:    CONSTITUTIONAL: + headache  EYES/ENT: No visual changes;  No vertigo or throat pain   NECK: No pain or stiffness  RESPIRATORY: No cough, wheezing, hemoptysis; No shortness of breath  CARDIOVASCULAR: No chest pain or palpitations  GASTROINTESTINAL: No abdominal or epigastric pain. No nausea, vomiting, or hematemesis; No diarrhea or constipation. No melena or hematochezia.  GENITOURINARY: No dysuria, frequency or hematuria  NEUROLOGICAL: No numbness or weakness  SKIN: No itching, burning, rashes, or lesions   All other review of systems is negative unless indicated above.    MEDICATIONS:  MEDICATIONS  (STANDING):  folic acid 1 milliGRAM(s) Oral daily  LORazepam   Injectable   IV Push   LORazepam   Injectable 2 milliGRAM(s) IV Push every 6 hours  multivitamin 1 Tablet(s) Oral daily  sodium chloride 0.9%. 1000 milliLiter(s) (75 mL/Hr) IV Continuous <Continuous>  thiamine 100 milliGRAM(s) Oral daily      PHYSICAL EXAM:  T(C): 36.7 (05-08-21 @ 00:18), Max: 36.9 (05-07-21 @ 16:22)  HR: 83 (05-08-21 @ 00:18) (71 - 100)  BP: 138/97 (05-08-21 @ 00:18) (131/90 - 148/104)  RR: 18 (05-08-21 @ 00:18) (18 - 19)  SpO2: 100% (05-08-21 @ 00:18) (97% - 100%)  Wt(kg): --  I&O's Summary    06 May 2021 07:01  -  07 May 2021 07:00  --------------------------------------------------------  IN: 240 mL / OUT: 0 mL / NET: 240 mL    07 May 2021 07:01  -  08 May 2021 00:37  --------------------------------------------------------  IN: 240 mL / OUT: 0 mL / NET: 240 mL          Appearance: awake, AOx4	  HEENT:   L eye trauma, achymosis, sclera blood , vision intact   Lymphatic: No lymphadenopathy   Cardiovascular: Normal S1 S2, no JVD  Respiratory: normal effort , clear  Gastrointestinal:  Soft, Non-tender  Skin: No rashes,  warm to touch  Psychiatry:  Mood & affect appropriate  Musculuskeletal: No edema      All labs, Imaging and EKGs personally reviewed       05-06-21 @ 07:01  -  05-07-21 @ 07:00  --------------------------------------------------------  IN: 240 mL / OUT: 0 mL / NET: 240 mL    05-07-21 @ 07:01  -  05-08-21 @ 00:37  --------------------------------------------------------  IN: 240 mL / OUT: 0 mL / NET: 240 mL                            8.3    3.58  )-----------( 111      ( 07 May 2021 11:05 )             28.1               05-07    135  |  97  |  8   ----------------------------<  118<H>  3.4<L>   |  24  |  0.51    Ca    8.7      07 May 2021 11:05  Phos  3.3     05-07  Mg     1.2     05-07    TPro  6.9  /  Alb  3.3  /  TBili  0.5  /  DBili  x   /  AST  95<H>  /  ALT  48<H>  /  AlkPhos  70  05-07                               
Name of Patient : AYLEEN DEY  MRN: [id number]  DATE OF SERVICE: 05-06-21     Subjective: Patient seen and examined. No new events except as noted.   tremors  headache           MEDICATIONS:  MEDICATIONS  (STANDING):  folic acid 1 milliGRAM(s) Oral daily  LORazepam   Injectable   IV Push   multivitamin 1 Tablet(s) Oral daily  sodium chloride 0.9%. 1000 milliLiter(s) (75 mL/Hr) IV Continuous <Continuous>  thiamine 100 milliGRAM(s) Oral daily      PHYSICAL EXAM:  T(C): 36.9 (05-06-21 @ 15:31), Max: 37.2 (05-05-21 @ 19:32)  HR: 60 (05-06-21 @ 15:31) (60 - 105)  BP: 130/70 (05-06-21 @ 15:31) (130/70 - 162/115)  RR: 19 (05-06-21 @ 15:31) (16 - 20)  SpO2: 98% (05-06-21 @ 15:31) (93% - 100%)  Wt(kg): --  I&O's Summary    06 May 2021 07:01  -  06 May 2021 18:40  --------------------------------------------------------  IN: 240 mL / OUT: 0 mL / NET: 240 mL          Appearance: Normal	  HEENT:  MANJINDER BAUER eye redness   Lymphatic: No lymphadenopathy   Cardiovascular: Normal S1 S2, no JVD  Respiratory: normal effort , clear  Gastrointestinal:  Soft, Non-tender  Skin: No rashes,  warm to touch  Psychiatry:  Mood & affect appropriate  Musculoskeletal No edema      All labs, Imaging and EKGs personally reviewed           05-06-21 @ 07:01  -  05-06-21 @ 18:40  --------------------------------------------------------  IN: 240 mL / OUT: 0 mL / NET: 240 mL                              8.7    4.67  )-----------( 124      ( 05 May 2021 13:21 )             29.8               05-05    137  |  97  |  11  ----------------------------<  68<L>  4.2   |  23  |  0.51    Ca    8.4      05 May 2021 15:47    TPro  7.4  /  Alb  3.6  /  TBili  0.6  /  DBili  x   /  AST  169<H>  /  ALT  74<H>  /  AlkPhos  77  05-05    PT/INR - ( 05 May 2021 13:21 )   PT: 13.3 sec;   INR: 1.11 ratio

## 2021-05-07 NOTE — PROGRESS NOTE ADULT - PROBLEM SELECTOR PLAN 4
microcytic anemia, Hb stable from recent admission - prior anemia labs showing likely iron deficiency anemia, possibly with BM suppression from chronic etoh use. Denies bleeding.   - start on iron tabs  - check retic count   - monitor CBC, defer pharm AC  - outpt PMD f/u
microcytic anemia, Hb stable from recent admission - prior anemia labs showing likely iron deficiency anemia, possibly with BM suppression from chronic etoh use. Denies bleeding.   - start on iron tabs  - check retic count   - monitor CBC, defer pharm AC  - outpt PMD f/u

## 2021-05-08 ENCOUNTER — TRANSCRIPTION ENCOUNTER (OUTPATIENT)
Age: 46
End: 2021-05-08

## 2021-05-08 VITALS
TEMPERATURE: 98 F | DIASTOLIC BLOOD PRESSURE: 88 MMHG | OXYGEN SATURATION: 100 % | SYSTOLIC BLOOD PRESSURE: 127 MMHG | HEART RATE: 81 BPM | RESPIRATION RATE: 18 BRPM

## 2021-05-08 DIAGNOSIS — D25.9 LEIOMYOMA OF UTERUS, UNSPECIFIED: ICD-10-CM

## 2021-05-08 RX ORDER — FOLIC ACID 0.8 MG
1 TABLET ORAL
Qty: 0 | Refills: 0 | DISCHARGE
Start: 2021-05-08

## 2021-05-08 RX ADMIN — Medication 2 MILLIGRAM(S): at 05:47

## 2021-05-08 NOTE — DISCHARGE NOTE NURSING/CASE MANAGEMENT/SOCIAL WORK - PATIENT PORTAL LINK FT
You can access the FollowMyHealth Patient Portal offered by [facility] by registering at the following website: [email or web address]. By joining [facility]’s FollowMyHealth portal, you will also be able to view your health information using other applications (apps) compatible with our system.

## 2021-05-13 ENCOUNTER — APPOINTMENT (OUTPATIENT)
Dept: OPHTHALMOLOGY | Facility: CLINIC | Age: 46
End: 2021-05-13

## 2021-06-02 ENCOUNTER — INPATIENT (INPATIENT)
Facility: HOSPITAL | Age: 46
LOS: 4 days | Discharge: HOME | End: 2021-06-07
Attending: INTERNAL MEDICINE | Admitting: INTERNAL MEDICINE
Payer: MEDICAID

## 2021-06-02 VITALS
HEIGHT: 67 IN | SYSTOLIC BLOOD PRESSURE: 135 MMHG | OXYGEN SATURATION: 98 % | HEART RATE: 116 BPM | RESPIRATION RATE: 16 BRPM | DIASTOLIC BLOOD PRESSURE: 96 MMHG | TEMPERATURE: 98 F

## 2021-06-02 DIAGNOSIS — Z98.890 OTHER SPECIFIED POSTPROCEDURAL STATES: Chronic | ICD-10-CM

## 2021-06-02 PROCEDURE — 99285 EMERGENCY DEPT VISIT HI MDM: CPT

## 2021-06-02 NOTE — ED ADULT TRIAGE NOTE - CHIEF COMPLAINT QUOTE
patient mother called saying is drank too much. patient admits to drinking many vodka tonight. as per EMS patient had a breakup 6 months ago and has been having drinking phases since. has been to detox around 6 months ago but went back to drinking afterwards. patient does not have any thoughts to hurt herself or others. patient denies any use of drugs tonight patient mother called saying is drank too much. patient admits to drinking many vodka tonight. as per EMS patient had a breakup 6 months ago and has been having drinking phases since. has been to detox around 6 months ago but went back to drinking afterwards. patient does not have any thoughts to hurt herself or others. patient denies any use of drugs tonight    belongings in [address]. phone locked with security

## 2021-06-03 DIAGNOSIS — F10.239 ALCOHOL DEPENDENCE WITH WITHDRAWAL, UNSPECIFIED: ICD-10-CM

## 2021-06-03 LAB
ALBUMIN SERPL ELPH-MCNC: 3.9 G/DL — SIGNIFICANT CHANGE UP (ref 3.3–5)
ALP SERPL-CCNC: 73 U/L — SIGNIFICANT CHANGE UP (ref 40–120)
ALT FLD-CCNC: 50 U/L — HIGH (ref 4–33)
ANION GAP SERPL CALC-SCNC: 27 MMOL/L — HIGH (ref 7–14)
ANISOCYTOSIS BLD QL: SIGNIFICANT CHANGE UP
APAP SERPL-MCNC: <15 UG/ML — SIGNIFICANT CHANGE UP (ref 15–25)
AST SERPL-CCNC: 96 U/L — HIGH (ref 4–32)
BASOPHILS # BLD AUTO: 0 K/UL — SIGNIFICANT CHANGE UP (ref 0–0.2)
BASOPHILS NFR BLD AUTO: 0 % — SIGNIFICANT CHANGE UP (ref 0–2)
BILIRUB SERPL-MCNC: 0.4 MG/DL — SIGNIFICANT CHANGE UP (ref 0.2–1.2)
BUN SERPL-MCNC: 14 MG/DL — SIGNIFICANT CHANGE UP (ref 7–23)
CALCIUM SERPL-MCNC: 8.8 MG/DL — SIGNIFICANT CHANGE UP (ref 8.4–10.5)
CHLORIDE SERPL-SCNC: 89 MMOL/L — LOW (ref 98–107)
CO2 SERPL-SCNC: 21 MMOL/L — LOW (ref 22–31)
CREAT SERPL-MCNC: 0.42 MG/DL — LOW (ref 0.5–1.3)
EOSINOPHIL # BLD AUTO: 0 K/UL — SIGNIFICANT CHANGE UP (ref 0–0.5)
EOSINOPHIL NFR BLD AUTO: 0 % — SIGNIFICANT CHANGE UP (ref 0–6)
ETHANOL SERPL-MCNC: 385 MG/DL — HIGH
GIANT PLATELETS BLD QL SMEAR: PRESENT — SIGNIFICANT CHANGE UP
GLUCOSE SERPL-MCNC: 144 MG/DL — HIGH (ref 70–99)
HCT VFR BLD CALC: 31.9 % — LOW (ref 34.5–45)
HGB BLD-MCNC: 9 G/DL — LOW (ref 11.5–15.5)
HYPOCHROMIA BLD QL: SLIGHT — SIGNIFICANT CHANGE UP
IANC: 4.75 K/UL — SIGNIFICANT CHANGE UP (ref 1.5–8.5)
LYMPHOCYTES # BLD AUTO: 0.51 K/UL — LOW (ref 1–3.3)
LYMPHOCYTES # BLD AUTO: 8.7 % — LOW (ref 13–44)
MANUAL SMEAR VERIFICATION: SIGNIFICANT CHANGE UP
MCHC RBC-ENTMCNC: 19.9 PG — LOW (ref 27–34)
MCHC RBC-ENTMCNC: 28.2 GM/DL — LOW (ref 32–36)
MCV RBC AUTO: 70.4 FL — LOW (ref 80–100)
METAMYELOCYTES # FLD: 0.9 % — SIGNIFICANT CHANGE UP (ref 0–1)
MICROCYTES BLD QL: SIGNIFICANT CHANGE UP
MONOCYTES # BLD AUTO: 0.15 K/UL — SIGNIFICANT CHANGE UP (ref 0–0.9)
MONOCYTES NFR BLD AUTO: 2.6 % — SIGNIFICANT CHANGE UP (ref 2–14)
NEUTROPHILS # BLD AUTO: 5.02 K/UL — SIGNIFICANT CHANGE UP (ref 1.8–7.4)
NEUTROPHILS NFR BLD AUTO: 86.1 % — HIGH (ref 43–77)
PLAT MORPH BLD: NORMAL — SIGNIFICANT CHANGE UP
PLATELET # BLD AUTO: 67 K/UL — LOW (ref 150–400)
PLATELET COUNT - ESTIMATE: ABNORMAL
POIKILOCYTOSIS BLD QL AUTO: SLIGHT — SIGNIFICANT CHANGE UP
POLYCHROMASIA BLD QL SMEAR: SIGNIFICANT CHANGE UP
POTASSIUM SERPL-MCNC: 3.5 MMOL/L — SIGNIFICANT CHANGE UP (ref 3.5–5.3)
POTASSIUM SERPL-SCNC: 3.5 MMOL/L — SIGNIFICANT CHANGE UP (ref 3.5–5.3)
PROT SERPL-MCNC: 7.9 G/DL — SIGNIFICANT CHANGE UP (ref 6–8.3)
RBC # BLD: 4.53 M/UL — SIGNIFICANT CHANGE UP (ref 3.8–5.2)
RBC # FLD: 23.4 % — HIGH (ref 10.3–14.5)
RBC BLD AUTO: ABNORMAL
SALICYLATES SERPL-MCNC: <5 MG/DL — LOW (ref 15–30)
SARS-COV-2 RNA SPEC QL NAA+PROBE: SIGNIFICANT CHANGE UP
SMUDGE CELLS # BLD: PRESENT — SIGNIFICANT CHANGE UP
SODIUM SERPL-SCNC: 137 MMOL/L — SIGNIFICANT CHANGE UP (ref 135–145)
TOXICOLOGY SCREEN, DRUGS OF ABUSE, SERUM RESULT: SIGNIFICANT CHANGE UP
VARIANT LYMPHS # BLD: 1.7 % — SIGNIFICANT CHANGE UP (ref 0–6)
WBC # BLD: 5.83 K/UL — SIGNIFICANT CHANGE UP (ref 3.8–10.5)
WBC # FLD AUTO: 5.83 K/UL — SIGNIFICANT CHANGE UP (ref 3.8–10.5)

## 2021-06-03 RX ORDER — ACETAMINOPHEN 500 MG
650 TABLET ORAL ONCE
Refills: 0 | Status: COMPLETED | OUTPATIENT
Start: 2021-06-03 | End: 2021-06-03

## 2021-06-03 RX ORDER — FOLIC ACID 0.8 MG
1 TABLET ORAL DAILY
Refills: 0 | Status: DISCONTINUED | OUTPATIENT
Start: 2021-06-03 | End: 2021-06-07

## 2021-06-03 RX ORDER — SODIUM CHLORIDE 9 MG/ML
1000 INJECTION INTRAMUSCULAR; INTRAVENOUS; SUBCUTANEOUS ONCE
Refills: 0 | Status: COMPLETED | OUTPATIENT
Start: 2021-06-03 | End: 2021-06-03

## 2021-06-03 RX ORDER — ONDANSETRON 8 MG/1
4 TABLET, FILM COATED ORAL ONCE
Refills: 0 | Status: COMPLETED | OUTPATIENT
Start: 2021-06-03 | End: 2021-06-03

## 2021-06-03 RX ORDER — THIAMINE MONONITRATE (VIT B1) 100 MG
100 TABLET ORAL DAILY
Refills: 0 | Status: DISCONTINUED | OUTPATIENT
Start: 2021-06-03 | End: 2021-06-07

## 2021-06-03 RX ORDER — SODIUM CHLORIDE 9 MG/ML
1000 INJECTION, SOLUTION INTRAVENOUS
Refills: 0 | Status: DISCONTINUED | OUTPATIENT
Start: 2021-06-03 | End: 2021-06-05

## 2021-06-03 RX ADMIN — Medication 2 MILLIGRAM(S): at 20:54

## 2021-06-03 RX ADMIN — Medication 650 MILLIGRAM(S): at 01:30

## 2021-06-03 RX ADMIN — Medication 2 MILLIGRAM(S): at 17:24

## 2021-06-03 RX ADMIN — Medication 2 MILLIGRAM(S): at 12:14

## 2021-06-03 RX ADMIN — ONDANSETRON 4 MILLIGRAM(S): 8 TABLET, FILM COATED ORAL at 00:38

## 2021-06-03 RX ADMIN — ONDANSETRON 4 MILLIGRAM(S): 8 TABLET, FILM COATED ORAL at 14:11

## 2021-06-03 RX ADMIN — ONDANSETRON 4 MILLIGRAM(S): 8 TABLET, FILM COATED ORAL at 04:19

## 2021-06-03 RX ADMIN — Medication 650 MILLIGRAM(S): at 18:29

## 2021-06-03 RX ADMIN — Medication 1 MILLIGRAM(S): at 00:38

## 2021-06-03 RX ADMIN — Medication 2 MILLIGRAM(S): at 19:32

## 2021-06-03 RX ADMIN — Medication 1 MILLIGRAM(S): at 12:55

## 2021-06-03 RX ADMIN — Medication 2 MILLIGRAM(S): at 09:56

## 2021-06-03 RX ADMIN — Medication 1 TABLET(S): at 12:55

## 2021-06-03 RX ADMIN — Medication 2 MILLIGRAM(S): at 14:11

## 2021-06-03 RX ADMIN — SODIUM CHLORIDE 1000 MILLILITER(S): 9 INJECTION INTRAMUSCULAR; INTRAVENOUS; SUBCUTANEOUS at 00:38

## 2021-06-03 RX ADMIN — Medication 650 MILLIGRAM(S): at 04:29

## 2021-06-03 RX ADMIN — SODIUM CHLORIDE 1000 MILLILITER(S): 9 INJECTION INTRAMUSCULAR; INTRAVENOUS; SUBCUTANEOUS at 02:22

## 2021-06-03 RX ADMIN — Medication 2 MILLIGRAM(S): at 05:59

## 2021-06-03 RX ADMIN — Medication 1 MILLIGRAM(S): at 02:22

## 2021-06-03 RX ADMIN — Medication 50 MILLIGRAM(S): at 01:58

## 2021-06-03 RX ADMIN — SODIUM CHLORIDE 100 MILLILITER(S): 9 INJECTION, SOLUTION INTRAVENOUS at 11:03

## 2021-06-03 RX ADMIN — Medication 2 MILLIGRAM(S): at 07:53

## 2021-06-03 RX ADMIN — Medication 650 MILLIGRAM(S): at 19:31

## 2021-06-03 RX ADMIN — Medication 2 MILLIGRAM(S): at 16:17

## 2021-06-03 RX ADMIN — Medication 2 MILLIGRAM(S): at 22:51

## 2021-06-03 RX ADMIN — Medication 100 MILLIGRAM(S): at 12:55

## 2021-06-03 RX ADMIN — Medication 1 MILLIGRAM(S): at 04:19

## 2021-06-03 RX ADMIN — Medication 2 MILLIGRAM(S): at 18:29

## 2021-06-03 NOTE — ED ADULT NURSE REASSESSMENT NOTE - COMFORT CARE
hob elevated/po fluids offered/repositioned/side rails up/warm blanket provided
plan of care explained/repositioned/side rails up/warm blanket provided

## 2021-06-03 NOTE — ED PROVIDER NOTE - OBJECTIVE STATEMENT
46 y/o female pmh ETOH abuse c/o withdrawal. Pt states that her last drink was 24 hours ago and now she does not feel well. Pt was drinking daily for the last 2 weeks. Pt admits to n/v and feeling like her previous episodes of withdrawal. Denies chest pain, sob, syncope, fever or chills.

## 2021-06-03 NOTE — ED PROVIDER NOTE - CLINICAL SUMMARY MEDICAL DECISION MAKING FREE TEXT BOX
46 y/o female w/ ETOH abuse c/o n/v and likely alcohol withdrawal- labs, fluids, ativan, likely admit

## 2021-06-03 NOTE — ED PROVIDER NOTE - ATTENDING CONTRIBUTION TO CARE
I have seen and examined the patient on the patient´s visit date. I have reviewed the note written by Richard Mcrae [facility], on that visit day. I have supervised and participated as necessary in the performance of procedures indicated for patient management and was available at all phases of the patient´s visit when needed. We discussed the history, physical exam findings, management plan, and  medical decision making. I have made my additions, exceptions, and revisions within the chart and I agree with H and P as documented in its entirety. The data and my interpretation of any data collected from labs, interventions and imaging appear below as well as my independent medical decision making and considerations    The patient is a 45y Female who has a past medical and surgery history of ETOH Abuse Depression HTN  pancreatitis Adenomyosis Tonsillectomy heavy menstrual bleeding requiring laparoscopy PTED with s/s of acute alcohol withdrawal as described    Vital Signs  PE: as described; my additions and exceptions are noted in the chart  DATA:  EKG: NA at time of eval  LABS: All significant/relevant labs and imaging results present during the time of evaluation have been entered into chart through pullset function and are discussed below    IMPRESSION/RISK:  Dx=Severe Alcohol withdrawal   Considerations: Significant considering level of alcohol; Control of symptoms with benzos and antiemetics  supplementation of electrolytes and nutrition iv hydration admit for same would add on lipase to r/o as a confounder and treat for AKA as well with ivfs antiemetics and glucose supplementation  Plan  IVF's   vitamin supplementation   benzos   antiemetics   serial exams  Admi I have seen and examined the patient on the patient´s visit date. I have reviewed the note written by Richard Mcrae [facility], on that visit day. I have supervised and participated as necessary in the performance of procedures indicated for patient management and was available at all phases of the patient´s visit when needed. We discussed the history, physical exam findings, management plan, and  medical decision making. I have made my additions, exceptions, and revisions within the chart and I agree with H and P as documented in its entirety. The data and my interpretation of any data collected from labs, interventions and imaging appear below as well as my independent medical decision making and considerations    The patient is a 45y Female who has a past medical and surgery history of ETOH Abuse Depression HTN  pancreatitis Adenomyosis Tonsillectomy heavy menstrual bleeding requiring laparoscopy PTED with s/s of acute alcohol withdrawal as described with nausea and vomiting    Vital Signs  PE: as described; my additions and exceptions are noted in the chart  DATA:  EKG: NA at time of eval  LABS: All significant/relevant labs and imaging results present during the time of evaluation have been entered into chart through pullset function and are discussed below    IMPRESSION/RISK:  Dx=Severe Alcohol withdrawal   Considerations: Significant considering level of alcohol; Control of symptoms with benzos and antiemetics  supplementation of electrolytes (add mag and phos) and nutrition iv hydration admit for same would add on lipase to r/o as a confounder and treat for AKA as well with ivfs antiemetics and glucose supplementation  Plan  IVF's   vitamin supplementation   benzos   antiemetics   serial exams  Admi

## 2021-06-03 NOTE — H&P ADULT - ASSESSMENT
Patient is a 44 y/o female pmh HTN, depression, ETOH abuse c/o withdrawal. Pt states that her last drink was 24 hours ago and now she does not feel well. Pt was drinking daily for the last 2 weeks. Pt admits to n/v and feeling like her previous episodes of withdrawal. Denies chest pain, sob, syncope, fever or chills.       Problem/Plan - 1:  ·  Problem: Alcohol withdrawal syndrome without complication.  Plan: alcohol withdrawal, drinks 2 pints/day, last drink 1.5 days ago, +nausea/HA/tremulous, denies VH/AH or tactile hallucinations  - CIWA monitoring,   - start on ativan taper  - symptom triggered ativan as needed   - MV, thiamine, folate   - SW consult   - IVF as tolerated   - fall precautions.      Problem/Plan - 2:  ·  Problem: Anemia.  Plan: microcytic anemia, Hb stable from recent admission - prior anemia labs showing likely iron deficiency anemia, possibly with BM suppression from chronic etoh use. Denies bleeding.    - monitor CBC, defer pharm AC  - outpt PMD f/u.      Problem/Plan - 3:  ·  Problem: Hepatic steatosis.  Plan: transaminitis with AST>ALT, imaging with hepatic steatosis, likely 2/2 chronic etoh abuse   c/t trend and monitor   outpt PMD f/u.

## 2021-06-03 NOTE — ED ADULT NURSE NOTE - CHIEF COMPLAINT QUOTE
patient mother called saying is drank too much. patient admits to drinking many vodka tonight. as per EMS patient had a breakup 6 months ago and has been having drinking phases since. has been to detox around 6 months ago but went back to drinking afterwards. patient does not have any thoughts to hurt herself or others. patient denies any use of drugs tonight    belongings in 21. phone locked with security

## 2021-06-03 NOTE — H&P ADULT - NSHPPHYSICALEXAM_GEN_ALL_CORE
Vital Signs Last 24 Hrs  T(C): 37.1 (03 Jun 2021 09:51), Max: 37.1 (03 Jun 2021 09:48)  T(F): 98.8 (03 Jun 2021 09:51), Max: 98.8 (03 Jun 2021 09:48)  HR: 100 (03 Jun 2021 09:51) (96 - 118)  BP: 147/96 (03 Jun 2021 09:51) (133/97 - 151/95)  BP(mean): --  RR: 18 (03 Jun 2021 09:51) (16 - 19)  SpO2: 99% (03 Jun 2021 09:51) (96% - 100%) Vital Signs Last 24 Hrs  T(C): 37.1 (03 Jun 2021 09:51), Max: 37.1 (03 Jun 2021 09:48)  T(F): 98.8 (03 Jun 2021 09:51), Max: 98.8 (03 Jun 2021 09:48)  HR: 100 (03 Jun 2021 09:51) (96 - 118)  BP: 147/96 (03 Jun 2021 09:51) (133/97 - 151/95)  BP(mean): --  RR: 18 (03 Jun 2021 09:51) (16 - 19)  SpO2: 99% (03 Jun 2021 09:51) (96% - 100%)    Appearance: Normal	  HEENT:   Normal oral mucosa, PERRL, EOMI	  Lymphatic: No lymphadenopathy , no edema  Cardiovascular: Normal S1 S2, No JVD, No murmurs , Peripheral pulses palpable 2+ bilaterally  Respiratory: Lungs clear to auscultation, normal effort 	  Gastrointestinal:  Soft, Non-tender, + BS	  Skin: No rashes, No ecchymoses, No cyanosis, warm to touch  Musculoskeletal: Normal range of motion, normal strength  Psychiatry:  Mood & affect appropriate  Ext: No edema

## 2021-06-03 NOTE — ED ADULT NURSE NOTE - OBJECTIVE STATEMENT
JOSE DAVID RN: Pt received to spot [facility] A&OX4 and ambulatory at baseline. Pt brought in by family for drinking too much. As per mother, drank vodka tonight. As per patient, last drink was 12pm. Pt endorses withdrawals in the past and states symptoms are consistent with previous withdrawal. Pt denies SI/HI at this time. Endorses daily drinking for 6mo after break up. Pt unwilling to answer whether or not she uses drugs. Pt is shaking and vomitting at this time.  20G IV placed to LAC and labs drawn as ordered. NSR on cardiac monitor. Endorsed to primary RN.

## 2021-06-03 NOTE — H&P ADULT - HISTORY OF PRESENT ILLNESS
Patient is a 44 y/o female pmh HTN, depression, ETOH abuse c/o withdrawal. Pt states that her last drink was 24 hours ago and now she does not feel well. Pt was drinking daily for the last 2 weeks. Pt admits to n/v and feeling like her previous episodes of withdrawal. Denies chest pain, sob, syncope, fever or chills.

## 2021-06-04 LAB
ALBUMIN SERPL ELPH-MCNC: 3.3 G/DL — SIGNIFICANT CHANGE UP (ref 3.3–5)
ALP SERPL-CCNC: 65 U/L — SIGNIFICANT CHANGE UP (ref 40–120)
ALT FLD-CCNC: 29 U/L — SIGNIFICANT CHANGE UP (ref 4–33)
ANION GAP SERPL CALC-SCNC: 14 MMOL/L — SIGNIFICANT CHANGE UP (ref 7–14)
AST SERPL-CCNC: 51 U/L — HIGH (ref 4–32)
BILIRUB SERPL-MCNC: 0.6 MG/DL — SIGNIFICANT CHANGE UP (ref 0.2–1.2)
BUN SERPL-MCNC: 8 MG/DL — SIGNIFICANT CHANGE UP (ref 7–23)
CALCIUM SERPL-MCNC: 8.8 MG/DL — SIGNIFICANT CHANGE UP (ref 8.4–10.5)
CHLORIDE SERPL-SCNC: 94 MMOL/L — LOW (ref 98–107)
CO2 SERPL-SCNC: 25 MMOL/L — SIGNIFICANT CHANGE UP (ref 22–31)
CREAT SERPL-MCNC: 0.46 MG/DL — LOW (ref 0.5–1.3)
GLUCOSE SERPL-MCNC: 168 MG/DL — HIGH (ref 70–99)
HCT VFR BLD CALC: 26.3 % — LOW (ref 34.5–45)
HGB BLD-MCNC: 7.7 G/DL — LOW (ref 11.5–15.5)
MAGNESIUM SERPL-MCNC: 1.3 MG/DL — LOW (ref 1.6–2.6)
MCHC RBC-ENTMCNC: 20.5 PG — LOW (ref 27–34)
MCHC RBC-ENTMCNC: 29.3 GM/DL — LOW (ref 32–36)
MCV RBC AUTO: 70.1 FL — LOW (ref 80–100)
NRBC # BLD: 0 /100 WBCS — SIGNIFICANT CHANGE UP
NRBC # FLD: 0 K/UL — SIGNIFICANT CHANGE UP
PHOSPHATE SERPL-MCNC: 2.6 MG/DL — SIGNIFICANT CHANGE UP (ref 2.5–4.5)
PLATELET # BLD AUTO: 42 K/UL — LOW (ref 150–400)
POTASSIUM SERPL-MCNC: 3.3 MMOL/L — LOW (ref 3.5–5.3)
POTASSIUM SERPL-SCNC: 3.3 MMOL/L — LOW (ref 3.5–5.3)
PROT SERPL-MCNC: 6.7 G/DL — SIGNIFICANT CHANGE UP (ref 6–8.3)
RBC # BLD: 3.75 M/UL — LOW (ref 3.8–5.2)
RBC # FLD: 22.8 % — HIGH (ref 10.3–14.5)
SODIUM SERPL-SCNC: 133 MMOL/L — LOW (ref 135–145)
WBC # BLD: 3.58 K/UL — LOW (ref 3.8–10.5)
WBC # FLD AUTO: 3.58 K/UL — LOW (ref 3.8–10.5)

## 2021-06-04 RX ORDER — POTASSIUM CHLORIDE 20 MEQ
40 PACKET (EA) ORAL ONCE
Refills: 0 | Status: COMPLETED | OUTPATIENT
Start: 2021-06-04 | End: 2021-06-04

## 2021-06-04 RX ORDER — LANOLIN ALCOHOL/MO/W.PET/CERES
9 CREAM (GRAM) TOPICAL AT BEDTIME
Refills: 0 | Status: DISCONTINUED | OUTPATIENT
Start: 2021-06-04 | End: 2021-06-07

## 2021-06-04 RX ORDER — MAGNESIUM SULFATE 500 MG/ML
2 VIAL (ML) INJECTION ONCE
Refills: 0 | Status: COMPLETED | OUTPATIENT
Start: 2021-06-04 | End: 2021-06-04

## 2021-06-04 RX ADMIN — Medication 1 TABLET(S): at 11:59

## 2021-06-04 RX ADMIN — Medication 1 MILLIGRAM(S): at 11:59

## 2021-06-04 RX ADMIN — Medication 1.5 MILLIGRAM(S): at 21:42

## 2021-06-04 RX ADMIN — Medication 1.5 MILLIGRAM(S): at 13:58

## 2021-06-04 RX ADMIN — SODIUM CHLORIDE 100 MILLILITER(S): 9 INJECTION, SOLUTION INTRAVENOUS at 12:54

## 2021-06-04 RX ADMIN — Medication 9 MILLIGRAM(S): at 22:28

## 2021-06-04 RX ADMIN — Medication 100 MILLIGRAM(S): at 11:59

## 2021-06-04 RX ADMIN — Medication 50 GRAM(S): at 12:52

## 2021-06-04 RX ADMIN — Medication 1.5 MILLIGRAM(S): at 10:00

## 2021-06-04 RX ADMIN — Medication 1.5 MILLIGRAM(S): at 06:00

## 2021-06-04 RX ADMIN — Medication 40 MILLIEQUIVALENT(S): at 12:53

## 2021-06-04 RX ADMIN — Medication 2 MILLIGRAM(S): at 01:49

## 2021-06-04 RX ADMIN — Medication 1.5 MILLIGRAM(S): at 17:56

## 2021-06-05 LAB
ALBUMIN SERPL ELPH-MCNC: 3.5 G/DL — SIGNIFICANT CHANGE UP (ref 3.3–5)
ALP SERPL-CCNC: 68 U/L — SIGNIFICANT CHANGE UP (ref 40–120)
ALT FLD-CCNC: 34 U/L — HIGH (ref 4–33)
ANION GAP SERPL CALC-SCNC: 14 MMOL/L — SIGNIFICANT CHANGE UP (ref 7–14)
AST SERPL-CCNC: 67 U/L — HIGH (ref 4–32)
BILIRUB SERPL-MCNC: 0.5 MG/DL — SIGNIFICANT CHANGE UP (ref 0.2–1.2)
BLD GP AB SCN SERPL QL: NEGATIVE — SIGNIFICANT CHANGE UP
BUN SERPL-MCNC: 6 MG/DL — LOW (ref 7–23)
CALCIUM SERPL-MCNC: 9.1 MG/DL — SIGNIFICANT CHANGE UP (ref 8.4–10.5)
CHLORIDE SERPL-SCNC: 99 MMOL/L — SIGNIFICANT CHANGE UP (ref 98–107)
CO2 SERPL-SCNC: 22 MMOL/L — SIGNIFICANT CHANGE UP (ref 22–31)
COVID-19 SPIKE DOMAIN AB INTERP: POSITIVE
COVID-19 SPIKE DOMAIN ANTIBODY RESULT: >250 U/ML — HIGH
CREAT SERPL-MCNC: 0.45 MG/DL — LOW (ref 0.5–1.3)
GLUCOSE SERPL-MCNC: 154 MG/DL — HIGH (ref 70–99)
HCT VFR BLD CALC: 28.4 % — LOW (ref 34.5–45)
HGB BLD-MCNC: 7.9 G/DL — LOW (ref 11.5–15.5)
MAGNESIUM SERPL-MCNC: 1.6 MG/DL — SIGNIFICANT CHANGE UP (ref 1.6–2.6)
MCHC RBC-ENTMCNC: 20.1 PG — LOW (ref 27–34)
MCHC RBC-ENTMCNC: 27.8 GM/DL — LOW (ref 32–36)
MCV RBC AUTO: 72.1 FL — LOW (ref 80–100)
NRBC # BLD: 0 /100 WBCS — SIGNIFICANT CHANGE UP
NRBC # FLD: 0 K/UL — SIGNIFICANT CHANGE UP
PHOSPHATE SERPL-MCNC: 3.5 MG/DL — SIGNIFICANT CHANGE UP (ref 2.5–4.5)
PLATELET # BLD AUTO: 53 K/UL — LOW (ref 150–400)
POTASSIUM SERPL-MCNC: 3.6 MMOL/L — SIGNIFICANT CHANGE UP (ref 3.5–5.3)
POTASSIUM SERPL-SCNC: 3.6 MMOL/L — SIGNIFICANT CHANGE UP (ref 3.5–5.3)
PROT SERPL-MCNC: 7.1 G/DL — SIGNIFICANT CHANGE UP (ref 6–8.3)
RBC # BLD: 3.94 M/UL — SIGNIFICANT CHANGE UP (ref 3.8–5.2)
RBC # FLD: 23.3 % — HIGH (ref 10.3–14.5)
RH IG SCN BLD-IMP: POSITIVE — SIGNIFICANT CHANGE UP
SARS-COV-2 IGG+IGM SERPL QL IA: >250 U/ML — HIGH
SARS-COV-2 IGG+IGM SERPL QL IA: POSITIVE
SODIUM SERPL-SCNC: 135 MMOL/L — SIGNIFICANT CHANGE UP (ref 135–145)
WBC # BLD: 2.52 K/UL — LOW (ref 3.8–10.5)
WBC # FLD AUTO: 2.52 K/UL — LOW (ref 3.8–10.5)

## 2021-06-05 RX ADMIN — Medication 1 MILLIGRAM(S): at 13:19

## 2021-06-05 RX ADMIN — Medication 1 MILLIGRAM(S): at 18:46

## 2021-06-05 RX ADMIN — Medication 1.5 MILLIGRAM(S): at 02:26

## 2021-06-05 RX ADMIN — SODIUM CHLORIDE 100 MILLILITER(S): 9 INJECTION, SOLUTION INTRAVENOUS at 01:07

## 2021-06-05 RX ADMIN — Medication 1 MILLIGRAM(S): at 22:50

## 2021-06-05 RX ADMIN — Medication 1 TABLET(S): at 13:19

## 2021-06-05 RX ADMIN — Medication 100 MILLIGRAM(S): at 13:19

## 2021-06-05 RX ADMIN — Medication 1 MILLIGRAM(S): at 10:39

## 2021-06-05 RX ADMIN — Medication 1 MILLIGRAM(S): at 06:29

## 2021-06-05 RX ADMIN — Medication 9 MILLIGRAM(S): at 22:49

## 2021-06-05 RX ADMIN — Medication 1 MILLIGRAM(S): at 14:38

## 2021-06-06 ENCOUNTER — TRANSCRIPTION ENCOUNTER (OUTPATIENT)
Age: 46
End: 2021-06-06

## 2021-06-06 RX ORDER — IBUPROFEN 200 MG
600 TABLET ORAL ONCE
Refills: 0 | Status: COMPLETED | OUTPATIENT
Start: 2021-06-06 | End: 2021-06-06

## 2021-06-06 RX ADMIN — Medication 600 MILLIGRAM(S): at 05:18

## 2021-06-06 RX ADMIN — Medication 0.5 MILLIGRAM(S): at 21:43

## 2021-06-06 RX ADMIN — Medication 0.5 MILLIGRAM(S): at 11:02

## 2021-06-06 RX ADMIN — Medication 1 MILLIGRAM(S): at 12:58

## 2021-06-06 RX ADMIN — Medication 0.5 MILLIGRAM(S): at 15:02

## 2021-06-06 RX ADMIN — Medication 0.5 MILLIGRAM(S): at 05:18

## 2021-06-06 RX ADMIN — Medication 0.5 MILLIGRAM(S): at 19:00

## 2021-06-06 RX ADMIN — Medication 100 MILLIGRAM(S): at 12:58

## 2021-06-06 RX ADMIN — Medication 9 MILLIGRAM(S): at 21:43

## 2021-06-06 RX ADMIN — Medication 1 TABLET(S): at 12:58

## 2021-06-06 NOTE — PROGRESS NOTE ADULT - ASSESSMENT
Patient is a 46 y/o female pmh HTN, depression, ETOH abuse c/o withdrawal. Pt states that her last drink was 24 hours ago and now she does not feel well. Pt was drinking daily for the last 2 weeks. Pt admits to n/v and feeling like her previous episodes of withdrawal. Denies chest pain, sob, syncope, fever or chills.       Problem/Plan - 1:  ·  Problem: Alcohol withdrawal syndrome without complication.  Plan: alcohol withdrawal, drinks 2 pints/day, last drink 1.5 days ago, +nausea/HA/tremulous, denies VH/AH or tactile hallucinations  - CIWA monitoring,   -  ativan taper  - symptom triggered ativan as needed   - MV, thiamine, folate   - SW consult   - IVF as tolerated   - fall precautions.      Problem/Plan - 2:  ·  Problem: Anemia.  Plan: microcytic anemia, Hb stable from recent admission - prior anemia labs showing likely iron deficiency anemia, possibly with BM suppression from chronic etoh use. Denies bleeding.    - monitor CBC, defer pharm AC  - outpt PMD f/u.   - monitor plt level, chronic low,   transfuse to keep hgb above 7     Problem/Plan - 3:  ·  Problem: Hepatic steatosis.  Plan: transaminitis with AST>ALT, imaging with hepatic steatosis, likely 2/2 chronic etoh abuse   c/t trend and monitor   outpt PMD f/u.   
Patient is a 46 y/o female pmh HTN, depression, ETOH abuse c/o withdrawal. Pt states that her last drink was 24 hours ago and now she does not feel well. Pt was drinking daily for the last 2 weeks. Pt admits to n/v and feeling like her previous episodes of withdrawal. Denies chest pain, sob, syncope, fever or chills.       Problem/Plan - 1:  ·  Problem: Alcohol withdrawal syndrome without complication.  Plan: alcohol withdrawal, drinks 2 pints/day, last drink 1.5 days ago, +nausea/HA/tremulous, denies VH/AH or tactile hallucinations  - CIWA monitoring,   -  ativan taper. refusing IV ativan, can give oral instead   - symptom triggered ativan as needed   - MV, thiamine, folate   - SW consult   - IVF as tolerated   - fall precautions.      Problem/Plan - 2:  ·  Problem: Anemia.  Plan: microcytic anemia, Hb stable from recent admission - prior anemia labs showing likely iron deficiency anemia, possibly with BM suppression from chronic etoh use. Denies bleeding.    - monitor CBC, defer pharm AC  - outpt PMD f/u.   - monitor plt level, chronic low,   transfuse to keep hgb above 7     Problem/Plan - 3:  ·  Problem: Hepatic steatosis.  Plan: transaminitis with AST>ALT, imaging with hepatic steatosis, likely 2/2 chronic etoh abuse   c/t trend and monitor   outpt PMD f/u.   
Patient is a 46 y/o female pmh HTN, depression, ETOH abuse c/o withdrawal. Pt states that her last drink was 24 hours ago and now she does not feel well. Pt was drinking daily for the last 2 weeks. Pt admits to n/v and feeling like her previous episodes of withdrawal. Denies chest pain, sob, syncope, fever or chills.       Problem/Plan - 1:  ·  Problem: Alcohol withdrawal syndrome without complication.  Plan: alcohol withdrawal, drinks 2 pints/day, last drink 1.5 days ago, +nausea/HA/tremulous, denies VH/AH or tactile hallucinations  - CIWA monitoring,   -  ativan taper  - symptom triggered ativan as needed   - MV, thiamine, folate   - SW consult   - IVF as tolerated   - fall precautions.      Problem/Plan - 2:  ·  Problem: Anemia.  Plan: microcytic anemia, Hb stable from recent admission - prior anemia labs showing likely iron deficiency anemia, possibly with BM suppression from chronic etoh use. Denies bleeding.    - monitor CBC, defer pharm AC  - outpt PMD f/u.   - monitor plt level, chronic low,   transfuse to keep hgb above 7     Problem/Plan - 3:  ·  Problem: Hepatic steatosis.  Plan: transaminitis with AST>ALT, imaging with hepatic steatosis, likely 2/2 chronic etoh abuse   c/t trend and monitor   outpt PMD f/u.

## 2021-06-06 NOTE — DIETITIAN INITIAL EVALUATION ADULT. - ADD RECOMMEND
3) Encourage PO intake as tolerated. RDN remains available to discuss menu alternatives PRN.                                                                                   4) Continue micronutrient supplementation.                                                                                   5) Monitor PO intake, tolerance to nutrition supplement, weight trends, BMs/GI distress, hydration status, skin integrity.

## 2021-06-06 NOTE — DIETITIAN INITIAL EVALUATION ADULT. - ORAL INTAKE PTA/DIET HISTORY
Attempted to visit pt twice, sleeping on both occasions. Comprehensive chart review completed.  NKFA. +ETOH use, 2 pints/day per chart.

## 2021-06-06 NOTE — DISCHARGE NOTE PROVIDER - NSDCMRMEDTOKEN_GEN_ALL_CORE_FT
Ambien 10 mg oral tablet: 1 tab(s) orally once a day (at bedtime), As Needed  folic acid 1 mg oral tablet: 1 tab(s) orally once a day  Multiple Vitamins oral tablet: 1 tab(s) orally once a day   Ambien 10 mg oral tablet: 1 tab(s) orally once a day (at bedtime), As Needed  folic acid 1 mg oral tablet: 1 tab(s) orally once a day  Multiple Vitamins oral tablet: 1 tab(s) orally once a day  thiamine 100 mg oral tablet: 1 tab(s) orally once a day

## 2021-06-06 NOTE — DISCHARGE NOTE PROVIDER - CARE PROVIDER_API CALL
Simba Shah  Internal Medicine  [facility], [address] [place] [id number]  Phone: [phone]  Fax: [phone]  Follow Up Time:

## 2021-06-06 NOTE — PROGRESS NOTE ADULT - SUBJECTIVE AND OBJECTIVE BOX
Name of Patient : AYLEEN DEY  MRN: [id number]  DATE OF SERVICE: 06-05-21 @ 16:16    Subjective: Patient seen and examined. No new events except as noted.   mild tremor     REVIEW OF SYSTEMS:    CONSTITUTIONAL: No weakness, fevers or chills  EYES/ENT: No visual changes;  No vertigo or throat pain   NECK: No pain or stiffness  RESPIRATORY: No cough, wheezing, hemoptysis; No shortness of breath  CARDIOVASCULAR: No chest pain or palpitations  GASTROINTESTINAL: No abdominal or epigastric pain. No nausea, vomiting, or hematemesis; No diarrhea or constipation. No melena or hematochezia.  GENITOURINARY: No dysuria, frequency or hematuria  NEUROLOGICAL: No numbness or weakness  SKIN: No itching, burning, rashes, or lesions   All other review of systems is negative unless indicated above.    MEDICATIONS:  MEDICATIONS  (STANDING):  folic acid 1 milliGRAM(s) Oral daily  LORazepam   Injectable 1 milliGRAM(s) IV Push every 4 hours  LORazepam   Injectable 0.5 milliGRAM(s) IV Push every 4 hours  LORazepam   Injectable   IV Push   melatonin 9 milliGRAM(s) Oral at bedtime  multivitamin 1 Tablet(s) Oral daily  thiamine 100 milliGRAM(s) Oral daily      PHYSICAL EXAM:  T(C): 36.7 (06-05-21 @ 21:37), Max: 37.1 (06-05-21 @ 07:01)  HR: 98 (06-05-21 @ 21:37) (89 - 106)  BP: 148/111 (06-05-21 @ 21:37) (127/86 - 171/108)  RR: 18 (06-05-21 @ 21:37) (16 - 20)  SpO2: 100% (06-05-21 @ 21:37) (100% - 100%)  Wt(kg): --  I&O's Summary    04 Jun 2021 07:01  -  05 Jun 2021 07:00  --------------------------------------------------------  IN: 600 mL / OUT: 700 mL / NET: -100 mL    05 Jun 2021 07:01  -  06 Jun 2021 00:16  --------------------------------------------------------  IN: 850 mL / OUT: 0 mL / NET: 850 mL          Appearance: Normal	  HEENT:  PERRLA   Lymphatic: No lymphadenopathy   Cardiovascular: Normal S1 S2, no JVD  Respiratory: normal effort , clear  Gastrointestinal:  Soft, Non-tender  Skin: No rashes,  warm to touch  Psychiatry:  Mood & affect appropriate  Musculuskeletal: No edema      All labs, Imaging and EKGs personally reviewed       06-04-21 @ 07:01  -  06-05-21 @ 07:00  --------------------------------------------------------  IN: 600 mL / OUT: 700 mL / NET: -100 mL    06-05-21 @ 07:01  -  06-06-21 @ 00:16  --------------------------------------------------------  IN: 850 mL / OUT: 0 mL / NET: 850 mL                          7.9    2.52  )-----------( 53       ( 05 Jun 2021 10:08 )             28.4               06-05    135  |  99  |  6<L>  ----------------------------<  154<H>  3.6   |  22  |  0.45<L>    Ca    9.1      05 Jun 2021 10:08  Phos  3.5     06-05  Mg     1.6     06-05    TPro  7.1  /  Alb  3.5  /  TBili  0.5  /  DBili  x   /  AST  67<H>  /  ALT  34<H>  /  AlkPhos  68  06-05                                 
Name of Patient : AYLEEN DEY  MRN: [id number]  DATE OF SERVICE: 06-06-21 @ 13:21    Subjective: Patient seen and examined. No new events except as noted.   doing better ciwa 3-4     REVIEW OF SYSTEMS:    CONSTITUTIONAL: No weakness, fevers or chills  EYES/ENT: No visual changes;  No vertigo or throat pain   NECK: No pain or stiffness  RESPIRATORY: No cough, wheezing, hemoptysis; No shortness of breath  CARDIOVASCULAR: No chest pain or palpitations  GASTROINTESTINAL: No abdominal or epigastric pain. No nausea, vomiting, or hematemesis; No diarrhea or constipation. No melena or hematochezia.  GENITOURINARY: No dysuria, frequency or hematuria  NEUROLOGICAL: No numbness or weakness  SKIN: No itching, burning, rashes, or lesions   All other review of systems is negative unless indicated above.    MEDICATIONS:  MEDICATIONS  (STANDING):  folic acid 1 milliGRAM(s) Oral daily  LORazepam   Injectable 0.5 milliGRAM(s) IV Push every 4 hours  LORazepam   Injectable   IV Push   melatonin 9 milliGRAM(s) Oral at bedtime  multivitamin 1 Tablet(s) Oral daily  thiamine 100 milliGRAM(s) Oral daily      PHYSICAL EXAM:  T(C): 36.9 (06-06-21 @ 10:00), Max: 37 (06-06-21 @ 01:28)  HR: 105 (06-06-21 @ 10:00) (97 - 105)  BP: 129/85 (06-06-21 @ 10:00) (121/85 - 171/108)  RR: 19 (06-06-21 @ 10:00) (16 - 19)  SpO2: 100% (06-06-21 @ 10:00) (100% - 100%)  Wt(kg): --  I&O's Summary    05 Jun 2021 07:01  -  06 Jun 2021 07:00  --------------------------------------------------------  IN: 850 mL / OUT: 0 mL / NET: 850 mL    06 Jun 2021 07:01  -  06 Jun 2021 13:21  --------------------------------------------------------  IN: 250 mL / OUT: 0 mL / NET: 250 mL          Appearance: Normal	  HEENT:  PERRLA   Lymphatic: No lymphadenopathy   Cardiovascular: Normal S1 S2, no JVD  Respiratory: normal effort , clear  Gastrointestinal:  Soft, Non-tender  Skin: No rashes,  warm to touch  Psychiatry:  Mood & affect appropriate  Musculuskeletal: No edema      All labs, Imaging and EKGs personally reviewed       06-05-21 @ 07:01  -  06-06-21 @ 07:00  --------------------------------------------------------  IN: 850 mL / OUT: 0 mL / NET: 850 mL    06-06-21 @ 07:01  -  06-06-21 @ 13:21  --------------------------------------------------------  IN: 250 mL / OUT: 0 mL / NET: 250 mL                          7.9    2.52  )-----------( 53       ( 05 Jun 2021 10:08 )             28.4               06-05    135  |  99  |  6<L>  ----------------------------<  154<H>  3.6   |  22  |  0.45<L>    Ca    9.1      05 Jun 2021 10:08  Phos  3.5     06-05  Mg     1.6     06-05    TPro  7.1  /  Alb  3.5  /  TBili  0.5  /  DBili  x   /  AST  67<H>  /  ALT  34<H>  /  AlkPhos  68  06-05                                 
Name of Patient : AYLEEN DEY  MRN: [id number]  DATE OF SERVICE: 06-04-21     Subjective: Patient seen and examined. No new events except as noted.   feeling betetr  CIWA protocol     REVIEW OF SYSTEMS:    CONSTITUTIONAL: No weakness, fevers or chills  EYES/ENT: No visual changes;  No vertigo or throat pain   NECK: No pain or stiffness  RESPIRATORY: No cough, wheezing, hemoptysis; No shortness of breath  CARDIOVASCULAR: No chest pain or palpitations  GASTROINTESTINAL: No abdominal or epigastric pain. No nausea, vomiting, or hematemesis; No diarrhea or constipation. No melena or hematochezia.  GENITOURINARY: No dysuria, frequency or hematuria  NEUROLOGICAL: No numbness or weakness  SKIN: No itching, burning, rashes, or lesions   All other review of systems is negative unless indicated above.    MEDICATIONS:  MEDICATIONS  (STANDING):  dextrose 5% + sodium chloride 0.9%. 1000 milliLiter(s) (100 mL/Hr) IV Continuous <Continuous>  folic acid 1 milliGRAM(s) Oral daily  LORazepam   Injectable 1.5 milliGRAM(s) IV Push every 4 hours  LORazepam   Injectable   IV Push   multivitamin 1 Tablet(s) Oral daily  thiamine 100 milliGRAM(s) Oral daily      PHYSICAL EXAM:  T(C): 36.9 (06-04-21 @ 18:46), Max: 37.3 (06-04-21 @ 14:50)  HR: 109 (06-04-21 @ 18:46) (101 - 117)  BP: 139/89 (06-04-21 @ 18:46) (124/88 - 149/105)  RR: 17 (06-04-21 @ 18:46) (16 - 18)  SpO2: 100% (06-04-21 @ 18:46) (98% - 100%)  Wt(kg): --  I&O's Summary    04 Jun 2021 07:01  -  04 Jun 2021 19:51  --------------------------------------------------------  IN: 600 mL / OUT: 300 mL / NET: 300 mL          Appearance: Normal	  HEENT:  PERRLA   Lymphatic: No lymphadenopathy   Cardiovascular: Normal S1 S2, no JVD  Respiratory: normal effort , clear  Gastrointestinal:  Soft, Non-tender  Skin: No rashes,  warm to touch  Psychiatry:  Mood & affect appropriate  Musculuskeletal: No edema      All labs, Imaging and EKGs personally reviewed         06-04-21 @ 07:01  -  06-04-21 @ 19:51  --------------------------------------------------------  IN: 600 mL / OUT: 300 mL / NET: 300 mL                              7.7    3.58  )-----------( 42       ( 04 Jun 2021 11:35 )             26.3               06-04    133<L>  |  94<L>  |  8   ----------------------------<  168<H>  3.3<L>   |  25  |  0.46<L>    Ca    8.8      04 Jun 2021 11:35  Phos  2.6     06-04  Mg     1.3     06-04    TPro  6.7  /  Alb  3.3  /  TBili  0.6  /  DBili  x   /  AST  51<H>  /  ALT  29  /  AlkPhos  65  06-04

## 2021-06-06 NOTE — DISCHARGE NOTE PROVIDER - NSDCFUADDAPPT_GEN_ALL_CORE_FT
[facility]

## 2021-06-06 NOTE — DISCHARGE NOTE PROVIDER - NSDCCPCAREPLAN_GEN_ALL_CORE_FT
PRINCIPAL DISCHARGE DIAGNOSIS  Diagnosis: Alcohol withdrawal  Assessment and Plan of Treatment: You were treated for alcohol withdrawal. Alcohol cessation encouraged, You were provided with resources. You are to follow up with PCP as outpatient for further management.       PRINCIPAL DISCHARGE DIAGNOSIS  Diagnosis: Alcohol withdrawal  Assessment and Plan of Treatment: You were treated for alcohol withdrawal. Alcohol cessation encouraged, You were provided with resources. You are to follow up with PCP as outpatient for further management.  You have [facility] and have an intake appointment this week   Please follow up   Continue thiamine and folic acid      SECONDARY DISCHARGE DIAGNOSES  Diagnosis: Pancytopenia  Assessment and Plan of Treatment: Follow up with your primary MD Dr Guajardo group to monitor your   cbc next week

## 2021-06-06 NOTE — DISCHARGE NOTE PROVIDER - HOSPITAL COURSE
Patient is a 46 y/o female pmh HTN, depression, ETOH abuse c/o withdrawal. Pt states that her last drink was 24 hours ago and now she does not feel well. Pt was drinking daily for the last 2 weeks. Pt admits to n/v and feeling like her previous episodes of withdrawal. Denies chest pain, sob, syncope, fever or chills.    Alcohol withdrawal syndrome without complication.    - Alcohol withdrawal, drinks 2 pints/day, last drink 1.5 days ago, +nausea/HA/tremulous, denies VH/AH or tactile hallucinations  - CIWA monitoring,   - Ativan taper with symptom triggered ativan as needed   - MV, thiamine, folate   - Alcohol cessation encouraged   - SW consult   - IVF as tolerated   - fall precautions.     Anemia.   - Microcytic anemia, Hb stable from recent admission   - prior anemia labs showing likely iron deficiency anemia, possibly with BM suppression from chronic etoh use. Denies bleeding.    - CBC monitoring   - Pt to follow up with PCP for further monitoring of CBC as outpatient.     Hepatic steatosis  - Transaminitis with AST>ALT  - Imaging with hepatic steatosis, likely secondary to chronic etoh abuse   - Pt to follow up with PCP as outpatient for further management     On_________, discussed with __________, patient is medically cleared and optimized for discharge today. All medications were reviewed with attending, and sent to mutually agreed upon pharmacy.   Patient is a 46 y/o female pmh HTN, depression, ETOH abuse c/o withdrawal. Pt states that her last drink was 24 hours ago and now she does not feel well. Pt was drinking daily for the last 2 weeks. Pt admits to n/v and feeling like her previous episodes of withdrawal. Denies chest pain, sob, syncope, fever or chills.    Alcohol withdrawal syndrome without complication.    - Alcohol withdrawal, drinks 2 pints/day, last drink 1.5 days ago, +nausea/HA/tremulous, denies VH/AH or tactile hallucinations  - CIWA monitoring,   - Ativan taper with symptom triggered ativan as needed   - MV, thiamine, folate   - Alcohol cessation encouraged   - SW consult   - IVF as tolerated   - fall precautions.   - You completed ciwa taper and clear for dc home   - [facility]U with [facility] for your intake appointment     Anemia.   - Microcytic anemia, Hb stable from recent admission   - thrombocytopenia Followed by Dr. Guajardo and he will follow plt /cbc  - prior anemia labs showing likely iron deficiency anemia, possibly with BM suppression from chronic etoh use. Denies bleeding.    - CBC monitoring   - Pt to follow up with PCP for further monitoring of CBC as outpatient.     Hepatic steatosis  - Transaminitis with AST>ALT  - Imaging with hepatic steatosis, likely secondary to chronic etoh abuse   - Pt to follow up with PCP as outpatient for further management     On June 7, 2021 discussed with Dr. Guajardo and patient is medically cleared and optimized for discharge today. All medications were reviewed with attending, and sent to mutually agreed upon pharmacy.

## 2021-06-06 NOTE — DIETITIAN INITIAL EVALUATION ADULT. - REASON FOR ADMISSION
Per chart, pt is 45 year old female PMHx adenomyosis, HTN, depression, ETOH abuse admitted for management of alcohol withdrawal syndrome without complication.

## 2021-06-06 NOTE — DIETITIAN INITIAL EVALUATION ADULT. - OTHER INFO
No dosing weight available at this time. Weight history, per chart: (5/6/21) 140 lbs, (4/21/21) 150 lbs, (12/18/20) 134 lbs.  No noted GI distress, +nausea PTA. No  bowel regimen ordered at this time.

## 2021-06-07 ENCOUNTER — TRANSCRIPTION ENCOUNTER (OUTPATIENT)
Age: 46
End: 2021-06-07

## 2021-06-07 VITALS
OXYGEN SATURATION: 100 % | RESPIRATION RATE: 18 BRPM | TEMPERATURE: 98 F | HEART RATE: 88 BPM | SYSTOLIC BLOOD PRESSURE: 127 MMHG | DIASTOLIC BLOOD PRESSURE: 83 MMHG

## 2021-06-07 RX ORDER — THIAMINE MONONITRATE (VIT B1) 100 MG
1 TABLET ORAL
Qty: 0 | Refills: 0 | DISCHARGE
Start: 2021-06-07

## 2021-06-07 RX ADMIN — Medication 1 TABLET(S): at 12:48

## 2021-06-07 RX ADMIN — Medication 0.5 MILLIGRAM(S): at 05:34

## 2021-06-07 RX ADMIN — Medication 0.5 MILLIGRAM(S): at 01:56

## 2021-06-07 RX ADMIN — Medication 100 MILLIGRAM(S): at 12:48

## 2021-06-07 RX ADMIN — Medication 1 MILLIGRAM(S): at 12:49

## 2021-06-07 NOTE — SBIRT NOTE ADULT - NSSBIRTALCPASSREFTXDET_GEN_A_CORE
SW met with patient at bedside to complete AUDIT. SW introduced self, explained role and reason for visit. Patient presented as a&ox4 and receptive to SW intervention. SW completed AUDIT screening. Full screen positive. Screening results were reviewed  and patient was provided information about healthy guidelines and potential negative consequences associated with level of risk. Motivation and readiness to reduce or stop use was discussed and goals and activities to make changes were suggested/offered. Options discussed for further evaluation and treatment, but referral to treatment was not completed because patient reports she has a screening interview at the end of the week with [facility] in [place], [place]. Patient reports she will be staying with her mother upon discharge from the hospital until entrance into program. Patient inquired about all women sober living houses in [place]. SW provided patient with information on [facility] ([address] [place] [address], p: [phone]) and [facility] ([address] [place] [address], p: [phone]). SW provided education on programs emphasizing both programs require applicants to complete an in Additionally, JUAN provided Telephonic SBIRT and outpatient resources [facility], and [facility] for Substance Use to utilize if needed.  Additionally, SW provided Telephonic SBIRT and inpatient and outpatient resources- [facility] and [facility] for Substance Use to utilize if needed.

## 2021-06-07 NOTE — SBIRT NOTE ADULT - NSSBIRTUNABLEREM_GEN_A_CORE
Reason for Call:  Other wants procedure code for vertigo that Dr. Quinones had her tested for    Detailed comments: will schedule appt to see Joni later    Phone Number Patient can be reached at: Other phone number:  [phone]    Best Time: any    Can we leave a detailed message on this number? YES    Call taken on 9/7/2017 at 8:28 AM by Aleksandra Ann       Daily or almost daily

## 2021-06-07 NOTE — SBIRT NOTE ADULT - NSSBIRTALCACTION/INTER_GEN_A_CORE
Passive referral to treatment Positive reinforcement/Brief intervention/Passive referral to treatment

## 2021-06-07 NOTE — SBIRT NOTE ADULT - NSSBIRTALCPOSREINDET_GEN_A_CORE
SW met with patient at bedside to complete AUDIT. SW introduced self, explained role and reason for visit. Patient presented as a&ox4 and receptive to SW intervention. SW completed AUDIT screening. Full screen positive. Screening results were reviewed and patient was provided information about healthy guidelines and potential negative consequences associated with level of risk. Motivation and readiness to reduce or stop use was discussed and goals and activities to make changes were suggested/offered.

## 2021-06-07 NOTE — SBIRT NOTE ADULT - NSSBIRTBRIEFINTDET_GEN_A_CORE
Options discussed for further evaluation and treatment but referral to treatment was not completed because patient declined referral to inpatient treatment as she reports she has done inpatient programs in the past and they were not beneficial for her. Patient reports she has a screening interview at the end of the week with [facility] in [place], [place]. Patient reports she will be staying with her mother upon discharge from the hospital until entrance into program. Patient inquired about all women sober living houses in [place]. SW provided patient with printed information on [facility] ([address] [place] [address], p: [phone]) and [facility] ([address] [place] [address], p: [phone]). SW provided education on programs emphasizing both programs require applicants to complete an inpatient treatment program prior to admission. Patient verbalized understanding, however continued to decline inpatient referral. Patient expressed gratitude and appreciation for the resources and reports she will follow up on her own if needed.

## 2021-06-07 NOTE — DISCHARGE NOTE NURSING/CASE MANAGEMENT/SOCIAL WORK - PATIENT PORTAL LINK FT
You can access the FollowMyHealth Patient Portal offered by [facility] by registering at the following website: [email or web address]. By joining [employer]’s FollowMyHealth portal, you will also be able to view your health information using other applications (apps) compatible with our system.

## 2021-06-22 PROCEDURE — 70486 CT MAXILLOFACIAL W/O DYE: CPT

## 2021-06-22 PROCEDURE — 85018 HEMOGLOBIN: CPT

## 2021-06-22 PROCEDURE — 80053 COMPREHEN METABOLIC PANEL: CPT

## 2021-06-22 PROCEDURE — 70450 CT HEAD/BRAIN W/O DYE: CPT

## 2021-06-22 PROCEDURE — 83735 ASSAY OF MAGNESIUM: CPT

## 2021-06-22 PROCEDURE — 83605 ASSAY OF LACTIC ACID: CPT

## 2021-06-22 PROCEDURE — 82947 ASSAY GLUCOSE BLOOD QUANT: CPT

## 2021-06-22 PROCEDURE — 84132 ASSAY OF SERUM POTASSIUM: CPT

## 2021-06-22 PROCEDURE — 76377 3D RENDER W/INTRP POSTPROCES: CPT

## 2021-06-22 PROCEDURE — 85025 COMPLETE CBC W/AUTO DIFF WBC: CPT

## 2021-06-22 PROCEDURE — U0003: CPT

## 2021-06-22 PROCEDURE — 82330 ASSAY OF CALCIUM: CPT

## 2021-06-22 PROCEDURE — 97116 GAIT TRAINING THERAPY: CPT

## 2021-06-22 PROCEDURE — 85014 HEMATOCRIT: CPT

## 2021-06-22 PROCEDURE — 73110 X-RAY EXAM OF WRIST: CPT

## 2021-06-22 PROCEDURE — 93005 ELECTROCARDIOGRAM TRACING: CPT

## 2021-06-22 PROCEDURE — 84295 ASSAY OF SERUM SODIUM: CPT

## 2021-06-22 PROCEDURE — 84702 CHORIONIC GONADOTROPIN TEST: CPT

## 2021-06-22 PROCEDURE — 85610 PROTHROMBIN TIME: CPT

## 2021-06-22 PROCEDURE — U0005: CPT

## 2021-06-22 PROCEDURE — 82803 BLOOD GASES ANY COMBINATION: CPT

## 2021-06-22 PROCEDURE — 80048 BASIC METABOLIC PNL TOTAL CA: CPT

## 2021-06-22 PROCEDURE — 82435 ASSAY OF BLOOD CHLORIDE: CPT

## 2021-06-22 PROCEDURE — 97161 PT EVAL LOW COMPLEX 20 MIN: CPT

## 2021-06-22 PROCEDURE — 99285 EMERGENCY DEPT VISIT HI MDM: CPT | Mod: 25

## 2021-06-22 PROCEDURE — 71260 CT THORAX DX C+: CPT

## 2021-06-22 PROCEDURE — 86769 SARS-COV-2 COVID-19 ANTIBODY: CPT

## 2021-06-22 PROCEDURE — 84100 ASSAY OF PHOSPHORUS: CPT

## 2021-06-22 PROCEDURE — 82962 GLUCOSE BLOOD TEST: CPT

## 2021-06-22 PROCEDURE — 80307 DRUG TEST PRSMV CHEM ANLYZR: CPT

## 2021-06-22 PROCEDURE — 74177 CT ABD & PELVIS W/CONTRAST: CPT

## 2021-06-22 PROCEDURE — 72125 CT NECK SPINE W/O DYE: CPT

## 2021-08-03 ENCOUNTER — EMERGENCY (EMERGENCY)
Facility: HOSPITAL | Age: 46
LOS: 1 days | Discharge: HOME | End: 2021-08-03
Attending: EMERGENCY MEDICINE | Admitting: EMERGENCY MEDICINE
Payer: MEDICAID

## 2021-08-03 VITALS
HEIGHT: 67 IN | HEART RATE: 100 BPM | OXYGEN SATURATION: 100 % | SYSTOLIC BLOOD PRESSURE: 134 MMHG | RESPIRATION RATE: 18 BRPM | DIASTOLIC BLOOD PRESSURE: 87 MMHG

## 2021-08-03 DIAGNOSIS — Z98.890 OTHER SPECIFIED POSTPROCEDURAL STATES: Chronic | ICD-10-CM

## 2021-08-03 PROCEDURE — 99284 EMERGENCY DEPT VISIT MOD MDM: CPT

## 2021-08-03 NOTE — ED ADULT TRIAGE NOTE - CHIEF COMPLAINT QUOTE
presents for alcohol intox and unknown amount of Xanax use. as per EMS Xanax bottle found with half amount. PMH ETOH abuse and depression. denies SI/HI.

## 2021-08-03 NOTE — ED ADULT NURSE NOTE - NSIMPLEMENTINTERV_GEN_ALL_ED
Implemented All Fall Risk Interventions:  [place] to call system. Call bell, personal items and telephone within reach. Instruct patient to call for assistance. Room bathroom lighting operational. Non-slip footwear when patient is off stretcher. Physically safe environment: no spills, clutter or unnecessary equipment. Stretcher in lowest position, wheels locked, appropriate side rails in place. Provide visual cue, wrist band, yellow gown, etc. Monitor gait and stability. Monitor for mental status changes and reorient to person, place, and time. Review medications for side effects contributing to fall risk. Reinforce activity limits and safety measures with patient and family.

## 2021-08-03 NOTE — ED ADULT NURSE NOTE - OBJECTIVE STATEMENT
Break cover Rn. Pt is a 46 year old female reporting to the ED for ETOH an possible drug ingestion. Pmh of ETOH abuse, and depression. reports drinking 1/22 pint of vodka tonight an taking 2 xanax.  as per EMS Xanax bottle found with half amount. Pt denies S/I, H/I, auditory or visual hallucinations. Pt is AOX4. Pt denies chest pain or SOB. PT respirations even an unlabored. Pt appears to be intoxicated. Pt denies fever, chills, n/v/d. Pt denies abdominal pain, dysuria, hematuria. awaiting further orders, will cotinine to monitor. Break cover Rn. Pt is a 46 year old female reporting to the ED for ETOH an possible drug ingestion. Pmh of ETOH abuse, and depression. reports drinking 1/22 pint of vodka tonight an taking 2 xanax.  as per EMS Xanax bottle found with half amount. Pt denies S/I, H/I, auditory or visual hallucinations. Pt is AOX4. Pt denies chest pain or SOB. PT respirations even an unlabored. Pt appears to be intoxicated. Pt denies fever, chills, n/v/d. Pt denies abdominal pain, dysuria, hematuria. Belongings placed outside room 21. awaiting further orders, will cotinine to monitor.

## 2021-08-04 VITALS
HEART RATE: 106 BPM | RESPIRATION RATE: 18 BRPM | SYSTOLIC BLOOD PRESSURE: 150 MMHG | DIASTOLIC BLOOD PRESSURE: 93 MMHG | TEMPERATURE: 99 F | OXYGEN SATURATION: 100 %

## 2021-08-04 RX ADMIN — Medication 50 MILLIGRAM(S): at 06:43

## 2021-08-04 NOTE — ED PROVIDER NOTE - PROGRESS NOTE DETAILS
Pt clinically sober at this time with normal speech and gait.  Pt with no interest in meeting with social work about rehab.  Will give one dose of librium to ensure will not progress to withdrawals.  Pt states did not OD on xanax and was only trying to get drunk,

## 2021-08-04 NOTE — ED PROVIDER NOTE - OBJECTIVE STATEMENT
45 yo with history of ETOH abuse presenting with alcohol intoxication.  Pt states that she drank a pint.  Denies any complaints and wants to be dc at this time.  Discussed with Azul (sister) who called 911 to have her brought to the ED.  She signed herself out of rehab on Friday and the rehab was calling to check up on her so Azul went to her apartment and found her drunk and hard to awaken.  States that this has happened before.  She has a problem with ETOH and xanax which is prescribed to her.  No history of SI HI [facility] and family is not concerned for SI.

## 2021-08-04 NOTE — ED PROVIDER NOTE - PATIENT PORTAL LINK FT
You can access the FollowMyHealth Patient Portal offered by [facility] by registering at the following website: [email or web address]. By joining [employer]’s FollowMyHealth portal, you will also be able to view your health information using other applications (apps) compatible with our system.

## 2021-08-04 NOTE — ED PROVIDER NOTE - CLINICAL SUMMARY MEDICAL DECISION MAKING FREE TEXT BOX
pt with ETOH intox and possible benzo co-ingestion.  At this time appears intoxicated as well therefore does not have capacity to leave.  Will observe in the ED until clinically sober with frequent re-evaluations.  Will reassess when clinically sober with regards to wanting to return to rehab or if any SI.  But would appear this is more of a substance use problem.

## 2021-08-04 NOTE — ED PROVIDER NOTE - PHYSICAL EXAMINATION
Gen: Well appearing in NAD  Head: NC/AT  Neck: trachea midline  Resp:  No distress  Ext: no deformities  Neuro:  Alert slow to answer questions appears non focal  Skin:  Warm and dry as visualized  Psych:  No SI HI AH VH

## 2021-08-11 ENCOUNTER — INPATIENT (INPATIENT)
Facility: HOSPITAL | Age: 46
LOS: 3 days | Discharge: HOME | End: 2021-08-15
Attending: INTERNAL MEDICINE | Admitting: INTERNAL MEDICINE
Payer: MEDICAID

## 2021-08-11 VITALS
RESPIRATION RATE: 18 BRPM | DIASTOLIC BLOOD PRESSURE: 91 MMHG | HEIGHT: 67 IN | OXYGEN SATURATION: 100 % | HEART RATE: 120 BPM | SYSTOLIC BLOOD PRESSURE: 112 MMHG

## 2021-08-11 DIAGNOSIS — F10.231 ALCOHOL DEPENDENCE WITH WITHDRAWAL DELIRIUM: ICD-10-CM

## 2021-08-11 DIAGNOSIS — Z98.890 OTHER SPECIFIED POSTPROCEDURAL STATES: Chronic | ICD-10-CM

## 2021-08-11 LAB
ALBUMIN SERPL ELPH-MCNC: 4 G/DL — SIGNIFICANT CHANGE UP (ref 3.3–5)
ALP SERPL-CCNC: 75 U/L — SIGNIFICANT CHANGE UP (ref 40–120)
ALT FLD-CCNC: 36 U/L — HIGH (ref 4–33)
AMPHET UR-MCNC: NEGATIVE — SIGNIFICANT CHANGE UP
ANION GAP SERPL CALC-SCNC: 24 MMOL/L — HIGH (ref 7–14)
ANISOCYTOSIS BLD QL: SIGNIFICANT CHANGE UP
APAP SERPL-MCNC: <15 UG/ML — SIGNIFICANT CHANGE UP (ref 15–25)
APPEARANCE UR: CLEAR — SIGNIFICANT CHANGE UP
APTT BLD: 27.6 SEC — SIGNIFICANT CHANGE UP (ref 27–36.3)
AST SERPL-CCNC: 76 U/L — HIGH (ref 4–32)
BACTERIA # UR AUTO: NEGATIVE — SIGNIFICANT CHANGE UP
BARBITURATES UR SCN-MCNC: NEGATIVE — SIGNIFICANT CHANGE UP
BASE EXCESS BLDV CALC-SCNC: -3.3 MMOL/L — LOW (ref -2–3)
BASOPHILS # BLD AUTO: 0 K/UL — SIGNIFICANT CHANGE UP (ref 0–0.2)
BASOPHILS NFR BLD AUTO: 0 % — SIGNIFICANT CHANGE UP (ref 0–2)
BENZODIAZ UR-MCNC: POSITIVE
BILIRUB SERPL-MCNC: 0.3 MG/DL — SIGNIFICANT CHANGE UP (ref 0.2–1.2)
BILIRUB UR-MCNC: NEGATIVE — SIGNIFICANT CHANGE UP
BLOOD GAS VENOUS COMPREHENSIVE RESULT: SIGNIFICANT CHANGE UP
BUN SERPL-MCNC: 18 MG/DL — SIGNIFICANT CHANGE UP (ref 7–23)
CALCIUM SERPL-MCNC: 8.6 MG/DL — SIGNIFICANT CHANGE UP (ref 8.4–10.5)
CHLORIDE BLDV-SCNC: 102 MMOL/L — SIGNIFICANT CHANGE UP (ref 96–108)
CHLORIDE SERPL-SCNC: 98 MMOL/L — SIGNIFICANT CHANGE UP (ref 98–107)
CO2 BLDV-SCNC: 23.3 MMOL/L — SIGNIFICANT CHANGE UP (ref 22–26)
CO2 SERPL-SCNC: 19 MMOL/L — LOW (ref 22–31)
COCAINE METAB.OTHER UR-MCNC: NEGATIVE — SIGNIFICANT CHANGE UP
COLOR SPEC: SIGNIFICANT CHANGE UP
CREAT SERPL-MCNC: 0.55 MG/DL — SIGNIFICANT CHANGE UP (ref 0.5–1.3)
CREATININE URINE RESULT, DAU: 93 MG/DL — SIGNIFICANT CHANGE UP
DIFF PNL FLD: ABNORMAL
EOSINOPHIL # BLD AUTO: 0.12 K/UL — SIGNIFICANT CHANGE UP (ref 0–0.5)
EOSINOPHIL NFR BLD AUTO: 0.9 % — SIGNIFICANT CHANGE UP (ref 0–6)
EPI CELLS # UR: 6 /HPF — HIGH (ref 0–5)
ETHANOL SERPL-MCNC: 464 MG/DL — HIGH
GAS PNL BLDV: 141 MMOL/L — SIGNIFICANT CHANGE UP (ref 136–145)
GLUCOSE BLDV-MCNC: 97 MG/DL — SIGNIFICANT CHANGE UP (ref 70–99)
GLUCOSE SERPL-MCNC: 98 MG/DL — SIGNIFICANT CHANGE UP (ref 70–99)
GLUCOSE UR QL: NEGATIVE — SIGNIFICANT CHANGE UP
HCO3 BLDV-SCNC: 22 MMOL/L — SIGNIFICANT CHANGE UP (ref 22–29)
HCT VFR BLD CALC: 37 % — SIGNIFICANT CHANGE UP (ref 34.5–45)
HCT VFR BLDA CALC: 33 % — LOW (ref 34.5–46.5)
HGB BLD CALC-MCNC: 11.1 G/DL — LOW (ref 11.5–15.5)
HGB BLD-MCNC: 10.8 G/DL — LOW (ref 11.5–15.5)
HYALINE CASTS # UR AUTO: 6 /LPF — SIGNIFICANT CHANGE UP (ref 0–7)
HYPERCHROMIA BLD QL AUTO: SIGNIFICANT CHANGE UP
IANC: 12.4 K/UL — HIGH (ref 1.5–8.5)
INR BLD: 1.08 RATIO — SIGNIFICANT CHANGE UP (ref 0.88–1.16)
KETONES UR-MCNC: ABNORMAL
LACTATE BLDV-MCNC: 5.1 MMOL/L — CRITICAL HIGH (ref 0.5–2)
LEUKOCYTE ESTERASE UR-ACNC: NEGATIVE — SIGNIFICANT CHANGE UP
LYMPHOCYTES # BLD AUTO: 0.36 K/UL — LOW (ref 1–3.3)
LYMPHOCYTES # BLD AUTO: 2.6 % — LOW (ref 13–44)
MCHC RBC-ENTMCNC: 20.4 PG — LOW (ref 27–34)
MCHC RBC-ENTMCNC: 29.2 GM/DL — LOW (ref 32–36)
MCV RBC AUTO: 69.8 FL — LOW (ref 80–100)
METHADONE UR-MCNC: NEGATIVE — SIGNIFICANT CHANGE UP
MICROCYTES BLD QL: SLIGHT — SIGNIFICANT CHANGE UP
MONOCYTES # BLD AUTO: 0 K/UL — SIGNIFICANT CHANGE UP (ref 0–0.9)
MONOCYTES NFR BLD AUTO: 0 % — LOW (ref 2–14)
NEUTROPHILS # BLD AUTO: 13.25 K/UL — HIGH (ref 1.8–7.4)
NEUTROPHILS NFR BLD AUTO: 96.5 % — HIGH (ref 43–77)
NITRITE UR-MCNC: NEGATIVE — SIGNIFICANT CHANGE UP
OPIATES UR-MCNC: NEGATIVE — SIGNIFICANT CHANGE UP
OXYCODONE UR-MCNC: NEGATIVE — SIGNIFICANT CHANGE UP
PCO2 BLDV: 40 MMHG — SIGNIFICANT CHANGE UP (ref 39–42)
PCP SPEC-MCNC: SIGNIFICANT CHANGE UP
PCP UR-MCNC: NEGATIVE — SIGNIFICANT CHANGE UP
PH BLDV: 7.35 — SIGNIFICANT CHANGE UP (ref 7.32–7.43)
PH UR: 6 — SIGNIFICANT CHANGE UP (ref 5–8)
PLAT MORPH BLD: NORMAL — SIGNIFICANT CHANGE UP
PLATELET # BLD AUTO: 202 K/UL — SIGNIFICANT CHANGE UP (ref 150–400)
PO2 BLDV: 78 MMHG — SIGNIFICANT CHANGE UP
POIKILOCYTOSIS BLD QL AUTO: SLIGHT — SIGNIFICANT CHANGE UP
POTASSIUM BLDV-SCNC: 4 MMOL/L — SIGNIFICANT CHANGE UP (ref 3.5–5.1)
POTASSIUM SERPL-MCNC: 3.7 MMOL/L — SIGNIFICANT CHANGE UP (ref 3.5–5.3)
POTASSIUM SERPL-SCNC: 3.7 MMOL/L — SIGNIFICANT CHANGE UP (ref 3.5–5.3)
PROT SERPL-MCNC: 7.9 G/DL — SIGNIFICANT CHANGE UP (ref 6–8.3)
PROT UR-MCNC: ABNORMAL
PROTHROM AB SERPL-ACNC: 12.4 SEC — SIGNIFICANT CHANGE UP (ref 10.6–13.6)
RBC # BLD: 5.3 M/UL — HIGH (ref 3.8–5.2)
RBC # FLD: 23.4 % — HIGH (ref 10.3–14.5)
RBC BLD AUTO: ABNORMAL
RBC CASTS # UR COMP ASSIST: 3 /HPF — SIGNIFICANT CHANGE UP (ref 0–4)
SALICYLATES SERPL-MCNC: <5 MG/DL — LOW (ref 15–30)
SAO2 % BLDV: 92.9 % — SIGNIFICANT CHANGE UP
SARS-COV-2 RNA SPEC QL NAA+PROBE: SIGNIFICANT CHANGE UP
SODIUM SERPL-SCNC: 141 MMOL/L — SIGNIFICANT CHANGE UP (ref 135–145)
SP GR SPEC: 1.03 — HIGH (ref 1.01–1.02)
THC UR QL: NEGATIVE — SIGNIFICANT CHANGE UP
TOXICOLOGY SCREEN, DRUGS OF ABUSE, SERUM RESULT: SIGNIFICANT CHANGE UP
TROPONIN T, HIGH SENSITIVITY RESULT: <6 NG/L — SIGNIFICANT CHANGE UP
UROBILINOGEN FLD QL: SIGNIFICANT CHANGE UP
WBC # BLD: 13.73 K/UL — HIGH (ref 3.8–10.5)
WBC # FLD AUTO: 13.73 K/UL — HIGH (ref 3.8–10.5)
WBC UR QL: 7 /HPF — HIGH (ref 0–5)

## 2021-08-11 PROCEDURE — 99285 EMERGENCY DEPT VISIT HI MDM: CPT

## 2021-08-11 PROCEDURE — 70498 CT ANGIOGRAPHY NECK: CPT | Mod: 26

## 2021-08-11 PROCEDURE — 70496 CT ANGIOGRAPHY HEAD: CPT | Mod: 26

## 2021-08-11 PROCEDURE — 99223 1ST HOSP IP/OBS HIGH 75: CPT

## 2021-08-11 RX ORDER — SODIUM CHLORIDE 9 MG/ML
1000 INJECTION INTRAMUSCULAR; INTRAVENOUS; SUBCUTANEOUS ONCE
Refills: 0 | Status: COMPLETED | OUTPATIENT
Start: 2021-08-11 | End: 2021-08-11

## 2021-08-11 RX ADMIN — SODIUM CHLORIDE 1000 MILLILITER(S): 9 INJECTION INTRAMUSCULAR; INTRAVENOUS; SUBCUTANEOUS at 18:17

## 2021-08-11 RX ADMIN — SODIUM CHLORIDE 1000 MILLILITER(S): 9 INJECTION INTRAMUSCULAR; INTRAVENOUS; SUBCUTANEOUS at 20:05

## 2021-08-11 RX ADMIN — SODIUM CHLORIDE 1000 MILLILITER(S): 9 INJECTION INTRAMUSCULAR; INTRAVENOUS; SUBCUTANEOUS at 23:27

## 2021-08-11 RX ADMIN — Medication 2 MILLIGRAM(S): at 18:20

## 2021-08-11 RX ADMIN — Medication 4 MILLIGRAM(S): at 22:07

## 2021-08-11 RX ADMIN — Medication 2 MILLIGRAM(S): at 19:57

## 2021-08-11 NOTE — H&P ADULT - HISTORY OF PRESENT ILLNESS
46-year-old female with depression and history of suicide attempts, alcohol/benzo abuse, presenting with aphasia. Patient reported having tonic/clonic seizure upon EMS arrival.     In the ED VS:  98.1  102-134  112-161/  16-23  100%RA, received 3L NS IVF, lorazepam 2mg IV x2 and 4mg IV x1 46-year-old female with depression and history of suicide attempts, alcohol/benzo abuse, presenting with aphasia. Patient reported to have tonic/clonic seizure upon EMS arrival. Patient is without complaint. States last drink was 1 day prior to arrival, drinks 1L of vodka daily. Denies history of DTs, reports prior history of seizures. She denies fevers/chills, sweats, nausea, vomiting, abdominal pain, diarrhea, constipation, urinary symptoms, SI/HI.    In the ED VS:  98.1  102-134  112-161/  16-23  100%RA, CIWA 4->4, received 3L NS IVF, lorazepam 2mg IV x2 and 4mg IV x1 46-year-old female with depression and history of suicide attempts, alcohol/benzo abuse, presenting with aphasia. Patient reported to have tonic/clonic seizure prior to EMS arrival. Patient is without complaint. States last drink was 1 day prior to arrival, drinks 1L of vodka daily. Denies history of DTs, reports prior history of seizures. She denies fevers/chills, sweats, nausea, vomiting, abdominal pain, diarrhea, constipation, urinary symptoms, SI/HI.    In the ED VS:  98.1  102-134  112-161/  16-23  100%RA, CIWA 4->4, received 3L NS IVF, lorazepam 2mg IV x2 and 4mg IV x1 46-year-old female with HTN, depression and history of suicide attempts, alcohol/benzo abuse, presenting with aphasia. Patient reported to have tonic/clonic seizure prior to EMS arrival. Patient is without complaint. States last drink was 1 day prior to arrival, drinks 1L of vodka daily. Denies history of DTs, reports prior history of seizures. She denies fevers/chills, sweats, nausea, vomiting, abdominal pain, diarrhea, constipation, urinary symptoms, SI/HI.    In the ED VS:  98.1  102-134  112-161/  16-23  100%RA, CIWA 4->4, received 3L NS IVF, lorazepam 2mg IV x2 and 4mg IV x1

## 2021-08-11 NOTE — ED PROVIDER NOTE - PSYCHIATRIC, MLM
Alert and oriented to person, place, time/situation. normal mood and affect. no apparent risk to self or others. Alert and oriented to person, not time or place, concerned for patient's expression of harm to self
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
## 2021-08-11 NOTE — ED PROVIDER NOTE - ATTENDING CONTRIBUTION TO CARE
pt p/w aphasia after possible seizure, w/ hx of ETOH withdrawal seizures and SI attempts in the past. called as code stroke 2/2 aphasia.  ETOH level found to be 400s. symptoms improved w/ time; possible post ictal state. CT head w/o acute bleed. neuro requesting admission for seizure w/u. Pt w/ early e/o withdrawal will give benzos for potential withdrawal + seizure protection, plan to admit.   GEN - no distress   HEAD - NC/AT     EYES - EOMI, no conjunctival pallor, no scleral icterus  ENT -   mucous membranes  moist , no discharge      NECK - Neck supple  PULM - CTA b/l,  symmetric breath sounds  COR -  RRR, S1 S2, no murmurs  ABD - , ND, NT, soft, no guarding, no rebound, no masses    BACK - no CVA tenderness, nontender spine     EXTREMS - no edema, no deformity, warm and well perfused    SKIN - no rash or bruising      NEUROLOGIC - aphasic, moving all extremities.

## 2021-08-11 NOTE — CONSULT NOTE ADULT - SUBJECTIVE AND OBJECTIVE BOX
HPI:  47yo F w/ PMHx of AMS, suicide attempts, and chronic alcoholism (and substance abuse with xanax) who is reporting to the ED for code stroke at 5:08 PM on 8/11/21 due to aphasia. Per EMS, patient was living alone and the last time patient was seen was 12 days ago. LKN was 12 days ago. Upon EMS arrival it was reported that pt was having tonic-clonic seizures. Pt has taken xanax about 7 days ago and drank 1 Liter vodka in the last few days (last drink yesterday).  There was no reports of falls and patient has no history of anticoagulation. Denies SI/HI.     She had a recent admission (08/4/21) for alcohol intoxication when she drank a pint of alcohol. Her sister Azul called EMS to have her brought to ED at that time. PT signed herself out of rehab the prior Friday and the rehab was calling to check up on her so Azul went to her apartment and found her drunk and hard to awaken.  No history of SI HI AH VH and family is not concerned for SI.    Pt is not a TPA candidate as she is outside the window. Pt is not a thrombectomy candidate as no LVO seen.     NIHSS: 16       ROS: A 10-system ROS was performed and is negative except for those items noted above and/or in the HPI.    PAST MEDICAL & SURGICAL HISTORY:  ETOH Abuse    Depression    HTN (hypertension)    History of pancreatitis    Adenomyosis    S/P Tonsillectomy        S/P laparoscopy  for heavy menstrual bleeding      FAMILY HISTORY:  Family history of hypertension (Father, Mother)        SOCIAL HISTORY: SOCIAL HISTORY:     Marital Status: (  )   (  ) Single  (  )   (  )      Occupation:      Lives: (  ) alone  (  ) with children   (  ) with spouse  (  ) with parents  (  ) other     Illicit Drug Use: (  ) never used  (  ) other _____     Tobacco Use:  (  ) never smoked  (  ) former smoker  (  ) current smoker  (  ) pack year  (  ) last cigarette date     Alcohol Use:      Sexual History:        MEDICATIONS  Home Medications:  Ambien 10 mg oral tablet: 1 tab(s) orally once a day (at bedtime), As Needed (05 May 2021 22:37)  folic acid 1 mg oral tablet: 1 tab(s) orally once a day (08 May 2021 10:32)  Multiple Vitamins oral tablet: 1 tab(s) orally once a day (08 May 2021 10:32)  thiamine 100 mg oral tablet: 1 tab(s) orally once a day (07 Jun 2021 13:26)      MEDICATIONS  (STANDING):  LORazepam   Injectable 2 milliGRAM(s) IV Push once  sodium chloride 0.9% Bolus 1000 milliLiter(s) IV Bolus once    MEDICATIONS  (PRN):      ALLERGIES/INTOLERANCES:  Allergies  No Known Allergies    Intolerances      OBJECTIVE:  VITALS   Vital Signs Last 24 Hrs  T(C): --  T(F): --  HR: 120 (11 Aug 2021 16:55) (120 - 120)  BP: 112/91 (11 Aug 2021 16:55) (112/91 - 112/91)  BP(mean): --  RR: 18 (11 Aug 2021 16:55) (18 - 18)  SpO2: 100% (11 Aug 2021 16:55) (100% - 100%)    PHYSICAL EXAM:  Neurological Exam:  Mental Status: Orientated to self, date and place.  Attention intact.  No dysarthria, aphasia or neglect.  Knowledge intact.  Registration intact.  Short and long term memory grossly intact.      Cranial Nerves: PERRL, EOMI, VFF, no nystagmus or diplopia.  CN V1-3 intact to light touch and pinprick.  No facial asymmetry.  Hearing intact.  Tongue midline.  Sternocleidomastoid and Trapezius intact bilaterally.    Motor:   Tone: normal            Strength:     Upper extremity                      Delt       Bicep    Tricep                                               R         5/5        5/5        5/5       5/5                                            L          5/5        5/5        5/5       5/5  Lower extremity                       HF          KE          KF        DF         PF                                            R        5/5        5/5        5/5       5/5       5/5                                            L         5/5        5/5       5/5       5/5        5/5  Pronator drift: none                 Dysmetria: None to finger-nose-finger or heel-shin-heel  No truncal ataxia.    Tremor: No resting, postural or action tremor.  No myoclonus.    Sensation: intact to light touch, pinprick, vibration and proprioception    Deep Tendon Reflexes: 2+ bilateral biceps, triceps, brachioradialis, knee and ankle  Toes flexor bilaterally    Gait: normal and stable.      LABORATORY:  CBC                       10.8   13.73 )-----------( 202      ( 11 Aug 2021 17:34 )             37.0     Chem 08-11    141  |  98  |  18  ----------------------------<  98  3.7   |  19<L>  |  0.55    Ca    8.6      11 Aug 2021 17:34    TPro  7.9  /  Alb  4.0  /  TBili  0.3  /  DBili  x   /  AST  76<H>  /  ALT  36<H>  /  AlkPhos  75  08-11    LFTs LIVER FUNCTIONS - ( 11 Aug 2021 17:34 )  Alb: 4.0 g/dL / Pro: 7.9 g/dL / ALK PHOS: 75 U/L / ALT: 36 U/L / AST: 76 U/L / GGT: x           Coagulopathy PT/INR - ( 11 Aug 2021 17:34 )   PT: 12.4 sec;   INR: 1.08 ratio         PTT - ( 11 Aug 2021 17:34 )  PTT:27.6 sec  Lipid Panel   A1c   Cardiac enzymes     U/A   CSF  Immunological  Other    STUDIES & IMAGING:  Studies (EKG, EEG, EMG, etc):     Radiology (XR, CT, MR, U/S, TTE/WOOD): HPI:  45yo F w/ PMHx of AMS, suicide attempts, and chronic alcoholism (and substance abuse with xanax) who is reporting to the ED for code stroke at 5:08 PM on 8/11/21 due to aphasia. Per EMS, patient was living alone and the last time patient was seen was 12 days ago. LKN was 12 days ago. Upon EMS arrival it was reported that pt was having tonic-clonic seizures. Pt has taken xanax about 7 days ago and drank 1 Liter vodka in the last few days (last drink yesterday).  There was no reports of falls and patient has no history of anticoagulation. Denies SI/HI.     She had a recent admission (08/4/21) for alcohol intoxication when she drank a pint of alcohol. Her sister Azul called EMS to have her brought to ED at that time. PT signed herself out of rehab the prior Friday and the rehab was calling to check up on her so Azul went to her apartment and found her drunk and hard to awaken.  No history of SI HI AH VH and family is not concerned for SI.    Pt is not a TPA candidate as she is outside the window. Pt is not a thrombectomy candidate as no LVO seen.     NIHSS initial: 16 (arousal to minor stimuli, aphasic, blinks eyes only, all extremities -some effort against gravity, mild-moderate sensation loss, mute)  preMRS: 0    Repeat NIHSS (30 minutes after): 2 (blinks eyes only, doesnt know month)    ROS: A 10-system ROS was performed and is negative except for those items noted above and/or in the HPI.    PAST MEDICAL & SURGICAL HISTORY:  ETOH Abuse    Depression    HTN (hypertension)    History of pancreatitis    Adenomyosis    S/P Tonsillectomy        S/P laparoscopy  for heavy menstrual bleeding      FAMILY HISTORY:  Family history of hypertension (Father, Mother)        SOCIAL HISTORY: SOCIAL HISTORY:     Marital Status: (  )   (  ) Single  (  )   (  )      Occupation:      Lives: (x  ) alone  (  ) with children   (  ) with spouse  (  ) with parents  (  ) other     Illicit Drug Use: (  ) never used  (  ) other _____     Tobacco Use:  (  ) never smoked  (  ) former smoker  (  ) current smoker  (  ) pack year  (  ) last cigarette date     Alcohol Use: 1 pint in the last few days     Sexual History:        MEDICATIONS  Home Medications:  Ambien 10 mg oral tablet: 1 tab(s) orally once a day (at bedtime), As Needed (05 May 2021 22:37)  folic acid 1 mg oral tablet: 1 tab(s) orally once a day (08 May 2021 10:32)  Multiple Vitamins oral tablet: 1 tab(s) orally once a day (08 May 2021 10:32)  thiamine 100 mg oral tablet: 1 tab(s) orally once a day (07 Jun 2021 13:26)      MEDICATIONS  (STANDING):  LORazepam   Injectable 2 milliGRAM(s) IV Push once  sodium chloride 0.9% Bolus 1000 milliLiter(s) IV Bolus once    MEDICATIONS  (PRN):      ALLERGIES/INTOLERANCES:  Allergies  No Known Allergies    Intolerances      OBJECTIVE:  VITALS   Vital Signs Last 24 Hrs  T(C): --  T(F): --  HR: 120 (11 Aug 2021 16:55) (120 - 120)  BP: 112/91 (11 Aug 2021 16:55) (112/91 - 112/91)  BP(mean): --  RR: 18 (11 Aug 2021 16:55) (18 - 18)  SpO2: 100% (11 Aug 2021 16:55) (100% - 100%)    PHYSICAL EXAM:  Neurological Exam after 30 minutes on admission:  Mental Status: Eyes open. Tracks objects. Orientated to self only.  lethargic.  No dysarthria, aphasia or neglect. Can repeat "today is a bright christophe day". Can name objects.     Cranial Nerves: PERRL, EOMI, VFF-blinks to threat, no nystagmus.  No facial asymmetry.  Hearing intact.  Tongue midline.  Sternocleidomastoid and Trapezius intact bilaterally. No tongue biting noted.     Motor:   Tone: normal            Strength:     Upper extremity                            Delt       Bicep    Tricep                                               R         5/5        5/5        5/5       5/5                                            L          5/5        5/5        5/5       5/5  Lower extremity                             HF          KE          KF        DF         PF                                            R        5/5        5/5        5/5       5/5       5/5                                            L         5/5        5/5       5/5       5/5        5/5  Pronator drift: none                 Dysmetria: None to finger-nose-finger or heel-shin-heel     Tremor: No resting, postural or action tremor.  No myoclonus.    Sensation: intact on repeat exam    Deep Tendon Reflexes: 2+ bilateral biceps, triceps, brachioradialis, knee and ankle  Toes flexor bilaterally    LABORATORY:  CBC                       10.8   13.73 )-----------( 202      ( 11 Aug 2021 17:34 )             37.0     Chem 08-11    141  |  98  |  18  ----------------------------<  98  3.7   |  19<L>  |  0.55    Ca    8.6      11 Aug 2021 17:34    TPro  7.9  /  Alb  4.0  /  TBili  0.3  /  DBili  x   /  AST  76<H>  /  ALT  36<H>  /  AlkPhos  75  08-11    LFTs LIVER FUNCTIONS - ( 11 Aug 2021 17:34 )  Alb: 4.0 g/dL / Pro: 7.9 g/dL / ALK PHOS: 75 U/L / ALT: 36 U/L / AST: 76 U/L / GGT: x           Coagulopathy PT/INR - ( 11 Aug 2021 17:34 )   PT: 12.4 sec;   INR: 1.08 ratio         PTT - ( 11 Aug 2021 17:34 )  PTT:27.6 sec  Lipid Panel   A1c   Cardiac enzymes     U/A   CSF  Immunological  Other    STUDIES & IMAGING:  Studies (EKG, EEG, EMG, etc):     Radiology (XR, CT, MR, U/S, TTE/WOOD):  CT brain:  No hydrocephalus, acute intracranial hemorrhage, mass effect, or brain edema.  No displaced calvarial fracture.    CTA brain:  No flow-limiting stenosis or vascular aneurysm.  No AVM.    CTA neck:  No flow limiting stenosis.  No evidence for arterial dissection.   HPI:  47yo F w/ PMHx of AMS, suicide attempts, and chronic alcoholism (and substance abuse with xanax) who reported to the ED for code stroke at 5:08 PM on 8/11/21 due to aphasia. Per EMS, patient was living alone and the last time patient was seen was 12 days ago. LKN was 12 days ago. Upon EMS arrival it was reported that pt was having tonic-clonic seizures. Pt has taken xanax about 7 days ago and drank 1 Liter vodka in the last few days (last drink yesterday).  There was no reports of falls and patient has no history of anticoagulation. Denies SI/HI.     She had a recent admission (08/4/21) for alcohol intoxication when she drank a pint of alcohol. Her sister Azul called EMS to have her brought to ED at that time. PT signed herself out of rehab the prior Friday and the rehab was calling to check up on her so Azul went to her apartment and found her drunk and hard to awaken.  No history of SI HI AH VH and family is not concerned for SI.    Pt is not a TPA candidate as she is outside the window. Pt is not a thrombectomy candidate as no LVO seen.     NIHSS initial: 16 (arousal to minor stimuli, aphasic, blinks eyes only, all extremities -some effort against gravity, mild-moderate sensation loss, mute)  preMRS: 0    Repeat NIHSS (30 minutes after): 2 (blinks eyes only, doesnt know month)    ROS: A 10-system ROS was performed and is negative except for those items noted above and/or in the HPI.    PAST MEDICAL & SURGICAL HISTORY:  ETOH Abuse    Depression    HTN (hypertension)    History of pancreatitis    Adenomyosis    S/P Tonsillectomy        S/P laparoscopy  for heavy menstrual bleeding      FAMILY HISTORY:  Family history of hypertension (Father, Mother)        SOCIAL HISTORY: SOCIAL HISTORY:     Marital Status: (  )   (  ) Single  (  )   (  )      Occupation:      Lives: (x  ) alone  (  ) with children   (  ) with spouse  (  ) with parents  (  ) other     Illicit Drug Use: (  ) never used  (  ) other _____     Tobacco Use:  (  ) never smoked  (  ) former smoker  (  ) current smoker  (  ) pack year  (  ) last cigarette date     Alcohol Use: 1 pint in the last few days     Sexual History:        MEDICATIONS  Home Medications:  Ambien 10 mg oral tablet: 1 tab(s) orally once a day (at bedtime), As Needed (05 May 2021 22:37)  folic acid 1 mg oral tablet: 1 tab(s) orally once a day (08 May 2021 10:32)  Multiple Vitamins oral tablet: 1 tab(s) orally once a day (08 May 2021 10:32)  thiamine 100 mg oral tablet: 1 tab(s) orally once a day (07 Jun 2021 13:26)      MEDICATIONS  (STANDING):  LORazepam   Injectable 2 milliGRAM(s) IV Push once  sodium chloride 0.9% Bolus 1000 milliLiter(s) IV Bolus once    MEDICATIONS  (PRN):      ALLERGIES/INTOLERANCES:  Allergies  No Known Allergies    Intolerances      OBJECTIVE:  VITALS   Vital Signs Last 24 Hrs  T(C): --  T(F): --  HR: 120 (11 Aug 2021 16:55) (120 - 120)  BP: 112/91 (11 Aug 2021 16:55) (112/91 - 112/91)  BP(mean): --  RR: 18 (11 Aug 2021 16:55) (18 - 18)  SpO2: 100% (11 Aug 2021 16:55) (100% - 100%)    PHYSICAL EXAM:  Neurological Exam after 30 minutes on admission:  Mental Status: Eyes open. Tracks objects. Orientated to self only.  lethargic.  No dysarthria, aphasia or neglect. Can repeat "today is a bright christophe day". Can name objects.     Cranial Nerves: PERRL, EOMI, VFF-blinks to threat, no nystagmus.  No facial asymmetry.  Hearing intact.  Tongue midline.  Sternocleidomastoid and Trapezius intact bilaterally. No tongue biting noted.     Motor:   Tone: normal            Strength:     Upper extremity                            Delt       Bicep    Tricep                                               R         5/5        5/5        5/5       5/5                                            L          5/5        5/5        5/5       5/5  Lower extremity                             HF          KE          KF        DF         PF                                            R        5/5        5/5        5/5       5/5       5/5                                            L         5/5        5/5       5/5       5/5        5/5  Pronator drift: none                 Dysmetria: None to finger-nose-finger or heel-shin-heel     Tremor: No resting, postural or action tremor.  No myoclonus.    Sensation: intact on repeat exam    Deep Tendon Reflexes: 2+ bilateral biceps, triceps, brachioradialis, knee and ankle  Toes flexor bilaterally    LABORATORY:  CBC                       10.8   13.73 )-----------( 202      ( 11 Aug 2021 17:34 )             37.0     Chem 08-11    141  |  98  |  18  ----------------------------<  98  3.7   |  19<L>  |  0.55    Ca    8.6      11 Aug 2021 17:34    TPro  7.9  /  Alb  4.0  /  TBili  0.3  /  DBili  x   /  AST  76<H>  /  ALT  36<H>  /  AlkPhos  75  08-11    LFTs LIVER FUNCTIONS - ( 11 Aug 2021 17:34 )  Alb: 4.0 g/dL / Pro: 7.9 g/dL / ALK PHOS: 75 U/L / ALT: 36 U/L / AST: 76 U/L / GGT: x           Coagulopathy PT/INR - ( 11 Aug 2021 17:34 )   PT: 12.4 sec;   INR: 1.08 ratio         PTT - ( 11 Aug 2021 17:34 )  PTT:27.6 sec  Lipid Panel   A1c   Cardiac enzymes     U/A   CSF  Immunological  Other    STUDIES & IMAGING:  Studies (EKG, EEG, EMG, etc):     Radiology (XR, CT, MR, U/S, TTE/WOOD):  CT brain:  No hydrocephalus, acute intracranial hemorrhage, mass effect, or brain edema.  No displaced calvarial fracture.    CTA brain:  No flow-limiting stenosis or vascular aneurysm.  No AVM.    CTA neck:  No flow limiting stenosis.  No evidence for arterial dissection.

## 2021-08-11 NOTE — H&P ADULT - NSHPLABSRESULTS_GEN_ALL_CORE
10.8   13.73 )-----------( 202      ( 11 Aug 2021 17:34 )             37.0     08-11    141  |  98  |  18  ----------------------------<  98  3.7   |  19<L>  |  0.55    Ca    8.6      11 Aug 2021 17:34    TPro  7.9  /  Alb  4.0  /  TBili  0.3  /  DBili  x   /  AST  76<H>  /  ALT  36<H>  /  AlkPhos  75  08-11    PT/INR - ( 11 Aug 2021 17:34 )   PT: 12.4 sec;   INR: 1.08 ratio    PTT - ( 11 Aug 2021 17:34 )  PTT:27.6 sec    17:34 - VBG - pH: 7.35  | pCO2: 40    | pO2: 78    | Lactate: 5.1      Troponin T, High Sensitivity Result: <6 ng/L (21 @ 17:34)    Alcohol, Blood: 464 mg/dL (21 @ 17:34)  Acetaminophen Level, Serum: <15 ug/mL (21 @ 17:34)  Salicylate Level, Serum: <5.0 mg/dL (21 @ 17:34)    Benzodiazepine, Urine: Positive (21 @ 20:17)  Utox otherwise negative     Urinalysis Basic - ( 11 Aug 2021 20:17 )  Color: Light Yellow / Appearance: Clear / S.028 / pH: x  Gluc: x / Ketone: Small  / Bili: Negative / Urobili: <2 mg/dL   Blood: x / Protein: 30 mg/dL / Nitrite: Negative   Leuk Esterase: Negative / RBC: 3 /HPF / WBC 7 /HPF   Sq Epi: x / Non Sq Epi: 6 /HPF / Bacteria: Negative    COVID-19 PCR: NotDetec (21 @ 17:34)    < from: CT Brain Stroke Protocol (21 @ 17:34) >/< from: CT Angio Head w/ IV Cont (21 @ 17:35) >/< from: CT Angio Neck w/ IV Cont (21 @ 17:36) >  CT BRAIN: RAPID AI detects no acute intracranial hemorrhage. No hydrocephalus, mass effect, midline shift, acute intracranial hemorrhage, or brain edema.  No displaced calvarial fracture. Visualized paranasal sinuses and mastoid air cells are clear.  CTA BRAIN: Normal flow-related enhancement of the skull base/intracranial internal carotid arteries. Normal flow-related enhancement of the bilateralanterior, middle, and posterior cerebral arteries. Anterior and large bilateral posterior communicating arteries are present. Normal flow-related enhancement of the bilateral intradural vertebral arteries and the basilar artery. No flow-limiting stenosis or vascular aneurysm. No AVM. The venous system is well opacified. No evidence for venous sinus or cortical vein thrombosis.  CTA NECK: Normal flow-related enhancement of the bilateral common and internal carotid arteries. Normal flow-related enhancement of the bilateral vertebral arteries. No flow-limiting stenosis, evidence for arterial dissection, or vascular aneurysm. Visualized lung apices clear. Nonspecific 6 mm low-density focus in the right thyroid lobe, likely a colloid cyst.  IMPRESSION:   CT brain: No hydrocephalus, acute intracranial hemorrhage, mass effect, or brain edema. No displaced calvarial fracture.  CTA brain: No flow-limiting stenosis or vascular aneurysm. No AVM.  CTA neck: No flow limiting stenosis. No evidence for arterial dissection.  < end of copied text > 10.8   13.73 )-----------( 202      ( 11 Aug 2021 17:34 )             37.0     08-11    141  |  98  |  18  ----------------------------<  98  3.7   |  19<L>  |  0.55    Ca    8.6      11 Aug 2021 17:34    TPro  7.9  /  Alb  4.0  /  TBili  0.3  /  DBili  x   /  AST  76<H>  /  ALT  36<H>  /  AlkPhos  75  08-11    PT/INR - ( 11 Aug 2021 17:34 )   PT: 12.4 sec;   INR: 1.08 ratio    PTT - ( 11 Aug 2021 17:34 )  PTT:27.6 sec    17:34 - VBG - pH: 7.35  | pCO2: 40    | pO2: 78    | Lactate: 5.1      Troponin T, High Sensitivity Result: <6 ng/L (21 @ 17:34)    Alcohol, Blood: 464 mg/dL (21 @ 17:34)  Acetaminophen Level, Serum: <15 ug/mL (21 @ 17:34)  Salicylate Level, Serum: <5.0 mg/dL (21 @ 17:34)    Benzodiazepine, Urine: Positive (21 @ 20:17)  Utox otherwise negative     Urinalysis Basic - ( 11 Aug 2021 20:17 )  Color: Light Yellow / Appearance: Clear / S.028 / pH: x  Gluc: x / Ketone: Small  / Bili: Negative / Urobili: <2 mg/dL   Blood: x / Protein: 30 mg/dL / Nitrite: Negative   Leuk Esterase: Negative / RBC: 3 /HPF / WBC 7 /HPF   Sq Epi: x / Non Sq Epi: 6 /HPF / Bacteria: Negative    COVID-19 PCR: NotDetec (21 @ 17:34)    < from: CT Brain Stroke Protocol (21 @ 17:34) >/< from: CT Angio Head w/ IV Cont (21 @ 17:35) >/< from: CT Angio Neck w/ IV Cont (21 @ 17:36) >  CT BRAIN: RAPID AI detects no acute intracranial hemorrhage. No hydrocephalus, mass effect, midline shift, acute intracranial hemorrhage, or brain edema.  No displaced calvarial fracture. Visualized paranasal sinuses and mastoid air cells are clear.  CTA BRAIN: Normal flow-related enhancement of the skull base/intracranial internal carotid arteries. Normal flow-related enhancement of the bilateralanterior, middle, and posterior cerebral arteries. Anterior and large bilateral posterior communicating arteries are present. Normal flow-related enhancement of the bilateral intradural vertebral arteries and the basilar artery. No flow-limiting stenosis or vascular aneurysm. No AVM. The venous system is well opacified. No evidence for venous sinus or cortical vein thrombosis.  CTA NECK: Normal flow-related enhancement of the bilateral common and internal carotid arteries. Normal flow-related enhancement of the bilateral vertebral arteries. No flow-limiting stenosis, evidence for arterial dissection, or vascular aneurysm. Visualized lung apices clear. Nonspecific 6 mm low-density focus in the right thyroid lobe, likely a colloid cyst.  IMPRESSION:   CT brain: No hydrocephalus, acute intracranial hemorrhage, mass effect, or brain edema. No displaced calvarial fracture.  CTA brain: No flow-limiting stenosis or vascular aneurysm. No AVM.  CTA neck: No flow limiting stenosis. No evidence for arterial dissection.  < end of copied text >    EKG personally reviewed - 114bpm sinus tach/LAD 10.8   13.73 )-----------( 202      ( 11 Aug 2021 17:34 )             37.0     08-11    141  |  98  |  18  ----------------------------<  98  3.7   |  19<L>  |  0.55    Ca    8.6      11 Aug 2021 17:34    TPro  7.9  /  Alb  4.0  /  TBili  0.3  /  DBili  x   /  AST  76<H>  /  ALT  36<H>  /  AlkPhos  75  08-11    PT/INR - ( 11 Aug 2021 17:34 )   PT: 12.4 sec;   INR: 1.08 ratio    PTT - ( 11 Aug 2021 17:34 )  PTT:27.6 sec    17:34 - VBG - pH: 7.35  | pCO2: 40    | pO2: 78    | Lactate: 5.1      Troponin T, High Sensitivity Result: <6 ng/L (21 @ 17:34)    Alcohol, Blood: 464 mg/dL (21 @ 17:34)  Acetaminophen Level, Serum: <15 ug/mL (21 @ 17:34)  Salicylate Level, Serum: <5.0 mg/dL (21 @ 17:34)    Benzodiazepine, Urine: Positive (21 @ 20:17)  Utox otherwise negative     Urinalysis Basic - ( 11 Aug 2021 20:17 )  Color: Light Yellow / Appearance: Clear / S.028 / pH: x  Gluc: x / Ketone: Small  / Bili: Negative / Urobili: <2 mg/dL   Blood: x / Protein: 30 mg/dL / Nitrite: Negative   Leuk Esterase: Negative / RBC: 3 /HPF / WBC 7 /HPF   Sq Epi: x / Non Sq Epi: 6 /HPF / Bacteria: Negative    COVID-19 PCR: NotDetec (21 @ 17:34)    < from: CT Brain Stroke Protocol (21 @ 17:34) >/< from: CT Angio Head w/ IV Cont (21 @ 17:35) >/< from: CT Angio Neck w/ IV Cont (21 @ 17:36) >  CT BRAIN: RAPID AI detects no acute intracranial hemorrhage. No hydrocephalus, mass effect, midline shift, acute intracranial hemorrhage, or brain edema.  No displaced calvarial fracture. Visualized paranasal sinuses and mastoid air cells are clear.  CTA BRAIN: Normal flow-related enhancement of the skull base/intracranial internal carotid arteries. Normal flow-related enhancement of the bilateralanterior, middle, and posterior cerebral arteries. Anterior and large bilateral posterior communicating arteries are present. Normal flow-related enhancement of the bilateral intradural vertebral arteries and the basilar artery. No flow-limiting stenosis or vascular aneurysm. No AVM. The venous system is well opacified. No evidence for venous sinus or cortical vein thrombosis.  CTA NECK: Normal flow-related enhancement of the bilateral common and internal carotid arteries. Normal flow-related enhancement of the bilateral vertebral arteries. No flow-limiting stenosis, evidence for arterial dissection, or vascular aneurysm. Visualized lung apices clear. Nonspecific 6 mm low-density focus in the right thyroid lobe, likely a colloid cyst.  IMPRESSION:   CT brain: No hydrocephalus, acute intracranial hemorrhage, mass effect, or brain edema. No displaced calvarial fracture.  CTA brain: No flow-limiting stenosis or vascular aneurysm. No AVM.  CTA neck: No flow limiting stenosis. No evidence for arterial dissection.  < end of copied text >    EKG personally reviewed - 114bpm sinus tach/LAD, TWI in V1-V3; QTc 501ms (new in V2-V3)

## 2021-08-11 NOTE — ED ADULT NURSE NOTE - OBJECTIVE STATEMENT
Code Stroke called, pt received in CT scan area.  Pt is non-verbal, at this time, oral mucosa appears dry.  LKN 7 days ago when pt was evaluated in ED at [facility].  IV access obtained.  Labs drawn and sent.

## 2021-08-11 NOTE — ED ADULT TRIAGE NOTE - CHIEF COMPLAINT QUOTE
Pt brought in by EMS from home for intox. Pt was found on the floor with an empty bottle of vodka. Pt has a hx of ETOH abuse. As per EMS pts mother says she saw patient convulsing. code stroke called. Pt unable to answer questions.  FS 91

## 2021-08-11 NOTE — H&P ADULT - PROBLEM SELECTOR PLAN 3
leukocytosis, tachycardia  UA negative  COVID negative  check CXR given recent seizure  US abdomen given RUQ/RLQ TTP  afebrile, monitor off abx leukocytosis, tachycardia  UA negative  COVID negative  check CXR given recent seizure  US abdomen given RUQ/RLQ TTP  afebrile, monitor off abx  trend leukocytosis/lactate

## 2021-08-11 NOTE — ED PROVIDER NOTE - PROGRESS NOTE DETAILS
Perfecto Villarreal D.O., PGY3 (Resident)  Patient signed out to me, etoh withdrawal. Tremulous, slower to respond. Denies hallucinosis. Reportedly was poct-ictal after seizure. Given ativan 2mg -> 2mg -> 4mg. Still mildy tachycardic. Able to tolerate PO. RASS -1. Continue to monitor

## 2021-08-11 NOTE — CONSULT NOTE ADULT - ATTENDING COMMENTS
Code stroke called in ED and neurology emergently assessed patient. wsa not a tpa or MT candidate  Briefly, 45yo F w/ h/o change in mental status, suicide attempts, and chronic alcoholism (and substance abuse with xanax) who reported to the ED for code stroke at 5:08 PM on 8/11/21 due to aphasia.  LKN was 12 days ago.  Upon EMS arrival it was reported that pt was having tonic-clonic seizures. Pt has taken xanax about 7 days ago and drank 1 Liter vodka in the last few days (last drink yesterday). Neurological exam initially revealed arousal to minor stimuli, aphasia (did not repeat sentences or name objects), blinks eyes only, all extremities -some effort against gravity, mild-moderate sensation loss, mute. Improved 30 minutes after - held up all extremities fully, aphasia resolved. Imaging was negative for any acute findings. Blood alcohol level 464. lipase elevated 64, Utox + for benzo    NIHSS initial: 16 (arousal to minor stimuli, aphasic, blinks eyes only, all extremities -some effort against gravity, mild-moderate sensation loss, mute)  preMRS: 0  Repeat NIHSS (30 minutes after): 2 (blinks eyes only, doesn't know month)  CTH neg  CTA H/N     Impression: aphasia/lethargy may be 2/2 to alcohol/benzo intoxication/withdrawal vs. postictal state. Unlikely pt has had a stroke as imaging was negative and pt is improving.     Recommendations:   - EEG to rule out postictal state/new seizures  - thiamine, folic acid, MVI  - CIWA  - if no improvement, MRI  - 1:1 SI, f/u BH   - check NH3  - check FS, glucose control <180  - GI/DVT ppx  - Counseling on diet, exercise, and medication adherence was done  - Counseling on smoking cessation and alcohol consumption offered when appropriate.  - Pain assessed and judicious use of narcotics when appropriate was discussed.    - Stroke education given when appropriate.  - Importance of fall prevention discussed.   - Differential diagnosis and plan of care discussed with patient and/or family and primary team  - Thank you for allowing me to participate in the care of this patient. Call with questions.   Rodrigo Spence MD  Vascular Neurology

## 2021-08-11 NOTE — ED ADULT NURSE REASSESSMENT NOTE - NS ED NURSE REASSESS COMMENT FT1
Pt A&OX2 disoriented to time, responsive to verbal stimuli. Pt slow to respond. Responsive to verbal stimuli. Resp even and unlabored. Medicated as per EMAR. Sinus tachy on monitor. . Awaiting further orders.

## 2021-08-11 NOTE — ED PROVIDER NOTE - NEUROLOGICAL, MLM
Alert and oriented, no focal deficits, no motor or sensory deficits. Moving all 4 extremities, spontaneously

## 2021-08-11 NOTE — ED PROVIDER NOTE - EYES, MLM
Clear bilaterally, pupils equal, round and reactive to light. Clear bilaterally, pupils equal, round and reactive to light, 5mm, bilaterally

## 2021-08-11 NOTE — H&P ADULT - NSHPSOCIALHISTORY_GEN_ALL_CORE
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

## 2021-08-11 NOTE — ED PROVIDER NOTE - CLINICAL SUMMARY MEDICAL DECISION MAKING FREE TEXT BOX
47yo F w/ PMHx of AMS, suicide attempts, and chronic alcoholism who is reporting to the ED. Patient staffed with supervising physician, Dr. Oneil; medical record was reviewed. Upon arrival to ED, patient promptly evaluated. She was a code stroke. Last known well of 12 days previously, outside of the TPA window. BGL reassuring, hypoglycemia ruled out. CT head, CTA head and neck reassuring. There was no bleed and no LVAO. Lactic acidosis of 5, IV initiated with fluids. 2mg of ativan were administered for concern for seizure activity noted by mother at home and seizure prevention here in ED. ETOH of 474, Tylenol and acetaminophen reassuring. Patient signed out during routine sign-out to my colleague, Dr. Villarreal. Please see his note for further information regarding her final disposition. Patient stable at time of shift change.

## 2021-08-11 NOTE — H&P ADULT - PROBLEM SELECTOR PLAN 1
reportedly w/sz at home PTA  high risk CIWA monitoring  lorazepam taper w/symptom triggered PRNs  SW consult  MV, thiamine, folic acid supplementation reportedly w/sz at home PTA  high risk CIWA monitoring  lorazepam taper w/symptom triggered PRNs  SW consult  MV, thiamine, folic acid supplementation    #lactate blood increased - likely secondary to alcohol, monitor/trend

## 2021-08-11 NOTE — H&P ADULT - PROBLEM SELECTOR PLAN 5
appreciate neuro recs  passed dysphagia screen, started on dysphagia diet, aspiration precautions  CT imaging as above  S&S eval  vEEG pending

## 2021-08-11 NOTE — H&P ADULT - NSHPREVIEWOFSYSTEMS_GEN_ALL_CORE
REVIEW OF SYSTEMS:    CONSTITUTIONAL: No weakness, fevers or chills  EYES/ENT: No visual changes;  No dysphagia; No sore throat; No rhinorrhea; No sinus pain/pressure  NECK: No pain or stiffness  RESPIRATORY: No cough, wheezing, hemoptysis; No shortness of breath  CARDIOVASCULAR: No chest pain or palpitations; No lower extremity edema  GASTROINTESTINAL: No abdominal or epigastric pain. No nausea, vomiting, or hematemesis; No diarrhea or constipation. No melena or hematochezia.  GENITOURINARY: No dysuria, frequency or hematuria  NEUROLOGICAL: No numbness or weakness  MSK: ambulates without aid; reports history of fall with head trauma could not state when or preceding events  SKIN: No itching, burning, rashes, or lesions   All other review of systems is negative unless indicated above.

## 2021-08-11 NOTE — H&P ADULT - PROBLEM SELECTOR PLAN 7
reportedly not on any antihypertensives  DASH diet  BP elevation likely in setting of alcohol withdrawal however if persistently elevated would start on antihypertensive agent

## 2021-08-11 NOTE — STROKE CODE NOTE - DISPOSITION
Case has been discussed with Dr. Wise under the supervision of stroke attending. Patient LKW estimated to be >12 days.

## 2021-08-11 NOTE — CONSULT NOTE ADULT - ASSESSMENT
47yo F w/ PMHx of AMS, suicide attempts, and chronic alcoholism (and substance abuse with xanax) who is reporting to the ED for code stroke at 5:08 PM on 8/11/21 due to aphasia.  Upon EMS arrival it was reported that pt was having tonic-clonic seizures. Pt has taken xanax about 7 days ago and drank 1 Liter vodka in the last few days (last drink yesterday). Neurological exam initially revealed arousal to minor stimuli, aphasia (did not repeat sentences or name objects), blinks eyes only, all extremities -some effort against gravity, mild-moderate sensation loss, mute. Improved 30 minutes after - held up all extremities fully, aphasia resolved. Imaging was negative for any acute findings.     Impression: aphasia/lethargy may be 2/2 to alcohol/benzo intoxication/withdrawal vs. postictal state. Unlikely pt has had a stroke as imaging was negative and pt is improving.     Recommendations:   []24 hour EEG to rule out postictal state/new seizures  []If pt has a seizure would give 4 mg ativan, can give more 4 mg ativan if pt has a repeat seizure. For refractory seizures may consider fosphenytoin (load 15-20 mg/kg IV @ 200 mg/min, additional 10 mg/kg if pt still seizing) or keppra (20 mg/kg IV over 15 minutes) if pt has no allergies to them.  [] No additional imaging recommended at this time as stroke unlikely.  [] U tox, blood alcohol level  [] Recommend CIWA and alcohol/drug intox/withdrawal treatment as per primary team.       Case has been discussed with stroke fellow Dr. Dani Wise under the supervision of stroke attending    45yo F w/ PMHx of AMS, suicide attempts, and chronic alcoholism (and substance abuse with xanax) who is reporting to the ED for code stroke at 5:08 PM on 8/11/21 due to aphasia.  Upon EMS arrival it was reported that pt was having tonic-clonic seizures. Pt has taken xanax about 7 days ago and drank 1 Liter vodka in the last few days (last drink yesterday). Neurological exam initially revealed arousal to minor stimuli, aphasia (did not repeat sentences or name objects), blinks eyes only, all extremities -some effort against gravity, mild-moderate sensation loss, mute. Improved 30 minutes after - held up all extremities fully, aphasia resolved. Imaging was negative for any acute findings.     Impression: aphasia/lethargy may be 2/2 to alcohol/benzo intoxication/withdrawal vs. postictal state. Unlikely pt has had a stroke as imaging was negative and pt is improving.     Recommendations:   []24 hour EEG to rule out postictal state/new seizures  []If pt has a seizure would give 4 mg ativan, can give more 4 mg ativan if pt has a repeat seizure. For refractory seizures may consider fosphenytoin (load 15-20 mg/kg IV @ 200 mg/min, additional 10 mg/kg if pt still seizing) or keppra (20 mg/kg IV over 15 minutes) if pt has no allergies to them.  [] No additional imaging recommended at this time as stroke unlikely.  [] U tox, blood alcohol level  [] Recommend CIWA and alcohol/drug intox/withdrawal treatment as per primary team.   []NPO until dysphagia screen passed. If fails, perform SLP eval.       Case has been discussed with stroke fellow Dr. Dani Wise under the supervision of stroke attending    45yo F w/ PMHx of AMS, suicide attempts, and chronic alcoholism (and substance abuse with xanax) who is reporting to the ED for code stroke at 5:08 PM on 8/11/21 due to aphasia.  Upon EMS arrival it was reported that pt was having tonic-clonic seizures. Pt has taken xanax about 7 days ago and drank 1 Liter vodka in the last few days (last drink yesterday). Neurological exam initially revealed arousal to minor stimuli, aphasia (did not repeat sentences or name objects), blinks eyes only, all extremities -some effort against gravity, mild-moderate sensation loss, mute. Improved 30 minutes after - held up all extremities fully, aphasia resolved. Imaging was negative for any acute findings.     Impression: aphasia/lethargy may be 2/2 to alcohol/benzo intoxication/withdrawal vs. postictal state. Unlikely pt has had a stroke as imaging was negative and pt is improving.     Recommendations:   []24 hour EEG to rule out postictal state/new seizures  []If pt has a seizure would give 4 mg ativan, can give more 4 mg ativan if pt has a repeat seizure. For refractory seizures may consider fosphenytoin (load 15-20 mg/kg IV @ 200 mg/min, additional 10 mg/kg if pt still seizing) or keppra (20 mg/kg IV over 15 minutes) if pt has no allergies to them.  [] No additional imaging recommended at this time as stroke unlikely. No AC or antiplatelets at this time.  [] U tox, blood alcohol level-management as per primary team  [] Recommend CIWA and alcohol/drug intox/withdrawal treatment as per primary team.   []NPO until dysphagia screen passed. If fails, perform SLP eval. Raise head of bed to 30 degrees to prevent aspiration.  []Fall precautions, seizure precautions      Case has been discussed with stroke fellow Dr. Dani Wise under the supervision of stroke attending    47yo F w/ PMHx of AMS, suicide attempts, and chronic alcoholism (and substance abuse with xanax) who reported to the ED for code stroke at 5:08 PM on 8/11/21 due to aphasia.  LKN was 12 days ago.  Upon EMS arrival it was reported that pt was having tonic-clonic seizures. Pt has taken xanax about 7 days ago and drank 1 Liter vodka in the last few days (last drink yesterday). Neurological exam initially revealed arousal to minor stimuli, aphasia (did not repeat sentences or name objects), blinks eyes only, all extremities -some effort against gravity, mild-moderate sensation loss, mute. Improved 30 minutes after - held up all extremities fully, aphasia resolved. Imaging was negative for any acute findings.     NIHSS initial: 16 (arousal to minor stimuli, aphasic, blinks eyes only, all extremities -some effort against gravity, mild-moderate sensation loss, mute)  preMRS: 0    Repeat NIHSS (30 minutes after): 2 (blinks eyes only, doesn't know month)    Impression: aphasia/lethargy may be 2/2 to alcohol/benzo intoxication/withdrawal vs. postictal state. Unlikely pt has had a stroke as imaging was negative and pt is improving.     Recommendations:   []24 hour EEG to rule out postictal state/new seizures  []If pt has a seizure would give 4 mg ativan, can give more 4 mg ativan if pt has a repeat seizure. For refractory seizures may consider fosphenytoin (load 15-20 mg/kg IV @ 200 mg/min, additional 10 mg/kg if pt still seizing) or keppra (20 mg/kg IV over 15 minutes) if pt has no allergies to them.  [] No additional imaging recommended at this time as stroke unlikely. No AC or antiplatelets at this time.  [] U tox, blood alcohol level-management as per primary team  [] Recommend CIWA and alcohol/drug intox/withdrawal treatment as per primary team.   []NPO until dysphagia screen passed. If fails, perform SLP eval. Raise head of bed to 30 degrees to prevent aspiration.  []Fall precautions, seizure precautions      Case has been discussed with stroke fellow Dr. Dani Wise under the supervision of stroke attending    47yo F w/ PMHx of AMS, suicide attempts, and chronic alcoholism (and substance abuse with xanax) who reported to the ED for code stroke at 5:08 PM on 8/11/21 due to aphasia.  LKN was 12 days ago.  Upon EMS arrival it was reported that pt was having tonic-clonic seizures. Pt has taken xanax about 7 days ago and drank 1 Liter vodka in the last few days (last drink yesterday). Neurological exam initially revealed arousal to minor stimuli, aphasia (did not repeat sentences or name objects), blinks eyes only, all extremities -some effort against gravity, mild-moderate sensation loss, mute. Improved 30 minutes after - held up all extremities fully, aphasia resolved. Imaging was negative for any acute findings. Blood alcohol level 464.    NIHSS initial: 16 (arousal to minor stimuli, aphasic, blinks eyes only, all extremities -some effort against gravity, mild-moderate sensation loss, mute)  preMRS: 0    Repeat NIHSS (30 minutes after): 2 (blinks eyes only, doesn't know month)    Impression: aphasia/lethargy may be 2/2 to alcohol/benzo intoxication/withdrawal vs. postictal state. Unlikely pt has had a stroke as imaging was negative and pt is improving.     Recommendations:   []24 hour EEG to rule out postictal state/new seizures  []If pt has a seizure would give 4 mg ativan, can give more 4 mg ativan if pt has a repeat seizure. For refractory seizures may consider fosphenytoin (load 15-20 mg/kg IV @ 200 mg/min, additional 10 mg/kg if pt still seizing) or keppra (20 mg/kg IV over 15 minutes) if pt has no allergies to them.  [] No additional imaging recommended at this time as stroke unlikely. No AC or antiplatelets at this time.  [] U tox, blood alcohol level-management as per primary team  [] Recommend CIWA and alcohol/drug intox/withdrawal treatment as per primary team.   []NPO until dysphagia screen passed. If fails, perform SLP eval. Raise head of bed to 30 degrees to prevent aspiration.  []Fall precautions, seizure precautions      Case has been discussed with stroke fellow Dr. Dani Wise under the supervision of stroke attending

## 2021-08-11 NOTE — ED ADULT NURSE REASSESSMENT NOTE - NS ED NURSE REASSESS COMMENT FT1
Pt A&Ox4, resting comfortably and eating dinner. Pt denies any chest pain, dyspnea, dizziness, blurry vision. Pt denies any complaints, pt in NAD at this time. Will continue to monitor.

## 2021-08-11 NOTE — ED PROVIDER NOTE - CONSTITUTIONAL, MLM
Well appearing, awake, alert, oriented to person, place, time/situation and in no apparent distress. normal... somnolent, arousable to redirection via speech. AO x1

## 2021-08-11 NOTE — ED PROVIDER NOTE - OBJECTIVE STATEMENT
47yo F w/ PMHx of AMS, suicide attempts, and chronic alcoholism who is reporting to the ED. Per EMS, patient was living alone and the last time patient was seen was 12 days ago. There was no reports of falls and patient has no history of anticoagulation. Additional history of present illness limited by acute medical condition. 47yo F w/ PMHx of AMS, suicide attempts, and chronic alcoholism who is reporting to the ED. Per EMS, patient was living alone and the last time patient was seen was 12 days ago. There was no reports of falls and patient has no history of anticoagulation. Per patient's mother, patient expressed suicidal ideation two day ago via phone. Per patients mother, there was an episode of tonic clonic behavior prior to EMS being called.  Additional history of present illness limited by acute medical condition.

## 2021-08-11 NOTE — H&P ADULT - PROBLEM SELECTOR PLAN 4
chronic/stable H/H  prior iron studies reviewed   monitor/trend   check TSH, B12, iron/TIBC, ferritin

## 2021-08-11 NOTE — ED ADULT NURSE REASSESSMENT NOTE - NS ED NURSE REASSESS COMMENT FT1
Pt presents A&Ox4 resting in bed. Pt on 1:1 for suicidal ideations, PCA at bedside. Respirations even and unlabored, speaking in full sentences without any difficulty, sating 100% on room air, pt tachycardic and hypertensive - pt denies any chest pain, neck pain, headache, nausea or vomiting. PERRLA intact, pt noted to have moderate tremors to bilateral upper extremities. Weakness present in both upper and lower extremities, no other neurological deficits noted. Pt denies any tactile disturbances or hallucinations. Will continue to monitor.

## 2021-08-11 NOTE — H&P ADULT - PROBLEM SELECTOR PLAN 6
SCDs for DVT ppx    #chemosis - vision intact, no occular complaints - CTM monitor on tele  trop negative  avoid QT prolonging agents

## 2021-08-11 NOTE — H&P ADULT - NSHPPHYSICALEXAM_GEN_ALL_CORE
PHYSICAL EXAM:  GENERAL: NAD, well-developed, well-nourished, slow to respond to questions   HEAD:  Atraumatic, Normocephalic  EYES: EOMI, unable to fully eval pupils - has color contacts in place, (+)chemosis, conjunctiva and sclera clear  ENT: dry MM  NECK: Supple, No JVD  CHEST/LUNG: Clear to auscultation bilaterally; No wheezes, rales or rhonchi; good effort/air movement  HEART: Regular rate and rhythm; No murmurs, rubs, or gallops, (+)S1, S2  ABDOMEN: Soft, Nondistended; Normal Bowel sounds; (+)TTP RUQ/RLQ; no guarding/rigidity  EXTREMITIES:  2+ Peripheral Pulses, No clubbing, cyanosis, or edema  PSYCH: flat affect, nml mood, A&Ox4  NEUROLOGY: non-focal, CN II-XII intact; FTN intact b/l; no asterixis; 5/5 strength x4 extremities; sensations grossly intact  SKIN: small healing abrasion on inferior chin - no surrounding erythema or drainage

## 2021-08-11 NOTE — H&P ADULT - ASSESSMENT
46-year-old female with depression and history of suicide attempts, alcohol/benzo abuse, presenting with aphasia, possibly secondary to recent seizure, alcohol withdrawal 46-year-old female with HTN, depression and history of suicide attempts, alcohol/benzo abuse, presenting with aphasia, possibly secondary to recent seizure, alcohol withdrawal

## 2021-08-12 DIAGNOSIS — Z29.9 ENCOUNTER FOR PROPHYLACTIC MEASURES, UNSPECIFIED: ICD-10-CM

## 2021-08-12 DIAGNOSIS — R45.851 SUICIDAL IDEATIONS: ICD-10-CM

## 2021-08-12 DIAGNOSIS — D50.9 IRON DEFICIENCY ANEMIA, UNSPECIFIED: ICD-10-CM

## 2021-08-12 DIAGNOSIS — R47.01 APHASIA: ICD-10-CM

## 2021-08-12 DIAGNOSIS — D64.9 ANEMIA, UNSPECIFIED: ICD-10-CM

## 2021-08-12 DIAGNOSIS — I10 ESSENTIAL (PRIMARY) HYPERTENSION: ICD-10-CM

## 2021-08-12 DIAGNOSIS — R94.31 ABNORMAL ELECTROCARDIOGRAM [ECG] [EKG]: ICD-10-CM

## 2021-08-12 DIAGNOSIS — R65.10 SYSTEMIC INFLAMMATORY RESPONSE SYNDROME (SIRS) OF NON-INFECTIOUS ORIGIN WITHOUT ACUTE ORGAN DYSFUNCTION: ICD-10-CM

## 2021-08-12 DIAGNOSIS — F10.239 ALCOHOL DEPENDENCE WITH WITHDRAWAL, UNSPECIFIED: ICD-10-CM

## 2021-08-12 LAB
A1C WITH ESTIMATED AVERAGE GLUCOSE RESULT: 5.4 % — SIGNIFICANT CHANGE UP (ref 4–5.6)
ALBUMIN SERPL ELPH-MCNC: 3.4 G/DL — SIGNIFICANT CHANGE UP (ref 3.3–5)
ALP SERPL-CCNC: 60 U/L — SIGNIFICANT CHANGE UP (ref 40–120)
ALT FLD-CCNC: 33 U/L — SIGNIFICANT CHANGE UP (ref 4–33)
ANION GAP SERPL CALC-SCNC: 16 MMOL/L — HIGH (ref 7–14)
ANION GAP SERPL CALC-SCNC: 19 MMOL/L — HIGH (ref 7–14)
AST SERPL-CCNC: 65 U/L — HIGH (ref 4–32)
BASOPHILS # BLD AUTO: 0.03 K/UL — SIGNIFICANT CHANGE UP (ref 0–0.2)
BASOPHILS NFR BLD AUTO: 0.4 % — SIGNIFICANT CHANGE UP (ref 0–2)
BILIRUB SERPL-MCNC: 0.4 MG/DL — SIGNIFICANT CHANGE UP (ref 0.2–1.2)
BLOOD GAS VENOUS COMPREHENSIVE RESULT: SIGNIFICANT CHANGE UP
BUN SERPL-MCNC: 11 MG/DL — SIGNIFICANT CHANGE UP (ref 7–23)
BUN SERPL-MCNC: 14 MG/DL — SIGNIFICANT CHANGE UP (ref 7–23)
CA-I BLD-SCNC: 0.94 MMOL/L — LOW (ref 1.15–1.29)
CALCIUM SERPL-MCNC: 7.1 MG/DL — LOW (ref 8.4–10.5)
CALCIUM SERPL-MCNC: 7.4 MG/DL — LOW (ref 8.4–10.5)
CHLORIDE SERPL-SCNC: 96 MMOL/L — LOW (ref 98–107)
CHLORIDE SERPL-SCNC: 98 MMOL/L — SIGNIFICANT CHANGE UP (ref 98–107)
CO2 SERPL-SCNC: 20 MMOL/L — LOW (ref 22–31)
CO2 SERPL-SCNC: 21 MMOL/L — LOW (ref 22–31)
CREAT SERPL-MCNC: 0.44 MG/DL — LOW (ref 0.5–1.3)
CREAT SERPL-MCNC: 0.56 MG/DL — SIGNIFICANT CHANGE UP (ref 0.5–1.3)
EOSINOPHIL # BLD AUTO: 0.07 K/UL — SIGNIFICANT CHANGE UP (ref 0–0.5)
EOSINOPHIL NFR BLD AUTO: 1 % — SIGNIFICANT CHANGE UP (ref 0–6)
ESTIMATED AVERAGE GLUCOSE: 108 — SIGNIFICANT CHANGE UP
ETHANOL SERPL-MCNC: 49 MG/DL — HIGH
GLUCOSE SERPL-MCNC: 115 MG/DL — HIGH (ref 70–99)
GLUCOSE SERPL-MCNC: 98 MG/DL — SIGNIFICANT CHANGE UP (ref 70–99)
HCT VFR BLD CALC: 27.9 % — LOW (ref 34.5–45)
HGB BLD-MCNC: 7.9 G/DL — LOW (ref 11.5–15.5)
IANC: 5.65 K/UL — SIGNIFICANT CHANGE UP (ref 1.5–8.5)
IMM GRANULOCYTES NFR BLD AUTO: 0.7 % — SIGNIFICANT CHANGE UP (ref 0–1.5)
LIDOCAIN IGE QN: 45 U/L — SIGNIFICANT CHANGE UP (ref 7–60)
LYMPHOCYTES # BLD AUTO: 0.77 K/UL — LOW (ref 1–3.3)
LYMPHOCYTES # BLD AUTO: 11.4 % — LOW (ref 13–44)
MAGNESIUM SERPL-MCNC: 1.1 MG/DL — LOW (ref 1.6–2.6)
MCHC RBC-ENTMCNC: 19.8 PG — LOW (ref 27–34)
MCHC RBC-ENTMCNC: 28.3 GM/DL — LOW (ref 32–36)
MCV RBC AUTO: 69.9 FL — LOW (ref 80–100)
MONOCYTES # BLD AUTO: 0.18 K/UL — SIGNIFICANT CHANGE UP (ref 0–0.9)
MONOCYTES NFR BLD AUTO: 2.7 % — SIGNIFICANT CHANGE UP (ref 2–14)
NEUTROPHILS # BLD AUTO: 5.65 K/UL — SIGNIFICANT CHANGE UP (ref 1.8–7.4)
NEUTROPHILS NFR BLD AUTO: 83.8 % — HIGH (ref 43–77)
NRBC # BLD: 0 /100 WBCS — SIGNIFICANT CHANGE UP
NRBC # FLD: 0 K/UL — SIGNIFICANT CHANGE UP
PLATELET # BLD AUTO: 134 K/UL — LOW (ref 150–400)
POTASSIUM SERPL-MCNC: 3.7 MMOL/L — SIGNIFICANT CHANGE UP (ref 3.5–5.3)
POTASSIUM SERPL-MCNC: 3.8 MMOL/L — SIGNIFICANT CHANGE UP (ref 3.5–5.3)
POTASSIUM SERPL-SCNC: 3.7 MMOL/L — SIGNIFICANT CHANGE UP (ref 3.5–5.3)
POTASSIUM SERPL-SCNC: 3.8 MMOL/L — SIGNIFICANT CHANGE UP (ref 3.5–5.3)
PROT SERPL-MCNC: 6.1 G/DL — SIGNIFICANT CHANGE UP (ref 6–8.3)
PTH-INTACT FLD-MCNC: 66 PG/ML — HIGH (ref 15–65)
RBC # BLD: 3.99 M/UL — SIGNIFICANT CHANGE UP (ref 3.8–5.2)
RBC # FLD: 22.5 % — HIGH (ref 10.3–14.5)
SODIUM SERPL-SCNC: 135 MMOL/L — SIGNIFICANT CHANGE UP (ref 135–145)
SODIUM SERPL-SCNC: 135 MMOL/L — SIGNIFICANT CHANGE UP (ref 135–145)
TSH SERPL-MCNC: 1.38 UIU/ML — SIGNIFICANT CHANGE UP (ref 0.27–4.2)
WBC # BLD: 6.75 K/UL — SIGNIFICANT CHANGE UP (ref 3.8–10.5)
WBC # FLD AUTO: 6.75 K/UL — SIGNIFICANT CHANGE UP (ref 3.8–10.5)

## 2021-08-12 PROCEDURE — 71045 X-RAY EXAM CHEST 1 VIEW: CPT | Mod: 26

## 2021-08-12 PROCEDURE — 90792 PSYCH DIAG EVAL W/MED SRVCS: CPT

## 2021-08-12 PROCEDURE — 76700 US EXAM ABDOM COMPLETE: CPT | Mod: 26

## 2021-08-12 RX ORDER — ACETAMINOPHEN 500 MG
650 TABLET ORAL EVERY 6 HOURS
Refills: 0 | Status: DISCONTINUED | OUTPATIENT
Start: 2021-08-12 | End: 2021-08-15

## 2021-08-12 RX ORDER — SODIUM CHLORIDE 9 MG/ML
1000 INJECTION, SOLUTION INTRAVENOUS
Refills: 0 | Status: DISCONTINUED | OUTPATIENT
Start: 2021-08-12 | End: 2021-08-15

## 2021-08-12 RX ORDER — ZOLPIDEM TARTRATE 10 MG/1
1 TABLET ORAL
Qty: 0 | Refills: 0 | DISCHARGE

## 2021-08-12 RX ORDER — FOLIC ACID 0.8 MG
1 TABLET ORAL DAILY
Refills: 0 | Status: DISCONTINUED | OUTPATIENT
Start: 2021-08-12 | End: 2021-08-15

## 2021-08-12 RX ORDER — THIAMINE MONONITRATE (VIT B1) 100 MG
100 TABLET ORAL DAILY
Refills: 0 | Status: DISCONTINUED | OUTPATIENT
Start: 2021-08-12 | End: 2021-08-12

## 2021-08-12 RX ORDER — SODIUM CHLORIDE 9 MG/ML
1000 INJECTION, SOLUTION INTRAVENOUS
Refills: 0 | Status: DISCONTINUED | OUTPATIENT
Start: 2021-08-12 | End: 2021-08-12

## 2021-08-12 RX ORDER — THIAMINE MONONITRATE (VIT B1) 100 MG
500 TABLET ORAL EVERY 8 HOURS
Refills: 0 | Status: COMPLETED | OUTPATIENT
Start: 2021-08-12 | End: 2021-08-15

## 2021-08-12 RX ADMIN — Medication 105 MILLIGRAM(S): at 15:37

## 2021-08-12 RX ADMIN — Medication 2 MILLIGRAM(S): at 06:21

## 2021-08-12 RX ADMIN — Medication 105 MILLIGRAM(S): at 21:24

## 2021-08-12 RX ADMIN — Medication 2 MILLIGRAM(S): at 17:58

## 2021-08-12 RX ADMIN — Medication 2 MILLIGRAM(S): at 15:29

## 2021-08-12 RX ADMIN — Medication 100 MILLIGRAM(S): at 11:08

## 2021-08-12 RX ADMIN — Medication 2 MILLIGRAM(S): at 14:56

## 2021-08-12 RX ADMIN — Medication 1.5 MILLIGRAM(S): at 21:24

## 2021-08-12 RX ADMIN — SODIUM CHLORIDE 100 MILLILITER(S): 9 INJECTION, SOLUTION INTRAVENOUS at 10:24

## 2021-08-12 RX ADMIN — Medication 650 MILLIGRAM(S): at 17:58

## 2021-08-12 RX ADMIN — Medication 1 TABLET(S): at 11:08

## 2021-08-12 RX ADMIN — Medication 2 MILLIGRAM(S): at 10:24

## 2021-08-12 RX ADMIN — Medication 1 MILLIGRAM(S): at 11:08

## 2021-08-12 RX ADMIN — Medication 2 MILLIGRAM(S): at 02:23

## 2021-08-12 RX ADMIN — SODIUM CHLORIDE 75 MILLILITER(S): 9 INJECTION, SOLUTION INTRAVENOUS at 04:50

## 2021-08-12 NOTE — PROVIDER CONTACT NOTE (OTHER) - ASSESSMENT
/107, temp 99
Patient A&Ox4. Patient denies chest pain, dizziness, palpitations, shortness of breath. No acute distress noted.

## 2021-08-12 NOTE — SWALLOW BEDSIDE ASSESSMENT ADULT - SWALLOW EVAL: DIAGNOSIS
Patient consumed trials of puree, solids and thin liquids presenting with functional swallow. Oral phase characterized by adequate acceptance, adequate anterior bolus containment, functional mastication and manipulation, anterior to posterior transport and adequate oral cavity clearance. Pharyngeal phase characterized by initiation of the pharyngeal swallow and hyolaryngeal elevation and excursion noted upon palpation. No clinically overt signs or symptoms of aspiration across trials of puree, solids and thin liquids.

## 2021-08-12 NOTE — BH CONSULTATION LIAISON ASSESSMENT NOTE - RISK ASSESSMENT
Patient has following risk factors- long history of alcohol use disorder, history of having SI when intoxicated.  She currently denies SI, has supportive family, and is future oriented.

## 2021-08-12 NOTE — PROVIDER CONTACT NOTE (OTHER) - ACTION/TREATMENT ORDERED:
provider made aware
Per provider ativan administered. Per provider will reassess BP. Safety maintained. Will continue to monitor.

## 2021-08-12 NOTE — BH CONSULTATION LIAISON ASSESSMENT NOTE - SUMMARY
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 Per chart review, patient had suicidal ideation prior to ER visit.  Patient has been starting on an ativan taper per the primary team. Psych CL consulted for concern of SI.    Patient currently in alcohol withdrawal with concern for possible recent alcohol related seizure. She is attending well to conversation and does not appear delirious at this moment. Primary team started ativan taper. She adamantly denies SI currently. Plan as follows:    [] continue 1:1 for recent SI (though likely in setting of intoxicaton- will want to reassess to ensure no return of SI as intoxication ceases)  [] continue ativan taper per primary team- defer to them if need to escalate taper  [] thimaine 500mg IV q8h for three days  [] SBIRT/SW consult to assist in direct transfer to alcohol inpatient rehab as this likely will be best treatment for patient (assuming no SI)

## 2021-08-12 NOTE — BH CONSULTATION LIAISON ASSESSMENT NOTE - CURRENT MEDICATION
MEDICATIONS  (STANDING):  dextrose 5% + sodium chloride 0.9%. 1000 milliLiter(s) (100 mL/Hr) IV Continuous <Continuous>  folic acid 1 milliGRAM(s) Oral daily  LORazepam   Injectable 2 milliGRAM(s) IV Push every 4 hours  LORazepam   Injectable 1.5 milliGRAM(s) IV Push every 4 hours  LORazepam   Injectable   IV Push   multivitamin 1 Tablet(s) Oral daily  thiamine 100 milliGRAM(s) Oral daily    MEDICATIONS  (PRN):  LORazepam     Tablet 2 milliGRAM(s) Oral every 2 hours PRN Symptom-triggered 2 point increase in CIWA-Ar  LORazepam   Injectable 2 milliGRAM(s) IV Push every 1 hour PRN Symptom-triggered: each CIWA -Ar score 8 or GREATER  LORazepam   Injectable 4 milliGRAM(s) IV Push once PRN To control seizure activity

## 2021-08-12 NOTE — BH CONSULTATION LIAISON ASSESSMENT NOTE - HPI (INCLUDE ILLNESS QUALITY, SEVERITY, DURATION, TIMING, CONTEXT, MODIFYING FACTORS, ASSOCIATED SIGNS AND SYMPTOMS)
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 Per chart review, patient had suicidal ideation prior to ER visit.  Patient has been starting on an ativan taper per the primary team. Psych CL consulted for concern of SI.    Patient seen in the ER. She is easily awoken. She states that she relapsed on alcohol a couple of weeks ago in the setting of finding a bottle and then relapsing. 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 She adamantly denies SI, and denies making recent SI statements. She is open to the idea of inpatient rehab for alcohol treatment but states it is difficult to find one that will accept her. She denies AH/VH, denies SI/HI. Otherwise, she is alert, fairly attentive, knows the month and year.    I spoke with her mother- who states that to her knowledge in the setting of likely being intoxicated, she made a statement to her (patient's) brother about wanting to end it all. Mother is concerned about relapse, but denies that the patient ever made such a statement when sober. Mother feels that the patient is struggling and she is not sure what to do.

## 2021-08-12 NOTE — ED ADULT NURSE REASSESSMENT NOTE - NS ED NURSE REASSESS COMMENT FT1
Discussed patient with Dr. Letha Grande and would like to hold Ativan 2 mg until 2am because patient received Ativan 4mg at 2200.

## 2021-08-12 NOTE — ED ADULT NURSE REASSESSMENT NOTE - NS ED NURSE REASSESS COMMENT FT1
Pt A&Ox4, resting in bed. Pt appears to be drowsy. Respirations even and unlabored, speaking in full sentences without any difficulty, sating 100% on room air. Pt noted to have moderate tremors to bilateral upper extremities, pt also endorsing headache 2/10. Pt denies any tactile or visual disturbances, chest pain, dyspnea, dizziness, blurry vision, nausea or vomiting. Pt in NAD at this time. Pt on 1:1 observation, PCA at bedside. Will continue to monitor.

## 2021-08-12 NOTE — ED ADULT NURSE REASSESSMENT NOTE - NS ED NURSE REASSESS COMMENT FT1
[facility] PCA.

## 2021-08-12 NOTE — BH CONSULTATION LIAISON ASSESSMENT NOTE - NSBHCHARTREVIEWVS_PSY_A_CORE FT
Vital Signs Last 24 Hrs  T(C): 36.8 (12 Aug 2021 12:19), Max: 37.1 (12 Aug 2021 06:16)  T(F): 98.2 (12 Aug 2021 12:19), Max: 98.8 (12 Aug 2021 06:16)  HR: 109 (12 Aug 2021 12:19) (102 - 134)  BP: 154/109 (12 Aug 2021 12:19) (112/91 - 165/112)  BP(mean): --  RR: 19 (12 Aug 2021 12:19) (16 - 23)  SpO2: 100% (12 Aug 2021 12:19) (100% - 100%)

## 2021-08-12 NOTE — SWALLOW BEDSIDE ASSESSMENT ADULT - COMMENTS
As per H&P: 46-year-old female with HTN, depression and history of suicide attempts, alcohol/benzo abuse, presenting with aphasia, possibly secondary to recent seizure, alcohol withdrawal    As per Neuro: aphasia/lethargy may be 2/2 to alcohol/benzo intoxication/withdrawal vs. postictal state. Unlikely pt has had a stroke as imaging was negative and pt is improving.    SLP received patient in stretcher in hallway of ED, awake, alert, able to follow directions and answer questions.  Patient denies dysphagia symptoms.

## 2021-08-12 NOTE — BH CONSULTATION LIAISON ASSESSMENT NOTE - MSE UNSTRUCTURED FT
Mental Status Exam:  Meghan: fair grooming, in some distress- appears uncomfortable   Behavior: calm, cooperative, no psychomotor retardation/agitation  Motor: bilateral tremors- no nystagmus appreciated  Gait: did not assess, pt in bed  Speech: normal rate, rhythm, prosody and volume   Mood: "okay"  Affect: euthymic, congruent  Thought process: clear, goal directed   Thought Content: denies paranoia, delusions, talks about relapse   Perception: denies AH/VH  SI: denies  HI: denies  Insight: fair  Judgment: fair    Cognitive Exam:  Orientation: AOx2 (knows month and year)  Recall: intact  Attention: intact  Abstraction: intact

## 2021-08-12 NOTE — BH CONSULTATION LIAISON ASSESSMENT NOTE - NSBHCHARTREVIEWLAB_PSY_A_CORE FT
7.9    6.75  )-----------( 134      ( 12 Aug 2021 11:49 )             27.9   08-12    135  |  98  |  11  ----------------------------<  98  3.8   |  21<L>  |  0.44<L>    Ca    7.4<L>      12 Aug 2021 11:49  Mg     1.10     08-12    TPro  6.1  /  Alb  3.4  /  TBili  0.4  /  DBili  x   /  AST  65<H>  /  ALT  33  /  AlkPhos  60  08-12

## 2021-08-13 LAB
ALBUMIN SERPL ELPH-MCNC: 3.1 G/DL — LOW (ref 3.3–5)
ALP SERPL-CCNC: 58 U/L — SIGNIFICANT CHANGE UP (ref 40–120)
ALT FLD-CCNC: 24 U/L — SIGNIFICANT CHANGE UP (ref 4–33)
ANION GAP SERPL CALC-SCNC: 13 MMOL/L — SIGNIFICANT CHANGE UP (ref 7–14)
ANISOCYTOSIS BLD QL: SLIGHT — SIGNIFICANT CHANGE UP
AST SERPL-CCNC: 47 U/L — HIGH (ref 4–32)
BASOPHILS # BLD AUTO: 0.06 K/UL — SIGNIFICANT CHANGE UP (ref 0–0.2)
BASOPHILS NFR BLD AUTO: 1.7 % — SIGNIFICANT CHANGE UP (ref 0–2)
BILIRUB SERPL-MCNC: 0.5 MG/DL — SIGNIFICANT CHANGE UP (ref 0.2–1.2)
BUN SERPL-MCNC: 9 MG/DL — SIGNIFICANT CHANGE UP (ref 7–23)
CALCIUM SERPL-MCNC: 8 MG/DL — LOW (ref 8.4–10.5)
CHLORIDE SERPL-SCNC: 100 MMOL/L — SIGNIFICANT CHANGE UP (ref 98–107)
CHOLEST SERPL-MCNC: 159 MG/DL — SIGNIFICANT CHANGE UP
CO2 SERPL-SCNC: 24 MMOL/L — SIGNIFICANT CHANGE UP (ref 22–31)
COVID-19 SPIKE DOMAIN AB INTERP: POSITIVE
COVID-19 SPIKE DOMAIN ANTIBODY RESULT: >250 U/ML — HIGH
CREAT SERPL-MCNC: 0.48 MG/DL — LOW (ref 0.5–1.3)
EOSINOPHIL # BLD AUTO: 0.13 K/UL — SIGNIFICANT CHANGE UP (ref 0–0.5)
EOSINOPHIL NFR BLD AUTO: 3.5 % — SIGNIFICANT CHANGE UP (ref 0–6)
GIANT PLATELETS BLD QL SMEAR: PRESENT — SIGNIFICANT CHANGE UP
GLUCOSE SERPL-MCNC: 155 MG/DL — HIGH (ref 70–99)
HCT VFR BLD CALC: 27.3 % — LOW (ref 34.5–45)
HDLC SERPL-MCNC: 90 MG/DL — SIGNIFICANT CHANGE UP
HGB BLD-MCNC: 7.9 G/DL — LOW (ref 11.5–15.5)
HYPOCHROMIA BLD QL: SLIGHT — SIGNIFICANT CHANGE UP
IANC: 2.62 K/UL — SIGNIFICANT CHANGE UP (ref 1.5–8.5)
LIPID PNL WITH DIRECT LDL SERPL: 59 MG/DL — SIGNIFICANT CHANGE UP
LYMPHOCYTES # BLD AUTO: 0.46 K/UL — LOW (ref 1–3.3)
LYMPHOCYTES # BLD AUTO: 12.2 % — LOW (ref 13–44)
MAGNESIUM SERPL-MCNC: 2.3 MG/DL — SIGNIFICANT CHANGE UP (ref 1.6–2.6)
MANUAL SMEAR VERIFICATION: SIGNIFICANT CHANGE UP
MCHC RBC-ENTMCNC: 20.4 PG — LOW (ref 27–34)
MCHC RBC-ENTMCNC: 28.9 GM/DL — LOW (ref 32–36)
MCV RBC AUTO: 70.4 FL — LOW (ref 80–100)
MICROCYTES BLD QL: SLIGHT — SIGNIFICANT CHANGE UP
MONOCYTES # BLD AUTO: 0.13 K/UL — SIGNIFICANT CHANGE UP (ref 0–0.9)
MONOCYTES NFR BLD AUTO: 3.5 % — SIGNIFICANT CHANGE UP (ref 2–14)
NEUTROPHILS # BLD AUTO: 2.97 K/UL — SIGNIFICANT CHANGE UP (ref 1.8–7.4)
NEUTROPHILS NFR BLD AUTO: 78.2 % — HIGH (ref 43–77)
NON HDL CHOLESTEROL: 69 MG/DL — SIGNIFICANT CHANGE UP
OVALOCYTES BLD QL SMEAR: SLIGHT — SIGNIFICANT CHANGE UP
PLAT MORPH BLD: NORMAL — SIGNIFICANT CHANGE UP
PLATELET # BLD AUTO: 136 K/UL — LOW (ref 150–400)
PLATELET COUNT - ESTIMATE: ABNORMAL
POTASSIUM SERPL-MCNC: 3.1 MMOL/L — LOW (ref 3.5–5.3)
POTASSIUM SERPL-SCNC: 3.1 MMOL/L — LOW (ref 3.5–5.3)
PROT SERPL-MCNC: 5.9 G/DL — LOW (ref 6–8.3)
RBC # BLD: 3.88 M/UL — SIGNIFICANT CHANGE UP (ref 3.8–5.2)
RBC # FLD: 22.5 % — HIGH (ref 10.3–14.5)
RBC BLD AUTO: ABNORMAL
SARS-COV-2 IGG+IGM SERPL QL IA: >250 U/ML — HIGH
SARS-COV-2 IGG+IGM SERPL QL IA: POSITIVE
SODIUM SERPL-SCNC: 137 MMOL/L — SIGNIFICANT CHANGE UP (ref 135–145)
TRIGL SERPL-MCNC: 51 MG/DL — SIGNIFICANT CHANGE UP
VARIANT LYMPHS # BLD: 0.9 % — SIGNIFICANT CHANGE UP (ref 0–6)
WBC # BLD: 3.8 K/UL — SIGNIFICANT CHANGE UP (ref 3.8–10.5)
WBC # FLD AUTO: 3.8 K/UL — SIGNIFICANT CHANGE UP (ref 3.8–10.5)

## 2021-08-13 PROCEDURE — 99233 SBSQ HOSP IP/OBS HIGH 50: CPT

## 2021-08-13 RX ORDER — LANOLIN ALCOHOL/MO/W.PET/CERES
6 CREAM (GRAM) TOPICAL ONCE
Refills: 0 | Status: COMPLETED | OUTPATIENT
Start: 2021-08-13 | End: 2021-08-13

## 2021-08-13 RX ORDER — MAGNESIUM SULFATE 500 MG/ML
2 VIAL (ML) INJECTION
Refills: 0 | Status: COMPLETED | OUTPATIENT
Start: 2021-08-13 | End: 2021-08-13

## 2021-08-13 RX ORDER — POTASSIUM CHLORIDE 20 MEQ
20 PACKET (EA) ORAL ONCE
Refills: 0 | Status: COMPLETED | OUTPATIENT
Start: 2021-08-13 | End: 2021-08-13

## 2021-08-13 RX ADMIN — Medication 1 MILLIGRAM(S): at 22:42

## 2021-08-13 RX ADMIN — Medication 1 TABLET(S): at 13:22

## 2021-08-13 RX ADMIN — Medication 1.5 MILLIGRAM(S): at 01:33

## 2021-08-13 RX ADMIN — Medication 1.5 MILLIGRAM(S): at 18:34

## 2021-08-13 RX ADMIN — Medication 50 GRAM(S): at 03:45

## 2021-08-13 RX ADMIN — Medication 650 MILLIGRAM(S): at 11:30

## 2021-08-13 RX ADMIN — Medication 50 GRAM(S): at 01:45

## 2021-08-13 RX ADMIN — Medication 105 MILLIGRAM(S): at 06:10

## 2021-08-13 RX ADMIN — Medication 1.5 MILLIGRAM(S): at 10:44

## 2021-08-13 RX ADMIN — Medication 0.1 MILLIGRAM(S): at 23:04

## 2021-08-13 RX ADMIN — Medication 1 MILLIGRAM(S): at 13:22

## 2021-08-13 RX ADMIN — Medication 0.1 MILLIGRAM(S): at 02:05

## 2021-08-13 RX ADMIN — Medication 0.1 MILLIGRAM(S): at 18:35

## 2021-08-13 RX ADMIN — Medication 1.5 MILLIGRAM(S): at 14:40

## 2021-08-13 RX ADMIN — Medication 105 MILLIGRAM(S): at 22:42

## 2021-08-13 RX ADMIN — Medication 6 MILLIGRAM(S): at 22:41

## 2021-08-13 RX ADMIN — Medication 650 MILLIGRAM(S): at 10:47

## 2021-08-13 RX ADMIN — Medication 0.1 MILLIGRAM(S): at 10:44

## 2021-08-13 RX ADMIN — Medication 1.5 MILLIGRAM(S): at 06:10

## 2021-08-13 RX ADMIN — Medication 105 MILLIGRAM(S): at 14:37

## 2021-08-13 RX ADMIN — Medication 20 MILLIEQUIVALENT(S): at 02:05

## 2021-08-13 NOTE — BH CONSULTATION LIAISON PROGRESS NOTE - NSBHASSESSMENTFT_PSY_ALL_CORE
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 Per chart review, patient had suicidal ideation prior to ER visit.  Patient has been starting on an ativan taper per the primary team. Psych CL consulted for concern of SI.    Patient currently in alcohol withdrawal with concern for possible recent alcohol related seizure. She is attending well to conversation and does not appear delirious at this moment. Primary team started ativan taper. As of 08/13, pt w/ two days of adamantly denying SI. Pt expressed concern about financial means to support self if plan were to be inpt rehab. Plan as follows:    [] DISCONTINUE 1:1 (pt w/ 2 days of denying SI and no SI outside context of intoxication); pt should be placed on enhanced or routine observation, per IM team comfort and resources  [] continue ativan taper per primary team- defer to them if need to escalate taper  [] thiamine 500mg IV q8h for three days  [] SBIRT/SW consult to assist in direct transfer to alcohol inpatient rehab as this likely will be best treatment for patient (assuming no SI)   - Patient would additionally benefit from consult with social work to address concerns about financial stability

## 2021-08-13 NOTE — CHART NOTE - NSCHARTNOTEFT_GEN_A_CORE
ACP team PA note    Vital Signs Last 24 Hrs  T(C): 36.9 (13 Aug 2021 01:05), Max: 37.2 (12 Aug 2021 21:06)  T(F): 98.4 (13 Aug 2021 01:05), Max: 99 (12 Aug 2021 21:06)  HR: 95 (13 Aug 2021 01:05) (95 - 113)  BP: 164/114 (13 Aug 2021 01:05) (140/91 - 165/112)  BP(mean): --  RR: 19 (13 Aug 2021 01:05) (19 - 20)  SpO2: 99% (13 Aug 2021 01:05) (99% - 100%)      CIWA -- 7>7>7   started Clonidine, for BP_ 164/114, persistently elevated, __continue monitoring

## 2021-08-13 NOTE — BH CONSULTATION LIAISON PROGRESS NOTE - NSBHFUPINTERVALHXFT_PSY_A_CORE
Pt seen and examined at the bedside this AM. Sts she feels well, is A&Ox3/3, and denies symptoms such as nausea, vomiting, seizure like activity, or any SI/HI/AH/VH. Pt endorsed some headache, shaking, and chills. She has otherwise been sleeping throughout her time on inpt thus far. She describes relapse approximately 2 months prior upon finding a bottle of alcohol in her apartment, and most recent 1 week of daily drinking, approximately 1 L/ day. Pt denies hx seizures in context of alcohol use. She sts she lives in a sober house, but also has her own apartment housing. She hopes to live with [facility] for accountability following inpt stay. She detailed numerous past stays w/ inpt rehab, and is concerned about supporting herself financially if she were to do that again. She sts she is currently living off her savings.

## 2021-08-13 NOTE — BH CONSULTATION LIAISON PROGRESS NOTE - NSBHCHARTREVIEWVS_PSY_A_CORE FT
Vital Signs Last 24 Hrs  T(C): 36.5 (13 Aug 2021 13:00), Max: 37.2 (12 Aug 2021 21:06)  T(F): 97.7 (13 Aug 2021 13:00), Max: 99 (12 Aug 2021 21:06)  HR: 83 (13 Aug 2021 13:00) (79 - 112)  BP: 130/86 (13 Aug 2021 13:00) (124/92 - 164/114)  BP(mean): --  RR: 18 (13 Aug 2021 13:00) (18 - 19)  SpO2: 100% (13 Aug 2021 13:00) (99% - 100%)

## 2021-08-13 NOTE — BH CONSULTATION LIAISON PROGRESS NOTE - CASE SUMMARY
Chart reviewed. Pt seen w/ trainee. Pt oriented well, tolerating taper, without complaints of withdrawal or SI/HI/AVH. Denies distress/safety concerns. Open to continued detox and SW intervention to help with resources. At this point there appears to be no psych contraindication to discharge when medically cleared. Psych acuity is low. We will sign off for now but please call us if questions emerge.

## 2021-08-13 NOTE — EEG REPORT - NS EEG TEXT BOX
AYLEEN DEY [id number])F  Admitting MD: Dr. Charles Guajardo    Study Date: 08-13-21    --------------------------------------------------------------------------------------------------  History:  CC/ HPI Patient is a 46y old  Female who presents with a chief complaint of   cloNIDine 0.1 milliGRAM(s) Oral three times a day  dextrose 5% + sodium chloride 0.9%. 1000 milliLiter(s) IV Continuous <Continuous>  folic acid 1 milliGRAM(s) Oral daily  LORazepam   Injectable 1.5 milliGRAM(s) IV Push every 4 hours  LORazepam   Injectable 1 milliGRAM(s) IV Push every 4 hours  LORazepam   Injectable   IV Push   multivitamin 1 Tablet(s) Oral daily  thiamine IVPB 500 milliGRAM(s) IV Intermittent every 8 hours    --------------------------------------------------------------------------------------------------  Study Interpretation:    [[[Abbreviation Key:  PDR=alpha rhythm/posterior dominant rhythm. A-P=anterior posterior gradient.  Amplitude: ‘very low’:<20; ‘low’:20-50; ‘medium’:; ‘high’:>200uV.  Persistence for periodic/rhythmic patterns (% of epoch) ‘rare’:<1%; ‘occasional’:1-10%; ‘frequent’:10-50%; ‘abundant’:50-90%; ‘continuous’:>90%.  Persistence for sporadic discharges: ‘rare’:<1/hr; ‘occasional’:1/min-1/hr; ‘frequent’:>1/min; ‘abundant’:>1/10 sec.  GRDA=generalized rhythmic delta activity, LRDA=lateralized rhythmic delta activity, TIRDA=temporal intermittent rhythmic delta activity, FIRDA=frontal intermittent rhythmic activity. LPD=PLED=lateralized periodic discharges, GPD=generalized periodic discharges, BiPDs=BiPLEDs=bilateral independent periodic epileptiform discharges, SIRPID=stimulus induced rhythmic, periodic, or ictal appearing discharges.  Modifiers: +F=with fast component, +S=with spike component, +R=with rhythmic component.  S-B=burst suppression pattern.  Max=maximal. N1-drowsy, N2-stage II sleep, N3-slow wave sleep.  HV=hyperventilation, PS=photic stimulation]]]    FINDINGS:  The background was continuous, spontaneously variable and reactive.  During wakefulness, the posteriorly dominant rhythm consisted of symmetric, well modulated 9 Hz activity, with an amplitude to 30 uV, that attenuated to eye opening.  Low amplitude central beta was noted in wakefulness.    Background Slowing:  Generalized slowing: none was present.  Focal slowing: none was present.    Sleep Background:  Drowsiness was characterized by fragmentation, attenuation, and slowing of the background activity.    Stage II sleep transients were not recorded.    Epileptiform Activity:   No interictal epileptiform discharges were present.    Events:  No clinical events were recorded.  No seizures were recorded.    Activation Procedures:   -Hyperventilation was not performed.    -Photic stimulation was performed and did not elicit any abnormalities.      Artifacts:  Intermittent myogenic and external motion artifacts were noted.    ECG:  The heart rate on single channel ECG at baseline was predominantly near BPM = 60-70  -----------------------------------------------------------------------------------------------------    EEG Classification / Summary:  normal EEG study, awake / drowsy   -  -----------------------------------------------------------------------------------------------------    Clinical Impression:  There were no epileptiform abnormalities recorded.      -------------------------------------------------------------------------------------------------------  [facility] EEG Reading Room Ph#: [phone]  Epilepsy Answering Service after 5PM and before 8:30AM: Ph#: [phone]    David Canas M.D.   of Neurology, [facility] Epilepsy [place]

## 2021-08-13 NOTE — PROGRESS NOTE ADULT - PROBLEM SELECTOR PLAN 3
leukocytosis, tachycardia  UA negative  COVID negative  check CXR given recent seizure  US abdomen given RUQ/RLQ TTP  afebrile, monitor off abx  trend leukocytosis/lactate

## 2021-08-13 NOTE — BH CONSULTATION LIAISON PROGRESS NOTE - CURRENT MEDICATION
MEDICATIONS  (STANDING):  cloNIDine 0.1 milliGRAM(s) Oral three times a day  dextrose 5% + sodium chloride 0.9%. 1000 milliLiter(s) (100 mL/Hr) IV Continuous <Continuous>  folic acid 1 milliGRAM(s) Oral daily  LORazepam   Injectable 1.5 milliGRAM(s) IV Push every 4 hours  LORazepam   Injectable 1 milliGRAM(s) IV Push every 4 hours  LORazepam   Injectable   IV Push   multivitamin 1 Tablet(s) Oral daily  thiamine IVPB 500 milliGRAM(s) IV Intermittent every 8 hours    MEDICATIONS  (PRN):  acetaminophen   Tablet .. 650 milliGRAM(s) Oral every 6 hours PRN Temp greater or equal to 38C (100.4F), Mild Pain (1 - 3), Moderate Pain (4 - 6)  LORazepam     Tablet 2 milliGRAM(s) Oral every 2 hours PRN Symptom-triggered 2 point increase in CIWA-Ar  LORazepam   Injectable 2 milliGRAM(s) IV Push every 1 hour PRN Symptom-triggered: each CIWA -Ar score 8 or GREATER  LORazepam   Injectable 4 milliGRAM(s) IV Push once PRN To control seizure activity

## 2021-08-14 LAB
ANION GAP SERPL CALC-SCNC: 12 MMOL/L — SIGNIFICANT CHANGE UP (ref 7–14)
BUN SERPL-MCNC: 7 MG/DL — SIGNIFICANT CHANGE UP (ref 7–23)
CALCIUM SERPL-MCNC: 8.4 MG/DL — SIGNIFICANT CHANGE UP (ref 8.4–10.5)
CHLORIDE SERPL-SCNC: 103 MMOL/L — SIGNIFICANT CHANGE UP (ref 98–107)
CO2 SERPL-SCNC: 21 MMOL/L — LOW (ref 22–31)
CREAT SERPL-MCNC: 0.47 MG/DL — LOW (ref 0.5–1.3)
GLUCOSE SERPL-MCNC: 139 MG/DL — HIGH (ref 70–99)
HCT VFR BLD CALC: 27.9 % — LOW (ref 34.5–45)
HGB BLD-MCNC: 7.9 G/DL — LOW (ref 11.5–15.5)
MAGNESIUM SERPL-MCNC: 1.4 MG/DL — LOW (ref 1.6–2.6)
MCHC RBC-ENTMCNC: 20.5 PG — LOW (ref 27–34)
MCHC RBC-ENTMCNC: 28.3 GM/DL — LOW (ref 32–36)
MCV RBC AUTO: 72.3 FL — LOW (ref 80–100)
NRBC # BLD: 0 /100 WBCS — SIGNIFICANT CHANGE UP
NRBC # FLD: 0 K/UL — SIGNIFICANT CHANGE UP
PHOSPHATE SERPL-MCNC: 2.9 MG/DL — SIGNIFICANT CHANGE UP (ref 2.5–4.5)
PLATELET # BLD AUTO: 100 K/UL — LOW (ref 150–400)
POTASSIUM SERPL-MCNC: 3.4 MMOL/L — LOW (ref 3.5–5.3)
POTASSIUM SERPL-SCNC: 3.4 MMOL/L — LOW (ref 3.5–5.3)
RBC # BLD: 3.86 M/UL — SIGNIFICANT CHANGE UP (ref 3.8–5.2)
RBC # FLD: 22.7 % — HIGH (ref 10.3–14.5)
SODIUM SERPL-SCNC: 136 MMOL/L — SIGNIFICANT CHANGE UP (ref 135–145)
WBC # BLD: 2.85 K/UL — LOW (ref 3.8–10.5)
WBC # FLD AUTO: 2.85 K/UL — LOW (ref 3.8–10.5)

## 2021-08-14 RX ORDER — LANOLIN ALCOHOL/MO/W.PET/CERES
6 CREAM (GRAM) TOPICAL AT BEDTIME
Refills: 0 | Status: DISCONTINUED | OUTPATIENT
Start: 2021-08-14 | End: 2021-08-15

## 2021-08-14 RX ORDER — POTASSIUM CHLORIDE 20 MEQ
40 PACKET (EA) ORAL ONCE
Refills: 0 | Status: COMPLETED | OUTPATIENT
Start: 2021-08-14 | End: 2021-08-14

## 2021-08-14 RX ADMIN — Medication 1 TABLET(S): at 11:29

## 2021-08-14 RX ADMIN — Medication 2 DROP(S): at 22:17

## 2021-08-14 RX ADMIN — Medication 40 MILLIEQUIVALENT(S): at 10:23

## 2021-08-14 RX ADMIN — Medication 1 MILLIGRAM(S): at 14:35

## 2021-08-14 RX ADMIN — Medication 105 MILLIGRAM(S): at 14:35

## 2021-08-14 RX ADMIN — Medication 1 MILLIGRAM(S): at 10:24

## 2021-08-14 RX ADMIN — Medication 105 MILLIGRAM(S): at 22:17

## 2021-08-14 RX ADMIN — Medication 0.1 MILLIGRAM(S): at 22:16

## 2021-08-14 RX ADMIN — Medication 1 MILLIGRAM(S): at 11:29

## 2021-08-14 RX ADMIN — Medication 6 MILLIGRAM(S): at 22:28

## 2021-08-14 RX ADMIN — Medication 1 MILLIGRAM(S): at 05:57

## 2021-08-14 RX ADMIN — Medication 1 MILLIGRAM(S): at 01:41

## 2021-08-14 RX ADMIN — Medication 0.1 MILLIGRAM(S): at 10:33

## 2021-08-14 RX ADMIN — Medication 1 MILLIGRAM(S): at 18:29

## 2021-08-14 RX ADMIN — Medication 0.5 MILLIGRAM(S): at 22:18

## 2021-08-14 RX ADMIN — Medication 0.1 MILLIGRAM(S): at 18:30

## 2021-08-14 RX ADMIN — Medication 105 MILLIGRAM(S): at 05:58

## 2021-08-14 RX ADMIN — Medication 2 DROP(S): at 18:31

## 2021-08-14 NOTE — PROGRESS NOTE ADULT - PROBLEM SELECTOR PLAN 7
reportedly not on any antihypertensives  DASH diet  BP elevation likely in setting of alcohol withdrawal however if persistently elevated would start on antihypertensive agent
reportedly not on any antihypertensives  DASH diet
reportedly not on any antihypertensives  DASH diet  BP elevation likely in setting of alcohol withdrawal however if persistently elevated would start on antihypertensive agent

## 2021-08-14 NOTE — PROGRESS NOTE ADULT - PROBLEM SELECTOR PLAN 1
reportedly w/sz at home PTA  high risk CIWA monitoring  lorazepam taper w/symptom triggered PRNs  SW consult  MV, thiamine, folic acid supplementation  doing well  DC Planing for likely tomorrow
reportedly w/sz at home PTA  high risk CIWA monitoring  lorazepam taper w/symptom triggered PRNs  SW consult  MV, thiamine, folic acid supplementation    #lactate blood increased - likely secondary to alcohol, monitor/trend
reportedly w/sz at home PTA  high risk CIWA monitoring  lorazepam taper w/symptom triggered PRNs  SW consult  MV, thiamine, folic acid supplementation    #lactate blood increased - likely secondary to alcohol, monitor/trend

## 2021-08-14 NOTE — PROGRESS NOTE ADULT - PROBLEM SELECTOR PLAN 6
monitor on tele  trop negative  avoid QT prolonging agents

## 2021-08-14 NOTE — PROGRESS NOTE ADULT - PROBLEM SELECTOR PLAN 8
SCDs for DVT ppx    #chemosis - vision intact, no occular complaints - CTM

## 2021-08-14 NOTE — CONSULT NOTE ADULT - SUBJECTIVE AND OBJECTIVE BOX
[facility] DEPARTMENT OF OPHTHALMOLOGY - INITIAL ADULT CONSULT  -----------------------------------------------------------------------------  Maude Louis MD, MPH, PGY-3  Pager: [phone]  -----------------------------------------------------------------------------    HPI: 46-year-old female with HTN, depression and history of suicide attempts, alcohol/benzo abuse, presenting with GTC prior to EMS arrival. Admitted for ETOH withdrawal and SIRS in s/o tachycardia and leukocytosis. Ophtho consulted for eye pain OS and swelling around orbit. Patient was previously seen by ophtho in February of 2021 with ulcer (rare staph aureus) treated with fortifieds and again in May for left orbital floor fracture.     Patient endorses mild eye pain OS. Noted 3-4 hours of left-sided periorbital swelling that resolved completely at time of exam. No pain with EOM, no diplopia, though patient feels that left eye is smaller than the left. Did not follow up with OMFS or plastics as outpatient. Denies any change in vision. Had CL in for 5 days, subsequently removed. No other visual complaints.     PMH: per HPI   POcHx: per HPI   FH: denies glc/amd  Social History: + ETOH   Ophthalmic Medications: AT PF PRN   Allergies: NKDA    Review of Systems: per HPI     VITALS: T(C): 36.7 (08-14-21 @ 18:23)  T(F): 98 (08-14-21 @ 18:23), Max: 98.1 (08-14-21 @ 06:52)  HR: 85 (08-14-21 @ 18:23) (71 - 85)  BP: 156/100 (08-14-21 @ 18:23) (122/89 - 156/100)  RR:  (18 - 18)  SpO2:  (99% - 100%)  Wt(kg): --  General: AAO x 3, appropriate mood and affect    Ophthalmology Exam:  Visual acuity (sc): 20/25 OU  Pupils: PERRL OU, no APD  Ttono: 10/11   Extraocular movements (EOMs): Full OU, no pain, no diplopia  Confrontational Visual Field (CVF): Full OU  Color Plates: 12/12 OU    No RTR, no proptosis OU. Left eye does appear mildly sunken compared to right eye.     Pen Light Exam (PLE)  External: Flat OU  Lids/Lashes/Lacrimal Ducts: Flat OU    Sclera/Conjunctiva: W+Q OU  Cornea: DTBUT, 1+ SPK OU   Anterior Chamber: D+F OU    Iris: Flat OU  Lens: NS OU     DFE performed 5/2021 wnl with exception of increased CDR (0.85) OU     Labs/Imaging:  N/A    Assessment and Recommendations:  46-year-old female with HTN, depression and history of suicide attempts, alcohol/benzo abuse, presenting with GTC prior to EMS arrival. Admitted for ETOH withdrawal and SIRS in s/o tachycardia and leukocytosis. Ophtho consulted for eye pain OS and swelling around orbit. Exam with flat external exam and no swelling around lids OU. No pain with EOM and no diplopia though left eye does appear mildly sunken. Anterior exam with scar formation inferior 2/2 healed ulcer, with dry eye signs OU. Please see recommendations below:     #DYANA OU   - Artificial tears, preservative-free q2hr OU   - Lacrilube BID OU   - If any worsening pain or decrease in vision, please page on-call ophthalmology resident  - Scar formation over inferior cornea OS, no new infiltrate, no epi defect   - CL holiday     #Orbital floor fracture, since May 2021, left-side   - Please notify OMFS (consulted in May at [facility]) or plastics that patient is admitted and needs to be followed by orbital floor fracture, consider role and timing of surgical intervention   - Emphasized need for outpatient follow-up to patient     #Increased CDR OU noted prior DFE   - Outpatient work-up with HVF and OCT nerve     Findings discussed with pt and primary team    Case DW Dr. Fitch, Chief Resident. Dr. Herman, attending, aware of case.     Outpatient follow-up: Patient should follow-up with his/her ophthalmologist or with [facility] Department of Ophthalmology within 1 week of after discharge at:    [address]  [place] [address]  [phone]    Maude Louis MD, MPH PGY-3  Pager: [phone]  Google Voice: [phone]   Also available on [employer] Teams

## 2021-08-14 NOTE — PROGRESS NOTE ADULT - PROBLEM SELECTOR PLAN 2
per ED note, patient expressed SI to mother 2 days ago, currently denies  PEMA gallego appreciated  D/C !:1
per ED note, patient expressed SI to mother 2 days ago, currently denies  c/w 1:1   lashonda appreciated
per ED note, patient expressed SI to mother 2 days ago, currently denies  c/w 1:1   lashonda appreciated

## 2021-08-14 NOTE — PROGRESS NOTE ADULT - PROBLEM SELECTOR PLAN 5
appreciate neuro recs  passed dysphagia screen, started on dysphagia diet, aspiration precautions  CT imaging as above  S&S eval  vEEG

## 2021-08-14 NOTE — PROGRESS NOTE ADULT - PROBLEM SELECTOR PROBLEM 3
Systemic inflammatory response syndrome (SIRS)

## 2021-08-15 ENCOUNTER — TRANSCRIPTION ENCOUNTER (OUTPATIENT)
Age: 46
End: 2021-08-15

## 2021-08-15 VITALS
DIASTOLIC BLOOD PRESSURE: 110 MMHG | SYSTOLIC BLOOD PRESSURE: 150 MMHG | HEART RATE: 82 BPM | RESPIRATION RATE: 16 BRPM | TEMPERATURE: 98 F | OXYGEN SATURATION: 100 %

## 2021-08-15 LAB
ANION GAP SERPL CALC-SCNC: 14 MMOL/L — SIGNIFICANT CHANGE UP (ref 7–14)
BUN SERPL-MCNC: 7 MG/DL — SIGNIFICANT CHANGE UP (ref 7–23)
CALCIUM SERPL-MCNC: 9.5 MG/DL — SIGNIFICANT CHANGE UP (ref 8.4–10.5)
CHLORIDE SERPL-SCNC: 101 MMOL/L — SIGNIFICANT CHANGE UP (ref 98–107)
CO2 SERPL-SCNC: 21 MMOL/L — LOW (ref 22–31)
CREAT SERPL-MCNC: 0.57 MG/DL — SIGNIFICANT CHANGE UP (ref 0.5–1.3)
GLUCOSE SERPL-MCNC: 122 MG/DL — HIGH (ref 70–99)
HCT VFR BLD CALC: 31.5 % — LOW (ref 34.5–45)
HGB BLD-MCNC: 8.8 G/DL — LOW (ref 11.5–15.5)
MAGNESIUM SERPL-MCNC: 1.2 MG/DL — LOW (ref 1.6–2.6)
MCHC RBC-ENTMCNC: 20.2 PG — LOW (ref 27–34)
MCHC RBC-ENTMCNC: 27.9 GM/DL — LOW (ref 32–36)
MCV RBC AUTO: 72.2 FL — LOW (ref 80–100)
NRBC # BLD: 0 /100 WBCS — SIGNIFICANT CHANGE UP
NRBC # FLD: 0 K/UL — SIGNIFICANT CHANGE UP
PHOSPHATE SERPL-MCNC: 3.9 MG/DL — SIGNIFICANT CHANGE UP (ref 2.5–4.5)
PLATELET # BLD AUTO: 145 K/UL — LOW (ref 150–400)
POTASSIUM SERPL-MCNC: 3.7 MMOL/L — SIGNIFICANT CHANGE UP (ref 3.5–5.3)
POTASSIUM SERPL-SCNC: 3.7 MMOL/L — SIGNIFICANT CHANGE UP (ref 3.5–5.3)
RBC # BLD: 4.36 M/UL — SIGNIFICANT CHANGE UP (ref 3.8–5.2)
RBC # FLD: 23.7 % — HIGH (ref 10.3–14.5)
SODIUM SERPL-SCNC: 136 MMOL/L — SIGNIFICANT CHANGE UP (ref 135–145)
WBC # BLD: 3.24 K/UL — LOW (ref 3.8–10.5)
WBC # FLD AUTO: 3.24 K/UL — LOW (ref 3.8–10.5)

## 2021-08-15 RX ORDER — FOLIC ACID 0.8 MG
1 TABLET ORAL
Qty: 0 | Refills: 0 | DISCHARGE
Start: 2021-08-15

## 2021-08-15 RX ADMIN — Medication 0.5 MILLIGRAM(S): at 05:36

## 2021-08-15 RX ADMIN — Medication 0.5 MILLIGRAM(S): at 01:59

## 2021-08-15 RX ADMIN — Medication 1 TABLET(S): at 13:44

## 2021-08-15 RX ADMIN — Medication 2 DROP(S): at 12:48

## 2021-08-15 RX ADMIN — Medication 0.1 MILLIGRAM(S): at 05:37

## 2021-08-15 RX ADMIN — Medication 0.1 MILLIGRAM(S): at 13:44

## 2021-08-15 RX ADMIN — Medication 105 MILLIGRAM(S): at 05:35

## 2021-08-15 RX ADMIN — Medication 2 DROP(S): at 10:47

## 2021-08-15 RX ADMIN — Medication 0.5 MILLIGRAM(S): at 10:41

## 2021-08-15 RX ADMIN — Medication 1 MILLIGRAM(S): at 13:44

## 2021-08-15 RX ADMIN — Medication 2 DROP(S): at 05:37

## 2021-08-15 NOTE — PROGRESS NOTE ADULT - ASSESSMENT
47yo F w/ h/o change in mental status, suicide attempts, and chronic alcoholism (and substance abuse with xanax) who reported to the ED for code stroke at 5:08 PM on 8/11/21 due to aphasia.  LKN was 12 days ago.  Upon EMS arrival it was reported that pt was having tonic-clonic seizures. Pt has taken xanax about 7 days ago and drank 1 Liter vodka in the last few days (last drink yesterday). Neurological exam initially revealed arousal to minor stimuli, aphasia (did not repeat sentences or name objects), blinks eyes only, all extremities -some effort against gravity, mild-moderate sensation loss, mute. Improved 30 minutes after - held up all extremities fully, aphasia resolved. Imaging was negative for any acute findings. Blood alcohol level 464. lipase elevated 64, Utox + for benzo    NIHSS initial: 16 (arousal to minor stimuli, aphasic, blinks eyes only, all extremities -some effort against gravity, mild-moderate sensation loss, mute)  preMRS: 0  Repeat NIHSS (30 minutes after): 2 (blinks eyes only, doesn't know month)  CTH neg  CTA H/N   EEG normal   LDL 59, A1c 5.4  Impression: aphasia/lethargy may be 2/2 to alcohol/benzo intoxication/withdrawal vs. postictal state. Unlikely pt has had a stroke as imaging was negative and pt is improving. now near baseline wtih mild tremor     Recommendations:   - platelets dropping 202-->134-->100  - thiamine, folic acid, MVI  - CIWA  - check FS, glucose control <180  - GI/DVT ppx  - Counseling on diet, exercise, and medication adherence was done  - Counseling on smoking cessation and alcohol consumption offered when appropriate.  - Pain assessed and judicious use of narcotics when appropriate was discussed.    - Stroke education given when appropriate.  - Importance of fall prevention discussed.   - Differential diagnosis and plan of care discussed with patient and/or family and primary team  - Thank you for allowing me to participate in the care of this patient. Call with questions.   - d/c planning when able.  detox program   Rodrigo Spence MD  Vascular Neurology .   
45yo F w/ h/o change in mental status, suicide attempts, and chronic alcoholism (and substance abuse with xanax) who reported to the ED for code stroke at 5:08 PM on 8/11/21 due to aphasia.  LKN was 12 days ago.  Upon EMS arrival it was reported that pt was having tonic-clonic seizures. Pt has taken xanax about 7 days ago and drank 1 Liter vodka in the last few days (last drink yesterday). Neurological exam initially revealed arousal to minor stimuli, aphasia (did not repeat sentences or name objects), blinks eyes only, all extremities -some effort against gravity, mild-moderate sensation loss, mute. Improved 30 minutes after - held up all extremities fully, aphasia resolved. Imaging was negative for any acute findings. Blood alcohol level 464. lipase elevated 64, Utox + for benzo    NIHSS initial: 16 (arousal to minor stimuli, aphasic, blinks eyes only, all extremities -some effort against gravity, mild-moderate sensation loss, mute)  preMRS: 0  Repeat NIHSS (30 minutes after): 2 (blinks eyes only, doesn't know month)  CTH neg  CTA H/N     Impression: aphasia/lethargy may be 2/2 to alcohol/benzo intoxication/withdrawal vs. postictal state. Unlikely pt has had a stroke as imaging was negative and pt is improving. now near baseline wtih mild tremor     Recommendations:   - EEG to rule out postictal state/new seizures. rEEG sufficient no need fro extended.   - thiamine, folic acid, MVI  - CIWA  - if no improvement, MRI can defer for now   - 1:1 SI, f/u BH   - check NH3  - check FS, glucose control <180  - GI/DVT ppx  - Counseling on diet, exercise, and medication adherence was done  - Counseling on smoking cessation and alcohol consumption offered when appropriate.  - Pain assessed and judicious use of narcotics when appropriate was discussed.    - Stroke education given when appropriate.  - Importance of fall prevention discussed.   - Differential diagnosis and plan of care discussed with patient and/or family and primary team  - Thank you for allowing me to participate in the care of this patient. Call with questions.   Rodrigo Spence MD  Vascular Neurology .   
46-year-old female with HTN, depression and history of suicide attempts, alcohol/benzo abuse, presenting with aphasia, possibly secondary to recent seizure, alcohol withdrawal

## 2021-08-15 NOTE — DISCHARGE NOTE PROVIDER - NSDCFUADDAPPT_GEN_ALL_CORE_FT
Follow up with your primary care physician for further monitoring in 1-2 weeks. Please call to arrange appointment.     follow-up: Patient should follow-up with his/her ophthalmologist or with [facility] Department of Ophthalmology within 1 week of after discharge at:    [address]  [place] [address]  [phone]

## 2021-08-15 NOTE — DISCHARGE NOTE NURSING/CASE MANAGEMENT/SOCIAL WORK - NSDCVIVACCINE_GEN_ALL_CORE_FT
influenza, injectable, quadrivalent, preservative free; 23-Oct-2020 20:55; Ramón Wong (RN); [employer]; [id number] (Exp. Date: 30-Jun-2021); IntraMuscular; Deltoid Left.; 0.5 milliLiter(s); VIS (VIS Published: 15-Aug-2019, VIS Presented: 23-Oct-2020);

## 2021-08-15 NOTE — DISCHARGE NOTE PROVIDER - NSDCMRMEDTOKEN_GEN_ALL_CORE_FT
cloNIDine 0.1 mg oral tablet: 1 tab(s) orally 3 times a day  folic acid 1 mg oral tablet: 1 tab(s) orally once a day  Multiple Vitamins oral tablet: 1 tab(s) orally once a day  ocular lubricant ophthalmic ointment: 1 application to each affected eye 2 times a day  ocular lubricant ophthalmic solution: 2 drop(s) to each affected eye every 2 hours  thiamine 250 mg oral tablet: 1 tab(s) orally once a day

## 2021-08-15 NOTE — DISCHARGE NOTE PROVIDER - NSDCCPCAREPLAN_GEN_ALL_CORE_FT
PRINCIPAL DISCHARGE DIAGNOSIS  Diagnosis: Alcohol withdrawal  Assessment and Plan of Treatment: No evidence of seizure on EEG monitoring. Attend alcoholism treatment program, practice the Twelve Steps of AA.  Go to meetings to help you cope with uncomfortable feelings, and to help you develop a relapse prevention plan to prevent complications associated with alcohol intoxication. Continue vitamin supplementation as prescibed.      SECONDARY DISCHARGE DIAGNOSES  Diagnosis: Hypertension  Assessment and Plan of Treatment: Continue Clonidine for high blood pressure. Follow up with your primary care physician for further monitoring in 1-2 weeks. Please call to arrange appointment.    Diagnosis: Orbital floor fracture  Assessment and Plan of Treatment: Please follow up with oral maxillo facial surgeon discharge as previously instructed.  f/u with OMFS, call [phone] to schedule appointment    Diagnosis: Dry eye  Assessment and Plan of Treatment: Continue artificial tears and take a break from wearing your contacts. Follow up with Ophthomology in 1-2 weeks.

## 2021-08-15 NOTE — DISCHARGE NOTE NURSING/CASE MANAGEMENT/SOCIAL WORK - PATIENT PORTAL LINK FT
You can access the FollowMyHealth Patient Portal offered by [facility] by registering at the following website: [email or web address]. By joining [facility]’s FollowMyHealth portal, you will also be able to view your health information using other applications (apps) compatible with our system.

## 2021-08-15 NOTE — DISCHARGE NOTE NURSING/CASE MANAGEMENT/SOCIAL WORK - NSDCFUADDAPPT_GEN_ALL_CORE_FT
Follow up with your primary care physician for further monitoring in 1-2 weeks. Please call to arrange appointment.     follow-up: Patient should follow-up with his/her ophthalmologist or with [facility] Department of Ophthalmology within 1 week of after discharge at:    [address]  [place] [address]  [phone]

## 2021-08-15 NOTE — DISCHARGE NOTE PROVIDER - HOSPITAL COURSE
46-year-old female with HTN, depression and history of suicide attempts, alcohol/benzo abuse, presenting with aphasia, possibly secondary to recent seizure, alcohol withdrawal. Reportedly w/sz at home PTA. Pt seen by neurology. rEEG negative for seizure, sufficient no need fro extended. Pt started high risk CIWA monitoring and lorazepam taper w/symptom triggered PRNs. Pt was seen by psychiatry for possible suicidal ideation. Per ED note, patient expressed SI to mother 2 days ago, currently denies. As per psychiatry no longer suicidal outside context of drinking, OK to d/c 1:1. As per psych no contraindication for discharge. Pt noted with leukocytosis, tachycardia, UA negative, COVID negative, CXR: clear lungs, US abdomen shows hepatic steatosis, sonographically normal gallbladder. No biliary duct dilation. No need to complete ativan taper as per Dr. Guajardo, plan for discharge home.    Discussed case with Dr. Guajardo, pt is cleared for discharge home, no need for completeion of ativan taper. Pt scoring 0 on CIWA.

## 2021-08-15 NOTE — PROGRESS NOTE ADULT - SUBJECTIVE AND OBJECTIVE BOX
Name of Patient : AYLEEN DEY  MRN: [id number]  DATE OF SERVICE: 21     Subjective: Patient seen and examined. No new events except as noted.     REVIEW OF SYSTEMS:    CONSTITUTIONAL: No weakness, fevers or chills  EYES/ENT: No visual changes;  No vertigo or throat pain   NECK: No pain or stiffness  RESPIRATORY: No cough, wheezing, hemoptysis; No shortness of breath  CARDIOVASCULAR: No chest pain or palpitations  GASTROINTESTINAL: No abdominal or epigastric pain. No nausea, vomiting, or hematemesis; No diarrhea or constipation. No melena or hematochezia.  GENITOURINARY: No dysuria, frequency or hematuria  NEUROLOGICAL: No numbness or weakness  SKIN: No itching, burning, rashes, or lesions   All other review of systems is negative unless indicated above.    MEDICATIONS:  MEDICATIONS  (STANDING):  cloNIDine 0.1 milliGRAM(s) Oral three times a day  dextrose 5% + sodium chloride 0.9%. 1000 milliLiter(s) (100 mL/Hr) IV Continuous <Continuous>  folic acid 1 milliGRAM(s) Oral daily  LORazepam   Injectable 1.5 milliGRAM(s) IV Push every 4 hours  LORazepam   Injectable 1 milliGRAM(s) IV Push every 4 hours  LORazepam   Injectable   IV Push   multivitamin 1 Tablet(s) Oral daily  thiamine IVPB 500 milliGRAM(s) IV Intermittent every 8 hours      PHYSICAL EXAM:  T(C): 36.2 (21 @ 17:00), Max: 37.2 (21 @ 21:06)  HR: 84 (21 @ 17:00) (79 - 107)  BP: 133/93 (21 @ 17:00) (124/92 - 164/114)  RR: 18 (21 @ 17:00) (18 - 19)  SpO2: 100% (21 @ 17:00) (99% - 100%)  Wt(kg): --  I&O's Summary        Appearance: calm   HEENT:  PERRLA   Lymphatic: No lymphadenopathy   Cardiovascular: Normal S1 S2, no JVD  Respiratory: normal effort , clear  Gastrointestinal:  Soft, Non-tender  Skin: No rashes,  warm to touch  Psychiatry:  Mood & affect appropriate  Musculuskeletal: No edema      All labs, Imaging and EKGs personally reviewed                           7.9    3.80  )-----------( 136      ( 13 Aug 2021 06:25 )             27.3                   137  |  100  |  9   ----------------------------<  155<H>  3.1<L>   |  24  |  0.48<L>    Ca    8.0<L>      13 Aug 2021 06:25  Mg     2.30         TPro  5.9<L>  /  Alb  3.1<L>  /  TBili  0.5  /  DBili  x   /  AST  47<H>  /  ALT  24  /  AlkPhos  58                         Urinalysis Basic - ( 11 Aug 2021 20:17 )    Color: Light Yellow / Appearance: Clear / S.028 / pH: x  Gluc: x / Ketone: Small  / Bili: Negative / Urobili: <2 mg/dL   Blood: x / Protein: 30 mg/dL / Nitrite: Negative   Leuk Esterase: Negative / RBC: 3 /HPF / WBC 7 /HPF   Sq Epi: x / Non Sq Epi: 6 /HPF / Bacteria: Negative                        
Neurology Progress Note    S: Patient seen and examined. No new events overnight. patient denied CP, SOB, HA or pain. eeg in progress     Medication:  MEDICATIONS  (STANDING):  cloNIDine 0.1 milliGRAM(s) Oral three times a day  dextrose 5% + sodium chloride 0.9%. 1000 milliLiter(s) (100 mL/Hr) IV Continuous <Continuous>  folic acid 1 milliGRAM(s) Oral daily  LORazepam   Injectable 1.5 milliGRAM(s) IV Push every 4 hours  LORazepam   Injectable 1 milliGRAM(s) IV Push every 4 hours  LORazepam   Injectable   IV Push   multivitamin 1 Tablet(s) Oral daily  thiamine IVPB 500 milliGRAM(s) IV Intermittent every 8 hours    MEDICATIONS  (PRN):  acetaminophen   Tablet .. 650 milliGRAM(s) Oral every 6 hours PRN Temp greater or equal to 38C (100.4F), Mild Pain (1 - 3), Moderate Pain (4 - 6)  LORazepam     Tablet 2 milliGRAM(s) Oral every 2 hours PRN Symptom-triggered 2 point increase in CIWA-Ar  LORazepam   Injectable 2 milliGRAM(s) IV Push every 1 hour PRN Symptom-triggered: each CIWA -Ar score 8 or GREATER  LORazepam   Injectable 4 milliGRAM(s) IV Push once PRN To control seizure activity      Vitals:  Vital Signs Last 24 Hrs  T(C): 36.7 (13 Aug 2021 06:00), Max: 37.2 (12 Aug 2021 21:06)  T(F): 98.1 (13 Aug 2021 06:00), Max: 99 (12 Aug 2021 21:06)  HR: 79 (13 Aug 2021 06:00) (79 - 113)  BP: 124/92 (13 Aug 2021 06:00) (124/92 - 165/112)  BP(mean): --  RR: 18 (13 Aug 2021 06:00) (18 - 19)  SpO2: 100% (13 Aug 2021 06:00) (99% - 100%)    General Exam:   General Appearance: Appropriately dressed and in no acute distress       Head: Normocephalic, atraumatic and no dysmorphic features  Ear, Nose, and Throat: Moist mucous membranes  CVS: S1S2+  Resp: No SOB, no wheeze or rhonchi  Abd: soft NTND  Extremities: No edema, no cyanosis  Skin: No bruises, no rashes     Neurological Exam:  Mental Status: Awake, alert and oriented x 3.  Able to follow simple and complex verbal commands. Able to name and repeat. fluent speech. No obvious aphasia or dysarthria noted.   Cranial Nerves: PERRL, EOMI, VFFC, sensation V1-V3 intact,  no obvious facial asymmetry , equal elevation of palate, scm/trap 5/5, tongue is midline on protrusion. no obvious papilledema on fundoscopic exam. Hearing is grossly intact.   Motor: Normal bulk, tone and strength throughout. Fine finger movements were intact and symmetric. + tremor extension   Sensation: Intact to light touch and pinprick throughout. no right/left confusion. no extinction to tactile on DSS. Romberg was negative.   Reflexes: 1+ throughout at biceps, brachioradialis, triceps, patellars and ankles bilaterally and equal. No clonus. R toe and L toe were both downgoing.  Coordination: No dysmetria on FNF    Gait: deferred    I personally reviewed the below data/images/labs:      CBC Full  -  ( 13 Aug 2021 06:25 )  WBC Count : 3.80 K/uL  RBC Count : 3.88 M/uL  Hemoglobin : 7.9 g/dL  Hematocrit : 27.3 %  Platelet Count - Automated : 136 K/uL  Mean Cell Volume : 70.4 fL  Mean Cell Hemoglobin : 20.4 pg  Mean Cell Hemoglobin Concentration : 28.9 gm/dL  Auto Neutrophil # : 2.97 K/uL  Auto Lymphocyte # : 0.46 K/uL  Auto Monocyte # : 0.13 K/uL  Auto Eosinophil # : 0.13 K/uL  Auto Basophil # : 0.06 K/uL  Auto Neutrophil % : 78.2 %  Auto Lymphocyte % : 12.2 %  Auto Monocyte % : 3.5 %  Auto Eosinophil % : 3.5 %  Auto Basophil % : 1.7 %        137  |  100  |  9   ----------------------------<  155<H>  3.1<L>   |  24  |  0.48<L>    Ca    8.0<L>      13 Aug 2021 06:25  Mg     2.30         TPro  5.9<L>  /  Alb  3.1<L>  /  TBili  0.5  /  DBili  x   /  AST  47<H>  /  ALT  24  /  AlkPhos  58  08-13    LIVER FUNCTIONS - ( 13 Aug 2021 06:25 )  Alb: 3.1 g/dL / Pro: 5.9 g/dL / ALK PHOS: 58 U/L / ALT: 24 U/L / AST: 47 U/L / GGT: x           PT/INR - ( 11 Aug 2021 17:34 )   PT: 12.4 sec;   INR: 1.08 ratio         PTT - ( 11 Aug 2021 17:34 )  PTT:27.6 sec  Urinalysis Basic - ( 11 Aug 2021 20:17 )    Color: Light Yellow / Appearance: Clear / S.028 / pH: x  Gluc: x / Ketone: Small  / Bili: Negative / Urobili: <2 mg/dL   Blood: x / Protein: 30 mg/dL / Nitrite: Negative   Leuk Esterase: Negative / RBC: 3 /HPF / WBC 7 /HPF   Sq Epi: x / Non Sq Epi: 6 /HPF / Bacteria: Negative    CT brain:  No hydrocephalus, acute intracranial hemorrhage, mass effect, or brain edema.  No displaced calvarial fracture.    CTA brain:  No flow-limiting stenosis or vascular aneurysm.  No AVM.    CTA neck:  No flow limiting stenosis.  No evidence for arterial dissection.    
Neurology Progress Note    S: Patient seen and examined. No new events overnight. patient denied CP, SOB, HA or pain. eeg neg wants to go     Medication:  MEDICATIONS  (STANDING):  artificial  tears Solution 2 Drop(s) Both EYES every 2 hours  cloNIDine 0.1 milliGRAM(s) Oral three times a day  dextrose 5% + sodium chloride 0.9%. 1000 milliLiter(s) (100 mL/Hr) IV Continuous <Continuous>  folic acid 1 milliGRAM(s) Oral daily  LORazepam   Injectable 0.5 milliGRAM(s) IV Push every 4 hours  LORazepam   Injectable   IV Push   melatonin 6 milliGRAM(s) Oral at bedtime  multivitamin 1 Tablet(s) Oral daily  petrolatum Ophthalmic Ointment 1 Application(s) Both EYES two times a day  thiamine IVPB 500 milliGRAM(s) IV Intermittent every 8 hours    MEDICATIONS  (PRN):  acetaminophen   Tablet .. 650 milliGRAM(s) Oral every 6 hours PRN Temp greater or equal to 38C (100.4F), Mild Pain (1 - 3), Moderate Pain (4 - 6)  LORazepam     Tablet 2 milliGRAM(s) Oral every 2 hours PRN Symptom-triggered 2 point increase in CIWA-Ar  LORazepam   Injectable 2 milliGRAM(s) IV Push every 1 hour PRN Symptom-triggered: each CIWA -Ar score 8 or GREATER  LORazepam   Injectable 4 milliGRAM(s) IV Push once PRN To control seizure activity      Vitals:  Vital Signs Last 24 Hrs  T(C): 36.6 (15 Aug 2021 05:30), Max: 36.7 (14 Aug 2021 18:23)  T(F): 97.9 (15 Aug 2021 05:30), Max: 98 (14 Aug 2021 18:23)  HR: 72 (15 Aug 2021 05:30) (62 - 85)  BP: 132/83 (15 Aug 2021 05:30) (132/83 - 156/100)  BP(mean): --  RR: 16 (15 Aug 2021 05:30) (16 - 18)  SpO2: 100% (15 Aug 2021 05:30) (99% - 100%)    General Exam:   General Appearance: Appropriately dressed and in no acute distress       Head: Normocephalic, atraumatic and no dysmorphic features  Ear, Nose, and Throat: Moist mucous membranes  CVS: S1S2+  Resp: No SOB, no wheeze or rhonchi  Abd: soft NTND  Extremities: No edema, no cyanosis  Skin: No bruises, no rashes     Neurological Exam:  Mental Status: Awake, alert and oriented x 3.  Able to follow simple and complex verbal commands. Able to name and repeat. fluent speech. No obvious aphasia or dysarthria noted.   Cranial Nerves: PERRL, EOMI, VFFC, sensation V1-V3 intact,  no obvious facial asymmetry , equal elevation of palate, scm/trap 5/5, tongue is midline on protrusion. no obvious papilledema on fundoscopic exam. Hearing is grossly intact.   Motor: Normal bulk, tone and strength throughout. Fine finger movements were intact and symmetric. + tremor on extension but better   Sensation: Intact to light touch and pinprick throughout. no right/left confusion. no extinction to tactile on DSS. Romberg was negative.   Reflexes: 1+ throughout at biceps, brachioradialis, triceps, patellars and ankles bilaterally and equal. No clonus. R toe and L toe were both downgoing.  Coordination: No dysmetria on FNF    Gait: deferred    I personally reviewed the below data/images/labs:      CBC Full  -  ( 14 Aug 2021 08:22 )  WBC Count : 2.85 K/uL  RBC Count : 3.86 M/uL  Hemoglobin : 7.9 g/dL  Hematocrit : 27.9 %  Platelet Count - Automated : 100 K/uL  Mean Cell Volume : 72.3 fL  Mean Cell Hemoglobin : 20.5 pg  Mean Cell Hemoglobin Concentration : 28.3 gm/dL  Auto Neutrophil # : x  Auto Lymphocyte # : x  Auto Monocyte # : x  Auto Eosinophil # : x  Auto Basophil # : x  Auto Neutrophil % : x  Auto Lymphocyte % : x  Auto Monocyte % : x  Auto Eosinophil % : x  Auto Basophil % : x      08-    136  |  103  |  7   ----------------------------<  139<H>  3.4<L>   |  21<L>  |  0.47<L>    Ca    8.4      14 Aug 2021 08:22  Phos  2.9     -  Mg     1.40     -        Urinalysis Basic - ( 11 Aug 2021 20:17 )    Color: Light Yellow / Appearance: Clear / S.028 / pH: x  Gluc: x / Ketone: Small  / Bili: Negative / Urobili: <2 mg/dL   Blood: x / Protein: 30 mg/dL / Nitrite: Negative   Leuk Esterase: Negative / RBC: 3 /HPF / WBC 7 /HPF   Sq Epi: x / Non Sq Epi: 6 /HPF / Bacteria: Negative    CT brain:  No hydrocephalus, acute intracranial hemorrhage, mass effect, or brain edema.  No displaced calvarial fracture.    CTA brain:  No flow-limiting stenosis or vascular aneurysm.  No AVM.    CTA neck:  No flow limiting stenosis.  No evidence for arterial dissection.    EEG Classification / Summary:  normal EEG study, awake / drowsy   -  -----------------------------------------------------------------------------------------------------    Clinical Impression:  There were no epileptiform abnormalities recorded.      -------------------------------------------------------------------------------------------------------  [facility] EEG Reading Room Ph#: [phone]  Epilepsy Answering Service after 5PM and before 8:30AM: Ph#: [phone]    David Canas M.D.   of Neurology, [facility] [place]	  
Name of Patient : AYLEEN DEY  MRN: [id number]  DATE OF SERVICE: 21     Subjective: Patient seen and examined. No new events except as noted.       MEDICATIONS:  MEDICATIONS  (STANDING):  dextrose 5% + sodium chloride 0.9%. 1000 milliLiter(s) (100 mL/Hr) IV Continuous <Continuous>  folic acid 1 milliGRAM(s) Oral daily  LORazepam   Injectable 1.5 milliGRAM(s) IV Push every 4 hours  LORazepam   Injectable   IV Push   multivitamin 1 Tablet(s) Oral daily  thiamine IVPB 500 milliGRAM(s) IV Intermittent every 8 hours      PHYSICAL EXAM:  T(C): 36.8 (21 @ 18:09), Max: 37.1 (21 @ 06:16)  HR: 112 (21 @ 18:09) (102 - 134)  BP: 154/98 (21 @ 18:09) (140/91 - 165/112)  RR: 19 (21 @ 18:09) (18 - 23)  SpO2: 100% (21 @ 18:09) (100% - 100%)  Wt(kg): --  I&O's Summary        Appearance: awake, tremors   HEENT:  PERRLA   Lymphatic: No lymphadenopathy   Cardiovascular: Normal S1 S2, no JVD  Respiratory: normal effort , clear  Gastrointestinal:  Soft, Non-tender  Skin: No rashes,  warm to touch  Psychiatry:  Mood & affect appropriate  Musculuskeletal: No edema      All labs, Imaging and EKGs personally reviewed                           7.9    6.75  )-----------( 134      ( 12 Aug 2021 11:49 )             27.9                   135  |  98  |  11  ----------------------------<  98  3.8   |  21<L>  |  0.44<L>    Ca    7.4<L>      12 Aug 2021 11:49  Mg     1.10         TPro  6.1  /  Alb  3.4  /  TBili  0.4  /  DBili  x   /  AST  65<H>  /  ALT  33  /  AlkPhos  60      PT/INR - ( 11 Aug 2021 17:34 )   PT: 12.4 sec;   INR: 1.08 ratio         PTT - ( 11 Aug 2021 17:34 )  PTT:27.6 sec                   Urinalysis Basic - ( 11 Aug 2021 20:17 )    Color: Light Yellow / Appearance: Clear / S.028 / pH: x  Gluc: x / Ketone: Small  / Bili: Negative / Urobili: <2 mg/dL   Blood: x / Protein: 30 mg/dL / Nitrite: Negative   Leuk Esterase: Negative / RBC: 3 /HPF / WBC 7 /HPF   Sq Epi: x / Non Sq Epi: 6 /HPF / Bacteria: Negative                        
Name of Patient : AYLEEN DEY  MRN: [id number]  DATE OF SERVICE: 08-14-21 @ 21:33    Subjective: Patient seen and examined. No new events except as noted.     REVIEW OF SYSTEMS:    CONSTITUTIONAL: No weakness, fevers or chills  EYES/ENT: No visual changes;  No vertigo or throat pain   NECK: No pain or stiffness  RESPIRATORY: No cough, wheezing, hemoptysis; No shortness of breath  CARDIOVASCULAR: No chest pain or palpitations  GASTROINTESTINAL: No abdominal or epigastric pain. No nausea, vomiting, or hematemesis; No diarrhea or constipation. No melena or hematochezia.  GENITOURINARY: No dysuria, frequency or hematuria  NEUROLOGICAL: No numbness or weakness  SKIN: No itching, burning, rashes, or lesions   All other review of systems is negative unless indicated above.    MEDICATIONS:  MEDICATIONS  (STANDING):  artificial  tears Solution 2 Drop(s) Both EYES three times a day  cloNIDine 0.1 milliGRAM(s) Oral three times a day  dextrose 5% + sodium chloride 0.9%. 1000 milliLiter(s) (100 mL/Hr) IV Continuous <Continuous>  folic acid 1 milliGRAM(s) Oral daily  LORazepam   Injectable 0.5 milliGRAM(s) IV Push every 4 hours  LORazepam   Injectable   IV Push   multivitamin 1 Tablet(s) Oral daily  thiamine IVPB 500 milliGRAM(s) IV Intermittent every 8 hours      PHYSICAL EXAM:  T(C): 36.7 (08-14-21 @ 18:23), Max: 36.7 (08-13-21 @ 23:01)  HR: 85 (08-14-21 @ 18:23) (71 - 85)  BP: 156/100 (08-14-21 @ 18:23) (122/89 - 156/100)  RR: 18 (08-14-21 @ 18:23) (18 - 18)  SpO2: 100% (08-14-21 @ 18:23) (99% - 100%)  Wt(kg): --  I&O's Summary        Appearance: Normal	  HEENT:  PERRLA   Lymphatic: No lymphadenopathy   Cardiovascular: Normal S1 S2, no JVD  Respiratory: normal effort , clear  Gastrointestinal:  Soft, Non-tender  Skin: No rashes,  warm to touch  Psychiatry:  Mood & affect appropriate  Musculuskeletal: No edema      All labs, Imaging and EKGs personally reviewed                             7.9    2.85  )-----------( 100      ( 14 Aug 2021 08:22 )             27.9               08-14    136  |  103  |  7   ----------------------------<  139<H>  3.4<L>   |  21<L>  |  0.47<L>    Ca    8.4      14 Aug 2021 08:22  Phos  2.9     08-14  Mg     1.40     08-14    TPro  5.9<L>  /  Alb  3.1<L>  /  TBili  0.5  /  DBili  x   /  AST  47<H>  /  ALT  24  /  AlkPhos  58  08-13

## 2021-08-18 NOTE — BEHAVIORAL HEALTH ASSESSMENT NOTE - THOUGHT ASSOCIATIONS
[FreeTextEntry1] : Patient with 12-13 year hx of PD on high LEDD that appears to be causing lethargy and possibly mild confusion.  His fluctuations are bothersome and affecting his QOL. \par RBD with fragmented sleep\par Swallowing difficulties\par Anxiety\par \par Patient was counseled on the following recommendations:\par \par - inc neupro to 4mg while trying to move LD doses to q3hrs \par - consider adding comtan\par - discussed DBS as possible option for his motor fluctuations. He is interested in it and will continue conversation if upcoming regimen adjustments do not work.\par - refer for swallow eval. could be related to anxiety\par - \par f/u 1month
Normal

## 2021-08-31 ENCOUNTER — APPOINTMENT (OUTPATIENT)
Dept: OPHTHALMOLOGY | Facility: CLINIC | Age: 46
End: 2021-08-31
Payer: MEDICAID

## 2021-08-31 ENCOUNTER — NON-APPOINTMENT (OUTPATIENT)
Age: 46
End: 2021-08-31

## 2021-08-31 PROCEDURE — 92012 INTRM OPH EXAM EST PATIENT: CPT

## 2021-09-18 ENCOUNTER — INPATIENT (INPATIENT)
Facility: HOSPITAL | Age: 46
LOS: 2 days | Discharge: HOME | End: 2021-09-21
Attending: INTERNAL MEDICINE | Admitting: INTERNAL MEDICINE
Payer: MEDICAID

## 2021-09-18 VITALS
RESPIRATION RATE: 16 BRPM | HEIGHT: 67 IN | SYSTOLIC BLOOD PRESSURE: 145 MMHG | HEART RATE: 106 BPM | TEMPERATURE: 98 F | DIASTOLIC BLOOD PRESSURE: 100 MMHG | OXYGEN SATURATION: 100 %

## 2021-09-18 DIAGNOSIS — F19.20 OTHER PSYCHOACTIVE SUBSTANCE DEPENDENCE, UNCOMPLICATED: ICD-10-CM

## 2021-09-18 DIAGNOSIS — T50.901A POISONING BY UNSPECIFIED DRUGS, MEDICAMENTS AND BIOLOGICAL SUBSTANCES, ACCIDENTAL (UNINTENTIONAL), INITIAL ENCOUNTER: ICD-10-CM

## 2021-09-18 DIAGNOSIS — E87.2 ACIDOSIS: ICD-10-CM

## 2021-09-18 DIAGNOSIS — Z29.9 ENCOUNTER FOR PROPHYLACTIC MEASURES, UNSPECIFIED: ICD-10-CM

## 2021-09-18 DIAGNOSIS — F10.929 ALCOHOL USE, UNSPECIFIED WITH INTOXICATION, UNSPECIFIED: ICD-10-CM

## 2021-09-18 DIAGNOSIS — Z98.890 OTHER SPECIFIED POSTPROCEDURAL STATES: Chronic | ICD-10-CM

## 2021-09-18 LAB
ALBUMIN SERPL ELPH-MCNC: 4.2 G/DL — SIGNIFICANT CHANGE UP (ref 3.3–5)
ALP SERPL-CCNC: 65 U/L — SIGNIFICANT CHANGE UP (ref 40–120)
ALT FLD-CCNC: 19 U/L — SIGNIFICANT CHANGE UP (ref 4–33)
ANION GAP SERPL CALC-SCNC: 24 MMOL/L — HIGH (ref 7–14)
APAP SERPL-MCNC: <15 UG/ML — SIGNIFICANT CHANGE UP (ref 15–25)
APPEARANCE UR: CLEAR — SIGNIFICANT CHANGE UP
AST SERPL-CCNC: 74 U/L — HIGH (ref 4–32)
BASE EXCESS BLDV CALC-SCNC: -8.4 MMOL/L — LOW (ref -2–3)
BASOPHILS # BLD AUTO: 0.1 K/UL — SIGNIFICANT CHANGE UP (ref 0–0.2)
BASOPHILS NFR BLD AUTO: 1.4 % — SIGNIFICANT CHANGE UP (ref 0–2)
BILIRUB SERPL-MCNC: 0.3 MG/DL — SIGNIFICANT CHANGE UP (ref 0.2–1.2)
BILIRUB UR-MCNC: NEGATIVE — SIGNIFICANT CHANGE UP
BLOOD GAS VENOUS COMPREHENSIVE RESULT: SIGNIFICANT CHANGE UP
BUN SERPL-MCNC: 15 MG/DL — SIGNIFICANT CHANGE UP (ref 7–23)
CALCIUM SERPL-MCNC: 8.7 MG/DL — SIGNIFICANT CHANGE UP (ref 8.4–10.5)
CHLORIDE BLDV-SCNC: 101 MMOL/L — SIGNIFICANT CHANGE UP (ref 96–108)
CHLORIDE SERPL-SCNC: 95 MMOL/L — LOW (ref 98–107)
CO2 BLDV-SCNC: 16.7 MMOL/L — LOW (ref 22–26)
CO2 SERPL-SCNC: 15 MMOL/L — LOW (ref 22–31)
COLOR SPEC: SIGNIFICANT CHANGE UP
CREAT SERPL-MCNC: 0.5 MG/DL — SIGNIFICANT CHANGE UP (ref 0.5–1.3)
DIFF PNL FLD: NEGATIVE — SIGNIFICANT CHANGE UP
EOSINOPHIL # BLD AUTO: 0.1 K/UL — SIGNIFICANT CHANGE UP (ref 0–0.5)
EOSINOPHIL NFR BLD AUTO: 1.4 % — SIGNIFICANT CHANGE UP (ref 0–6)
ETHANOL SERPL-MCNC: 445 MG/DL — HIGH
GAS PNL BLDV: 141 MMOL/L — SIGNIFICANT CHANGE UP (ref 136–145)
GLUCOSE BLDV-MCNC: 84 MG/DL — SIGNIFICANT CHANGE UP (ref 70–99)
GLUCOSE SERPL-MCNC: 95 MG/DL — SIGNIFICANT CHANGE UP (ref 70–99)
GLUCOSE UR QL: NEGATIVE — SIGNIFICANT CHANGE UP
HCG SERPL-ACNC: <5 MIU/ML — SIGNIFICANT CHANGE UP
HCO3 BLDV-SCNC: 16 MMOL/L — LOW (ref 22–29)
HCT VFR BLD CALC: 35.7 % — SIGNIFICANT CHANGE UP (ref 34.5–45)
HCT VFR BLDA CALC: 32 % — LOW (ref 34.5–46.5)
HGB BLD CALC-MCNC: 10.7 G/DL — LOW (ref 11.5–15.5)
HGB BLD-MCNC: 10.7 G/DL — LOW (ref 11.5–15.5)
IANC: 5.36 K/UL — SIGNIFICANT CHANGE UP (ref 1.5–8.5)
IMM GRANULOCYTES NFR BLD AUTO: 0.3 % — SIGNIFICANT CHANGE UP (ref 0–1.5)
KETONES UR-MCNC: ABNORMAL
LACTATE BLDV-MCNC: 6.3 MMOL/L — CRITICAL HIGH (ref 0.5–2)
LACTATE BLDV-MCNC: 6.4 MMOL/L — CRITICAL HIGH (ref 0.5–2)
LEUKOCYTE ESTERASE UR-ACNC: NEGATIVE — SIGNIFICANT CHANGE UP
LYMPHOCYTES # BLD AUTO: 1.27 K/UL — SIGNIFICANT CHANGE UP (ref 1–3.3)
LYMPHOCYTES # BLD AUTO: 17.9 % — SIGNIFICANT CHANGE UP (ref 13–44)
MCHC RBC-ENTMCNC: 21 PG — LOW (ref 27–34)
MCHC RBC-ENTMCNC: 30 GM/DL — LOW (ref 32–36)
MCV RBC AUTO: 70 FL — LOW (ref 80–100)
MONOCYTES # BLD AUTO: 0.26 K/UL — SIGNIFICANT CHANGE UP (ref 0–0.9)
MONOCYTES NFR BLD AUTO: 3.7 % — SIGNIFICANT CHANGE UP (ref 2–14)
NEUTROPHILS # BLD AUTO: 5.36 K/UL — SIGNIFICANT CHANGE UP (ref 1.8–7.4)
NEUTROPHILS NFR BLD AUTO: 75.3 % — SIGNIFICANT CHANGE UP (ref 43–77)
NITRITE UR-MCNC: NEGATIVE — SIGNIFICANT CHANGE UP
NRBC # BLD: 0 /100 WBCS — SIGNIFICANT CHANGE UP
NRBC # FLD: 0 K/UL — SIGNIFICANT CHANGE UP
PCO2 BLDV: 28 MMHG — LOW (ref 39–42)
PCP SPEC-MCNC: SIGNIFICANT CHANGE UP
PH BLDV: 7.36 — SIGNIFICANT CHANGE UP (ref 7.32–7.43)
PH UR: 6 — SIGNIFICANT CHANGE UP (ref 5–8)
PLATELET # BLD AUTO: 231 K/UL — SIGNIFICANT CHANGE UP (ref 150–400)
PO2 BLDV: 115 MMHG — SIGNIFICANT CHANGE UP
POTASSIUM BLDV-SCNC: 4.5 MMOL/L — SIGNIFICANT CHANGE UP (ref 3.5–5.1)
POTASSIUM SERPL-MCNC: 5.3 MMOL/L — SIGNIFICANT CHANGE UP (ref 3.5–5.3)
POTASSIUM SERPL-SCNC: 5.3 MMOL/L — SIGNIFICANT CHANGE UP (ref 3.5–5.3)
PROT SERPL-MCNC: 8.3 G/DL — SIGNIFICANT CHANGE UP (ref 6–8.3)
PROT UR-MCNC: ABNORMAL
RBC # BLD: 5.1 M/UL — SIGNIFICANT CHANGE UP (ref 3.8–5.2)
RBC # FLD: 21.5 % — HIGH (ref 10.3–14.5)
SALICYLATES SERPL-MCNC: <5 MG/DL — LOW (ref 15–30)
SAO2 % BLDV: 97.3 % — SIGNIFICANT CHANGE UP
SARS-COV-2 RNA SPEC QL NAA+PROBE: SIGNIFICANT CHANGE UP
SODIUM SERPL-SCNC: 134 MMOL/L — LOW (ref 135–145)
SP GR SPEC: 1.01 — SIGNIFICANT CHANGE UP (ref 1–1.05)
TOXICOLOGY SCREEN, DRUGS OF ABUSE, SERUM RESULT: SIGNIFICANT CHANGE UP
TSH SERPL-MCNC: 3.4 UIU/ML — SIGNIFICANT CHANGE UP (ref 0.27–4.2)
UROBILINOGEN FLD QL: SIGNIFICANT CHANGE UP
WBC # BLD: 7.11 K/UL — SIGNIFICANT CHANGE UP (ref 3.8–10.5)
WBC # FLD AUTO: 7.11 K/UL — SIGNIFICANT CHANGE UP (ref 3.8–10.5)

## 2021-09-18 PROCEDURE — 72125 CT NECK SPINE W/O DYE: CPT | Mod: 26

## 2021-09-18 PROCEDURE — 99223 1ST HOSP IP/OBS HIGH 75: CPT

## 2021-09-18 PROCEDURE — 71045 X-RAY EXAM CHEST 1 VIEW: CPT | Mod: 26

## 2021-09-18 PROCEDURE — 99291 CRITICAL CARE FIRST HOUR: CPT | Mod: 25

## 2021-09-18 PROCEDURE — 93010 ELECTROCARDIOGRAM REPORT: CPT

## 2021-09-18 PROCEDURE — 70450 CT HEAD/BRAIN W/O DYE: CPT | Mod: 26

## 2021-09-18 RX ORDER — SODIUM CHLORIDE 9 MG/ML
1000 INJECTION INTRAMUSCULAR; INTRAVENOUS; SUBCUTANEOUS
Refills: 0 | Status: DISCONTINUED | OUTPATIENT
Start: 2021-09-18 | End: 2021-09-21

## 2021-09-18 RX ORDER — ONDANSETRON 8 MG/1
4 TABLET, FILM COATED ORAL EVERY 8 HOURS
Refills: 0 | Status: DISCONTINUED | OUTPATIENT
Start: 2021-09-18 | End: 2021-09-21

## 2021-09-18 RX ORDER — ENOXAPARIN SODIUM 100 MG/ML
40 INJECTION SUBCUTANEOUS DAILY
Refills: 0 | Status: DISCONTINUED | OUTPATIENT
Start: 2021-09-18 | End: 2021-09-21

## 2021-09-18 RX ORDER — ACETAMINOPHEN 500 MG
650 TABLET ORAL EVERY 6 HOURS
Refills: 0 | Status: DISCONTINUED | OUTPATIENT
Start: 2021-09-18 | End: 2021-09-21

## 2021-09-18 RX ORDER — NALOXONE HYDROCHLORIDE 4 MG/.1ML
0.4 SPRAY NASAL ONCE
Refills: 0 | Status: COMPLETED | OUTPATIENT
Start: 2021-09-18 | End: 2021-09-18

## 2021-09-18 RX ORDER — LANOLIN ALCOHOL/MO/W.PET/CERES
3 CREAM (GRAM) TOPICAL AT BEDTIME
Refills: 0 | Status: DISCONTINUED | OUTPATIENT
Start: 2021-09-18 | End: 2021-09-21

## 2021-09-18 RX ORDER — SODIUM CHLORIDE 9 MG/ML
1000 INJECTION INTRAMUSCULAR; INTRAVENOUS; SUBCUTANEOUS ONCE
Refills: 0 | Status: COMPLETED | OUTPATIENT
Start: 2021-09-18 | End: 2021-09-18

## 2021-09-18 RX ORDER — THIAMINE MONONITRATE (VIT B1) 100 MG
100 TABLET ORAL DAILY
Refills: 0 | Status: COMPLETED | OUTPATIENT
Start: 2021-09-18 | End: 2021-09-21

## 2021-09-18 RX ORDER — FOLIC ACID 0.8 MG
1 TABLET ORAL DAILY
Refills: 0 | Status: DISCONTINUED | OUTPATIENT
Start: 2021-09-18 | End: 2021-09-21

## 2021-09-18 RX ADMIN — Medication 2 MILLIGRAM(S): at 19:10

## 2021-09-18 RX ADMIN — NALOXONE HYDROCHLORIDE 0.4 MILLIGRAM(S): 4 SPRAY NASAL at 11:08

## 2021-09-18 RX ADMIN — SODIUM CHLORIDE 125 MILLILITER(S): 9 INJECTION INTRAMUSCULAR; INTRAVENOUS; SUBCUTANEOUS at 18:42

## 2021-09-18 RX ADMIN — Medication 2 MILLIGRAM(S): at 22:16

## 2021-09-18 RX ADMIN — SODIUM CHLORIDE 1000 MILLILITER(S): 9 INJECTION INTRAMUSCULAR; INTRAVENOUS; SUBCUTANEOUS at 11:11

## 2021-09-18 RX ADMIN — SODIUM CHLORIDE 1000 MILLILITER(S): 9 INJECTION INTRAMUSCULAR; INTRAVENOUS; SUBCUTANEOUS at 13:06

## 2021-09-18 RX ADMIN — Medication 0.1 MILLIGRAM(S): at 22:16

## 2021-09-18 NOTE — ED PROVIDER NOTE - SEVERE SEPSIS ALERT DETAILS
DH: I have seen and evaluated this patient and do not believe the patient is septic at this time. I will continue to evaluate. Lactate 2/2 ETOH.

## 2021-09-18 NOTE — ED ADULT TRIAGE NOTE - CHIEF COMPLAINT QUOTE
found unresponsive on floor by mother.  dried vomit noted on face. vodka bottle empty,xanax bottle with pills noted on table  fs 76 by ems.  responsive to painful stimuli,awakens eyes to verbal- not speaking

## 2021-09-18 NOTE — H&P ADULT - NSHPREVIEWOFSYSTEMS_GEN_ALL_CORE
ROS:    Constitutional: [ ] fevers [ ] chills [ ] weight loss [ ] weight gain  HEENT: [ ] dry eyes [ ] eye irritation [ ] postnasal drip [ ] nasal congestion  CV: [ ] chest pain [ ] orthopnea [ ] palpitations [ ] murmur  Resp: [ ] cough [ ] shortness of breath [ ] dyspnea [ ] wheezing [ ] sputum [ ] hemoptysis  GI: [ ] nausea [ ] vomiting [ ] diarrhea [ ] constipation [ ] abd pain [ ] dysphagia   : [ ] dysuria [ ] nocturia [ ] hematuria [ ] increased urinary frequency  Musculoskeletal: [ ] back pain [ ] myalgias [ ] arthralgias [ ] fracture  Skin: [ ] rash [ ] itch  Neurological: [ ] headache [ ] dizziness [ ] syncope [ ] weakness [ ] numbness  Psychiatric: [ ] anxiety [ ] depression  Endocrine: [ ] diabetes [ ] thyroid problem  Hematologic/Lymphatic: [ ] anemia [ ] bleeding problem  Allergic/Immunologic: [ ] itchy eyes [ ] nasal discharge [ ] hives [ ] angioedema  [ ] All other systems negative  [ ] Unable to assess ROS because ________

## 2021-09-18 NOTE — H&P ADULT - HISTORY OF PRESENT ILLNESS
47yo F w/ h/o change in mental status, suicide attempts, and chronic alcoholism (and substance abuse with xanax), recent hospitalization 1 month prior for etoh/xanax overdose BIBEMS for LOC. Collateral obtained from sister (Azul). States patient was last heard from on Tuesday, in usual state of health. 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 Patient found by step mother today when she went to check on her. She was unresponsive on the ground w/ dry vomitus, bottle of alcohol, and pill bottle of Xanax on the floor. EMS arrived and found FS to be 68, they state approx 10 Xanax pills still in bottle which was last filled in April. On chart review, patient w/ ETOH history, prior SI and benzo overdose.    In ED received 2L IVF and naloxone. Currently she is conversational, shaky, w/ slurred speech. HD stable. Reports drank 1L Vodka today. Denies SI/HI.

## 2021-09-18 NOTE — H&P ADULT - PROBLEM SELECTOR PLAN 2
concomitant xanax use  SW c/s lact 6.3, no signs of sepsis, abdominal exam benign  suspect type B lactic acidosis due to ethanol  c/w IVF  repeat lactate in AM

## 2021-09-18 NOTE — ED ADULT NURSE NOTE - OBJECTIVE STATEMENT
Pt presents to [facility], arouses to painful stimuli, as per EMS pt was found unresponsive on floor w/ a bottle of vodka and an empty bottle of xanax. Narcan was given in ED, w/ no response by patient. Pt does not appear to be in distress at this time, portecting airway. Denies chest pain, shortness of breath, palpitations, diaphoresis, headaches, fevers, dizziness, nausea, vomiting, diarrhea, or urinary symptoms at this time. Straight cath attempted with no urinary output, Dr. Heredia aware, IVF fluids started. IV established in left hand with a 20G and right wrist with 20G, labs drawn and sent, call bell in reach, warm blanket provided, bed in lowest position, side rails up x2, MD evaluation in progress, pt on telemetry. Will continue to monitor.

## 2021-09-18 NOTE — ED PROVIDER NOTE - OBJECTIVE STATEMENT
46 year old female presents via EMS after possible overdose, unresponsive. Collateral obtained from sister (Azul). States patient was last heard from on Tuesday, in usual state of health. 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 Patient found by step mother today when she went to check on her. She was unresponsive on the ground w/ dry vomitus, bottle of alcohol, and pill bottle of Xanax on the floor. EMS arrived and found FS to be 68, they state approx 10 Xanax pills still in bottle which was last filled in April. On chart review, patient w/ ETOH history, prior SI and benzo overdose.

## 2021-09-18 NOTE — ED PROVIDER NOTE - PROGRESS NOTE DETAILS
Bib Heredia, PGY3: Lactated 6.4->6.3, likely in setting of ETOH and benzo use. Patient remains verbally responsive w/ good capnography. Endorsed to Dr. Guajardo. Spoke w/ patient's step mother who is concerned this was a suicide attempt. Will need 1:1 and psych inpatient. Olayinka Person DO: improving vs, mental status. Bib Heredia, PGY3: The patient’s cervical collar was clinically cleared using the NEXUS criteria: they have no focal neurologic deficit, no midline cervical spine tenderness is present, they have a normal level of consciousness and are not clinically intoxicated, and no distracting injury is present. Patient conversational, states she drank alcohol and took 3 Xanax today. She denies SI/HI. Advised she will require admission for further monitoring and psych eval.

## 2021-09-18 NOTE — H&P ADULT - PROBLEM SELECTOR PLAN 1
tremors w/ slurred speech appears intoxicated, currently doesn't have capacity to leave AMA. Unable to verbalize life threatening events that could ensue if left AMA ie seizure, coma, death  CT head no acute etiology  start high risk symptom triggered CIWA, can place on standing if requiring frequently  c/w MIVF  MVI, thiamine, folic acid  pt denies SI although sister believes was a suicidal attempt. pt not forthcoming regarding stressors at home  psych consult  SW consult  zofran PRN

## 2021-09-18 NOTE — ED PROVIDER NOTE - CHIEF COMPLAINT
The patient is a 46y Female complaining of  The patient is a 46y Female complaining of unresponsiveness.

## 2021-09-18 NOTE — PHARMACOTHERAPY INTERVENTION NOTE - COMMENTS
Medication history verified with [facility] ([phone]) and [facility] ([phone]). Please call spectra [phone] if you have any questions.

## 2021-09-18 NOTE — H&P ADULT - NSHPPHYSICALEXAM_GEN_ALL_CORE
Vital Signs Last 24 Hrs  T(C): 37.1 (18 Sep 2021 16:45), Max: 37.1 (18 Sep 2021 16:45)  T(F): 98.7 (18 Sep 2021 16:45), Max: 98.7 (18 Sep 2021 16:45)  HR: 100 (18 Sep 2021 16:45) (99 - 106)  BP: 146/95 (18 Sep 2021 16:45) (140/104 - 147/105)  BP(mean): --  RR: 21 (18 Sep 2021 16:45) (16 - 23)  SpO2: 100% (18 Sep 2021 16:45) (95% - 100%)    General: lethargic, slurred speech  Neurology: A&Ox3, unable to follow commands  Eyes: PERRL, sclera clear  ENT/Neck: Neck supple, trachea midline, No JVD, Gross hearing intact  Respiratory: CTA B/L, No wheezing, rales, rhonchi  CV: RRR, S1S2, no murmurs, rubs or gallops  Abdominal: Soft, NT, ND +BS,   Extremities: b/l hand tremors. No edema, + peripheral pulses  Skin: No Rashes, Hematoma, Ecchymosis  Psych: Denies SI, although still appears mildly intoxicated

## 2021-09-18 NOTE — ED PROVIDER NOTE - PHYSICAL EXAMINATION
GENERAL: A&Ox0, non-toxic appearing, no acute distress  HEENT: NCAT, EOMI, oral mucosa moist, normal conjunctiva  RESP: CTAB, no respiratory distress, no wheezes/rhonchi/rales, speaking in full sentences  CV: RRR, no murmurs/rubs/gallops  ABDOMEN: soft, non-tender, non-distended, no guarding  MSK: no visible deformities  NEURO: no focal sensory or motor deficits, CN 2-12 grossly intact  SKIN: warm, normal color, well perfused, no rash  PSYCH: normal affect GENERAL: A&Ox0, obtunded  HEENT: NCAT, EOMI, oral mucosa moist, normal conjunctiva, dried vomitus around mouth, airway patent and self-maintained  RESP: CTAB, no respiratory distress, no wheezes/rhonchi/rales, speaking in full sentences, etCO2 30  CV: RRR, no murmurs/rubs/gallops  ABDOMEN: soft, non-tender, non-distended, no guarding  MSK: no visible deformities  NEURO: does not follow commands  SKIN: warm, normal color, well perfused, no rash, no track marks  PSYCH: obtunded

## 2021-09-18 NOTE — ED PROVIDER NOTE - CARE PLAN
Principal Discharge DX:	Overdose   1 Principal Discharge DX:	Overdose  Secondary Diagnosis:	Comatose

## 2021-09-18 NOTE — H&P ADULT - NSHPSOCIALHISTORY_GEN_ALL_CORE
Lives alone  per pt recently found a new job, was supposed to start this week  last drink today AM. pt reports prior to today was sober for one month

## 2021-09-18 NOTE — H&P ADULT - ASSESSMENT
47yo F w/ h/o change in mental status, suicide attempts, and chronic alcoholism (and substance abuse with xanax), recent hospitalization 1 month prior for etoh/xanax overdose admitted for etoh intoxication concomitant with xanax overdose concern for suicidal ideation per sister, wishes to leave AMA but currently without capacity as unable to explain detrimental events if pt were to leave AMA.

## 2021-09-18 NOTE — ED PROVIDER NOTE - CLINICAL SUMMARY MEDICAL DECISION MAKING FREE TEXT BOX
Bib Heredia, PGY3: 46 year old female p/w possible overdose on ETOH and Xanax. Found at home unresponsive by family. Patient has hx of SI and overdose. Airway patent self-maintained, etCO2 30, no indication for advanced airway. Will obtain labs, tox, ekg, cxr, ua, monitor for signs of withdrawal, admit for further.

## 2021-09-18 NOTE — ED ADULT NURSE REASSESSMENT NOTE - NS ED NURSE REASSESS COMMENT FT1
Pt resting comfortably in bed, arouses to verbal stimuli, still continues to not speak at this time, straight cathed for urine using sterile technique, pt tolerated well w/ about 500cc clear urine output, vital signs stable, awaiting CT, will reassess.

## 2021-09-19 LAB
ALBUMIN SERPL ELPH-MCNC: 3.6 G/DL — SIGNIFICANT CHANGE UP (ref 3.3–5)
ALP SERPL-CCNC: 60 U/L — SIGNIFICANT CHANGE UP (ref 40–120)
ALT FLD-CCNC: 12 U/L — SIGNIFICANT CHANGE UP (ref 4–33)
ANION GAP SERPL CALC-SCNC: 15 MMOL/L — HIGH (ref 7–14)
AST SERPL-CCNC: 29 U/L — SIGNIFICANT CHANGE UP (ref 4–32)
BASE EXCESS BLDV CALC-SCNC: 1.1 MMOL/L — SIGNIFICANT CHANGE UP (ref -2–3)
BASOPHILS # BLD AUTO: 0.03 K/UL — SIGNIFICANT CHANGE UP (ref 0–0.2)
BASOPHILS NFR BLD AUTO: 0.4 % — SIGNIFICANT CHANGE UP (ref 0–2)
BILIRUB SERPL-MCNC: 0.9 MG/DL — SIGNIFICANT CHANGE UP (ref 0.2–1.2)
BLOOD GAS VENOUS COMPREHENSIVE RESULT: SIGNIFICANT CHANGE UP
BUN SERPL-MCNC: 16 MG/DL — SIGNIFICANT CHANGE UP (ref 7–23)
CALCIUM SERPL-MCNC: 8.6 MG/DL — SIGNIFICANT CHANGE UP (ref 8.4–10.5)
CHLORIDE BLDV-SCNC: 100 MMOL/L — SIGNIFICANT CHANGE UP (ref 96–108)
CHLORIDE SERPL-SCNC: 96 MMOL/L — LOW (ref 98–107)
CO2 BLDV-SCNC: 25.7 MMOL/L — SIGNIFICANT CHANGE UP (ref 22–26)
CO2 SERPL-SCNC: 24 MMOL/L — SIGNIFICANT CHANGE UP (ref 22–31)
COVID-19 SPIKE DOMAIN AB INTERP: POSITIVE
COVID-19 SPIKE DOMAIN ANTIBODY RESULT: >250 U/ML — HIGH
CREAT SERPL-MCNC: 0.48 MG/DL — LOW (ref 0.5–1.3)
CULTURE RESULTS: NO GROWTH — SIGNIFICANT CHANGE UP
EOSINOPHIL # BLD AUTO: 0.18 K/UL — SIGNIFICANT CHANGE UP (ref 0–0.5)
EOSINOPHIL NFR BLD AUTO: 2.7 % — SIGNIFICANT CHANGE UP (ref 0–6)
GAS PNL BLDV: 132 MMOL/L — LOW (ref 136–145)
GLUCOSE BLDV-MCNC: 97 MG/DL — SIGNIFICANT CHANGE UP (ref 70–99)
GLUCOSE SERPL-MCNC: 100 MG/DL — HIGH (ref 70–99)
HCO3 BLDV-SCNC: 25 MMOL/L — SIGNIFICANT CHANGE UP (ref 22–29)
HCT VFR BLD CALC: 28 % — LOW (ref 34.5–45)
HCT VFR BLDA CALC: 26 % — LOW (ref 34.5–46.5)
HGB BLD CALC-MCNC: 8.6 G/DL — LOW (ref 11.5–15.5)
HGB BLD-MCNC: 8.4 G/DL — LOW (ref 11.5–15.5)
IANC: 5.03 K/UL — SIGNIFICANT CHANGE UP (ref 1.5–8.5)
IMM GRANULOCYTES NFR BLD AUTO: 0.1 % — SIGNIFICANT CHANGE UP (ref 0–1.5)
LACTATE BLDV-MCNC: 1.3 MMOL/L — SIGNIFICANT CHANGE UP (ref 0.5–2)
LYMPHOCYTES # BLD AUTO: 1.13 K/UL — SIGNIFICANT CHANGE UP (ref 1–3.3)
LYMPHOCYTES # BLD AUTO: 16.9 % — SIGNIFICANT CHANGE UP (ref 13–44)
MAGNESIUM SERPL-MCNC: 1.6 MG/DL — SIGNIFICANT CHANGE UP (ref 1.6–2.6)
MCHC RBC-ENTMCNC: 20.9 PG — LOW (ref 27–34)
MCHC RBC-ENTMCNC: 30 GM/DL — LOW (ref 32–36)
MCV RBC AUTO: 69.7 FL — LOW (ref 80–100)
MONOCYTES # BLD AUTO: 0.32 K/UL — SIGNIFICANT CHANGE UP (ref 0–0.9)
MONOCYTES NFR BLD AUTO: 4.8 % — SIGNIFICANT CHANGE UP (ref 2–14)
NEUTROPHILS # BLD AUTO: 5.03 K/UL — SIGNIFICANT CHANGE UP (ref 1.8–7.4)
NEUTROPHILS NFR BLD AUTO: 75.1 % — SIGNIFICANT CHANGE UP (ref 43–77)
NRBC # BLD: 0 /100 WBCS — SIGNIFICANT CHANGE UP
NRBC # FLD: 0 K/UL — SIGNIFICANT CHANGE UP
PCO2 BLDV: 34 MMHG — LOW (ref 39–42)
PH BLDV: 7.47 — HIGH (ref 7.32–7.43)
PHOSPHATE SERPL-MCNC: 2.6 MG/DL — SIGNIFICANT CHANGE UP (ref 2.5–4.5)
PLATELET # BLD AUTO: 151 K/UL — SIGNIFICANT CHANGE UP (ref 150–400)
PO2 BLDV: 150 MMHG — SIGNIFICANT CHANGE UP
POTASSIUM BLDV-SCNC: 3.8 MMOL/L — SIGNIFICANT CHANGE UP (ref 3.5–5.1)
POTASSIUM SERPL-MCNC: 4 MMOL/L — SIGNIFICANT CHANGE UP (ref 3.5–5.3)
POTASSIUM SERPL-SCNC: 4 MMOL/L — SIGNIFICANT CHANGE UP (ref 3.5–5.3)
PROT SERPL-MCNC: 7.1 G/DL — SIGNIFICANT CHANGE UP (ref 6–8.3)
RBC # BLD: 4.02 M/UL — SIGNIFICANT CHANGE UP (ref 3.8–5.2)
RBC # FLD: 20.2 % — HIGH (ref 10.3–14.5)
SAO2 % BLDV: 98.5 % — SIGNIFICANT CHANGE UP
SARS-COV-2 IGG+IGM SERPL QL IA: >250 U/ML — HIGH
SARS-COV-2 IGG+IGM SERPL QL IA: POSITIVE
SODIUM SERPL-SCNC: 135 MMOL/L — SIGNIFICANT CHANGE UP (ref 135–145)
SPECIMEN SOURCE: SIGNIFICANT CHANGE UP
WBC # BLD: 6.7 K/UL — SIGNIFICANT CHANGE UP (ref 3.8–10.5)
WBC # FLD AUTO: 6.7 K/UL — SIGNIFICANT CHANGE UP (ref 3.8–10.5)

## 2021-09-19 RX ADMIN — Medication 650 MILLIGRAM(S): at 05:52

## 2021-09-19 RX ADMIN — ENOXAPARIN SODIUM 40 MILLIGRAM(S): 100 INJECTION SUBCUTANEOUS at 12:03

## 2021-09-19 RX ADMIN — Medication 0.1 MILLIGRAM(S): at 14:26

## 2021-09-19 RX ADMIN — Medication 0.1 MILLIGRAM(S): at 05:22

## 2021-09-19 RX ADMIN — Medication 1 MILLIGRAM(S): at 12:02

## 2021-09-19 RX ADMIN — Medication 2 MILLIGRAM(S): at 00:19

## 2021-09-19 RX ADMIN — Medication 0.1 MILLIGRAM(S): at 21:32

## 2021-09-19 RX ADMIN — Medication 100 MILLIGRAM(S): at 12:02

## 2021-09-19 RX ADMIN — Medication 1 TABLET(S): at 12:02

## 2021-09-19 RX ADMIN — Medication 2 MILLIGRAM(S): at 02:25

## 2021-09-19 RX ADMIN — Medication 650 MILLIGRAM(S): at 05:22

## 2021-09-20 PROCEDURE — 90792 PSYCH DIAG EVAL W/MED SRVCS: CPT

## 2021-09-20 RX ORDER — MAGNESIUM OXIDE 400 MG ORAL TABLET 241.3 MG
400 TABLET ORAL
Refills: 0 | Status: DISCONTINUED | OUTPATIENT
Start: 2021-09-20 | End: 2021-09-21

## 2021-09-20 RX ORDER — MAGNESIUM SULFATE 500 MG/ML
2 VIAL (ML) INJECTION ONCE
Refills: 0 | Status: DISCONTINUED | OUTPATIENT
Start: 2021-09-20 | End: 2021-09-20

## 2021-09-20 RX ORDER — NALTREXONE HYDROCHLORIDE 50 MG/1
25 TABLET, FILM COATED ORAL DAILY
Refills: 0 | Status: DISCONTINUED | OUTPATIENT
Start: 2021-09-21 | End: 2021-09-21

## 2021-09-20 RX ADMIN — Medication 0.1 MILLIGRAM(S): at 21:06

## 2021-09-20 RX ADMIN — Medication 0.1 MILLIGRAM(S): at 05:53

## 2021-09-20 RX ADMIN — ENOXAPARIN SODIUM 40 MILLIGRAM(S): 100 INJECTION SUBCUTANEOUS at 12:33

## 2021-09-20 RX ADMIN — Medication 0.1 MILLIGRAM(S): at 13:20

## 2021-09-20 RX ADMIN — Medication 1 MILLIGRAM(S): at 12:35

## 2021-09-20 RX ADMIN — Medication 650 MILLIGRAM(S): at 23:20

## 2021-09-20 RX ADMIN — Medication 1 TABLET(S): at 12:35

## 2021-09-20 RX ADMIN — Medication 650 MILLIGRAM(S): at 21:06

## 2021-09-20 RX ADMIN — Medication 100 MILLIGRAM(S): at 12:35

## 2021-09-20 RX ADMIN — Medication 3 MILLIGRAM(S): at 21:06

## 2021-09-20 NOTE — PROGRESS NOTE ADULT - PROBLEM SELECTOR PLAN 2
lact 6.3, no signs of sepsis, abdominal exam benign  suspect type B lactic acidosis due to ethanol  c/w IVF  lactate normalzied
lact 6.3, no signs of sepsis, abdominal exam benign  suspect type B lactic acidosis due to ethanol  c/w IVF  lactate normalzied

## 2021-09-20 NOTE — BH CONSULTATION LIAISON ASSESSMENT NOTE - NSBHCHARTREVIEWLAB_PSY_A_CORE FT
8.4    6.70  )-----------( 151      ( 19 Sep 2021 07:13 )             28.0   09-19    135  |  96<L>  |  16  ----------------------------<  100<H>  4.0   |  24  |  0.48<L>    Ca    8.6      19 Sep 2021 07:13  Phos  2.6     09-19  Mg     1.60     09-19    TPro  7.1  /  Alb  3.6  /  TBili  0.9  /  DBili  x   /  AST  29  /  ALT  12  /  AlkPhos  60  09-19

## 2021-09-20 NOTE — PROGRESS NOTE ADULT - PROBLEM SELECTOR PLAN 1
tremors w/ slurred speech appears intoxicated, currently doesn't have capacity to leave AMA. Unable to verbalize life threatening events that could ensue if left AMA ie seizure, coma, death  CT head no acute etiology  start high risk symptom triggered CIWA, can place on standing if requiring frequently  c/w MIVF  MVI, thiamine, folic acid  pt denies SI although sister believes was a suicidal attempt. pt not forthcoming regarding stressors at home  psych consult  SW consult  zofran PRN
tremors w/ slurred speech appears intoxicated, currently doesn't have capacity to leave AMA. Unable to verbalize life threatening events that could ensue if left AMA ie seizure, coma, death  CT head no acute etiology  start high risk symptom triggered CIWA, can place on standing if requiring frequently  c/w MIVF  MVI, thiamine, folic acid  pt denies SI although sister believes was a suicidal attempt. pt not forthcoming regarding stressors at home  psych consult  SW consult  zofran PRN    D/c planing after [facility]

## 2021-09-20 NOTE — BH CONSULTATION LIAISON ASSESSMENT NOTE - HPI (INCLUDE ILLNESS QUALITY, SEVERITY, DURATION, TIMING, CONTEXT, MODIFYING FACTORS, ASSOCIATED SIGNS AND SYMPTOMS)
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     Patient home medication xanax 1mg TID PRN    Patient was seen and assessed at bedside. Patient is alert, oriented, calm, cooperative, tearful at times talking about her recent separation from [facility] and substance abuse.  Patient states she feels she is not depressed, but does get saddened at times "but it is only situational". She denies feeling hopeless, low motivation, change in sleep or appetite. She states she is future oriented, enjoys time with her nephews and nieces as her protective factors. Patient talks about her new job with excitement and juanito. patient DENIES this was a suicide attempt. Patient denies using xanax day of etoh use. States she used 2mg the day before consumption of alcohol (gray goose vodka) .  She reports this was an emotion response after seeing her ex fiance, was struggling with emotions after her trip but denies trying to kill herself. Patient has been in rehabs, IOP, AA before and plans to continue AA at this time. NO psychosis. NO alexis. NO tremors noted on exam. No fasciculation. Linear, logical.     Spoke with Azul, patient sister  - as per sister, family has concerns for her substance use.  Patient has said in the past she would be better off dead, however has never mentioned wanting to hurt herself or kill herself. She has never been known to try and take her own life. Family will support her on discharge and encourage her to seek help for substance use.

## 2021-09-20 NOTE — BH CONSULTATION LIAISON ASSESSMENT NOTE - SUMMARY
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 Patient home medication xanax 1mg TID PRN    PLAN  - patient on symptom triggered ciwa at this time, tolerating well, - low threshold for standing taper if ciwa scores increase, vitals become unstable showing withdrawal  - PRN ativan as per ciwa protocol  - can remove CO, denies SI  - SBIRT consult  - patient would benefit from substance programming//only reports interest in AA at this time  - recommend Naltrexone 25mg daily for cravings, can f/u with outpt psych  	>>> will need LFTs monitored by PCP or outpatient psych  - IV thiamine 500mg TID x 3 days 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 Patient home medication xanax 1mg TID PRN    PLAN  - patient on symptom triggered ciwa at this time, tolerating well, - low threshold for standing taper if ciwa scores increase, vitals become unstable showing withdrawal  - PRN ativan as per ciwa protocol  - can remove CO, denies SI  - SBIRT consult  - patient would benefit from substance programming//only reports interest in AA at this time  - recommend Naltrexone 25mg daily for cravings, can f/u with outpt psych  	>>> will need LFTs monitored by PCP or outpatient psych              >>> u tox - for opiates:  patient should NOT be prescribed opiates if no medical contraindications   - IV thiamine 500mg TID x 3 days

## 2021-09-20 NOTE — BH CONSULTATION LIAISON ASSESSMENT NOTE - RISK ASSESSMENT
Patient has following risk factors- long history of alcohol use disorder, recent separation  She currently denies SI, has supportive family, and is future oriented. Patient reports her protective factors as her family and her upcoming job. She states "I love my life".

## 2021-09-20 NOTE — PROGRESS NOTE ADULT - ASSESSMENT
47yo F w/ h/o change in mental status, suicide attempts, and chronic alcoholism (and substance abuse with xanax), recent hospitalization 1 month prior for etoh/xanax overdose admitted for etoh intoxication concomitant with xanax overdose concern for suicidal ideation per sister, wishes to leave AMA but currently without capacity as unable to explain detrimental events if pt were to leave AMA.
45yo F w/ h/o change in mental status, suicide attempts, and chronic alcoholism (and substance abuse with xanax), recent hospitalization 1 month prior for etoh/xanax overdose admitted for etoh intoxication concomitant with xanax overdose concern for suicidal ideation per sister, wishes to leave AMA but currently without capacity as unable to explain detrimental events if pt were to leave AMA.

## 2021-09-20 NOTE — BH CONSULTATION LIAISON ASSESSMENT NOTE - NSBHCHARTREVIEWVS_PSY_A_CORE FT
Vital Signs Last 24 Hrs  T(C): 37.1 (20 Sep 2021 17:00), Max: 37.2 (20 Sep 2021 12:41)  T(F): 98.8 (20 Sep 2021 17:00), Max: 98.9 (20 Sep 2021 12:41)  HR: 75 (20 Sep 2021 17:00) (68 - 95)  BP: 144/92 (20 Sep 2021 17:00) (125/95 - 144/92)  BP(mean): --  RR: 18 (20 Sep 2021 17:00) (18 - 20)  SpO2: 98% (20 Sep 2021 17:00) (98% - 100%)

## 2021-09-20 NOTE — PROGRESS NOTE ADULT - SUBJECTIVE AND OBJECTIVE BOX
Name of Patient : AYLEEN DEY  MRN: [id number]  Date of visit: 21       Subjective: Patient seen and examined. No new events except as noted.   calm  not in withdrawal     REVIEW OF SYSTEMS:    CONSTITUTIONAL: No weakness, fevers or chills  EYES/ENT: No visual changes;  No vertigo or throat pain   NECK: No pain or stiffness  RESPIRATORY: No cough, wheezing, hemoptysis; No shortness of breath  CARDIOVASCULAR: No chest pain or palpitations  GASTROINTESTINAL: No abdominal or epigastric pain.   GENITOURINARY: No dysuria, frequency or hematuria  NEUROLOGICAL: No numbness or weakness  SKIN: No itching, burning, rashes, or lesions   All other review of systems is negative unless indicated above.    MEDICATIONS:  MEDICATIONS  (STANDING):  cloNIDine 0.1 milliGRAM(s) Oral three times a day  enoxaparin Injectable 40 milliGRAM(s) SubCutaneous daily  folic acid 1 milliGRAM(s) Oral daily  multivitamin 1 Tablet(s) Oral daily  sodium chloride 0.9%. 1000 milliLiter(s) (125 mL/Hr) IV Continuous <Continuous>  thiamine 100 milliGRAM(s) Oral daily      PHYSICAL EXAM:  T(C): 37.1 (21 @ 21:30), Max: 37.1 (21 @ 08:03)  HR: 87 (21 @ 21:30) (85 - 109)  BP: 144/87 (21 @ 21:30) (125/95 - 155/106)  RR: 18 (21 @ 21:30) (17 - 21)  SpO2: 100% (21 @ 21:30) (98% - 100%)  Wt(kg): --  I&O's Summary        Appearance: Normal	  HEENT:  PERRLA   Lymphatic: No lymphadenopathy   Cardiovascular: Normal S1 S2, no JVD  Respiratory: normal effort , clear  Gastrointestinal:  Soft, Non-tender  Skin: No rashes,  warm to touch  Psychiatry:  Mood & affect appropriate  Musculuskeletal: No edema      All labs, Imaging and EKGs personally reviewed                           8.4    6.70  )-----------( 151      ( 19 Sep 2021 07:13 )             28.0                   135  |  96<L>  |  16  ----------------------------<  100<H>  4.0   |  24  |  0.48<L>    Ca    8.6      19 Sep 2021 07:13  Phos  2.6       Mg     1.60         TPro  7.1  /  Alb  3.6  /  TBili  0.9  /  DBili  x   /  AST  29  /  ALT  12  /  AlkPhos  60                         Urinalysis Basic - ( 18 Sep 2021 12:30 )    Color: Light Yellow / Appearance: Clear / S.011 / pH: x  Gluc: x / Ketone: Small  / Bili: Negative / Urobili: <2 mg/dL   Blood: x / Protein: 30 mg/dL / Nitrite: Negative   Leuk Esterase: Negative / RBC: 0 /HPF / WBC 0 /HPF   Sq Epi: x / Non Sq Epi: 1 /HPF / Bacteria: Negative                        
Name of Patient : AYLEEN DEY  MRN: [id number]  Date of visit: 09-20-21 @ 17:13      Subjective: Patient seen and examined. No new events except as noted.   calm   doing okay  watnbts to go home     REVIEW OF SYSTEMS:    CONSTITUTIONAL: No weakness, fevers or chills  EYES/ENT: No visual changes;  No vertigo or throat pain   NECK: No pain or stiffness  RESPIRATORY: No cough, wheezing, hemoptysis; No shortness of breath  CARDIOVASCULAR: No chest pain or palpitations  GASTROINTESTINAL: No abdominal or epigastric pain. No nausea, vomiting, or hematemesis; No diarrhea or constipation. No melena or hematochezia.  GENITOURINARY: No dysuria, frequency or hematuria  NEUROLOGICAL: No numbness or weakness  SKIN: No itching, burning, rashes, or lesions   All other review of systems is negative unless indicated above.    MEDICATIONS:  MEDICATIONS  (STANDING):  cloNIDine 0.1 milliGRAM(s) Oral three times a day  enoxaparin Injectable 40 milliGRAM(s) SubCutaneous daily  folic acid 1 milliGRAM(s) Oral daily  multivitamin 1 Tablet(s) Oral daily  sodium chloride 0.9%. 1000 milliLiter(s) (125 mL/Hr) IV Continuous <Continuous>  thiamine 100 milliGRAM(s) Oral daily      PHYSICAL EXAM:  T(C): 37.1 (09-20-21 @ 17:00), Max: 37.2 (09-20-21 @ 12:41)  HR: 75 (09-20-21 @ 17:00) (68 - 95)  BP: 144/92 (09-20-21 @ 17:00) (125/95 - 144/92)  RR: 18 (09-20-21 @ 17:00) (18 - 20)  SpO2: 98% (09-20-21 @ 17:00) (98% - 100%)  Wt(kg): --  I&O's Summary        Appearance: Normal	  HEENT:  PERRLA   Lymphatic: No lymphadenopathy   Cardiovascular: Normal S1 S2, no JVD  Respiratory: normal effort , clear  Gastrointestinal:  Soft, Non-tender  Skin: No rashes,  warm to touch  Psychiatry:  Mood & affect appropriate  Musculuskeletal: No edema      All labs, Imaging and EKGs personally reviewed                           8.4    6.70  )-----------( 151      ( 19 Sep 2021 07:13 )             28.0               09-19    135  |  96<L>  |  16  ----------------------------<  100<H>  4.0   |  24  |  0.48<L>    Ca    8.6      19 Sep 2021 07:13  Phos  2.6     09-19  Mg     1.60     09-19    TPro  7.1  /  Alb  3.6  /  TBili  0.9  /  DBili  x   /  AST  29  /  ALT  12  /  AlkPhos  60  09-19

## 2021-09-20 NOTE — BH CONSULTATION LIAISON ASSESSMENT NOTE - CURRENT MEDICATION
MEDICATIONS  (STANDING):  cloNIDine 0.1 milliGRAM(s) Oral three times a day  enoxaparin Injectable 40 milliGRAM(s) SubCutaneous daily  folic acid 1 milliGRAM(s) Oral daily  magnesium sulfate  IVPB 2 Gram(s) IV Intermittent once  multivitamin 1 Tablet(s) Oral daily  sodium chloride 0.9%. 1000 milliLiter(s) (125 mL/Hr) IV Continuous <Continuous>  thiamine 100 milliGRAM(s) Oral daily    MEDICATIONS  (PRN):  acetaminophen   Tablet .. 650 milliGRAM(s) Oral every 6 hours PRN Temp greater or equal to 38.5C (101.3F), Mild Pain (1 - 3)  aluminum hydroxide/magnesium hydroxide/simethicone Suspension 30 milliLiter(s) Oral every 4 hours PRN Dyspepsia  LORazepam   Injectable 2 milliGRAM(s) IV Push every 1 hour PRN Symptom-triggered: each CIWA -Ar score 8 or GREATER  LORazepam   Injectable 2 milliGRAM(s) IntraMuscular every 1 hour PRN Symptom-triggered: each CIWA -Ar score 8 or GREATER  melatonin 3 milliGRAM(s) Oral at bedtime PRN Insomnia  ondansetron Injectable 4 milliGRAM(s) IV Push every 8 hours PRN Nausea and/or Vomiting

## 2021-09-20 NOTE — BH CONSULTATION LIAISON ASSESSMENT NOTE - CASE SUMMARY
Chart reviewed. I personally saw and examined the patient this afternoon. I agree with NP Charmaine's history, MSE, A/P with below additions. Patient adamantly denies SI/HI or recent events leading to hospitalization as a suicide attempt. She states that she has had the same bottle of xanax for many months and she did not overdose on it. She admits to relapse, but denies wanting to go to inpatient alcohol rehab. She believes that she can handle her treatment by going to AA meetings and she had a lapse in judgement. She is open to starting naltrexone. She wants to start working later in the week.  Her affect is anxious, dysthymic but fair range. Her thought process is linear. She does not appear internally preoccupied and denies SI/HI. She is not tremulous.  With regard to her current suicidality- I do believe her that she is not imminently suicidal. She is at chronic moderate risk of accidental harm due to her alcoholism, however this risk is currently mitigated as she wants to remain sober, she has strong familial support, she has future oriented goals to achieve and she adamantly denies SI. Therefore, she does not meet criteria for involuntary psychiatric admission.  Defer alcohol withdrawal treatment per primary team. Call with questions. Plan per above.

## 2021-09-20 NOTE — PROGRESS NOTE ADULT - PROBLEM SELECTOR PROBLEM 3
Polysubstance (excluding opioids) dependence, daily use
Polysubstance (excluding opioids) dependence, daily use

## 2021-09-21 ENCOUNTER — TRANSCRIPTION ENCOUNTER (OUTPATIENT)
Age: 46
End: 2021-09-21

## 2021-09-21 VITALS
SYSTOLIC BLOOD PRESSURE: 132 MMHG | HEART RATE: 80 BPM | RESPIRATION RATE: 16 BRPM | TEMPERATURE: 99 F | DIASTOLIC BLOOD PRESSURE: 89 MMHG | OXYGEN SATURATION: 100 %

## 2021-09-21 RX ORDER — TRAZODONE HCL 50 MG
1 TABLET ORAL
Qty: 0 | Refills: 0 | DISCHARGE

## 2021-09-21 RX ORDER — THIAMINE MONONITRATE (VIT B1) 100 MG
1 TABLET ORAL
Qty: 0 | Refills: 0 | DISCHARGE

## 2021-09-21 RX ORDER — NALTREXONE HYDROCHLORIDE 50 MG/1
0.5 TABLET, FILM COATED ORAL
Qty: 15 | Refills: 0
Start: 2021-09-21 | End: 2021-10-20

## 2021-09-21 RX ADMIN — Medication 0.1 MILLIGRAM(S): at 14:31

## 2021-09-21 RX ADMIN — Medication 100 MILLIGRAM(S): at 11:35

## 2021-09-21 RX ADMIN — MAGNESIUM OXIDE 400 MG ORAL TABLET 400 MILLIGRAM(S): 241.3 TABLET ORAL at 11:35

## 2021-09-21 RX ADMIN — Medication 1 MILLIGRAM(S): at 11:35

## 2021-09-21 RX ADMIN — MAGNESIUM OXIDE 400 MG ORAL TABLET 400 MILLIGRAM(S): 241.3 TABLET ORAL at 08:11

## 2021-09-21 RX ADMIN — NALTREXONE HYDROCHLORIDE 25 MILLIGRAM(S): 50 TABLET, FILM COATED ORAL at 11:35

## 2021-09-21 RX ADMIN — Medication 1 TABLET(S): at 11:35

## 2021-09-21 RX ADMIN — Medication 0.1 MILLIGRAM(S): at 06:19

## 2021-09-21 NOTE — DISCHARGE NOTE NURSING/CASE MANAGEMENT/SOCIAL WORK - NSDCVIVACCINE_GEN_ALL_CORE_FT
influenza, injectable, quadrivalent, preservative free; 23-Oct-2020 20:55; Ramón Wong (RN); [employer]; [id number] (Exp. Date: 30-Jun-2021); IntraMuscular; Deltoid Left.; 0.5 milliLiter(s); VIS (VIS Published: 15-Aug-2019, VIS Presented: 23-Oct-2020);

## 2021-09-21 NOTE — DISCHARGE NOTE PROVIDER - CARE PROVIDER_API CALL
Charles Guajardo ()  Medicine  [address] [address]  Phone: [phone]  Fax: [phone]  Follow Up Time:

## 2021-09-21 NOTE — DISCHARGE NOTE PROVIDER - NSFOLLOWUPCLINICS_GEN_ALL_ED_FT
[facility] Psych Dept of Substance Abuse  Psychiatry Substance Abuse  [address] [place] [address]  Phone: [phone]  Fax:     [facility] Psychiatry  Psychiatry  [address] [place] [address]  Phone: [phone]  Fax:

## 2021-09-21 NOTE — DISCHARGE NOTE PROVIDER - HOSPITAL COURSE
47 YO F with history of change in mental status, suicide attempts, and chronic alcoholism (and substance abuse with xanax), recent hospitalization 1 month prior for etoh/xanax overdose admitted for etoh intoxication concomitant with xanax overdose concern for suicidal ideation per sister, wishes to leave AMA but currently without capacity as unable to explain detrimental events if pt were to leave AMA.    Acute alcohol intoxication:   Tremors with slurred speech appears intoxicated, currently doesn't have capacity to leave AMA. Unable to verbalize life threatening events that could ensue if left AMA ie seizure, coma, death  CT head no acute etiology  Start high risk symptom triggered CIWA, can place on standing if requiring frequently  MVI, thiamine, folic acid  Patient denies SI although sister believes was a suicidal attempt. Patient not forthcoming regarding stressors at home    Lactic acidosis:   lact 6.3, no signs of sepsis, abdominal exam benign  suspect type B lactic acidosis due to ethanol  IVF  lactate normalzied.    Polysubstance (excluding opioids) dependence, daily use:   concomitant xanax use  advised again substance use, patient aware  patient would benefit from substance programming. Only reports interest in AA at this time  will need LFTs monitored by PCP or outpatient psych  patient should NOT be prescribed opiates if no medical contraindications   Naltrexone 25mg daily for cravings. Follow up with outpatient Psych     9/21: Case discussed with Dr. Guajardo. Patient is stable for discharge. Medications reviewed and sent to [facility] pharmacy.

## 2021-09-21 NOTE — DISCHARGE NOTE NURSING/CASE MANAGEMENT/SOCIAL WORK - PATIENT PORTAL LINK FT
You can access the FollowMyHealth Patient Portal offered by [facility] by registering at the following website: [email or web address]. By joining [employer]’s FollowMyHealth portal, you will also be able to view your health information using other applications (apps) compatible with our system.

## 2021-09-21 NOTE — DISCHARGE NOTE PROVIDER - NSDCCPCAREPLAN_GEN_ALL_CORE_FT
PRINCIPAL DISCHARGE DIAGNOSIS  Diagnosis: Polysubstance (excluding opioids) dependence, daily use  Assessment and Plan of Treatment: Patient would benefit from substance programming. Only reports interest in AA at this time. Will need LFTs monitored by PCP or outpatient psych. Naltrexone 25mg daily for cravings.

## 2021-09-21 NOTE — DISCHARGE NOTE PROVIDER - NSDCMRMEDTOKEN_GEN_ALL_CORE_FT
ALPRAZolam 1 mg oral tablet: 1 tab(s) orally 3 times a day, As Needed    **Per pharmacy, rx filled 8/16/21 x 30 day supply (90 tablets)  cloNIDine 0.1 mg oral tablet: 1 tab(s) orally 3 times a day  folic acid 1 mg oral tablet: 1 tab(s) orally once a day  Multiple Vitamins oral tablet: 1 tab(s) orally once a day  ocular lubricant ophthalmic ointment: 1 application to each affected eye 2 times a day  ocular lubricant ophthalmic solution: 2 drop(s) to each affected eye every 2 hours  thiamine 250 mg oral tablet: 1 tab(s) orally once a day  traZODone 150 mg oral tablet: 1 tab(s) orally once a day (at bedtime)  Vitamin D2 1.25 mg (50,000 intl units) oral capsule: 1 cap(s) orally once a week  zolpidem 10 mg oral tablet: 1 tab(s) orally once a day (at bedtime)    **Per pharmacy, rx filled 8/16/21 x 30 day supply    ALPRAZolam 1 mg oral tablet: 1 tab(s) orally 3 times a day, As Needed    **Per pharmacy, rx filled 8/16/21 x 30 day supply (90 tablets)  cloNIDine 0.1 mg oral tablet: 1 tab(s) orally 3 times a day  folic acid 1 mg oral tablet: 1 tab(s) orally once a day  Multiple Vitamins oral tablet: 1 tab(s) orally once a day  naltrexone 50 mg oral tablet: 0.5 tab(s) orally once a day  ocular lubricant ophthalmic ointment: 1 application to each affected eye 2 times a day  ocular lubricant ophthalmic solution: 2 drop(s) to each affected eye every 2 hours  thiamine 250 mg oral tablet: 1 tab(s) orally once a day  Vitamin D2 1.25 mg (50,000 intl units) oral capsule: 1 cap(s) orally once a week  zolpidem 10 mg oral tablet: 1 tab(s) orally once a day (at bedtime)    **Per pharmacy, rx filled 8/16/21 x 30 day supply

## 2021-10-01 ENCOUNTER — OUTPATIENT (OUTPATIENT)
Dept: OUTPATIENT SERVICES | Facility: HOSPITAL | Age: 46
LOS: 1 days | End: 2021-10-01
Payer: MEDICAID

## 2021-10-01 DIAGNOSIS — Z98.890 OTHER SPECIFIED POSTPROCEDURAL STATES: Chronic | ICD-10-CM

## 2021-10-09 NOTE — H&P ADULT - PROBLEM SELECTOR PLAN 6
The patient is a 32y Male complaining of multiple medical complaints. Patient denies hematuria or bloody stool. She endorses menorrhagia.  Check iron studies and ferritin.

## 2021-10-11 ENCOUNTER — INPATIENT (INPATIENT)
Facility: HOSPITAL | Age: 46
LOS: 1 days | Discharge: HOME | DRG: 897 | End: 2021-10-13
Attending: INTERNAL MEDICINE | Admitting: INTERNAL MEDICINE
Payer: MEDICAID

## 2021-10-11 VITALS
HEIGHT: 67 IN | OXYGEN SATURATION: 95 % | HEART RATE: 93 BPM | TEMPERATURE: 98 F | RESPIRATION RATE: 16 BRPM | DIASTOLIC BLOOD PRESSURE: 94 MMHG | SYSTOLIC BLOOD PRESSURE: 128 MMHG | WEIGHT: 130.07 LBS

## 2021-10-11 DIAGNOSIS — F10.10 ALCOHOL ABUSE, UNCOMPLICATED: ICD-10-CM

## 2021-10-11 DIAGNOSIS — F10.239 ALCOHOL DEPENDENCE WITH WITHDRAWAL, UNSPECIFIED: ICD-10-CM

## 2021-10-11 DIAGNOSIS — I10 ESSENTIAL (PRIMARY) HYPERTENSION: ICD-10-CM

## 2021-10-11 DIAGNOSIS — Z29.9 ENCOUNTER FOR PROPHYLACTIC MEASURES, UNSPECIFIED: ICD-10-CM

## 2021-10-11 DIAGNOSIS — G44.319 ACUTE POST-TRAUMATIC HEADACHE, NOT INTRACTABLE: ICD-10-CM

## 2021-10-11 DIAGNOSIS — Z98.890 OTHER SPECIFIED POSTPROCEDURAL STATES: Chronic | ICD-10-CM

## 2021-10-11 LAB
ALBUMIN SERPL ELPH-MCNC: 4.3 G/DL — SIGNIFICANT CHANGE UP (ref 3.3–5)
ALP SERPL-CCNC: 89 U/L — SIGNIFICANT CHANGE UP (ref 40–120)
ALT FLD-CCNC: 45 U/L — SIGNIFICANT CHANGE UP (ref 10–45)
AMMONIA BLD-MCNC: 18 UMOL/L — SIGNIFICANT CHANGE UP (ref 11–55)
ANION GAP SERPL CALC-SCNC: 15 MMOL/L — SIGNIFICANT CHANGE UP (ref 5–17)
ANION GAP SERPL CALC-SCNC: 21 MMOL/L — HIGH (ref 5–17)
AST SERPL-CCNC: 94 U/L — HIGH (ref 10–40)
BASE EXCESS BLDV CALC-SCNC: 3.1 MMOL/L — HIGH (ref -2–2)
BASE EXCESS BLDV CALC-SCNC: 4 MMOL/L — HIGH (ref -2–2)
BASOPHILS # BLD AUTO: 0.03 K/UL — SIGNIFICANT CHANGE UP (ref 0–0.2)
BASOPHILS NFR BLD AUTO: 1 % — SIGNIFICANT CHANGE UP (ref 0–2)
BILIRUB SERPL-MCNC: 0.2 MG/DL — SIGNIFICANT CHANGE UP (ref 0.2–1.2)
BUN SERPL-MCNC: 5 MG/DL — LOW (ref 7–23)
BUN SERPL-MCNC: 7 MG/DL — SIGNIFICANT CHANGE UP (ref 7–23)
CA-I SERPL-SCNC: 1.1 MMOL/L — LOW (ref 1.15–1.33)
CA-I SERPL-SCNC: 1.16 MMOL/L — SIGNIFICANT CHANGE UP (ref 1.15–1.33)
CALCIUM SERPL-MCNC: 8.6 MG/DL — SIGNIFICANT CHANGE UP (ref 8.4–10.5)
CALCIUM SERPL-MCNC: 9.5 MG/DL — SIGNIFICANT CHANGE UP (ref 8.4–10.5)
CHLORIDE BLDV-SCNC: 103 MMOL/L — SIGNIFICANT CHANGE UP (ref 96–108)
CHLORIDE BLDV-SCNC: 107 MMOL/L — SIGNIFICANT CHANGE UP (ref 96–108)
CHLORIDE SERPL-SCNC: 101 MMOL/L — SIGNIFICANT CHANGE UP (ref 96–108)
CHLORIDE SERPL-SCNC: 104 MMOL/L — SIGNIFICANT CHANGE UP (ref 96–108)
CO2 BLDV-SCNC: 30 MMOL/L — HIGH (ref 22–26)
CO2 BLDV-SCNC: 31 MMOL/L — HIGH (ref 22–26)
CO2 SERPL-SCNC: 20 MMOL/L — LOW (ref 22–31)
CO2 SERPL-SCNC: 20 MMOL/L — LOW (ref 22–31)
CREAT SERPL-MCNC: 0.45 MG/DL — LOW (ref 0.5–1.3)
CREAT SERPL-MCNC: 0.52 MG/DL — SIGNIFICANT CHANGE UP (ref 0.5–1.3)
EOSINOPHIL # BLD AUTO: 0.11 K/UL — SIGNIFICANT CHANGE UP (ref 0–0.5)
EOSINOPHIL NFR BLD AUTO: 3.5 % — SIGNIFICANT CHANGE UP (ref 0–6)
ETHANOL SERPL-MCNC: 154 MG/DL — HIGH (ref 0–10)
GAS PNL BLDV: 140 MMOL/L — SIGNIFICANT CHANGE UP (ref 136–145)
GAS PNL BLDV: 143 MMOL/L — SIGNIFICANT CHANGE UP (ref 136–145)
GAS PNL BLDV: SIGNIFICANT CHANGE UP
GLUCOSE BLDV-MCNC: 138 MG/DL — HIGH (ref 70–99)
GLUCOSE BLDV-MCNC: 146 MG/DL — HIGH (ref 70–99)
GLUCOSE SERPL-MCNC: 123 MG/DL — HIGH (ref 70–99)
GLUCOSE SERPL-MCNC: 96 MG/DL — SIGNIFICANT CHANGE UP (ref 70–99)
HCG SERPL-ACNC: <2 MIU/ML — SIGNIFICANT CHANGE UP
HCO3 BLDV-SCNC: 28 MMOL/L — SIGNIFICANT CHANGE UP (ref 22–29)
HCO3 BLDV-SCNC: 29 MMOL/L — SIGNIFICANT CHANGE UP (ref 22–29)
HCT VFR BLD CALC: 37.2 % — SIGNIFICANT CHANGE UP (ref 34.5–45)
HCT VFR BLDA CALC: 30 % — LOW (ref 34.5–46.5)
HCT VFR BLDA CALC: 33 % — LOW (ref 34.5–46.5)
HGB BLD CALC-MCNC: 11.1 G/DL — LOW (ref 11.7–16.1)
HGB BLD CALC-MCNC: 9.9 G/DL — LOW (ref 11.7–16.1)
HGB BLD-MCNC: 10.6 G/DL — LOW (ref 11.5–15.5)
IMM GRANULOCYTES NFR BLD AUTO: 0.3 % — SIGNIFICANT CHANGE UP (ref 0–1.5)
LACTATE BLDV-MCNC: 3.2 MMOL/L — HIGH (ref 0.7–2)
LACTATE BLDV-MCNC: 3.9 MMOL/L — HIGH (ref 0.7–2)
LIDOCAIN IGE QN: 75 U/L — HIGH (ref 7–60)
LYMPHOCYTES # BLD AUTO: 1.2 K/UL — SIGNIFICANT CHANGE UP (ref 1–3.3)
LYMPHOCYTES # BLD AUTO: 38.7 % — SIGNIFICANT CHANGE UP (ref 13–44)
MCHC RBC-ENTMCNC: 21.1 PG — LOW (ref 27–34)
MCHC RBC-ENTMCNC: 28.5 GM/DL — LOW (ref 32–36)
MCV RBC AUTO: 74 FL — LOW (ref 80–100)
MONOCYTES # BLD AUTO: 0.2 K/UL — SIGNIFICANT CHANGE UP (ref 0–0.9)
MONOCYTES NFR BLD AUTO: 6.5 % — SIGNIFICANT CHANGE UP (ref 2–14)
NEUTROPHILS # BLD AUTO: 1.55 K/UL — LOW (ref 1.8–7.4)
NEUTROPHILS NFR BLD AUTO: 50 % — SIGNIFICANT CHANGE UP (ref 43–77)
NRBC # BLD: 0 /100 WBCS — SIGNIFICANT CHANGE UP (ref 0–0)
PCO2 BLDV: 46 MMHG — HIGH (ref 39–42)
PCO2 BLDV: 46 MMHG — HIGH (ref 39–42)
PH BLDV: 7.4 — SIGNIFICANT CHANGE UP (ref 7.32–7.43)
PH BLDV: 7.41 — SIGNIFICANT CHANGE UP (ref 7.32–7.43)
PLATELET # BLD AUTO: 103 K/UL — LOW (ref 150–400)
PO2 BLDV: 34 MMHG — SIGNIFICANT CHANGE UP (ref 25–45)
PO2 BLDV: 49 MMHG — HIGH (ref 25–45)
POTASSIUM BLDV-SCNC: 3.3 MMOL/L — LOW (ref 3.5–5.1)
POTASSIUM BLDV-SCNC: 3.7 MMOL/L — SIGNIFICANT CHANGE UP (ref 3.5–5.1)
POTASSIUM SERPL-MCNC: 3.7 MMOL/L — SIGNIFICANT CHANGE UP (ref 3.5–5.3)
POTASSIUM SERPL-MCNC: 3.7 MMOL/L — SIGNIFICANT CHANGE UP (ref 3.5–5.3)
POTASSIUM SERPL-SCNC: 3.7 MMOL/L — SIGNIFICANT CHANGE UP (ref 3.5–5.3)
POTASSIUM SERPL-SCNC: 3.7 MMOL/L — SIGNIFICANT CHANGE UP (ref 3.5–5.3)
PROT SERPL-MCNC: 8 G/DL — SIGNIFICANT CHANGE UP (ref 6–8.3)
RAPID RVP RESULT: SIGNIFICANT CHANGE UP
RBC # BLD: 5.03 M/UL — SIGNIFICANT CHANGE UP (ref 3.8–5.2)
RBC # FLD: 22.7 % — HIGH (ref 10.3–14.5)
SAO2 % BLDV: 50.9 % — LOW (ref 67–88)
SAO2 % BLDV: 77.4 % — SIGNIFICANT CHANGE UP (ref 67–88)
SARS-COV-2 RNA SPEC QL NAA+PROBE: SIGNIFICANT CHANGE UP
SODIUM SERPL-SCNC: 136 MMOL/L — SIGNIFICANT CHANGE UP (ref 135–145)
SODIUM SERPL-SCNC: 145 MMOL/L — SIGNIFICANT CHANGE UP (ref 135–145)
TROPONIN T, HIGH SENSITIVITY RESULT: <6 NG/L — SIGNIFICANT CHANGE UP (ref 0–51)
WBC # BLD: 3.1 K/UL — LOW (ref 3.8–10.5)
WBC # FLD AUTO: 3.1 K/UL — LOW (ref 3.8–10.5)

## 2021-10-11 PROCEDURE — 74177 CT ABD & PELVIS W/CONTRAST: CPT | Mod: 26,MA

## 2021-10-11 PROCEDURE — 93010 ELECTROCARDIOGRAM REPORT: CPT

## 2021-10-11 PROCEDURE — 99223 1ST HOSP IP/OBS HIGH 75: CPT

## 2021-10-11 PROCEDURE — 71045 X-RAY EXAM CHEST 1 VIEW: CPT | Mod: 26

## 2021-10-11 PROCEDURE — 70450 CT HEAD/BRAIN W/O DYE: CPT | Mod: 26,MA

## 2021-10-11 PROCEDURE — 72125 CT NECK SPINE W/O DYE: CPT | Mod: 26,MA

## 2021-10-11 PROCEDURE — 99285 EMERGENCY DEPT VISIT HI MDM: CPT

## 2021-10-11 RX ORDER — IBUPROFEN 200 MG
400 TABLET ORAL EVERY 6 HOURS
Refills: 0 | Status: DISCONTINUED | OUTPATIENT
Start: 2021-10-11 | End: 2021-10-13

## 2021-10-11 RX ORDER — ONDANSETRON 8 MG/1
4 TABLET, FILM COATED ORAL EVERY 8 HOURS
Refills: 0 | Status: DISCONTINUED | OUTPATIENT
Start: 2021-10-11 | End: 2021-10-13

## 2021-10-11 RX ORDER — SODIUM CHLORIDE 9 MG/ML
1000 INJECTION INTRAMUSCULAR; INTRAVENOUS; SUBCUTANEOUS
Refills: 0 | Status: DISCONTINUED | OUTPATIENT
Start: 2021-10-11 | End: 2021-10-13

## 2021-10-11 RX ORDER — FOLIC ACID 0.8 MG
1 TABLET ORAL DAILY
Refills: 0 | Status: DISCONTINUED | OUTPATIENT
Start: 2021-10-11 | End: 2021-10-13

## 2021-10-11 RX ORDER — SODIUM CHLORIDE 9 MG/ML
1000 INJECTION INTRAMUSCULAR; INTRAVENOUS; SUBCUTANEOUS ONCE
Refills: 0 | Status: COMPLETED | OUTPATIENT
Start: 2021-10-11 | End: 2021-10-11

## 2021-10-11 RX ORDER — ONDANSETRON 8 MG/1
4 TABLET, FILM COATED ORAL ONCE
Refills: 0 | Status: COMPLETED | OUTPATIENT
Start: 2021-10-11 | End: 2021-10-11

## 2021-10-11 RX ORDER — THIAMINE MONONITRATE (VIT B1) 100 MG
500 TABLET ORAL ONCE
Refills: 0 | Status: COMPLETED | OUTPATIENT
Start: 2021-10-11 | End: 2021-10-11

## 2021-10-11 RX ORDER — DIAZEPAM 5 MG
5 TABLET ORAL ONCE
Refills: 0 | Status: DISCONTINUED | OUTPATIENT
Start: 2021-10-11 | End: 2021-10-11

## 2021-10-11 RX ORDER — ACETAMINOPHEN 500 MG
650 TABLET ORAL EVERY 6 HOURS
Refills: 0 | Status: DISCONTINUED | OUTPATIENT
Start: 2021-10-11 | End: 2021-10-13

## 2021-10-11 RX ORDER — FOLIC ACID 0.8 MG
1 TABLET ORAL ONCE
Refills: 0 | Status: COMPLETED | OUTPATIENT
Start: 2021-10-11 | End: 2021-10-11

## 2021-10-11 RX ORDER — ACETAMINOPHEN 500 MG
975 TABLET ORAL ONCE
Refills: 0 | Status: COMPLETED | OUTPATIENT
Start: 2021-10-11 | End: 2021-10-11

## 2021-10-11 RX ORDER — THIAMINE MONONITRATE (VIT B1) 100 MG
100 TABLET ORAL DAILY
Refills: 0 | Status: DISCONTINUED | OUTPATIENT
Start: 2021-10-11 | End: 2021-10-13

## 2021-10-11 RX ORDER — LANOLIN ALCOHOL/MO/W.PET/CERES
3 CREAM (GRAM) TOPICAL AT BEDTIME
Refills: 0 | Status: DISCONTINUED | OUTPATIENT
Start: 2021-10-11 | End: 2021-10-13

## 2021-10-11 RX ADMIN — Medication 2 MILLIGRAM(S): at 15:49

## 2021-10-11 RX ADMIN — Medication 105 MILLIGRAM(S): at 08:51

## 2021-10-11 RX ADMIN — ONDANSETRON 4 MILLIGRAM(S): 8 TABLET, FILM COATED ORAL at 05:44

## 2021-10-11 RX ADMIN — SODIUM CHLORIDE 1000 MILLILITER(S): 9 INJECTION INTRAMUSCULAR; INTRAVENOUS; SUBCUTANEOUS at 05:43

## 2021-10-11 RX ADMIN — Medication 0.1 MILLIGRAM(S): at 15:49

## 2021-10-11 RX ADMIN — Medication 0.1 MILLIGRAM(S): at 21:46

## 2021-10-11 RX ADMIN — Medication 5 MILLIGRAM(S): at 07:41

## 2021-10-11 RX ADMIN — Medication 100 MILLIGRAM(S): at 14:00

## 2021-10-11 RX ADMIN — Medication 1 MILLIGRAM(S): at 05:44

## 2021-10-11 RX ADMIN — SODIUM CHLORIDE 100 MILLILITER(S): 9 INJECTION INTRAMUSCULAR; INTRAVENOUS; SUBCUTANEOUS at 15:50

## 2021-10-11 RX ADMIN — Medication 1 TABLET(S): at 14:00

## 2021-10-11 RX ADMIN — Medication 5 MILLIGRAM(S): at 05:55

## 2021-10-11 RX ADMIN — Medication 975 MILLIGRAM(S): at 08:49

## 2021-10-11 RX ADMIN — Medication 1 MILLIGRAM(S): at 14:00

## 2021-10-11 RX ADMIN — Medication 2 MILLIGRAM(S): at 14:03

## 2021-10-11 RX ADMIN — Medication 2 MILLIGRAM(S): at 20:22

## 2021-10-11 RX ADMIN — Medication 5 MILLIGRAM(S): at 08:50

## 2021-10-11 NOTE — H&P ADULT - ASSESSMENT
46F with PMH of HTN, depression and ETOH abuse who presents with headache after a slip and fall in the bathroom during a binge drinking episode two days ago, admitted with concern for withdrawal

## 2021-10-11 NOTE — ED ADULT NURSE REASSESSMENT NOTE - NS ED NURSE REASSESS COMMENT FT1
Report received from Milka ROSALES. Patient currently a/ox3, moderately anxious, with increase in CIWA score to 15, MD Rondon aware pending medication administration. Respirations unlabored, denies chest pain, abdomen is soft, no n/v/d. Reports 10/10 headache. Hypertensive as documented. Awaiting CTr, and dispo.

## 2021-10-11 NOTE — H&P ADULT - NSHPREVIEWOFSYSTEMS_GEN_ALL_CORE
CONSTITUTIONAL: No weakness, fevers or chills; no weight loss or weight gain  EYES: No visual changes; No blurry vision  ENT: No vertigo or throat pain   NECK: No pain or stiffness  RESPIRATORY: No cough, wheezing, hemoptysis; No shortness of breath  CARDIOVASCULAR: No chest pain or palpitations, no FOWLER, no orthopnea or PND  GASTROINTESTINAL: No abdominal or epigastric pain. No nausea, vomiting, or hematemesis; No diarrhea or constipation. No melena or hematochezia.  GENITOURINARY: No dysuria, frequency or hematuria  NEUROLOGICAL: No numbness or weakness, no syncope, +headache  SKIN: No itching, rashes  PSYCH: No insomnia, no depression, no hallucinations  IMMUNOLOGY: No gum bleeding, no lymphadenopathy  ENDOCRINE: No polydipsia, no heat or cold intolerance  RHEUM: No joint pain or swelling, no rash

## 2021-10-11 NOTE — ED PROVIDER NOTE - OBJECTIVE STATEMENT
Patient is a 46 year-old-female with history of alcohol abuse, substance abuse (Xanax), prior suicidal attempts and hypertension presents with 4-day history of worsening headache s/p fall 4 days ago. Patient is a poor medical historian. She had a fall in the shower 4 days ago, unable to described whether she had lightheadedness/nausea/blurry vison/CP/SOB prior to the fall or whether she had LOC prior or after the head trauma. Developed headache after incident, which had worsened in the last few days, exertional, unable to described if worsen with coughing/laughing. Also c/o abdominal pain, diffuse, unable to characterize. Told nursing that she had an syncopal event which precipitated the fall. Denies fever, chills, chest pain, shortness of breath, dysuria, hematuria, diarrhea, constipation. Last drink was 2 days ago. Denies wanting to hurt herself.

## 2021-10-11 NOTE — H&P ADULT - PROBLEM SELECTOR PLAN 1
Pt insists last drink was two days ago, however ETOH level here remains high  Currently scoring high on CIWA  Start Ativan taper with symptom triggered PRN (Librium is on back order)  Monitor on CIWA  LR at 100cc/hr; f/u repeat lactate

## 2021-10-11 NOTE — ED ADULT NURSE NOTE - OBJECTIVE STATEMENT
47 y/o female BIBA with c/o headache. A&Ox4. Ambulatory. PMH HTN, ETOH abuse last drink 2 days ago. Patient had a syncopal episode with a fall 4 days ago in the shower. Patient reports she was feeling dizzy and fell, hitting her head. Patient reports since the incident she has had a worsening headache, nausea, and abdominal pain. Bruising noted around the orbitals b/l. CHARLEEN. Patient moving all extremities with equal strength and sensation b/l. Speech is clear, patient is slow to respond to questions, but answers appropriately. Patient denies chest pain, SOB, numbness fever, chills, n/v/d, urinary symptoms, abdominal pain. Safety measures maintained. Bed in the lowest position. MD at the bedside. No acute distress noted or further complaints at this time. 47 y/o female BIBA with c/o headache. A&Ox4. Ambulatory. PMH HTN, ETOH abuse last drink 2 days ago prior patient states drinking about 1L a day patient not willing to answer all questions about hx of withdrawal. Patient had a syncopal episode with a fall 4 days ago in the shower. Patient reports she was feeling dizzy and fell, hitting her head. Patient reports since the incident she has had a worsening headache, nausea, and abdominal pain. Bruising noted around the orbitals b/l. CHARLEEN. Patient moving all extremities with equal strength and sensation b/l. Speech is clear, patient is slow to respond to questions, but answers appropriately. Patient denies chest pain, SOB, numbness fever, chills, n/v/d, urinary symptoms, abdominal pain. Safety measures maintained. Bed in the lowest position. MD at the bedside. No acute distress noted or further complaints at this time.

## 2021-10-11 NOTE — H&P ADULT - HISTORY OF PRESENT ILLNESS
46F with PMH of HTN, depression and ETOH abuse who presents with severe headache X 2 days. Pt is a poor historian, but states that she slipped and fell, hitting her head in her shower stall two days ago, after a two day binge drinking episode. States that she had two bottles of vodka, but denies any inciting factor. Pt admits to prior admissions for ETOH use and withdrawal, but denies hallucinations or seizures. She decided to come to the hospital today because she noticed she had "racoon eyes" and was worried about a brain bleed. Denies nausea, vomiting, diarrhea or abdominal pain.  In the ER, she is hypertensive otherwise stable. Noted to be scoring high on CIWA and tremulous, and given valium and ativan. Labs with elevated ETOH level, lactate 3.2. CTH with no intracranial bleed however with L frontal scalp hematoma. Admitted with concern for withdrawal.

## 2021-10-11 NOTE — H&P ADULT - NSHPPHYSICALEXAM_GEN_ALL_CORE
Vital Signs Last 24 Hrs  T(C): 36.8 (11 Oct 2021 12:53), Max: 36.8 (11 Oct 2021 12:53)  T(F): 98.2 (11 Oct 2021 12:53), Max: 98.2 (11 Oct 2021 12:53)  HR: 90 (11 Oct 2021 12:53) (80 - 93)  BP: 147/93 (11 Oct 2021 12:53) (128/94 - 160/114)  BP(mean): --  RR: 16 (11 Oct 2021 12:53) (16 - 22)  SpO2: 98% (11 Oct 2021 12:53) (95% - 99%)    GENERAL: Ill appearing, in NAD  EYES: EOMI, conjunctiva and sclera clear; +raccoon eyes  HEENT: L scalp swelling, dry mucosa, no pharyngeal erythema  NECK: supple, no JVD  LUNG: Clear to auscultation bilaterally; No rales, rhonchi, wheezing, or rubs.   CVS: Regular rate and rhythm; No murmurs, rubs, or gallops  ABDOMEN: Soft, non tender, nondistended. +Bowel sounds.  EXTREMITIES:  no edema, no cyanosis  NEURO:  Alert & Oriented X3,  No focal deficits. significantly tremulous with mental slowing  PSYCH: Normal affect, normal mood  MUSCULOSKELETAL: FROM, no joint swelling  Skin: warm and dry, normal color

## 2021-10-11 NOTE — ED PROVIDER NOTE - PROGRESS NOTE DETAILS
[place], DO: patient re-assessed and resting in minimal distress.  patient mildly tremulous in bilateral upper extremities with minimal tongue fasciculations.  Patient received 5mg of Diazepam by night shift and additional 10mg of Diazepam upon evaluation.  Patient with improved symptoms after additional Diazepam.  Scans were negative for acute pathology.  Case discussed with admitting physician, Dr. Guajardo, who agreed with decision for admission.  Patient continued on CIWA protocol.

## 2021-10-11 NOTE — H&P ADULT - PROBLEM SELECTOR PLAN 2
CTH as above with no acute intracranial bleeding, mass effect, or acute calvarium fracture. Focal left frontal scalp hematoma with adjacent mild scalp soft tissue swelling.  Ibuprofen PRN for pain

## 2021-10-11 NOTE — ED PROVIDER NOTE - CLINICAL SUMMARY MEDICAL DECISION MAKING FREE TEXT BOX
46 year-old-female with history of alcohol abuse, substance abuse (Xanax), prior suicidal attempts and hypertension presents with 4-day history of worsening headache s/p fall. Patient is a poor medical historian. Clinical picture concerning for alcohol withdrawal (last drink 2 days ago). Given unclear history, will obtain syncope workup, CT head and neck to evaluate for head trauma and CT abd/pel to evaluate for abdominal etiologies. Ordered CBC, CMP, VBG, ammonia, lipase, mg, phosphorus, trop, UA, UC, hcg, RVP, ECG, CXR. Fluids, valium, folic acid, multivitamin, zofran and thiamine. Activate CIWA. Anticipate admission for alcohol withdrawal and further findings on workup.

## 2021-10-11 NOTE — H&P ADULT - NSHPADDITIONALINFOADULT_GEN_ALL_CORE
D/W LESLY Azul    [facility] Division of Hospital Medicine  Nay King MD  Pager (M-F, 8A-5P): [phone]  Other Times:  [phone]

## 2021-10-11 NOTE — ED PROVIDER NOTE - PHYSICAL EXAMINATION
General: non-toxic appearing, in no respiratory distress, appears drowsy and slow to respond   HEENT: atraumatic, normocephalic; pupils are equal, round and react to light, extraocular movements intact bilaterally without deficits, no conjunctival pallor, mucous membranes moist  Neck: no jugular venous distension, full range of motion  Chest/Lung: clear to auscultation bilaterally, no wheezes/rhonchi/rales  Heart: regular rate and rhythm, no murmur/gallops/rubs  Abdomen: normal bowel sounds, soft, non-distended, diffuse tenderness with some guarding, no rebound appreciated   Extremities: no lower extremity edema, +2 radial pulses bilaterally, +2 dorsalis pedis pulses bilaterally  Musculoskeletal: full range of motion of all 4 extremities  Nervous System: drowsy, slow to respond to questions, oriented x3, no motor deficits or sensory deficits; CNII-XII grossly intact; no focal neurologic deficits  Skin: no rashes/lacerations noted General: non-toxic appearing, in no respiratory distress, appears drowsy and slow to respond   HEENT: atraumatic, normocephalic; pupils are equal, round and react to light, extraocular movements intact bilaterally without deficits, no conjunctival pallor, mucous membranes moist  Neck: no jugular venous distension, full range of motion  Chest/Lung: clear to auscultation bilaterally, no wheezes/rhonchi/rales  Heart: regular rate and rhythm, no murmur/gallops/rubs  Abdomen: normal bowel sounds, soft, non-distended, diffuse tenderness with some guarding, no rebound appreciated   Extremities: no lower extremity edema, +2 radial pulses bilaterally, +2 dorsalis pedis pulses bilaterally  Musculoskeletal: full range of motion of all 4 extremities  Nervous System: drowsy, slow to respond to questions, oriented x3, no motor deficits or sensory deficits; CNII-XII grossly intact; no focal neurologic deficits, bilateral hand tremors  Skin: no rashes/lacerations noted

## 2021-10-11 NOTE — ED ADULT NURSE REASSESSMENT NOTE - NS ED NURSE REASSESS COMMENT FT1
pt resting comfortably in stretcher, no acute distress. No signs of worsening ETOH withdrawal symptoms. Will reassess. Pending bed assignment at this time.

## 2021-10-11 NOTE — ED PROVIDER NOTE - NS ED ROS FT
General: Denies fever, chills, unexpected weight loss/gain  HEENT: Reports headache; denies vision changes, tinnitus, sore throat  Cardiovascular: Denies chest pain, palpitations  Skin: Denies rash, erythema, color changes  Respiratory: Denies shortness of breath, difficulty breathing, cough  Endocrine: Denies sensitivity to heat, cold, increased urination  Gastrointestinal: Reports abdominal pain, nausea, vomiting; denies diarrhea, constipation, changes in bowel habits  Musculoskeletal: Denies back pain, lower extremity swelling, extremity pain  Genitourinary: Denies hematuria, dysuria, increased frequency  Neurologic: Denies loss of consciousness, weakness, numbness, tingling  Psychiatric: Denies history of psych, hallucinations
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
## 2021-10-12 LAB
ALBUMIN SERPL ELPH-MCNC: 3.2 G/DL — LOW (ref 3.3–5)
ALP SERPL-CCNC: 70 U/L — SIGNIFICANT CHANGE UP (ref 40–120)
ALT FLD-CCNC: 34 U/L — SIGNIFICANT CHANGE UP (ref 10–45)
ANION GAP SERPL CALC-SCNC: 14 MMOL/L — SIGNIFICANT CHANGE UP (ref 5–17)
AST SERPL-CCNC: 61 U/L — HIGH (ref 10–40)
BASE EXCESS BLDV CALC-SCNC: 0.5 MMOL/L — SIGNIFICANT CHANGE UP (ref -2–2)
BASOPHILS # BLD AUTO: 0.03 K/UL — SIGNIFICANT CHANGE UP (ref 0–0.2)
BASOPHILS NFR BLD AUTO: 1.1 % — SIGNIFICANT CHANGE UP (ref 0–2)
BILIRUB SERPL-MCNC: 0.4 MG/DL — SIGNIFICANT CHANGE UP (ref 0.2–1.2)
BUN SERPL-MCNC: 4 MG/DL — LOW (ref 7–23)
CA-I SERPL-SCNC: 1.14 MMOL/L — LOW (ref 1.15–1.33)
CALCIUM SERPL-MCNC: 8.5 MG/DL — SIGNIFICANT CHANGE UP (ref 8.4–10.5)
CHLORIDE BLDV-SCNC: 101 MMOL/L — SIGNIFICANT CHANGE UP (ref 96–108)
CHLORIDE SERPL-SCNC: 100 MMOL/L — SIGNIFICANT CHANGE UP (ref 96–108)
CO2 BLDV-SCNC: 27 MMOL/L — HIGH (ref 22–26)
CO2 SERPL-SCNC: 23 MMOL/L — SIGNIFICANT CHANGE UP (ref 22–31)
COVID-19 SPIKE DOMAIN AB INTERP: POSITIVE
COVID-19 SPIKE DOMAIN ANTIBODY RESULT: >250 U/ML — HIGH
CREAT SERPL-MCNC: 0.49 MG/DL — LOW (ref 0.5–1.3)
EOSINOPHIL # BLD AUTO: 0.1 K/UL — SIGNIFICANT CHANGE UP (ref 0–0.5)
EOSINOPHIL NFR BLD AUTO: 3.7 % — SIGNIFICANT CHANGE UP (ref 0–6)
GAS PNL BLDV: 135 MMOL/L — LOW (ref 136–145)
GAS PNL BLDV: SIGNIFICANT CHANGE UP
GAS PNL BLDV: SIGNIFICANT CHANGE UP
GLUCOSE BLDV-MCNC: 90 MG/DL — SIGNIFICANT CHANGE UP (ref 70–99)
GLUCOSE SERPL-MCNC: 95 MG/DL — SIGNIFICANT CHANGE UP (ref 70–99)
HCO3 BLDV-SCNC: 25 MMOL/L — SIGNIFICANT CHANGE UP (ref 22–29)
HCT VFR BLD CALC: 30.6 % — LOW (ref 34.5–45)
HCT VFR BLDA CALC: 29 % — LOW (ref 34.5–46.5)
HGB BLD CALC-MCNC: 9.5 G/DL — LOW (ref 11.7–16.1)
HGB BLD-MCNC: 8.7 G/DL — LOW (ref 11.5–15.5)
IMM GRANULOCYTES NFR BLD AUTO: 0.4 % — SIGNIFICANT CHANGE UP (ref 0–1.5)
LACTATE BLDV-MCNC: 0.8 MMOL/L — SIGNIFICANT CHANGE UP (ref 0.7–2)
LYMPHOCYTES # BLD AUTO: 0.86 K/UL — LOW (ref 1–3.3)
LYMPHOCYTES # BLD AUTO: 31.9 % — SIGNIFICANT CHANGE UP (ref 13–44)
MANUAL SMEAR VERIFICATION: SIGNIFICANT CHANGE UP
MCHC RBC-ENTMCNC: 20.9 PG — LOW (ref 27–34)
MCHC RBC-ENTMCNC: 28.4 GM/DL — LOW (ref 32–36)
MCV RBC AUTO: 73.6 FL — LOW (ref 80–100)
MONOCYTES # BLD AUTO: 0.27 K/UL — SIGNIFICANT CHANGE UP (ref 0–0.9)
MONOCYTES NFR BLD AUTO: 10 % — SIGNIFICANT CHANGE UP (ref 2–14)
NEUTROPHILS # BLD AUTO: 1.43 K/UL — LOW (ref 1.8–7.4)
NEUTROPHILS NFR BLD AUTO: 52.9 % — SIGNIFICANT CHANGE UP (ref 43–77)
NRBC # BLD: 0 /100 WBCS — SIGNIFICANT CHANGE UP (ref 0–0)
PCO2 BLDV: 41 MMHG — SIGNIFICANT CHANGE UP (ref 39–42)
PH BLDV: 7.4 — SIGNIFICANT CHANGE UP (ref 7.32–7.43)
PLAT MORPH BLD: NORMAL — SIGNIFICANT CHANGE UP
PLATELET # BLD AUTO: 100 K/UL — LOW (ref 150–400)
PO2 BLDV: 55 MMHG — HIGH (ref 25–45)
POTASSIUM BLDV-SCNC: 2.9 MMOL/L — CRITICAL LOW (ref 3.5–5.1)
POTASSIUM SERPL-MCNC: 3.2 MMOL/L — LOW (ref 3.5–5.3)
POTASSIUM SERPL-SCNC: 3.2 MMOL/L — LOW (ref 3.5–5.3)
PROT SERPL-MCNC: 6.1 G/DL — SIGNIFICANT CHANGE UP (ref 6–8.3)
RBC # BLD: 4.16 M/UL — SIGNIFICANT CHANGE UP (ref 3.8–5.2)
RBC # FLD: 21.3 % — HIGH (ref 10.3–14.5)
RBC BLD AUTO: SIGNIFICANT CHANGE UP
SAO2 % BLDV: 86.5 % — SIGNIFICANT CHANGE UP (ref 67–88)
SARS-COV-2 IGG+IGM SERPL QL IA: >250 U/ML — HIGH
SARS-COV-2 IGG+IGM SERPL QL IA: POSITIVE
SODIUM SERPL-SCNC: 137 MMOL/L — SIGNIFICANT CHANGE UP (ref 135–145)
WBC # BLD: 2.7 K/UL — LOW (ref 3.8–10.5)
WBC # FLD AUTO: 2.7 K/UL — LOW (ref 3.8–10.5)

## 2021-10-12 RX ORDER — POTASSIUM CHLORIDE 20 MEQ
40 PACKET (EA) ORAL EVERY 4 HOURS
Refills: 0 | Status: COMPLETED | OUTPATIENT
Start: 2021-10-12 | End: 2021-10-12

## 2021-10-12 RX ADMIN — Medication 1.5 MILLIGRAM(S): at 22:04

## 2021-10-12 RX ADMIN — Medication 0.1 MILLIGRAM(S): at 05:00

## 2021-10-12 RX ADMIN — Medication 3 MILLIGRAM(S): at 22:06

## 2021-10-12 RX ADMIN — Medication 1 TABLET(S): at 11:24

## 2021-10-12 RX ADMIN — Medication 0.1 MILLIGRAM(S): at 11:52

## 2021-10-12 RX ADMIN — Medication 100 MILLIGRAM(S): at 13:06

## 2021-10-12 RX ADMIN — Medication 2 MILLIGRAM(S): at 13:00

## 2021-10-12 RX ADMIN — Medication 0.5 MILLIGRAM(S): at 17:38

## 2021-10-12 RX ADMIN — Medication 2 MILLIGRAM(S): at 08:53

## 2021-10-12 RX ADMIN — Medication 2 MILLIGRAM(S): at 00:23

## 2021-10-12 RX ADMIN — Medication 400 MILLIGRAM(S): at 11:24

## 2021-10-12 RX ADMIN — Medication 2 MILLIGRAM(S): at 04:05

## 2021-10-12 RX ADMIN — Medication 1 APPLICATION(S): at 05:27

## 2021-10-12 RX ADMIN — Medication 1 APPLICATION(S): at 17:20

## 2021-10-12 RX ADMIN — Medication 40 MILLIEQUIVALENT(S): at 11:51

## 2021-10-12 RX ADMIN — Medication 40 MILLIEQUIVALENT(S): at 17:18

## 2021-10-12 RX ADMIN — Medication 1 MILLIGRAM(S): at 11:24

## 2021-10-12 RX ADMIN — Medication 1.5 MILLIGRAM(S): at 15:11

## 2021-10-12 RX ADMIN — Medication 1.5 MILLIGRAM(S): at 20:00

## 2021-10-12 RX ADMIN — Medication 0.1 MILLIGRAM(S): at 21:59

## 2021-10-12 NOTE — PROGRESS NOTE ADULT - REASON FOR ADMISSION
UAI/96 completed & submitted to [facility], successfully processed. [facility] Spoke with [facility], states will likely get it started tomorrow. Level 2 screening will take 7-10 business days to get determination back for nursing home placement. Will cont to follow. Janette Howard RN,ext. 1000. Headache

## 2021-10-12 NOTE — PROGRESS NOTE ADULT - PROBLEM SELECTOR PLAN 2
Patient was last seen 5/7/19 and has an appointment 5/12/20  Script was eprescribed to pharmacy per Dr. Lo for 3 months.       CTH as above with no acute intracranial bleeding, mass effect, or acute calvarium fracture. Focal left frontal scalp hematoma with adjacent mild scalp soft tissue swelling.  Ibuprofen PRN for pain

## 2021-10-12 NOTE — PROGRESS NOTE ADULT - SUBJECTIVE AND OBJECTIVE BOX
Name of Patient : AYLEEN DEY  MRN: [id number]  Date of visit: 10-12-21       Subjective: Patient seen and examined. No new events except as noted.   calm   no DT     REVIEW OF SYSTEMS:    CONSTITUTIONAL: No weakness, fevers or chills  EYES/ENT: No visual changes;  No vertigo or throat pain   NECK: No pain or stiffness  RESPIRATORY: No cough, wheezing, hemoptysis; No shortness of breath  CARDIOVASCULAR: No chest pain or palpitations  GASTROINTESTINAL: No abdominal or epigastric pain. No nausea, vomiting, or hematemesis; No diarrhea or constipation. No melena or hematochezia.  GENITOURINARY: No dysuria, frequency or hematuria  NEUROLOGICAL: No numbness or weakness  SKIN: No itching, burning, rashes, or lesions   All other review of systems is negative unless indicated above.    MEDICATIONS:  MEDICATIONS  (STANDING):  cloNIDine 0.1 milliGRAM(s) Oral three times a day  folic acid 1 milliGRAM(s) Oral daily  LORazepam   Injectable   IV Push   LORazepam   Injectable 1.5 milliGRAM(s) IV Push every 4 hours  LORazepam   Injectable 0.5 milliGRAM(s) IV Push once  multivitamin 1 Tablet(s) Oral daily  petrolatum Ophthalmic Ointment 1 Application(s) Both EYES two times a day  potassium chloride    Tablet ER 40 milliEquivalent(s) Oral every 4 hours  sodium chloride 0.9%. 1000 milliLiter(s) (100 mL/Hr) IV Continuous <Continuous>  thiamine 100 milliGRAM(s) Oral daily      PHYSICAL EXAM:  T(C): 36.8 (10-12-21 @ 15:17), Max: 37.2 (10-11-21 @ 21:41)  HR: 70 (10-12-21 @ 15:17) (61 - 95)  BP: 156/107 (10-12-21 @ 15:17) (122/81 - 169/103)  RR: 20 (10-12-21 @ 15:17) (16 - 20)  SpO2: 100% (10-12-21 @ 15:17) (97% - 100%)  Wt(kg): --  I&O's Summary        Appearance: Normal	  HEENT:  PERRLA   Lymphatic: No lymphadenopathy   Cardiovascular: Normal S1 S2, no JVD  Respiratory: normal effort , clear  Gastrointestinal:  Soft, Non-tender  Skin: No rashes,  warm to touch  Psychiatry:  Mood & affect appropriate  Musculuskeletal: No edema      All labs, Imaging and EKGs personally reviewed                           8.7    2.70  )-----------( 100      ( 12 Oct 2021 08:28 )             30.6               10-12    137  |  100  |  4<L>  ----------------------------<  95  3.2<L>   |  23  |  0.49<L>    Ca    8.5      12 Oct 2021 08:28  Phos  4.0     10-11  Mg     1.6     10-11    TPro  6.1  /  Alb  3.2<L>  /  TBili  0.4  /  DBili  x   /  AST  61<H>  /  ALT  34  /  AlkPhos  70  10-12

## 2021-10-12 NOTE — PROGRESS NOTE ADULT - PROBLEM SELECTOR PLAN 1
Pt insists last drink was two days ago, however ETOH level here remains high  Start Ativan taper with symptom triggered PRN (Librium is on back order)  Monitor on CIWA  LR at 100cc/hr; trend lactate level

## 2021-10-12 NOTE — SBIRT NOTE ADULT - NSSBIRTBRIEFINTDET_GEN_A_CORE
Screening results were reviewed with the patient and the patient was provided information about healthy guidelines and potential negative consequences associated with level of risk. Motivation and readiness to reduce or stop use was discussed and goals and activities to make changes were suggested/offered. Patient reports she is not interested in inpatient rehab at this time but is interested in outpatient rehab and daily AA support.

## 2021-10-13 ENCOUNTER — TRANSCRIPTION ENCOUNTER (OUTPATIENT)
Age: 46
End: 2021-10-13

## 2021-10-13 VITALS
DIASTOLIC BLOOD PRESSURE: 88 MMHG | SYSTOLIC BLOOD PRESSURE: 131 MMHG | RESPIRATION RATE: 18 BRPM | HEART RATE: 74 BPM | TEMPERATURE: 99 F | OXYGEN SATURATION: 100 %

## 2021-10-13 PROCEDURE — 84702 CHORIONIC GONADOTROPIN TEST: CPT

## 2021-10-13 PROCEDURE — 80053 COMPREHEN METABOLIC PANEL: CPT

## 2021-10-13 PROCEDURE — 84295 ASSAY OF SERUM SODIUM: CPT

## 2021-10-13 PROCEDURE — 83605 ASSAY OF LACTIC ACID: CPT

## 2021-10-13 PROCEDURE — 80048 BASIC METABOLIC PNL TOTAL CA: CPT

## 2021-10-13 PROCEDURE — 36415 COLL VENOUS BLD VENIPUNCTURE: CPT

## 2021-10-13 PROCEDURE — 86769 SARS-COV-2 COVID-19 ANTIBODY: CPT

## 2021-10-13 PROCEDURE — 82330 ASSAY OF CALCIUM: CPT

## 2021-10-13 PROCEDURE — 99285 EMERGENCY DEPT VISIT HI MDM: CPT | Mod: 25

## 2021-10-13 PROCEDURE — 70450 CT HEAD/BRAIN W/O DYE: CPT | Mod: MA

## 2021-10-13 PROCEDURE — 83735 ASSAY OF MAGNESIUM: CPT

## 2021-10-13 PROCEDURE — 82435 ASSAY OF BLOOD CHLORIDE: CPT

## 2021-10-13 PROCEDURE — 85025 COMPLETE CBC W/AUTO DIFF WBC: CPT

## 2021-10-13 PROCEDURE — 96374 THER/PROPH/DIAG INJ IV PUSH: CPT

## 2021-10-13 PROCEDURE — 83690 ASSAY OF LIPASE: CPT

## 2021-10-13 PROCEDURE — 82565 ASSAY OF CREATININE: CPT

## 2021-10-13 PROCEDURE — 97161 PT EVAL LOW COMPLEX 20 MIN: CPT

## 2021-10-13 PROCEDURE — 82140 ASSAY OF AMMONIA: CPT

## 2021-10-13 PROCEDURE — 93005 ELECTROCARDIOGRAM TRACING: CPT

## 2021-10-13 PROCEDURE — 82803 BLOOD GASES ANY COMBINATION: CPT

## 2021-10-13 PROCEDURE — 85014 HEMATOCRIT: CPT

## 2021-10-13 PROCEDURE — 80307 DRUG TEST PRSMV CHEM ANLYZR: CPT

## 2021-10-13 PROCEDURE — 72125 CT NECK SPINE W/O DYE: CPT | Mod: MA

## 2021-10-13 PROCEDURE — 84484 ASSAY OF TROPONIN QUANT: CPT

## 2021-10-13 PROCEDURE — 71045 X-RAY EXAM CHEST 1 VIEW: CPT

## 2021-10-13 PROCEDURE — 82962 GLUCOSE BLOOD TEST: CPT

## 2021-10-13 PROCEDURE — 96375 TX/PRO/DX INJ NEW DRUG ADDON: CPT

## 2021-10-13 PROCEDURE — 74177 CT ABD & PELVIS W/CONTRAST: CPT | Mod: MA

## 2021-10-13 PROCEDURE — 0225U NFCT DS DNA&RNA 21 SARSCOV2: CPT

## 2021-10-13 PROCEDURE — 84100 ASSAY OF PHOSPHORUS: CPT

## 2021-10-13 PROCEDURE — 85018 HEMOGLOBIN: CPT

## 2021-10-13 PROCEDURE — 84132 ASSAY OF SERUM POTASSIUM: CPT

## 2021-10-13 PROCEDURE — 82947 ASSAY GLUCOSE BLOOD QUANT: CPT

## 2021-10-13 RX ORDER — ALPRAZOLAM 0.25 MG
1 TABLET ORAL
Qty: 0 | Refills: 0 | DISCHARGE

## 2021-10-13 RX ORDER — INFLUENZA VIRUS VACCINE 15; 15; 15; 15 UG/.5ML; UG/.5ML; UG/.5ML; UG/.5ML
0.5 SUSPENSION INTRAMUSCULAR ONCE
Refills: 0 | Status: DISCONTINUED | OUTPATIENT
Start: 2021-10-13 | End: 2021-10-13

## 2021-10-13 RX ORDER — LANOLIN ALCOHOL/MO/W.PET/CERES
1 CREAM (GRAM) TOPICAL
Qty: 0 | Refills: 0 | DISCHARGE
Start: 2021-10-13

## 2021-10-13 RX ORDER — IBUPROFEN 200 MG
1 TABLET ORAL
Qty: 0 | Refills: 0 | DISCHARGE
Start: 2021-10-13

## 2021-10-13 RX ORDER — ZOLPIDEM TARTRATE 10 MG/1
1 TABLET ORAL
Qty: 0 | Refills: 0 | DISCHARGE

## 2021-10-13 RX ADMIN — Medication 100 MILLIGRAM(S): at 12:52

## 2021-10-13 RX ADMIN — Medication 1.5 MILLIGRAM(S): at 12:37

## 2021-10-13 RX ADMIN — Medication 1.5 MILLIGRAM(S): at 01:21

## 2021-10-13 RX ADMIN — Medication 1 MILLIGRAM(S): at 12:52

## 2021-10-13 RX ADMIN — Medication 1 APPLICATION(S): at 06:18

## 2021-10-13 RX ADMIN — Medication 1.5 MILLIGRAM(S): at 06:18

## 2021-10-13 RX ADMIN — Medication 1 TABLET(S): at 12:52

## 2021-10-13 RX ADMIN — Medication 0.1 MILLIGRAM(S): at 06:17

## 2021-10-13 NOTE — DISCHARGE NOTE PROVIDER - CARE PROVIDER_API CALL
Simba Shah  Internal Medicine  [facility], [address] [place] [id number]  Phone: [phone]  Fax: [phone]  Follow Up Time:

## 2021-10-13 NOTE — PHYSICAL THERAPY INITIAL EVALUATION ADULT - PLANNED THERAPY INTERVENTIONS, PT EVAL
Patient demonstrated functional independence with all mobility and is now cleared from PT. No further skilled PT needs identified.

## 2021-10-13 NOTE — DISCHARGE NOTE PROVIDER - NSDCCPCAREPLAN_GEN_ALL_CORE_FT
PRINCIPAL DISCHARGE DIAGNOSIS  Diagnosis: Alcohol withdrawal  Assessment and Plan of Treatment: Resolved with ativan tapering dose. Follow up with your PCP in 2 days  Recommend to avoid alcohol      SECONDARY DISCHARGE DIAGNOSES  Diagnosis: ETOH abuse  Assessment and Plan of Treatment: Recommend out patient alcohol rehab    Diagnosis: Headache, post-traumatic, acute  Assessment and Plan of Treatment: CTH  with no acute intracranial bleeding, mass effect, or acute calvarium fracture. Focal left frontal scalp hematoma with adjacent mild scalp soft tissue swelling.  Ibuprofen PRN for pain.

## 2021-10-13 NOTE — PHYSICAL THERAPY INITIAL EVALUATION ADULT - PERTINENT HX OF CURRENT PROBLEM, REHAB EVAL
46F with PMH of HTN, depression and ETOH abuse who presents with headache after a slip and fall in the bathroom during a binge drinking episode two days ago, admitted with concern for withdrawal.

## 2021-10-13 NOTE — PHYSICAL THERAPY INITIAL EVALUATION ADULT - ADDITIONAL COMMENTS
Patient reports that she lives alone however will be staying with her brother after d/c. Has 18 steps to enter her apt, "a few" steps at her brother's house as well. Independent with ADLs and ambulation at baseline, no assistive device.

## 2021-10-13 NOTE — DISCHARGE NOTE PROVIDER - HOSPITAL COURSE
46F with PMH of HTN, depression and ETOH abuse who presents with headache after a slip and fall in the bathroom during a binge drinking episode two days ago, admitted with   concern for withdrawal     Problem/Plan - 1:  ·  Problem: Alcohol withdrawal, with unspecified complication.   ·  Plan: Pt insists last drink was two days ago, however ETOH level here remains high  Currently scoring high on CIWA  Started  Ativan taper with symptom triggered PRN   Monitored  on CIWA  IV hydration   patient refused psych eval  Had SW consult     Problem/Plan - 2:  ·  Problem: Headache, post-traumatic, acute.   ·  Plan: CTH  with no acute intracranial bleeding, mass effect, or acute calvarium fracture. Focal left frontal scalp hematoma with adjacent mild scalp soft tissue swelling.  Ibuprofen PRN for pain.     LDH stable. 46F with PMH of HTN, depression and ETOH abuse who presents with headache after a slip and fall in the bathroom during a binge drinking episode two days ago, admitted with   concern for withdrawal     Problem/Plan - 1:  ·  Problem: Alcohol withdrawal, with unspecified complication.   ·  Plan: Pt insists last drink was two days ago, however ETOH level here remains high  Currently scoring high on CIWA  Started  Ativan taper with symptom triggered PRN   Monitored  on CIWA  IV hydration   patient refused psych eval  Had SW consult     Problem/Plan - 2:  ·  Problem: Headache, post-traumatic, acute.   ·  Plan: CTH  with no acute intracranial bleeding, mass effect, or acute calvarium fracture. Focal left frontal scalp hematoma with adjacent mild scalp soft tissue swelling.  Ibuprofen PRN for pain.  CIWA score 0 now. Cleared for discharge per Dr juarez with 2 more days of ativan tapering     46F with PMH of HTN, depression and ETOH abuse who presents with headache after a slip and fall in the bathroom during a binge drinking episode two days ago, admitted with   concern for withdrawal     Problem/Plan - 1:  ·  Problem: Alcohol withdrawal, with unspecified complication.   ·  Plan: Pt insists last drink was two days ago, however ETOH level here remains high  Currently scoring high on CIWA  Started  Ativan taper with symptom triggered PRN   Monitored  on CIWA  IV hydration   patient refused psych eval  Had SW consult     Problem/Plan - 2:  ·  Problem: Headache, post-traumatic, acute.   ·  Plan: CTH  with no acute intracranial bleeding, mass effect, or acute calvarium fracture. Focal left frontal scalp hematoma with adjacent mild scalp soft tissue swelling.  Ibuprofen PRN for pain.  CIWA score 0 now. Seen by Dr juarez, and he cleared the patient for discharge  with 2 more days of ativan tapering.

## 2021-10-13 NOTE — DISCHARGE NOTE NURSING/CASE MANAGEMENT/SOCIAL WORK - PATIENT PORTAL LINK FT
You can access the FollowMyHealth Patient Portal offered by [facility] by registering at the following website: [email or web address]. By joining [employer]’s FollowMyHealth portal, you will also be able to view your health information using other applications (apps) compatible with our system.

## 2021-10-13 NOTE — DISCHARGE NOTE PROVIDER - NSDCMRMEDTOKEN_GEN_ALL_CORE_FT
cloNIDine 0.1 mg oral tablet: 1 tab(s) orally 3 times a day  folic acid 1 mg oral tablet: 1 tab(s) orally once a day  ibuprofen 400 mg oral tablet: 1 tab(s) orally every 6 hours, As needed, Mild Pain (1 - 3)  melatonin 3 mg oral tablet: 1 tab(s) orally once a day (at bedtime), As needed, Insomnia  Multiple Vitamins oral tablet: 1 tab(s) orally once a day  ocular lubricant ophthalmic ointment: 1 application to each affected eye 2 times a day  thiamine 250 mg oral tablet: 1 tab(s) orally once a day  Vitamin D2 1.25 mg (50,000 intl units) oral capsule: 1 cap(s) orally once a week   Ativan 0.5 mg oral tablet: 2 tab(s) orally 2 times a day x 1 day    then 1 tab twice daily x 1 day MDD:2  cloNIDine 0.1 mg oral tablet: 1 tab(s) orally 3 times a day  folic acid 1 mg oral tablet: 1 tab(s) orally once a day  ibuprofen 400 mg oral tablet: 1 tab(s) orally every 6 hours, As needed, Mild Pain (1 - 3)  melatonin 3 mg oral tablet: 1 tab(s) orally once a day (at bedtime), As needed, Insomnia  Multiple Vitamins oral tablet: 1 tab(s) orally once a day  ocular lubricant ophthalmic ointment: 1 application to each affected eye 2 times a day  thiamine 250 mg oral tablet: 1 tab(s) orally once a day  Vitamin D2 1.25 mg (50,000 intl units) oral capsule: 1 cap(s) orally once a week

## 2021-10-13 NOTE — DISCHARGE NOTE NURSING/CASE MANAGEMENT/SOCIAL WORK - NSDCVIVACCINE_GEN_ALL_CORE_FT
influenza, injectable, quadrivalent, preservative free; 23-Oct-2020 20:55; Ramón Wong (RN); [employer]; [id number] (Exp. Date: 30-Jun-2021); IntraMuscular; Deltoid Left.; 0.5 milliLiter(s); VIS (VIS Published: 15-Aug-2019, VIS Presented: 23-Oct-2020);

## 2021-10-15 DIAGNOSIS — Z71.89 OTHER SPECIFIED COUNSELING: ICD-10-CM

## 2021-11-01 PROCEDURE — G9005: CPT

## 2021-11-11 ENCOUNTER — INPATIENT (INPATIENT)
Facility: HOSPITAL | Age: 46
LOS: 2 days | Discharge: HOME | End: 2021-11-14
Attending: INTERNAL MEDICINE | Admitting: INTERNAL MEDICINE
Payer: MEDICAID

## 2021-11-11 VITALS
SYSTOLIC BLOOD PRESSURE: 146 MMHG | HEIGHT: 67 IN | TEMPERATURE: 98 F | OXYGEN SATURATION: 98 % | RESPIRATION RATE: 18 BRPM | DIASTOLIC BLOOD PRESSURE: 90 MMHG | HEART RATE: 85 BPM

## 2021-11-11 DIAGNOSIS — T50.901A POISONING BY UNSPECIFIED DRUGS, MEDICAMENTS AND BIOLOGICAL SUBSTANCES, ACCIDENTAL (UNINTENTIONAL), INITIAL ENCOUNTER: ICD-10-CM

## 2021-11-11 DIAGNOSIS — Z98.890 OTHER SPECIFIED POSTPROCEDURAL STATES: Chronic | ICD-10-CM

## 2021-11-11 LAB
ALBUMIN SERPL ELPH-MCNC: 4.6 G/DL — SIGNIFICANT CHANGE UP (ref 3.3–5)
ALP SERPL-CCNC: 75 U/L — SIGNIFICANT CHANGE UP (ref 40–120)
ALT FLD-CCNC: 18 U/L — SIGNIFICANT CHANGE UP (ref 4–33)
ANION GAP SERPL CALC-SCNC: 25 MMOL/L — HIGH (ref 7–14)
AST SERPL-CCNC: 48 U/L — HIGH (ref 4–32)
BASE EXCESS BLDV CALC-SCNC: -2.1 MMOL/L — LOW (ref -2–3)
BILIRUB SERPL-MCNC: 0.2 MG/DL — SIGNIFICANT CHANGE UP (ref 0.2–1.2)
BLOOD GAS VENOUS COMPREHENSIVE RESULT: SIGNIFICANT CHANGE UP
BUN SERPL-MCNC: 13 MG/DL — SIGNIFICANT CHANGE UP (ref 7–23)
CALCIUM SERPL-MCNC: 10.4 MG/DL — SIGNIFICANT CHANGE UP (ref 8.4–10.5)
CHLORIDE BLDV-SCNC: 105 MMOL/L — SIGNIFICANT CHANGE UP (ref 96–108)
CHLORIDE SERPL-SCNC: 98 MMOL/L — SIGNIFICANT CHANGE UP (ref 98–107)
CK SERPL-CCNC: 252 U/L — HIGH (ref 25–170)
CO2 BLDV-SCNC: 24.5 MMOL/L — SIGNIFICANT CHANGE UP (ref 22–26)
CO2 SERPL-SCNC: 19 MMOL/L — LOW (ref 22–31)
CREAT SERPL-MCNC: 0.62 MG/DL — SIGNIFICANT CHANGE UP (ref 0.5–1.3)
GAS PNL BLDV: 142 MMOL/L — SIGNIFICANT CHANGE UP (ref 136–145)
GLUCOSE BLDV-MCNC: 97 MG/DL — SIGNIFICANT CHANGE UP (ref 70–99)
GLUCOSE SERPL-MCNC: 86 MG/DL — SIGNIFICANT CHANGE UP (ref 70–99)
HCG SERPL-ACNC: <5 MIU/ML — SIGNIFICANT CHANGE UP
HCO3 BLDV-SCNC: 23 MMOL/L — SIGNIFICANT CHANGE UP (ref 22–29)
HCT VFR BLD CALC: 37.1 % — SIGNIFICANT CHANGE UP (ref 34.5–45)
HCT VFR BLDA CALC: 31 % — LOW (ref 34.5–46.5)
HGB BLD CALC-MCNC: 10.3 G/DL — LOW (ref 11.5–15.5)
HGB BLD-MCNC: 10.7 G/DL — LOW (ref 11.5–15.5)
LACTATE BLDV-MCNC: 7.6 MMOL/L — CRITICAL HIGH (ref 0.5–2)
LIDOCAIN IGE QN: 15 U/L — SIGNIFICANT CHANGE UP (ref 7–60)
MAGNESIUM SERPL-MCNC: 1.6 MG/DL — SIGNIFICANT CHANGE UP (ref 1.6–2.6)
MCHC RBC-ENTMCNC: 20.7 PG — LOW (ref 27–34)
MCHC RBC-ENTMCNC: 28.8 GM/DL — LOW (ref 32–36)
MCV RBC AUTO: 71.9 FL — LOW (ref 80–100)
NRBC # BLD: 0 /100 WBCS — SIGNIFICANT CHANGE UP
NRBC # FLD: 0 K/UL — SIGNIFICANT CHANGE UP
PCO2 BLDV: 41 MMHG — SIGNIFICANT CHANGE UP (ref 39–42)
PH BLDV: 7.36 — SIGNIFICANT CHANGE UP (ref 7.32–7.43)
PHOSPHATE SERPL-MCNC: 3.5 MG/DL — SIGNIFICANT CHANGE UP (ref 2.5–4.5)
PLATELET # BLD AUTO: 314 K/UL — SIGNIFICANT CHANGE UP (ref 150–400)
PO2 BLDV: 46 MMHG — SIGNIFICANT CHANGE UP
POTASSIUM BLDV-SCNC: 4.3 MMOL/L — SIGNIFICANT CHANGE UP (ref 3.5–5.1)
POTASSIUM SERPL-MCNC: 4.5 MMOL/L — SIGNIFICANT CHANGE UP (ref 3.5–5.3)
POTASSIUM SERPL-SCNC: 4.5 MMOL/L — SIGNIFICANT CHANGE UP (ref 3.5–5.3)
PROT SERPL-MCNC: 9.3 G/DL — HIGH (ref 6–8.3)
RBC # BLD: 5.16 M/UL — SIGNIFICANT CHANGE UP (ref 3.8–5.2)
RBC # FLD: 20.9 % — HIGH (ref 10.3–14.5)
SAO2 % BLDV: 71.3 % — SIGNIFICANT CHANGE UP
SARS-COV-2 RNA SPEC QL NAA+PROBE: SIGNIFICANT CHANGE UP
SODIUM SERPL-SCNC: 142 MMOL/L — SIGNIFICANT CHANGE UP (ref 135–145)
TOXICOLOGY SCREEN, DRUGS OF ABUSE, SERUM RESULT: SIGNIFICANT CHANGE UP
TSH SERPL-MCNC: 3.71 UIU/ML — SIGNIFICANT CHANGE UP (ref 0.27–4.2)
WBC # BLD: 6.78 K/UL — SIGNIFICANT CHANGE UP (ref 3.8–10.5)
WBC # FLD AUTO: 6.78 K/UL — SIGNIFICANT CHANGE UP (ref 3.8–10.5)

## 2021-11-11 PROCEDURE — 99285 EMERGENCY DEPT VISIT HI MDM: CPT

## 2021-11-11 PROCEDURE — 70450 CT HEAD/BRAIN W/O DYE: CPT | Mod: 26,QQ

## 2021-11-11 RX ORDER — SODIUM CHLORIDE 9 MG/ML
1000 INJECTION INTRAMUSCULAR; INTRAVENOUS; SUBCUTANEOUS ONCE
Refills: 0 | Status: COMPLETED | OUTPATIENT
Start: 2021-11-11 | End: 2021-11-11

## 2021-11-11 RX ORDER — ONDANSETRON 8 MG/1
4 TABLET, FILM COATED ORAL ONCE
Refills: 0 | Status: COMPLETED | OUTPATIENT
Start: 2021-11-11 | End: 2021-11-11

## 2021-11-11 RX ORDER — FOLIC ACID 0.8 MG
1 TABLET ORAL ONCE
Refills: 0 | Status: COMPLETED | OUTPATIENT
Start: 2021-11-11 | End: 2021-11-11

## 2021-11-11 RX ORDER — THIAMINE MONONITRATE (VIT B1) 100 MG
100 TABLET ORAL ONCE
Refills: 0 | Status: COMPLETED | OUTPATIENT
Start: 2021-11-11 | End: 2021-11-11

## 2021-11-11 RX ADMIN — SODIUM CHLORIDE 1000 MILLILITER(S): 9 INJECTION INTRAMUSCULAR; INTRAVENOUS; SUBCUTANEOUS at 20:55

## 2021-11-11 RX ADMIN — SODIUM CHLORIDE 1000 MILLILITER(S): 9 INJECTION INTRAMUSCULAR; INTRAVENOUS; SUBCUTANEOUS at 20:28

## 2021-11-11 RX ADMIN — ONDANSETRON 4 MILLIGRAM(S): 8 TABLET, FILM COATED ORAL at 20:28

## 2021-11-11 RX ADMIN — Medication 100 MILLIGRAM(S): at 20:28

## 2021-11-11 RX ADMIN — Medication 1 MILLIGRAM(S): at 20:56

## 2021-11-11 NOTE — ED PROVIDER NOTE - ATTENDING CONTRIBUTION TO CARE
GEN - alert, looking around, oriented to name though not answering other questions, repeating herself  HEAD - NC/AT   EYES- PERRL, EOMI  ENT: Airway patent, mmm, Oral cavity and pharynx normal. No inflammation, swelling, exudate, or lesions.  NECK: Neck supple, non-tender without lymphadenopathy, no masses.  PULMONARY - CTA b/l, symmetric breath sounds.   CARDIAC -s1s2, RRR, no M,G,R  ABDOMEN - +BS, ND, NT, soft, no guarding, no rebound, no masses   BACK - no CVA tenderness, Normal  spine   EXTREMITIES - FROM, symmetric pulses, capillary refill < 2 seconds, no edema   SKIN - no rash or bruising, no e/o trauma  NEUROLOGIC - alert, speech clear, face symmetric, moves all ext equally, does not follow direction of full neuro exam  PSYCH -denies si/hi, appears depressed, flat affect  a/p-patient presents to ed with reports of etoh intox, si-h/o similar in past while intoxicated. Patient not answering all direct questions, does state she has no pain, and denies si. Does not answer when asked if she drank. Did have a fall where she hit her head though no gross trauma noted. will check labs, ct brain, co for safety, monitor and reass pending initial w/u if requires psych when dx sober. GEN - alert, looking around, oriented to name though not answering other questions with more than 1 word answers, repeating herself, speech slow/mildly slurred, understandable, +odor of alcohol  HEAD - NC/AT   EYES- PERRL, EOMI  ENT: Airway patent, mmm, Oral cavity and pharynx normal. No inflammation, swelling, exudate, or lesions.  NECK: Neck supple, non-tender without lymphadenopathy, no masses.  PULMONARY - CTA b/l, symmetric breath sounds.   CARDIAC -s1s2, RRR, no M,G,R  ABDOMEN - +BS, ND, NT, soft, no guarding, no rebound, no masses   BACK - no CVA tenderness, Normal  spine   EXTREMITIES - FROM, symmetric pulses, capillary refill < 2 seconds, no edema   SKIN - no rash or bruising, no e/o trauma  NEUROLOGIC - alert,  face symmetric, moves all ext equally, does not follow direction of full neuro exam  PSYCH -denies si/hi, appears depressed, flat affect  a/p-patient presents to ed with reports of etoh intox, si-h/o similar in past while intoxicated. Patient not answering all direct questions, does state she has no pain, and denies si. Does not answer when asked if she drank. Did have a fall where she hit her head though no gross trauma noted. will check labs, ct brain, co for safety, monitor and reass pending initial w/u if requires psych when dx sober.

## 2021-11-11 NOTE — ED ADULT TRIAGE NOTE - CHIEF COMPLAINT QUOTE
p/t with hx ETOH, brought in by EMS for increased depression and SI, p/t with strong AOB, admits for drinking vodka every day, last alcohol intake this afternoon

## 2021-11-11 NOTE — ED PROVIDER NOTE - CLINICAL SUMMARY MEDICAL DECISION MAKING FREE TEXT BOX
Fish Enrique MD (PGY-2): The patient is a 46y Female with pmhx of ETOH abuse and depression/anxiety complaining of SI and alcohol intoxication. Pt appears clinically intoxicated from alcohol consumption, currently denying any SI, HI, AH, VH. IVF, thiamine, folic acid, Zofran, ekg, ct head, labs, tox screen including alcohol, tsh, hcg quant, u/a, lipase. Will have pt sober up and reassess pt. Pt not currently withdrawing. Most likely will be able to go home once sober.

## 2021-11-11 NOTE — ED PROVIDER NOTE - OBJECTIVE STATEMENT
The patient is a 46y Female with pmhx of ETOH abuse and depression/anxiety complaining of SI and alcohol intoxication. p/t with Brought in by EMS for increased depression and SI. Pt appears clinically intoxicated, so is somewhat of a poor historian. Admits for drinking 1L vodka bottle every day, says last alcohol intake this afternoon. Says she fell and hit left side of her head, not sure if she lost consciousness. Denies any CP, sob, back pain, abd pain, leg swelling, fevers, extremity pain. Endorsing mild nausea. Denies any other drug use today. Denies any current SI, HI, VH, AH. Takes medicine for anxiety and has outpatient psychiatrist (doesn't remember name of med or psychiatrist). Pt has been seen at [facility] multiple times in the past for alcohol intoxication and SI. NKDA. The patient is a 46y Female with pmhx of ETOH abuse and depression/anxiety complaining of SI and alcohol intoxication. p/t with Brought in by EMS for increased depression and SI. Pt appears clinically intoxicated, so is somewhat of a poor historian. Admits for drinking 1L vodka bottle every day, says last alcohol intake this afternoon. Says she fell and hit left side of her head, not sure if she lost consciousness, endorsing mild left temporal HA. Denies any CP, sob, back pain, abd pain, leg swelling, fevers, extremity pain. Endorsing mild nausea. Denies any other drug use today. Denies any current SI, HI, VH, AH. Takes medicine for anxiety and has outpatient psychiatrist (doesn't remember name of med or psychiatrist). Pt has been seen at [facility] multiple times in the past for alcohol intoxication and SI. NKDA.

## 2021-11-11 NOTE — ED PROVIDER NOTE - PROGRESS NOTE DETAILS
Fish Enrique MD (PGY-2): Pt is starting to withdraw (extremity tremors and tongue fasciculations), so gave 2mg ativan and 50mg librium. Pt will be admitted to hospitalist.

## 2021-11-11 NOTE — ED PROVIDER NOTE - PHYSICAL EXAMINATION
Gen: In mild distress. A&Ox4. Non-toxic appearing. Slurring words, appears clinically intoxicated. Unable to provide detailed history.  HEENT: Normocephalic and atraumatic. Head and scalp are nontender. PERRL, EOMI, no nasal discharge, mucous membranes dry, no scleral icterus. No tongue fasciculations.  CV: Regular rate and rhythm, +S1/S2, no M/R/G. No significant lower extremity edema. Radial and DP pulses present and symmetrical. Capillary refill less than 2 seconds.  Resp: Normal effort and rate. CTAB, no rales, rhonchi, or wheezes.  GI: Abdomen soft, non-distended, TTP in epigastric area. No masses appreciated. Bowel sounds present.  MSK: No open wounds and no bruising. No CVAT bilaterally. No midline spinal tenderness.  Neuro: Following some commands, not speaking in full sentences, moving extremities spontaneously. CN II-XII intact. Strength and sensation symmetric and intact throughout. Reflexes 2+ throughout. Cerebellar testing normal. No active extremity tremors.  Psych: Depressed mood, flat affect, cooperative

## 2021-11-11 NOTE — ED ADULT NURSE NOTE - NSIMPLEMENTINTERV_GEN_ALL_ED
Implemented All Fall Risk Interventions:  [place] to call system. Call bell, personal items and telephone within reach. Instruct patient to call for assistance. Room bathroom lighting operational. Non-slip footwear when patient is off stretcher. Physically safe environment: no spills, clutter or unnecessary equipment. Stretcher in lowest position, wheels locked, appropriate side rails in place. Provide visual cue, wrist band, yellow gown, etc. Monitor gait and stability. Monitor for mental status changes and reorient to person, place, and time. Review medications for side effects contributing to fall risk. Reinforce activity limits and safety measures with patient and family.

## 2021-11-11 NOTE — ED ADULT NURSE NOTE - OBJECTIVE STATEMENT
Patient received to room 25. Unable to obtain mental history on patient. Patient keeps repeating "sick" and "[facility]". Patient is unable to asnwer question, non compliant, patient was not letting IV being placed, Able to get patient to put IV acess. MD at bedside.

## 2021-11-11 NOTE — ED PROVIDER NOTE - NS ED ROS FT
GENERAL: +intoxication; no fever or chills  EYES: No change in vision  HEENT: No trouble swallowing or speaking  CARDIAC: No chest pain  PULMONARY: No cough or SOB  GI: +nausea; no abdominal pain, vomiting, diarrhea, or constipation  : No changes in urination  SKIN: No rashes  NEURO: +headache  MSK: No joint pain  PSYCH: +no SI, HI, VH, AH  Otherwise as HPI or negative.

## 2021-11-12 DIAGNOSIS — E87.2 ACIDOSIS: ICD-10-CM

## 2021-11-12 DIAGNOSIS — F10.239 ALCOHOL DEPENDENCE WITH WITHDRAWAL, UNSPECIFIED: ICD-10-CM

## 2021-11-12 DIAGNOSIS — R10.13 EPIGASTRIC PAIN: ICD-10-CM

## 2021-11-12 DIAGNOSIS — F10.929 ALCOHOL USE, UNSPECIFIED WITH INTOXICATION, UNSPECIFIED: ICD-10-CM

## 2021-11-12 DIAGNOSIS — R63.8 OTHER SYMPTOMS AND SIGNS CONCERNING FOOD AND FLUID INTAKE: ICD-10-CM

## 2021-11-12 DIAGNOSIS — R45.851 SUICIDAL IDEATIONS: ICD-10-CM

## 2021-11-12 LAB
ANION GAP SERPL CALC-SCNC: 16 MMOL/L — HIGH (ref 7–14)
BASE EXCESS BLDV CALC-SCNC: -2.7 MMOL/L — LOW (ref -2–3)
BLOOD GAS VENOUS COMPREHENSIVE RESULT: SIGNIFICANT CHANGE UP
BUN SERPL-MCNC: 12 MG/DL — SIGNIFICANT CHANGE UP (ref 7–23)
CALCIUM SERPL-MCNC: 8.5 MG/DL — SIGNIFICANT CHANGE UP (ref 8.4–10.5)
CHLORIDE BLDV-SCNC: 103 MMOL/L — SIGNIFICANT CHANGE UP (ref 96–108)
CHLORIDE SERPL-SCNC: 97 MMOL/L — LOW (ref 98–107)
CO2 BLDV-SCNC: 24.6 MMOL/L — SIGNIFICANT CHANGE UP (ref 22–26)
CO2 SERPL-SCNC: 23 MMOL/L — SIGNIFICANT CHANGE UP (ref 22–31)
COVID-19 SPIKE DOMAIN AB INTERP: POSITIVE
COVID-19 SPIKE DOMAIN ANTIBODY RESULT: >250 U/ML — HIGH
CREAT SERPL-MCNC: 0.56 MG/DL — SIGNIFICANT CHANGE UP (ref 0.5–1.3)
GAS PNL BLDV: 139 MMOL/L — SIGNIFICANT CHANGE UP (ref 136–145)
GLUCOSE BLDV-MCNC: 80 MG/DL — SIGNIFICANT CHANGE UP (ref 70–99)
GLUCOSE SERPL-MCNC: 106 MG/DL — HIGH (ref 70–99)
HCO3 BLDV-SCNC: 23 MMOL/L — SIGNIFICANT CHANGE UP (ref 22–29)
HCT VFR BLD CALC: 29.2 % — LOW (ref 34.5–45)
HCT VFR BLDA CALC: 30 % — LOW (ref 34.5–46.5)
HGB BLD CALC-MCNC: 9.9 G/DL — LOW (ref 11.5–15.5)
HGB BLD-MCNC: 8.7 G/DL — LOW (ref 11.5–15.5)
LACTATE BLDV-MCNC: 5.7 MMOL/L — CRITICAL HIGH (ref 0.5–2)
LACTATE SERPL-SCNC: 2.8 MMOL/L — HIGH (ref 0.5–2)
MAGNESIUM SERPL-MCNC: 1.7 MG/DL — SIGNIFICANT CHANGE UP (ref 1.6–2.6)
MCHC RBC-ENTMCNC: 20.9 PG — LOW (ref 27–34)
MCHC RBC-ENTMCNC: 29.8 GM/DL — LOW (ref 32–36)
MCV RBC AUTO: 70.2 FL — LOW (ref 80–100)
NRBC # BLD: 0 /100 WBCS — SIGNIFICANT CHANGE UP
NRBC # FLD: 0 K/UL — SIGNIFICANT CHANGE UP
PCO2 BLDV: 44 MMHG — HIGH (ref 39–42)
PH BLDV: 7.33 — SIGNIFICANT CHANGE UP (ref 7.32–7.43)
PHOSPHATE SERPL-MCNC: 1.9 MG/DL — LOW (ref 2.5–4.5)
PLATELET # BLD AUTO: 228 K/UL — SIGNIFICANT CHANGE UP (ref 150–400)
PO2 BLDV: 62 MMHG — SIGNIFICANT CHANGE UP
POTASSIUM BLDV-SCNC: 4.1 MMOL/L — SIGNIFICANT CHANGE UP (ref 3.5–5.1)
POTASSIUM SERPL-MCNC: 4.2 MMOL/L — SIGNIFICANT CHANGE UP (ref 3.5–5.3)
POTASSIUM SERPL-SCNC: 4.2 MMOL/L — SIGNIFICANT CHANGE UP (ref 3.5–5.3)
RBC # BLD: 4.16 M/UL — SIGNIFICANT CHANGE UP (ref 3.8–5.2)
RBC # FLD: 19.8 % — HIGH (ref 10.3–14.5)
SAO2 % BLDV: 88.6 % — SIGNIFICANT CHANGE UP
SARS-COV-2 IGG+IGM SERPL QL IA: >250 U/ML — HIGH
SARS-COV-2 IGG+IGM SERPL QL IA: POSITIVE
SODIUM SERPL-SCNC: 136 MMOL/L — SIGNIFICANT CHANGE UP (ref 135–145)
WBC # BLD: 5.47 K/UL — SIGNIFICANT CHANGE UP (ref 3.8–10.5)
WBC # FLD AUTO: 5.47 K/UL — SIGNIFICANT CHANGE UP (ref 3.8–10.5)

## 2021-11-12 PROCEDURE — 90792 PSYCH DIAG EVAL W/MED SRVCS: CPT

## 2021-11-12 PROCEDURE — 12345: CPT | Mod: NC

## 2021-11-12 PROCEDURE — 99223 1ST HOSP IP/OBS HIGH 75: CPT

## 2021-11-12 RX ORDER — THIAMINE MONONITRATE (VIT B1) 100 MG
400 TABLET ORAL ONCE
Refills: 0 | Status: DISCONTINUED | OUTPATIENT
Start: 2021-11-12 | End: 2021-11-12

## 2021-11-12 RX ORDER — THIAMINE MONONITRATE (VIT B1) 100 MG
100 TABLET ORAL DAILY
Refills: 0 | Status: DISCONTINUED | OUTPATIENT
Start: 2021-11-13 | End: 2021-11-13

## 2021-11-12 RX ORDER — MAGNESIUM SULFATE 500 MG/ML
2 VIAL (ML) INJECTION ONCE
Refills: 0 | Status: COMPLETED | OUTPATIENT
Start: 2021-11-12 | End: 2021-11-12

## 2021-11-12 RX ORDER — LANOLIN ALCOHOL/MO/W.PET/CERES
3 CREAM (GRAM) TOPICAL AT BEDTIME
Refills: 0 | Status: DISCONTINUED | OUTPATIENT
Start: 2021-11-12 | End: 2021-11-14

## 2021-11-12 RX ORDER — SODIUM CHLORIDE 9 MG/ML
1000 INJECTION INTRAMUSCULAR; INTRAVENOUS; SUBCUTANEOUS ONCE
Refills: 0 | Status: COMPLETED | OUTPATIENT
Start: 2021-11-12 | End: 2021-11-12

## 2021-11-12 RX ORDER — FOLIC ACID 0.8 MG
1 TABLET ORAL DAILY
Refills: 0 | Status: DISCONTINUED | OUTPATIENT
Start: 2021-11-12 | End: 2021-11-14

## 2021-11-12 RX ORDER — TRAZODONE HCL 50 MG
150 TABLET ORAL DAILY
Refills: 0 | Status: DISCONTINUED | OUTPATIENT
Start: 2021-11-12 | End: 2021-11-14

## 2021-11-12 RX ORDER — SODIUM CHLORIDE 9 MG/ML
1000 INJECTION, SOLUTION INTRAVENOUS
Refills: 0 | Status: DISCONTINUED | OUTPATIENT
Start: 2021-11-12 | End: 2021-11-14

## 2021-11-12 RX ORDER — SODIUM CHLORIDE 9 MG/ML
1000 INJECTION, SOLUTION INTRAVENOUS
Refills: 0 | Status: DISCONTINUED | OUTPATIENT
Start: 2021-11-12 | End: 2021-11-12

## 2021-11-12 RX ORDER — ONDANSETRON 8 MG/1
4 TABLET, FILM COATED ORAL ONCE
Refills: 0 | Status: COMPLETED | OUTPATIENT
Start: 2021-11-12 | End: 2021-11-12

## 2021-11-12 RX ORDER — THIAMINE MONONITRATE (VIT B1) 100 MG
500 TABLET ORAL EVERY 8 HOURS
Refills: 0 | Status: DISCONTINUED | OUTPATIENT
Start: 2021-11-12 | End: 2021-11-12

## 2021-11-12 RX ORDER — THIAMINE MONONITRATE (VIT B1) 100 MG
500 TABLET ORAL ONCE
Refills: 0 | Status: COMPLETED | OUTPATIENT
Start: 2021-11-12 | End: 2021-11-12

## 2021-11-12 RX ORDER — THIAMINE MONONITRATE (VIT B1) 100 MG
500 TABLET ORAL ONCE
Refills: 0 | Status: DISCONTINUED | OUTPATIENT
Start: 2021-11-12 | End: 2021-11-12

## 2021-11-12 RX ORDER — ERGOCALCIFEROL 1.25 MG/1
1 CAPSULE ORAL
Qty: 0 | Refills: 0 | DISCHARGE

## 2021-11-12 RX ORDER — ACETAMINOPHEN 500 MG
650 TABLET ORAL ONCE
Refills: 0 | Status: COMPLETED | OUTPATIENT
Start: 2021-11-12 | End: 2021-11-12

## 2021-11-12 RX ORDER — SODIUM,POTASSIUM PHOSPHATES 278-250MG
1 POWDER IN PACKET (EA) ORAL THREE TIMES A DAY
Refills: 0 | Status: COMPLETED | OUTPATIENT
Start: 2021-11-12 | End: 2021-11-14

## 2021-11-12 RX ORDER — ALPRAZOLAM 0.25 MG
1 TABLET ORAL
Qty: 0 | Refills: 0 | DISCHARGE

## 2021-11-12 RX ADMIN — Medication 2 MILLIGRAM(S): at 17:16

## 2021-11-12 RX ADMIN — Medication 50 MILLIGRAM(S): at 14:04

## 2021-11-12 RX ADMIN — Medication 50 MILLIGRAM(S): at 04:41

## 2021-11-12 RX ADMIN — ONDANSETRON 4 MILLIGRAM(S): 8 TABLET, FILM COATED ORAL at 02:19

## 2021-11-12 RX ADMIN — Medication 2 MILLIGRAM(S): at 23:31

## 2021-11-12 RX ADMIN — Medication 50 GRAM(S): at 15:34

## 2021-11-12 RX ADMIN — Medication 150 MILLIGRAM(S): at 17:15

## 2021-11-12 RX ADMIN — Medication 2 MILLIGRAM(S): at 09:22

## 2021-11-12 RX ADMIN — Medication 1 PACKET(S): at 22:52

## 2021-11-12 RX ADMIN — SODIUM CHLORIDE 1000 MILLILITER(S): 9 INJECTION INTRAMUSCULAR; INTRAVENOUS; SUBCUTANEOUS at 04:29

## 2021-11-12 RX ADMIN — Medication 3 MILLIGRAM(S): at 23:26

## 2021-11-12 RX ADMIN — Medication 105 MILLIGRAM(S): at 07:30

## 2021-11-12 RX ADMIN — SODIUM CHLORIDE 125 MILLILITER(S): 9 INJECTION, SOLUTION INTRAVENOUS at 23:28

## 2021-11-12 RX ADMIN — Medication 2 MILLIGRAM(S): at 13:22

## 2021-11-12 RX ADMIN — Medication 50 MILLIGRAM(S): at 22:52

## 2021-11-12 RX ADMIN — SODIUM CHLORIDE 125 MILLILITER(S): 9 INJECTION, SOLUTION INTRAVENOUS at 14:10

## 2021-11-12 RX ADMIN — Medication 2 MILLIGRAM(S): at 04:41

## 2021-11-12 RX ADMIN — Medication 1 MILLIGRAM(S): at 12:24

## 2021-11-12 RX ADMIN — Medication 1 TABLET(S): at 12:24

## 2021-11-12 RX ADMIN — Medication 50 GRAM(S): at 06:17

## 2021-11-12 RX ADMIN — Medication 650 MILLIGRAM(S): at 12:25

## 2021-11-12 RX ADMIN — Medication 650 MILLIGRAM(S): at 13:00

## 2021-11-12 RX ADMIN — Medication 1 PACKET(S): at 15:40

## 2021-11-12 RX ADMIN — SODIUM CHLORIDE 100 MILLILITER(S): 9 INJECTION, SOLUTION INTRAVENOUS at 07:30

## 2021-11-12 NOTE — BH CONSULTATION LIAISON ASSESSMENT NOTE - NSBHCHARTREVIEWVS_PSY_A_CORE FT
Vital Signs Last 24 Hrs  T(C): 37 (12 Nov 2021 14:00), Max: 37.3 (12 Nov 2021 11:01)  T(F): 98.6 (12 Nov 2021 14:00), Max: 99.2 (12 Nov 2021 11:01)  HR: 122 (12 Nov 2021 14:00) (85 - 122)  BP: 146/98 (12 Nov 2021 14:00) (128/86 - 166/103)  BP(mean): --  RR: 16 (12 Nov 2021 14:00) (16 - 18)  SpO2: 99% (12 Nov 2021 14:00) (94% - 100%)

## 2021-11-12 NOTE — H&P ADULT - ASSESSMENT
Patient is a 46 year old F hx of EtOH abuse, depression, anxiety complaining of SI and alcohol intoxication.

## 2021-11-12 NOTE — BH CONSULTATION LIAISON ASSESSMENT NOTE - NSBHCHARTREVIEWLAB_PSY_A_CORE FT
LABS:  11-11 @ 20:16 Creatine 252 U/L<H> [25 - 170]  cret                        8.7    5.47  )-----------( 228      ( 12 Nov 2021 10:59 )             29.2     11-12    136  |  97<L>  |  12  ----------------------------<  106<H>  4.2   |  23  |  0.56    Ca    8.5      12 Nov 2021 10:59  Phos  1.9     11-12  Mg     1.70     11-12    TPro  9.3<H>  /  Alb  4.6  /  TBili  0.2  /  DBili  x   /  AST  48<H>  /  ALT  18  /  AlkPhos  75  11-11

## 2021-11-12 NOTE — H&P ADULT - HISTORY OF PRESENT ILLNESS
Patient is a 46 year old F hx of EtOH abuse, depression, anxiety complaining of SI and alcohol intoxication. Was brought in by EMS for worsening depression and SI. She states having suicidal thoughts, and did fall and hit her head. She denied attempting suicide on my interview. She denies hallucinations or HI. She drinks 1 L vodka per day, has tried Antabuse and was prescribed naltrexone but did not take this. She did hit her head, not sure if she lost consciousness or not. Denies seizure or prior intubations. Denies fevers, chills, nausea, vomiting, diarrhea, sob, cp, visual hallucinations, has mild headache, denies skin rashes.     ED course: afebrile, , /92, RR 16, 99% RA  lipase wnl, beta hcg negative  status post ativan 2 mg iv and librium 50 mg oral CIWA initially 9 now 6

## 2021-11-12 NOTE — H&P ADULT - NSHPLABSRESULTS_GEN_ALL_CORE
.  LABS:                         10.7   6.78  )-----------( 314      ( 11 Nov 2021 19:54 )             37.1     11-11    142  |  98  |  13  ----------------------------<  86  4.5   |  19<L>  |  0.62    Ca    10.4      11 Nov 2021 19:54  Phos  3.5     11-11  Mg     1.60     11-11    TPro  9.3<H>  /  Alb  4.6  /  TBili  0.2  /  DBili  x   /  AST  48<H>  /  ALT  18  /  AlkPhos  75  11-11        CARDIAC MARKERS ( 11 Nov 2021 20:16 )  x     / x     / 252 U/L / x     / x                RADIOLOGY, EKG & ADDITIONAL TESTS: Reviewed.

## 2021-11-12 NOTE — BH CONSULTATION LIAISON ASSESSMENT NOTE - MSE UNSTRUCTURED FT
Appearance: dressed in gown, fair grooming  Behavior: cooperative, good eye contact  Motor: bl hand tremors  Speech: normal production and volume  Mood: "disappointed"  Affect: neutral to sad, appropriately reactive,   Thought Process: linear and logical, tight associations  Thought Content: ruminations about her relapse. no delusional content was elicited. denies SIIP  Perception: no abnormalities noted  Cognition: A+Ox3, no deficits noted  Insight: good  Judgement: fair (considering willingness to stay in the hospital for detox)  Impulse Control: intact  Gait/Station: laying in bed

## 2021-11-12 NOTE — BH CONSULTATION LIAISON ASSESSMENT NOTE - SUMMARY
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, with PPH significant for alcohol use disorder (history of multiple rehab admissions, per chart rereview, history of alcohol withdrawal seizure, as well as anxiety and recent hospitalization 1 month prior for etoh intoxication, presents for etoh intoxication, requesting detox, psychiatry was consulted for depression and SI    PLAN  - patient on Librium taper and symptom triggered ciwa at this time, tolerating well  - PRN ativan as per ciwa protocol  - can remove CO, denies SI  - SBIRT consult  - patient would benefit from substance programming//only reports interest in AA at this time  - IV thiamine 500mg TID x 3 days 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, with PPH significant for alcohol use disorder (history of multiple rehab admissions, per chart rereview, history of alcohol withdrawal seizure, as well as anxiety and recent hospitalization 1 month prior for etoh intoxication, presents for etoh intoxication, requesting detox, psychiatry was consulted for depression and SI    PLAN  - patient on Librium taper and symptom triggered ciwa at this time, tolerating well  	>>> defer to primary team for treatment  - PRN ativan as per ciwa protocol  - can remove CO, denies SI  - SBIRT consult  - patient would benefit from substance programming//only reports interest in AA at this time  - IV thiamine 500mg TID x 3 days

## 2021-11-12 NOTE — H&P ADULT - PROBLEM SELECTOR PLAN 5
F-s/p 3 L NS c/w 100 cc/hr LR D5  E-replete K<4 and Mag <2 check phosphorous as well  N-regular diet  SCD for DVT prophlyaxis

## 2021-11-12 NOTE — H&P ADULT - NSHPPHYSICALEXAM_GEN_ALL_CORE
PHYSICAL EXAM:  GENERAL: NAD, well-developed  HEAD:  Atraumatic, Normocephalic  EYES: EOMI, PERRLA, conjunctiva and sclera clear  NECK: Supple, No JVD  CHEST/LUNG: Clear to auscultation bilaterally; No wheeze  HEART: Regular rate and rhythm; No murmurs, rubs, or gallops  ABDOMEN: Soft, mild abdominal tenderness to palpation. Nondistended; Bowel sounds present  EXTREMITIES:  2+ Peripheral Pulses, No clubbing, cyanosis, or edema  PSYCH: AAOx3  NEUROLOGY: non-focal  SKIN: No rashes or lesions

## 2021-11-12 NOTE — BH CONSULTATION LIAISON ASSESSMENT NOTE - CURRENT MEDICATION
MEDICATIONS  (STANDING):  chlordiazePOXIDE 50 milliGRAM(s) Oral every 8 hours  dextrose 5% + lactated ringers. 1000 milliLiter(s) (125 mL/Hr) IV Continuous <Continuous>  folic acid 1 milliGRAM(s) Oral daily  multivitamin 1 Tablet(s) Oral daily  potassium phosphate / sodium phosphate Powder (PHOS-NaK) 1 Packet(s) Oral three times a day  traZODone 150 milliGRAM(s) Oral daily    MEDICATIONS  (PRN):  LORazepam   Injectable 2 milliGRAM(s) IV Push every 2 hours PRN CIWA >8  melatonin 3 milliGRAM(s) Oral at bedtime PRN Insomnia

## 2021-11-12 NOTE — H&P ADULT - PROBLEM SELECTOR PLAN 1
-patient with alcohol withdrawal last drink evening 11/11  -serum alcohol level 400s CIWA 9 requiring ativan 2 mg iv and librium  -ativan Q3 prn CIWA >8 and CIWA protocol  -suicidal ideation as below, constant observation for now  -urine pregnancy negative

## 2021-11-12 NOTE — ED ADULT NURSE REASSESSMENT NOTE - NS ED NURSE REASSESS COMMENT FT1
Report given to BOBBY Stein , KRISU 2. Patient on constant observation.  Left ED in stable condition

## 2021-11-12 NOTE — BH CONSULTATION LIAISON ASSESSMENT NOTE - HPI (INCLUDE ILLNESS QUALITY, SEVERITY, DURATION, TIMING, CONTEXT, MODIFYING FACTORS, ASSOCIATED SIGNS AND SYMPTOMS)
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 Patient is alert, oriented, calm, cooperative, tearful at times talking about her struggles with alcohol.  Patient states she feels she is not depressed, and is not suicidal. She states that she was drunk when she came in to the hospital and does not remember saying those things. She denies feeling hopeless, low motivation, change in sleep or appetite. She states she is future oriented, enjoys time with her nephews and nieces as her protective factors. Patient talks about her plans to move [facility] to [facility] where her psychiatrist is located and where she had a long stretch of sobriety. 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 Patient has been in rehabs, IOP, AA before and plans to continue AA at this time. NO psychosis. NO alexis. Mild tremors noted on exam. Linear, logical.     Spoke with Azul, patient sister in law - The patient has had multiple relapses with alcohol. Following her last discharge, she seemed to be doing well and the family did not notice any depression, suicidal statements, or behavior that made them worry about the patient's safety. She has never been known to try and take her own life. Family will support her on discharge and continue to encourage her to seek help for substance use.

## 2021-11-13 PROCEDURE — 99232 SBSQ HOSP IP/OBS MODERATE 35: CPT

## 2021-11-13 RX ORDER — THIAMINE MONONITRATE (VIT B1) 100 MG
500 TABLET ORAL EVERY 8 HOURS
Refills: 0 | Status: DISCONTINUED | OUTPATIENT
Start: 2021-11-13 | End: 2021-11-14

## 2021-11-13 RX ADMIN — SODIUM CHLORIDE 125 MILLILITER(S): 9 INJECTION, SOLUTION INTRAVENOUS at 09:40

## 2021-11-13 RX ADMIN — Medication 150 MILLIGRAM(S): at 21:39

## 2021-11-13 RX ADMIN — Medication 1 PACKET(S): at 06:03

## 2021-11-13 RX ADMIN — Medication 1 PACKET(S): at 14:28

## 2021-11-13 RX ADMIN — Medication 1 MILLIGRAM(S): at 13:02

## 2021-11-13 RX ADMIN — Medication 1 TABLET(S): at 13:02

## 2021-11-13 RX ADMIN — Medication 25 MILLIGRAM(S): at 14:28

## 2021-11-13 RX ADMIN — Medication 2 MILLIGRAM(S): at 09:40

## 2021-11-13 RX ADMIN — Medication 1 PACKET(S): at 21:39

## 2021-11-13 RX ADMIN — Medication 25 MILLIGRAM(S): at 21:39

## 2021-11-13 RX ADMIN — Medication 50 MILLIGRAM(S): at 06:03

## 2021-11-14 ENCOUNTER — TRANSCRIPTION ENCOUNTER (OUTPATIENT)
Age: 46
End: 2021-11-14

## 2021-11-14 VITALS
HEART RATE: 100 BPM | RESPIRATION RATE: 18 BRPM | SYSTOLIC BLOOD PRESSURE: 142 MMHG | TEMPERATURE: 98 F | OXYGEN SATURATION: 100 % | DIASTOLIC BLOOD PRESSURE: 98 MMHG

## 2021-11-14 PROCEDURE — 99239 HOSP IP/OBS DSCHRG MGMT >30: CPT

## 2021-11-14 RX ORDER — THIAMINE MONONITRATE (VIT B1) 100 MG
1 TABLET ORAL
Qty: 0 | Refills: 0 | DISCHARGE

## 2021-11-14 RX ORDER — PANTOPRAZOLE SODIUM 20 MG/1
1 TABLET, DELAYED RELEASE ORAL
Qty: 30 | Refills: 0
Start: 2021-11-14 | End: 2021-12-13

## 2021-11-14 RX ORDER — PANTOPRAZOLE SODIUM 20 MG/1
40 TABLET, DELAYED RELEASE ORAL
Refills: 0 | Status: DISCONTINUED | OUTPATIENT
Start: 2021-11-14 | End: 2021-11-14

## 2021-11-14 RX ORDER — FOLIC ACID 0.8 MG
1 TABLET ORAL
Qty: 30 | Refills: 0
Start: 2021-11-14 | End: 2021-12-13

## 2021-11-14 RX ORDER — THIAMINE MONONITRATE (VIT B1) 100 MG
1 TABLET ORAL
Qty: 30 | Refills: 0
Start: 2021-11-14 | End: 2021-12-13

## 2021-11-14 RX ADMIN — Medication 25 MILLIGRAM(S): at 05:07

## 2021-11-14 RX ADMIN — Medication 1 PACKET(S): at 05:07

## 2021-11-14 RX ADMIN — Medication 1 TABLET(S): at 13:01

## 2021-11-14 RX ADMIN — Medication 1 MILLIGRAM(S): at 13:01

## 2021-11-14 NOTE — PROGRESS NOTE ADULT - PROBLEM SELECTOR PLAN 4
-abdominal pain likely 2/2 starvation ketosis  -lactate elevation as well  -lipase wnl less concern for pancreatitis  -c/w fluids for starvation ketosis
-abdominal pain likely 2/2 starvation ketosis  -lactate elevation as well  -lipase wnl less concern for pancreatitis  -c/w fluids for starvation ketosis

## 2021-11-14 NOTE — PROVIDER CONTACT NOTE (OTHER) - REASON
Pt refused old iv to be taken out and refused new IV to be put in she stated she was stick a total of 8 in ED and 3 by the AM nurse and refuses thiamine IVPB.

## 2021-11-14 NOTE — DISCHARGE NOTE NURSING/CASE MANAGEMENT/SOCIAL WORK - PATIENT PORTAL LINK FT
You can access the FollowMyHealth Patient Portal offered by [facility] by registering at the following website: [email or web address]. By joining [facility]’s FollowMyHealth portal, you will also be able to view your health information using other applications (apps) compatible with our system.

## 2021-11-14 NOTE — PROGRESS NOTE ADULT - PROBLEM SELECTOR PLAN 5
F-s/p 3 L NS c/w 100 cc/hr LR D5  E-replete K<4 and Mag <2 check phosphorous as well  N-regular diet  dvt ppx
F-s/p 3 L NS c/w 100 cc/hr LR D5  E-replete K<4 and Mag <2 check phosphorous as well  N-regular diet  dvt ppx

## 2021-11-14 NOTE — DISCHARGE NOTE NURSING/CASE MANAGEMENT/SOCIAL WORK - NSDCVIVACCINE_GEN_ALL_CORE_FT
influenza, injectable, quadrivalent, preservative free; 23-Oct-2020 20:55; Ramón Wong (RN); [employer]; [id number] (Exp. Date: 30-Jun-2021); IntraMuscular; Deltoid Left.; 0.5 milliLiter(s); VIS (VIS Published: 15-Aug-2019, VIS Presented: 23-Oct-2020);

## 2021-11-14 NOTE — DISCHARGE NOTE PROVIDER - NSDCMRMEDTOKEN_GEN_ALL_CORE_FT
ALPRAZolam 1 mg oral tablet: 1 tab(s) orally 3 times a day as needed  folic acid 1 mg oral tablet: 1 tab(s) orally once a day  melatonin 3 mg oral tablet: 1 tab(s) orally once a day (at bedtime), As needed, Insomnia  Multiple Vitamins oral tablet: 1 tab(s) orally once a day  pantoprazole 40 mg oral delayed release tablet: 1 tab(s) orally once a day (before a meal)  thiamine 250 mg oral tablet: 1 tab(s) orally once a day  traZODone 150 mg oral tablet: 1 tab(s) orally once a day

## 2021-11-14 NOTE — SBIRT NOTE ADULT - NSSBIRTALCACTIVEREFTXDET_GEN_A_CORE
raised the subject with patient regarding her alcohol usage by obtaining feedback of her substance abuse habits.  Patient reports that she drinks one liter of Vodka daily for approximately twi years.    attempted to enhance the patient’s motivation to change by providing education and supportive counseling on the pros and cons of substance abuse, the dangers, and long-term health consequences of continuous alcohol, identifying triggers, and treatment options.   provided patient with additional resources on inpatient, outpatient, and residential substance abuse treatment facilities then negotiated a plan.  Patient reports that she going [facility] to a recovery Program.  Patient declined SW referral for Inpatient Substance Program and SW respected patient’s decision.

## 2021-11-14 NOTE — PROGRESS NOTE ADULT - PROBLEM SELECTOR PLAN 3
-constant observation dc'd per psych  -apprec psych recs  -iv thiamine  -continue with home trazodone dosing
-constant observation dc'd per psych  -apprec psych recs  -iv thiamine  -continue with home trazodone dosing

## 2021-11-14 NOTE — DISCHARGE NOTE PROVIDER - HOSPITAL COURSE
Patient is a 46 year old F hx of EtOH abuse, depression, anxiety complaining of SI and alcohol intoxication.    Alcohol withdrawal.   -patient with alcohol withdrawal last drink evening 11/11  -serum alcohol level 400s CIWA 9 requiring ativan 2 mg iv and librium  -ativan Q3 prn CIWA >8 and CIWA protocol  -ciwa score down to 3, taper librium to 25mg q8h  -iv thiamine  -SBIRT eval  -suicidal ideation as below, but per psych no SI, dc'd CO  -urine pregnancy negative.    Metabolic acidosis.   -lactate elevation 9--->5.4 after 3 L sodium chloride  -LR D5 100 cc/hr for starvation ketosis.    Suicidal ideation.   -constant observation dc'd per psych  -apprec psych recs  -iv thiamine  -continue with home trazodone dosing.    Intermittent epigastric abdominal pain.   -abdominal pain likely 2/2 starvation ketosis  -lactate elevation as well  -lipase wnl less concern for pancreatitis  -c/w fluids for starvation ketosis.    Nutrition, metabolism, and development symptoms.   F-s/p 3 L NS c/w 100 cc/hr LR D5  E-replete K<4 and Mag <2 check phosphorous as well  N-regular diet  dvt ppx.    Patient is medically stable for discharge on 11/14/2021 per attending Dr. Maggy Davidson.

## 2021-11-14 NOTE — PROGRESS NOTE ADULT - ASSESSMENT
Patient is a 46 year old F hx of EtOH abuse, depression, anxiety complaining of SI and alcohol intoxication.
Patient is a 46 year old F hx of EtOH abuse, depression, anxiety complaining of SI and alcohol intoxication.

## 2021-11-14 NOTE — PROGRESS NOTE ADULT - PROBLEM SELECTOR PLAN 2
-lactate elevation 9--->5.4 after 3 L sodium chloride  -LR D5 100 cc/hr for starvation ketosis
-lactate elevation 9--->5.4 after 3 L sodium chloride  -LR D5 100 cc/hr for starvation ketosis

## 2021-11-14 NOTE — PROGRESS NOTE ADULT - PROBLEM SELECTOR PLAN 1
-patient with alcohol withdrawal last drink evening 11/11  -serum alcohol level 400s CIWA 9 requiring ativan 2 mg iv and librium  -ativan Q3 prn CIWA >8 and CIWA protocol  -ciwa score down to 0-1, completed librium taper 25mg q8h x3 doses  -iv thiamine, dc on po thiamine, folate  -start protonix 40mg qd  -SBIRT lashonda today, pt states she will stay with her mom for a few days in [place] and then move up to [place] in [facility] to a recovery program, resources given to patient   -suicidal ideation as below, but per psych no obed PARKINSON'd CO  -urine pregnancy negative.  -Time spent on discharge 32 minutes coordinating discharge plan and discussing with patient and family.
-patient with alcohol withdrawal last drink evening 11/11  -serum alcohol level 400s CIWA 9 requiring ativan 2 mg iv and librium  -ativan Q3 prn CIWA >8 and CIWA protocol  -ciwa score down to 3, taper librium to 25mg q8h  -iv thiamine  -SBIRT eval  -suicidal ideation as below, but per psych no SI, dc'd CO  -urine pregnancy negative

## 2021-11-14 NOTE — PROVIDER CONTACT NOTE (OTHER) - SITUATION
Pt refused old iv to be taken out and refused new IV to be put in she stated she was stick a total of 8 in ED and 3 by the AM nurse and refuses thiamine IVPB and states she doesn't want any ativan.

## 2021-11-14 NOTE — DISCHARGE NOTE PROVIDER - NSDCCPCAREPLAN_GEN_ALL_CORE_FT
PRINCIPAL DISCHARGE DIAGNOSIS  Diagnosis: Alcohol intoxication  Assessment and Plan of Treatment: You were admitted in the hospital for alcohol intoxication. You were monitored carefully in the hospital and received the appropriate medications. Your symptoms have improved upon discharge. Continue taking thiamine, folic acid, and protonix at home. Follow up with your primary care physician upon discharge.      SECONDARY DISCHARGE DIAGNOSES  Diagnosis: Suicidal ideation  Assessment and Plan of Treatment: Continue taking trazodone at home. Follow up with your psychiatrist and/or primary care physician upon discharge.

## 2021-11-14 NOTE — PROVIDER CONTACT NOTE (OTHER) - ACTION/TREATMENT ORDERED:
PA is ok w/ patient refusing ativan and thiamine and also refusing the new IV to be put in w/ refusing the old IV to be taken out.

## 2021-11-14 NOTE — PROGRESS NOTE ADULT - SUBJECTIVE AND OBJECTIVE BOX
Dr. Maggy Davidson  Pager [phone]    PROGRESS NOTE:     Patient is a 46y old  Female who presents with a chief complaint of alcohol withdrawal SI (12 Nov 2021 06:30)      SUBJECTIVE / OVERNIGHT EVENTS: pt is less tremulous, ciwa score 3  ADDITIONAL REVIEW OF SYSTEMS: afebrile    MEDICATIONS  (STANDING):  chlordiazePOXIDE 25 milliGRAM(s) Oral every 8 hours  dextrose 5% + lactated ringers. 1000 milliLiter(s) (125 mL/Hr) IV Continuous <Continuous>  folic acid 1 milliGRAM(s) Oral daily  multivitamin 1 Tablet(s) Oral daily  potassium phosphate / sodium phosphate Powder (PHOS-NaK) 1 Packet(s) Oral three times a day  thiamine IVPB 500 milliGRAM(s) IV Intermittent every 8 hours  traZODone 150 milliGRAM(s) Oral daily    MEDICATIONS  (PRN):  LORazepam   Injectable 2 milliGRAM(s) IV Push every 2 hours PRN CIWA >8  melatonin 3 milliGRAM(s) Oral at bedtime PRN Insomnia      CAPILLARY BLOOD GLUCOSE        I&O's Summary      PHYSICAL EXAM:  Vital Signs Last 24 Hrs  T(C): 36.6 (13 Nov 2021 14:15), Max: 37.1 (12 Nov 2021 17:25)  T(F): 97.8 (13 Nov 2021 14:15), Max: 98.7 (12 Nov 2021 17:25)  HR: 98 (13 Nov 2021 14:15) (83 - 102)  BP: 140/90 (13 Nov 2021 14:15) (125/89 - 155/90)  BP(mean): --  RR: 17 (13 Nov 2021 14:15) (16 - 18)  SpO2: 100% (13 Nov 2021 14:15) (97% - 100%)  CONSTITUTIONAL: NAD, well-developed  RESPIRATORY: Normal respiratory effort; lungs are clear to auscultation bilaterally  CARDIOVASCULAR: Regular rate and rhythm, normal S1 and S2, no murmur/rub/gallop; No lower extremity edema; Peripheral pulses are 2+ bilaterally  ABDOMEN: Nontender to palpation, normoactive bowel sounds, no rebound/guarding; No hepatosplenomegaly  MUSCULOSKELETAL: no clubbing or cyanosis of digits; no joint swelling or tenderness to palpation  PSYCH: A+O to person, place, and time; affect appropriate, less tremulous    LABS:                        8.7    5.47  )-----------( 228      ( 12 Nov 2021 10:59 )             29.2     11-12    136  |  97<L>  |  12  ----------------------------<  106<H>  4.2   |  23  |  0.56    Ca    8.5      12 Nov 2021 10:59  Phos  1.9     11-12  Mg     1.70     11-12    TPro  9.3<H>  /  Alb  4.6  /  TBili  0.2  /  DBili  x   /  AST  48<H>  /  ALT  18  /  AlkPhos  75  11-11      CARDIAC MARKERS ( 11 Nov 2021 20:16 )  x     / x     / 252 U/L / x     / x                RADIOLOGY & ADDITIONAL TESTS:  Results Reviewed:   Imaging Personally Reviewed:  Electrocardiogram Personally Reviewed:    COORDINATION OF CARE:  Care Discussed with Consultants/Other Providers [Y/N]:  Prior or Outpatient Records Reviewed [Y/N]:  
Dr. Maggy Davidson  Pager [phone]    PROGRESS NOTE:     Patient is a 46y old  Female who presents with a chief complaint of alcohol withdrawal SI (14 Nov 2021 10:52)      SUBJECTIVE / OVERNIGHT EVENTS: ciwa 0-1,. a/ox3, eager to go home today  ADDITIONAL REVIEW OF SYSTEMS: no complaints    MEDICATIONS  (STANDING):  dextrose 5% + lactated ringers. 1000 milliLiter(s) (125 mL/Hr) IV Continuous <Continuous>  folic acid 1 milliGRAM(s) Oral daily  multivitamin 1 Tablet(s) Oral daily  pantoprazole    Tablet 40 milliGRAM(s) Oral before breakfast  thiamine IVPB 500 milliGRAM(s) IV Intermittent every 8 hours  traZODone 150 milliGRAM(s) Oral daily    MEDICATIONS  (PRN):  LORazepam   Injectable 2 milliGRAM(s) IV Push every 2 hours PRN CIWA >8  melatonin 3 milliGRAM(s) Oral at bedtime PRN Insomnia      CAPILLARY BLOOD GLUCOSE        I&O's Summary    13 Nov 2021 07:01  -  14 Nov 2021 07:00  --------------------------------------------------------  IN: 1105 mL / OUT: 0 mL / NET: 1105 mL        PHYSICAL EXAM:  Vital Signs Last 24 Hrs  T(C): 36.9 (14 Nov 2021 07:00), Max: 37.2 (14 Nov 2021 03:00)  T(F): 98.5 (14 Nov 2021 07:00), Max: 98.9 (14 Nov 2021 03:00)  HR: 100 (14 Nov 2021 07:00) (85 - 103)  BP: 142/79 (14 Nov 2021 07:00) (133/85 - 146/97)  BP(mean): --  RR: 18 (14 Nov 2021 07:00) (17 - 18)  SpO2: 100% (14 Nov 2021 07:00) (99% - 100%)  CONSTITUTIONAL: NAD, well-developed  RESPIRATORY: Normal respiratory effort; lungs are clear to auscultation bilaterally  CARDIOVASCULAR: Regular rate and rhythm, normal S1 and S2, no murmur/rub/gallop; No lower extremity edema; Peripheral pulses are 2+ bilaterally  ABDOMEN: Nontender to palpation, normoactive bowel sounds, no rebound/guarding; No hepatosplenomegaly  MUSCULOSKELETAL: no clubbing or cyanosis of digits; no joint swelling or tenderness to palpation  PSYCH: A+O to person, place, and time; affect appropriate    LABS:                      RADIOLOGY & ADDITIONAL TESTS:  Results Reviewed:   Imaging Personally Reviewed:  Electrocardiogram Personally Reviewed:    COORDINATION OF CARE:  Care Discussed with Consultants/Other Providers [Y/N]: hay Seaman dc home today on ppi, thiamine, folate, sbirt eval  Prior or Outpatient Records Reviewed [Y/N]:

## 2022-01-03 NOTE — PATIENT PROFILE ADULT - NSPROGENSOURCEINFO_GEN_A_NUR
From: Pippa Oseguera  To: Pat Agarwal  Sent: 12/31/2021 9:21 AM CST  Subject: Losartan    I have just read a message from [employer] that there is an impurity in Losartan that can cause an increased risk of cancer. Can Dr. Camacho change the medicationI use for high blood pressure?    Pippa Conti  
Please advise on below message.  
Pt notified, verbalized understanding.  Rx sent.  
atacand 8 mg qd instead and stop losartan  
patient

## 2022-02-07 NOTE — DISCHARGE NOTE NURSING/CASE MANAGEMENT/SOCIAL WORK - NSDCPETBCESMAN_GEN_ALL_CORE
[address] Comprehensive If you are a smoker, it is important for your health to stop smoking. Please be aware that second hand smoke is also harmful.

## 2022-02-09 NOTE — ED ADULT NURSE NOTE - NSSUSCREENINGQ3_ED_ALL_ED
Conscious sedation complete, see sedation narrator. Pt awake and oriented. Pt c/o pain. ERP aware. New orders received.   No

## 2022-03-16 NOTE — H&P ADULT - PROBLEM/PLAN-7
Contacted patient and transferred to reception pool to schedule virtual appointment to review lab results and discuss IUD.    Laurie Riojas RN on 3/16/2022 at 11:57 AM     DISPLAY PLAN FREE TEXT
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
## 2022-03-28 NOTE — PATIENT PROFILE ADULT - NSPROPOAURINARYCATHETER_GEN_A_NUR
If patient is referring to her Strattera, I would advise checking the good Rx roxanne. (as low as $31 at [facility] [employer] [facility])  Sometimes its cheaper with cash price and market coupons rather than what your insurance will cover.    no

## 2022-04-13 NOTE — PATIENT PROFILE ADULT - FALL HARM RISK TYPE OF ASSESSMENT
Received request via: Pharmacy    Was the patient seen in the last year in this department? yes    Does the patient have an active prescription (recently filled or refills available) for medication(s) requested? No   admission

## 2022-05-23 ENCOUNTER — INPATIENT (INPATIENT)
Facility: HOSPITAL | Age: 47
LOS: 4 days | Discharge: HOME | DRG: 897 | End: 2022-05-28
Attending: INTERNAL MEDICINE | Admitting: INTERNAL MEDICINE
Payer: MEDICAID

## 2022-05-23 VITALS
WEIGHT: 149.91 LBS | DIASTOLIC BLOOD PRESSURE: 91 MMHG | SYSTOLIC BLOOD PRESSURE: 118 MMHG | OXYGEN SATURATION: 98 % | HEART RATE: 105 BPM | HEIGHT: 67 IN | TEMPERATURE: 98 F | RESPIRATION RATE: 20 BRPM

## 2022-05-23 DIAGNOSIS — Z98.890 OTHER SPECIFIED POSTPROCEDURAL STATES: Chronic | ICD-10-CM

## 2022-05-23 LAB
ALBUMIN SERPL ELPH-MCNC: 4.5 G/DL — SIGNIFICANT CHANGE UP (ref 3.3–5)
ALP SERPL-CCNC: 89 U/L — SIGNIFICANT CHANGE UP (ref 40–120)
ALT FLD-CCNC: 57 U/L — HIGH (ref 10–45)
ANION GAP SERPL CALC-SCNC: 31 MMOL/L — HIGH (ref 5–17)
ANISOCYTOSIS BLD QL: SLIGHT — SIGNIFICANT CHANGE UP
APAP SERPL-MCNC: <15 UG/ML — SIGNIFICANT CHANGE UP (ref 10–30)
APTT BLD: 28.2 SEC — SIGNIFICANT CHANGE UP (ref 27.5–35.5)
AST SERPL-CCNC: 104 U/L — HIGH (ref 10–40)
BASOPHILS # BLD AUTO: 0 K/UL — SIGNIFICANT CHANGE UP (ref 0–0.2)
BASOPHILS NFR BLD AUTO: 0 % — SIGNIFICANT CHANGE UP (ref 0–2)
BILIRUB SERPL-MCNC: 0.7 MG/DL — SIGNIFICANT CHANGE UP (ref 0.2–1.2)
BUN SERPL-MCNC: 21 MG/DL — SIGNIFICANT CHANGE UP (ref 7–23)
CALCIUM SERPL-MCNC: 10.1 MG/DL — SIGNIFICANT CHANGE UP (ref 8.4–10.5)
CHLORIDE SERPL-SCNC: 82 MMOL/L — LOW (ref 96–108)
CK SERPL-CCNC: 2743 U/L — HIGH (ref 25–170)
CO2 SERPL-SCNC: 18 MMOL/L — LOW (ref 22–31)
CREAT SERPL-MCNC: 0.73 MG/DL — SIGNIFICANT CHANGE UP (ref 0.5–1.3)
DACRYOCYTES BLD QL SMEAR: SLIGHT — SIGNIFICANT CHANGE UP
EGFR: 101 ML/MIN/1.73M2 — SIGNIFICANT CHANGE UP
ELLIPTOCYTES BLD QL SMEAR: SLIGHT — SIGNIFICANT CHANGE UP
EOSINOPHIL # BLD AUTO: 0 K/UL — SIGNIFICANT CHANGE UP (ref 0–0.5)
EOSINOPHIL NFR BLD AUTO: 0 % — SIGNIFICANT CHANGE UP (ref 0–6)
ETHANOL SERPL-MCNC: SIGNIFICANT CHANGE UP MG/DL (ref 0–10)
FLUAV AG NPH QL: SIGNIFICANT CHANGE UP
FLUBV AG NPH QL: SIGNIFICANT CHANGE UP
GIANT PLATELETS BLD QL SMEAR: PRESENT — SIGNIFICANT CHANGE UP
GLUCOSE SERPL-MCNC: 155 MG/DL — HIGH (ref 70–99)
HCG SERPL-ACNC: <2 MIU/ML — SIGNIFICANT CHANGE UP
HCT VFR BLD CALC: 37.8 % — SIGNIFICANT CHANGE UP (ref 34.5–45)
HGB BLD-MCNC: 11.3 G/DL — LOW (ref 11.5–15.5)
INR BLD: 1.15 RATIO — SIGNIFICANT CHANGE UP (ref 0.88–1.16)
LIDOCAIN IGE QN: 63 U/L — HIGH (ref 7–60)
LYMPHOCYTES # BLD AUTO: 0.63 K/UL — LOW (ref 1–3.3)
LYMPHOCYTES # BLD AUTO: 7 % — LOW (ref 13–44)
MACROCYTES BLD QL: SLIGHT — SIGNIFICANT CHANGE UP
MANUAL SMEAR VERIFICATION: SIGNIFICANT CHANGE UP
MCHC RBC-ENTMCNC: 22.1 PG — LOW (ref 27–34)
MCHC RBC-ENTMCNC: 29.9 GM/DL — LOW (ref 32–36)
MCV RBC AUTO: 73.8 FL — LOW (ref 80–100)
MICROCYTES BLD QL: SLIGHT — SIGNIFICANT CHANGE UP
MONOCYTES # BLD AUTO: 0.24 K/UL — SIGNIFICANT CHANGE UP (ref 0–0.9)
MONOCYTES NFR BLD AUTO: 2.6 % — SIGNIFICANT CHANGE UP (ref 2–14)
MYELOCYTES NFR BLD: 0.9 % — HIGH (ref 0–0)
NEUTROPHILS # BLD AUTO: 8.12 K/UL — HIGH (ref 1.8–7.4)
NEUTROPHILS NFR BLD AUTO: 89.5 % — HIGH (ref 43–77)
PLAT MORPH BLD: NORMAL — SIGNIFICANT CHANGE UP
PLATELET # BLD AUTO: 188 K/UL — SIGNIFICANT CHANGE UP (ref 150–400)
POIKILOCYTOSIS BLD QL AUTO: SLIGHT — SIGNIFICANT CHANGE UP
POLYCHROMASIA BLD QL SMEAR: SLIGHT — SIGNIFICANT CHANGE UP
POTASSIUM SERPL-MCNC: 3.8 MMOL/L — SIGNIFICANT CHANGE UP (ref 3.5–5.3)
POTASSIUM SERPL-SCNC: 3.8 MMOL/L — SIGNIFICANT CHANGE UP (ref 3.5–5.3)
PROT SERPL-MCNC: 8.2 G/DL — SIGNIFICANT CHANGE UP (ref 6–8.3)
PROTHROM AB SERPL-ACNC: 13.4 SEC — SIGNIFICANT CHANGE UP (ref 10.5–13.4)
RBC # BLD: 5.12 M/UL — SIGNIFICANT CHANGE UP (ref 3.8–5.2)
RBC # FLD: 21.6 % — HIGH (ref 10.3–14.5)
RBC BLD AUTO: ABNORMAL
RSV RNA NPH QL NAA+NON-PROBE: SIGNIFICANT CHANGE UP
SALICYLATES SERPL-MCNC: <2 MG/DL — LOW (ref 15–30)
SARS-COV-2 RNA SPEC QL NAA+PROBE: SIGNIFICANT CHANGE UP
SCHISTOCYTES BLD QL AUTO: SLIGHT — SIGNIFICANT CHANGE UP
SODIUM SERPL-SCNC: 131 MMOL/L — LOW (ref 135–145)
SPHEROCYTES BLD QL SMEAR: SLIGHT — SIGNIFICANT CHANGE UP
WBC # BLD: 9.07 K/UL — SIGNIFICANT CHANGE UP (ref 3.8–10.5)
WBC # FLD AUTO: 9.07 K/UL — SIGNIFICANT CHANGE UP (ref 3.8–10.5)

## 2022-05-23 PROCEDURE — 71045 X-RAY EXAM CHEST 1 VIEW: CPT | Mod: 26

## 2022-05-23 PROCEDURE — 72170 X-RAY EXAM OF PELVIS: CPT | Mod: 26

## 2022-05-23 PROCEDURE — 99285 EMERGENCY DEPT VISIT HI MDM: CPT

## 2022-05-23 PROCEDURE — 73562 X-RAY EXAM OF KNEE 3: CPT | Mod: 26,RT

## 2022-05-23 PROCEDURE — 73590 X-RAY EXAM OF LOWER LEG: CPT | Mod: 26,RT

## 2022-05-23 PROCEDURE — 72125 CT NECK SPINE W/O DYE: CPT | Mod: 26,MA

## 2022-05-23 PROCEDURE — 70450 CT HEAD/BRAIN W/O DYE: CPT | Mod: 26,MA

## 2022-05-23 RX ORDER — FOLIC ACID 0.8 MG
1 TABLET ORAL ONCE
Refills: 0 | Status: COMPLETED | OUTPATIENT
Start: 2022-05-23 | End: 2022-05-23

## 2022-05-23 RX ORDER — ONDANSETRON 8 MG/1
4 TABLET, FILM COATED ORAL ONCE
Refills: 0 | Status: COMPLETED | OUTPATIENT
Start: 2022-05-23 | End: 2022-05-23

## 2022-05-23 RX ORDER — SODIUM CHLORIDE 9 MG/ML
1000 INJECTION, SOLUTION INTRAVENOUS ONCE
Refills: 0 | Status: COMPLETED | OUTPATIENT
Start: 2022-05-23 | End: 2022-05-23

## 2022-05-23 RX ORDER — THIAMINE MONONITRATE (VIT B1) 100 MG
100 TABLET ORAL ONCE
Refills: 0 | Status: COMPLETED | OUTPATIENT
Start: 2022-05-23 | End: 2022-05-23

## 2022-05-23 RX ORDER — SODIUM CHLORIDE 9 MG/ML
250 INJECTION INTRAMUSCULAR; INTRAVENOUS; SUBCUTANEOUS ONCE
Refills: 0 | Status: COMPLETED | OUTPATIENT
Start: 2022-05-23 | End: 2022-05-23

## 2022-05-23 RX ADMIN — ONDANSETRON 4 MILLIGRAM(S): 8 TABLET, FILM COATED ORAL at 21:06

## 2022-05-23 RX ADMIN — SODIUM CHLORIDE 250 MILLILITER(S): 9 INJECTION INTRAMUSCULAR; INTRAVENOUS; SUBCUTANEOUS at 21:00

## 2022-05-23 RX ADMIN — Medication 50 MILLIGRAM(S): at 21:05

## 2022-05-23 RX ADMIN — Medication 100 MILLIGRAM(S): at 23:59

## 2022-05-23 RX ADMIN — SODIUM CHLORIDE 250 MILLILITER(S): 9 INJECTION INTRAMUSCULAR; INTRAVENOUS; SUBCUTANEOUS at 22:00
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
## 2022-05-23 NOTE — ED PROVIDER NOTE - PROGRESS NOTE DETAILS
Stephie SCHMITZ PGY2: CTs and XR without acute traumatic fracture. CK elevated c/f rhabdo. Will admit for rhabdo and EtOH withdrawal.

## 2022-05-23 NOTE — ED PROVIDER NOTE - PHYSICAL EXAMINATION
CONST:     NAD, intoxicated appearing; well-developed  EYES:         Conjunctiva pink with L eye subconjunctival hemorrahge, PERRL;  ENMT:       MMM, no pharyngeal injection or exudates; normal dentition +tongue fasciculations  NECK:        Supple, no palpable masses; no thyromegaly; no LAD  RESP :        Normal respiratory effort; ; CTA bilaterally; no W/R/R  CHEST:       No TTP, no lines/ports; symmetric chest expansion  CARDIO:    RRR, normal S1 and S2, no M/R/G; apical impulse at MCL  VASC:         No JVD/AJR, no peripheral edema, pulses 2+ B/L, Cap refill <2s  ABD:           Soft, NT/ND, norm bowel sounds; no R/G; No HSM  MSK:          No clubbing or cyanosis of digits; no joint swelling or TTP  EXT:           + RLE violacious lesion. WWP, no reduction in body hair  PSYCH        A&O to person, place, and time; affect depressed  NEURO:     Non-focal; no gross sensory deficits; moving all extremities +tremors  SKIN:          No rashes; no palpable lesions;

## 2022-05-23 NOTE — ED ADULT NURSE NOTE - NSIMPLEMENTINTERV_GEN_ALL_ED
Implemented All Fall Risk Interventions:  [place] to call system. Call bell, personal items and telephone within reach. Instruct patient to call for assistance. Room bathroom lighting operational. Non-slip footwear when patient is off stretcher. Physically safe environment: no spills, clutter or unnecessary equipment. Stretcher in lowest position, wheels locked, appropriate side rails in place. Provide visual cue, wrist band, yellow gown, etc. Monitor gait and stability. Monitor for mental status changes and reorient to person, place, and time. Review medications for side effects contributing to fall risk. Reinforce activity limits and safety measures with patient and family.

## 2022-05-23 NOTE — ED PROVIDER NOTE - NS ED ROS FT
ROS:  CONSTITUTIONAL: No fever, chills, generalized weakness or fatigue.  EYES: No eye pain, visual disturbances, or discharge.  ENMT:  No difficulty hearing, tinnitus, vertigo; No sinus or throat pain.  RESPIRATORY: No cough, wheezing, chills or hemoptysis; No shortness of breath.  CARDIOVASCULAR: No chest pain, palpitations, dizziness, or leg swelling.  GASTROINTESTINAL: No abdominal or epigastric pain. +nausea, No vomiting, or hematemesis; No diarrhea or constipation. No melena or hematochezia.  GENITOURINARY: No dysuria, frequency, hematuria, or incontinence.  NEUROLOGICAL: No headaches, memory loss, loss of strength, numbness, + tremors.  ENDOCRINE: No heat or cold intolerance; No hair loss.  MUSCULOSKELETAL: No joint pain or swelling; No muscle, back, or extremity pain.  HEME/LYMPH: No easy bruising, or bleeding gums.  SKIN: No rash or itchiness.

## 2022-05-23 NOTE — ED ADULT NURSE NOTE - OBJECTIVE STATEMENT
Pt arrived via EMS with report from EMS stating " + etoh use and her family found her at home with a dress on top of her." Pt A/O x 2 ( disoriented to month) As per Pt " I was sober for 5 yrs and I started drinking 2 weeks ago every day. 1L Vodka/day. I don't know how the dresser ended up on top of me. I live alone and my family came by today cause they hadn't heard from me. + pain all over. I am starting to feel shaking all over."    On assessment pt found to have: Left anterior thigh with + strips/marks of redness/swelling/, left knee + redness/bruising . Rt lateral knee with bruising/redness

## 2022-05-23 NOTE — ED PROVIDER NOTE - OBJECTIVE STATEMENT
46F PMH etoh abuse, depression, anxiety, SI in the past BIBEMS after being found down. Patient reports drinking 1L vodka this am and subsequently being found by family with dresser on top of her. She can't recall the fall or anything leading up to the event. She denies taking any drugs with the alcohol and denies active suicidal ideation. She denies any acute pain in extremities, chest, abdomen, neck, and head. She denies head strike but clearly did lose consciousness for a prolonged amount of time. No fevers, chills, CP, AP, V/D/C. +nausea. No bleeding. Reports that she feels that she is going into withdrawal from the alcohol at this time,.

## 2022-05-23 NOTE — ED ADULT NURSE NOTE - NSFALLRSKUNASSIST_ED_ALL_ED
Continue to gradually increase all activities as tolerated  You may recall return to the brace as needed for any unusual activities but regular wears and not advised  Continue to follow with her primary care provider Dr Jose Conroy for osteoporosis evaluation and management  Keep you or planned follow-up with Neurosurgery, for meningioma follow-up  Proceed for MRI brain as discussed prior to follow-up visit  Return to Neurosurgery with any new pain or new changes in your back  no

## 2022-05-23 NOTE — ED PROVIDER NOTE - SHIFT CHANGE DETAILS
Gonzalo Crockett MD FACEP note of transfer at the usual time of sign out: Receiving team will follow up on labs, analgesia, any clinical imaging, reassess and disposition as clinically indicated.  Details of patient and plan conveyed to receiving physician and conveyed back for understanding.  There were no questions at this time about the patient's status, disposition, and plan. Patient's care to be taken over by receiving physician at this time, all decisions regarding the progression of care will be made at their discretion.

## 2022-05-23 NOTE — ED PROVIDER NOTE - ATTENDING CONTRIBUTION TO CARE
Gonzalo Crockett MD, FACEP: In this physician's medical judgement based on clinical history and physical exam: patient with fall, alcohol abuse, unknown down time, mild shakiness and fatigue, confusion.   will get iv, cbc, cmp, ck, analgesia and antiemetic prn, fluids prn, benzodiazepine prn, ct head  ua, urine culture  ciwa  Will follow up on labs, analgesia, imaging, reassess and disposition as clinically indicated.  *The above represents an initial assessment/impression. Please refer to my progress notes below for potential changes in patient clinical course*

## 2022-05-24 DIAGNOSIS — M62.82 RHABDOMYOLYSIS: ICD-10-CM

## 2022-05-24 LAB
ANION GAP SERPL CALC-SCNC: 17 MMOL/L — SIGNIFICANT CHANGE UP (ref 5–17)
BUN SERPL-MCNC: 18 MG/DL — SIGNIFICANT CHANGE UP (ref 7–23)
CALCIUM SERPL-MCNC: 9.4 MG/DL — SIGNIFICANT CHANGE UP (ref 8.4–10.5)
CHLORIDE SERPL-SCNC: 87 MMOL/L — LOW (ref 96–108)
CK SERPL-CCNC: 1795 U/L — HIGH (ref 25–170)
CO2 SERPL-SCNC: 24 MMOL/L — SIGNIFICANT CHANGE UP (ref 22–31)
CREAT SERPL-MCNC: 0.49 MG/DL — LOW (ref 0.5–1.3)
EGFR: 116 ML/MIN/1.73M2 — SIGNIFICANT CHANGE UP
GLUCOSE SERPL-MCNC: 147 MG/DL — HIGH (ref 70–99)
HCT VFR BLD CALC: 31.7 % — LOW (ref 34.5–45)
HGB BLD-MCNC: 9.8 G/DL — LOW (ref 11.5–15.5)
MCHC RBC-ENTMCNC: 22.3 PG — LOW (ref 27–34)
MCHC RBC-ENTMCNC: 30.9 GM/DL — LOW (ref 32–36)
MCV RBC AUTO: 72.2 FL — LOW (ref 80–100)
NRBC # BLD: 0 /100 WBCS — SIGNIFICANT CHANGE UP (ref 0–0)
PLATELET # BLD AUTO: 163 K/UL — SIGNIFICANT CHANGE UP (ref 150–400)
POTASSIUM SERPL-MCNC: 3.5 MMOL/L — SIGNIFICANT CHANGE UP (ref 3.5–5.3)
POTASSIUM SERPL-SCNC: 3.5 MMOL/L — SIGNIFICANT CHANGE UP (ref 3.5–5.3)
RBC # BLD: 4.39 M/UL — SIGNIFICANT CHANGE UP (ref 3.8–5.2)
RBC # FLD: 21.4 % — HIGH (ref 10.3–14.5)
SODIUM SERPL-SCNC: 128 MMOL/L — LOW (ref 135–145)
WBC # BLD: 8.67 K/UL — SIGNIFICANT CHANGE UP (ref 3.8–10.5)
WBC # FLD AUTO: 8.67 K/UL — SIGNIFICANT CHANGE UP (ref 3.8–10.5)

## 2022-05-24 RX ORDER — ONDANSETRON 8 MG/1
4 TABLET, FILM COATED ORAL ONCE
Refills: 0 | Status: COMPLETED | OUTPATIENT
Start: 2022-05-24 | End: 2022-05-24

## 2022-05-24 RX ORDER — SODIUM CHLORIDE 9 MG/ML
1000 INJECTION, SOLUTION INTRAVENOUS
Refills: 0 | Status: DISCONTINUED | OUTPATIENT
Start: 2022-05-24 | End: 2022-05-24

## 2022-05-24 RX ORDER — THIAMINE MONONITRATE (VIT B1) 100 MG
500 TABLET ORAL DAILY
Refills: 0 | Status: DISCONTINUED | OUTPATIENT
Start: 2022-05-24 | End: 2022-05-24

## 2022-05-24 RX ORDER — PANTOPRAZOLE SODIUM 20 MG/1
40 TABLET, DELAYED RELEASE ORAL
Refills: 0 | Status: DISCONTINUED | OUTPATIENT
Start: 2022-05-24 | End: 2022-05-28

## 2022-05-24 RX ORDER — THIAMINE MONONITRATE (VIT B1) 100 MG
500 TABLET ORAL DAILY
Refills: 0 | Status: COMPLETED | OUTPATIENT
Start: 2022-05-24 | End: 2022-05-26

## 2022-05-24 RX ORDER — HEPARIN SODIUM 5000 [USP'U]/ML
5000 INJECTION INTRAVENOUS; SUBCUTANEOUS EVERY 8 HOURS
Refills: 0 | Status: DISCONTINUED | OUTPATIENT
Start: 2022-05-24 | End: 2022-05-28

## 2022-05-24 RX ORDER — FOLIC ACID 0.8 MG
1 TABLET ORAL DAILY
Refills: 0 | Status: DISCONTINUED | OUTPATIENT
Start: 2022-05-24 | End: 2022-05-28

## 2022-05-24 RX ORDER — ACETAMINOPHEN 500 MG
650 TABLET ORAL ONCE
Refills: 0 | Status: COMPLETED | OUTPATIENT
Start: 2022-05-24 | End: 2022-05-24

## 2022-05-24 RX ORDER — ALPRAZOLAM 0.25 MG
1 TABLET ORAL
Qty: 0 | Refills: 0 | DISCHARGE

## 2022-05-24 RX ORDER — SODIUM CHLORIDE 9 MG/ML
1000 INJECTION INTRAMUSCULAR; INTRAVENOUS; SUBCUTANEOUS
Refills: 0 | Status: DISCONTINUED | OUTPATIENT
Start: 2022-05-24 | End: 2022-05-28

## 2022-05-24 RX ADMIN — Medication 50 MILLIGRAM(S): at 00:06

## 2022-05-24 RX ADMIN — Medication 1 TABLET(S): at 00:02

## 2022-05-24 RX ADMIN — SODIUM CHLORIDE 75 MILLILITER(S): 9 INJECTION INTRAMUSCULAR; INTRAVENOUS; SUBCUTANEOUS at 17:16

## 2022-05-24 RX ADMIN — Medication 105 MILLIGRAM(S): at 11:03

## 2022-05-24 RX ADMIN — SODIUM CHLORIDE 100 MILLILITER(S): 9 INJECTION, SOLUTION INTRAVENOUS at 07:45

## 2022-05-24 RX ADMIN — Medication 1 MILLIGRAM(S): at 11:03

## 2022-05-24 RX ADMIN — Medication 50 MILLIGRAM(S): at 18:52

## 2022-05-24 RX ADMIN — Medication 1 TABLET(S): at 11:03

## 2022-05-24 RX ADMIN — Medication 50 MILLIGRAM(S): at 05:18

## 2022-05-24 RX ADMIN — HEPARIN SODIUM 5000 UNIT(S): 5000 INJECTION INTRAVENOUS; SUBCUTANEOUS at 21:56

## 2022-05-24 RX ADMIN — ONDANSETRON 4 MILLIGRAM(S): 8 TABLET, FILM COATED ORAL at 05:20

## 2022-05-24 RX ADMIN — SODIUM CHLORIDE 75 MILLILITER(S): 9 INJECTION INTRAMUSCULAR; INTRAVENOUS; SUBCUTANEOUS at 11:04

## 2022-05-24 RX ADMIN — Medication 1 MILLIGRAM(S): at 00:38

## 2022-05-24 RX ADMIN — Medication 50 MILLIGRAM(S): at 12:15

## 2022-05-24 RX ADMIN — Medication 650 MILLIGRAM(S): at 21:56

## 2022-05-24 RX ADMIN — HEPARIN SODIUM 5000 UNIT(S): 5000 INJECTION INTRAVENOUS; SUBCUTANEOUS at 13:03

## 2022-05-24 RX ADMIN — SODIUM CHLORIDE 1000 MILLILITER(S): 9 INJECTION, SOLUTION INTRAVENOUS at 00:00

## 2022-05-24 NOTE — PATIENT PROFILE ADULT - NSPROGENOTHERPROVIDER_GEN_A_NUR
Pt states she ate a hot dog around 5pm yesterday and felt swelling and pain in the right side of her jaw. Pt reports jaw still feels swollen. none

## 2022-05-24 NOTE — H&P ADULT - NSHPPHYSICALEXAM_GEN_ALL_CORE
Vital Signs Last 24 Hrs  T(C): 36.4 (24 May 2022 08:00), Max: 36.8 (24 May 2022 01:26)  T(F): 97.5 (24 May 2022 08:00), Max: 98.2 (24 May 2022 01:26)  HR: 80 (24 May 2022 08:00) (65 - 105)  BP: 123/83 (24 May 2022 08:00) (118/88 - 143/94)  BP(mean): --  RR: 18 (24 May 2022 08:00) (17 - 20)  SpO2: 100% (24 May 2022 08:00) (95% - 100%)      Appearance: + Awake; +Tremors; +Tongue fasciculations   HEENT:   +Tongue fasciculations 	  Lymphatic: No lymphadenopathy , no edema  Cardiovascular: Normal S1 S2, No JVD, No murmurs , Peripheral pulses palpable 2+ bilaterally  Respiratory: Lungs clear to auscultation, normal effort 	  Gastrointestinal:  Soft, Non-tender, + BS	  Skin: RLE bruising; Mildly- TTP  Musculoskeletal: Normal range of motion, normal strength  Psychiatry:  Mood & affect appropriate  Ext: No edema

## 2022-05-24 NOTE — H&P ADULT - HISTORY OF PRESENT ILLNESS
Patient is a 46 year old female with a PMHx of ETOH abuse (sober for 5 years), depression, anxiety SI in the past, who was BIBEMS to [facility] after being found with a dresser on her. Patient reports that her brother in law found her down after not hearing from her X 3 days. Patient reports that she was drinking 1Liter of Vodka prior to being found down with dresser on top of her. Patient does not recall falling. Patient does not recall how long she was down for. She denies taking any drugs with the alcohol and denies active suicidal ideation. Patient reports that she feels like she is going through alcohol withdrawal at this time. Patient denies CP, palpitations, SOB or dyspnea. Denies abdominal pain or discomfort. Denies pain in extremities, head or neck.

## 2022-05-24 NOTE — ED ADULT NURSE REASSESSMENT NOTE - COMFORT CARE
pt aware being moved to ed holding while wqiting for inpt bed assignment/plan of care explained
plan of care explained

## 2022-05-24 NOTE — PATIENT PROFILE ADULT - FALL HARM RISK - HARM RISK INTERVENTIONS
Assistance with ambulation/Assistance OOB with selected safe patient handling equipment/Communicate Risk of Fall with Harm to all staff/Monitor for mental status changes/Monitor gait and stability/Reinforce activity limits and safety measures with patient and family/Tailored Fall Risk Interventions/Toileting schedule using arm’s reach rule for commode and bathroom/Use of alarms - bed, chair and/or voice tab/Visual Cue: Yellow wristband and red socks/Bed in lowest position, wheels locked, appropriate side rails in place/Call bell, personal items and telephone in reach/Instruct patient to call for assistance before getting out of bed or chair/Non-slip footwear when patient is out of bed/[place] to call system/Physically safe environment - no spills, clutter or unnecessary equipment/Purposeful Proactive Rounding/Room/bathroom lighting operational, light cord in reach

## 2022-05-24 NOTE — H&P ADULT - ASSESSMENT
Patient is a 46 year old female with a PMHx of ETOH abuse (sober for 5 years), depression, anxiety SI in the past, who was BIBEMS to [facility] after being found with a dresser on her. Patient reports that her brother in law found her down after not hearing from her X 3 days. Patient reports that she was drinking 1Liter of Vodka prior to being found down with dresser on top of her. Patient does not recall falling. Patient does not recall how long she was down for. She denies taking any drugs with the alcohol and denies active suicidal ideation. Patient reports that she feels like she is going through alcohol withdrawal at this time.      ETOH withdrawal  - Switch Librium to CIWA protocol (Ativan)  - Thiamin 500 IVPB daily  - Folic acid  - IVF for hydration  - Monitor for signs/symptoms of withdrawal  - CT H/N negative for acute Fx  - Xray pre-garcia negative for Fx   - Fall precautions  - Aspirations precautions    Rhabdo  - Due to fall/ ETOH   - CK downtrending from 2743 -- 1795  - C/w IVF for hydration  - Trend CK on AM labs    Hyponatremia  - Likely alcohol induced   - C/w NS 75 cc/hr   - Monitor on AM labs  - Avoid overcorrection    Elevated LFTs  - Likely due to  ETOH  - COnt to monitor and trend  - Avoid hepatotoxic agents  - Serial abd exams    Anemia  - Check Anemia W/U  - Transfuse for Hgb <7.0     Depression/Anxiety  - Psych eval PRN       PPX  - PPX  - Heparin 5K Q8    This note is incomplete      Patient is a 46 year old female with a PMHx of ETOH abuse (sober for 5 years), depression, anxiety SI in the past, who was BIBEMS to [facility] after being found with a dresser on her. Patient reports that her brother in law found her down after not hearing from her X 3 days. Patient reports that she was drinking 1Liter of Vodka prior to being found down with dresser on top of her. Patient does not recall falling. Patient does not recall how long she was down for. She denies taking any drugs with the alcohol and denies active suicidal ideation. Patient reports that she feels like she is going through alcohol withdrawal at this time.      ETOH withdrawal  - C/w Librium with Ativan for breakthrough   - Thiamine 500 IVPB daily X 3 days   - Folic acid  - Multivitamin   - IVF for hydration  - Monitor for signs/symptoms of withdrawal  - CT H/N negative for acute Fx  - Xray pre-garcia negative for Fx   - Fall precautions  - Aspirations precautions  - SW follow up     Rhabdo  - Due to fall/ ETOH   - CK downtrending from 2743 -- 1795  - C/w IVF for hydration  - Trend CK on AM labs    Hyponatremia  - Likely alcohol induced   - C/w NS 75 cc/hr   - Monitor on AM labs  - Avoid overcorrection    Elevated LFTs  - Likely due to  ETOH  - Cont to monitor and trend  - Avoid hepatotoxic agents  - Serial abd exams    Anemia  - Check Anemia W/U  - Transfuse for Hgb <7.0     Depression/Anxiety  - Psych eval PRN   - On Trazadone at home PRN   - Psych eval once on floor       PPX  - PPX  - Heparin 5K Q8  - PT  - SW follow up

## 2022-05-24 NOTE — H&P ADULT - NS ATTEND AMEND GEN_ALL_CORE FT
Pt care and plan discussed and reviewed with PA. Plan as outlined above edited by me to reflect our discussion.

## 2022-05-24 NOTE — H&P ADULT - NSHPREVIEWOFSYSTEMS_GEN_ALL_CORE
REVIEW OF SYSTEMS:    CONSTITUTIONAL: +Generalized weakness; S/P fall due to alcohol intoxication   EYES/ENT: No visual changes;  No vertigo or throat pain   NECK: No pain or stiffness  RESPIRATORY: No cough, wheezing, hemoptysis; No shortness of breath  CARDIOVASCULAR: No chest pain or palpitations  GASTROINTESTINAL: No abdominal or epigastric pain. No nausea, vomiting, or hematemesis; No diarrhea or constipation. No melena or hematochezia.  GENITOURINARY: No dysuria, frequency or hematuria  NEUROLOGICAL: No numbness or weakness  SKIN: RLE Bruising   All other review of systems is negative unless indicated above.

## 2022-05-25 LAB
A1C WITH ESTIMATED AVERAGE GLUCOSE RESULT: 5.4 % — SIGNIFICANT CHANGE UP (ref 4–5.6)
ALBUMIN SERPL ELPH-MCNC: 3.2 G/DL — LOW (ref 3.3–5)
ALP SERPL-CCNC: 64 U/L — SIGNIFICANT CHANGE UP (ref 40–120)
ALT FLD-CCNC: 40 U/L — SIGNIFICANT CHANGE UP (ref 10–45)
ANION GAP SERPL CALC-SCNC: 17 MMOL/L — SIGNIFICANT CHANGE UP (ref 5–17)
AST SERPL-CCNC: 70 U/L — HIGH (ref 10–40)
BILIRUB SERPL-MCNC: 0.4 MG/DL — SIGNIFICANT CHANGE UP (ref 0.2–1.2)
BUN SERPL-MCNC: 14 MG/DL — SIGNIFICANT CHANGE UP (ref 7–23)
CALCIUM SERPL-MCNC: 8.7 MG/DL — SIGNIFICANT CHANGE UP (ref 8.4–10.5)
CHLORIDE SERPL-SCNC: 96 MMOL/L — SIGNIFICANT CHANGE UP (ref 96–108)
CHOLEST SERPL-MCNC: 167 MG/DL — SIGNIFICANT CHANGE UP
CK SERPL-CCNC: 820 U/L — HIGH (ref 25–170)
CO2 SERPL-SCNC: 21 MMOL/L — LOW (ref 22–31)
CREAT SERPL-MCNC: 0.49 MG/DL — LOW (ref 0.5–1.3)
EGFR: 116 ML/MIN/1.73M2 — SIGNIFICANT CHANGE UP
ESTIMATED AVERAGE GLUCOSE: 108 MG/DL — SIGNIFICANT CHANGE UP (ref 68–114)
FERRITIN SERPL-MCNC: 80 NG/ML — SIGNIFICANT CHANGE UP (ref 15–150)
FOLATE SERPL-MCNC: 17.1 NG/ML — SIGNIFICANT CHANGE UP
GLUCOSE SERPL-MCNC: 135 MG/DL — HIGH (ref 70–99)
HCT VFR BLD CALC: 34.8 % — SIGNIFICANT CHANGE UP (ref 34.5–45)
HDLC SERPL-MCNC: 66 MG/DL — SIGNIFICANT CHANGE UP
HGB BLD-MCNC: 10.2 G/DL — LOW (ref 11.5–15.5)
IRON SATN MFR SERPL: 22 UG/DL — LOW (ref 30–160)
IRON SATN MFR SERPL: 8 % — LOW (ref 14–50)
LIPID PNL WITH DIRECT LDL SERPL: 87 MG/DL — SIGNIFICANT CHANGE UP
MCHC RBC-ENTMCNC: 22.6 PG — LOW (ref 27–34)
MCHC RBC-ENTMCNC: 29.3 GM/DL — LOW (ref 32–36)
MCV RBC AUTO: 77 FL — LOW (ref 80–100)
NON HDL CHOLESTEROL: 101 MG/DL — SIGNIFICANT CHANGE UP
NRBC # BLD: 0 /100 WBCS — SIGNIFICANT CHANGE UP (ref 0–0)
PLATELET # BLD AUTO: 131 K/UL — LOW (ref 150–400)
POTASSIUM SERPL-MCNC: 4 MMOL/L — SIGNIFICANT CHANGE UP (ref 3.5–5.3)
POTASSIUM SERPL-SCNC: 4 MMOL/L — SIGNIFICANT CHANGE UP (ref 3.5–5.3)
PROT SERPL-MCNC: 5.7 G/DL — LOW (ref 6–8.3)
RBC # BLD: 4.52 M/UL — SIGNIFICANT CHANGE UP (ref 3.8–5.2)
RBC # FLD: 22.3 % — HIGH (ref 10.3–14.5)
SODIUM SERPL-SCNC: 134 MMOL/L — LOW (ref 135–145)
TIBC SERPL-MCNC: 257 UG/DL — SIGNIFICANT CHANGE UP (ref 220–430)
TRIGL SERPL-MCNC: 71 MG/DL — SIGNIFICANT CHANGE UP
TSH SERPL-MCNC: 4.34 UIU/ML — HIGH (ref 0.27–4.2)
UIBC SERPL-MCNC: 235 UG/DL — SIGNIFICANT CHANGE UP (ref 110–370)
VIT B12 SERPL-MCNC: 1284 PG/ML — HIGH (ref 232–1245)
WBC # BLD: 4.83 K/UL — SIGNIFICANT CHANGE UP (ref 3.8–10.5)
WBC # FLD AUTO: 4.83 K/UL — SIGNIFICANT CHANGE UP (ref 3.8–10.5)

## 2022-05-25 PROCEDURE — 99222 1ST HOSP IP/OBS MODERATE 55: CPT

## 2022-05-25 RX ORDER — ONDANSETRON 8 MG/1
4 TABLET, FILM COATED ORAL ONCE
Refills: 0 | Status: COMPLETED | OUTPATIENT
Start: 2022-05-25 | End: 2022-05-25

## 2022-05-25 RX ORDER — ACETAMINOPHEN 500 MG
650 TABLET ORAL ONCE
Refills: 0 | Status: COMPLETED | OUTPATIENT
Start: 2022-05-25 | End: 2022-05-25

## 2022-05-25 RX ADMIN — Medication 105 MILLIGRAM(S): at 13:46

## 2022-05-25 RX ADMIN — Medication 1 MILLIGRAM(S): at 23:05

## 2022-05-25 RX ADMIN — PANTOPRAZOLE SODIUM 40 MILLIGRAM(S): 20 TABLET, DELAYED RELEASE ORAL at 05:46

## 2022-05-25 RX ADMIN — Medication 50 MILLIGRAM(S): at 13:44

## 2022-05-25 RX ADMIN — Medication 1 MILLIGRAM(S): at 21:14

## 2022-05-25 RX ADMIN — HEPARIN SODIUM 5000 UNIT(S): 5000 INJECTION INTRAVENOUS; SUBCUTANEOUS at 05:46

## 2022-05-25 RX ADMIN — Medication 650 MILLIGRAM(S): at 21:45

## 2022-05-25 RX ADMIN — Medication 1 MILLIGRAM(S): at 10:35

## 2022-05-25 RX ADMIN — Medication 1.5 MILLIGRAM(S): at 15:35

## 2022-05-25 RX ADMIN — SODIUM CHLORIDE 75 MILLILITER(S): 9 INJECTION INTRAMUSCULAR; INTRAVENOUS; SUBCUTANEOUS at 18:29

## 2022-05-25 RX ADMIN — HEPARIN SODIUM 5000 UNIT(S): 5000 INJECTION INTRAVENOUS; SUBCUTANEOUS at 21:14

## 2022-05-25 RX ADMIN — Medication 1 DROP(S): at 18:26

## 2022-05-25 RX ADMIN — Medication 50 MILLIGRAM(S): at 05:46

## 2022-05-25 RX ADMIN — SODIUM CHLORIDE 75 MILLILITER(S): 9 INJECTION INTRAMUSCULAR; INTRAVENOUS; SUBCUTANEOUS at 05:54

## 2022-05-25 RX ADMIN — Medication 650 MILLIGRAM(S): at 22:00

## 2022-05-25 RX ADMIN — Medication 1 MILLIGRAM(S): at 13:44

## 2022-05-25 RX ADMIN — Medication 1 DROP(S): at 13:49

## 2022-05-25 RX ADMIN — HEPARIN SODIUM 5000 UNIT(S): 5000 INJECTION INTRAVENOUS; SUBCUTANEOUS at 14:24

## 2022-05-25 RX ADMIN — Medication 1.5 MILLIGRAM(S): at 18:22

## 2022-05-25 RX ADMIN — Medication 1 DROP(S): at 05:59

## 2022-05-25 RX ADMIN — Medication 1 TABLET(S): at 13:45

## 2022-05-25 RX ADMIN — ONDANSETRON 4 MILLIGRAM(S): 8 TABLET, FILM COATED ORAL at 23:39

## 2022-05-25 NOTE — PHYSICAL THERAPY INITIAL EVALUATION ADULT - STRENGTHENING, PT EVAL
Goal: Pt will increase strength >half a grade  t/o extremities to improve safety of transfers and ambulation in 1 week.

## 2022-05-25 NOTE — BH CONSULTATION LIAISON ASSESSMENT NOTE - HPI (INCLUDE ILLNESS QUALITY, SEVERITY, DURATION, TIMING, CONTEXT, MODIFYING FACTORS, ASSOCIATED SIGNS AND SYMPTOMS)
Patient is a 46 year old female, domiciled alone, with PPH significant for alcohol use disorder (history of multiple rehab admissions, per chart rereview, history of alcohol withdrawal seizure), admitted for fall, alcohol withdrawal, psych consulted for withdrawal.     Patient was seen and assessed at bedside. Patient is alert, oriented, calm, cooperative. Pt doesn't recall details of recent events, she thinks she may have fallen in her home. Pt was drinking about 1 liter vodka daily the past week. she was sober for about 5 years prior. pt reports tremors currently, feels current dose taper nto helping. pt denies anxiety, depression, psychosis, alexis. pt reports fair sleep, appetite. pt stopped working the past 2 weeks. pt denies recent stressors.

## 2022-05-25 NOTE — BH CONSULTATION LIAISON ASSESSMENT NOTE - NSBHCHARTREVIEWLAB_PSY_A_CORE FT
10.2   4.83  )-----------( 131      ( 25 May 2022 10:33 )             34.8     05-25    134<L>  |  96  |  14  ----------------------------<  135<H>  4.0   |  21<L>  |  0.49<L>    Ca    8.7      25 May 2022 10:33  Phos  3.6     05-23  Mg     2.1     05-23    TPro  5.7<L>  /  Alb  3.2<L>  /  TBili  0.4  /  DBili  x   /  AST  70<H>  /  ALT  40  /  AlkPhos  64  05-25

## 2022-05-25 NOTE — BH CONSULTATION LIAISON ASSESSMENT NOTE - NSBHMSETHTCONTENT_PSY_A_CORE
Operative Note      Patient: Linda Smyth  YOB: 1950  MRN: [id number]    Date of Procedure: 6/25/2020    Pre-Op Diagnosis: CHANGE IN BOWEL HABITS, HISTORY POLYP    Post-Op Diagnosis: Same       Procedure(s):  COLONOSCOPY POLYPECTOMY SNARE/COLD BIOPSY    Surgeon(s):  Sp Thomas MD    Assistant:   * No surgical staff found *    Anesthesia: Monitor Anesthesia Care    Estimated Blood Loss (mL): None    Complications: None    Specimens:   ID Type Source Tests Collected by Time Destination   A : A.  Cecal polypectomy - hot snare removal Tissue Tissue SURGICAL PATHOLOGY Sp Thomas MD 6/25/2020 [id number]        Implants:  * No implants in log *      Drains: * No LDAs found *    Findings: See dictated report    Detailed Description of Procedure:   See dictated report    Electronically signed by Sp Thomas MD on 6/25/2020 at 9:30 AM
Unremarkable

## 2022-05-25 NOTE — BH CONSULTATION LIAISON ASSESSMENT NOTE - DOMICILE TYPE
Appt scheduled 9/26/18, pt does not want AFE at this time, just wants to discuss pain and have forms filled out.    Marina LANGLEY, Patient Care      Private Residence

## 2022-05-25 NOTE — BH CONSULTATION LIAISON ASSESSMENT NOTE - NSBHCHARTREVIEWVS_PSY_A_CORE FT
Vital Signs Last 24 Hrs  T(C): 36.6 (25 May 2022 12:44), Max: 36.9 (24 May 2022 20:07)  T(F): 97.8 (25 May 2022 12:44), Max: 98.5 (24 May 2022 20:07)  HR: 84 (25 May 2022 12:44) (73 - 92)  BP: 121/79 (25 May 2022 12:44) (109/77 - 126/81)  BP(mean): --  RR: 18 (25 May 2022 12:44) (16 - 18)  SpO2: 99% (25 May 2022 12:44) (95% - 100%)

## 2022-05-25 NOTE — PHYSICAL THERAPY INITIAL EVALUATION ADULT - PERTINENT HX OF CURRENT PROBLEM, REHAB EVAL
Patient is a 46 year old female with a PMHx of ETOH abuse (sober for 5 years), depression, anxiety SI in the past, who was BIBEMS to [facility] after being found with a dresser on her. Patient reports that her brother in law found her down after not hearing from her X 3 days. Patient reports that she was drinking 1Liter of Vodka prior to being found down with dresser on top of her. CTH and CT C spine : neg for acute chnages.

## 2022-05-25 NOTE — BH CONSULTATION LIAISON ASSESSMENT NOTE - SUMMARY
Patient is a 46 year old female, domiciled alone, with PPH significant for alcohol use disorder (history of multiple rehab admissions, per chart rereview, history of alcohol withdrawal seizure), admitted for fall, alcohol withdrawal, psych consulted for withdrawal.     Patient was seen and assessed at bedside. Patient is alert, oriented, calm, cooperative. Pt doesn't recall details of recent events, she thinks she may have fallen in her home. Pt was drinking about 1 liter vodka daily the past week. she was sober for about 5 years prior. pt reports tremors currently. no si/hi.

## 2022-05-25 NOTE — PROGRESS NOTE ADULT - ASSESSMENT
Patient is a 46 year old female with a PMHx of ETOH abuse (sober for 5 years), depression, anxiety SI in the past, who was BIBEMS to [facility] after being found with a dresser on her. Patient reports that her brother in law found her down after not hearing from her X 3 days. Patient reports that she was drinking 1Liter of Vodka prior to being found down with dresser on top of her. Patient does not recall falling. Patient does not recall how long she was down for. She denies taking any drugs with the alcohol and denies active suicidal ideation. Patient reports that she feels like she is going through alcohol withdrawal at this time.      ETOH withdrawal  - C/w Librium with Ativan for breakthrough   - Thiamine 500 IVPB daily X 3 days   - Folic acid  - Multivitamin   - IVF for hydration  - Monitor for signs/symptoms of withdrawal  - CT H/N negative for acute Fx  - Xray negative for Fx or dislocation   - Fall precautions  - Seizure precautions   - Aspirations precautions  - SW follow up     Rhabdo  - Due to fall/ ETOH   - CK downtrending from 2743 -- 1795 -- 820   - C/w IVF for hydration  - Trend CK on AM labs    Hyponatremia  - Likely alcohol induced   - C/w NS 75 cc/hr   - Monitor on AM labs  - Avoid overcorrection  -- Improving, C/w IVF     Elevated LFTs  - Likely due to  ETOH  - Cont to monitor and trend  - Avoid hepatotoxic agents  - Serial abd exams    Anemia  - Check Anemia W/U  - Transfuse for Hgb <7.0     Depression/Anxiety  - Psych eval PRN   - On Trazadone at home PRN   - Psych eval placed; F/U recs       PPX  - PPX  - Heparin 5K Q8  - PT  - SW follow up

## 2022-05-25 NOTE — PHYSICAL THERAPY INITIAL EVALUATION ADULT - BALANCE TRAINING, PT EVAL
Goal: Pt will improve balance one half grade to improve performance and safety of transfers and ambulation in 1 week.

## 2022-05-25 NOTE — BH CONSULTATION LIAISON ASSESSMENT NOTE - CURRENT MEDICATION
MEDICATIONS  (STANDING):  artificial  tears Solution 1 Drop(s) Both EYES two times a day  artificial tears (preservative free) Ophthalmic Solution 1 Drop(s) Both EYES daily  folic acid 1 milliGRAM(s) Oral daily  heparin   Injectable 5000 Unit(s) SubCutaneous every 8 hours  LORazepam     Tablet   Oral   LORazepam     Tablet 1.5 milliGRAM(s) Oral every 6 hours  multivitamin 1 Tablet(s) Oral daily  pantoprazole    Tablet 40 milliGRAM(s) Oral before breakfast  sodium chloride 0.9%. 1000 milliLiter(s) (75 mL/Hr) IV Continuous <Continuous>  thiamine IVPB 500 milliGRAM(s) IV Intermittent daily    MEDICATIONS  (PRN):  LORazepam     Tablet 1 milliGRAM(s) Oral every 1 hour PRN Symptom-triggered: each CIWA -Ar score 8 or GREATER

## 2022-05-26 ENCOUNTER — TRANSCRIPTION ENCOUNTER (OUTPATIENT)
Age: 47
End: 2022-05-26

## 2022-05-26 LAB
ALBUMIN SERPL ELPH-MCNC: 2.9 G/DL — LOW (ref 3.3–5)
ALP SERPL-CCNC: 60 U/L — SIGNIFICANT CHANGE UP (ref 40–120)
ALT FLD-CCNC: 36 U/L — SIGNIFICANT CHANGE UP (ref 10–45)
ANION GAP SERPL CALC-SCNC: 14 MMOL/L — SIGNIFICANT CHANGE UP (ref 5–17)
AST SERPL-CCNC: 60 U/L — HIGH (ref 10–40)
BILIRUB SERPL-MCNC: 0.3 MG/DL — SIGNIFICANT CHANGE UP (ref 0.2–1.2)
BUN SERPL-MCNC: 8 MG/DL — SIGNIFICANT CHANGE UP (ref 7–23)
CALCIUM SERPL-MCNC: 8.5 MG/DL — SIGNIFICANT CHANGE UP (ref 8.4–10.5)
CHLORIDE SERPL-SCNC: 102 MMOL/L — SIGNIFICANT CHANGE UP (ref 96–108)
CK SERPL-CCNC: 724 U/L — HIGH (ref 25–170)
CO2 SERPL-SCNC: 22 MMOL/L — SIGNIFICANT CHANGE UP (ref 22–31)
CREAT SERPL-MCNC: 0.52 MG/DL — SIGNIFICANT CHANGE UP (ref 0.5–1.3)
EGFR: 115 ML/MIN/1.73M2 — SIGNIFICANT CHANGE UP
GLUCOSE SERPL-MCNC: 124 MG/DL — HIGH (ref 70–99)
HCT VFR BLD CALC: 32 % — LOW (ref 34.5–45)
HGB BLD-MCNC: 9.3 G/DL — LOW (ref 11.5–15.5)
MCHC RBC-ENTMCNC: 22.5 PG — LOW (ref 27–34)
MCHC RBC-ENTMCNC: 29.1 GM/DL — LOW (ref 32–36)
MCV RBC AUTO: 77.5 FL — LOW (ref 80–100)
NRBC # BLD: 0 /100 WBCS — SIGNIFICANT CHANGE UP (ref 0–0)
PLATELET # BLD AUTO: 161 K/UL — SIGNIFICANT CHANGE UP (ref 150–400)
POTASSIUM SERPL-MCNC: 3.2 MMOL/L — LOW (ref 3.5–5.3)
POTASSIUM SERPL-SCNC: 3.2 MMOL/L — LOW (ref 3.5–5.3)
PROT SERPL-MCNC: 5.5 G/DL — LOW (ref 6–8.3)
RBC # BLD: 4.13 M/UL — SIGNIFICANT CHANGE UP (ref 3.8–5.2)
RBC # FLD: 22.7 % — HIGH (ref 10.3–14.5)
SODIUM SERPL-SCNC: 138 MMOL/L — SIGNIFICANT CHANGE UP (ref 135–145)
WBC # BLD: 3.99 K/UL — SIGNIFICANT CHANGE UP (ref 3.8–10.5)
WBC # FLD AUTO: 3.99 K/UL — SIGNIFICANT CHANGE UP (ref 3.8–10.5)

## 2022-05-26 PROCEDURE — 99231 SBSQ HOSP IP/OBS SF/LOW 25: CPT

## 2022-05-26 RX ORDER — POTASSIUM CHLORIDE 20 MEQ
40 PACKET (EA) ORAL EVERY 4 HOURS
Refills: 0 | Status: COMPLETED | OUTPATIENT
Start: 2022-05-26 | End: 2022-05-26

## 2022-05-26 RX ORDER — ACETAMINOPHEN 500 MG
650 TABLET ORAL EVERY 6 HOURS
Refills: 0 | Status: DISCONTINUED | OUTPATIENT
Start: 2022-05-26 | End: 2022-05-28

## 2022-05-26 RX ORDER — POTASSIUM CHLORIDE 20 MEQ
40 PACKET (EA) ORAL ONCE
Refills: 0 | Status: COMPLETED | OUTPATIENT
Start: 2022-05-26 | End: 2022-05-26

## 2022-05-26 RX ORDER — ONDANSETRON 8 MG/1
4 TABLET, FILM COATED ORAL EVERY 8 HOURS
Refills: 0 | Status: DISCONTINUED | OUTPATIENT
Start: 2022-05-26 | End: 2022-05-28

## 2022-05-26 RX ORDER — THIAMINE MONONITRATE (VIT B1) 100 MG
100 TABLET ORAL DAILY
Refills: 0 | Status: DISCONTINUED | OUTPATIENT
Start: 2022-05-27 | End: 2022-05-28

## 2022-05-26 RX ADMIN — Medication 1 DROP(S): at 07:31

## 2022-05-26 RX ADMIN — Medication 1 MILLIGRAM(S): at 17:01

## 2022-05-26 RX ADMIN — Medication 1 MILLIGRAM(S): at 11:59

## 2022-05-26 RX ADMIN — Medication 1 MILLIGRAM(S): at 03:03

## 2022-05-26 RX ADMIN — ONDANSETRON 4 MILLIGRAM(S): 8 TABLET, FILM COATED ORAL at 18:08

## 2022-05-26 RX ADMIN — Medication 1 MILLIGRAM(S): at 23:33

## 2022-05-26 RX ADMIN — Medication 1 DROP(S): at 13:41

## 2022-05-26 RX ADMIN — HEPARIN SODIUM 5000 UNIT(S): 5000 INJECTION INTRAVENOUS; SUBCUTANEOUS at 06:54

## 2022-05-26 RX ADMIN — Medication 40 MILLIEQUIVALENT(S): at 18:07

## 2022-05-26 RX ADMIN — Medication 1 TABLET(S): at 11:58

## 2022-05-26 RX ADMIN — Medication 40 MILLIEQUIVALENT(S): at 11:58

## 2022-05-26 RX ADMIN — Medication 105 MILLIGRAM(S): at 11:58

## 2022-05-26 RX ADMIN — Medication 650 MILLIGRAM(S): at 18:07

## 2022-05-26 RX ADMIN — HEPARIN SODIUM 5000 UNIT(S): 5000 INJECTION INTRAVENOUS; SUBCUTANEOUS at 21:57

## 2022-05-26 RX ADMIN — Medication 1 MILLIGRAM(S): at 02:11

## 2022-05-26 RX ADMIN — Medication 1 MILLIGRAM(S): at 11:58

## 2022-05-26 RX ADMIN — Medication 650 MILLIGRAM(S): at 23:32

## 2022-05-26 RX ADMIN — Medication 650 MILLIGRAM(S): at 18:35

## 2022-05-26 RX ADMIN — PANTOPRAZOLE SODIUM 40 MILLIGRAM(S): 20 TABLET, DELAYED RELEASE ORAL at 06:53

## 2022-05-26 RX ADMIN — SODIUM CHLORIDE 75 MILLILITER(S): 9 INJECTION INTRAMUSCULAR; INTRAVENOUS; SUBCUTANEOUS at 06:54

## 2022-05-26 RX ADMIN — Medication 650 MILLIGRAM(S): at 23:50

## 2022-05-26 RX ADMIN — Medication 1.5 MILLIGRAM(S): at 06:53

## 2022-05-26 RX ADMIN — Medication 1 DROP(S): at 17:08

## 2022-05-26 RX ADMIN — Medication 1.5 MILLIGRAM(S): at 00:04

## 2022-05-26 NOTE — BH CONSULTATION LIAISON PROGRESS NOTE - NSBHFUPINTERVALHXFT_PSY_A_CORE
pt states she is feeling better today, less tremors. pt c/o headache last night,nausea, anxiety. pt received ativan prns x3. no si/hi, denies depression.

## 2022-05-26 NOTE — DISCHARGE NOTE NURSING/CASE MANAGEMENT/SOCIAL WORK - NSDCPEFALRISK_GEN_ALL_CORE
For information on Fall & Injury Prevention, visit: [email or web address] OR  [email or web address] OR  https://www.cdc.gov/steadi/patient.html

## 2022-05-26 NOTE — PROGRESS NOTE ADULT - NS ATTEND AMEND GEN_ALL_CORE FT
Pt care and plan discussed and reviewed with PA. Plan as outlined above edited by me to reflect our discussion.
Pt care and plan discussed and reviewed with PA. Plan as outlined above edited by me to reflect our discussion.

## 2022-05-26 NOTE — BH CONSULTATION LIAISON PROGRESS NOTE - NSBHCHARTREVIEWVS_PSY_A_CORE FT
Vital Signs Last 24 Hrs  T(C): 37 (26 May 2022 15:00), Max: 37.1 (25 May 2022 19:30)  T(F): 98.6 (26 May 2022 15:00), Max: 98.7 (25 May 2022 19:30)  HR: 84 (26 May 2022 15:00) (68 - 88)  BP: 119/79 (26 May 2022 15:00) (112/76 - 143/94)  BP(mean): --  RR: 18 (26 May 2022 15:00) (18 - 18)  SpO2: 98% (26 May 2022 15:00) (96% - 99%)

## 2022-05-26 NOTE — BH CONSULTATION LIAISON PROGRESS NOTE - NSBHCHARTREVIEWLAB_PSY_A_CORE FT
9.3    3.99  )-----------( 161      ( 26 May 2022 07:11 )             32.0     05-26    138  |  102  |  8   ----------------------------<  124<H>  3.2<L>   |  22  |  0.52    Ca    8.5      26 May 2022 07:09    TPro  5.5<L>  /  Alb  2.9<L>  /  TBili  0.3  /  DBili  x   /  AST  60<H>  /  ALT  36  /  AlkPhos  60  05-26

## 2022-05-26 NOTE — BH CONSULTATION LIAISON PROGRESS NOTE - NSBHASSESSMENTFT_PSY_ALL_CORE
Summary (brief):	Patient is a 46 year old female, domiciled alone, with PPH significant for alcohol use disorder (history of multiple rehab admissions, per chart rereview, history of alcohol withdrawal seizure), admitted for fall, alcohol withdrawal, psych consulted for withdrawal.     Patient was seen and assessed at bedside. Patient is alert, oriented, calm, cooperative. Pt doesn't recall details of recent events, she thinks she may have fallen in her home. Pt was drinking about 1 liter vodka daily the past week. she was sober for about 5 years prior. pt reports tremors currently. no si/hi.

## 2022-05-26 NOTE — PROGRESS NOTE ADULT - ASSESSMENT
Patient is a 46 year old female with a PMHx of ETOH abuse (sober for 5 years), depression, anxiety SI in the past, who was BIBEMS to [facility] after being found with a dresser on her. Patient reports that her brother in law found her down after not hearing from her X 3 days. Patient reports that she was drinking 1Liter of Vodka prior to being found down with dresser on top of her. Patient does not recall falling. Patient does not recall how long she was down for. She denies taking any drugs with the alcohol and denies active suicidal ideation. Patient reports that she feels like she is going through alcohol withdrawal at this time.      ETOH withdrawal  - C/w Ativan; Librium D/C  - Thiamine 500 IVPB daily X 3 days following by 100 daily (order is in)  - C/w folic acid and multivitamin   - IVF for hydration  - Monitor for signs/symptoms of withdrawal  - CT H/N negative for acute Fx  - Xray negative for Fx or dislocation   - Fall precautions  - Seizure precautions   - Aspirations precautions  - SW follow up     Rhabdo  - Due to fall/ ETOH   - CK downtrending from 2743 -- 1795 -- 820 -- 724   - C/w IVF for hydration  - Trend CK on AM labs (Order is in for 05/27 and 05/28)    Hyponatremia  - Likely alcohol induced   - C/w NS 75 cc/hr   - Monitor on AM labs  - Avoid overcorrection  - Improving, C/w IVF     Elevated LFTs  - Likely due to  ETOH  - Cont to monitor and trend  - Avoid hepatotoxic agents  - Serial abd exams    Anemia  - Check Anemia W/U  - Transfuse for Hgb <7.0   - Likely due to AOCD    Depression/Anxiety  - Psych eval PRN   - On Trazadone at home PRN   - Psych eval placed; F/U recs   - Pt will require outpatient Psych follow up     Elevated TSH  - Mildly elevated TSH; F/U outpatient w/ PCP for repeat TSH - likely stress induced     PPX  - PPX  - Heparin 5K Q8  - PT  - SW follow up     Patient is a 46 year old female with a PMHx of ETOH abuse (sober for 5 years), depression, anxiety SI in the past, who was BIBEMS to [facility] after being found with a dresser on her. Patient reports that her brother in law found her down after not hearing from her X 3 days. Patient reports that she was drinking 1Liter of Vodka prior to being found down with dresser on top of her. Patient does not recall falling. Patient does not recall how long she was down for. She denies taking any drugs with the alcohol and denies active suicidal ideation. Patient reports that she feels like she is going through alcohol withdrawal at this time.      ETOH withdrawal  - C/w Ativan; Librium D/C  - Thiamine 500 IVPB daily X 3 days following by 100 daily (order is in)  - C/w folic acid and multivitamin   - IVF for hydration  - Monitor for signs/symptoms of withdrawal  - CT H/N negative for acute Fx  - Xray negative for Fx or dislocation   - Fall precautions  - Seizure precautions   - Aspirations precautions  - SW follow up     Rhabdo  - Due to fall/ ETOH   - CK downtrending from 2743 -- 1795 -- 820 -- 724   - C/w IVF for hydration  - Trend CK on AM labs (Order is in for 05/27 and 05/28)    Hyponatremia  - Likely alcohol induced   - C/w NS 75 cc/hr   - Monitor on AM labs  - Avoid overcorrection  - Improving, C/w IVF     Hypokalmia  - Monitor and replete electrolytes PRN   - F/U AM BMP     Elevated LFTs  - Likely due to  ETOH  - Cont to monitor and trend  - Avoid hepatotoxic agents  - Serial abd exams    Anemia  - Check Anemia W/U  - Transfuse for Hgb <7.0   - Likely due to AOCD    Depression/Anxiety  - Psych eval PRN   - On Trazadone at home PRN   - Psych eval placed; F/U recs   - Pt will require outpatient Psych follow up     Elevated TSH  - Mildly elevated TSH; F/U outpatient w/ PCP for repeat TSH - likely stress induced     PPX  - PPX  - Heparin 5K Q8  - PT  - SW follow up

## 2022-05-26 NOTE — BH CONSULTATION LIAISON PROGRESS NOTE - CURRENT MEDICATION
MEDICATIONS  (STANDING):  artificial  tears Solution 1 Drop(s) Both EYES two times a day  artificial tears (preservative free) Ophthalmic Solution 1 Drop(s) Both EYES daily  folic acid 1 milliGRAM(s) Oral daily  heparin   Injectable 5000 Unit(s) SubCutaneous every 8 hours  LORazepam     Tablet   Oral   LORazepam     Tablet 1 milliGRAM(s) Oral every 6 hours  multivitamin 1 Tablet(s) Oral daily  pantoprazole    Tablet 40 milliGRAM(s) Oral before breakfast  sodium chloride 0.9%. 1000 milliLiter(s) (75 mL/Hr) IV Continuous <Continuous>    MEDICATIONS  (PRN):  LORazepam     Tablet 1 milliGRAM(s) Oral every 1 hour PRN Symptom-triggered: each CIWA -Ar score 8 or GREATER

## 2022-05-26 NOTE — DISCHARGE NOTE NURSING/CASE MANAGEMENT/SOCIAL WORK - PATIENT PORTAL LINK FT
You can access the FollowMyHealth Patient Portal offered by [facility] by registering at the following website: [email or web address]. By joining [email or web address]’s FollowMyHealth portal, you will also be able to view your health information using other applications (apps) compatible with our system.

## 2022-05-26 NOTE — DISCHARGE NOTE NURSING/CASE MANAGEMENT/SOCIAL WORK - NSDCVIVACCINE_GEN_ALL_CORE_FT
influenza, injectable, quadrivalent, preservative free; 23-Oct-2020 20:55; Ramón Wong (RN); [employer]; [id number] (Exp. Date: 30-Jun-2021); IntraMuscular; Deltoid Left.; 0.5 milliLiter(s); VIS (VIS Published: 15-Aug-2019, VIS Presented: 23-Oct-2020);

## 2022-05-27 RX ORDER — POLYETHYLENE GLYCOL 3350 17 G/17G
17 POWDER, FOR SOLUTION ORAL DAILY
Refills: 0 | Status: DISCONTINUED | OUTPATIENT
Start: 2022-05-27 | End: 2022-05-28

## 2022-05-27 RX ORDER — SENNA PLUS 8.6 MG/1
2 TABLET ORAL DAILY
Refills: 0 | Status: DISCONTINUED | OUTPATIENT
Start: 2022-05-27 | End: 2022-05-28

## 2022-05-27 RX ADMIN — Medication 1 TABLET(S): at 11:46

## 2022-05-27 RX ADMIN — Medication 0.5 MILLIGRAM(S): at 11:45

## 2022-05-27 RX ADMIN — Medication 0.5 MILLIGRAM(S): at 17:52

## 2022-05-27 RX ADMIN — Medication 100 MILLIGRAM(S): at 11:46

## 2022-05-27 RX ADMIN — PANTOPRAZOLE SODIUM 40 MILLIGRAM(S): 20 TABLET, DELAYED RELEASE ORAL at 06:24

## 2022-05-27 RX ADMIN — SENNA PLUS 2 TABLET(S): 8.6 TABLET ORAL at 11:45

## 2022-05-27 RX ADMIN — Medication 1 MILLIGRAM(S): at 05:20

## 2022-05-27 RX ADMIN — Medication 1 DROP(S): at 06:34

## 2022-05-27 RX ADMIN — Medication 1 MILLIGRAM(S): at 11:46

## 2022-05-27 RX ADMIN — POLYETHYLENE GLYCOL 3350 17 GRAM(S): 17 POWDER, FOR SOLUTION ORAL at 11:46

## 2022-05-27 RX ADMIN — HEPARIN SODIUM 5000 UNIT(S): 5000 INJECTION INTRAVENOUS; SUBCUTANEOUS at 05:20

## 2022-05-27 RX ADMIN — Medication 0.5 MILLIGRAM(S): at 23:01

## 2022-05-27 RX ADMIN — Medication 1 DROP(S): at 11:46

## 2022-05-27 NOTE — PROGRESS NOTE ADULT - ASSESSMENT
Patient is a 46 year old female with a PMHx of ETOH abuse (sober for 5 years), depression, anxiety SI in the past, who was BIBEMS to [facility] after being found with a dresser on her. Patient reports that her brother in law found her down after not hearing from her X 3 days. Patient reports that she was drinking 1Liter of Vodka prior to being found down with dresser on top of her. Patient does not recall falling. Patient does not recall how long she was down for. She denies taking any drugs with the alcohol and denies active suicidal ideation. Patient reports that she feels like she is going through alcohol withdrawal at this time.      ETOH withdrawal  - C/w Ativan; Librium D/C  - Thiamine 500 IVPB daily X 3 days following by 100 daily (order is in)  - C/w folic acid and multivitamin   - IVF for hydration  - Monitor for signs/symptoms of withdrawal  - CT H/N negative for acute Fx  - Xray negative for Fx or dislocation   - Fall precautions  - Seizure precautions   - Aspirations precautions  - SW follow up     Rhabdo  - Due to fall/ ETOH   - CK downtrending from 2743 -- 1795 -- 820 -- 724   - C/w IVF for hydration  - Trend CK on AM labs (Order is in for 05/27 and 05/28)    Hyponatremia  - Likely alcohol induced   - C/w NS 75 cc/hr   - Monitor on AM labs  - Avoid overcorrection  - Improving, C/w IVF     Hypokalmia  - Monitor and replete electrolytes PRN   - F/U AM BMP     Elevated LFTs  - Likely due to  ETOH  - Cont to monitor and trend  - Avoid hepatotoxic agents  - Serial abd exams    Anemia  - Check Anemia W/U  - Transfuse for Hgb <7.0   - Likely due to AOCD    Depression/Anxiety  - Psych eval PRN   - On Trazadone at home PRN   - Psych eval placed; F/U recs   - Pt will require outpatient Psych follow up     Elevated TSH  - Mildly elevated TSH; F/U outpatient w/ PCP for repeat TSH - likely stress induced     PPX  - PPX  - Heparin 5K Q8  - PT  - SW follow up

## 2022-05-27 NOTE — PROGRESS NOTE ADULT - SUBJECTIVE AND OBJECTIVE BOX
Name of Patient : AYLEEN DEY  MRN: [id number]  Date of visit: 05-25-22 @ 16:56      Subjective: Patient seen and examined. No new events except as noted.   Patient seen earlier this AM. Denies CP, palpitations, SOB or dyspnea. Denies abdominal pain or discomfort.  Librium changed to Ativan. Tremulous     REVIEW OF SYSTEMS:    CONSTITUTIONAL:  +Generalized weakness; S/P fall due to alcohol intoxication   EYES/ENT: No visual changes;  No vertigo or throat pain   NECK: No pain or stiffness  RESPIRATORY: No cough, wheezing, hemoptysis; No shortness of breath  CARDIOVASCULAR: No chest pain or palpitations  GASTROINTESTINAL: No abdominal or epigastric pain. No nausea, vomiting, or hematemesis; No diarrhea or constipation. No melena or hematochezia.  GENITOURINARY: No dysuria, frequency or hematuria  NEUROLOGICAL: +Tremulous   SKIN: RLE ecchymosis   All other review of systems is negative unless indicated above.    MEDICATIONS:  MEDICATIONS  (STANDING):  artificial  tears Solution 1 Drop(s) Both EYES two times a day  artificial tears (preservative free) Ophthalmic Solution 1 Drop(s) Both EYES daily  folic acid 1 milliGRAM(s) Oral daily  heparin   Injectable 5000 Unit(s) SubCutaneous every 8 hours  LORazepam     Tablet   Oral   LORazepam     Tablet 1.5 milliGRAM(s) Oral every 6 hours  multivitamin 1 Tablet(s) Oral daily  pantoprazole    Tablet 40 milliGRAM(s) Oral before breakfast  sodium chloride 0.9%. 1000 milliLiter(s) (75 mL/Hr) IV Continuous <Continuous>  thiamine IVPB 500 milliGRAM(s) IV Intermittent daily      PHYSICAL EXAM:  T(C): 36.6 (05-25-22 @ 12:44), Max: 36.9 (05-24-22 @ 20:07)  HR: 84 (05-25-22 @ 12:44) (73 - 92)  BP: 121/79 (05-25-22 @ 12:44) (109/77 - 126/81)  RR: 18 (05-25-22 @ 12:44) (16 - 18)  SpO2: 99% (05-25-22 @ 12:44) (95% - 100%)  Wt(kg): --  I&O's Summary    24 May 2022 07:01  -  25 May 2022 07:00  --------------------------------------------------------  IN: 1020 mL / OUT: 1400 mL / NET: -380 mL    25 May 2022 07:01  -  25 May 2022 16:56  --------------------------------------------------------  IN: 120 mL / OUT: 1000 mL / NET: -880 mL          Appearance: Normal; Awake; Tremulous   HEENT:  PERRLA   Lymphatic: No lymphadenopathy   Cardiovascular: Normal S1 S2, no JVD  Respiratory: normal effort , clear  Gastrointestinal:  Soft, Non-tender  Skin: No rashes,  warm to touch  Psychiatry:  Mood & affect appropriate  Musculoskeletal: RLE ecchymosis       05-24-22 @ 07:01  -  05-25-22 @ 07:00  --------------------------------------------------------  IN: 1020 mL / OUT: 1400 mL / NET: -380 mL    05-25-22 @ 07:01  -  05-25-22 @ 16:56  --------------------------------------------------------  IN: 120 mL / OUT: 1000 mL / NET: -880 mL                                10.2   4.83  )-----------( 131      ( 25 May 2022 10:33 )             34.8               05-25    134<L>  |  96  |  14  ----------------------------<  135<H>  4.0   |  21<L>  |  0.49<L>    Ca    8.7      25 May 2022 10:33  Phos  3.6     05-23  Mg     2.1     05-23    TPro  5.7<L>  /  Alb  3.2<L>  /  TBili  0.4  /  DBili  x   /  AST  70<H>  /  ALT  40  /  AlkPhos  64  05-25    PT/INR - ( 23 May 2022 21:08 )   PT: 13.4 sec;   INR: 1.15 ratio         PTT - ( 23 May 2022 21:08 )  PTT:28.2 sec       CARDIAC MARKERS ( 25 May 2022 10:33 )  x     / x     / 820 U/L / x     / x      CARDIAC MARKERS ( 24 May 2022 07:45 )  x     / x     / 1795 U/L / x     / x      CARDIAC MARKERS ( 23 May 2022 21:08 )  x     / x     / 2743 U/L / x     / x                      < from: Xray Tibia + Fibula 2 Views, Right (05.23.22 @ 21:39) >    IMPRESSION:  No acute fracture or dislocation.    < end of copied text >  
Name of Patient : AYLEEN DEY  MRN: [id number]  Date of visit: 05-27-22       Subjective: Patient seen and examined. No new events except as noted.   doing okay   cont to have tremors     REVIEW OF SYSTEMS:    CONSTITUTIONAL: No weakness, fevers or chills  EYES/ENT: No visual changes;  No vertigo or throat pain   NECK: No pain or stiffness  RESPIRATORY: No cough, wheezing, hemoptysis; No shortness of breath  CARDIOVASCULAR: No chest pain or palpitations  GASTROINTESTINAL: No abdominal or epigastric pain. No nausea, vomiting, or hematemesis; No diarrhea or constipation. No melena or hematochezia.  GENITOURINARY: No dysuria, frequency or hematuria  NEUROLOGICAL: No numbness or weakness  SKIN: No itching, burning, rashes, or lesions   All other review of systems is negative unless indicated above.    MEDICATIONS:  MEDICATIONS  (STANDING):  artificial  tears Solution 1 Drop(s) Both EYES two times a day  artificial tears (preservative free) Ophthalmic Solution 1 Drop(s) Both EYES daily  folic acid 1 milliGRAM(s) Oral daily  heparin   Injectable 5000 Unit(s) SubCutaneous every 8 hours  LORazepam     Tablet   Oral   LORazepam     Tablet 0.5 milliGRAM(s) Oral every 6 hours  multivitamin 1 Tablet(s) Oral daily  pantoprazole    Tablet 40 milliGRAM(s) Oral before breakfast  polyethylene glycol 3350 17 Gram(s) Oral daily  senna 2 Tablet(s) Oral daily  sodium chloride 0.9%. 1000 milliLiter(s) (75 mL/Hr) IV Continuous <Continuous>  thiamine 100 milliGRAM(s) Oral daily      PHYSICAL EXAM:  T(C): 37 (05-27-22 @ 18:40), Max: 37 (05-27-22 @ 18:40)  HR: 94 (05-27-22 @ 18:40) (73 - 141)  BP: 120/83 (05-27-22 @ 18:40) (117/88 - 142/93)  RR: 18 (05-27-22 @ 18:40) (18 - 18)  SpO2: 99% (05-27-22 @ 18:40) (97% - 100%)  Wt(kg): --  I&O's Summary    26 May 2022 07:01  -  27 May 2022 07:00  --------------------------------------------------------  IN: 3175 mL / OUT: 400 mL / NET: 2775 mL    27 May 2022 07:01  -  27 May 2022 23:13  --------------------------------------------------------  IN: 780 mL / OUT: 2 mL / NET: 778 mL          Appearance: Normal	  HEENT:  PERRLA   Lymphatic: No lymphadenopathy   Cardiovascular: Normal S1 S2, no JVD  Respiratory: normal effort , clear  Gastrointestinal:  Soft, Non-tender  Skin: No rashes,  warm to touch  Psychiatry:  Mood & affect appropriate  Musculuskeletal: No edema      All labs, Imaging and EKGs personally reviewed       05-26-22 @ 07:01  -  05-27-22 @ 07:00  --------------------------------------------------------  IN: 3175 mL / OUT: 400 mL / NET: 2775 mL    05-27-22 @ 07:01  -  05-27-22 @ 23:13  --------------------------------------------------------  IN: 780 mL / OUT: 2 mL / NET: 778 mL                          9.3    3.99  )-----------( 161      ( 26 May 2022 07:11 )             32.0               05-26    138  |  102  |  8   ----------------------------<  124<H>  3.2<L>   |  22  |  0.52    Ca    8.5      26 May 2022 07:09    TPro  5.5<L>  /  Alb  2.9<L>  /  TBili  0.3  /  DBili  x   /  AST  60<H>  /  ALT  36  /  AlkPhos  60  05-26           CARDIAC MARKERS ( 26 May 2022 07:09 )  x     / x     / 724 U/L / x     / x                            
Name of Patient : AYLEEN DEY  MRN: [id number]  Date of visit: 05-26-22 @ 14:01      Subjective: Patient seen and examined. No new events except as noted.   Patient seen earlier this AM. Denies CP, palpitations, SOB or dyspnea.   Reports Ativan provides more relief than librium.    REVIEW OF SYSTEMS:    CONSTITUTIONAL:  +Generalized weakness; S/P fall due to alcohol intoxication   EYES/ENT: No visual changes;  No vertigo or throat pain   NECK: No pain or stiffness  RESPIRATORY: No cough, wheezing, hemoptysis; No shortness of breath  CARDIOVASCULAR: No chest pain or palpitations  GASTROINTESTINAL: No abdominal or epigastric pain. No nausea, vomiting, or hematemesis; No diarrhea or constipation. No melena or hematochezia.  GENITOURINARY: No dysuria, frequency or hematuria  NEUROLOGICAL: No numbness or weakness  SKIN: RLE ecchymosis   All other review of systems is negative unless indicated above.    MEDICATIONS:  MEDICATIONS  (STANDING):  artificial  tears Solution 1 Drop(s) Both EYES two times a day  artificial tears (preservative free) Ophthalmic Solution 1 Drop(s) Both EYES daily  folic acid 1 milliGRAM(s) Oral daily  heparin   Injectable 5000 Unit(s) SubCutaneous every 8 hours  LORazepam     Tablet   Oral   LORazepam     Tablet 1 milliGRAM(s) Oral every 6 hours  multivitamin 1 Tablet(s) Oral daily  pantoprazole    Tablet 40 milliGRAM(s) Oral before breakfast  sodium chloride 0.9%. 1000 milliLiter(s) (75 mL/Hr) IV Continuous <Continuous>      PHYSICAL EXAM:  T(C): 36.8 (05-26-22 @ 13:00), Max: 37.1 (05-25-22 @ 19:30)  HR: 88 (05-26-22 @ 13:00) (68 - 88)  BP: 135/92 (05-26-22 @ 13:00) (112/76 - 143/94)  RR: 18 (05-26-22 @ 13:00) (18 - 18)  SpO2: 98% (05-26-22 @ 13:00) (96% - 99%)  Wt(kg): --  I&O's Summary    25 May 2022 07:01  -  26 May 2022 07:00  --------------------------------------------------------  IN: 360 mL / OUT: 1200 mL / NET: -840 mL          Appearance: Normal; Awake; Tremulous   HEENT:  PERRLA   Lymphatic: No lymphadenopathy   Cardiovascular: Normal S1 S2, no JVD  Respiratory: normal effort , clear  Gastrointestinal:  Soft, Non-tender  Skin: No rashes,  warm to touch  Psychiatry:  Mood & affect appropriate  Musculoskeletal: RLE ecchymosis           05-25-22 @ 07:01  -  05-26-22 @ 07:00  --------------------------------------------------------  IN: 360 mL / OUT: 1200 mL / NET: -840 mL                              9.3    3.99  )-----------( 161      ( 26 May 2022 07:11 )             32.0               05-26    138  |  102  |  8   ----------------------------<  124<H>  3.2<L>   |  22  |  0.52    Ca    8.5      26 May 2022 07:09    TPro  5.5<L>  /  Alb  2.9<L>  /  TBili  0.3  /  DBili  x   /  AST  60<H>  /  ALT  36  /  AlkPhos  60  05-26           CARDIAC MARKERS ( 26 May 2022 07:09 )  x     / x     / 724 U/L / x     / x      CARDIAC MARKERS ( 25 May 2022 10:33 )  x     / x     / 820 U/L / x     / x

## 2022-05-28 ENCOUNTER — TRANSCRIPTION ENCOUNTER (OUTPATIENT)
Age: 47
End: 2022-05-28

## 2022-05-28 VITALS — DIASTOLIC BLOOD PRESSURE: 97 MMHG | SYSTOLIC BLOOD PRESSURE: 148 MMHG | HEART RATE: 84 BPM

## 2022-05-28 LAB
ANION GAP SERPL CALC-SCNC: 14 MMOL/L — SIGNIFICANT CHANGE UP (ref 5–17)
BUN SERPL-MCNC: 9 MG/DL — SIGNIFICANT CHANGE UP (ref 7–23)
CALCIUM SERPL-MCNC: 8.9 MG/DL — SIGNIFICANT CHANGE UP (ref 8.4–10.5)
CHLORIDE SERPL-SCNC: 102 MMOL/L — SIGNIFICANT CHANGE UP (ref 96–108)
CK SERPL-CCNC: 998 U/L — HIGH (ref 25–170)
CO2 SERPL-SCNC: 25 MMOL/L — SIGNIFICANT CHANGE UP (ref 22–31)
CREAT SERPL-MCNC: 0.51 MG/DL — SIGNIFICANT CHANGE UP (ref 0.5–1.3)
EGFR: 115 ML/MIN/1.73M2 — SIGNIFICANT CHANGE UP
GLUCOSE SERPL-MCNC: 125 MG/DL — HIGH (ref 70–99)
HCT VFR BLD CALC: 33.5 % — LOW (ref 34.5–45)
HGB BLD-MCNC: 9.7 G/DL — LOW (ref 11.5–15.5)
LIDOCAIN IGE QN: 27 U/L — SIGNIFICANT CHANGE UP (ref 7–60)
MAGNESIUM SERPL-MCNC: 1.4 MG/DL — LOW (ref 1.6–2.6)
MCHC RBC-ENTMCNC: 22.1 PG — LOW (ref 27–34)
MCHC RBC-ENTMCNC: 29 GM/DL — LOW (ref 32–36)
MCV RBC AUTO: 76.5 FL — LOW (ref 80–100)
NRBC # BLD: 0 /100 WBCS — SIGNIFICANT CHANGE UP (ref 0–0)
PHOSPHATE SERPL-MCNC: 4.1 MG/DL — SIGNIFICANT CHANGE UP (ref 2.5–4.5)
PLATELET # BLD AUTO: 237 K/UL — SIGNIFICANT CHANGE UP (ref 150–400)
POTASSIUM SERPL-MCNC: 3.7 MMOL/L — SIGNIFICANT CHANGE UP (ref 3.5–5.3)
POTASSIUM SERPL-SCNC: 3.7 MMOL/L — SIGNIFICANT CHANGE UP (ref 3.5–5.3)
RBC # BLD: 4.38 M/UL — SIGNIFICANT CHANGE UP (ref 3.8–5.2)
RBC # FLD: 23.6 % — HIGH (ref 10.3–14.5)
SODIUM SERPL-SCNC: 141 MMOL/L — SIGNIFICANT CHANGE UP (ref 135–145)
WBC # BLD: 3.54 K/UL — LOW (ref 3.8–10.5)
WBC # FLD AUTO: 3.54 K/UL — LOW (ref 3.8–10.5)

## 2022-05-28 PROCEDURE — 80048 BASIC METABOLIC PNL TOTAL CA: CPT

## 2022-05-28 PROCEDURE — 84443 ASSAY THYROID STIM HORMONE: CPT

## 2022-05-28 PROCEDURE — 82746 ASSAY OF FOLIC ACID SERUM: CPT

## 2022-05-28 PROCEDURE — 96360 HYDRATION IV INFUSION INIT: CPT

## 2022-05-28 PROCEDURE — 84100 ASSAY OF PHOSPHORUS: CPT

## 2022-05-28 PROCEDURE — 82607 VITAMIN B-12: CPT

## 2022-05-28 PROCEDURE — 87637 SARSCOV2&INF A&B&RSV AMP PRB: CPT

## 2022-05-28 PROCEDURE — 97162 PT EVAL MOD COMPLEX 30 MIN: CPT

## 2022-05-28 PROCEDURE — 84702 CHORIONIC GONADOTROPIN TEST: CPT

## 2022-05-28 PROCEDURE — 36415 COLL VENOUS BLD VENIPUNCTURE: CPT

## 2022-05-28 PROCEDURE — 83540 ASSAY OF IRON: CPT

## 2022-05-28 PROCEDURE — 85027 COMPLETE CBC AUTOMATED: CPT

## 2022-05-28 PROCEDURE — 82550 ASSAY OF CK (CPK): CPT

## 2022-05-28 PROCEDURE — 82728 ASSAY OF FERRITIN: CPT

## 2022-05-28 PROCEDURE — 71045 X-RAY EXAM CHEST 1 VIEW: CPT

## 2022-05-28 PROCEDURE — 85025 COMPLETE CBC W/AUTO DIFF WBC: CPT

## 2022-05-28 PROCEDURE — 93005 ELECTROCARDIOGRAM TRACING: CPT

## 2022-05-28 PROCEDURE — 99285 EMERGENCY DEPT VISIT HI MDM: CPT

## 2022-05-28 PROCEDURE — 73590 X-RAY EXAM OF LOWER LEG: CPT

## 2022-05-28 PROCEDURE — 70450 CT HEAD/BRAIN W/O DYE: CPT | Mod: MA

## 2022-05-28 PROCEDURE — 73562 X-RAY EXAM OF KNEE 3: CPT

## 2022-05-28 PROCEDURE — 80061 LIPID PANEL: CPT

## 2022-05-28 PROCEDURE — 83690 ASSAY OF LIPASE: CPT

## 2022-05-28 PROCEDURE — 83036 HEMOGLOBIN GLYCOSYLATED A1C: CPT

## 2022-05-28 PROCEDURE — 72125 CT NECK SPINE W/O DYE: CPT | Mod: MA

## 2022-05-28 PROCEDURE — 83550 IRON BINDING TEST: CPT

## 2022-05-28 PROCEDURE — 72170 X-RAY EXAM OF PELVIS: CPT

## 2022-05-28 PROCEDURE — 85610 PROTHROMBIN TIME: CPT

## 2022-05-28 PROCEDURE — 80307 DRUG TEST PRSMV CHEM ANLYZR: CPT

## 2022-05-28 PROCEDURE — 80053 COMPREHEN METABOLIC PANEL: CPT

## 2022-05-28 PROCEDURE — 85730 THROMBOPLASTIN TIME PARTIAL: CPT

## 2022-05-28 PROCEDURE — 83735 ASSAY OF MAGNESIUM: CPT

## 2022-05-28 RX ORDER — PANTOPRAZOLE SODIUM 20 MG/1
1 TABLET, DELAYED RELEASE ORAL
Qty: 30 | Refills: 0
Start: 2022-05-28 | End: 2022-06-26

## 2022-05-28 RX ORDER — FOLIC ACID 0.8 MG
1 TABLET ORAL
Qty: 30 | Refills: 0
Start: 2022-05-28 | End: 2022-06-26

## 2022-05-28 RX ORDER — THIAMINE MONONITRATE (VIT B1) 100 MG
1 TABLET ORAL
Qty: 30 | Refills: 0
Start: 2022-05-28 | End: 2022-06-26

## 2022-05-28 RX ORDER — POLYETHYLENE GLYCOL 3350 17 G/17G
17 POWDER, FOR SOLUTION ORAL
Qty: 255 | Refills: 0
Start: 2022-05-28 | End: 2022-06-11

## 2022-05-28 RX ORDER — SENNA PLUS 8.6 MG/1
2 TABLET ORAL
Qty: 60 | Refills: 0
Start: 2022-05-28 | End: 2022-06-26

## 2022-05-28 RX ORDER — ACETAMINOPHEN 500 MG
2 TABLET ORAL
Qty: 0 | Refills: 0 | DISCHARGE
Start: 2022-05-28

## 2022-05-28 RX ADMIN — Medication 650 MILLIGRAM(S): at 14:52

## 2022-05-28 RX ADMIN — Medication 1 TABLET(S): at 11:48

## 2022-05-28 RX ADMIN — Medication 0.5 MILLIGRAM(S): at 06:12

## 2022-05-28 RX ADMIN — Medication 100 MILLIGRAM(S): at 11:48

## 2022-05-28 RX ADMIN — Medication 1 MILLIGRAM(S): at 11:48

## 2022-05-28 RX ADMIN — PANTOPRAZOLE SODIUM 40 MILLIGRAM(S): 20 TABLET, DELAYED RELEASE ORAL at 06:12

## 2022-05-28 RX ADMIN — Medication 1 DROP(S): at 06:35

## 2022-05-28 RX ADMIN — Medication 650 MILLIGRAM(S): at 12:54

## 2022-05-28 NOTE — DISCHARGE NOTE PROVIDER - CARE PROVIDER_API CALL
Charles Guajardo ()  Medicine  [address] [address]  Phone: [phone]  Fax: [phone]  Follow Up Time: 1 week

## 2022-05-28 NOTE — DISCHARGE NOTE PROVIDER - HOSPITAL COURSE
46 year old female with a PMHx of ETOH abuse (sober for 5 years), depression, anxiety SI in the past, who was BIBEMS to [facility] after being found with a dresser on her. Patient reports that her brother in law found her down after not hearing from her X 3 days. Patient reports that she was drinking 1Liter of Vodka prior to being found down with dresser on top of her. Patient does not recall falling. Patient does not recall how long she was down for. She denies taking any drugs with the alcohol and denies active suicidal ideation. Patient reports that she feels like she is going through alcohol withdrawal at this time.      ETOH withdrawal  - C/w Ativan; Librium D/C  - Thiamine 500 IVPB daily X 3 days following by 100 daily (order is in)  - C/w folic acid and multivitamin   - IVF for hydration  - Monitor for signs/symptoms of withdrawal  - CT H/N negative for acute Fx  - Xray negative for Fx or dislocation   - Fall precautions  - Seizure precautions   - Aspirations precautions  - SW follow up     Rhabdo  - Due to fall/ ETOH   - CK downtrending from 2743 -- 1795 -- 820 -- 724   - C/w IVF for hydration  - Trend CK on AM labs (Order is in for 05/27 and 05/28)    Hyponatremia  - Likely alcohol induced   - C/w NS 75 cc/hr   - Monitor on AM labs  - Avoid overcorrection  - Improving, C/w IVF     Hypokalmia  - Monitor and replete electrolytes PRN   - F/U AM BMP     Elevated LFTs  - Likely due to  ETOH  - Cont to monitor and trend  - Avoid hepatotoxic agents  - Serial abd exams    Anemia  - Check Anemia W/U  - Transfuse for Hgb <7.0   - Likely due to AOCD    Depression/Anxiety  - Psych eval PRN   - On Trazadone at home PRN   - Psych eval placed; F/U recs   - Pt will require outpatient Psych follow up     Elevated TSH  - Mildly elevated TSH; F/U outpatient w/ PCP for repeat TSH - likely stress induced     PPX  - PPX  - Heparin 5K Q8  - PT    - PT          Pt medically stable for discharge home today per Med Attending Dr Guajardo.

## 2022-05-28 NOTE — DISCHARGE NOTE PROVIDER - NSDCMRMEDTOKEN_GEN_ALL_CORE_FT
acetaminophen 325 mg oral tablet: 2 tab(s) orally every 6 hours, As needed, Mild Pain (1 - 3)  folic acid 1 mg oral tablet: 1 tab(s) orally once a day  Multiple Vitamins oral tablet: 1 tab(s) orally once a day  pantoprazole 40 mg oral delayed release tablet: 1 tab(s) orally once a day (before a meal)  polyethylene glycol 3350 oral powder for reconstitution: 17 gram(s) orally once a day, As Needed for constipation.   senna oral tablet: 2 tab(s) orally once a day (at bedtime)   thiamine 100 mg oral tablet: 1 tab(s) orally once a day  traZODone 150 mg oral tablet: 1 tab(s) orally once a day, As Needed

## 2022-05-28 NOTE — DISCHARGE NOTE PROVIDER - NSDCCPCAREPLAN_GEN_ALL_CORE_FT
PRINCIPAL DISCHARGE DIAGNOSIS  Diagnosis: Rhabdomyolysis  Assessment and Plan of Treatment: Resolved.      SECONDARY DISCHARGE DIAGNOSES  Diagnosis: Anxiety and depression  Assessment and Plan of Treatment: Take your medication as prescribed.   Follow up with your medical doctor for routine blood  work monitoring, and to establish long term treatment goals.      Diagnosis: Alcohol use with intoxication  Assessment and Plan of Treatment: Please decist from alcohol use.  Follow up with outside resourses and organizations for AA for assistance since you declined referral to outside agents for assistance.        PRINCIPAL DISCHARGE DIAGNOSIS  Diagnosis: Rhabdomyolysis  Assessment and Plan of Treatment: Resolved.      SECONDARY DISCHARGE DIAGNOSES  Diagnosis: Anxiety and depression  Assessment and Plan of Treatment: Take your medication as prescribed.   Follow up with your medical doctor for routine blood  work monitoring, and to establish long term treatment goals.      Diagnosis: Alcohol use with intoxication  Assessment and Plan of Treatment: Please decist from alcohol use.  Take your spplements as prescribed.   Follow up with outside resourses and organizations for AA for assistance since you declined referral to outside agents for assistance.

## 2022-08-31 NOTE — H&P ADULT - NEUROLOGICAL
details… detailed exam Mirvaso Counseling: Mirvaso is a topical medication which can decrease superficial blood flow where applied. Side effects are uncommon and include stinging, redness and allergic reactions.

## 2022-09-02 NOTE — DISCHARGE NOTE PROVIDER - NSCORESITESY/N_GEN_A_CORE_RD
NEUROLOGY PROGRESS NOTE     ASSESSMENT & PLAN     Diagnosis: Prolonged headache, right-sided facial droop.  History of Sturge-Emery, epilepsy.     45 yo woman who presented to the emergency room on 8.31.22 with 5-day history of headache, urinary incontinence with forgetfulness and right facial droop noticed on day of presentation.  She has significant history of seizure, Sturge-Emery, right-sided weakness at baseline, developmental delay and migraines.  She follows with Dr. Ferrara in clinic.  Per his most recent note, her seizures have been under control with Keppra and Topamax, the latter of which has also been used as migraine prevention.  She also takes Imitrex as needed for migraines.       Head CT/CTA unremarkable.    Brain MRI shows chronic subcortical strokes and developmental abnormalities.    Keppra level is therapeutic. Topiramate level pending.  Do not need this back prior to discharge.  Previous level was therapeutic at 24, topiramate level was therapeutic at 7.9 (5.2022).  Patient was loaded with Keppra and continues on 500mg twice daily, Topamax dose is 100mg twice daily.    Electroencephalogram negative for seizure activity.    As needed Imitrex and Tylenol for headache. Resolved.     Continue daily aspirin.    Anemia, management per primary team.     Abdominal complaints discussed with nurse.  No flank pain concerning for nephrolithiasis.  Will defer to primary team for any necessary work-up.    Neurology will sign off.  Patient should follow up with Dr. Ferrara in clinic.      Neurology Discharge Planning:  Ok to discharge from neurology standpoint    Patient Active Problem List    Diagnosis Date Noted     Submandibular lymphadenopathy 09/01/2022     Priority: Medium     Facial droop 08/31/2022     Priority: Medium     Migraine without status migrainosus, not intractable, unspecified migraine type 08/31/2022     Priority: Medium     Diarrhea 01/20/2022     Priority: Medium     Thyroid nodule  06/24/2020     Priority: Medium     Unspecified glaucoma      Priority: Medium     Created by Conversion         Gastroesophageal reflux disease without esophagitis 10/04/2018     Priority: Medium     Created by Conversion         Wwmsiaa-Gimbrvtlb-Cahwj syndrome      Priority: Medium     Created by Conversion  [facility] Annotation: Aug  7 2008 10:26PM - Ivonne Brownlee: 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 The three main features are nevus flammeus (port-wine   stain), venous and lymphatic malformations, and soft-tissue hypertrophy of   the   affected limb.         Dyslipidemia      Priority: Medium     Created by Conversion         Shortening, leg, congenital, left 05/10/2017     Priority: Medium     Abnormality of gait due to impairment of balance 05/10/2017     Priority: Medium     History of colonic polyps 04/28/2017     Priority: Medium     Polyp of colon 04/28/2017     Priority: Medium     Allergic Rhinitis      Priority: Medium     Created by Conversion  Replacement Utility updated for latest IMO load         Anxiety      Priority: Medium     Created by Conversion  Replacement Utility updated for latest IMO load         Epilepsy And Recurrent Seizures      Priority: Medium     Created by [facility] Annotation: Aug  7 2008 10:26PM Ivonne Casillas: Last   seizure   1996  Replacement Utility updated for latest IMO load         Migraine Headache      Priority: Medium     Created by [facility] Annotation: Aug  7 2008 10:26PM Ivonne Casillas: Chronic   and   recurrent  Replacement Utility updated for latest IMO load         Mild persistent asthma      Priority: Medium     Created by Conversion         Right leg swelling 08/24/2015     Priority: Medium     Right foot drop 08/24/2015     Priority: Medium     Primary (congenital) lymphedema 08/24/2015     Priority: Medium     Chronic low back pain 02/09/2015      Priority: Medium     Iron Deficiency Anemia Secondary To Inadequate Dietary Iron Intake      Priority: Medium     Created by Conversion         Joleen-Parkinson-White Syndrome      Priority: Medium     Created by Conversion         Benign Essential Hypertension      Priority: Medium     Created by Conversion         Impaired Fasting Glucose      Priority: Medium     Created by Conversion         Mild Intellectual Disabilities      Priority: Medium     Created by Conversion         Visual Impairment In Both Eyes      Priority: Medium     Created by Conversion         Scoliosis      Priority: Medium     Created by Conversion    Replacing diagnoses that were inactivated after the 10/1/2021 regulatory import.       Medical History  Past Medical History:   Diagnosis Date     Abdominal pain, periumbilic     Created by Conversion      Acute, but ill-defined, cerebrovascular disease     Created by Conversion [facility] Annotation: Aug 16 2012  2:43PM - Ivonne Brownlee: R sided  hemiparesis      Allergic rhinitis, cause unspecified     Created by Conversion      Anomalous atrioventricular excitation     Created by Conversion      Asthma      Chronic kidney disease      Dehydration     Created by Conversion      Developmental delay      Dysuria     Created by Conversion      Hypertension      Hypotension, unspecified     Created by Conversion      Iron deficiency anemia secondary to inadequate dietary iron intake     Created by Conversion      Migraine      KOKO (obstructive sleep apnea)     both central and obstructive - not on machine yet - recent dx 9/2014     Other specified congenital anomalies     Created by Conversion [facility] Annotation: Aug  7 2008 10:26PM - Ivonne Brownlee: Rare  congenital medical condition in which blood vessels and/or lymph vessels fail  to form properly. The three main features are nevus flammeus (port-wine  stain), venous and lymphatic malformations, and soft-tissue hypertrophy of the   affected limb.      Scoliosis      Seizures (H)      Stroke (H)      Sturge-Emery syndrome (H)         SUBJECTIVE     No acute events overnight.  Lluvia says she is still having some abdominal pain.  She says she has been able to go to the bathroom without trouble.  No back pain.  She is not currently experiencing any headaches.       OBJECTIVE     Vital signs in last 24 hours  Temp:  [97.6  F (36.4  C)-98.7  F (37.1  C)] 98.5  F (36.9  C)  Pulse:  [52-77] 52  Resp:  [16-20] 20  BP: (112-132)/(59-73) 131/73  SpO2:  [96 %-98 %] 96 %    Weight:   [unfilled]    Review of Systems   Pertinent items are noted in HPI.      General Physical Exam: Patient is alert and oriented x 2. Abdomen is soft, nontender.  No tenderness to percussion of the flanks. Vital signs were reviewed and are documented in EMR.  No thyromegaly, JVD or lymphadenopathy noted.  Neurological Exam:  Mental status: Alert, attentive.  Speech: Dysarthric, chronic  Cranial nerves: 2-12 intact except that I did not see her palate elevate.  Motor: Right-sided weakness/hemiplegia, baseline.  Some spasticity also baseline.  Sensory: Appears to be intact throughout.  Coordination: Unable to test.  Gait: Deferred for safety.         DIAGNOSTIC STUDIES     Pertinent Radiology   Radiology Results: Personally reviewed image/s    Electroencephalogram:  Impression: This is an abnormal EEG due to paroxysmal slowing of the background and theta range that suggest nonspecific generalized cerebral dysfunction.  Such slowing can be seen in metabolic abnormalities and medication effect.  Clinical correlation is recommended     Classification: Dysrhythmia grade II generalized    Pertinent Labs   Lab Results: personally reviewed.   Recent Results (from the past 24 hour(s))   Potassium    Collection Time: 09/02/22  8:10 AM   Result Value Ref Range    Potassium 3.9 3.5 - 5.0 mmol/L         HOSPITAL MEDICATIONS       baclofen  20 mg Oral BID     baclofen  40 mg Oral At Bedtime      brimonidine  1 drop Left Eye BID     dorzolamide  1 drop Left Eye BID     enoxaparin ANTICOAGULANT  40 mg Subcutaneous Q24H     fluticasone  2 spray Both Nostrils Daily     latanoprost  1 drop Left Eye At Bedtime     levETIRAcetam  500 mg Oral BID     loratadine  10 mg Oral Daily     NIFEdipine ER  30 mg Oral Daily     omeprazole  40 mg Oral QAM     pilocarpine  1 drop Left Eye 4x Daily     potassium chloride ER  20 mEq Oral Daily     sertraline  150 mg Oral Daily     sodium chloride (PF)  3 mL Intracatheter Q8H     tamsulosin  0.4 mg Oral QPM     timolol maleate  1 drop Left Eye BID     topiramate  100 mg Oral BID        Total time spent for face to face visit, reviewing labs/imaging studies, counseling and coordination of care was: 30 Minutes. More than 50% of this time was spent on counseling and coordination of care.    Dragon software used in the dictation on this note.    Gretta Jim MD  [facility] - [address], [address]  [place] [address]         No

## 2022-09-09 NOTE — BEHAVIORAL HEALTH ASSESSMENT NOTE - ABNORMAL MOVEMENTS
"  [facility]  NPI: [id number]    Utilization Review   Contact:Flori Burr MSN, APRN, NP-C  Phone: [phone]  Fax: [phone]      Inpatient Auth Req  REF: [id number]  DX: T50.904A  In critical care bed  Ravi Seo (32 y.o. Female)             Date of Birth   1989    Social Security Number       Address   [address] [address]    Home Phone   [phone]    MRN   [id number]       [place]   [place]    Marital Status   Single                            Admission Date   9/8/22    Admission Type   Emergency    Admitting Provider   Freddie Nava MD    Attending Provider   Freddie Nava MD    Department, Room/Bed   [facility] CRITICAL CARE, CC03/       Discharge Date       Discharge Disposition       Discharge Destination                               Attending Provider: Freddie Nava MD    Allergies: Adhesive Tape, Bactrim [Sulfamethoxazole-trimethoprim], Codeine, Keflex [Cephalexin]    Isolation: None   Infection: MRSA (09/08/22)   Code Status: CPR   Advance Care Planning Activity    Ht: 167.6 cm (65.98\")   Wt: 106 kg (233 lb 0.4 oz)    Admission Cmt: None   Principal Problem: None                Active Insurance as of 9/7/2022     Primary Coverage     Payor Plan Insurance Group Employer/Plan Group    [facility] BY [facility] BY YODER [id number]     Payor Plan Address Payor Plan Phone Number Payor Plan Fax Number Effective Dates    [address] [address]   1/1/2021 - None Entered    [facility] [id number]       Subscriber Name Subscriber Birth Date Member ID       RAVI SEO 1989 [id number]                 Emergency Contacts      (Rel.) Home Phone Work Phone Mobile Phone    Mayte Seo (Sister) [phone] -- --    Yessy Seo (Mother) [phone] -- --               History & Physical      Claudia Perez APRN at 09/08/22 0902     Attestation signed by Freddie Nava MD at 09/08/22 1258    I have reviewed this " documentation and agree.      Ms. Ayon is our 33 yo F with hx anxiety, arthritis, fibromyalgia, chronic hepatitis C, illicit substance abuse, mild tricuspid valve regurgitation who presented from [facility] with AMS. There was reported seizure like activity. Patient unable to provide history. History obtained from [facility] staff, patient's mother and sister, ED staff. Upon arrival to the ED patient combative, no seizure like activity reported while here. Patient was found to have a bag of tablets in her bra that was later confirmed as 800 mg gabapentin tablets. Patient had a prolonged stay in the ED from difficulty getting IV access. Patient was loaded on keppra and required intubation. Patient has R femoral central line with sedation infusing. On my evaluation patient was intermittently following commands and having intermittent jerking but also purposeful movement in all extremities. She was on 15 of propofol, 10 of versed, and being started on fentanyl. Not requiring pressors. On low ventilator settings.     I discussed plan of care with patient's mother and patient's sister.       #Acute respiratory failure 2/2 drug overdose   #Aspiration pneumonia  - Last ABG 7.414/35/84 on 50% FiO2, PEEP of 5, , RR 25   - CT chest/abdomen/pelvis/head unrevealing other than bibasilar infiltrates likely from aspiration   - Will transition to zosyn as cefepime can lower seizure threshold. Mild allergy to cephalosporins listed but tolerated cefepime in ED.   - Will order respiratory culture and follow blood cultures   - Will start fentanyl in addition to propofol/versed. Will wean versed first. Will avoid precedex as it had associated bradycardia   - Pulmonology following. Appreciate recs.     #Precedex induced bradycardia   - Will repeat EKG and get echo. Will avoid precedex.     #Polysubstance drug use   - UDS positive for benzodiazepine, methamphetamine, methadone. There is also concern for gabapentin misuse.     #Elevated CK  -  Preserved renal function  - Level not likely to cause pigmented induced kidney injury at this juncture   - Will trend until improves   - Will start 125 cc/hr NS    #Reported seizure and active jerking/monoclonus   - Suspect from gabapentin overdose   - Loaded on keppra in ED. Will continue 500 IV BID keppra for now.   - STAT EEG ordered   - Will get tele neuro to evaluate    #Hx of hepatitis C  - Ammonia level nml. No history of cirrhosis. Do not suspect hepatic encephalopathy at this point   - Will repeat hepatitis panel and get HIV    #Tobacco use  - Recommend cessation     F: NPO, Tfs ordered  A: Fentanyl   S: Propofol   T: Heparin SubQ  H: HOB elevated  U: Famotadine   G: PRN  S: Daily  B: PRN  I: R femoral central line , ETT . Will order PICC for access to minimize time femoral line is in place.   D: Cefepime/flagyl     Code status: Full. Patient's NOK is her mother, Yessy, [phone].    Dispo: Admit to CCU                  [facility]  History & Physical    Patient Identification:  Name:  Lillian Ayon  Age:  32 y.o.  Sex:  female  :  1989  MRN:  [id number]   Visit Number:  [id number]  Admit Date: 2022   Primary Care Physician:  Jason Mathew MD    Subjective     Chief complaint: Concern for seizure-like activity    History of presenting illness:      Lillian Ayon is a 32 y.o. female with past medical history significant for anxiety, arthritis, fibromyalgia, chronic hepatitis C, illicit substance abuse, mild tricuspid valve regurgitation, and bradycardia.  Per ED report, [facility]. She had been incarcerated yesterday on unknown charges.  She was observed to have seizure-like activity while at the retirement. Has not reportedly experienced any seizure activity after arriving to this facility. She was sedated and intubated in the ED after becoming lethargic (was  initially combative, aggressive, and acutely intoxicated with unknown substance, per provider note). In ED, a bag of pills was found in patient's bra. Confirmed to be 800 mg gabapentin. Felt that patient's condition is likely the result of gabapentin overdose. However, urine drug screen was positive for amphetamines, methamphetamines, benzos, and methadone. Patient now has right femoral, triple lumen CVC after issues with central line placement in right and left IJ. Chest xray confirming no pneumothorax. Initially on 100 % FiO2; repeat ABG improved and patient has been weaned down to 50% currently. Due to acuity of condition, unable to obtain HPI or information surrounding the events leading up to presentation. Guard initially at bedside; states that when he arrived on shift at 7:00 p.m. yesterday evening, he was told he would be accompanying patient to [facility] due to witnessed seizure activity. Neurology consult has been placed for further evaluation. EEG pending. Given 1,500 mg Keppra in ED; plans to continue Keppra 750 mg BID per attending. Please see assessment and plan below for further details.     Upon arrival to the ED, vital signs were temperature 99, pulse 108, RR 24, blood pressure 139/61, SpO2 saturation 99% on room air.  Initial ABG with pH 7.345, PCO2 38.2, PO2 99.0, O2 saturation 97.6% on ventilator with 100% FiO2.  Chest x-ray revealing ETT and OGT in good position; shallow lung expansion; infiltrate and/or atelectasis in the lung bases.  CK 1775.  Troponin T negative.  CMP with glucose 173, potassium 3.3, AST 47, otherwise unremarkable.  TSH 0.492.  Ammonia, lactate, mag, procalcitonin all within normal limits.  CBC with WBCs 22.57, hemoglobin 11.5, otherwise unremarkable.  Acetaminophen and salicylate levels negative.  Ethanol negative.  hCG qualitative negative.  Peripheral blood cultures x2 pending.  COVID-19 and flu A/B swab negative.  Urine tox positive for amphetamines, benzodiazepines,  methamphetamine, and methadone.  CT head without contrast negative.  CT abdomen pelvis without contrast with no evidence of acute trauma in the abdomen or pelvis; evidence of constipation; previous cholecystectomy.  CT chest without contrast with apparent tube and line positioning as above; bilateral infiltrates in the lower lobes and lingula, likely result of aspiration.  No pneumothorax.    Known Emergency Department medications received prior to my evaluation included 2g IV Cefepime, 50 mg IV Benadryl, 100 mg IV succinylcholine, 20 mg IV etomidate, 0.4 mg IV glycopyrrolate, 10 mg IM haloperidol, 1,500 mg IV levetiracetam (Keppra), 2 mg IV ativan, 500 mg IV flagyl, 2 mg IV versed, 100 mg IV propofol, 100 mg IV rocuronium, and 2 L normal saline bolus. Room location at the time of my evaluation was CCU-3. Discussed with attending physician, Dr. Nava, Freddie ROBBINS MD.      ---------------------------------------------------------------------------------------------------------------------   Review of Systems   Unable to perform ROS: Acuity of condition      ---------------------------------------------------------------------------------------------------------------------   Past Medical History:   Diagnosis Date   • Anxiety    • Arthritis    • Bradycardia    • Fibromyalgia    • Heart disease    • Hepatitis C    • Myalgia      Past Surgical History:   Procedure Laterality Date   • CHOLECYSTECTOMY     • NERVE SURGERY Right      History reviewed. No pertinent family history.  Social History     Socioeconomic History   • Marital status: Single   Tobacco Use   • Smoking status: Current Every Day Smoker     Packs/day: 0.50     Years: 15.00     Pack years: 7.50     Types: Cigarettes   • Smokeless tobacco: Never Used   Substance and Sexual Activity   • Alcohol use: No   • Drug use: No   • Sexual activity: Defer      ---------------------------------------------------------------------------------------------------------------------   Allergies:  Adhesive tape, Bactrim [sulfamethoxazole-trimethoprim], Codeine, and Keflex [cephalexin]  ---------------------------------------------------------------------------------------------------------------------   Home medications:    Medications below are reported home medications pulling from within the system; at this time, these medications have not been reconciled unless otherwise specified and are in the verification process for further verifcation as current home medications.  (Not in a hospital admission)      Hospital Scheduled Meds:  cefepime, 2 g, Intravenous, Once  cefepime, 2 g, Intravenous, Q8H  metroNIDAZOLE, 500 mg, Intravenous, Q8H      dexmedetomidine, 0.2-1.5 mcg/kg/hr, Last Rate: Stopped (09/08/22 0745)  Midazolam 1 mg/mL 100mL NS, 1-10 mg/hr, Last Rate: 10 mg/hr (09/08/22 0850)  propofol, 5-50 mcg/kg/min, Last Rate: 5 mcg/kg/min (09/08/22 0851)        Current listed hospital scheduled medications may not yet reflect those currently placed in orders that are signed and held awaiting patient's arrival to floor.   ---------------------------------------------------------------------------------------------------------------------     Objective     Vital Signs:  Temp:  [99 °F (37.2 °C)] 99 °F (37.2 °C)  Heart Rate:  [] 84  Resp:  [24-43] 25  BP: ()/(27-91) 101/64  FiO2 (%):  [50 %-100 %] 50 %      09/07/22 2116   Weight: 99.8 kg (220 lb)     Body mass index is 35.53 kg/m².  ---------------------------------------------------------------------------------------------------------------------       Physical Exam  Vitals and nursing note reviewed.   Constitutional:       Appearance: She is obese. She is ill-appearing and diaphoretic.      Interventions: She is sedated and intubated.   HENT:      Mouth/Throat:      Mouth: Mucous membranes are moist.       Pharynx: Oropharynx is clear.   Eyes:      Pupils: Pupils are equal, round, and reactive to light.   Cardiovascular:      Rate and Rhythm: Normal rate and regular rhythm.      Pulses: Normal pulses.           Radial pulses are 2+ on the right side and 2+ on the left side.        Dorsalis pedis pulses are 2+ on the right side and 2+ on the left side.      Heart sounds: Normal heart sounds. No murmur heard.    No friction rub. No gallop.   Pulmonary:      Effort: She is intubated.      Breath sounds: Examination of the right-lower field reveals decreased breath sounds. Examination of the left-lower field reveals decreased breath sounds. Decreased breath sounds present. No wheezing or rhonchi.   Abdominal:      General: There is no distension.      Palpations: Abdomen is soft.      Tenderness: There is no abdominal tenderness. There is no guarding.   Genitourinary:     Comments: Ibarra catheter in place draining cloudy, yellow urine.   Musculoskeletal:      Cervical back: No rigidity.      Right lower leg: No edema.      Left lower leg: No edema.   Skin:     General: Skin is warm.      Capillary Refill: Capillary refill takes less than 2 seconds.      Findings: Bruising present.   Neurological:      Comments: Generalized myoclonic jerking motions observed on exam; Patient is following commands (squeezed both hands when prompted).        ---------------------------------------------------------------------------------------------------------------------  EKG:  Pending cardiology interpretation; per my review, appears normal sinus rhythm 90's with nonspecific T wave abnormality in inferior and anterolateral leads. No definitive evidence of acute ischemia. QTc prolonged at 505 ms.         Telemetry:  Appearing normal sinus rhythm 80's on exam.   I have personally looked at both the EKG and the telemetry  strips.  ---------------------------------------------------------------------------------------------------------------------   Results from last 7 days   Lab Units 09/08/22  0003   LACTATE mmol/L 1.8   WBC 10*3/mm3 22.57*   HEMOGLOBIN g/dL 11.5*   HEMATOCRIT % 35.8   MCV fL 86.5   MCHC g/dL 32.1   PLATELETS 10*3/mm3 266   INR  1.02     Results from last 7 days   Lab Units 09/08/22  0204 09/08/22  0202 09/07/22  2327   PH, ARTERIAL pH units 7.329* 7.301* 7.345*   PO2 ART mm Hg 172.0* 66.1* 99.0   PCO2, ARTERIAL mm Hg 42.8 47.1* 38.2   HCO3 ART mmol/L 22.5 23.2 20.9     Results from last 7 days   Lab Units 09/08/22  0003   SODIUM mmol/L 139   POTASSIUM mmol/L 3.3*   MAGNESIUM mg/dL 2.2   CHLORIDE mmol/L 104   CO2 mmol/L 22.1   BUN mg/dL 8   CREATININE mg/dL 0.71   CALCIUM mg/dL 8.9   GLUCOSE mg/dL 173*   ALBUMIN g/dL 4.31   BILIRUBIN mg/dL 0.4   ALK PHOS U/L 91   AST (SGOT) U/L 47*   ALT (SGPT) U/L 26   Estimated Creatinine Clearance: 133.1 mL/min (by C-G formula based on SCr of 0.71 mg/dL).  Ammonia   Date Value Ref Range Status   09/08/2022 25 11 - 51 umol/L Final     Results from last 7 days   Lab Units 09/08/22  0003   CK TOTAL U/L 1,775*   TROPONIN T ng/mL <0.010         No results found for: HGBA1C  Lab Results   Component Value Date    TSH 0.492 09/08/2022     Lab Results   Component Value Date    PREGTESTUR Negative 09/08/2022     Pain Management Panel     Pain Management Panel Latest Ref Rng & Units 9/8/2022 5/25/2021    AMPHETAMINES SCREEN, URINE Negative Positive(A) Positive(A)    BARBITURATES SCREEN Negative Negative Negative    BENZODIAZEPINE SCREEN, URINE Negative Positive(A) Negative    BUPRENORPHINEUR Negative Negative Negative    COCAINE SCREEN, URINE Negative Negative Negative    METHADONE SCREEN, URINE Negative Positive(A) Negative    METHAMPHETAMINEUR Negative Positive(A) -             ---------------------------------------------------------------------------------------------------------------------  Imaging Results (Last 7 Days)     Procedure Component Value Units Date/Time    CT Abdomen Pelvis Without Contrast [[id number]] Collected: 09/08/22 0510     Updated: 09/08/22 0512    Narrative:      CT Abdomen Pelvis WO    INDICATION:   Abdominal and thoracic bruising. Overdose. Seizure like activity. Positive drug screen.    TECHNIQUE:   CT of the abdomen and pelvis without IV contrast. Coronal and sagittal reconstructions were obtained.  Radiation dose reduction techniques included automated exposure control or exposure modulation based on body size. Count of known CT and cardiac nuc  med studies performed in previous 12 months: 2.     COMPARISON:   None available.    FINDINGS:  Please see chest CT report dictated separately. There is streak artifact due to the patient's arms. Gallbladder is surgically absent. No biliary obstruction is seen. There is some fatty atrophy of the pancreas. The unenhanced solid abdominal organs are  otherwise grossly normal. No renal or ureteral stones are identified. There is no hydronephrosis. Urinary bladder is decompressed by a Ibarra catheter. Solid pelvic organs are normal.    NG tube is present in the stomach. The appendix is normal. Moderate stool burden may reflect some constipation. No evidence of colitis. No small bowel obstruction. No adenopathy or free fluid. No acute fractures are seen in the lumbar spine or pelvis.      Impression:        1. Technically limited exam as above.  2. No evidence of acute trauma in the abdomen or pelvis.  3. Evidence of constipation.  4. Cholecystectomy.          Signer Name: Jason Rollins MD   Signed: 9/8/2022 5:10 AM   Workstation Name: [email or web address]    [facility]    CT Chest Without Contrast Diagnostic [[id number]] Collected: 09/08/22 0505     Updated: 09/08/22 0507    Narrative:      CT Chest  WO    INDICATION:   Overdose. Aspiration. Seizure-like activity. Positive drug screen. Bruising.    TECHNIQUE:   CT of the thorax without IV contrast. Coronal and sagittal reconstructions were obtained.  Radiation dose reduction techniques included automated exposure control or exposure modulation based on body size. Count of known CT and cardiac nuc med studies  performed in previous 12 months: 2.     COMPARISON:   1/20/2022     FINDINGS:  The endotracheal tube tip is just below the thoracic inlet with the balloon inflated at approximately the level of the thyroid gland. Consider advancing the tube. NG tube tip is in the stomach. There is a left internal jugular vein line in place. The  line is aberrantly positioned in the left internal thoracic vein. It is not in the SVC, and this line should be removed. There is some streak artifact from the patient's arms. No pleural or pericardial effusion is seen. There is no adenopathy. Alveolar  consolidations are noted in the lower lobes and lingula concerning for aspiration. No pneumothorax is identified. No acute fractures. Please see abdomen and pelvis CT report dictated separately.      Impression:        1. Aberrant tube and line positioning as above.  2. Bilateral infiltrates in the lower lobes and lingula, likely the result of aspiration. No pneumothorax.      Results discussed with Dr. Jareth Mendez at the time of this dictation.    Signer Name: Jason Rollnis MD   Signed: 9/8/2022 5:05 AM   Workstation Name: [email or web address]    [facility]    CT Head Without Contrast [[id number]] Collected: 09/08/22 0448     Updated: 09/08/22 0450    Narrative:      CT Head WO    HISTORY:   Seizure-like activity. Altered mental status. Positive drug screen.    TECHNIQUE:   Axial unenhanced head CT with multiplanar reformats. Radiation dose reduction techniques included automated exposure control or exposure modulation based on body size. Count of known CT and  cardiac nuc med studies performed in previous 12 months: 2.     COMPARISON:   8/23/2018    FINDINGS:   Ventricular size and configuration are normal. No acute infarct or hemorrhage is identified. There are no masses. There is no skull fracture.      Impression:      Normal, negative unenhanced head CT.    Signer Name: Jason Rollins MD   Signed: 9/8/2022 4:48 AM   Workstation Name: [facility]    XR Chest 1 View [[id number]] Collected: 09/08/22 0135     Updated: 09/08/22 0137    Narrative:      CR Chest 1 Vw    INDICATION:   Left IJ line placement.     COMPARISON:    Earlier same day    FINDINGS:  Portable AP view(s) of the chest.  Right IJ line has been removed. Left IJ line has been placed.  The line is directed straight down from the internal jugular vein. On a chest CT of 1/20/2022, the patient did not have a duplicated or left side SVC. Therefore, I cannot exclude arterial line placement. No pneumothorax is seen. The remainder of the exam  is not significantly changed.      Impression:      Left IJ line tip is directed straight down from the neck and projects over the mediastinum. Placement cannot be confirmed within the SVC, and I cannot exclude an arterial line stick. Correlate with blood gases drawn from the line. Alternatively,  noncontrast chest CT could be obtained to verify placement.    Signer Name: Jason Rollins MD   Signed: 9/8/2022 1:35 AM   Workstation Name: [facility]    XR Chest 1 View [[id number]] Collected: 09/08/22 0034     Updated: 09/08/22 0036    Narrative:      CR Chest 1 Vw    INDICATION:   Line placement.     COMPARISON:    9/7/2022 at 2224 hours    FINDINGS:  Portable AP view(s) of the chest.  Endotracheal tube remains in good position in the mid trachea. NG tube tip is in the stomach. There is a new right IJ line in place. The tip extends laterally into the right axillary vein. The tip is not in the SVC.  No  pneumothorax. Lung volumes remain low with bibasilar infiltrate or atelectasis, left greater than right. Heart size is normal.      Impression:      Right IJ line tip is directed laterally into the right axillary vein, not into the SVC. No pneumothorax.    Signer Name: Jason Rollins MD   Signed: 9/8/2022 12:34 AM   Workstation Name: [email or web address]    [facility]    XR Chest 1 View [[id number]] Collected: 09/07/22 2332     Updated: 09/07/22 2334    Narrative:      CHEST X-RAY, 9/7/2022      HISTORY:    32-year-old female intubated in the ED.      TECHNIQUE:  AP portable chest x-ray.      FINDINGS:  Endotracheal tube tip in the upper thoracic trachea about 4.4 cm above the idalia. OG tube tip in the mid stomach.    Shallow lung expansion with basilar infiltrate and/or atelectasis.      Impression:      1.  ETT and OGT in good position.  2.  Shallow lung expansion. Infiltrate and/or atelectasis in the lung bases.    Signer Name: Bruno Perrin MD   Signed: 9/7/2022 11:32 PM   Workstation Name: [email or web address]    [facility]          Last echocardiogram:  Results for orders placed during the hospital encounter of 07/07/17    Adult Transthoracic Echo Complete    Interpretation Summary  · . Normal left ventricular cavity size and wall thickness noted. All left ventricular wall segments contract normally.  · Estimated EF appears to be in the range of 56 - 60%.  · The aortic valve is structurally normal. No aortic valve regurgitation is present. No aortic valve stenosis is present.  · The mitral valve is normal in structure. No mitral valve regurgitation is present. No significant mitral valve stenosis is present.  · The tricuspid valve is normal. No tricuspid valve stenosis is present. Mild tricuspid valve regurgitation is present. Estimated right ventricular systolic pressure from tricuspid regurgitation is mildly elevated (35-45 mmHg).  · There is no evidence of  pericardial effusion.          I have personally reviewed the above radiology images and read the final radiology report on 09/08/22  ---------------------------------------------------------------------------------------------------------------------  Assessment / Plan     Active Hospital Problems    Diagnosis  POA   • Overdose of undetermined intent, initial encounter [T50.844A]  Yes       ASSESSMENT/PLAN:  -Overdose of unknown substance, POA  -Reported seizure-like activity PTA  -Polysubstance abuse  -Aspiration pneumonia of bilateral lower lobes, POA  -Severe sepsis criteria met on admission with HR > 90, RR > 20, and WBC > 12  -Elevated CK (1,775) POA  -UDS + for amphetamines, methamphetamines, benzos, and methadone.   -Acetaminophen, salicylate, and ethanol levels negative.  -Found bag of pills on patient at time of presentation; appearing to be 800 mg gabapentin.   -Inpatient neurology consult placed; EEG pending. Received loading dose of Keppra; continue maintenance dose.   -CT head unremarkable.  -Ammonia within normal limits.   -Intubated and sedated while in ED; ETT at 24 cm at lip.   -Sedation including fentanyl, midazolam, and propofol at this time; titrate.  -Patient with myoclonic jerking movements upon exam; is following commands and opens eyes to verbal stimuli.   -Initial ABG in ED with pH 7.345, PO2 99.0 with O2 saturation of 97.6% on vent, 100% FiO2.  -Repeat ABG approximately 3 hours later revealing acidotic pH (7.301) with developing hypercarbia and hypoxia; still on 100% FiO2.  -Most recent ABG improved; pH compensated. FiO2 turned down to 50%.   -Closely monitor with continuous VS/cardiac monitoring/pulse oximetry.   -Inpatient pulmonology consult placed; appreciate their evaluation and input.  -CT chest revealing bilateral infiltrates in the lower lobes and lingula, likely result of aspiration.  -Treat with IV Cefepime and IV Flagyl.   -Obtain sputum culture, MRSA screen.   -Pro-lokesh and  lactate normal; WBCs 22.57 upon presentation.   -Peripheral blood cultures pending x2.  -Urine appearing cloudy and a Ibarra catheter drainage bag; add urinalysis with culture if indicated and follow with final results.  -Receiving normal saline at 125 mL an hour; had 2 L normal saline bolus in the ED.  -NPO; OG in place; nutrition consult has also been placed.   -Triple lumen CL in place to right femoral; PICC consult placed for further access while requiring multiple IV medications.   -Repeat CBC, CK in the a.m.    -History of bradycardia  -Mild tricuspid valve regurg  -Hypokalemia, POA  -Patient with noted bradycardia in the 30's after receiving sedation in the ED; Precedex discontinued. Fentanyl initiated. HR has improved.  -Appearing sinus rhythm 80's on my exam.  -Troponin T negative.  -Potassium 3.3; monitor electrolytes and replace per protocol.  -TTE ordered; pending.   -No murmur auscultated on exam.   -Continuous cardiac monitoring in place.     -Chronic hepatitis C  -Mild transaminitis  -AST 47; ALT 26.   -Abdominal imaging obtained this admission with no evidence of liver abnormality.   -Obtain acute hepatitis panel with HIV co-testing.   -Closely monitor; avoid hepatotoxic agents as able.   -Repeat CMP in the a.m.     -Mild, normocytic anemia  -Hgb currently 11.5; appearing to be near baseline.  -Possibly dilutional 2/2 IV fluid bolus.   -Closely monitor; plan to transfuse if Hgb < 7.   -No s/s bleeding on physical exam.    -Anxiety  -Supportive care.  -Currently on continuous sedating medications while mechanically ventilated; Xanax 1 mg Q 8 hours added per attending.     -Arthritis  -Fibromyalgia  -Supportive care.  -Turn Q2 hours, elevate heels, utilize pillows/wedge for positioning.   -Current sedation/analgesics should provide pain relief.     -Ongoing tobacco abuse   -Strongly encourage cessation.   -NRT made available.    -Obesity by BMI, Body mass index is 37.63 kg/m².  -Affecting all aspects of  care.  -Encourage lifestyle modifications.        ----------  -DVT prophylaxis: Subcutaneous heparin  -Activity: Bedrest, turn Q2, elevate heels  -Expected length of stay:   INPATIENT status due to the need for care which can only be reasonably provided in an hospital setting such as aggressive/expedited ancillary services and/or consultation services, the necessity for IV medications, close physician monitoring and/or the possible need for procedures.  In such, I feel patient's risk for adverse outcomes and need for care warrant INPATIENT evaluation and predict the patient's care encounter to likely last beyond 2 midnights.  -Disposition pending clinical course.    High risk secondary to overdose of undetermined intent requiring mechanical ventilation.         PEGGY Barnes   09/08/22  09:02 EDT  Pager #[phone]  ---------------------------------------------------------------------------------------------------------------------       Electronically signed by Freddie Nava MD at 09/08/22 1258          Emergency Department Notes      Radha Collins, RN at 09/07/22 2124        Pt noted to be having seizure like activity at this time, pt noted to be cyanotic around her mouth, foaming from the mouth, having jerking motions and noted to be extremely stiff and noted to have a drop in her O2, provider at bedside, preparing to intubate.    Electronically signed by Radha Collins RN at 09/07/22 3769     Radha Collins RN at 09/07/22 2125        Pt given breaths via BVM at this time by RRT, nurses X2 attempting IV access with no success, provider remains at bedside preparing to intubate.    Electronically signed by Radha Collins RN at 09/07/22 4801     Radha Collins RN at 09/07/22 2149        Provider at bedside with RRT, pt noted to be having seizure like activity again, pt noted to have a drop in O2, foaming from her mouth, pt noted to be biting lip and clinching teeth.  Pt receiving breaths via BVMm  by RRT.  Nurses X2 noted to be attempting IV access with no success.    Electronically signed by Radha Collins RN at 09/07/22 0146     Josr Avila DO at 09/07/22 2223      Procedure Orders    1. Central Line At Bedside [[id number]] ordered by Josr Avila DO               Subjective   PIT    Patient is a 32-year-old female is brought in from the intermediate for seizure-like activity.  On arrival patient is combative, altered and fighting medical staff as well as law enforcement who is present at bedside.  Due to her level of combativeness patient is a severe potential harm to herself as well as others.  Due to her acute inebriation.  She is unable to answer questions or follow commands. Patient is acutely intoxicated with an unknown substance at this time.                 Review of Systems   Unable to perform ROS: Mental status change (Patient is acutely intoxicated and inebriated and cannot answer questions.)       Past Medical History:   Diagnosis Date   • Anxiety    • Arthritis    • Bradycardia    • Fibromyalgia    • Heart disease    • Hepatitis C    • Myalgia        Allergies   Allergen Reactions   • Adhesive Tape      Steri Strip Adhesive Allergy    • Bactrim [Sulfamethoxazole-Trimethoprim] Hives, Itching and Swelling   • Codeine Hives, Itching and Swelling   • Keflex [Cephalexin] Hives, Itching and Swelling       Past Surgical History:   Procedure Laterality Date   • CHOLECYSTECTOMY     • NERVE SURGERY Right        History reviewed. No pertinent family history.    Social History     Socioeconomic History   • Marital status: Single   Tobacco Use   • Smoking status: Current Every Day Smoker     Packs/day: 0.50     Years: 15.00     Pack years: 7.50     Types: Cigarettes   • Smokeless tobacco: Never Used   Substance and Sexual Activity   • Alcohol use: No   • Drug use: No   • Sexual activity: Defer           Objective   Physical Exam    Central Line At Bedside    Date/Time: 9/8/2022 8:11 AM  Performed by:  Josr Avila DO  Authorized by: Josr Avila DO     Consent:     Consent obtained:  Emergent situation    Consent given by:  Healthcare agent    Risks, benefits, and alternatives were discussed: not applicable    Universal protocol:     Patient identity confirmed:  Arm band and hospital-assigned identification number  Pre-procedure details:     Indication(s): central venous access and insufficient peripheral access      Hand hygiene: Hand hygiene performed prior to insertion      Sterile barrier technique: All elements of maximal sterile technique followed      Skin preparation:  Chlorhexidine    Skin preparation agent: Skin preparation agent completely dried prior to procedure    Sedation:     Sedation type:  Deep  Anesthesia:     Anesthesia method:  None  Procedure details:     Location:  R femoral    Patient position:  Supine    Procedural supplies:  Triple lumen    Catheter size:  7 Fr    Landmarks identified: yes      Ultrasound guidance: no      Number of attempts:  1    Successful placement: yes    Post-procedure details:     Post-procedure:  Dressing applied and line sutured    Assessment:  Blood return through all ports    Procedure completion:  Tolerated well, no immediate complications              ED Course  ED Course as of 09/08/22 0812   u Sep 08, 2022   0014 Review chest x-ray at bedside showed the patient's central line initially fed into the right IJ and deviated off into the axillary vein.  Lungs are expanded bilaterally with no pneumothorax.    Central line will be removed and replaced  in the left IJ.    [LK]   0016 XR Chest 1 View [LK]   0149 Review of chest xray to confirm left IJ placement  7 Danish with a 20 cm   Left carotid artery was visualized medial to the left internal jugular vein by bedside ultrasound guide prior to left IJ placement.  The tip of the catheter projects slightly superior to the superior vena cava.  Bedside ABGs will be performed with a blood draw from the left  catheter line and a peripheral arterial stick confirmed venous placement. [LK]   0151 ER EKG REVIEW  RHYTHM: Sinus rhythm  RATE: 96 bpm  KS: 136 ms  QRS:  70 ms  QTC: 505 ms  AXIS: Normal axis without acute ischemia.    nonspecific ST and T wave repolarization abnormalities. [LK]   0212 ABGs were performed at bedside and for comparison.  Currently the left IJ is and venous placement based on blood pulled from peripheral left IJ compared to a peripheral arterial placement.    The specimen from the left IJ pH is 7.301 PCO2 is 47 PO2 was 66, oxygen saturation of 90.8%    Serial peripheral stick in the left radial artery:  Eight 7.329 PCO2 42.8 PaO2 172, saturation greater than 99%. [LK]   0216 We will also empirically treat patient with Keppra 1500 mg in addition to antibiotics given her history of seizure at time of arrival.    Drug screen was positive for methadone, amphetamines, benzodiazepines and methamphetamine.    Patient refusing ABGs will titrate oxygen saturation down from 100% FiO2.  Patient is currently still pending CT imaging but plan of care will be to admit to ICU. Pt had-off to Dr Mendez  [LK]   0547 I assumed patient's care from Dr. Escalante at the end of her shift, pending CT studies and disposition.  Please see her documentation above.  Left IJ line removed.  Respiratory has advanced endotracheal tube 2 cm, now 24 cm at the lip. [CM]   0626 I discussed the case with Dr. Silva.  She advises that the hospitalist service will admit the patient, but no orders can be placed until the patient has central venous access. [CM]   0716 Case discussed with and care endorsed to Dr. Avila at shift change. [CM]   0810 Patient status required central access for further treatment.  Sterile technique was used prior to procedure.  The area was cleaned using chlorahexadine.  Sterile field was maintained.  Placement was confirmed with return flow.   Procedure tolerated well by the patient. [SF]   0812 Recommend admission  for further work up and treatment.  Hospitalist team consulted and made aware of the patient.  Consults and orders placed per hospitalist request.  Patient was agreeable to admission plan.  Vitals stable on admission. [SF]      ED Course User Index  [CM] Jareth Mendez MD  [LK] Damaris Escalante DO  [SF] Josr Avila DO                                           MDM    Final diagnoses:   Overdose of undetermined intent, initial encounter   Seizures (HCC)       ED Disposition  ED Disposition     ED Disposition   Decision to Admit    Condition   --    Comment   --             No follow-up provider specified.       Medication List      No changes were made to your prescriptions during this visit.          Josr Avila DO  09/08/22 0812      Electronically signed by Josr Avila DO at 09/08/22 0812     Jemma Coley RN at 09/07/22 2230        Baggy with multiple white pills found tucked in patients bra in her tank top.  Baggy was given to .     Electronically signed by Jemma Coley RN at 09/08/22 0650     Jemma Coley RN at 09/07/22 2252        Provider gave verbal order to start Dexmedetomidine at 0.8 mcg/kg/hr.    Electronically signed by Jemma Coley RN at 09/08/22 0305     Jemma Coley RN at 09/08/22 0040        Central line removed from right IJ and reinserted in Left IJ due to inappropriate placement.    Electronically signed by Jemma Coley RN at 09/08/22 0216     Jemma Coley RN at 09/08/22 0310        Pt sedated but continues to pull at ETT tube and IV lines.  Nurse at bedside.      Electronically signed by Jemma Coley RN at 09/08/22 0520     Radha Collins, RN at 09/08/22 0345        Called to pts room to check pts central line due to swelling around the site, attempted to aspirate X2 ports with no blood return, provider made aware, meds stopped going in central line and new IV access placed, meds switched to peripheral access sites.    Electronically  signed by Radha Collins RN at 09/08/22 0356     Jemma Coley, RN at 09/08/22 0350        Pt sedated but she continues to move and attempt to pull at ETT tube.  Nurse at bedside.    Electronically signed by Jemma Coley RN at 09/08/22 0516         Ventilator/Non-Invasive Ventilation Settings (From admission, onward)             Start     Ordered    09/08/22 1001  Ventilator - AC/VC; Other; 25; 40%; SpO2 >/= 90%; 5; mL; 400  Continuous        Question Answer Comment   Vent Mode AC/VC    Rate Other    Rate 25    FiO2 40%    Titrate FiO2 to Keep SpO2 >/= 90%    PEEP 5    Tidal Volume mL            09/08/22 1001    09/07/22 2333  Ventilator - AC/VC; Other; 25; 100%; SpO2 >/= 90%; 5; mL; 400  Continuous,   Status:  Canceled        Question Answer Comment   Vent Mode AC/VC    Rate Other    Rate 25    FiO2 100%    Titrate FiO2 to Keep SpO2 >/= 90%    PEEP 5    Tidal Volume mL            09/07/22 2333                       Consult Notes (last 48 hours)      Kyle Blackburn MD at 09/08/22 1513      Consult Orders    1. Inpatient Pulmonology Consult [[id number]] ordered by Freddie Nava MD at 09/08/22 0830                 Referring Provider: Dr. Nava  Reason for Consultation: ventilator management      Chief complaint  Altered mental status      History of present illness:       Patient is a 32-year-old female with past medical history including anxiety, arthritis, fibromyalgia, hepatitis C, mild tricuspid valve regurgitation,and drug abuse history.  Patient presents to the ED yesterday evening due [facility]  On arrival, she was notable for altered mental status and very combative, decision was made for intubation.  There was concern that she had taken an unknown substance, she was later notable for pills stored in her bra.  Confirmed per note to be 800 mg gabapentin tablets.   Should not drink screen positive for amphetamines, methamphetamines, benzos,  methadone.  Notable for difficult line placement, required femoral central line.  She was also given loading dose of Keppra and scheduled Keppra.  Other evaluation revealed elevated CK level, mild hypokalemia, mild AST elevation.  Leukocytosis noted at 22, and hemoglobin mildly decreased to 11.5.  CT head was negative.  CT chest notable for bilateral infiltrates of the lower lobes concerning for aspiration.  It is also notable that while in the ED, she had difficulty maintaining sedation and would intermittently pull at the lines/tubes.  She required dose of rocuronium and was also given fluid bolus.  She has been initiated on antibiotic coverage.   She was admitted to transfer to CCU for further care.  She arrived on sedation of propofol, Versed.  She is not fully awake, but notable for intermittent movements of the extremities.  It was also reported that while attempting to sedate with Precedex, she became severely bradycardic in the 30s, and thus has been avoided since that time.        Review Of Systems:    Unable to obtain review of systems due to intubation and sedation.         History  Past Medical History:   Diagnosis Date   • Anxiety    • Arthritis    • Bradycardia    • Fibromyalgia    • Heart disease    • Hepatitis C    • Myalgia    ,   Past Surgical History:   Procedure Laterality Date   • CHOLECYSTECTOMY     • NERVE SURGERY Right    , History reviewed. No pertinent family history.,   Social History     Tobacco Use   • Smoking status: Current Every Day Smoker     Packs/day: 0.50     Years: 15.00     Pack years: 7.50     Types: Cigarettes   • Smokeless tobacco: Never Used   Vaping Use   • Vaping Use: Unknown   Substance Use Topics   • Alcohol use: No   • Drug use: Yes     Types: Amphetamines, Methamphetamines, Benzodiazepines   ,   Medications Prior to Admission   Medication Sig Dispense Refill Last Dose   • albuterol sulfate  (90 Base) MCG/ACT inhaler Inhale 2 puffs 4 (Four) Times a Day. 6.7 g 0     • ARIPiprazole (ABILIFY) 10 MG tablet       • clonazePAM (KlonoPIN) 0.5 MG tablet       • diclofenac (VOLTAREN) 75 MG EC tablet Take 1 tablet by mouth 2 (Two) Times a Day. 30 tablet 0    • doxycycline (MONODOX) 100 MG capsule Take 1 capsule by mouth Every 12 (Twelve) Hours. 20 capsule 0    • gabapentin (NEURONTIN) 300 MG capsule 2 (Two) Times a Day.      • sertraline (ZOLOFT) 50 MG tablet       • tiZANidine (ZANAFLEX) 4 MG tablet Take 1 tablet by mouth Every 8 (Eight) Hours As Needed (pain). 30 tablet 0    , Scheduled Meds:  ALPRAZolam, 2 mg, Oral, Q8H  ethyl alcohol, 1 application, Nasal, BID  famotidine, 20 mg, Intravenous, Q12H  gabapentin, 800 mg, Oral, TID  heparin (porcine), 5,000 Units, Subcutaneous, Q8H  levETIRAcetam, 750 mg, Intravenous, Q12H  piperacillin-tazobactam, 3.375 g, Intravenous, Once  piperacillin-tazobactam, 3.375 g, Intravenous, Q8H  potassium chloride, 40 mEq, Oral, Q4H  sodium chloride, 10 mL, Intravenous, Q12H    , Continuous Infusions:  fentaNYL Citrate,   LORazepam, 0.5-10 mg/hr, Last Rate: 0.5 mg/hr (09/08/22 1451)  Midazolam 1 mg/mL 100mL NS, 1-10 mg/hr, Last Rate: 10 mg/hr (09/08/22 1308)  norepinephrine, 0.02-0.3 mcg/kg/min, Last Rate: Stopped (09/08/22 1401)  Pharmacy to Dose Zosyn,   propofol, 5-50 mcg/kg/min, Last Rate: 50 mcg/kg/min (09/08/22 1307)  sodium chloride, 150 mL/hr, Last Rate: 150 mL/hr (09/08/22 1214)     and Allergies:  Adhesive tape, Bactrim [sulfamethoxazole-trimethoprim], Codeine, and Keflex [cephalexin]    Objective     Vital Signs   Temp:  [98 °F (36.7 °C)-99 °F (37.2 °C)] 98.9 °F (37.2 °C)  Heart Rate:  [] 81  Resp:  [24-45] 45  BP: ()/(27-98) 101/56  FiO2 (%):  [40 %-100 %] 40 %    Physical Exam:               Physical exam limited due to telemedicine  General:   intubated. Sedated.  Drowsy but remains notable for intermittent extremity movements.  No seizure-like activity.  HENT: normocephalic. Atraumatic.  ET tube in place.  Cardiac: normal rate  and rhythm noted on telemetry  Lungs:  on ventilator support.  Compliant with current settings.  Musculoskeletal: no significant deformity, joint abnormality  Neurologic: On sedation.  Drowsy but notable for intermittent extremity movements.                  Results Review:    LABS:    Lab Results   Component Value Date    GLUCOSE 173 (H) 09/08/2022    BUN 8 09/08/2022    CREATININE 0.71 09/08/2022    EGFRIFNONA 96 01/19/2022    BCR 11.3 09/08/2022    CO2 22.1 09/08/2022    CALCIUM 8.9 09/08/2022    ALBUMIN 4.31 09/08/2022    LABIL2 1.3 (L) 10/24/2015    AST 47 (H) 09/08/2022    ALT 26 09/08/2022    WBC 22.57 (H) 09/08/2022    HGB 11.5 (L) 09/08/2022    HCT 35.8 09/08/2022    MCV 86.5 09/08/2022     09/08/2022     09/08/2022    K 3.3 (L) 09/08/2022     09/08/2022    ANIONGAP 12.9 09/08/2022       Lab Results   Component Value Date    INR 1.02 09/08/2022    INR 0.96 12/08/2014    INR 0.97 12/07/2014    PROTIME 13.6 09/08/2022    PROTIME 10.6 12/08/2014    PROTIME 10.8 12/07/2014       Results from last 7 days   Lab Units 09/08/22  0003   INR  1.02   APTT seconds 25.1*                     I reviewed the patient's new clinical results.  I reviewed the patient's new imaging results and agree with the interpretation.      Assessment & Plan       Assessment:   Acute respiratory failure, inability to protect airway related to overdose  Overdose of unknown substance, notable for possession of gabapentin tablets on ED admission  Urine toxicology positive for multiple substances: Methamphetamines, amphetamines, benzodiazepines, methadone  Bilateral lower lobe pneumonia, likely aspiration  Hepatitis C  Mild normocytic anemia  History of anxiety, fibromyalgia  History of drug abuse      Plan:   Sedation: On propofol, Versed infusions  Plan to also initiate fentanyl infusion.   Titrating sedation as tolerated.  Can continue Versed due to concern of recent seizure activity.  On Keppra.  Notable for position of  gabapentin tablets during admission.   UDS positive for amphetamine, methamphetamine, benzodiazepines, methadone.  EEG completed, results pending.  Neurology following.  Continue on gabapentin 800 mg scheduled  On Xanax scheduled    Patient was notable for partial awakening later this afternoon.  Not entirely alert, but attempting conversation and frequently repositioning in bed.  Also notable for significant tachycardia but maintaining tidal volumes and saturation appropriately.  Appeared agitated, uncomfortable.  Sedation infusions rates were maximized without improvement.  Avoiding Precedex episode of severe bradycardia with use.  Ordered 4 mg/2mL Ativan PRN every 1 hour for sedation.  Patient was administered the first dose, which did not result in any improvement.  Near the time for the second dose, instead ordered 10 mL propofol IV push.   This adequately achieved sedation and was hemodynamically well-tolerated.  Respiratory rate regulated back into the upper 20s and more comfortable appearance.  Patient was also started on Ativan infusion.     Map goal 65 mmHg or greater.     FiO2 (%):  [40 %-100 %] 40 %  S RR:  [25] 25  PEEP/CPAP (cm H2O):  [5 cm H20] 5 cm H20  MAP (cm H2O):  [5.8-10] 5.8  Ventilator settings and graphics reviewed.  Recent ABGs reviewed.  Patient was initially compliant with the ventilator earlier this morning, then had an episode of partial awakening and was notable for tachypnea in the 40s.  After sedation medications adjusted/added, respiratory rate improved and ventilator compliance improved.  There was no desaturation event during this time.  Ordered MRSA PCR  Imaging notable for bilateral basilar infiltrates, likely aspiration in the setting of recent seizure event.  COVID-19/influenza testing negative.  On zosyn    GI prophylaxis: Famotidine  Nutrition: tube Feeds    Antibiotics: Zosyn  Concern for aspiration pneumonia.  Ordered MRSA PCR.  Cultures:   Microbiology Results (last 10  days)       Procedure Component Value - Date/Time    MRSA Screen, PCR (Inpatient) - Swab, Nares [[id number]]  (Abnormal) Collected: 09/08/22 1027    Lab Status: Final result Specimen: Swab from Nares Updated: 09/08/22 1314     MRSA PCR MRSA Detected    Narrative:      The negative predictive value of this diagnostic test is high and should only be used to consider de-escalating anti-MRSA therapy. A positive result may indicate colonization with MRSA and must be correlated clinically.    COVID PRE-OP / PRE-PROCEDURE SCREENING ORDER (NO ISOLATION) - Swab, Nasopharynx [[id number]]  (Normal) Collected: 09/08/22 0212    Lab Status: Final result Specimen: Swab from Nasopharynx Updated: 09/08/22 0234    Narrative:      The following orders were created for panel order COVID PRE-OP / PRE-PROCEDURE SCREENING ORDER (NO ISOLATION) - Swab, Nasopharynx.  Procedure                               Abnormality         Status                     ---------                               -----------         ------                     COVID-19 and FLU A/B PCR...[[id number]]  Normal              Final result                 Please view results for these tests on the individual orders.    COVID-19 and FLU A/B PCR - Swab, Nasopharynx [[id number]]  (Normal) Collected: 09/08/22 0212    Lab Status: Final result Specimen: Swab from Nasopharynx Updated: 09/08/22 0234     COVID19 Not Detected     Influenza A PCR Not Detected     Influenza B PCR Not Detected    Narrative:      Fact sheet for providers: https://www.fda.gov/media/208939/download    Fact sheet for patients: https://www.fda.gov/media/060385/download    Test performed by PCR.            DVT prophylaxis:  Heparin subcutaneous    Critically ill with hypoxic respiratory failure and delirium   Adjusted sedative meds and vent settings   Cc time 51 mins   Nga Ramos PA-C  09/08/22  15:13 EDT  Case d/w nurse and team   This service is provided via audio video tele medicine   I am in [place]  [place]ompson and patient is in [facility]   I, Kyle Blackburn M.D. attest that the above note accurately reflects the work and decisions made  by me.  Patient was seen and evaluated by Dr. Blackburn, including history of present illness, physical exam, assessment, and treatment plan.  The above note was reviewed and edited by Dr. Blackburn.      Scribed for Dr. Blackburn by Nga Ramos PA-C      Electronically signed by Kyle Blackburn MD at 22     José Gardiner APRN at 22 1020      Consult Orders    1. Inpatient Neurology Consult General [[id number]] ordered by Freddie Nava MD at 22 0830          Attestation signed by Gina Preston MD at 22 1406    I have reviewed this documentation and agree.    31 yo woman presenting acutely intoxicated with concern for seizure activity prior to presentation. On our examination today patient is intubated and sedated but able to follow all simple commands. No signs of focal neurologic deficits. Recommend continuing keppra 750mg IV BID for now. We will continue to follow.      Gina Preston MD  We will continue to follow   If you have any questions about the patient recommendations,   please call [phone] at anytime and ask to speak with the neurologist on call.   Press 1 for an emergent consult and 2 for a non-emergent consult.                  Neurology Consult Note    Patient Name: Lillian Ayon   MRN: [id number]  Age: 32 y.o.  Sex: female  : 1989    Primary Care Physician: Jason Mathew MD  Referring Physician:  Provider, No Known    Chief Complaint/Reason for Consultation: Seizure     Subjective .  HPI: 32-year-old female PMHx significant for anxiety, arthritis, fibromyalgia, hep C, substance abuse, tricuspid valve regurgitation and bradycardia who presented to the ED on 2022 from the local alf she had been incarcerated.  Staff noted seizure activity (per report, whole body jerking), no reports of seizure  activity since being at the hospital.  In the ED she was initially combative and aggressive before becoming lethargic and was eventually sedated and intubated for airway protection.  ED did find a bag of pills in the patient's bra which they confirmed to be 800 mg gabapentin.  UDS was positive for amphetamines, methamphetamines, benzodiazepines, and methadone.  She was given an initial dose of Keppra 1500 mg and has Keppra 750 mg scheduled every 12 hours.  She was also given multiple doses of Ativan for agitation.    The patient unable to provide history due to being intubated, history obtained from chart review and nursing staff at CCU bedside.      Review of Systems   Unable to perform ROS: Intubated      Past Medical History:   Diagnosis Date   • Anxiety    • Arthritis    • Bradycardia    • Fibromyalgia    • Heart disease    • Hepatitis C    • Myalgia      Past Surgical History:   Procedure Laterality Date   • CHOLECYSTECTOMY     • NERVE SURGERY Right      History reviewed. No pertinent family history.  Social History     Socioeconomic History   • Marital status: Single   Tobacco Use   • Smoking status: Current Every Day Smoker     Packs/day: 0.50     Years: 15.00     Pack years: 7.50     Types: Cigarettes   • Smokeless tobacco: Never Used   Substance and Sexual Activity   • Alcohol use: No   • Drug use: No   • Sexual activity: Defer     Allergies   Allergen Reactions   • Adhesive Tape      Steri Strip Adhesive Allergy    • Bactrim [Sulfamethoxazole-Trimethoprim] Hives, Itching and Swelling   • Codeine Hives, Itching and Swelling   • Keflex [Cephalexin] Hives, Itching and Swelling     Prior to Admission medications    Medication Sig Start Date End Date Taking? Authorizing Provider   albuterol sulfate  (90 Base) MCG/ACT inhaler Inhale 2 puffs 4 (Four) Times a Day. 1/13/19   Lola De La Paz PA   ARIPiprazole (ABILIFY) 10 MG tablet  3/13/18   Provider, MD Dany   clonazePAM (KlonoPIN) 0.5 MG tablet   3/13/18   Dany Walker MD   diclofenac (VOLTAREN) 75 MG EC tablet Take 1 tablet by mouth 2 (Two) Times a Day. 4/28/22   Cortez Rosenthal II, PA   doxycycline (MONODOX) 100 MG capsule Take 1 capsule by mouth Every 12 (Twelve) Hours. 1/20/22   Jareth Mendez MD   gabapentin (NEURONTIN) 300 MG capsule 2 (Two) Times a Day. 2/28/18   Dany Walker MD   sertraline (ZOLOFT) 50 MG tablet  3/13/18   Dany Walker MD   tiZANidine (ZANAFLEX) 4 MG tablet Take 1 tablet by mouth Every 8 (Eight) Hours As Needed (pain). 4/28/22   Cortez Rosenthal II, PA            Objective     Temp:  [98 °F (36.7 °C)-99 °F (37.2 °C)] 98 °F (36.7 °C)  Heart Rate:  [] 90  Resp:  [24-43] 30  BP: ()/(27-98) 118/98  FiO2 (%):  [40 %-100 %] 40 %  Neurological Exam  Mental Status  Awake and alert. Patient is nonverbal. Follows two-step commands.  Patient is intubated and on mechanical ventilator.  Currently receiving infusion of propofol, Versed, and fentanyl.  Despite these she opens her eyes when spoken to, and attends to the examiner..    Cranial Nerves  CN II: Visual fields full to confrontation.  CN III, IV, VI: Nystagmus present: Few beats of horizontal nystagmus when she first opens her eyes. Normal lids and orbits bilaterally. Pupils equal round and reactive to light bilaterally.    Motor  Normal muscle bulk throughout. Normal muscle tone. No abnormal involuntary movements.  Patient is globally weak, no focal motor deficit.  She follows two-step commands.  Antigravity in all 4 extremities.    Sensory  Light touch is normal in upper and lower extremities.       Physical Exam  Vitals and nursing note reviewed.   Constitutional:       General: She is awake.      Appearance: She is obese. She is ill-appearing.   HENT:      Head: Normocephalic.      Mouth/Throat:      Comments: Intubated  Eyes:      General: Lids are normal.      Extraocular Movements: Nystagmus present.      Pupils: Pupils are equal, round, and  reactive to light.   Cardiovascular:      Rate and Rhythm: Normal rate and regular rhythm.   Pulmonary:      Comments: Intubated and on mechanical ventilator  Skin:     General: Skin is warm and dry.   Neurological:      Mental Status: She is alert.           Hospital Meds:  Scheduled- ALPRAZolam, 1 mg, Oral, Q8H  cefepime, 2 g, Intravenous, Q8H  famotidine, 20 mg, Intravenous, Q12H  gabapentin, 800 mg, Oral, TID  heparin (porcine), 5,000 Units, Subcutaneous, Q8H  levETIRAcetam, 750 mg, Intravenous, Q12H  metroNIDAZOLE, 500 mg, Intravenous, Q8H  potassium chloride, 40 mEq, Oral, Q4H  sodium chloride, 10 mL, Intravenous, Q12H      Infusions- fentaNYL Citrate,   Midazolam 1 mg/mL 100mL NS, 1-10 mg/hr, Last Rate: 6 mg/hr (09/08/22 0955)  propofol, 5-50 mcg/kg/min, Last Rate: 25 mcg/kg/min (09/08/22 0956)  sodium chloride, 125 mL/hr, Last Rate: 125 mL/hr (09/08/22 1122)       PRNs- magnesium sulfate **OR** magnesium sulfate **OR** magnesium sulfate  •  potassium chloride  •  potassium chloride  •  sodium chloride      Results Reviewed:  I have personally reviewed current labs, radiology, and data.    No results found for: HGBA1C  Lab Results   Component Value Date    WBC 22.57 (H) 09/08/2022    HGB 11.5 (L) 09/08/2022    HCT 35.8 09/08/2022    MCV 86.5 09/08/2022     09/08/2022      Lab Results   Component Value Date    GLUCOSE 173 (H) 09/08/2022    BUN 8 09/08/2022    CREATININE 0.71 09/08/2022    EGFRIFNONA 96 01/19/2022    BCR 11.3 09/08/2022    K 3.3 (L) 09/08/2022    CO2 22.1 09/08/2022    CALCIUM 8.9 09/08/2022    ALBUMIN 4.31 09/08/2022    LABIL2 1.3 (L) 10/24/2015    AST 47 (H) 09/08/2022    ALT 26 09/08/2022      No results found for: CHOL, CHLPL  No results found for: TRIG  No results found for: HDL  No results found for: LDL, LDLDIRECT   Imaging Results (Last 24 Hours)     Procedure Component Value Units Date/Time    CT Abdomen Pelvis Without Contrast [[id number]] Collected: 09/08/22 0510     Updated:  09/08/22 0512    Narrative:      CT Abdomen Pelvis WO    INDICATION:   Abdominal and thoracic bruising. Overdose. Seizure like activity. Positive drug screen.    TECHNIQUE:   CT of the abdomen and pelvis without IV contrast. Coronal and sagittal reconstructions were obtained.  Radiation dose reduction techniques included automated exposure control or exposure modulation based on body size. Count of known CT and cardiac nuc  med studies performed in previous 12 months: 2.     COMPARISON:   None available.    FINDINGS:  Please see chest CT report dictated separately. There is streak artifact due to the patient's arms. Gallbladder is surgically absent. No biliary obstruction is seen. There is some fatty atrophy of the pancreas. The unenhanced solid abdominal organs are  otherwise grossly normal. No renal or ureteral stones are identified. There is no hydronephrosis. Urinary bladder is decompressed by a Ibarra catheter. Solid pelvic organs are normal.    NG tube is present in the stomach. The appendix is normal. Moderate stool burden may reflect some constipation. No evidence of colitis. No small bowel obstruction. No adenopathy or free fluid. No acute fractures are seen in the lumbar spine or pelvis.      Impression:        1. Technically limited exam as above.  2. No evidence of acute trauma in the abdomen or pelvis.  3. Evidence of constipation.  4. Cholecystectomy.          Signer Name: Jason Rollins MD   Signed: 9/8/2022 5:10 AM   Workstation Name: [email or web address]    [facility]    CT Chest Without Contrast Diagnostic [[id number]] Collected: 09/08/22 0505     Updated: 09/08/22 0507    Narrative:      CT Chest WO    INDICATION:   Overdose. Aspiration. Seizure-like activity. Positive drug screen. Bruising.    TECHNIQUE:   CT of the thorax without IV contrast. Coronal and sagittal reconstructions were obtained.  Radiation dose reduction techniques included automated exposure control or exposure  modulation based on body size. Count of known CT and cardiac nuc med studies  performed in previous 12 months: 2.     COMPARISON:   1/20/2022     FINDINGS:  The endotracheal tube tip is just below the thoracic inlet with the balloon inflated at approximately the level of the thyroid gland. Consider advancing the tube. NG tube tip is in the stomach. There is a left internal jugular vein line in place. The  line is aberrantly positioned in the left internal thoracic vein. It is not in the SVC, and this line should be removed. There is some streak artifact from the patient's arms. No pleural or pericardial effusion is seen. There is no adenopathy. Alveolar  consolidations are noted in the lower lobes and lingula concerning for aspiration. No pneumothorax is identified. No acute fractures. Please see abdomen and pelvis CT report dictated separately.      Impression:        1. Aberrant tube and line positioning as above.  2. Bilateral infiltrates in the lower lobes and lingula, likely the result of aspiration. No pneumothorax.      Results discussed with Dr. Jareth Mendez at the time of this dictation.    Signer Name: Jason Rollins MD   Signed: 9/8/2022 5:05 AM   Workstation Name: [email or web address]    [facility]    CT Head Without Contrast [[id number]] Collected: 09/08/22 0448     Updated: 09/08/22 0450    Narrative:      CT Head WO    HISTORY:   Seizure-like activity. Altered mental status. Positive drug screen.    TECHNIQUE:   Axial unenhanced head CT with multiplanar reformats. Radiation dose reduction techniques included automated exposure control or exposure modulation based on body size. Count of known CT and cardiac nuc med studies performed in previous 12 months: 2.     COMPARISON:   8/23/2018    FINDINGS:   Ventricular size and configuration are normal. No acute infarct or hemorrhage is identified. There are no masses. There is no skull fracture.      Impression:      Normal, negative  unenhanced head CT.    Signer Name: Jason Rollins MD   Signed: 9/8/2022 4:48 AM   Workstation Name: [facility]    [facility]    XR Chest 1 View [[id number]] Collected: 09/08/22 0135     Updated: 09/08/22 0137    Narrative:      CR Chest 1 Vw    INDICATION:   Left IJ line placement.     COMPARISON:    Earlier same day    FINDINGS:  Portable AP view(s) of the chest.  Right IJ line has been removed. Left IJ line has been placed.  The line is directed straight down from the internal jugular vein. On a chest CT of 1/20/2022, the patient did not have a duplicated or left side SVC. Therefore, I cannot exclude arterial line placement. No pneumothorax is seen. The remainder of the exam  is not significantly changed.      Impression:      Left IJ line tip is directed straight down from the neck and projects over the mediastinum. Placement cannot be confirmed within the SVC, and I cannot exclude an arterial line stick. Correlate with blood gases drawn from the line. Alternatively,  noncontrast chest CT could be obtained to verify placement.    Signer Name: Jason Rollins MD   Signed: 9/8/2022 1:35 AM   Workstation Name: [facility]    XR Chest 1 View [[id number]] Collected: 09/08/22 0034     Updated: 09/08/22 0036    Narrative:      CR Chest 1 Vw    INDICATION:   Line placement.     COMPARISON:    9/7/2022 at 2224 hours    FINDINGS:  Portable AP view(s) of the chest.  Endotracheal tube remains in good position in the mid trachea. NG tube tip is in the stomach. There is a new right IJ line in place. The tip extends laterally into the right axillary vein. The tip is not in the SVC. No  pneumothorax. Lung volumes remain low with bibasilar infiltrate or atelectasis, left greater than right. Heart size is normal.      Impression:      Right IJ line tip is directed laterally into the right axillary vein, not into the SVC. No pneumothorax.    Signer Name: Jason Rollins  MD   Signed: 9/8/2022 12:34 AM   Workstation Name: [email or web address]    [facility]    XR Chest 1 View [[id number]] Collected: 09/07/22 2332     Updated: 09/07/22 2334    Narrative:      CHEST X-RAY, 9/7/2022      HISTORY:    32-year-old female intubated in the ED.      TECHNIQUE:  AP portable chest x-ray.      FINDINGS:  Endotracheal tube tip in the upper thoracic trachea about 4.4 cm above the idalia. OG tube tip in the mid stomach.    Shallow lung expansion with basilar infiltrate and/or atelectasis.      Impression:      1.  ETT and OGT in good position.  2.  Shallow lung expansion. Infiltrate and/or atelectasis in the lung bases.    Signer Name: Bruno Perrin MD   Signed: 9/7/2022 11:32 PM   Workstation Name: [email or web address]    [facility]          Assessment/Plan:  32-year-old female noted to have seizure activity [facility].  UDS positive for multiple substances.  Initially given 1500 mg of IV Keppra and started on Keppra 750 mg IV every 12 hours. Also received multiple doses of Ativan due to agitation.  No reports of seizure activity since being at the hospital. Patient was initially combative and aggressive she then became lethargic and she was intubated for airway protection.  Patient alert despite being on sedation, following commands, no focal deficits noted.  During work-up patient noted to be septic with aspiration pneumonia bilateral lower lobes.       #Seizure activity, likely related to overdose of unknown substance  #Polysubstance abuse, concern for overdose  #Aspiration pneumonia with sepsis    -CT head did not show any acute changes  -UDS positive for amphetamines, methamphetamines, benzodiazepines, and methadone  -Recommend to continue Keppra 750 mg IV every 12 hours for now  -Would recommend considering alternative to cefepime if available as it is known to decrease seizure threshold    The case was discussed with the patient,  nursing staff at bedside, and will discuss with hospitalist team.  We will continue to follow.    Thank you for the consultation, please feel free to call with any questions.    José Jonas Abdifatah, APRN  September 8, 2022  11:25 EDT    Verbal consent taken.  Patient agreeable to be seen via telemedicine. Dr. Kary HILL present during encounter via telemedicine.    This was an audio and video enabled telemedicine encounter.      Please note that portions of this note were completed with a voice recognition program. Efforts were made to edit dictation, but occasionally words are mistranscribed.              Electronically signed by Gina Preston MD at 09/08/22 5350        No abnormal movements

## 2022-10-18 NOTE — BH CONSULTATION LIAISON ASSESSMENT NOTE - EMPLOYMENT
MSW drafted letter for Ericka Chen PA-C reviewed same and was agreeable to sign off on same  Signed letter explaining need for wheelchair [employer] transport and an escort was faxed to Ericka Cehn this date at [phone]  Successful fax notice received  MSW also phoned [employer] at [phone] and spoke with Trena  She confirmed patient's round trip ride to her PT/OT appt tomorrow with is with [employer] - they will be picking her and her escort up at [address] [facility] [address] and taking her to [facility]  Return trip home is scheduled for 1030AM      MSW phoned Donal to make him aware of above at [phone]  MSW will follow-up with Ericka Chen at the end of this week to ensure that they received letter and updated patient's profile to reflect changes  Donal will take over making transportation arrangements for future appts  MSW will be available to assist him as needed  Employed

## 2022-11-11 ENCOUNTER — INPATIENT (INPATIENT)
Facility: HOSPITAL | Age: 47
LOS: 3 days | Discharge: HOME | End: 2022-11-15
Attending: STUDENT IN AN ORGANIZED HEALTH CARE EDUCATION/TRAINING PROGRAM | Admitting: STUDENT IN AN ORGANIZED HEALTH CARE EDUCATION/TRAINING PROGRAM

## 2022-11-11 VITALS
SYSTOLIC BLOOD PRESSURE: 135 MMHG | DIASTOLIC BLOOD PRESSURE: 90 MMHG | TEMPERATURE: 99 F | RESPIRATION RATE: 18 BRPM | OXYGEN SATURATION: 98 % | HEART RATE: 116 BPM

## 2022-11-11 DIAGNOSIS — Z98.890 OTHER SPECIFIED POSTPROCEDURAL STATES: Chronic | ICD-10-CM

## 2022-11-11 DIAGNOSIS — F10.939 ALCOHOL USE, UNSPECIFIED WITH WITHDRAWAL, UNSPECIFIED: ICD-10-CM

## 2022-11-11 LAB
ALBUMIN SERPL ELPH-MCNC: 4.7 G/DL — SIGNIFICANT CHANGE UP (ref 3.3–5)
ALP SERPL-CCNC: 81 U/L — SIGNIFICANT CHANGE UP (ref 40–120)
ALT FLD-CCNC: 34 U/L — HIGH (ref 4–33)
ANION GAP SERPL CALC-SCNC: 20 MMOL/L — HIGH (ref 7–14)
APAP SERPL-MCNC: <10 UG/ML — LOW (ref 15–25)
AST SERPL-CCNC: 53 U/L — HIGH (ref 4–32)
BASE EXCESS BLDV CALC-SCNC: 0.9 MMOL/L — SIGNIFICANT CHANGE UP (ref -2–3)
BASOPHILS # BLD AUTO: 0.08 K/UL — SIGNIFICANT CHANGE UP (ref 0–0.2)
BASOPHILS NFR BLD AUTO: 0.6 % — SIGNIFICANT CHANGE UP (ref 0–2)
BILIRUB SERPL-MCNC: 0.3 MG/DL — SIGNIFICANT CHANGE UP (ref 0.2–1.2)
BLOOD GAS VENOUS COMPREHENSIVE RESULT: SIGNIFICANT CHANGE UP
BUN SERPL-MCNC: 16 MG/DL — SIGNIFICANT CHANGE UP (ref 7–23)
CALCIUM SERPL-MCNC: 9.7 MG/DL — SIGNIFICANT CHANGE UP (ref 8.4–10.5)
CHLORIDE BLDV-SCNC: 105 MMOL/L — SIGNIFICANT CHANGE UP (ref 96–108)
CHLORIDE SERPL-SCNC: 99 MMOL/L — SIGNIFICANT CHANGE UP (ref 98–107)
CO2 BLDV-SCNC: 28.6 MMOL/L — HIGH (ref 22–26)
CO2 SERPL-SCNC: 24 MMOL/L — SIGNIFICANT CHANGE UP (ref 22–31)
CREAT SERPL-MCNC: 0.62 MG/DL — SIGNIFICANT CHANGE UP (ref 0.5–1.3)
EGFR: 109 ML/MIN/1.73M2 — SIGNIFICANT CHANGE UP
EOSINOPHIL # BLD AUTO: 0.15 K/UL — SIGNIFICANT CHANGE UP (ref 0–0.5)
EOSINOPHIL NFR BLD AUTO: 1.1 % — SIGNIFICANT CHANGE UP (ref 0–6)
ETHANOL SERPL-MCNC: 269 MG/DL — HIGH
GAS PNL BLDV: 144 MMOL/L — SIGNIFICANT CHANGE UP (ref 136–145)
GAS PNL BLDV: SIGNIFICANT CHANGE UP
GLUCOSE BLDV-MCNC: 120 MG/DL — HIGH (ref 70–99)
GLUCOSE SERPL-MCNC: 115 MG/DL — HIGH (ref 70–99)
HCG SERPL-ACNC: <5 MIU/ML — SIGNIFICANT CHANGE UP
HCO3 BLDV-SCNC: 27 MMOL/L — SIGNIFICANT CHANGE UP (ref 22–29)
HCT VFR BLD CALC: 41.2 % — SIGNIFICANT CHANGE UP (ref 34.5–45)
HCT VFR BLDA CALC: 36 % — SIGNIFICANT CHANGE UP (ref 34.5–46.5)
HGB BLD CALC-MCNC: 12.1 G/DL — SIGNIFICANT CHANGE UP (ref 11.5–15.5)
HGB BLD-MCNC: 12.3 G/DL — SIGNIFICANT CHANGE UP (ref 11.5–15.5)
IANC: 11.04 K/UL — HIGH (ref 1.8–7.4)
IMM GRANULOCYTES NFR BLD AUTO: 0.3 % — SIGNIFICANT CHANGE UP (ref 0–0.9)
LACTATE BLDV-MCNC: 8.8 MMOL/L — CRITICAL HIGH (ref 0.5–2)
LACTATE SERPL-SCNC: 5.5 MMOL/L — CRITICAL HIGH (ref 0.5–2)
LACTATE SERPL-SCNC: 8 MMOL/L — CRITICAL HIGH (ref 0.5–2)
LIDOCAIN IGE QN: 11 U/L — SIGNIFICANT CHANGE UP (ref 7–60)
LYMPHOCYTES # BLD AUTO: 16.5 % — SIGNIFICANT CHANGE UP (ref 13–44)
LYMPHOCYTES # BLD AUTO: 2.35 K/UL — SIGNIFICANT CHANGE UP (ref 1–3.3)
MAGNESIUM SERPL-MCNC: 1.8 MG/DL — SIGNIFICANT CHANGE UP (ref 1.6–2.6)
MCHC RBC-ENTMCNC: 21.8 PG — LOW (ref 27–34)
MCHC RBC-ENTMCNC: 29.9 GM/DL — LOW (ref 32–36)
MCV RBC AUTO: 73 FL — LOW (ref 80–100)
MONOCYTES # BLD AUTO: 0.55 K/UL — SIGNIFICANT CHANGE UP (ref 0–0.9)
MONOCYTES NFR BLD AUTO: 3.9 % — SIGNIFICANT CHANGE UP (ref 2–14)
NEUTROPHILS # BLD AUTO: 11.04 K/UL — HIGH (ref 1.8–7.4)
NEUTROPHILS NFR BLD AUTO: 77.6 % — HIGH (ref 43–77)
NRBC # BLD: 0 /100 WBCS — SIGNIFICANT CHANGE UP (ref 0–0)
NRBC # FLD: 0 K/UL — SIGNIFICANT CHANGE UP (ref 0–0)
PCO2 BLDV: 49 MMHG — HIGH (ref 39–42)
PH BLDV: 7.35 — SIGNIFICANT CHANGE UP (ref 7.32–7.43)
PLATELET # BLD AUTO: 368 K/UL — SIGNIFICANT CHANGE UP (ref 150–400)
PO2 BLDV: 30 MMHG — SIGNIFICANT CHANGE UP
POTASSIUM BLDV-SCNC: 4.3 MMOL/L — SIGNIFICANT CHANGE UP (ref 3.5–5.1)
POTASSIUM SERPL-MCNC: 4.7 MMOL/L — SIGNIFICANT CHANGE UP (ref 3.5–5.3)
POTASSIUM SERPL-SCNC: 4.7 MMOL/L — SIGNIFICANT CHANGE UP (ref 3.5–5.3)
PROT SERPL-MCNC: 9 G/DL — HIGH (ref 6–8.3)
RBC # BLD: 5.64 M/UL — HIGH (ref 3.8–5.2)
RBC # FLD: 19 % — HIGH (ref 10.3–14.5)
SALICYLATES SERPL-MCNC: <0.3 MG/DL — LOW (ref 15–30)
SAO2 % BLDV: 34.2 % — SIGNIFICANT CHANGE UP
SARS-COV-2 RNA SPEC QL NAA+PROBE: SIGNIFICANT CHANGE UP
SODIUM SERPL-SCNC: 143 MMOL/L — SIGNIFICANT CHANGE UP (ref 135–145)
TROPONIN T, HIGH SENSITIVITY RESULT: <6 NG/L — SIGNIFICANT CHANGE UP
WBC # BLD: 14.21 K/UL — HIGH (ref 3.8–10.5)
WBC # FLD AUTO: 14.21 K/UL — HIGH (ref 3.8–10.5)

## 2022-11-11 PROCEDURE — 70450 CT HEAD/BRAIN W/O DYE: CPT | Mod: 26,MB

## 2022-11-11 PROCEDURE — 93010 ELECTROCARDIOGRAM REPORT: CPT

## 2022-11-11 PROCEDURE — 99285 EMERGENCY DEPT VISIT HI MDM: CPT

## 2022-11-11 PROCEDURE — 74177 CT ABD & PELVIS W/CONTRAST: CPT | Mod: 26,MB

## 2022-11-11 PROCEDURE — 71045 X-RAY EXAM CHEST 1 VIEW: CPT | Mod: 26

## 2022-11-11 PROCEDURE — 99223 1ST HOSP IP/OBS HIGH 75: CPT

## 2022-11-11 RX ORDER — ONDANSETRON 8 MG/1
4 TABLET, FILM COATED ORAL ONCE
Refills: 0 | Status: COMPLETED | OUTPATIENT
Start: 2022-11-11 | End: 2022-11-11

## 2022-11-11 RX ORDER — SODIUM CHLORIDE 9 MG/ML
1000 INJECTION INTRAMUSCULAR; INTRAVENOUS; SUBCUTANEOUS ONCE
Refills: 0 | Status: COMPLETED | OUTPATIENT
Start: 2022-11-11 | End: 2022-11-11

## 2022-11-11 RX ORDER — PHENOBARBITAL 60 MG
130 TABLET ORAL ONCE
Refills: 0 | Status: DISCONTINUED | OUTPATIENT
Start: 2022-11-11 | End: 2022-11-11

## 2022-11-11 RX ADMIN — Medication 2 MILLIGRAM(S): at 19:30

## 2022-11-11 RX ADMIN — Medication 130 MILLIGRAM(S): at 21:02

## 2022-11-11 RX ADMIN — ONDANSETRON 4 MILLIGRAM(S): 8 TABLET, FILM COATED ORAL at 18:03

## 2022-11-11 RX ADMIN — SODIUM CHLORIDE 1000 MILLILITER(S): 9 INJECTION INTRAMUSCULAR; INTRAVENOUS; SUBCUTANEOUS at 21:01

## 2022-11-11 RX ADMIN — SODIUM CHLORIDE 1000 MILLILITER(S): 9 INJECTION INTRAMUSCULAR; INTRAVENOUS; SUBCUTANEOUS at 18:03

## 2022-11-11 RX ADMIN — SODIUM CHLORIDE 1000 MILLILITER(S): 9 INJECTION INTRAMUSCULAR; INTRAVENOUS; SUBCUTANEOUS at 19:30

## 2022-11-11 RX ADMIN — Medication 2 MILLIGRAM(S): at 17:56

## 2022-11-11 NOTE — ED ADULT NURSE NOTE - OBJECTIVE STATEMENT
Pt awake. Pt came in due to intoxication. Pt unable to speak in full complete sentence at this time. According to mother 2 bottles of vodka were found by pt. Speech is incoherent. Labs drawn and sent. 20g in the AC. Pt is sinus tach on monitor. Will continue to monitor.

## 2022-11-11 NOTE — H&P ADULT - NSHPLABSRESULTS_GEN_ALL_CORE
EKG personally reviewed.  Sinus tach at 116 bpm, no acute ischemic changes, QTc 461 ms.    Labs personally reviewed.                        12.3   14.21 )-----------( 368      ( 11 Nov 2022 17:25 )             41.2     11-11    143  |  99  |  16  ----------------------------<  115<H>  4.7   |  24  |  0.62    Ca    9.7      11 Nov 2022 17:20  Mg     1.80     11-11    TPro  9.0<H>  /  Alb  4.7  /  TBili  0.3  /  DBili  x   /  AST  53<H>  /  ALT  34<H>  /  AlkPhos  81  11-11    Lactate 8 -> 5.5    Imaging personally reviewed.  ACC: [id number] EXAM:  CT BRAIN                        PROCEDURE DATE:  11/11/2022    FINDINGS:  No mass effect, midline shift, acute intracranial hemorrhage, or extra-axial collections.    Ventricular and sulcal prominence consistent with age appropriate involutional change.    The calvarium is intact. Mild mucosal thickening of the left sphenoid sinus, likely chronic. The mastoid air cells are predominantly clear. The orbits are unremarkable.    IMPRESSION:  No acute intra-cranial hemorrhage, mass effect, or midline shift.    ACC: [id number] EXAM:  CT ABDOMEN AND PELVIS IC                        PROCEDURE DATE:  11/11/2022    FINDINGS:  LOWER CHEST: Within normal limits.    LIVER: Steatosis. No focal hepatic masses.  BILE DUCTS: Normal caliber.  GALLBLADDER: Phrygian cap configuration.  SPLEEN: Within normal limits.  PANCREAS: Atrophic. No peripancreatic fluid collection. No CT evidence for acute pancreatitis.  ADRENALS: Within normal limits.  KIDNEYS/URETERS: Symmetric renal enhancement. No hydronephrosis. 2-3 mm calculus mid pole region right kidney. Less than 5 mm hypodense focus mid pole region left kidney, too small to characterize.    BLADDER: Within normal limits.  REPRODUCTIVE ORGANS: Redemonstrated enlarged, globular uterus with heterogeneous appearance of the wall likely representing fibroids. Stable prominence of the lower uterine segment. The adnexa are unremarkable. Note is made of an indwelling IUD.    BOWEL: No evidence for mechanical bowel obstruction. Duodenal diverticulum.  PERITONEUM: No ascites or pneumoperitoneum.  VESSELS: Within normal limits.  RETROPERITONEUM/LYMPH NODES: No lymphadenopathy.  ABDOMINAL WALL: Tiny fat-containing umbilical hernia.  BONES: Degenerative changes at L5-S1 are redemonstrated.    IMPRESSION:  No CT evidence of pancreatitis.    ACC: [id number] EXAM:  XR CHEST PORTABLE URGENT 1V                        PROCEDURE DATE:  11/11/2022    ******PRELIMINARY REPORT******    ******PRELIMINARY REPORT******   INTERPRETATION:  clear lungs.  ******PRELIMINARY REPORT******    ******PRELIMINARY REPORT******

## 2022-11-11 NOTE — ED PROVIDER NOTE - OBJECTIVE STATEMENT
47yoF w/Hx of HTN (?) BIBEMS for AMS. Pt was found by mom in apartment unresponsive, with an empty bottle of vodka near her, tried shaking/calling her name w/o response, called EMS, no shaking activity or incontinence noted at that time. Pt has had several social stressors causing her to relapse, Hx of seizures in the past during heavy etoh use last 8years ago. Unable to assess ROS 2/2 pt mentation at this time; endorses nausea; denies HA, CP, abd pain.     PMD Unknown

## 2022-11-11 NOTE — H&P ADULT - PROBLEM SELECTOR PLAN 6
BPs in acceptable range on admission despite being in withdrawal. Pt not on any BP meds at home.  - Monitor VS q4hrs

## 2022-11-11 NOTE — H&P ADULT - NSHPPHYSICALEXAM_GEN_ALL_CORE
Vital Signs Last 24 Hrs  T(C): 37.4 (12 Nov 2022 06:28), Max: 37.4 (12 Nov 2022 06:28)  T(F): 99.3 (12 Nov 2022 06:28), Max: 99.3 (12 Nov 2022 06:28)  HR: 112 (12 Nov 2022 06:28) (97 - 117)  BP: 142/86 (12 Nov 2022 06:28) (125/80 - 157/113)  BP(mean): --  RR: 18 (12 Nov 2022 06:28) (17 - 23)  SpO2: 99% (12 Nov 2022 06:28) (97% - 100%)    PHYSICAL EXAM:  General: Sleepy but easily arousable to voice or light tactile stimuli.  No acute distress.  Head: Normocephalic, atraumatic.    Eyes: PERRL.  EOMI.  ?Vertical nystagmus.  No scleral icterus.  No conjunctival pallor.  Mouth: Mild tongue fasciculations.  Moist MM.  No oropharyngeal exudates.    Neck: Supple.  Full range of motion.  No JVD.  No LAD.  Trachea midline.    Heart: Tachycardic, regular rhythm.  Normal S1 and S2.  No murmurs, rubs, or gallops.  No LE edema b/l.   Lungs: Nonlabored breathing.  Good inspiratory effort.  CTAB.  No wheezes, crackles, or rhonchi.    Abdomen: BS+, soft, nontender with no rebound or guarding, nondistended.  No hepatomegaly.   Skin: Warm and dry.  No rashes.  Extremities: No cyanosis.  2+ peripheral pulses b/l.  Hand tremors with arms outstretched b/l.   Musculoskeletal: No joint deformities.  No spinal or paraspinal tenderness.  Neuro: A&Ox3.  No facial asymmetry, tongue midline on protrusion.  5/5 motor strength in UE and LE b/l.  Tactile sensation intact in UE and LE b/l.  No focal deficits.  Psychiatric: Restricted affect.  Repeatedly asking if she can leave AMA.  Denies any depressed mood, SI/HI, or auditory/visual/tactile hallucinations.

## 2022-11-11 NOTE — ED PROVIDER NOTE - PHYSICAL EXAMINATION
Gen: AAOx1 (self), lethargic middle-aged woman lying in bed in NAD  Head: NCAT  HEENT: EOMI, sluggish PERRL, oral mucosa dry, normal conjunctiva, no tongue laceration/fasciculations, +colored contacts b/l  Lung: CTAB no w/r/r  CV: tachycardic w/reg rhythm   Abd: +diffuse ttp   MSK: no visible deformities, spontaneously moving all extremities   Neuro: non-focal  Skin: Warm, well perfused, no rash, no edema  Psych: unable to assess  ~Donny Oropeza M.D. Resident

## 2022-11-11 NOTE — ED PROVIDER NOTE - ATTENDING CONTRIBUTION TO CARE
Attending Statement: I have personally seen and examined this patient. I have fully participated in the care of this patient. I have reviewed all pertinent clinical information, including history physical exam, plan and the Resident's note and agree except as noted  47yoF hx of HTN and ETOH abuse per EMS from home co AMS. EMS triage note states pt was home, mother called found her w empty vodka bottles. pt intoxicated, limited h/p obtained. no family w pt.   pt endorse drinking, no drug use. pt only complaining of nausea. no cp or SOB no vomiting. no abdominal pain. Vital signs noted. disheveled intoxicated female. no sign of head trauma. supple neck no rom. nontender chest wall. soft nondistended tender upper abdomen, no rebound or guarding. no cvat. moving ext. no lac noted.  plan ekg, labs, ct a/p, obtain  collateral, tba

## 2022-11-11 NOTE — H&P ADULT - PROBLEM SELECTOR PLAN 5
Mild, with AST 53, ALT 34, with t bili 0.3 and alk phos 81. No clinical evidence of alcoholic hepatitis. Pt with known history of hepatic steatosis likely 2/2 alcohol use disorder.  - Monitor LFTs for now

## 2022-11-11 NOTE — H&P ADULT - PROBLEM SELECTOR PLAN 4
WBC elevated to 14.21 on admission. Possibly reactive from withdrawal seizure or from hemoconcentration from excessive alcohol consumption, as WBC, Hgb, and plt count are all higher than baseline. Pt afebrile, with no localizing S/S of infection.  - Trend CBC with diff  - CXR with clear lungs, CTAP negative. Monitor off antibiotics.

## 2022-11-11 NOTE — H&P ADULT - HISTORY OF PRESENT ILLNESS
48 yo woman with history of alcohol use disorder c/b alcohol withdrawal syndrome, including seizures, HTN, and insomnia presents after being found down on the floor by pt's mom. Pt states that her mom came over to her house and called EMS after finding her 46 yo woman with history of alcohol use disorder c/b alcohol withdrawal syndrome, including seizures, HTN, and insomnia presents after being found unresponsive by her mom. Pt states that her mom came over to her house and called EMS to take her to the hospital because her mom grew increasingly concerned about her drinking. Pt denies any loss of consciousness or being down on the floor. Pt admits to heavy drinking over the past 4 days, saying that she was sober for ~4 months but relapsed ~5 days ago and has been drinking more than a pint of vodka daily since then. Pt is unwilling to share what caused her to resume drinking. Pt states that her last drink was on Friday afternoon. Pt denies any history of withdrawal seizures. Pt also denies any depressed mood, suicidal or homicidal ideations, or hallucinations. No     According to ED Provider Note: "47yoF w/Hx of HTN (?) BIBEMS for AMS. Pt was found by mom in apartment unresponsive, with an empty bottle of vodka near her, tried shaking/calling her name w/o response, called EMS, no shaking activity or incontinence noted at that time. Pt has had several social stressors causing her to relapse, Hx of seizures in the past during heavy etoh use last 8years ago. Unable to assess ROS 2/2 pt mentation at this time; endorses nausea; denies HA, CP, abd pain. 48 yo woman with history of alcohol use disorder c/b hepatic steatosis, pancreatitis, and alcohol withdrawal syndrome, including seizures, HTN, and insomnia presents after being found unresponsive by her mom. Pt states that her mom came over to her house and called EMS to take her to the hospital because her mom grew increasingly concerned about her drinking. Pt denies any loss of consciousness or being down on the floor. Pt admits to heavy drinking over the past 4 days, saying that she was sober for ~4 months but relapsed ~5 days ago and has been drinking more than a pint of vodka daily since then. Pt is unwilling to share what caused her to resume drinking. Pt states that her last drink was on Friday afternoon. Pt denies any history of withdrawal seizures. Pt also denies any depressed mood, suicidal or homicidal ideations, or hallucinations. No chest pain, shortness of breath, palpitations, fevers, chills, cough, abdominal pain, nausea, vomiting, diarrhea, constipation, or urinary symptoms.     According to ED Provider Note: "47yoF w/Hx of HTN (?) BIBEMS for AMS. Pt was found by mom in apartment unresponsive, with an empty bottle of vodka near her, tried shaking/calling her name w/o response, called EMS, no shaking activity or incontinence noted at that time. Pt has had several social stressors causing her to relapse, Hx of seizures in the past during heavy etoh use last 8years ago. Unable to assess ROS 2/2 pt mentation at this time; endorses nausea; denies HA, CP, abd pain."    In the ED,  [employer]

## 2022-11-11 NOTE — H&P ADULT - PROBLEM SELECTOR PLAN 7
- DVT ppx: IMPROVE score 0, low risk for VTE. No indication for pharmacologic ppx. Encourage OOB and ambulation as tolerated and with assistance.   - Diet: DASH/TLC

## 2022-11-11 NOTE — ED PROVIDER NOTE - CLINICAL SUMMARY MEDICAL DECISION MAKING FREE TEXT BOX
47yoF w/Hx HTN BIBEMS for AMS, vs significant for tachycardia, phys exam w/abd ttp. DDx acute alcohol intox vs. pancreatitis vs. seizure vs. infection, will eval w/labs, CT a/p, IVF, zofran for nausea, reassess pt when more awake/alert., dispo pending w/u. - Donny Oropeza, PGY-2

## 2022-11-11 NOTE — ED ADULT TRIAGE NOTE - CHIEF COMPLAINT QUOTE
Per EMS Mom called 911 when she found Pt apparently intoxicated minimally responsive wit h2 empty bottles of vodka.  Pt states "I drank" denies daily ETOH use, denies SI but reports she was drinking d/t feeling depressed. 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

## 2022-11-11 NOTE — H&P ADULT - NSHPREVIEWOFSYSTEMS_GEN_ALL_CORE
Constitutional: No generalized weakness, fevers, chills, or weight loss  Eyes: No visual changes, double vision, or eye pain  Ears, Nose, Mouth, Throat: No runny nose, sinus pain, ear pain, tinnitus, sore throat, dysphagia, or odynophagia  Cardiovascular: No chest pain, palpitations, or LE edema  Respiratory: No cough, wheezing, hemoptysis, or shortness of breath  Gastrointestinal: No abdominal pain, nausea/vomiting, diarrhea/constipation, hematemesis, melena, or BRBPR  Genitourinary: No dysuria, frequency, urgency, or hematuria  Musculoskeletal: No back pain or joint pain, swelling, or decreased ROM  Skin: No pruritus or rashes  Neurologic: No syncope, seizures, headaches, paresthesias, numbness, or limb weakness  Psychiatric: No depression, anxiety, or agitation  Endocrine: No heat/cold intolerance, mood swings, sweats, polydipsia, or polyuria  Hematologic/lymphatic: No purpura, petechia, or prolonged or excessive bleeding after dental extraction / injury  Allergic/Immunologic: No anaphylaxis or allergic response to materials, foods, animals    Positives and pertinent negatives noted and all other systems negative.

## 2022-11-11 NOTE — H&P ADULT - SOCIAL HISTORY: ALCOHOL USE
History of severe alcohol use disorder. Reports that she was sober for ~4 months but relapsed ~5 days ago and has been drinking more than a pint of vodka daily since then.

## 2022-11-11 NOTE — H&P ADULT - PROBLEM SELECTOR PLAN 3
Pt admits to drinking more than 1 pint of vodka daily for the past ~5 days after being sober for ~4 months. Pt with no desire to quit drinking at this time, repeatedly voicing desire to leave AMA.  - SW/SBIRT consults  - Plan as above for alcohol withdrawal

## 2022-11-11 NOTE — H&P ADULT - PROBLEM SELECTOR PLAN 1
Pt with mild tongue fasciculations, hand tremors, and tachycardia, concern for withdrawal. Blood alcohol level on presentation 269, with last drink Friday afternoon. Pt admits to drinking more than 1 pint of vodka daily for the past ~5 days after being sober for ~4 months.   - Start IV Ativan taper and symptom-triggered CIWA protocol  - Start high-dose IV thiamine 500 mg q8hrs x3 days  - Start PO folate 1 mg daily and MVI supplementation  - Low threshold for MICU consult if pt having uptrending CIWA scores  - Pt reportedly found unresponsive by her mom on Friday prior to presentation to [facility] ED. Possibly in setting of withdrawal seizure given elevated lactate to 8 and CK of 382. However, can be from alcohol intoxication as well.   - Aspiration precautions, seizure precautions, and fall risk protocol  - Pt repeatedly voicing desire to leave AMA. However, pt with no capacity at this time to sign out AMA, as pt is unable to display understanding of the risks involved, including risk of death, of leaving the hospital AMA at this time. C/w constant observation, as pt is flight risk.

## 2022-11-11 NOTE — H&P ADULT - PROBLEM SELECTOR PLAN 2
Lactate elevated to 8 on presentation, downtrended to 5.5 after >2L bolus of NS. VBG with pH 7.35 and lactate 8.8. Possibly 2/2 withdrawal seizure or from recent heavy alcohol consumption.   - Start D5 LR at 75 cc/hr for now. Repeat VBG with lactate in AM.   - Trend lactate until normalization

## 2022-11-11 NOTE — ED PROVIDER NOTE - PROGRESS NOTE DETAILS
pt pending collateral, labs, ct A/P, pain med/ anti emetic and reassess. sign out to night team Have tried multiple times to reach out for collateral info via family phone numbers listed in chart, no answer over 2 hours, will try again later. - Donny Oropeza, PGY-2 Mom Mellsia Estrada ([phone]). Per mom, chronic EtOH use, sober for 9years then relapsed over last 2 years, has been sober for ~9 months, lives alone and mom suspects pt was triggered to drink again as pt's father is currently hospitalized. Pt was supposed to go on a trip and missed a flight this week, has been missing/not answering calls from family members. Mom went yesterday and pt was awake, alert, ambulatory; today Mom went back and pt was unresponsive (calling name, shaking her w/o response) around ~2pm today, mom noted 1 empty bottle of vodka again today. Pt has Hx of seizure in the past (unclear if EtOH-related), last 8 years ago. Mom Mellisa Estarda ([phone]). Per mom, chronic EtOH use, sober for 9years then relapsed over last 2 years, has been sober for ~9 months, lives alone and mom suspects pt was triggered to drink again as pt's father is currently hospitalized. Pt was supposed to go on a trip and missed a flight this week, has been missing/not answering calls from family members. Mom went yesterday and pt was awake, alert, ambulatory, drinking vodka, which Mom poured out. today Mom went back and pt was unresponsive (calling name, shaking her w/o response) around ~2pm today, no shaking/seizure activity or incontinence noted at that time, mom noted 1 empty bottle of vodka next to pt today. Pt has Hx of seizure in the past (unclear if EtOH-related), last 8 years ago. Pt still w/tremulousness and increased anxiousness, nausea improved, will give 2 more mg ativan and reassess. - Donny Oropeza, PGY-2 Mom Mellisa Estrada ([phone]). Per mom, chronic EtOH use, sober for 9years then relapsed over last 2 years, has been sober for ~9 months, lives alone and mom suspects pt was triggered to drink again as pt's father is currently hospitalized. Pt was supposed to go on a trip and missed a flight this week, has been missing/not answering calls from family members. Mom went yesterday and pt was awake, alert, ambulatory, drinking vodka, which Mom poured out. today Mom went back and pt was unresponsive (calling name, shaking her w/o response) around ~2pm today, no shaking/seizure activity or incontinence noted at that time, mom noted 1 empty bottle of vodka next to pt today. Pt has Hx of seizure in the past (unclear if EtOH-related), last 8 years ago.     Patient now more awake and alert, AAOx3, states last drink was last night, endorsing nausea, tremulousness, anxiety, worried about withdrawal and seizures, will give 2mg ativan and reassess. - Donny Oropeza, PGY-2 Carolyn Holloway PGY-3: Reassessed. Patient still with bilateral upper extremity tremor, mild tongue fasiculations. Will give 130mg phenobarbital, reassess. Lactate remains 8.0 likely 2/2 tremor. Will give additional fluid bolus. Carolyn Holloway PGY-3: Reassessed. Patient still with bilateral upper extremity tremor, mild tongue fasiculations. Will give 130mg phenobarbital, reassess. Lactate remains 8.0 likely 2/2 tremor. Will give additional fluid bolus. Denies SI/HI/AVH. TBA for withdrawal.

## 2022-11-11 NOTE — H&P ADULT - ASSESSMENT
48 yo woman with history of alcohol use disorder c/b hepatic steatosis, pancreatitis, and alcohol withdrawal syndrome, including seizures, HTN, and insomnia presents after being found unresponsive by her mom, likely in setting of alcohol intoxication vs. withdrawal seizure, now admitted due to alcohol withdrawal.

## 2022-11-12 DIAGNOSIS — F10.20 ALCOHOL DEPENDENCE, UNCOMPLICATED: ICD-10-CM

## 2022-11-12 DIAGNOSIS — Z29.9 ENCOUNTER FOR PROPHYLACTIC MEASURES, UNSPECIFIED: ICD-10-CM

## 2022-11-12 DIAGNOSIS — F10.939 ALCOHOL USE, UNSPECIFIED WITH WITHDRAWAL, UNSPECIFIED: ICD-10-CM

## 2022-11-12 DIAGNOSIS — R74.01 ELEVATION OF LEVELS OF LIVER TRANSAMINASE LEVELS: ICD-10-CM

## 2022-11-12 DIAGNOSIS — D72.829 ELEVATED WHITE BLOOD CELL COUNT, UNSPECIFIED: ICD-10-CM

## 2022-11-12 DIAGNOSIS — R79.89 OTHER SPECIFIED ABNORMAL FINDINGS OF BLOOD CHEMISTRY: ICD-10-CM

## 2022-11-12 DIAGNOSIS — E87.8 OTHER DISORDERS OF ELECTROLYTE AND FLUID BALANCE, NOT ELSEWHERE CLASSIFIED: ICD-10-CM

## 2022-11-12 DIAGNOSIS — I10 ESSENTIAL (PRIMARY) HYPERTENSION: ICD-10-CM

## 2022-11-12 LAB
ALBUMIN SERPL ELPH-MCNC: 3.8 G/DL — SIGNIFICANT CHANGE UP (ref 3.3–5)
ALP SERPL-CCNC: 68 U/L — SIGNIFICANT CHANGE UP (ref 40–120)
ALT FLD-CCNC: 26 U/L — SIGNIFICANT CHANGE UP (ref 4–33)
ANION GAP SERPL CALC-SCNC: 12 MMOL/L — SIGNIFICANT CHANGE UP (ref 7–14)
AST SERPL-CCNC: 46 U/L — HIGH (ref 4–32)
BASE EXCESS BLDV CALC-SCNC: 3 MMOL/L — SIGNIFICANT CHANGE UP (ref -2–3)
BASOPHILS # BLD AUTO: 0.05 K/UL — SIGNIFICANT CHANGE UP (ref 0–0.2)
BASOPHILS NFR BLD AUTO: 0.6 % — SIGNIFICANT CHANGE UP (ref 0–2)
BILIRUB SERPL-MCNC: 0.8 MG/DL — SIGNIFICANT CHANGE UP (ref 0.2–1.2)
BLOOD GAS VENOUS COMPREHENSIVE RESULT: SIGNIFICANT CHANGE UP
BUN SERPL-MCNC: 12 MG/DL — SIGNIFICANT CHANGE UP (ref 7–23)
CALCIUM SERPL-MCNC: 8.1 MG/DL — LOW (ref 8.4–10.5)
CHLORIDE BLDV-SCNC: 102 MMOL/L — SIGNIFICANT CHANGE UP (ref 96–108)
CHLORIDE SERPL-SCNC: 95 MMOL/L — LOW (ref 98–107)
CO2 BLDV-SCNC: 28.2 MMOL/L — HIGH (ref 22–26)
CO2 SERPL-SCNC: 25 MMOL/L — SIGNIFICANT CHANGE UP (ref 22–31)
CREAT SERPL-MCNC: 0.58 MG/DL — SIGNIFICANT CHANGE UP (ref 0.5–1.3)
EGFR: 111 ML/MIN/1.73M2 — SIGNIFICANT CHANGE UP
EOSINOPHIL # BLD AUTO: 0.28 K/UL — SIGNIFICANT CHANGE UP (ref 0–0.5)
EOSINOPHIL NFR BLD AUTO: 3.3 % — SIGNIFICANT CHANGE UP (ref 0–6)
FERRITIN SERPL-MCNC: 25 NG/ML — SIGNIFICANT CHANGE UP (ref 15–150)
GAS PNL BLDV: 135 MMOL/L — LOW (ref 136–145)
GLUCOSE BLDV-MCNC: 89 MG/DL — SIGNIFICANT CHANGE UP (ref 70–99)
GLUCOSE SERPL-MCNC: 87 MG/DL — SIGNIFICANT CHANGE UP (ref 70–99)
HCO3 BLDV-SCNC: 27 MMOL/L — SIGNIFICANT CHANGE UP (ref 22–29)
HCT VFR BLD CALC: 31 % — LOW (ref 34.5–45)
HCT VFR BLDA CALC: 28 % — LOW (ref 34.5–46.5)
HGB BLD CALC-MCNC: 9.2 G/DL — LOW (ref 11.5–15.5)
HGB BLD-MCNC: 9.4 G/DL — LOW (ref 11.5–15.5)
IANC: 6.59 K/UL — SIGNIFICANT CHANGE UP (ref 1.8–7.4)
IMM GRANULOCYTES NFR BLD AUTO: 0.5 % — SIGNIFICANT CHANGE UP (ref 0–0.9)
IRON SATN MFR SERPL: 180 UG/DL — HIGH (ref 30–160)
IRON SATN MFR SERPL: 55 % — HIGH (ref 14–50)
LACTATE BLDV-MCNC: 1.4 MMOL/L — SIGNIFICANT CHANGE UP (ref 0.5–2)
LYMPHOCYTES # BLD AUTO: 1.15 K/UL — SIGNIFICANT CHANGE UP (ref 1–3.3)
LYMPHOCYTES # BLD AUTO: 13.6 % — SIGNIFICANT CHANGE UP (ref 13–44)
MAGNESIUM SERPL-MCNC: 1.3 MG/DL — LOW (ref 1.6–2.6)
MCHC RBC-ENTMCNC: 22.1 PG — LOW (ref 27–34)
MCHC RBC-ENTMCNC: 30.3 GM/DL — LOW (ref 32–36)
MCV RBC AUTO: 72.9 FL — LOW (ref 80–100)
MONOCYTES # BLD AUTO: 0.34 K/UL — SIGNIFICANT CHANGE UP (ref 0–0.9)
MONOCYTES NFR BLD AUTO: 4 % — SIGNIFICANT CHANGE UP (ref 2–14)
NEUTROPHILS # BLD AUTO: 6.59 K/UL — SIGNIFICANT CHANGE UP (ref 1.8–7.4)
NEUTROPHILS NFR BLD AUTO: 78 % — HIGH (ref 43–77)
NRBC # BLD: 0 /100 WBCS — SIGNIFICANT CHANGE UP (ref 0–0)
NRBC # FLD: 0 K/UL — SIGNIFICANT CHANGE UP (ref 0–0)
PCO2 BLDV: 38 MMHG — LOW (ref 39–42)
PH BLDV: 7.46 — HIGH (ref 7.32–7.43)
PHOSPHATE SERPL-MCNC: 2 MG/DL — LOW (ref 2.5–4.5)
PLATELET # BLD AUTO: 242 K/UL — SIGNIFICANT CHANGE UP (ref 150–400)
PO2 BLDV: 67 MMHG — SIGNIFICANT CHANGE UP
POTASSIUM BLDV-SCNC: 3.8 MMOL/L — SIGNIFICANT CHANGE UP (ref 3.5–5.1)
POTASSIUM SERPL-MCNC: 3.8 MMOL/L — SIGNIFICANT CHANGE UP (ref 3.5–5.3)
POTASSIUM SERPL-SCNC: 3.8 MMOL/L — SIGNIFICANT CHANGE UP (ref 3.5–5.3)
PROT SERPL-MCNC: 7.2 G/DL — SIGNIFICANT CHANGE UP (ref 6–8.3)
RBC # BLD: 4.25 M/UL — SIGNIFICANT CHANGE UP (ref 3.8–5.2)
RBC # FLD: 17.9 % — HIGH (ref 10.3–14.5)
SAO2 % BLDV: 94.1 % — SIGNIFICANT CHANGE UP
SODIUM SERPL-SCNC: 132 MMOL/L — LOW (ref 135–145)
TIBC SERPL-MCNC: 326 UG/DL — SIGNIFICANT CHANGE UP (ref 220–430)
UIBC SERPL-MCNC: 146 UG/DL — SIGNIFICANT CHANGE UP (ref 110–370)
WBC # BLD: 8.45 K/UL — SIGNIFICANT CHANGE UP (ref 3.8–10.5)
WBC # FLD AUTO: 8.45 K/UL — SIGNIFICANT CHANGE UP (ref 3.8–10.5)

## 2022-11-12 PROCEDURE — 99233 SBSQ HOSP IP/OBS HIGH 50: CPT

## 2022-11-12 RX ORDER — ONDANSETRON 8 MG/1
4 TABLET, FILM COATED ORAL EVERY 8 HOURS
Refills: 0 | Status: DISCONTINUED | OUTPATIENT
Start: 2022-11-12 | End: 2022-11-15

## 2022-11-12 RX ORDER — FOLIC ACID 0.8 MG
1 TABLET ORAL DAILY
Refills: 0 | Status: DISCONTINUED | OUTPATIENT
Start: 2022-11-12 | End: 2022-11-15

## 2022-11-12 RX ORDER — TRAZODONE HCL 50 MG
1 TABLET ORAL
Qty: 0 | Refills: 0 | DISCHARGE

## 2022-11-12 RX ORDER — MAGNESIUM SULFATE 500 MG/ML
2 VIAL (ML) INJECTION ONCE
Refills: 0 | Status: COMPLETED | OUTPATIENT
Start: 2022-11-12 | End: 2022-11-12

## 2022-11-12 RX ORDER — SODIUM CHLORIDE 9 MG/ML
1000 INJECTION, SOLUTION INTRAVENOUS
Refills: 0 | Status: DISCONTINUED | OUTPATIENT
Start: 2022-11-12 | End: 2022-11-12

## 2022-11-12 RX ORDER — SODIUM,POTASSIUM PHOSPHATES 278-250MG
2 POWDER IN PACKET (EA) ORAL ONCE
Refills: 0 | Status: COMPLETED | OUTPATIENT
Start: 2022-11-12 | End: 2022-11-12

## 2022-11-12 RX ORDER — THIAMINE MONONITRATE (VIT B1) 100 MG
500 TABLET ORAL EVERY 8 HOURS
Refills: 0 | Status: COMPLETED | OUTPATIENT
Start: 2022-11-12 | End: 2022-11-15

## 2022-11-12 RX ORDER — PANTOPRAZOLE SODIUM 20 MG/1
40 TABLET, DELAYED RELEASE ORAL
Refills: 0 | Status: DISCONTINUED | OUTPATIENT
Start: 2022-11-12 | End: 2022-11-15

## 2022-11-12 RX ADMIN — Medication 2 MILLIGRAM(S): at 22:24

## 2022-11-12 RX ADMIN — Medication 2 MILLIGRAM(S): at 06:31

## 2022-11-12 RX ADMIN — Medication 2 MILLIGRAM(S): at 14:33

## 2022-11-12 RX ADMIN — PANTOPRAZOLE SODIUM 40 MILLIGRAM(S): 20 TABLET, DELAYED RELEASE ORAL at 10:32

## 2022-11-12 RX ADMIN — Medication 105 MILLIGRAM(S): at 03:00

## 2022-11-12 RX ADMIN — SODIUM CHLORIDE 75 MILLILITER(S): 9 INJECTION, SOLUTION INTRAVENOUS at 08:14

## 2022-11-12 RX ADMIN — Medication 105 MILLIGRAM(S): at 18:51

## 2022-11-12 RX ADMIN — Medication 2 MILLIGRAM(S): at 18:50

## 2022-11-12 RX ADMIN — Medication 2 MILLIGRAM(S): at 10:32

## 2022-11-12 RX ADMIN — Medication 105 MILLIGRAM(S): at 10:32

## 2022-11-12 RX ADMIN — Medication 2 PACKET(S): at 10:32

## 2022-11-12 RX ADMIN — Medication 2 MILLIGRAM(S): at 01:00

## 2022-11-12 RX ADMIN — Medication 25 GRAM(S): at 12:51

## 2022-11-12 RX ADMIN — Medication 1 TABLET(S): at 12:50

## 2022-11-12 RX ADMIN — Medication 1 MILLIGRAM(S): at 12:51

## 2022-11-13 LAB
ALBUMIN SERPL ELPH-MCNC: 3.8 G/DL — SIGNIFICANT CHANGE UP (ref 3.3–5)
ALP SERPL-CCNC: 76 U/L — SIGNIFICANT CHANGE UP (ref 40–120)
ALT FLD-CCNC: 31 U/L — SIGNIFICANT CHANGE UP (ref 4–33)
ANION GAP SERPL CALC-SCNC: 12 MMOL/L — SIGNIFICANT CHANGE UP (ref 7–14)
AST SERPL-CCNC: 49 U/L — HIGH (ref 4–32)
BILIRUB SERPL-MCNC: 0.6 MG/DL — SIGNIFICANT CHANGE UP (ref 0.2–1.2)
BUN SERPL-MCNC: 6 MG/DL — LOW (ref 7–23)
CALCIUM SERPL-MCNC: 9 MG/DL — SIGNIFICANT CHANGE UP (ref 8.4–10.5)
CHLORIDE SERPL-SCNC: 101 MMOL/L — SIGNIFICANT CHANGE UP (ref 98–107)
CO2 SERPL-SCNC: 24 MMOL/L — SIGNIFICANT CHANGE UP (ref 22–31)
CREAT SERPL-MCNC: 0.63 MG/DL — SIGNIFICANT CHANGE UP (ref 0.5–1.3)
EGFR: 109 ML/MIN/1.73M2 — SIGNIFICANT CHANGE UP
GLUCOSE SERPL-MCNC: 116 MG/DL — HIGH (ref 70–99)
MAGNESIUM SERPL-MCNC: 2 MG/DL — SIGNIFICANT CHANGE UP (ref 1.6–2.6)
PHOSPHATE SERPL-MCNC: 2.5 MG/DL — SIGNIFICANT CHANGE UP (ref 2.5–4.5)
POTASSIUM SERPL-MCNC: 4.1 MMOL/L — SIGNIFICANT CHANGE UP (ref 3.5–5.3)
POTASSIUM SERPL-SCNC: 4.1 MMOL/L — SIGNIFICANT CHANGE UP (ref 3.5–5.3)
PROT SERPL-MCNC: 7.5 G/DL — SIGNIFICANT CHANGE UP (ref 6–8.3)
SODIUM SERPL-SCNC: 137 MMOL/L — SIGNIFICANT CHANGE UP (ref 135–145)

## 2022-11-13 PROCEDURE — 99233 SBSQ HOSP IP/OBS HIGH 50: CPT

## 2022-11-13 RX ADMIN — Medication 105 MILLIGRAM(S): at 14:15

## 2022-11-13 RX ADMIN — Medication 1.5 MILLIGRAM(S): at 14:15

## 2022-11-13 RX ADMIN — Medication 105 MILLIGRAM(S): at 22:26

## 2022-11-13 RX ADMIN — Medication 1.5 MILLIGRAM(S): at 22:24

## 2022-11-13 RX ADMIN — Medication 1.5 MILLIGRAM(S): at 10:20

## 2022-11-13 RX ADMIN — Medication 1.5 MILLIGRAM(S): at 06:07

## 2022-11-13 RX ADMIN — Medication 1 MILLIGRAM(S): at 12:03

## 2022-11-13 RX ADMIN — Medication 1.5 MILLIGRAM(S): at 18:20

## 2022-11-13 RX ADMIN — PANTOPRAZOLE SODIUM 40 MILLIGRAM(S): 20 TABLET, DELAYED RELEASE ORAL at 06:30

## 2022-11-13 RX ADMIN — Medication 1.5 MILLIGRAM(S): at 02:07

## 2022-11-13 RX ADMIN — Medication 105 MILLIGRAM(S): at 01:43

## 2022-11-13 RX ADMIN — Medication 1 TABLET(S): at 12:03

## 2022-11-14 DIAGNOSIS — D50.9 IRON DEFICIENCY ANEMIA, UNSPECIFIED: ICD-10-CM

## 2022-11-14 PROCEDURE — 99233 SBSQ HOSP IP/OBS HIGH 50: CPT

## 2022-11-14 PROCEDURE — 90792 PSYCH DIAG EVAL W/MED SRVCS: CPT

## 2022-11-14 RX ORDER — LANOLIN ALCOHOL/MO/W.PET/CERES
6 CREAM (GRAM) TOPICAL AT BEDTIME
Refills: 0 | Status: DISCONTINUED | OUTPATIENT
Start: 2022-11-14 | End: 2022-11-15

## 2022-11-14 RX ORDER — TRAZODONE HCL 50 MG
100 TABLET ORAL AT BEDTIME
Refills: 0 | Status: DISCONTINUED | OUTPATIENT
Start: 2022-11-14 | End: 2022-11-15

## 2022-11-14 RX ADMIN — PANTOPRAZOLE SODIUM 40 MILLIGRAM(S): 20 TABLET, DELAYED RELEASE ORAL at 06:18

## 2022-11-14 RX ADMIN — Medication 105 MILLIGRAM(S): at 06:18

## 2022-11-14 RX ADMIN — Medication 0.5 MILLIGRAM(S): at 20:13

## 2022-11-14 RX ADMIN — Medication 105 MILLIGRAM(S): at 14:46

## 2022-11-14 RX ADMIN — Medication 1 MILLIGRAM(S): at 02:23

## 2022-11-14 RX ADMIN — Medication 1 TABLET(S): at 11:54

## 2022-11-14 RX ADMIN — Medication 1 MILLIGRAM(S): at 06:17

## 2022-11-14 RX ADMIN — Medication 1 MILLIGRAM(S): at 14:46

## 2022-11-14 RX ADMIN — Medication 1 MILLIGRAM(S): at 10:38

## 2022-11-14 RX ADMIN — Medication 1 MILLIGRAM(S): at 11:54

## 2022-11-14 NOTE — BH CONSULTATION LIAISON ASSESSMENT NOTE - CURRENT MEDICATION
MEDICATIONS  (STANDING):  folic acid 1 milliGRAM(s) Oral daily  LORazepam   Injectable 1 milliGRAM(s) IV Push every 4 hours  LORazepam   Injectable   IV Push   multivitamin 1 Tablet(s) Oral daily  pantoprazole    Tablet 40 milliGRAM(s) Oral before breakfast  thiamine IVPB 500 milliGRAM(s) IV Intermittent every 8 hours    MEDICATIONS  (PRN):  LORazepam     Tablet 2 milliGRAM(s) Oral every 2 hours PRN Symptom-triggered 2 point increase in CIWA-Ar  LORazepam   Injectable 2 milliGRAM(s) IV Push every 1 hour PRN Symptom-triggered: each CIWA -Ar score 8 or GREATER  ondansetron Injectable 4 milliGRAM(s) IV Push every 8 hours PRN Nausea and/or Vomiting

## 2022-11-14 NOTE — SBIRT NOTE ADULT - NSSBIRTDRGPOSREINDET_GEN_A_CORE
SW and patient discussed the importance of utilizing medication in the recommended manner. Patient reports that she does not use drugs, only the medication prescribed by providers. SW supported and provided supportive listening, emotional support & encouraged patient to continue using medication in a safe manner, patient agreed. SW offered & patient declines resources for drug use.

## 2022-11-14 NOTE — BH CONSULTATION LIAISON ASSESSMENT NOTE - NSBHCHARTREVIEWVS_PSY_A_CORE FT
Vital Signs Last 24 Hrs  T(C): 36.8 (14 Nov 2022 10:21), Max: 36.8 (13 Nov 2022 22:05)  T(F): 98.3 (14 Nov 2022 10:21), Max: 98.3 (14 Nov 2022 06:18)  HR: 99 (14 Nov 2022 10:21) (86 - 100)  BP: 143/97 (14 Nov 2022 10:21) (137/99 - 146/95)  BP(mean): --  RR: 18 (14 Nov 2022 10:21) (18 - 18)  SpO2: 100% (14 Nov 2022 10:21) (100% - 100%)    Parameters below as of 14 Nov 2022 10:21  Patient On (Oxygen Delivery Method): room air

## 2022-11-14 NOTE — SBIRT NOTE ADULT - NSSBIRTBRIEFINTDET_GEN_A_CORE
SW and patient discussed the importance of caution when utilizing alcohol. SW gently explored and patient shared that she spoke with her insurance company about possible therapy and treatment options. Patient reports that she is open to help as she was previously 5 months sober. SW supported and encouraged patient to utilize insurance networks to get additional support, patient agreed.

## 2022-11-14 NOTE — BH CONSULTATION LIAISON ASSESSMENT NOTE - NSBHCONSULTFOLLOWAFTERCARE_PSY_A_CORE FT
Cleared for DC tomorrow 11/15  SBIRT eval prior to DC    Please copy/paste following into DC paperwork   [email or web address] ->find a therapist tool  [facility] [phone]  f/u with outpt psychiatrist Dr. Kevin Gitlin (tel: [phone]).

## 2022-11-14 NOTE — PATIENT PROFILE ADULT - FALL HARM RISK - HARM RISK INTERVENTIONS

## 2022-11-14 NOTE — BH CONSULTATION LIAISON ASSESSMENT NOTE - RISK ASSESSMENT
Patient has following risk factors- long history of alcohol use disorder, recent separation  She currently denies SI, has supportive family, and is future oriented
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
## 2022-11-14 NOTE — BH CONSULTATION LIAISON ASSESSMENT NOTE - SUMMARY
Patient is a 46 year old female, domiciled alone, with PPH significant for alcohol use disorder (history of multiple rehab admissions, per chart rereview, history of alcohol withdrawal seizure), admitted for fall, alcohol withdrawal, psych consulted for emotional lability despite CIWA scores being 0.    Patient does not meet criterion for major depression, alexis, or psychosis. Has minimal/no sign of etoh withdrawal at this time, tolerating taper. Patient not interested in rehab at this time, wishes to go back to work as soon as possible. Expresses some interest in oral naltrexone for etoh cessation. Has psychiatrist already. Denies suicidality/intent or plan and denies homicidality/intent or plan, no concern for SI from team. Patient's CIWA scores very low, no tachycardia, OK to speed up taper as below, would monitor on taper and DC tomorrow.     Recs:  - OK to c/w 1:1  - OK to speed up etoh withdrawal taper, would consider 0.5mg q4h until tomorrow, then reduce to 1mg BID, then STOP  - PRN for anxiety ativan 2mg, BID PRN  - Begin Melatonin 6mg qHS  - Begin Trazodone 100mg qHS PRN for insomnia  - At discharge please DC with Naltrexone 50mg PO QD   - No psych c/i to d/c, patient refuses rehab, no indication for inpt psych  - C/w Thiamine as ordered   - f/u with resources as below

## 2022-11-14 NOTE — CHART NOTE - NSCHARTNOTEFT_GEN_A_CORE
To Whom It may Concern:     AYLEEN DEY has been under inpatient care at [facility] since 11/11/22.  She is still currently admitted as of 11/14/22.       If there are any questions please do not hesitate to call.    Peg Brown FNP-BC, AGACNP-BC  on behalf of Dr. Canas  [phone] To Whom It may Concern:     AYLEEN DEY has been under inpatient care at [facility] since 11/11/22. She was discharged from the hospital 11/15/22 and may resume all activities without restrictions.     If there are any questions please do not hesitate to call.    Peg Brown FNP-BC, AGACNP-BC  on behalf of Dr. Canas  [phone]

## 2022-11-14 NOTE — BH CONSULTATION LIAISON ASSESSMENT NOTE - NSBHADMITTIME_PSY_A_CORE
Diagnosis:   1. Empyema lung (CMS/HCC)    2. Pneumonia of right middle lobe due to infectious organism        Patient arrives for Ertapenem 1gram infusion today.     Dr. Paris is the ordering clinician, therapy plan orders reviewed and signed by clinician.     Vital Signs:  ONC OP Encounter Vitals  BP: 113/75  Heart Rate: 86  Temp: 98.4 °F (36.9 °C)  SpO2: 96 %  Weight: 74.2 kg (163 lb 9.3 oz)      Allergies:  ALLERGIES:  No Known Allergies      Labs reviewed with the patient: Yes    Nursing Summary:  Physical assessment, ECOG, TOX    Patient condition stable during treatment? Yes  Questions were answered and understanding was verbalized. Yes   Education provided Yes    Patient advised on follow up, any and all questions answered.  Patient Discharged to home Ambulatory with self     42

## 2022-11-15 ENCOUNTER — TRANSCRIPTION ENCOUNTER (OUTPATIENT)
Age: 47
End: 2022-11-15

## 2022-11-15 VITALS
DIASTOLIC BLOOD PRESSURE: 97 MMHG | SYSTOLIC BLOOD PRESSURE: 133 MMHG | HEART RATE: 80 BPM | TEMPERATURE: 99 F | OXYGEN SATURATION: 99 % | RESPIRATION RATE: 17 BRPM

## 2022-11-15 DIAGNOSIS — E83.39 OTHER DISORDERS OF PHOSPHORUS METABOLISM: ICD-10-CM

## 2022-11-15 DIAGNOSIS — E83.42 HYPOMAGNESEMIA: ICD-10-CM

## 2022-11-15 DIAGNOSIS — E87.20 ACIDOSIS, UNSPECIFIED: ICD-10-CM

## 2022-11-15 PROCEDURE — 99239 HOSP IP/OBS DSCHRG MGMT >30: CPT

## 2022-11-15 PROCEDURE — 99233 SBSQ HOSP IP/OBS HIGH 50: CPT

## 2022-11-15 RX ORDER — FOLIC ACID 0.8 MG
1 TABLET ORAL
Qty: 30 | Refills: 0
Start: 2022-11-15 | End: 2022-12-14

## 2022-11-15 RX ORDER — LANOLIN ALCOHOL/MO/W.PET/CERES
2 CREAM (GRAM) TOPICAL
Qty: 60 | Refills: 0
Start: 2022-11-15 | End: 2022-12-14

## 2022-11-15 RX ORDER — FERROUS GLUCONATE 100 %
1 POWDER (GRAM) MISCELLANEOUS
Qty: 30 | Refills: 0
Start: 2022-11-15 | End: 2022-12-14

## 2022-11-15 RX ORDER — NALTREXONE HYDROCHLORIDE 50 MG/1
1 TABLET, FILM COATED ORAL
Qty: 84 | Refills: 0
Start: 2022-11-15 | End: 2023-02-06

## 2022-11-15 RX ORDER — PANTOPRAZOLE SODIUM 20 MG/1
1 TABLET, DELAYED RELEASE ORAL
Qty: 30 | Refills: 0
Start: 2022-11-15 | End: 2022-12-14

## 2022-11-15 RX ORDER — TRAZODONE HCL 50 MG
1 TABLET ORAL
Qty: 30 | Refills: 0
Start: 2022-11-15 | End: 2022-12-14

## 2022-11-15 RX ADMIN — Medication 1 MILLIGRAM(S): at 15:10

## 2022-11-15 RX ADMIN — Medication 0.5 MILLIGRAM(S): at 00:21

## 2022-11-15 RX ADMIN — Medication 1 MILLIGRAM(S): at 09:15

## 2022-11-15 RX ADMIN — Medication 1 MILLIGRAM(S): at 11:43

## 2022-11-15 RX ADMIN — Medication 6 MILLIGRAM(S): at 00:20

## 2022-11-15 RX ADMIN — Medication 0.5 MILLIGRAM(S): at 03:27

## 2022-11-15 RX ADMIN — Medication 1 TABLET(S): at 11:43

## 2022-11-15 RX ADMIN — Medication 105 MILLIGRAM(S): at 00:24

## 2022-11-15 NOTE — DISCHARGE NOTE PROVIDER - NSDCCPCAREPLAN_GEN_ALL_CORE_FT
PRINCIPAL DISCHARGE DIAGNOSIS  Diagnosis: Alcohol withdrawal syndrome  Assessment and Plan of Treatment: Please follow up with your primary care and psychiatrist within in 1-2 weeks for further medical management.  Call your psychiatrist immediately if you feel suicidal or if you need to talk to someone.  Exercise 30 minutes daily as tolerated.        SECONDARY DISCHARGE DIAGNOSES  Diagnosis: Severe alcohol use disorder  Assessment and Plan of Treatment: Start Naltrexone 50mg by mouth daily.   [employer] ->find a therapist tool  [facility] [phone]  f/u with outpt psychiatrist Dr. Kevin Gitlin (tel: [phone]).      Diagnosis: Hypertension  Assessment and Plan of Treatment: Monitor for any visual changes, headaches or dizziness.  Monitor blood pressure regularly.  Follow up with your primary care provider for further management for high blood pressure.      Diagnosis: Iron deficiency anemia  Assessment and Plan of Treatment: Start ferrous gluconate 325 mg by mouth daily.   Follow-up with your outpatient provider for further monitoring of your blood counts.   Monitor for signs/symptoms indicating worsening of disease, such as, easy bleeding/bruising, pale skin, fatigue, dizziness, increased heart rate, or chest pain.

## 2022-11-15 NOTE — PROGRESS NOTE ADULT - ASSESSMENT
48 yo woman with history of alcohol use disorder c/b hepatic steatosis, pancreatitis, and alcohol withdrawal syndrome, including seizures, HTN, and insomnia presents after being found unresponsive by her mom, likely in setting of alcohol intoxication vs. withdrawal seizure, now admitted due to alcohol withdrawal. Improving on ativan taper 
48 yo woman with history of alcohol use disorder c/b hepatic steatosis, pancreatitis, and alcohol withdrawal syndrome, including seizures, HTN, and insomnia presents after being found unresponsive by her mom, likely in setting of alcohol intoxication vs. withdrawal seizure, now admitted due to alcohol withdrawal. Improving on ativan taper 
46 yo woman with history of alcohol use disorder c/b hepatic steatosis, pancreatitis, and alcohol withdrawal syndrome, including seizures, HTN, and insomnia presents after being found unresponsive by her mom, likely in setting of alcohol intoxication vs. withdrawal seizure, now admitted due to alcohol withdrawal. Improving on ativan taper 
46 yo woman with history of alcohol use disorder c/b hepatic steatosis, pancreatitis, and alcohol withdrawal syndrome, including seizures, HTN, and insomnia presents after being found unresponsive by her mom, likely in setting of alcohol intoxication vs. withdrawal seizure, now admitted due to alcohol withdrawal. Improving on ativan taper

## 2022-11-15 NOTE — PROGRESS NOTE ADULT - PROBLEM SELECTOR PLAN 8
- DVT ppx: IMPROVE score 0, low risk for VTE. No indication for pharmacologic ppx. Encourage OOB and ambulation as tolerated and with assistance.   - Diet: DASH/TLC
Mild, with AST 53, ALT 34, with t bili 0.3 and alk phos 81. No clinical evidence of alcoholic hepatitis. Pt with known history of hepatic steatosis likely 2/2 alcohol use disorder.  - Monitor LFTs for now, resolving
BPs in acceptable range on admission despite being in withdrawal. Pt not on any BP meds at home.  - Monitor VS q4hrs
- DVT ppx: IMPROVE score 0, low risk for VTE. No indication for pharmacologic ppx. Encourage OOB and ambulation as tolerated and with assistance.   - Diet: DASH/TLC

## 2022-11-15 NOTE — BH CONSULTATION LIAISON PROGRESS NOTE - CURRENT MEDICATION
MEDICATIONS  (STANDING):  folic acid 1 milliGRAM(s) Oral daily  melatonin 6 milliGRAM(s) Oral at bedtime  multivitamin 1 Tablet(s) Oral daily  pantoprazole    Tablet 40 milliGRAM(s) Oral before breakfast    MEDICATIONS  (PRN):  LORazepam     Tablet 2 milliGRAM(s) Oral every 2 hours PRN Symptom-triggered 2 point increase in CIWA-Ar  LORazepam   Injectable 2 milliGRAM(s) IV Push every 1 hour PRN Symptom-triggered: each CIWA -Ar score 8 or GREATER  ondansetron Injectable 4 milliGRAM(s) IV Push every 8 hours PRN Nausea and/or Vomiting  traZODone 100 milliGRAM(s) Oral at bedtime PRN insomnia

## 2022-11-15 NOTE — PROGRESS NOTE ADULT - PROBLEM SELECTOR PLAN 7
BPs in acceptable range on admission despite being in withdrawal. Pt not on any BP meds at home.  - Monitor VS q4hrs
WBC elevated to 14.21 on admission. Possibly reactive from withdrawal seizure or from hemoconcentration from excessive alcohol consumption, as WBC, Hgb, and plt count are all higher than baseline. Pt afebrile, with no localizing S/S of infection.  - CXR with clear lungs, CTAP negative.  -resolved
Mild, with AST 53, ALT 34, with t bili 0.3 and alk phos 81. No clinical evidence of alcoholic hepatitis. Pt with known history of hepatic steatosis likely 2/2 alcohol use disorder.  - Monitor LFTs for now, resolving
BPs in acceptable range on admission despite being in withdrawal. Pt not on any BP meds at home.  - Monitor VS q4hrs

## 2022-11-15 NOTE — PROGRESS NOTE ADULT - PROBLEM SELECTOR PLAN 5
Pt admits to drinking more than 1 pint of vodka daily for the past ~5 days after being sober for ~4 months. Pt with no desire to quit drinking at this time, repeatedly voicing desire to leave AMA.  - SW/SBIRT consults  - Plan as above for alcohol withdrawal
WBC elevated to 14.21 on admission. Possibly reactive from withdrawal seizure or from hemoconcentration from excessive alcohol consumption, as WBC, Hgb, and plt count are all higher than baseline. Pt afebrile, with no localizing S/S of infection.  - CXR with clear lungs, CTAP negative.  -resolved
WBC elevated to 14.21 on admission. Possibly reactive from withdrawal seizure or from hemoconcentration from excessive alcohol consumption, as WBC, Hgb, and plt count are all higher than baseline. Pt afebrile, with no localizing S/S of infection.  - CXR with clear lungs, CTAP negative.  -resolved
reports hx of iron deficiency anemia with iv iron transfusion.   denies any active bleeding.   - fu anemia work up  - start ferrous sulfate 325mg qd   - monitor hh

## 2022-11-15 NOTE — PROGRESS NOTE ADULT - SUBJECTIVE AND OBJECTIVE BOX
[facility] Division of Hospital Medicine  Sharon Davis MD  Available via MS Teams  Pager: [phone]    SUBJECTIVE / OVERNIGHT EVENTS:  no events. Patient states she doesn't know why she is here. Concerned about work on Monday. Said had tried multiple alcohol rehabs but has not been helpful    ADDITIONAL REVIEW OF SYSTEMS:  denies current withdrawal, no tremors, no nausea/vomiting or diarrhea    MEDICATIONS  (STANDING):  dextrose 5% + lactated ringers. 1000 milliLiter(s) (75 mL/Hr) IV Continuous <Continuous>  folic acid 1 milliGRAM(s) Oral daily  LORazepam   Injectable 2 milliGRAM(s) IV Push every 4 hours  LORazepam   Injectable   IV Push   multivitamin 1 Tablet(s) Oral daily  pantoprazole    Tablet 40 milliGRAM(s) Oral before breakfast  thiamine IVPB 500 milliGRAM(s) IV Intermittent every 8 hours    MEDICATIONS  (PRN):  LORazepam     Tablet 2 milliGRAM(s) Oral every 2 hours PRN Symptom-triggered 2 point increase in CIWA-Ar  LORazepam   Injectable 2 milliGRAM(s) IV Push every 1 hour PRN Symptom-triggered: each CIWA -Ar score 8 or GREATER  ondansetron Injectable 4 milliGRAM(s) IV Push every 8 hours PRN Nausea and/or Vomiting      I&O's Summary      PHYSICAL EXAM:  Vital Signs Last 24 Hrs  T(C): 37 (12 Nov 2022 12:30), Max: 37.4 (12 Nov 2022 06:28)  T(F): 98.6 (12 Nov 2022 12:30), Max: 99.3 (12 Nov 2022 06:28)  HR: 97 (12 Nov 2022 12:30) (97 - 117)  BP: 128/93 (12 Nov 2022 12:30) (125/80 - 157/113)  BP(mean): --  RR: 18 (12 Nov 2022 12:30) (17 - 23)  SpO2: 99% (12 Nov 2022 12:30) (97% - 100%)    Parameters below as of 12 Nov 2022 12:30  Patient On (Oxygen Delivery Method): room air      CONSTITUTIONAL: NAD, well-developed, well-groomed  EYES: PERRLA; conjunctiva and sclera clear  ENMT: Moist oral mucosa, no pharyngeal injection or exudates  NECK: Supple, no palpable masses  RESPIRATORY: Normal respiratory effort; lungs are clear to auscultation bilaterally  CARDIOVASCULAR: Regular rate and rhythm, normal S1 and S2, no murmur/rub/gallop; No lower extremity edema; Peripheral pulses are 2+ bilaterally  ABDOMEN: Nontender to palpation, normoactive bowel sounds, no rebound/guarding  MUSCULOSKELETAL:  no clubbing or cyanosis of digits; no joint swelling or tenderness to palpation  PSYCH: A+O to person, place, and time; affect appropriate  NEUROLOGY: CN 2-12 are intact and symmetric; no gross sensory deficits   SKIN: No rashes; no palpable lesions    LABS:                        9.4    8.45  )-----------( 242      ( 12 Nov 2022 08:26 )             31.0     11-12    132<L>  |  95<L>  |  12  ----------------------------<  87  3.8   |  25  |  0.58    Ca    8.1<L>      12 Nov 2022 08:26  Phos  2.0     11-12  Mg     1.30     11-12    TPro  7.2  /  Alb  3.8  /  TBili  0.8  /  DBili  x   /  AST  46<H>  /  ALT  26  /  AlkPhos  68  11-12      CARDIAC MARKERS ( 11 Nov 2022 17:20 )  x     / x     / 382 U/L / x     / x              COVID-19 PCR: NotDetec (11 Nov 2022 16:56)      RADIOLOGY & ADDITIONAL TESTS:  New Results Reviewed Today:   no new results          
[facility] Division of Hospital Medicine  Sharon Daivs MD  Available via MS Teams  Pager: [phone]    SUBJECTIVE / OVERNIGHT EVENTS:  no events. Patient comfortable this am. Concerned about missing job days while in the hospital. Denies any current symptoms of alcohol withdrawal.     ADDITIONAL REVIEW OF SYSTEMS:  no sweats, no anxiety, no nausea no vomiting    MEDICATIONS  (STANDING):  folic acid 1 milliGRAM(s) Oral daily  LORazepam   Injectable 1.5 milliGRAM(s) IV Push every 4 hours  LORazepam   Injectable   IV Push   multivitamin 1 Tablet(s) Oral daily  pantoprazole    Tablet 40 milliGRAM(s) Oral before breakfast  thiamine IVPB 500 milliGRAM(s) IV Intermittent every 8 hours    MEDICATIONS  (PRN):  LORazepam     Tablet 2 milliGRAM(s) Oral every 2 hours PRN Symptom-triggered 2 point increase in CIWA-Ar  LORazepam   Injectable 2 milliGRAM(s) IV Push every 1 hour PRN Symptom-triggered: each CIWA -Ar score 8 or GREATER  ondansetron Injectable 4 milliGRAM(s) IV Push every 8 hours PRN Nausea and/or Vomiting      I&O's Summary    12 Nov 2022 07:01  -  13 Nov 2022 07:00  --------------------------------------------------------  IN: 480 mL / OUT: 1 mL / NET: 479 mL        PHYSICAL EXAM:  Vital Signs Last 24 Hrs  T(C): 36.7 (13 Nov 2022 06:28), Max: 37.3 (12 Nov 2022 16:29)  T(F): 98 (13 Nov 2022 06:28), Max: 99.1 (12 Nov 2022 16:29)  HR: 94 (13 Nov 2022 06:28) (91 - 103)  BP: 132/98 (13 Nov 2022 06:28) (128/93 - 138/97)  BP(mean): --  RR: 18 (13 Nov 2022 06:28) (18 - 18)  SpO2: 100% (13 Nov 2022 06:28) (99% - 100%)    Parameters below as of 13 Nov 2022 06:28  Patient On (Oxygen Delivery Method): room air      CONSTITUTIONAL: NAD  EYES: PERRLA; conjunctiva and sclera clear  ENMT: Moist oral mucosa, no pharyngeal injection or exudates  NECK: Supple, no palpable masses  RESPIRATORY: Normal respiratory effort; lungs are clear to auscultation bilaterally  CARDIOVASCULAR: Regular rate and rhythm, normal S1 and S2, no murmur/rub/gallop; No lower extremity edema; Peripheral pulses are 2+ bilaterally  ABDOMEN: Nontender to palpation, normoactive bowel sounds, no rebound/guarding  MUSCULOSKELETAL:  no clubbing or cyanosis of digits; no joint swelling or tenderness to palpation  PSYCH: A+O to person, place, and time; affect appropriate  NEUROLOGY: CN 2-12 are intact and symmetric; no gross sensory deficits   SKIN: No rashes; no palpable lesions    LABS:                        9.4    8.45  )-----------( 242      ( 12 Nov 2022 08:26 )             31.0     11-13    137  |  101  |  6<L>  ----------------------------<  116<H>  4.1   |  24  |  0.63    Ca    9.0      13 Nov 2022 06:49  Phos  2.5     11-13  Mg     2.00     11-13    TPro  7.5  /  Alb  3.8  /  TBili  0.6  /  DBili  x   /  AST  49<H>  /  ALT  31  /  AlkPhos  76  11-13      CARDIAC MARKERS ( 11 Nov 2022 17:20 )  x     / x     / 382 U/L / x     / x              COVID-19 PCR: NotDetec (11 Nov 2022 16:56)      RADIOLOGY & ADDITIONAL TESTS:  New Results Reviewed Today:   no new results        
48 yo woman with history of alcohol use disorder c/b hepatic steatosis, pancreatitis, and alcohol withdrawal syndrome, including seizures, HTN, and insomnia presents after being found unresponsive by her mom, likely in setting of alcohol intoxication vs. withdrawal seizure.    SUBJECTIVE / OVERNIGHT EVENTS:  No acute events overnight. VSS  patient reports feeling better and states she wants to go home. she will follow up with her psychiatrist outpatient. she is also concerned about her new job starting this wednesday.  denies any tremors, no diaphoresis, no epigastric pain. states she drank only few pints of vodka because [place] in the [place] and she was sober for a long time before.   CIWA overnight had been 0. no additional ativan required.     ADDITIONAL REVIEW OF SYSTEMS:  no sweats, no anxiety, no nausea no vomiting. no shortness of breath, no chest pain.     MEDICATIONS  (STANDING):  MEDICATIONS  (STANDING):  folic acid 1 milliGRAM(s) Oral daily  LORazepam   Injectable 1 milliGRAM(s) IV Push every 4 hours  LORazepam   Injectable   IV Push   multivitamin 1 Tablet(s) Oral daily  pantoprazole    Tablet 40 milliGRAM(s) Oral before breakfast  thiamine IVPB 500 milliGRAM(s) IV Intermittent every 8 hours    MEDICATIONS  (PRN):  LORazepam     Tablet 2 milliGRAM(s) Oral every 2 hours PRN Symptom-triggered 2 point increase in CIWA-Ar  LORazepam   Injectable 2 milliGRAM(s) IV Push every 1 hour PRN Symptom-triggered: each CIWA -Ar score 8 or GREATER  ondansetron Injectable 4 milliGRAM(s) IV Push every 8 hours PRN Nausea and/or Vomiting      PHYSICAL EXAM:  Vital Signs Last 24 Hrs  T(C): 36.8 (14 Nov 2022 10:21), Max: 36.8 (13 Nov 2022 22:05)  T(F): 98.3 (14 Nov 2022 10:21), Max: 98.3 (14 Nov 2022 06:18)  HR: 99 (14 Nov 2022 10:21) (86 - 100)  BP: 143/97 (14 Nov 2022 10:21) (137/99 - 146/95)  BP(mean): --  RR: 18 (14 Nov 2022 10:21) (18 - 18)  SpO2: 100% (14 Nov 2022 10:21) (100% - 100%)    Parameters below as of 14 Nov 2022 10:21  Patient On (Oxygen Delivery Method): room air    CONSTITUTIONAL: NAD  EYES: PERRLA; conjunctiva and sclera clear  ENMT: Moist oral mucosa, no pharyngeal injection or exudates  NECK: Supple, no palpable masses  RESPIRATORY: Normal respiratory effort; lungs are clear to auscultation bilaterally  CARDIOVASCULAR: Regular rate and rhythm, normal S1 and S2, no murmur/rub/gallop; No lower extremity edema; Peripheral pulses are 2+ bilaterally  ABDOMEN: Nontender to palpation, normoactive bowel sounds, no rebound/guarding  MUSCULOSKELETAL:  no clubbing or cyanosis of digits; no joint swelling or tenderness to palpation  PSYCH: A+O to person, place, and time; affect appropriate  NEUROLOGY: CN 2-12 are intact and symmetric; no gross sensory deficits   SKIN: No rashes; no palpable lesions    LABS:                 11-13    137  |  101  |  6<L>  ----------------------------<  116<H>  4.1   |  24  |  0.63    Ca    9.0      13 Nov 2022 06:49  Phos  2.5     11-13  Mg     2.00     11-13    TPro  7.5  /  Alb  3.8  /  TBili  0.6  /  DBili  x   /  AST  49<H>  /  ALT  31  /  AlkPhos  76  11-13        CAPILLARY BLOOD GLUCOSE          RADIOLOGY & ADDITIONAL TESTS: Reviewed.
46 yo woman with history of alcohol use disorder c/b hepatic steatosis, pancreatitis, and alcohol withdrawal syndrome, including seizures, HTN, and insomnia presents after being found unresponsive by her mom, likely in setting of alcohol intoxication vs. withdrawal seizure.    SUBJECTIVE / OVERNIGHT EVENTS:  No acute events overnight. VSS  patient feels better. Denies any tremors, no nausea/vomiting, no abdn pain. denies any hallucinations, no confusions. CIWA has been 0. no additional ativan needed. feels ready to be discharged today. states she has her psychiatrist apt tomorrow and will be going to  meeting [facility].      ADDITIONAL REVIEW OF SYSTEMS:  no diaphoresis, no anxiety, no nausea no vomiting. no shortness of breath, no chest pain.     MEDICATIONS  (STANDING):  folic acid 1 milliGRAM(s) Oral daily  melatonin 6 milliGRAM(s) Oral at bedtime  multivitamin 1 Tablet(s) Oral daily  pantoprazole    Tablet 40 milliGRAM(s) Oral before breakfast    MEDICATIONS  (PRN):  LORazepam     Tablet 2 milliGRAM(s) Oral every 2 hours PRN Symptom-triggered 2 point increase in CIWA-Ar  LORazepam   Injectable 2 milliGRAM(s) IV Push every 1 hour PRN Symptom-triggered: each CIWA -Ar score 8 or GREATER  ondansetron Injectable 4 milliGRAM(s) IV Push every 8 hours PRN Nausea and/or Vomiting  traZODone 100 milliGRAM(s) Oral at bedtime PRN insomnia        PHYSICAL EXAM:  Vital Signs Last 24 Hrs  T(C): 37.1 (15 Nov 2022 13:43), Max: 37.1 (15 Nov 2022 13:43)  T(F): 98.7 (15 Nov 2022 13:43), Max: 98.7 (15 Nov 2022 13:43)  HR: 80 (15 Nov 2022 13:43) (80 - 94)  BP: 133/97 (15 Nov 2022 13:43) (119/83 - 150/97)  BP(mean): --  RR: 17 (15 Nov 2022 13:43) (17 - 18)  SpO2: 99% (15 Nov 2022 13:43) (99% - 100%)    Parameters below as of 15 Nov 2022 13:43  Patient On (Oxygen Delivery Method): room air    CONSTITUTIONAL: NAD  EYES: PERRLA; conjunctiva and sclera clear  ENMT: Moist oral mucosa, no pharyngeal injection or exudates  NECK: Supple, no palpable masses  RESPIRATORY: Normal respiratory effort; lungs are clear to auscultation bilaterally  CARDIOVASCULAR: Regular rate and rhythm, normal S1 and S2, no murmur/rub/gallop; No lower extremity edema; Peripheral pulses are 2+ bilaterally  ABDOMEN: Nontender to palpation, normoactive bowel sounds, no rebound/guarding  MUSCULOSKELETAL:  no clubbing or cyanosis of digits; no joint swelling or tenderness to palpation  PSYCH: A+O to person, place, and time; affect appropriate  NEUROLOGY: CN 2-12 are intact and symmetric; no gross sensory deficits   SKIN: No rashes; no palpable lesions    LABS:            RADIOLOGY & ADDITIONAL TESTS: Reviewed.  CAPILLARY BLOOD GLUCOSE          RADIOLOGY & ADDITIONAL TESTS: Reviewed.

## 2022-11-15 NOTE — PROGRESS NOTE ADULT - PROBLEM SELECTOR PLAN 1
Pt with mild tongue fasciculations, hand tremors, and tachycardia, concern for withdrawal. Blood alcohol level on presentation 269, with last drink Friday afternoon. Pt admits to drinking more than 1 pint of vodka daily for the past ~5 days after being sober for ~4 months.   - on IV Ativan taper and symptom-triggered CIWA protocol  - on high-dose IV thiamine 500 mg q8hrs x3 days  - c/w PO folate 1 mg daily and MVI supplementation  -replete lytes as needed  C/w constant observation, pt currently doesn't have capacity to leave, pending psych eval.
Pt with mild tongue fasciculations, hand tremors, and tachycardia, concern for withdrawal. Blood alcohol level on presentation 269, with last drink Friday afternoon. Pt admits to drinking more than 1 pint of vodka daily for the past ~5 days after being sober for ~4 months.   - on IV Ativan taper and symptom-triggered CIWA protocol  - on high-dose IV thiamine 500 mg q8hrs x3 days  - c/w PO folate 1 mg daily and MVI supplementation  -replete lytes as needed  C/w constant observation
Pt with mild tongue fasciculations, hand tremors, and tachycardia, concern for withdrawal. Blood alcohol level on presentation 269, with last drink Friday afternoon. Pt admits to drinking more than 1 pint of vodka daily for the past ~5 days after being sober for ~4 months.   - on IV Ativan taper and symptom-triggered CIWA protocol  - on high-dose IV thiamine 500 mg q8hrs x3 days  - c/w PO folate 1 mg daily and MVI supplementation  -replete lytes as needed  - Pt repeatedly voicing desire to leave AMA. However, pt with no capacity at this time to sign out AMA, as pt is unable to display understanding of the risks involved, including risk of death, of leaving the hospital AMA at this time. C/w constant observation, as pt is flight risk.
Pt with mild tongue fasciculations, hand tremors, and tachycardia, concern for withdrawal. Blood alcohol level on presentation 269, with last drink Friday afternoon. Pt admits to drinking more than 1 pint of vodka daily for the past ~5 days after being sober for ~4 months.   - completed ativan taper.  - s/p high-dose IV thiamine 500 mg q8hrs , cont with thiamine 100mg po qd  - c/w PO folate 1 mg daily and MVI supplementation  - stable for dc with close outpatient fu

## 2022-11-15 NOTE — DISCHARGE NOTE PROVIDER - NSDCMRMEDTOKEN_GEN_ALL_CORE_FT
Ambien 10 mg oral tablet: 1 tab(s) orally once a day (at bedtime)  ferrous gluconate 324 mg (38 mg elemental iron) oral tablet: 1 tab(s) orally once a day   folic acid 1 mg oral tablet: 1 tab(s) orally once a day  melatonin 3 mg oral tablet: 2 tab(s) orally once a day (at bedtime)  Multiple Vitamins oral tablet: 1 tab(s) orally once a day  naltrexone 50 mg oral tablet: 1 tab(s) orally once a day   pantoprazole 40 mg oral delayed release tablet: 1 tab(s) orally once a day (before a meal)  traZODone 100 mg oral tablet: 1 tab(s) orally once a day (at bedtime), As needed, insomnia

## 2022-11-15 NOTE — BH CONSULTATION LIAISON PROGRESS NOTE - NSBHCONSULTFOLLOWAFTERCARE_PSY_A_CORE FT
Cleared for DC tomorrow 11/15  SBIRT eval prior to DC    Please copy/paste following into DC paperwork   [email or web address] ->find a therapist tool  [email or web address]  [email or web address]   [facility] [phone]  f/u with outpt psychiatrist Dr. Kevin Gitlin (tel: [phone]).

## 2022-11-15 NOTE — DISCHARGE NOTE NURSING/CASE MANAGEMENT/SOCIAL WORK - NSDCPEFALRISK_GEN_ALL_CORE
For information on Fall & Injury Prevention, visit: [email or web address] OR  [email or web address] OR  https://www.cdc.gov/steadi/patient.html

## 2022-11-15 NOTE — BH CONSULTATION LIAISON PROGRESS NOTE - NSBHCHARTREVIEWVS_PSY_A_CORE FT
Vital Signs Last 24 Hrs  T(C): 37.1 (15 Nov 2022 13:43), Max: 37.1 (15 Nov 2022 13:43)  T(F): 98.7 (15 Nov 2022 13:43), Max: 98.7 (15 Nov 2022 13:43)  HR: 80 (15 Nov 2022 13:43) (80 - 94)  BP: 133/97 (15 Nov 2022 13:43) (119/83 - 150/97)  BP(mean): --  RR: 17 (15 Nov 2022 13:43) (17 - 18)  SpO2: 99% (15 Nov 2022 13:43) (99% - 100%)    Parameters below as of 15 Nov 2022 13:43  Patient On (Oxygen Delivery Method): room air

## 2022-11-15 NOTE — DISCHARGE NOTE PROVIDER - HOSPITAL COURSE
46 yo woman with history of alcohol use disorder c/b hepatic steatosis, pancreatitis, and alcohol withdrawal syndrome, including seizures, HTN, and insomnia presented to ER 11/11/22 after being found unresponsive by her mom, likely in setting of alcohol intoxication vs. withdrawal seizure, now admitted due to alcohol withdrawal. Blood alcohol level on presentation 269, with last drink Friday (11/11/22) afternoon. Pt admits to drinking more than 1 pint of vodka daily for 5 days after being sober for ~4 months. Lactate elevated to 8 on presentation, downtrended to 5.5 after >2L bolus of NS. VBG with pH 7.35 and lactate 8.8. Possibly 2/2 withdrawal seizure or from recent heavy alcohol consumption, lactic acidosis now resolved with IV hydration. Patient treated with 5 days of Ativan for CIWA taper. Seen by Psychiatry service on 11/14/22 due to concerns for questionable manic presentation vs severe anxiety. Patient medically cleared from psych perspective with dose adjusted taper (last dose at 1500hrs today). Patient expressed interest in medication for medication for alcohol cessation will discharge home on Naltrexone 50mg PO QD per psych recommendations.     On 11/15/22 this case was reviewed with Dr. Caro, the patient is medically stable and optimized for discharge as per attending. All medications were reviewed and prescriptions were sent to mutually agreed upon pharmacy. Discharge plan reviewed with patient and/or family.

## 2022-11-15 NOTE — DISCHARGE NOTE NURSING/CASE MANAGEMENT/SOCIAL WORK - PATIENT PORTAL LINK FT
You can access the FollowMyHealth Patient Portal offered by [facility] by registering at the following website: [email or web address]. By joining [facility]’s FollowMyHealth portal, you will also be able to view your health information using other applications (apps) compatible with our system.

## 2022-11-15 NOTE — PROGRESS NOTE ADULT - PROBLEM SELECTOR PLAN 2
low mag, low phos  -s/p repletion  -resolved
low mag, low phos  -s/p repletion  -resolved
low mag, low phos  -replete  -trend
Lactate elevated to 8 on presentation, downtrended to 5.5 after >2L bolus of NS. VBG with pH 7.35 and lactate 8.8. Possibly 2/2 withdrawal seizure or from recent heavy alcohol consumption.   - resolved with IV fluids

## 2022-11-15 NOTE — PROGRESS NOTE ADULT - PROBLEM SELECTOR PLAN 9
BPs in acceptable range on admission despite being in withdrawal. Pt not on any BP meds at home.  - Monitor VS q4hrs
- DVT ppx: IMPROVE score 0, low risk for VTE. No indication for pharmacologic ppx. Encourage OOB and ambulation as tolerated and with assistance.   - Diet: DASH/TLC

## 2022-11-15 NOTE — BH CONSULTATION LIAISON PROGRESS NOTE - NSBHFUPINTERVALHXFT_PSY_A_CORE
patient feels okay, denies depression, anxiety, denies si/hi, future oriented, wishes to abstain from etoh, asking for resources

## 2022-11-15 NOTE — DISCHARGE NOTE PROVIDER - NSFOLLOWUPCLINICS_GEN_ALL_ED_FT
[facility]  Behavioral Health  [address] [place] [address]  Phone: [phone]  Fax:   Follow Up Time: 2 weeks

## 2022-11-15 NOTE — PROGRESS NOTE ADULT - PROBLEM SELECTOR PLAN 4
Pt admits to drinking more than 1 pint of vodka daily for the past ~5 days after being sober for ~4 months. Pt with no desire to quit drinking at this time, repeatedly voicing desire to leave AMA.  - SW/SBIRT consults  - Plan as above for alcohol withdrawal
low mag, likely in s.o poor nutrition and etoh use.  -s/p repletion  -resolved

## 2022-11-15 NOTE — BH CONSULTATION LIAISON PROGRESS NOTE - NSBHASSESSMENTFT_PSY_ALL_CORE
46 year old female, domiciled alone, with PPH significant for alcohol use disorder (history of multiple rehab admissions, per chart rereview, history of alcohol withdrawal seizure), admitted for fall, alcohol withdrawal, psych consulted for emotional lability despite CIWA scores being 0.    Patient does not meet criterion for major depression, alexis, or psychosis. Has minimal/no sign of etoh withdrawal at this time, tolerating taper. Patient not interested in rehab at this time, wishes to go back to work as soon as possible. Expresses some interest in oral naltrexone for etoh cessation. Has psychiatrist already. Denies suicidality/intent or plan and denies homicidality/intent or plan, no concern for SI from team. Patient's CIWA scores very low, no tachycardia, OK to speed up taper as below, would monitor on taper and DC tomorrow.     11/15: no issues, mood stable, no withdrawal symptoms, CIWA 0, no SI/HI, cleared for DC, extensive counseling provided     Recs:  - OK to c/w 1:1  - Finish ativan taper today   - PRN for anxiety ativan 2mg, BID PRN (do NOT discharge on this)  - Begin Melatonin 6mg qHS  - Begin Trazodone 100mg qHS PRN for insomnia  - At discharge please DC with Naltrexone 50mg PO QD   - No psych c/i to d/c, patient refuses rehab, no indication for inpt psych  - C/w Thiamine as ordered   - f/u with resources as below

## 2022-11-15 NOTE — PROGRESS NOTE ADULT - PROBLEM SELECTOR PLAN 6
reports hx of iron deficiency anemia with iv iron transfusion.   denies any active bleeding.   - fu anemia work up  - start ferrous sulfate 325mg qd   - monitor hh
WBC elevated to 14.21 on admission. Possibly reactive from withdrawal seizure or from hemoconcentration from excessive alcohol consumption, as WBC, Hgb, and plt count are all higher than baseline. Pt afebrile, with no localizing S/S of infection.  - CXR with clear lungs, CTAP negative.  -resolved
Mild, with AST 53, ALT 34, with t bili 0.3 and alk phos 81. No clinical evidence of alcoholic hepatitis. Pt with known history of hepatic steatosis likely 2/2 alcohol use disorder.  - Monitor LFTs for now, resolving
Mild, with AST 53, ALT 34, with t bili 0.3 and alk phos 81. No clinical evidence of alcoholic hepatitis. Pt with known history of hepatic steatosis likely 2/2 alcohol use disorder.  - Monitor LFTs for now, resolving

## 2022-11-15 NOTE — PROGRESS NOTE ADULT - PROBLEM SELECTOR PLAN 3
Lactate elevated to 8 on presentation, downtrended to 5.5 after >2L bolus of NS. VBG with pH 7.35 and lactate 8.8. Possibly 2/2 withdrawal seizure or from recent heavy alcohol consumption.   - resolved with IV fluids
Lactate elevated to 8 on presentation, downtrended to 5.5 after >2L bolus of NS. VBG with pH 7.35 and lactate 8.8. Possibly 2/2 withdrawal seizure or from recent heavy alcohol consumption.   - resolved with IV fluids
low phos , likely in s.o poor nutrition and etoh use.  -s/p repletion  -resolved
Lactate elevated to 8 on presentation, downtrended to 5.5 after >2L bolus of NS. VBG with pH 7.35 and lactate 8.8. Possibly 2/2 withdrawal seizure or from recent heavy alcohol consumption.   - resolved with IV fluids

## 2022-11-15 NOTE — DISCHARGE NOTE NURSING/CASE MANAGEMENT/SOCIAL WORK - NSDCVIVACCINE_GEN_ALL_CORE_FT
influenza, injectable, quadrivalent, preservative free; 23-Oct-2020 20:55; Ramón Wong (RN); [employer]; [id number] (Exp. Date: 30-Jun-2021); IntraMuscular; Deltoid Left.; 0.5 milliLiter(s); VIS (VIS Published: 15-Aug-2019, VIS Presented: 23-Oct-2020);

## 2022-11-28 NOTE — H&P ADULT - [facility]T D E [facility]
Detail Level: Detailed
Quality 110: Preventive Care And Screening: Influenza Immunization: Influenza Immunization Administered during Influenza season
regular rate and rhythm/no rub/no murmur

## 2022-12-12 NOTE — PATIENT PROFILE ADULT - IS THERE A SUSPICION OF ABUSE/NEGLIGENCE?
[FreeTextEntry1] : 41-year-old gentleman who is here for initial consultation of head injury with subsequent neurologic symptoms of headache and lightheadedness.  Patient will obtain a CAT scan of the head to evaluate for any signs of hematoma.  We will be in contact with patient after results are back and if he continues to have concussion symptoms will send for MRI of the head at that time.  Patient was advised that he should continue with Tylenol as needed his headache and have plenty of rest.\par \par I spent the time noted on the day of this patient encounter preparing for, providing and documenting the above E/M service and counseling and educate patient on differential, workup, disease course, and treatment/management. Education was provided to the patient during this encounter. All questions and concerns were answered and addressed in detail. The patient verbalized understanding and agreed to plan. Patient was advised to continue to monitor for neurologic symptoms and to notify my office or go to the nearest emergency room if there are any changes. Any orders/referrals and communications were provided as well. \par Side effects of the above medications were discussed in detail including but not limited to applicable black box warning and teratogenicity as appropriate. \par Patient was advised to bring previous records to my office. \par \par \par 
no

## 2023-02-14 NOTE — H&P ADULT - NSICDXPASTMEDICALHX_GEN_ALL_CORE_FT
Detail Level: Detailed
Detail Level: Zone
Patient Specific Counseling (Will Not Stick From Patient To Patient): 6/13/22\\nHe declines metro gel, he has some from his pcp.\\n\\n2/14/23\\nContinue with SPF
PAST MEDICAL HISTORY:  Depression     ETOH Abuse     HTN (hypertension)

## 2023-03-02 NOTE — SBIRT NOTE ADULT - NSSBIRTSCREENAVAIL_GEN_A_CORE
No Topical Steroids Applications Pregnancy And Lactation Text: Most topical steroids are considered safe to use during pregnancy and lactation.  Any topical steroid applied to the breast or nipple should be washed off before breastfeeding.

## 2023-03-13 NOTE — BEHAVIORAL HEALTH ASSESSMENT NOTE - PAST PSYCHOTROPIC MEDICATION
Ambien 10 mg qHS  Xanax 1 mg PRN   Pristiq 25 mg Composite Graft Text: The defect edges were debeveled with a #15 scalpel blade.  Given the location of the defect, shape of the defect, the proximity to free margins and the fact the defect was full thickness a composite graft was deemed most appropriate.  The defect was outline and then transferred to the donor site.  A full thickness graft was then excised from the donor site. The graft was then placed in the primary defect, oriented appropriately and then sutured into place.  The secondary defect was then repaired using a primary closure.

## 2023-03-15 ENCOUNTER — EMERGENCY (EMERGENCY)
Facility: HOSPITAL | Age: 48
LOS: 1 days | Discharge: HOME | End: 2023-03-15
Attending: STUDENT IN AN ORGANIZED HEALTH CARE EDUCATION/TRAINING PROGRAM
Payer: MEDICAID

## 2023-03-15 VITALS
SYSTOLIC BLOOD PRESSURE: 161 MMHG | WEIGHT: 149.91 LBS | RESPIRATION RATE: 20 BRPM | HEIGHT: 66 IN | HEART RATE: 91 BPM | DIASTOLIC BLOOD PRESSURE: 107 MMHG | OXYGEN SATURATION: 98 %

## 2023-03-15 DIAGNOSIS — Z98.890 OTHER SPECIFIED POSTPROCEDURAL STATES: Chronic | ICD-10-CM

## 2023-03-15 LAB
ALBUMIN SERPL ELPH-MCNC: 4.5 G/DL — SIGNIFICANT CHANGE UP (ref 3.3–5)
ALP SERPL-CCNC: 75 U/L — SIGNIFICANT CHANGE UP (ref 40–120)
ALT FLD-CCNC: 12 U/L — SIGNIFICANT CHANGE UP (ref 10–45)
ANION GAP SERPL CALC-SCNC: 20 MMOL/L — HIGH (ref 5–17)
APAP SERPL-MCNC: <15 UG/ML — SIGNIFICANT CHANGE UP (ref 10–30)
APPEARANCE UR: CLEAR — SIGNIFICANT CHANGE UP
APTT BLD: 27.7 SEC — SIGNIFICANT CHANGE UP (ref 27.5–35.5)
AST SERPL-CCNC: 27 U/L — SIGNIFICANT CHANGE UP (ref 10–40)
BACTERIA # UR AUTO: NEGATIVE — SIGNIFICANT CHANGE UP
BASOPHILS # BLD AUTO: 0.06 K/UL — SIGNIFICANT CHANGE UP (ref 0–0.2)
BASOPHILS NFR BLD AUTO: 0.8 % — SIGNIFICANT CHANGE UP (ref 0–2)
BILIRUB SERPL-MCNC: 0.2 MG/DL — SIGNIFICANT CHANGE UP (ref 0.2–1.2)
BILIRUB UR-MCNC: NEGATIVE — SIGNIFICANT CHANGE UP
BUN SERPL-MCNC: 15 MG/DL — SIGNIFICANT CHANGE UP (ref 7–23)
CALCIUM SERPL-MCNC: 9.1 MG/DL — SIGNIFICANT CHANGE UP (ref 8.4–10.5)
CHLORIDE SERPL-SCNC: 102 MMOL/L — SIGNIFICANT CHANGE UP (ref 96–108)
CO2 SERPL-SCNC: 18 MMOL/L — LOW (ref 22–31)
COLOR SPEC: COLORLESS — SIGNIFICANT CHANGE UP
CREAT SERPL-MCNC: 0.6 MG/DL — SIGNIFICANT CHANGE UP (ref 0.5–1.3)
DIFF PNL FLD: ABNORMAL
EGFR: 110 ML/MIN/1.73M2 — SIGNIFICANT CHANGE UP
EOSINOPHIL # BLD AUTO: 0.11 K/UL — SIGNIFICANT CHANGE UP (ref 0–0.5)
EOSINOPHIL NFR BLD AUTO: 1.5 % — SIGNIFICANT CHANGE UP (ref 0–6)
EPI CELLS # UR: 5 /HPF — SIGNIFICANT CHANGE UP
ETHANOL SERPL-MCNC: 300 MG/DL — HIGH (ref 0–10)
FLUAV AG NPH QL: SIGNIFICANT CHANGE UP
FLUBV AG NPH QL: SIGNIFICANT CHANGE UP
GLUCOSE SERPL-MCNC: 76 MG/DL — SIGNIFICANT CHANGE UP (ref 70–99)
GLUCOSE UR QL: NEGATIVE — SIGNIFICANT CHANGE UP
HCG SERPL-ACNC: <2 MIU/ML — SIGNIFICANT CHANGE UP
HCT VFR BLD CALC: 36.6 % — SIGNIFICANT CHANGE UP (ref 34.5–45)
HGB BLD-MCNC: 10.8 G/DL — LOW (ref 11.5–15.5)
HYALINE CASTS # UR AUTO: 2 /LPF — SIGNIFICANT CHANGE UP (ref 0–2)
IMM GRANULOCYTES NFR BLD AUTO: 0.4 % — SIGNIFICANT CHANGE UP (ref 0–0.9)
INR BLD: 1.1 RATIO — SIGNIFICANT CHANGE UP (ref 0.88–1.16)
KETONES UR-MCNC: NEGATIVE — SIGNIFICANT CHANGE UP
LEUKOCYTE ESTERASE UR-ACNC: NEGATIVE — SIGNIFICANT CHANGE UP
LYMPHOCYTES # BLD AUTO: 1.76 K/UL — SIGNIFICANT CHANGE UP (ref 1–3.3)
LYMPHOCYTES # BLD AUTO: 24.4 % — SIGNIFICANT CHANGE UP (ref 13–44)
MCHC RBC-ENTMCNC: 22.4 PG — LOW (ref 27–34)
MCHC RBC-ENTMCNC: 29.5 GM/DL — LOW (ref 32–36)
MCV RBC AUTO: 75.8 FL — LOW (ref 80–100)
MONOCYTES # BLD AUTO: 0.35 K/UL — SIGNIFICANT CHANGE UP (ref 0–0.9)
MONOCYTES NFR BLD AUTO: 4.9 % — SIGNIFICANT CHANGE UP (ref 2–14)
NEUTROPHILS # BLD AUTO: 4.89 K/UL — SIGNIFICANT CHANGE UP (ref 1.8–7.4)
NEUTROPHILS NFR BLD AUTO: 68 % — SIGNIFICANT CHANGE UP (ref 43–77)
NITRITE UR-MCNC: NEGATIVE — SIGNIFICANT CHANGE UP
NRBC # BLD: 0 /100 WBCS — SIGNIFICANT CHANGE UP (ref 0–0)
PH UR: 6 — SIGNIFICANT CHANGE UP (ref 5–8)
PLATELET # BLD AUTO: 243 K/UL — SIGNIFICANT CHANGE UP (ref 150–400)
POTASSIUM SERPL-MCNC: 3.7 MMOL/L — SIGNIFICANT CHANGE UP (ref 3.5–5.3)
POTASSIUM SERPL-SCNC: 3.7 MMOL/L — SIGNIFICANT CHANGE UP (ref 3.5–5.3)
PROT SERPL-MCNC: 8.5 G/DL — HIGH (ref 6–8.3)
PROT UR-MCNC: NEGATIVE — SIGNIFICANT CHANGE UP
PROTHROM AB SERPL-ACNC: 12.8 SEC — SIGNIFICANT CHANGE UP (ref 10.5–13.4)
RBC # BLD: 4.83 M/UL — SIGNIFICANT CHANGE UP (ref 3.8–5.2)
RBC # FLD: 17.3 % — HIGH (ref 10.3–14.5)
RBC CASTS # UR COMP ASSIST: 6 /HPF — HIGH (ref 0–4)
RSV RNA NPH QL NAA+NON-PROBE: SIGNIFICANT CHANGE UP
SALICYLATES SERPL-MCNC: <2 MG/DL — LOW (ref 15–30)
SARS-COV-2 RNA SPEC QL NAA+PROBE: SIGNIFICANT CHANGE UP
SODIUM SERPL-SCNC: 140 MMOL/L — SIGNIFICANT CHANGE UP (ref 135–145)
SP GR SPEC: 1.01 — LOW (ref 1.01–1.02)
UROBILINOGEN FLD QL: NEGATIVE — SIGNIFICANT CHANGE UP
WBC # BLD: 7.2 K/UL — SIGNIFICANT CHANGE UP (ref 3.8–10.5)
WBC # FLD AUTO: 7.2 K/UL — SIGNIFICANT CHANGE UP (ref 3.8–10.5)
WBC UR QL: 1 /HPF — SIGNIFICANT CHANGE UP (ref 0–5)

## 2023-03-15 PROCEDURE — 80307 DRUG TEST PRSMV CHEM ANLYZR: CPT

## 2023-03-15 PROCEDURE — 87637 SARSCOV2&INF A&B&RSV AMP PRB: CPT

## 2023-03-15 PROCEDURE — 87186 SC STD MICRODIL/AGAR DIL: CPT

## 2023-03-15 PROCEDURE — 96372 THER/PROPH/DIAG INJ SC/IM: CPT | Mod: XU

## 2023-03-15 PROCEDURE — 85610 PROTHROMBIN TIME: CPT

## 2023-03-15 PROCEDURE — 96360 HYDRATION IV INFUSION INIT: CPT

## 2023-03-15 PROCEDURE — 87086 URINE CULTURE/COLONY COUNT: CPT

## 2023-03-15 PROCEDURE — 84702 CHORIONIC GONADOTROPIN TEST: CPT

## 2023-03-15 PROCEDURE — 99284 EMERGENCY DEPT VISIT MOD MDM: CPT | Mod: 25

## 2023-03-15 PROCEDURE — 80053 COMPREHEN METABOLIC PANEL: CPT

## 2023-03-15 PROCEDURE — 99285 EMERGENCY DEPT VISIT HI MDM: CPT

## 2023-03-15 PROCEDURE — 96361 HYDRATE IV INFUSION ADD-ON: CPT

## 2023-03-15 PROCEDURE — 82962 GLUCOSE BLOOD TEST: CPT

## 2023-03-15 PROCEDURE — 85730 THROMBOPLASTIN TIME PARTIAL: CPT

## 2023-03-15 PROCEDURE — 85025 COMPLETE CBC W/AUTO DIFF WBC: CPT

## 2023-03-15 PROCEDURE — 81001 URINALYSIS AUTO W/SCOPE: CPT

## 2023-03-15 RX ORDER — SODIUM CHLORIDE 9 MG/ML
1000 INJECTION INTRAMUSCULAR; INTRAVENOUS; SUBCUTANEOUS ONCE
Refills: 0 | Status: COMPLETED | OUTPATIENT
Start: 2023-03-15 | End: 2023-03-15

## 2023-03-15 RX ORDER — ACETAMINOPHEN 500 MG
975 TABLET ORAL ONCE
Refills: 0 | Status: COMPLETED | OUTPATIENT
Start: 2023-03-15 | End: 2023-03-15

## 2023-03-15 RX ORDER — HALOPERIDOL DECANOATE 100 MG/ML
5 INJECTION INTRAMUSCULAR ONCE
Refills: 0 | Status: COMPLETED | OUTPATIENT
Start: 2023-03-15 | End: 2023-03-15

## 2023-03-15 RX ADMIN — SODIUM CHLORIDE 1000 MILLILITER(S): 9 INJECTION INTRAMUSCULAR; INTRAVENOUS; SUBCUTANEOUS at 21:07

## 2023-03-15 RX ADMIN — HALOPERIDOL DECANOATE 5 MILLIGRAM(S): 100 INJECTION INTRAMUSCULAR at 14:15

## 2023-03-15 RX ADMIN — Medication 2 MILLIGRAM(S): at 14:15

## 2023-03-15 RX ADMIN — SODIUM CHLORIDE 1000 MILLILITER(S): 9 INJECTION INTRAMUSCULAR; INTRAVENOUS; SUBCUTANEOUS at 14:45

## 2023-03-15 RX ADMIN — Medication 975 MILLIGRAM(S): at 19:38

## 2023-03-15 NOTE — ED PROVIDER NOTE - PROGRESS NOTE DETAILS
Spencer Jenkins MD. pt uncooperative, refusing lab work, getting agitated. pt requiring chemical sedation for medical screening exam. Pt offered CXR given rectal fever in ED, denies syx at this time, declined CXR, still clinically intoxicated at this time, will reassess. - Donny Oropeza, PGY-2 Pt still clinically intoxicated at this time, will reassess. - Donny Oropeza, PGY-2 Pt feeling improved, PO challenge successful, ambulatory in ED without issue, house keys located, pt able to take cab home (states she can pay for it at home). Medically cleared for DC at this time. - Donny Oropeza, PGY-2

## 2023-03-15 NOTE — ED PROVIDER NOTE - OBJECTIVE STATEMENT
48 yo woman with history of alcohol use disorder c/b hepatic steatosis, pancreatitis, and alcohol withdrawal, including seizures, HTN, and insomnia presented to ER for intox. pt states she called EMS but unable to describe her concerns. uncooperative with exam.

## 2023-03-15 NOTE — ED ADULT NURSE NOTE - OBJECTIVE STATEMENT
Pt is a 48 yo female brought in by [facility] for intox. as per EMS her family has not heard from her for several days. When EMS arrived pts sister in law Taj was at the house and pt was tearful and denied drinking alcohol to or taking any medications. As per Sister in Law pt drinks daily and has taken xanax before in the past. Pt denies any drinking alcohol or taking any medications. Pt needed to be restrained for transport via EMS. Pt is tearful and is refusing to answer questions and just keeps stating "no" "im a PA I Can help myself."

## 2023-03-15 NOTE — ED PROVIDER NOTE - PHYSICAL EXAMINATION
A&Ox3, NAD  Lungs CTAB. No w/r/r  Cardiac +S1S2, RRR,   Abd soft, NT/ND, no rebound or guarding.   Skin without rash.   No focal Deficits, clinically intoxicated.

## 2023-03-15 NOTE — ED PROVIDER NOTE - ATTENDING APP SHARED VISIT CONTRIBUTION OF CARE
I, Spencer Jenkins, performed a history and physical exam of the patient and discussed their management with the resident and/or advanced care provider. I reviewed the resident and/or advanced care provider's note and agree with the documented findings and plan of care except where noted. I was present and available for all procedures.     48 yo woman with history of alcohol use disorder c/b hepatic steatosis, pancreatitis, and alcohol withdrawal, including seizures, HTN, and insomnia presented to ER for intox. pt states she called EMS but unable to describe her concerns. uncooperative with exam.    48 yo F pmhx etoh abuse with prior withdrawal including seizures, htn, pancreatitis, presents via EMS for etoh intox. per ems, pt's family did not hear from pt for the past 4 days and called 911 (?or pt called 911, unclear). when ems arrived, pt was found to be intoxicated and was brought to ED for further eval. en route, pt became agitated and was placed in restraints, which were removed upon ED arrival. when I asked pt what brought her to the ED, she stated "I just don't feel well" and when I asked further what she meant, she did not further elaborate. pt is clinically intoxicated and hx and ros are limited.  on exam, pt is slurring her speech, conssistent with etoh intoxication. no external signs of trauma and pt is moving all 4 extremities without limitation. no signs of head trauma. lungs cta. midlly tachycardic but regular rhtyhm;. abd is soft, nt nd. no leg swelling. no anne focal neurologic deficits.   mdm: pt presenting for etoh intoxication but has not been heard from for 4 days. it is unclear the circumstances surrounding thes epast 4 days and unable to obtain collateral from her ?sister-in-law at this time. unclear if pt ingested other substances or may have had a seizure while at home and given this, will need to obtain labs including an etoh level. lower suspucion for meningitis or encephalitis. Will check CBC to eval WBC, anemia, plt count; CMP to eval for lyte abnormalities, renal and/or liver dysfunction. will check an EKG as well. if her etoh level is negative, would likely need to pursue a CTH to eval for ich. dispo of pt is pending ed w/u

## 2023-03-15 NOTE — ED PROVIDER NOTE - NSFOLLOWUPINSTRUCTIONS_ED_ALL_ED_FT
Please follow up with your primary care doctor within 1 week. Bring copies of your results with you (provided in your discharge paperwork).     You may take 500-1000 mg acetaminophen (tylenol) every 6 hours, as needed for pain.  You may take 600 mg ibuprofen every 8 hours, with food, as needed for pain.  You can take tylenol and ibuprofen at the same time.     Alcohol intoxication occurs when the amount of alcohol that a person has consumed impairs his or her ability to mentally and physically function. Chronic alcohol consumption can also lead to a variety of health issues including neurological disease, stomach disease, heart disease, liver disease, etc. Do not drive after drinking alcohol. Drinking enough alcohol to end up in an Emergency Room suggests you may have an alcohol abuse problem. Seek help at a drug addiction center.    SEEK IMMEDIATE MEDICAL CARE IF YOU HAVE ANY OF THE FOLLOWING SYMPTOMS: seizures, vomiting blood, blood in your stool, lightheadedness/dizziness, or becoming shaky to tremulous when you stop drinking.

## 2023-03-15 NOTE — ED PROVIDER NOTE - PATIENT PORTAL LINK FT
You can access the FollowMyHealth Patient Portal offered by [facility] by registering at the following website: [email or web address]. By joining [facility]’s FollowMyHealth portal, you will also be able to view your health information using other applications (apps) compatible with our system.

## 2023-03-15 NOTE — ED ADULT NURSE REASSESSMENT NOTE - NS ED NURSE REASSESS COMMENT FT1
1322 pt non cooperative, severely inebriated at this time, placed on co at this time for safety, continues to climb out of bed, pending eval

## 2023-03-16 VITALS
OXYGEN SATURATION: 98 % | HEART RATE: 102 BPM | DIASTOLIC BLOOD PRESSURE: 97 MMHG | RESPIRATION RATE: 18 BRPM | SYSTOLIC BLOOD PRESSURE: 150 MMHG | TEMPERATURE: 98 F

## 2023-03-17 NOTE — ED POST DISCHARGE NOTE - OTHER COMMUNICATION
No return call. Info given to ED rep Afsaneh to send certified letter/telegram to pt's home address. - Cliff Baker PA-C

## 2023-03-17 NOTE — ED POST DISCHARGE NOTE - DETAILS
Left  for call back.  Sensitivities still pending.  -Vikram Raza PA-C 3/18: left vm for cb. -Mayelin Boyd PA-C 3/19: No answer, left voice message for pt to call back admin line. Given third attempt, if no return call by end of admin shift will send certified letter. - Cliff Baker PA-C

## 2023-03-27 ENCOUNTER — INPATIENT (INPATIENT)
Facility: HOSPITAL | Age: 48
LOS: 5 days | Discharge: HOME | End: 2023-04-02
Attending: INTERNAL MEDICINE | Admitting: INTERNAL MEDICINE
Payer: MEDICAID

## 2023-03-27 VITALS
HEART RATE: 95 BPM | SYSTOLIC BLOOD PRESSURE: 147 MMHG | RESPIRATION RATE: 18 BRPM | OXYGEN SATURATION: 99 % | HEIGHT: 66 IN | TEMPERATURE: 98 F | DIASTOLIC BLOOD PRESSURE: 106 MMHG

## 2023-03-27 DIAGNOSIS — F10.239 ALCOHOL DEPENDENCE WITH WITHDRAWAL, UNSPECIFIED: ICD-10-CM

## 2023-03-27 DIAGNOSIS — Z98.890 OTHER SPECIFIED POSTPROCEDURAL STATES: Chronic | ICD-10-CM

## 2023-03-27 PROCEDURE — 70486 CT MAXILLOFACIAL W/O DYE: CPT | Mod: 26

## 2023-03-27 PROCEDURE — 99285 EMERGENCY DEPT VISIT HI MDM: CPT

## 2023-03-27 PROCEDURE — 70450 CT HEAD/BRAIN W/O DYE: CPT | Mod: 26

## 2023-03-27 NOTE — ED ADULT TRIAGE NOTE - CHIEF COMPLAINT QUOTE
downtime triage  fall x2 days ago. went to oral surgeon and was told to come to ER for broken jaw. speaking clearly

## 2023-03-27 NOTE — CONSULT NOTE ADULT - ASSESSMENT
47 yr old female presenting with bilateral Condyle fractures, left coronoid process fracture, and left mandibular parasymphsis greenstick fracture s/p intoxicated fall. *PATIENT REFUSES TO BE WIRED CLOSED AT THIS TIME*    Plan:  - admit to medicine for concern of alcohol withdrawl/possible detox  - OMFS to reevluate patient in AM and discuss treatment options with patient given patient's current treatment concerns  - Added on tomorrow for possible procedure in OR (#7)  - FLD now  - NPO AT MIDNIGHT  - medicine please document clearance for ORIF of mandible fractures  - multimodal pain control  - unasyn 3g q6h   - pre op morning labs     Stone Quintanilla DDS  OMFS  [facility] pager: [phone]   [place]: [phone]  [employer] on teams     47 yr old female presenting with bilateral Condyle fractures, left coronoid process fracture, and left mandibular parasymphsis greenstick fracture s/p intoxicated fall. *PATIENT REFUSES TO BE CLOSED REDUCED AT THIS TIME*    Plan:  - admit to medicine for concern of alcohol withdrawl/possible detox  - OMFS to reevluate patient in AM and discuss treatment options with patient given patient's current treatment concerns  - Added on tomorrow for possible procedure in OR (#7)  - FLD now  - NPO AT MIDNIGHT  - medicine please document clearance for ORIF of mandible fractures  - multimodal pain control  - unasyn 3g q6h   - pre op morning labs     Stone Quintanilla DDS  OMFS  [facility] pager: [phone]   [place]: [phone]  [employer] on teams

## 2023-03-27 NOTE — ED PROVIDER NOTE - PROGRESS NOTE DETAILS
I was not involved in the care of this patient and am only entering information to back log for downtime that took place during this patient's care.

## 2023-03-27 NOTE — ED PROVIDER NOTE - NSICDXPASTMEDICALHX_GEN_ALL_CORE_FT
PAST MEDICAL HISTORY:  Adenomyosis     Anemia     Depression     ETOH Abuse     History of pancreatitis     HTN (hypertension)     Withdrawal seizures

## 2023-03-27 NOTE — CONSULT NOTE ADULT - SUBJECTIVE AND OBJECTIVE BOX
Patient is a 47y old  Female who presents with a chief complaint of mandibular fractures s/p mechanical fall 2 days ago. Patient reports that she was intoxicated 2 days ago and fell while walking. Patient saw outside OMFS today who recommended patient go to [facility] ED for evaluation and treatment. Patient visited [facility] ED after visit with outside OMFS.     3/27/23   Patient visited bedside in ED. Patient was agitated and trembling upon exam. Patient had slowed/struggled speech during exam. NO signs of acute distress.  ED concern for possible alcohol withdraw tremors with worsening effect/possible seizure. Has been given multiple doses of ativan without effect.       PAST MEDICAL & SURGICAL HISTORY:  ETOH Abuse      Depression      HTN (hypertension)      History of pancreatitis      Adenomyosis      S/P Tonsillectomy          S/P laparoscopy  for heavy menstrual bleeding        MEDICATIONS  (STANDING):    MEDICATIONS  (PRN):    Allergies    No Known Allergies    Intolerances      FAMILY HISTORY:  Family history of hypertension (Father, Mother)      *SOCIAL HISTORY: Denies ETOH use, Tobacco, drugs    Review of systems:  denies fever, chills. denies LOC. denies nausea/vomiting/headache. denies CP, SOB, cough. Denies palpatitions. denies blurred/double vision. denies dyspahgia, dyspnea.    T(F): 97.8 (03-27-23 @ 11:40), Max: 97.8 (03-27-23 @ 11:40)  HR: 95 (03-27-23 @ 11:40) (95 - 95)  BP: 147/106 (03-27-23 @ 11:40) (147/106 - 147/106)  RR: 18 (03-27-23 @ 11:40) (18 - 18)  SpO2: 99% (03-27-23 @ 11:40) (99% - 99%)    PHYSICAL EXAM:    Gen: AAox3  Head: no Lacerations/abrasions/hematomas. no edema/erythema/tenderness to palpation. no asymmetry. no signs of scalp infection  Eyes: EOMI, PERRL, visual acuity intact, no diplopia, supra/infra orbital rims intact, no subconjunctival heme, no telecanthus, no exophthalmos  Ears: Gross hearing intact, No heme appreciated,   Nose: no crepitis/septal hematoma/asymmetry.  no Lacerations/abrasions/hematomas.  Malar: no malar depression, no CN V-2 paresthesia  Throat: no LAD, supple, FROM, no lesions    EOE: chin abrasion scabbed over and healing, most likely due to previous fall.    IOE: GRIFFIN 30mm, patient denies any difficulty/limited opening. #9 fractured, #22 and 23 +1 mobility, patient reports happened after fall. occlusion unstable and not reproducible. Difficulty at this time manipulating patient into proper bite/patient also non-compliant during attempt to achieve occlusion. no intraoral swelling, infection or gross segmental mobility. no step deformity.     CN II-XII intact    LABS:                          10.2   6.03  )-----------( 202      ( 27 Mar 2023 15:06 )             34.5     03-27    134<L>  |  92<L>  |  13  ----------------------------<  115<H>  3.9   |  25  |  0.59    Ca    10.4      27 Mar 2023 15:06    TPro  8.5<H>  /  Alb  4.7  /  TBili  0.6  /  DBili  x   /  AST  48<H>  /  ALT  27  /  AlkPhos  80  03-27      PT/INR - ( 27 Mar 2023 15:06 )   PT: 13.0 sec;   INR: 1.12 ratio         PTT - ( 27 Mar 2023 15:06 )  PTT:28.7 sec    IMAGING:  CT FACE    Acute comminuted fractures of the neck of the mandibular condyles   bilaterally. There is overriding of fracture fragments with lateral   displacement of the vertical rami bilaterally relative to the condyles at   the level of the fracture site. The mandibular condyles remain located in   the glenoid fossa bilaterally.    Additional acute comminuted fracture of the left mandibular coronoid   process with medially displaced fracture fragments.    The globes are intact without retrobulbar hematoma. The lenses are   located bilaterally.    Layering fluid and debris within the left sphenoid sinus. Trace left   maxillary sinus mucosal thickening.    The mastoid air cells are clear. The nasopharyngeal contours are within   normal limits.    Upper cervical degenerative change with disc osteophyte complex formation   and anterior wedging.    IMPRESSION:    CT BRAIN: No acute intracranial bleeding    CT FACE: Acute comminuted fractures of the maxilla of the mandibular   condyles bilaterally with overriding fracture fragments. Mandibular   condyles remain located. Additional fracture of the left mandibular   coronoid process.    Additional findings:  left parashymphysis greenstick fracture  alveolar fracture   symphysis fracture

## 2023-03-27 NOTE — ED PROVIDER NOTE - CLINICAL SUMMARY MEDICAL DECISION MAKING FREE TEXT BOX
Downtime charting back log. Remigio Jaramillo, ED Attending     48 yo woman with history of alcohol use disorder w history of withdrawal seizures and alcoholic pancreatitis, anxiety/depression, w jaw pain following a fall 3 days ago. Pt states that she tripped and fell forward, hitting her face on the floor of her apartment, while she was intoxicated on Saturday. Denies any LOC with the fall. Patient states that she has had decreased p.o. intake since this secondary to pain.  Went to an [facility] doctor outpatient who did x-rays showing mandibular fractures, recommended to come to the ER for further evaluation and possible wiring.   no other complaints at this time.    On exam  Tenderness to palpation along bilateral mandibles.  Vital signs stable.  Dry mucous membranes.  Patient does have mild tongue fasciculations as well as tremors.  given patient is a daily drinker, some concern for alcohol withdrawal as patient has not been able to drink secondary to fractures.  Will obtain CT maxfac,  plan for labs to check electrolytes, hydration status.  Plan for fluids.  We will start patient on CIWA protocol and plan for Ativan.  OMFS consult, and reassess to dispo.  Will likely need admission for pain control and no p.o. tolerance.

## 2023-03-28 DIAGNOSIS — Z79.899 OTHER LONG TERM (CURRENT) DRUG THERAPY: ICD-10-CM

## 2023-03-28 DIAGNOSIS — F41.9 ANXIETY DISORDER, UNSPECIFIED: ICD-10-CM
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
## 2023-03-28 DIAGNOSIS — S02.609A FRACTURE OF MANDIBLE, UNSPECIFIED, INITIAL ENCOUNTER FOR CLOSED FRACTURE: ICD-10-CM

## 2023-03-28 DIAGNOSIS — R03.0 ELEVATED BLOOD-PRESSURE READING, WITHOUT DIAGNOSIS OF HYPERTENSION: ICD-10-CM

## 2023-03-28 DIAGNOSIS — Z29.9 ENCOUNTER FOR PROPHYLACTIC MEASURES, UNSPECIFIED: ICD-10-CM

## 2023-03-28 DIAGNOSIS — F10.939 ALCOHOL USE, UNSPECIFIED WITH WITHDRAWAL, UNSPECIFIED: ICD-10-CM

## 2023-03-28 DIAGNOSIS — D64.9 ANEMIA, UNSPECIFIED: ICD-10-CM

## 2023-03-28 DIAGNOSIS — I10 ESSENTIAL (PRIMARY) HYPERTENSION: ICD-10-CM

## 2023-03-28 LAB
ALBUMIN SERPL ELPH-MCNC: 4.2 G/DL — SIGNIFICANT CHANGE UP (ref 3.3–5)
ALP SERPL-CCNC: 72 U/L — SIGNIFICANT CHANGE UP (ref 40–120)
ALT FLD-CCNC: 29 U/L — SIGNIFICANT CHANGE UP (ref 4–33)
ANION GAP SERPL CALC-SCNC: 16 MMOL/L — HIGH (ref 7–14)
AST SERPL-CCNC: 46 U/L — HIGH (ref 4–32)
BASOPHILS # BLD AUTO: 0.02 K/UL — SIGNIFICANT CHANGE UP (ref 0–0.2)
BASOPHILS NFR BLD AUTO: 0.5 % — SIGNIFICANT CHANGE UP (ref 0–2)
BILIRUB SERPL-MCNC: 0.5 MG/DL — SIGNIFICANT CHANGE UP (ref 0.2–1.2)
BUN SERPL-MCNC: 13 MG/DL — SIGNIFICANT CHANGE UP (ref 7–23)
CALCIUM SERPL-MCNC: 9.6 MG/DL — SIGNIFICANT CHANGE UP (ref 8.4–10.5)
CHLORIDE SERPL-SCNC: 94 MMOL/L — LOW (ref 98–107)
CO2 SERPL-SCNC: 24 MMOL/L — SIGNIFICANT CHANGE UP (ref 22–31)
CREAT SERPL-MCNC: 0.54 MG/DL — SIGNIFICANT CHANGE UP (ref 0.5–1.3)
EGFR: 113 ML/MIN/1.73M2 — SIGNIFICANT CHANGE UP
EOSINOPHIL # BLD AUTO: 0.1 K/UL — SIGNIFICANT CHANGE UP (ref 0–0.5)
EOSINOPHIL NFR BLD AUTO: 2.3 % — SIGNIFICANT CHANGE UP (ref 0–6)
GLUCOSE SERPL-MCNC: 123 MG/DL — HIGH (ref 70–99)
HCT VFR BLD CALC: 33.3 % — LOW (ref 34.5–45)
HGB BLD-MCNC: 9.7 G/DL — LOW (ref 11.5–15.5)
IANC: 3.46 K/UL — SIGNIFICANT CHANGE UP (ref 1.8–7.4)
IMM GRANULOCYTES NFR BLD AUTO: 2.5 % — HIGH (ref 0–0.9)
LYMPHOCYTES # BLD AUTO: 0.54 K/UL — LOW (ref 1–3.3)
LYMPHOCYTES # BLD AUTO: 12.2 % — LOW (ref 13–44)
MAGNESIUM SERPL-MCNC: 1.7 MG/DL — SIGNIFICANT CHANGE UP (ref 1.6–2.6)
MCHC RBC-ENTMCNC: 21.4 PG — LOW (ref 27–34)
MCHC RBC-ENTMCNC: 29.1 GM/DL — LOW (ref 32–36)
MCV RBC AUTO: 73.5 FL — LOW (ref 80–100)
MONOCYTES # BLD AUTO: 0.19 K/UL — SIGNIFICANT CHANGE UP (ref 0–0.9)
MONOCYTES NFR BLD AUTO: 4.3 % — SIGNIFICANT CHANGE UP (ref 2–14)
NEUTROPHILS # BLD AUTO: 3.46 K/UL — SIGNIFICANT CHANGE UP (ref 1.8–7.4)
NEUTROPHILS NFR BLD AUTO: 78.2 % — HIGH (ref 43–77)
NRBC # BLD: 0 /100 WBCS — SIGNIFICANT CHANGE UP (ref 0–0)
NRBC # FLD: 0 K/UL — SIGNIFICANT CHANGE UP (ref 0–0)
PHOSPHATE SERPL-MCNC: 2.9 MG/DL — SIGNIFICANT CHANGE UP (ref 2.5–4.5)
PLATELET # BLD AUTO: 180 K/UL — SIGNIFICANT CHANGE UP (ref 150–400)
POTASSIUM SERPL-MCNC: 4 MMOL/L — SIGNIFICANT CHANGE UP (ref 3.5–5.3)
POTASSIUM SERPL-SCNC: 4 MMOL/L — SIGNIFICANT CHANGE UP (ref 3.5–5.3)
PROT SERPL-MCNC: 8 G/DL — SIGNIFICANT CHANGE UP (ref 6–8.3)
RBC # BLD: 4.53 M/UL — SIGNIFICANT CHANGE UP (ref 3.8–5.2)
RBC # FLD: 18.4 % — HIGH (ref 10.3–14.5)
SODIUM SERPL-SCNC: 134 MMOL/L — LOW (ref 135–145)
WBC # BLD: 4.42 K/UL — SIGNIFICANT CHANGE UP (ref 3.8–10.5)
WBC # FLD AUTO: 4.42 K/UL — SIGNIFICANT CHANGE UP (ref 3.8–10.5)

## 2023-03-28 PROCEDURE — 12345: CPT | Mod: NC

## 2023-03-28 PROCEDURE — 93010 ELECTROCARDIOGRAM REPORT: CPT

## 2023-03-28 PROCEDURE — 99223 1ST HOSP IP/OBS HIGH 75: CPT

## 2023-03-28 RX ORDER — ZOLPIDEM TARTRATE 10 MG/1
1 TABLET ORAL
Qty: 0 | Refills: 0 | DISCHARGE

## 2023-03-28 RX ORDER — AMPICILLIN SODIUM AND SULBACTAM SODIUM 250; 125 MG/ML; MG/ML
3 INJECTION, POWDER, FOR SUSPENSION INTRAMUSCULAR; INTRAVENOUS EVERY 6 HOURS
Refills: 0 | Status: DISCONTINUED | OUTPATIENT
Start: 2023-03-28 | End: 2023-04-01

## 2023-03-28 RX ORDER — ACETAMINOPHEN 500 MG
1000 TABLET ORAL ONCE
Refills: 0 | Status: DISCONTINUED | OUTPATIENT
Start: 2023-03-28 | End: 2023-03-28

## 2023-03-28 RX ORDER — SODIUM CHLORIDE 9 MG/ML
1000 INJECTION, SOLUTION INTRAVENOUS
Refills: 0 | Status: DISCONTINUED | OUTPATIENT
Start: 2023-03-28 | End: 2023-04-02

## 2023-03-28 RX ORDER — MORPHINE SULFATE 50 MG/1
4 CAPSULE, EXTENDED RELEASE ORAL EVERY 6 HOURS
Refills: 0 | Status: DISCONTINUED | OUTPATIENT
Start: 2023-03-28 | End: 2023-03-31

## 2023-03-28 RX ORDER — THIAMINE MONONITRATE (VIT B1) 100 MG
500 TABLET ORAL EVERY 8 HOURS
Refills: 0 | Status: COMPLETED | OUTPATIENT
Start: 2023-03-28 | End: 2023-03-30

## 2023-03-28 RX ORDER — LISINOPRIL 2.5 MG/1
10 TABLET ORAL DAILY
Refills: 0 | Status: DISCONTINUED | OUTPATIENT
Start: 2023-03-28 | End: 2023-04-02

## 2023-03-28 RX ADMIN — Medication 2 MILLIGRAM(S): at 06:31

## 2023-03-28 RX ADMIN — MORPHINE SULFATE 4 MILLIGRAM(S): 50 CAPSULE, EXTENDED RELEASE ORAL at 22:37

## 2023-03-28 RX ADMIN — Medication 105 MILLIGRAM(S): at 09:05

## 2023-03-28 RX ADMIN — AMPICILLIN SODIUM AND SULBACTAM SODIUM 200 GRAM(S): 250; 125 INJECTION, POWDER, FOR SUSPENSION INTRAMUSCULAR; INTRAVENOUS at 06:30

## 2023-03-28 RX ADMIN — MORPHINE SULFATE 4 MILLIGRAM(S): 50 CAPSULE, EXTENDED RELEASE ORAL at 22:54

## 2023-03-28 RX ADMIN — Medication 105 MILLIGRAM(S): at 23:14

## 2023-03-28 RX ADMIN — Medication 2 MILLIGRAM(S): at 02:31

## 2023-03-28 RX ADMIN — AMPICILLIN SODIUM AND SULBACTAM SODIUM 200 GRAM(S): 250; 125 INJECTION, POWDER, FOR SUSPENSION INTRAMUSCULAR; INTRAVENOUS at 13:36

## 2023-03-28 RX ADMIN — LISINOPRIL 10 MILLIGRAM(S): 2.5 TABLET ORAL at 13:39

## 2023-03-28 RX ADMIN — Medication 105 MILLIGRAM(S): at 17:37

## 2023-03-28 RX ADMIN — AMPICILLIN SODIUM AND SULBACTAM SODIUM 200 GRAM(S): 250; 125 INJECTION, POWDER, FOR SUSPENSION INTRAMUSCULAR; INTRAVENOUS at 20:10

## 2023-03-28 RX ADMIN — SODIUM CHLORIDE 75 MILLILITER(S): 9 INJECTION, SOLUTION INTRAVENOUS at 10:30

## 2023-03-28 NOTE — PROGRESS NOTE ADULT - PROBLEM SELECTOR PLAN 2
According to pt, pt with trip and fall on Saturday 2/2 alcohol intoxication. Pt denies any prodromal symptoms. Fall unwitnessed, can be in setting of withdrawal seizure.  - Plan as below for alcohol withdrawal  - Aspiration precautions, seizure precautions, fall risk protocol  - PT consult Pt with history of alcohol use disorder, drinking up to 1L of vodka daily, c/b multiple withdrawals, including with withdrawal seizures. Pt initially admitted to drinking 1 pint of vodka on Saturday prior to fall. CIWA scores on admission ranging from 4-12, currently 3-5.   - Pt currently on symptom-triggered CIWA with Ativan  - Start high-dose IV thiamine 500 mg q8hrs x3 days  - Start NS with folic acid and MVI at 75 cc/hr x12 hrs while NPO  - SBIRT and SW consults. Pt has been on naltrexone in the past to help with alcohol cessation, now off of naltrexone.

## 2023-03-28 NOTE — PATIENT PROFILE ADULT - FALL HARM RISK - HARM RISK INTERVENTIONS

## 2023-03-28 NOTE — H&P ADULT - PROBLEM SELECTOR PLAN 1
Pt s/p fall on Saturday while intoxicated, now with acute comminuted fractures of the maxilla of the mandibular condyles bilaterally with overriding fracture fragment and with additional fracture of the left mandibular coronoid process.  - OMFS consulted on admission, recs appreciated  - Start IV Unasyn 3 g q6hrs   - Keep NPO for now. Start NS with folic acid and MVI at 75 cc/hr x12 hrs while NPO.  - Pain control with IV Tylenol PRN, will try to minimize use of opioids, as pt currently on IV Ativan taper and given pt's history of alcohol use disorder  - Per OMFS, added on tomorrow for possible procedure in OR, as pt refused to be closed reduced on admission. Pt, however, currently in active alcohol withdrawal, scoring as high as 12 on CIWA. If procedure not urgent, would recommend deferring surgery until withdrawal symptoms better controlled. Pt's last drink was on Saturday.

## 2023-03-28 NOTE — H&P ADULT - PROBLEM SELECTOR PLAN 8
- DVT ppx: IMPROVE score 0, low risk for VTE. Encourage OOB and ambulation as tolerated. SCDs.  - Diet: NPO for now given b/l mandibular fractures

## 2023-03-28 NOTE — H&P ADULT - NSHPLABSRESULTS_GEN_ALL_CORE
EKG personally reviewed.  NSR at 94 bpm, concordant TWI in V3, III, and aVF, no STD or COREY. QTc 475 ms.    Labs personally reviewed.                        10.2   6.03  )-----------( 202      ( 27 Mar 2023 15:06 )             34.5     03-27    134<L>  |  92<L>  |  13  ----------------------------<  115<H>  3.9   |  25  |  0.59    Ca    10.4      27 Mar 2023 15:06    TPro  8.5<H>  /  Alb  4.7  /  TBili  0.6  /  DBili  x   /  AST  48<H>  /  ALT  27  /  AlkPhos  80  03-27    Imaging personally reviewed.  ACC: [id number] EXAM:  CT MAXILLOFACIAL   ORDERED BY: GERRY LUO   ACC: [id number] EXAM:  CT BRAIN   ORDERED BY: GERRY LUO   PROCEDURE DATE:  03/27/2023      INTERPRETATION:  HISTORY: Trauma. Headache status post fall.    COMPARISON: CT head 11/11/2022    TECHNIQUE: Axial noncontrast CT images of the head, cervical spine, and   face were obtained and submitted for interpretation. Sagittal and coronal   reformatted images were provided. Bone and soft tissue windows were   evaluated.    FINDINGS:    CT BRAIN:    There is no acute intracranial mass-effect, hemorrhage, midline shift, or   abnormal extra-axial fluid collection.    Ventricles, sulci, and cisterns are normal in size for the patient's age   without evidence of hydrocephalus. Basal cisterns are patent. The   calvarium is intact.    CT FACE    Acute comminuted fractures of the neck of the mandibular condyles   bilaterally. There is overriding of fracture fragments with lateral   displacement of the vertical rami bilaterally relative to the condyles at   the level of the fracture site. The mandibular condyles remain located in   the glenoid fossa bilaterally.    Additional acute comminuted fracture of the left mandibular coronoid   process with medially displaced fracture fragments.    The globes are intact without retrobulbar hematoma. The lenses are   located bilaterally.    Layering fluid and debris within the left sphenoid sinus. Trace left   maxillary sinus mucosal thickening.    The mastoid air cells are clear. The nasopharyngeal contours are within   normal limits.    Upper cervical degenerative change with disc osteophyte complex formation   and anterior wedging.    IMPRESSION:    CT BRAIN: No acute intracranial bleeding    CT FACE: Acute comminuted fractures of the maxilla of the mandibular   condyles bilaterally with overriding fracture fragments. Mandibular   condyles remain located. Additional fracture of the left mandibular   coronoid process.

## 2023-03-28 NOTE — H&P ADULT - PROBLEM SELECTOR PLAN 6
BPs elevated on admission to 160 systolic, likely in setting of alcohol withdrawal and jaw pain.   - Monitor VS q4hrs for now  - Will hold off on starting BP meds unless BP remains elevated despite improvement in withdrawal symptoms and pain

## 2023-03-28 NOTE — H&P ADULT - PROBLEM SELECTOR PLAN 4
Hgb 10.2 on admission. MCV 72.9, indicating microcytic anemia. Baseline Hgb ranging from 8-11, likely 2/2 iron-deficiency anemia from menorrhagia. Pt's LMP was 3/1/23. No S/S of any active bleed at this time.  - Monitor H/H  - Check iron studies, folate, vitamin B12 and start supplementation PRN

## 2023-03-28 NOTE — H&P ADULT - PROBLEM SELECTOR PLAN 7
- DVT ppx: IMPROVE score 0, low risk for VTE. Encourage OOB and ambulation as tolerated. SCDs.  - Diet: NPO for now given b/l mandibular fractures Pt unable to remember her home meds.   - Med Rec pharmacist emailed for assistance, f/u Med Rec

## 2023-03-28 NOTE — H&P ADULT - PROBLEM SELECTOR PLAN 3
Pt with history of alcohol use disorder, drinking up to 1L of vodka daily, c/b multiple withdrawals, including with withdrawal seizures. Pt initially admitted to drinking 1 pint of vodka on Saturday prior to fall. CIWA scores on admission ranging from 4-12, currently 4-5.   - Start IV Ativan taper and symptom-triggered CIWA with Ativan  - Start high-dose IV thiamine 500 mg q8hrs x3 days  - Start NS with folic acid and MVI at 75 cc/hr x12 hrs while NPO  - SBIRT and SW consults. Pt has been on naltrexone in the past to help with alcohol cessation, now off of naltrexone. Pt currently not expressing any desire for detox/alcohol cessation.

## 2023-03-28 NOTE — PROGRESS NOTE ADULT - PROBLEM SELECTOR PLAN 3
Pt with history of alcohol use disorder, drinking up to 1L of vodka daily, c/b multiple withdrawals, including with withdrawal seizures. Pt initially admitted to drinking 1 pint of vodka on Saturday prior to fall. CIWA scores on admission ranging from 4-12, currently 4-5.   - Start IV Ativan taper and symptom-triggered CIWA with Ativan  - Start high-dose IV thiamine 500 mg q8hrs x3 days  - Start NS with folic acid and MVI at 75 cc/hr x12 hrs while NPO  - SBIRT and SW consults. Pt has been on naltrexone in the past to help with alcohol cessation, now off of naltrexone. Pt currently not expressing any desire for detox/alcohol cessation. Patient reports history of HTN on home Lisinopril 10mg. BPs elevated on admission to 160 systolic 100's diastolic, likely multifactorial iso pain, anxiety, alcohol withdrawal  - Monitor VS q4hrs for now  - Unclear med rec overnight.   - Will start Lisinopril 10mg w/ holding parameters. Will need confirmation med rec

## 2023-03-28 NOTE — PHYSICAL THERAPY INITIAL EVALUATION ADULT - ADDITIONAL COMMENTS
heart rate assessed to be 102 bpm     Pt. left comfortable in bed with all tubes/lines intact, call bell in reach and in NAD.

## 2023-03-28 NOTE — H&P ADULT - PROBLEM SELECTOR PLAN 2
According to pt, pt with trip and fall on Saturday 2/2 alcohol intoxication. Pt denies any prodromal symptoms. Fall unwitnessed, can be in setting of withdrawal seizure.  - Plan as below for alcohol withdrawal  - Aspiration precautions, seizure precautions, fall risk protocol  - PT consult

## 2023-03-28 NOTE — PROGRESS NOTE ADULT - SUBJECTIVE AND OBJECTIVE BOX
Ernesto Villarreal, PGY1    AYLEEN DEY  47y  Female    Complaints:  Subjective:     Pt admitted o/n s/p fall w/ facial fractures. Concern for alcohol withdrawal admitted to medicine for management.. Pt denies subj fevers/chills, HA, dizziness, chest pain, palpitations, n/v, abd pain, dysuria, diarrhea      FAMILY HISTORY:  Family history of hypertension (Father, Mother)      47yVital Signs Last 24 Hrs  T(C): 36.5 (28 Mar 2023 07:45), Max: 36.7 (27 Mar 2023 23:58)  T(F): 97.7 (28 Mar 2023 07:45), Max: 98 (27 Mar 2023 23:58)  HR: 89 (28 Mar 2023 07:45) (80 - 97)  BP: 145/107 (28 Mar 2023 07:45) (134/105 - 160/111)  BP(mean): --  RR: 16 (28 Mar 2023 07:45) (16 - 18)  SpO2: 100% (28 Mar 2023 07:45) (99% - 100%)    Parameters below as of 28 Mar 2023 07:45  Patient On (Oxygen Delivery Method): room air          PHYSICAL EXAM:  GENERAL: NAD, well-groomed, well-developed  HEAD:  Atraumatic, Normocephalic  EYES: EOMI, PERRLA, conjunctiva and sclera clear  ENMT: No tonsillar erythema, exudates, or enlargement; Moist mucous membranes, Good dentition, No lesions  NECK: Supple, No JVD, Normal thyroid  NERVOUS SYSTEM:  Alert & Oriented X3, Good concentration; Motor Strength 5/5 B/L upper and lower extremities; DTRs 2+ intact and symmetric  CHEST/LUNG: Clear to percussion bilaterally; No rales, rhonchi, wheezing, or rubs  HEART: Regular rate and rhythm; No murmurs, rubs, or gallops  ABDOMEN: Soft, Nontender, Nondistended; Bowel sounds present  EXTREMITIES:  2+ Peripheral Pulses, No clubbing, cyanosis, or edema  LYMPH: No lymphadenopathy noted  SKIN: No rashes or lesions      Consultant(s) Notes Reviewed:  [x ] YES  [ ] NO  Care Discussed with Consultants/Other Providers [ x] YES  [ ] NO    LABS:                Female  RADIOLOGY & ADDITIONAL TESTS:    Imaging Personally Reviewed:  [ ] YES  [ ] NO    MedsMEDICATIONS  (STANDING):  ampicillin/sulbactam  IVPB 3 Gram(s) IV Intermittent every 6 hours  LORazepam   Injectable 2 milliGRAM(s) IV Push every 4 hours  LORazepam   Injectable   IV Push   sodium chloride 0.9% 1000 milliLiter(s) (75 mL/Hr) IV Continuous <Continuous>  thiamine IVPB 500 milliGRAM(s) IV Intermittent every 8 hours    MEDICATIONS  (PRN):  LORazepam     Tablet 2 milliGRAM(s) Oral every 2 hours PRN Symptom-triggered 2 point increase in CIWA-Ar  LORazepam   Injectable 2 milliGRAM(s) IV Push every 1 hour PRN Symptom-triggered: each CIWA -Ar score 8 or GREATER      HEALTH ISSUES - PROBLEM Dx:  Bilateral mandibular fracture, closed, initial encounter    Fall    Alcohol withdrawal    Anemia    Anxiety and depression    Blood pressure elevated without history of HTN    Need for prophylactic measure                   Ernesto Villarreal, PGY1    AYLEEN DEY  47y  Female    Complaints:  Subjective:     Pt admitted o/n s/p fall w/ facial fractures. Concern for alcohol withdrawal admitted to medicine for management. Pt reports last drink was Saturday when she had the fall. Unable to drink d/t jaw pain. Pt denies subj fevers/chills, HA, dizziness, chest pain, palpitations, n/v, abd pain, dysuria, diarrhea      FAMILY HISTORY:  Family history of hypertension (Father, Mother)      47yVital Signs Last 24 Hrs  T(C): 36.5 (28 Mar 2023 07:45), Max: 36.7 (27 Mar 2023 23:58)  T(F): 97.7 (28 Mar 2023 07:45), Max: 98 (27 Mar 2023 23:58)  HR: 89 (28 Mar 2023 07:45) (80 - 97)  BP: 145/107 (28 Mar 2023 07:45) (134/105 - 160/111)  BP(mean): --  RR: 16 (28 Mar 2023 07:45) (16 - 18)  SpO2: 100% (28 Mar 2023 07:45) (99% - 100%)    Parameters below as of 28 Mar 2023 07:45  Patient On (Oxygen Delivery Method): room air          PHYSICAL EXAM:  GENERAL: NAD, well-groomed, well-developed. Anxious  HEAD:  Atraumatic, Normocephalic  EYES: EOMI, PERRLA, conjunctiva and sclera clear  ENMT: No tonsillar erythema, exudates, or enlargement; Moist mucous membranes,   NECK: Supple, No JVD, Normal thyroid  NERVOUS SYSTEM:  Alert & Oriented X3, Poor concentration; Motor Strength 5/5 B/L upper and lower extremities;   CHEST/LUNG: CTAB; No rales, rhonchi, wheezing,   HEART: Regular rate and rhythm; No murmurs, rubs, or gallops  ABDOMEN: Soft, Nontender, Nondistended; Bowel sounds present  EXTREMITIES:  2+ Peripheral Pulses, No clubbing, cyanosis, or edema  LYMPH: No lymphadenopathy noted  SKIN: No rashes or lesions      Consultant(s) Notes Reviewed:  [x ] YES  [ ] NO  Care Discussed with Consultants/Other Providers [ x] YES  [ ] NO    LABS:                Female  RADIOLOGY & ADDITIONAL TESTS:    Imaging Personally Reviewed:  [ ] YES  [ ] NO    [facility]  [facility]  ampicillin/sulbactam  IVPB 3 Gram(s) IV Intermittent every 6 hours  LORazepam   Injectable 2 milliGRAM(s) IV Push every 4 hours  LORazepam   Injectable   IV Push   sodium chloride 0.9% 1000 milliLiter(s) (75 mL/Hr) IV Continuous <Continuous>  thiamine IVPB 500 milliGRAM(s) IV Intermittent every 8 hours    MEDICATIONS  (PRN):  LORazepam     Tablet 2 milliGRAM(s) Oral every 2 hours PRN Symptom-triggered 2 point increase in CIWA-Ar  LORazepam   Injectable 2 milliGRAM(s) IV Push every 1 hour PRN Symptom-triggered: each CIWA -Ar score 8 or GREATER      HEALTH ISSUES - PROBLEM Dx:  Bilateral mandibular fracture, closed, initial encounter    Fall    Alcohol withdrawal    Anemia    Anxiety and depression    Blood pressure elevated without history of HTN    Need for prophylactic measure

## 2023-03-28 NOTE — PROGRESS NOTE ADULT - PROBLEM SELECTOR PLAN 1
Pt s/p fall on Saturday while intoxicated, now with acute comminuted fractures of the maxilla of the mandibular condyles bilaterally with overriding fracture fragment and with additional fracture of the left mandibular coronoid process.  - OMFS consulted on admission, recs appreciated  - Start IV Unasyn 3 g q6hrs   - Keep NPO for now. Start NS with folic acid and MVI at 75 cc/hr x12 hrs while NPO.  - Pain control with IV Tylenol PRN, will try to minimize use of opioids, as pt currently on IV Ativan taper and given pt's history of alcohol use disorder  - Per OMFS, added on tomorrow for possible procedure in OR, as pt refused to be closed reduced on admission. Pt, however, currently in active alcohol withdrawal, scoring as high as 12 on CIWA. If procedure not urgent, would recommend deferring surgery until withdrawal symptoms better controlled. Pt's last drink was on Saturday. Pt s/p fall on Saturday while intoxicated, now with acute comminuted fractures of the maxilla of the mandibular condyles bilaterally with overriding fracture fragment and with additional fracture of the left mandibular coronoid process.  - OMFS consulted on admission, recs appreciated  - Start IV Unasyn 3 g q6hrs   - Pain control with IV Tylenol PRN, Morphine 4mg for severe pain  - Per OMFS, added on for possible procedure in OR, as pt refused to be closed reduced on admission.   - Concern for withdrawal on admission. Pt started on symptom triggered CIWA. CIWA between 3-5 today. Last Ativan dose at 6am.   - Pt otherwise medically stable. RCRI=0, giving patient 3.9% risk of MACE. No further intervention prior to OR

## 2023-03-28 NOTE — H&P ADULT - NSHPPHYSICALEXAM_GEN_ALL_CORE
Vital Signs Last 24 Hrs  T(C): 36.4 (28 Mar 2023 03:43), Max: 36.7 (27 Mar 2023 23:58)  T(F): 97.6 (28 Mar 2023 03:43), Max: 98 (27 Mar 2023 23:58)  HR: 80 (28 Mar 2023 05:45) (80 - 97)  BP: 142/96 (28 Mar 2023 05:45) (134/105 - 160/111)  BP(mean): --  RR: 16 (28 Mar 2023 05:45) (16 - 18)  SpO2: 100% (28 Mar 2023 05:45) (99% - 100%)    PHYSICAL EXAM:  General: Awake and alert.  No acute distress.  Head: Normocephalic, atraumatic.    Eyes: PERRL.  EOMI.  No scleral icterus.  No conjunctival pallor.  Mouth: Moist MM.  No oropharyngeal exudates.    Neck: Supple.  Full range of motion.  No JVD.  No LAD.  No thyromegaly.  Trachea midline.    Heart: RRR.  Normal S1 and S2.  No murmurs, rubs, or gallops.  No LE edema b/l.   Lungs: Nonlabored breathing.  Good inspiratory effort.  CTAB.  No wheezes, crackles, or rhonchi.    Abdomen: BS+, soft, NT/ND.  No hepatomegaly.   Skin: Warm and dry.  No rashes.  Extremities: No cyanosis.  2+ peripheral pulses b/l.  Musculoskeletal: No joint deformities.  No spinal or paraspinal tenderness.  Neuro: A&Ox3.  CN II-XII intact.  5/5 motor strength in UE and LE b/l.  Tactile sensation intact in UE and LE b/l.  Cerebellar function intact as assessed by finger-to-nose test. Vital Signs Last 24 Hrs  T(C): 36.4 (28 Mar 2023 03:43), Max: 36.7 (27 Mar 2023 23:58)  T(F): 97.6 (28 Mar 2023 03:43), Max: 98 (27 Mar 2023 23:58)  HR: 80 (28 Mar 2023 05:45) (80 - 97)  BP: 142/96 (28 Mar 2023 05:45) (134/105 - 160/111)  BP(mean): --  RR: 16 (28 Mar 2023 05:45) (16 - 18)  SpO2: 100% (28 Mar 2023 05:45) (99% - 100%)    PHYSICAL EXAM:  General: Awake and alert.  No acute distress.  Head: Normocephalic, atraumatic.    Eyes: PERRL.  EOMI.  No scleral icterus.  No conjunctival pallor.  Mouth: Tongue fasciculations.  Moist MM.  No oropharyngeal exudates.    Neck: Supple.  Full range of motion.  No JVD.  No LAD.  No thyromegaly.  Trachea midline.    Heart: RRR.  Normal S1 and S2.  No murmurs, rubs, or gallops.  No LE edema b/l.   Lungs: Nonlabored breathing.  Good inspiratory effort.  CTAB.  No wheezes, crackles, or rhonchi.    Abdomen: BS+, soft, nontender with no rebound or guarding, nondistended.  No hepatomegaly.   Skin: Warm and dry.  No rashes.  Laceration on chin, no active bleeding.  Extremities: No cyanosis.  2+ peripheral pulses b/l.  Mild hand tremors b/l.   Musculoskeletal: No joint deformities.  No spinal or paraspinal tenderness.  Neuro: A&Ox2-3 (confused about day of week, thought today was Wednesday).  CN II-XII intact.  5/5 motor strength in UE and LE b/l.  Tactile sensation intact in UE and LE b/l.  No focal deficits.  Psychiatric: Restricted affect.  Not responding to internal stimuli.  Denies any SI/HI.

## 2023-03-28 NOTE — PROGRESS NOTE ADULT - PROBLEM SELECTOR PLAN 5
Pt recently started on venlafaxine per pt, but dose unknown. Currently denies any depressed mood or SI/HI.  - C/w venlafaxine once confirmed as a home med. Will need to monitor for sedation, as pt currently on IV Ativan taper. Hgb 10.2 on admission. MCV 72.9, indicating microcytic anemia. Baseline Hgb ranging from 8-11, likely 2/2 iron-deficiency anemia from menorrhagia. Pt's LMP was 3/1/23. No S/S of any active bleed at this time.  - Monitor H/H  - Check iron studies, folate, vitamin B12 and start supplementation PRN

## 2023-03-28 NOTE — H&P ADULT - NSICDXPASTMEDICALHX_GEN_ALL_CORE_FT
PAST MEDICAL HISTORY:  Adenomyosis     Depression     ETOH Abuse     History of pancreatitis     HTN (hypertension)      PAST MEDICAL HISTORY:  Adenomyosis     Anemia     Depression     ETOH Abuse     History of pancreatitis     HTN (hypertension)     Withdrawal seizures

## 2023-03-28 NOTE — PROGRESS NOTE ADULT - ASSESSMENT
47F with hx of alcohol use disorder (up to 1L of vodka daily; last drink Sat 3/25), Hx of EToH withdrawal (c/b seizures) hx of alcoholic pancreatitis, anxiety/depression, and chronic anemia 2/2 menorrhagia from adenomyosis presents with a broken jaw following a fall 3 days ago

## 2023-03-28 NOTE — PROGRESS NOTE ADULT - PROBLEM SELECTOR PLAN 6
BPs elevated on admission to 160 systolic, likely in setting of alcohol withdrawal and jaw pain.   - Monitor VS q4hrs for now  - Will hold off on starting BP meds unless BP remains elevated despite improvement in withdrawal symptoms and pain Pt recently started on venlafaxine per pt, but dose unknown. Currently denies any depressed mood or SI/HI.  - C/w venlafaxine once confirmed as a home med. Will need to monitor for sedation,

## 2023-03-28 NOTE — PROGRESS NOTE ADULT - PROBLEM SELECTOR PLAN 4
Hgb 10.2 on admission. MCV 72.9, indicating microcytic anemia. Baseline Hgb ranging from 8-11, likely 2/2 iron-deficiency anemia from menorrhagia. Pt's LMP was 3/1/23. No S/S of any active bleed at this time.  - Monitor H/H  - Check iron studies, folate, vitamin B12 and start supplementation PRN According to pt, pt with trip and fall on Saturday 2/2 alcohol intoxication. Pt denies any prodromal symptoms. Fall unwitnessed, can be in setting of withdrawal seizure.  - Plan as below for alcohol withdrawal  - Aspiration precautions, seizure precautions, fall risk protocol  - PT consult

## 2023-03-28 NOTE — H&P ADULT - PROBLEM SELECTOR PLAN 5
Pt recently started on venlafaxine per pt. Currently denies any depressed mood or SI/HI.  - C/w venlafaxine 25 mg daily, but monitor for sedation, as pt currently on IV Ativan taper Pt recently started on venlafaxine per pt, but dose unknown. Currently denies any depressed mood or SI/HI.  - C/w venlafaxine once confirmed as a home med. Will need to monitor for sedation, as pt currently on IV Ativan taper.

## 2023-03-28 NOTE — H&P ADULT - NSHPREVIEWOFSYSTEMS_GEN_ALL_CORE
Constitutional: No generalized weakness, fevers, chills, or weight loss  Eyes: No visual changes, double vision, or eye pain  Ears, Nose, Mouth, Throat: No runny nose, sinus pain, ear pain, tinnitus, sore throat, dysphagia, or odynophagia  Cardiovascular: No chest pain, palpitations, or LE edema  Respiratory: No cough, wheezing, hemoptysis, or shortness of breath  Gastrointestinal: No abdominal pain, dysphagia, anorexia, nausea/vomiting, diarrhea/constipation, hematemesis, melena, or BRBPR  Genitourinary: No dysuria, frequency, urgency, or hematuria  Musculoskeletal: No joint pain, joint swelling, or decreased ROM  Skin: No pruritus, rashes, lesions, or wounds  Neurologic:  No seizures, headache, paresthesias, numbness, or limb weakness  Psychiatric: No depression, anxiety, difficulty concentrating, anhedonia, or lack of energy  Endocrine: No heat/cold intolerance, mood swings, sweats, polydipsia, or polyuria  Hematologic/lymphatic: No purpura, petechia, or prolonged or excessive bleeding after dental extraction / injury  Allergic/Immunologic: No anaphylaxis or allergic response to materials, foods, animals    Positives and pertinent negatives noted and all other systems negative. Constitutional: No generalized weakness, fevers, chills, or weight loss  Eyes: No visual changes, double vision, or eye pain  Ears, Nose, Mouth, Throat: +B/l jaw pain. No runny nose, sinus pain, ear pain, tinnitus, sore throat, dysphagia, or odynophagia.  Cardiovascular: No chest pain, palpitations, or LE edema  Respiratory: No cough, wheezing, hemoptysis, or shortness of breath  Gastrointestinal: No abdominal pain, nausea/vomiting, diarrhea/constipation, hematemesis, melena, or BRBPR  Genitourinary: No dysuria, frequency, urgency, or hematuria  Musculoskeletal: No back pain or joint pain, swelling, or decreased ROM  Skin: No pruritus or rashes  Neurologic: No syncope, seizures, headache, paresthesias, numbness, or limb weakness  Psychiatric: +H/o anxiety and depression. No SI/HI or hallucinations.   Endocrine: No heat/cold intolerance, mood swings, sweats, polydipsia, or polyuria  Hematologic/lymphatic: No purpura, petechia, or prolonged or excessive bleeding after dental extraction / injury  Allergic/Immunologic: No anaphylaxis or allergic response to materials, foods, animals    Positives and pertinent negatives noted and all other systems negative.

## 2023-03-28 NOTE — PHYSICAL THERAPY INITIAL EVALUATION ADULT - PERTINENT HX OF CURRENT PROBLEM, REHAB EVAL
47 year old woman with history of alcohol use disorder with  withdrawal seizures and alcoholic pancreatitis, anxiety/depression, and chronic anemia 2/2 menorrhagia from adenomyosis presents with a broken jaw

## 2023-03-28 NOTE — H&P ADULT - HISTORY OF PRESENT ILLNESS
48 yo woman with history of alcohol use disorder c/b withdrawal seizures and alcoholic pancreatitis, anxiety/depression, and chronic anemia 2/2 menorrhagia from adenomyosis presents with a broken jaw following a fall 2 days ago. Pt states that she fell 2 days ago while intoxicated  48 yo woman with history of alcohol use disorder (up to 1L of vodka daily) c/b withdrawal seizures and alcoholic pancreatitis, anxiety/depression, and chronic anemia 2/2 menorrhagia from adenomyosis presents with a broken jaw following a fall 3 days ago. Pt states that she tripped and fell forward, hitting her face on the floor of her apartment, while she was intoxicated on Saturday. Denies any LOC with the fall. Afterwards, pt had worsening jaw pain and has been unable to eat anything, as pt can barely open her mouth and chew due to the jaw pain. Pt went to see an oral surgeon on Monday and was referred to the ED for further eval after pt was found to have a broken jaw in 3 places at the oral surgeon's office. Pt initially admitted to drinking a pint of vodka on Saturday, but later became less than forthcoming as to when she last drank alcohol, reporting that it was last Thursday rather than Saturday. Pt denies any recent fever, chills, chest pain, shortness of breath, palpitations, abdominal pain, nausea, vomiting, diarrhea, constipation, melena, BRBPR, or urinary symptoms. Also denies any headaches, vision changes, or neck or back pain.     In the ED,  T 97.6-98, HR 80-97, -160/, RR 16-18, SpO2 100% RA.  Pt's CIWA score 8 on presentation, requiring IV Ativan 2 mg x1. Pt later scoring as high as 12, requiring additional doses of IV Ativan 2 mg.

## 2023-03-29 ENCOUNTER — TRANSCRIPTION ENCOUNTER (OUTPATIENT)
Age: 48
End: 2023-03-29

## 2023-03-29 LAB
ANION GAP SERPL CALC-SCNC: 15 MMOL/L — HIGH (ref 7–14)
APTT BLD: 27.3 SEC — SIGNIFICANT CHANGE UP (ref 27–36.3)
BUN SERPL-MCNC: 10 MG/DL — SIGNIFICANT CHANGE UP (ref 7–23)
CALCIUM SERPL-MCNC: 9.1 MG/DL — SIGNIFICANT CHANGE UP (ref 8.4–10.5)
CHLORIDE SERPL-SCNC: 97 MMOL/L — LOW (ref 98–107)
CO2 SERPL-SCNC: 24 MMOL/L — SIGNIFICANT CHANGE UP (ref 22–31)
CREAT SERPL-MCNC: 0.57 MG/DL — SIGNIFICANT CHANGE UP (ref 0.5–1.3)
EGFR: 111 ML/MIN/1.73M2 — SIGNIFICANT CHANGE UP
FERRITIN SERPL-MCNC: 35 NG/ML — SIGNIFICANT CHANGE UP (ref 15–150)
GLUCOSE SERPL-MCNC: 108 MG/DL — HIGH (ref 70–99)
HCT VFR BLD CALC: 33.8 % — LOW (ref 34.5–45)
HGB BLD-MCNC: 9.7 G/DL — LOW (ref 11.5–15.5)
INR BLD: 1.11 RATIO — SIGNIFICANT CHANGE UP (ref 0.88–1.16)
IRON SATN MFR SERPL: 26 UG/DL — LOW (ref 30–160)
IRON SATN MFR SERPL: 8 % — LOW (ref 14–50)
MAGNESIUM SERPL-MCNC: 1.7 MG/DL — SIGNIFICANT CHANGE UP (ref 1.6–2.6)
MCHC RBC-ENTMCNC: 21.6 PG — LOW (ref 27–34)
MCHC RBC-ENTMCNC: 28.7 GM/DL — LOW (ref 32–36)
MCV RBC AUTO: 75.1 FL — LOW (ref 80–100)
NRBC # BLD: 0 /100 WBCS — SIGNIFICANT CHANGE UP (ref 0–0)
NRBC # FLD: 0 K/UL — SIGNIFICANT CHANGE UP (ref 0–0)
PHOSPHATE SERPL-MCNC: 3.3 MG/DL — SIGNIFICANT CHANGE UP (ref 2.5–4.5)
PLATELET # BLD AUTO: 177 K/UL — SIGNIFICANT CHANGE UP (ref 150–400)
POTASSIUM SERPL-MCNC: 3.7 MMOL/L — SIGNIFICANT CHANGE UP (ref 3.5–5.3)
POTASSIUM SERPL-SCNC: 3.7 MMOL/L — SIGNIFICANT CHANGE UP (ref 3.5–5.3)
PROTHROM AB SERPL-ACNC: 12.9 SEC — SIGNIFICANT CHANGE UP (ref 10.5–13.4)
RBC # BLD: 4.5 M/UL — SIGNIFICANT CHANGE UP (ref 3.8–5.2)
RBC # FLD: 18.8 % — HIGH (ref 10.3–14.5)
SODIUM SERPL-SCNC: 136 MMOL/L — SIGNIFICANT CHANGE UP (ref 135–145)
TIBC SERPL-MCNC: 309 UG/DL — SIGNIFICANT CHANGE UP (ref 220–430)
UIBC SERPL-MCNC: 283 UG/DL — SIGNIFICANT CHANGE UP (ref 110–370)
WBC # BLD: 4.92 K/UL — SIGNIFICANT CHANGE UP (ref 3.8–10.5)
WBC # FLD AUTO: 4.92 K/UL — SIGNIFICANT CHANGE UP (ref 3.8–10.5)

## 2023-03-29 PROCEDURE — 99233 SBSQ HOSP IP/OBS HIGH 50: CPT | Mod: GC

## 2023-03-29 RX ORDER — ACETAMINOPHEN 500 MG
650 TABLET ORAL ONCE
Refills: 0 | Status: COMPLETED | OUTPATIENT
Start: 2023-03-29 | End: 2023-03-29

## 2023-03-29 RX ADMIN — MORPHINE SULFATE 4 MILLIGRAM(S): 50 CAPSULE, EXTENDED RELEASE ORAL at 11:10

## 2023-03-29 RX ADMIN — MORPHINE SULFATE 4 MILLIGRAM(S): 50 CAPSULE, EXTENDED RELEASE ORAL at 17:05

## 2023-03-29 RX ADMIN — Medication 650 MILLIGRAM(S): at 21:46

## 2023-03-29 RX ADMIN — AMPICILLIN SODIUM AND SULBACTAM SODIUM 200 GRAM(S): 250; 125 INJECTION, POWDER, FOR SUSPENSION INTRAMUSCULAR; INTRAVENOUS at 23:02

## 2023-03-29 RX ADMIN — AMPICILLIN SODIUM AND SULBACTAM SODIUM 200 GRAM(S): 250; 125 INJECTION, POWDER, FOR SUSPENSION INTRAMUSCULAR; INTRAVENOUS at 02:17

## 2023-03-29 RX ADMIN — Medication 105 MILLIGRAM(S): at 06:42

## 2023-03-29 RX ADMIN — MORPHINE SULFATE 4 MILLIGRAM(S): 50 CAPSULE, EXTENDED RELEASE ORAL at 23:05

## 2023-03-29 RX ADMIN — MORPHINE SULFATE 4 MILLIGRAM(S): 50 CAPSULE, EXTENDED RELEASE ORAL at 11:25

## 2023-03-29 RX ADMIN — MORPHINE SULFATE 4 MILLIGRAM(S): 50 CAPSULE, EXTENDED RELEASE ORAL at 06:10

## 2023-03-29 RX ADMIN — MORPHINE SULFATE 4 MILLIGRAM(S): 50 CAPSULE, EXTENDED RELEASE ORAL at 23:25

## 2023-03-29 RX ADMIN — AMPICILLIN SODIUM AND SULBACTAM SODIUM 200 GRAM(S): 250; 125 INJECTION, POWDER, FOR SUSPENSION INTRAMUSCULAR; INTRAVENOUS at 13:36

## 2023-03-29 RX ADMIN — Medication 650 MILLIGRAM(S): at 21:00

## 2023-03-29 RX ADMIN — AMPICILLIN SODIUM AND SULBACTAM SODIUM 200 GRAM(S): 250; 125 INJECTION, POWDER, FOR SUSPENSION INTRAMUSCULAR; INTRAVENOUS at 17:07

## 2023-03-29 RX ADMIN — AMPICILLIN SODIUM AND SULBACTAM SODIUM 200 GRAM(S): 250; 125 INJECTION, POWDER, FOR SUSPENSION INTRAMUSCULAR; INTRAVENOUS at 05:48

## 2023-03-29 RX ADMIN — LISINOPRIL 10 MILLIGRAM(S): 2.5 TABLET ORAL at 05:48

## 2023-03-29 RX ADMIN — Medication 105 MILLIGRAM(S): at 21:01

## 2023-03-29 RX ADMIN — Medication 105 MILLIGRAM(S): at 14:15

## 2023-03-29 RX ADMIN — MORPHINE SULFATE 4 MILLIGRAM(S): 50 CAPSULE, EXTENDED RELEASE ORAL at 17:20

## 2023-03-29 RX ADMIN — MORPHINE SULFATE 4 MILLIGRAM(S): 50 CAPSULE, EXTENDED RELEASE ORAL at 05:54

## 2023-03-29 NOTE — PROGRESS NOTE ADULT - SUBJECTIVE AND OBJECTIVE BOX
Ernesto Villarreal, PGY1    AYLEEN DEY  47y  Female    Complaints:  Subjective:     No acute o/n events. Pt is anxious. She is planned for OR w/ OMFS today at 3pm. Pt denies subj fevers/chills, HA, dizziness, chest pain, palpitations, n/v, abd pain, dysuria, diarrhea      FAMILY HISTORY:  Family history of hypertension (Father, Mother)      47yVital Signs Last 24 Hrs  T(C): 36.5 (29 Mar 2023 05:44), Max: 37.1 (28 Mar 2023 15:22)  T(F): 97.7 (29 Mar 2023 05:44), Max: 98.8 (28 Mar 2023 15:22)  HR: 76 (29 Mar 2023 05:44) (76 - 100)  BP: 138/88 (29 Mar 2023 05:44) (138/88 - 153/100)  BP(mean): --  RR: 17 (29 Mar 2023 05:44) (12 - 17)  SpO2: 100% (29 Mar 2023 05:44) (100% - 100%)    Parameters below as of 29 Mar 2023 05:44  Patient On (Oxygen Delivery Method): room air          PHYSICAL EXAM:  GENERAL: NAD, well-groomed, well-developed. Anxious  HEAD:  Atraumatic, Normocephalic  EYES: EOMI, PERRLA, conjunctiva and sclera clear  ENMT: No tonsillar erythema, exudates, or enlargement; Moist mucous membranes,   NECK: Supple, No JVD, Normal thyroid  NERVOUS SYSTEM:  Alert & Oriented X3, Poor concentration; Motor Strength 5/5 B/L upper and lower extremities; Flat affect  CHEST/LUNG: CTAB; No rales, rhonchi, wheezing,   HEART: Regular rate and rhythm; No murmurs, rubs, or gallops  ABDOMEN: Soft, Nontender, Nondistended; Bowel sounds present  EXTREMITIES:  2+ Peripheral Pulses, No clubbing, cyanosis, or edema  SKIN: No rashes or lesions      Consultant(s) Notes Reviewed:  [x ] YES  [ ] NO  Care Discussed with Consultants/Other Providers [ x] YES  [ ] NO    LABS:                Female  RADIOLOGY & ADDITIONAL TESTS:    Imaging Personally Reviewed:  [ ] YES  [ ] NO    MedsMEDICATIONS  (STANDING):  ampicillin/sulbactam  IVPB 3 Gram(s) IV Intermittent every 6 hours  lisinopril 10 milliGRAM(s) Oral daily  sodium chloride 0.9% 1000 milliLiter(s) (75 mL/Hr) IV Continuous <Continuous>  thiamine IVPB 500 milliGRAM(s) IV Intermittent every 8 hours    MEDICATIONS  (PRN):  LORazepam   Injectable 2 milliGRAM(s) IV Push every 2 hours PRN CIWA-Ar score increase by 2 points and a total score of 7 or less  LORazepam   Injectable 2 milliGRAM(s) IV Push every 1 hour PRN CIWA-Ar score 8 or greater  morphine  - Injectable 4 milliGRAM(s) IV Push every 6 hours PRN Severe Pain (7 - 10)      HEALTH ISSUES - PROBLEM Dx:  Bilateral mandibular fracture, closed, initial encounter    Alcohol withdrawal    Hypertension    Fall    Anemia    Anxiety and depression    Blood pressure elevated without history of HTN    Medication management    Need for prophylactic measure                   Ernesto Villarreal, PGY1    AYLEEN DEY  47y  Female    Complaints:  Subjective:     No acute o/n events. Pt is anxious and has significant facial pain. Pt endorses mild headache. She is planned for OR w/ OMFS today at 3pm. Pt denies subj fevers/chills, dizziness, chest pain, palpitations, n/v, abd pain, dysuria, diarrhea      FAMILY HISTORY:  Family history of hypertension (Father, Mother)      47yVital Signs Last 24 Hrs  T(C): 36.5 (29 Mar 2023 05:44), Max: 37.1 (28 Mar 2023 15:22)  T(F): 97.7 (29 Mar 2023 05:44), Max: 98.8 (28 Mar 2023 15:22)  HR: 76 (29 Mar 2023 05:44) (76 - 100)  BP: 138/88 (29 Mar 2023 05:44) (138/88 - 153/100)  BP(mean): --  RR: 17 (29 Mar 2023 05:44) (12 - 17)  SpO2: 100% (29 Mar 2023 05:44) (100% - 100%)    Parameters below as of 29 Mar 2023 05:44  Patient On (Oxygen Delivery Method): room air          PHYSICAL EXAM:  GENERAL: NAD, well-groomed, well-developed. Anxious  HEAD:  Atraumatic, Normocephalic  EYES: EOMI, PERRLA, conjunctiva and sclera clear  ENMT: No tonsillar erythema, exudates, or enlargement; Moist mucous membranes,   NECK: Supple, No JVD, Normal thyroid  NERVOUS SYSTEM:  Alert & Oriented X3, Poor concentration; Motor Strength 5/5 B/L upper and lower extremities; Flat affect. No tremor  CHEST/LUNG: CTAB; No rales, rhonchi, wheezing,   HEART: Regular rate and rhythm; No murmurs, rubs, or gallops  ABDOMEN: Soft, Nontender, Nondistended; Bowel sounds present  EXTREMITIES:  2+ Peripheral Pulses, No clubbing, cyanosis, or edema  SKIN: No rashes or lesions      Consultant(s) Notes Reviewed:  [x ] YES  [ ] NO  Care Discussed with Consultants/Other Providers [ x] YES  [ ] NO    LABS:                          9.7    4.92  )-----------( 177      ( 29 Mar 2023 06:40 )             33.8       03-29    136  |  97<L>  |  10  ----------------------------<  108<H>  3.7   |  24  |  0.57    Ca    9.1      29 Mar 2023 06:40  Phos  3.3     03-29  Mg     1.70     03-29    TPro  8.0  /  Alb  4.2  /  TBili  0.5  /  DBili  x   /  AST  46<H>  /  ALT  29  /  AlkPhos  72  03-28                  PT/INR - ( 29 Mar 2023 06:40 )   PT: 12.9 sec;   INR: 1.11 ratio         PTT - ( 29 Mar 2023 06:40 )  PTT:27.3 sec          CAPILLARY BLOOD GLUCOSE        Female  RADIOLOGY & ADDITIONAL TESTS:    Imaging Personally Reviewed:  [ ] YES  [ ] NO    MedsMEDICATIONS  (STANDING):  ampicillin/sulbactam  IVPB 3 Gram(s) IV Intermittent every 6 hours  lisinopril 10 milliGRAM(s) Oral daily  sodium chloride 0.9% 1000 milliLiter(s) (75 mL/Hr) IV Continuous <Continuous>  thiamine IVPB 500 milliGRAM(s) IV Intermittent every 8 hours    MEDICATIONS  (PRN):  LORazepam   Injectable 2 milliGRAM(s) IV Push every 2 hours PRN CIWA-Ar score increase by 2 points and a total score of 7 or less  LORazepam   Injectable 2 milliGRAM(s) IV Push every 1 hour PRN CIWA-Ar score 8 or greater  morphine  - Injectable 4 milliGRAM(s) IV Push every 6 hours PRN Severe Pain (7 - 10)      HEALTH ISSUES - PROBLEM Dx:  Bilateral mandibular fracture, closed, initial encounter    Alcohol withdrawal    Hypertension    Fall    Anemia    Anxiety and depression    Blood pressure elevated without history of HTN    Medication management    Need for prophylactic measure

## 2023-03-29 NOTE — PROGRESS NOTE ADULT - PROBLEM SELECTOR PLAN 6
Pt recently started on venlafaxine per pt, but dose unknown. Currently denies any depressed mood or SI/HI.  - C/w venlafaxine once confirmed as a home med. Will need to monitor for sedation, Pt reported starting venlafaxine to overnight admitting team, but dose unknown. On my discussion pt denies venlafaxine use.   - Currently denies any depressed mood or SI/HI.  - No venlafaxine prescription noted  - c/t monitor

## 2023-03-29 NOTE — PROGRESS NOTE ADULT - ASSESSMENT
A/P:   47 yr old female presenting with bilateral Condyle fractures, left coronoid process fracture, and left mandibular parasymphsis greenstick fracture s/p intoxicated fall with plan for OR for ORIF bilateral condylar fracture and left parasymphysis fracture and possible closed reduction with Dr. Parra     Plan:  - Add on #10 for OR today 3/29/23 with Dr. Parra   - Please keep NPO  - C/w unasyn 3g q6h  - Multimodal pain control per primary  - Appreciate excellent care per primary     Bartolo Bell DDS  Oral and Maxillofacial Surgery   Pager #[phone]  Available on [employer] Teams    A/P:   47 yr old female presenting with bilateral Condyle fractures, left coronoid process fracture, and left mandibular parasymphsis greenstick fracture s/p intoxicated fall with plan for OR for ORIF bilateral condylar fracture and left parasymphysis fracture and possible closed reduction with Dr. Parra     Plan:  - Add on #10 for OR today 3/29/23 with Dr. Parra   - Please keep NPO  - C/w unasyn 3g q6h  - IVF  - Multimodal pain control per primary  - Appreciate excellent care per primary     Bartolo Bell DDS  Oral and Maxillofacial Surgery   Pager #[phone]  Available on [employer] Teams

## 2023-03-29 NOTE — PROGRESS NOTE ADULT - PROBLEM SELECTOR PLAN 2
Left voicemail message on cell phone again to return call.      Pt with history of alcohol use disorder, drinking up to 1L of vodka daily, c/b multiple withdrawals, including with withdrawal seizures. Pt initially admitted to drinking 1 pint of vodka on Saturday prior to fall. CIWA scores on admission ranging from 4-12, currently 3-5.   - Pt currently on symptom-triggered CIWA with Ativan. No doses received since yesterday 3/28 6am  - Start high-dose IV thiamine 500 mg q8hrs x3 days  - Start NS with folic acid and MVI at 75 cc/hr x12 hrs while NPO  - SBIRT and SW consults. Pt has been on naltrexone in the past to help with alcohol cessation, now off of naltrexone. Pt with history of alcohol use disorder, drinking up to 1L of vodka daily, c/b multiple withdrawals, including with withdrawal seizures. Pt initially admitted to drinking 1 pint of vodka on Saturday prior to fall. CIWA scores on admission ranging from 4-12, currently 3-5.   - Pt currently on symptom-triggered CIWA with Ativan. No doses received since yesterday 3/28 6am. No tremors, seizures, hallucinations  - Start high-dose IV thiamine 500 mg q8hrs x3 days  - Start NS with folic acid and MVI at 75 cc/hr x12 hrs while NPO  - SBIRT and SW consults. Pt has been on naltrexone in the past to help with alcohol cessation, now off of naltrexone.

## 2023-03-29 NOTE — SBIRT NOTE ADULT - NSSBIRTALCPOSREINDET_GEN_A_CORE
Pt is working with a new sponsor at . Pt declines formal tx referrals for outpatient/inpatient treatment

## 2023-03-29 NOTE — PROGRESS NOTE ADULT - PROBLEM SELECTOR PLAN 1
Pt s/p fall on Saturday while intoxicated, now with acute comminuted fractures of the maxilla of the mandibular condyles bilaterally with overriding fracture fragment and with additional fracture of the left mandibular coronoid process.  - OMFS consulted on admission, recs appreciated  - Start IV Unasyn 3 g q6hrs   - Pain control with IV Tylenol PRN, Morphine 4mg for severe pain  - Planned OR today at 3pm for closed vs open reduction  - There was initial concern for ETOH withdrawal on admission. Pt started on symptom triggered CIWA. Has not received Ativan since 6am yesterday 3/28. Pt anxious d/t pain and planned procedure. Last drink 3/25. ETOH on admission <10.  - Pt otherwise medically stable. RCRI=0, giving patient low risk MACE. Patient is optimized for OR today

## 2023-03-29 NOTE — PROGRESS NOTE ADULT - PROBLEM SELECTOR PLAN 3
Patient reports history of HTN on home Lisinopril 10mg. BPs elevated on admission to 160 systolic 100's diastolic, likely multifactorial iso pain, anxiety, alcohol withdrawal  - Monitor VS q4hrs for now  - Will start Lisinopril 10mg w/ holding parameters. Will need confirmation med rec Patient reports history of HTN on home Lisinopril 10mg. BPs elevated on admission to 160 systolic, 100's diastolic, likely multifactorial iso pain, anxiety, with possible alcohol withdrawal  - Monitor VS q4hrs for now  - Will start Lisinopril 10mg w/ holding parameters. Will need confirmation med rec

## 2023-03-29 NOTE — PROGRESS NOTE ADULT - SUBJECTIVE AND OBJECTIVE BOX
OMFS PROGRESS NOTE    Interval Events:   Patient is HD3 with bilateral Condyle fractures, left coronoid process fracture, and left mandibular parasymphsis greenstick fracture s/p intoxicated fall. KRISTAL o/n. Patient examined this AM bedside in NAD. Currently NPO. AVSS.     HPI:  48 yo woman with history of alcohol use disorder (up to 1L of vodka daily) c/b withdrawal seizures and alcoholic pancreatitis, anxiety/depression, and chronic anemia 2/2 menorrhagia from adenomyosis presents with a broken jaw following a fall 3 days ago. Pt states that she tripped and fell forward, hitting her face on the floor of her apartment, while she was intoxicated on Saturday. Denies any LOC with the fall. Afterwards, pt had worsening jaw pain and has been unable to eat anything, as pt can barely open her mouth and chew due to the jaw pain. Pt went to see an oral surgeon on Monday and was referred to the ED for further eval after pt was found to have a broken jaw in 3 places at the oral surgeon's office. Pt initially admitted to drinking a pint of vodka on Saturday, but later became less than forthcoming as to when she last drank alcohol, reporting that it was last Thursday rather than Saturday. Pt denies any recent fever, chills, chest pain, shortness of breath, palpitations, abdominal pain, nausea, vomiting, diarrhea, constipation, melena, BRBPR, or urinary symptoms. Also denies any headaches, vision changes, or neck or back pain.              Vital Signs Last 24 Hrs  T(C): 36.5 (29 Mar 2023 05:44), Max: 37.1 (28 Mar 2023 15:22)  T(F): 97.7 (29 Mar 2023 05:44), Max: 98.8 (28 Mar 2023 15:22)  HR: 76 (29 Mar 2023 05:44) (76 - 100)  BP: 138/88 (29 Mar 2023 05:44) (138/88 - 153/100)  BP(mean): --  RR: 17 (29 Mar 2023 05:44) (12 - 17)  SpO2: 100% (29 Mar 2023 05:44) (100% - 100%)    Parameters below as of 29 Mar 2023 05:44  Patient On (Oxygen Delivery Method): room air    PHYSICAL EXAM  Gen: AAOx3, NAD  EOE: chin abrasion scabbed over and healing, most likely due to previous fall.  IOE: GRIFFIN 30mm, patient denies any difficulty/limited opening. #9 fractured, #22 and 23 +1 mobility, patient reports happened after fall. occlusion unstable and not reproducible. Difficulty at this time manipulating patient into proper bite/patient also non-compliant during attempt to achieve occlusion. no intraoral swelling, infection or gross segmental mobility. no step deformity.                           9.7    4.42  )-----------( 180      ( 28 Mar 2023 10:50 )             33.3     CBC Full  -  ( 28 Mar 2023 10:50 )  WBC Count : 4.42 K/uL  RBC Count : 4.53 M/uL  Hemoglobin : 9.7 g/dL  Hematocrit : 33.3 %  Platelet Count - Automated : 180 K/uL  Mean Cell Volume : 73.5 fL  Mean Cell Hemoglobin : 21.4 pg  Mean Cell Hemoglobin Concentration : 29.1 gm/dL  Auto Neutrophil # : 3.46 K/uL  Auto Lymphocyte # : 0.54 K/uL  Auto Monocyte # : 0.19 K/uL  Auto Eosinophil # : 0.10 K/uL  Auto Basophil # : 0.02 K/uL  Auto Neutrophil % : 78.2 %  Auto Lymphocyte % : 12.2 %  Auto Monocyte % : 4.3 %  Auto Eosinophil % : 2.3 %  Auto Basophil % : 0.5 %    PT/INR - ( 27 Mar 2023 15:06 )   PT: 13.0 sec;   INR: 1.12 ratio         PTT - ( 27 Mar 2023 15:06 )  PTT:28.7 sec  COVID-19 PCR: NotDetec (11 Nov 2022 16:56)    I&O's Detail        Oxygen Saturation Index= Unable to calculate   [Based on FiO2 = Unknown, SpO2 = 100(03/29/2023 05:44), MAP = Unknown]

## 2023-03-29 NOTE — PROGRESS NOTE ADULT - PROBLEM SELECTOR PLAN 4
According to pt, pt with trip and fall on Saturday 2/2 alcohol intoxication. Pt denies any prodromal symptoms. Fall unwitnessed  - Plan as above for alcohol withdrawal  - Aspiration precautions, seizure precautions, fall risk protocol  - PT consult

## 2023-03-30 LAB
ANION GAP SERPL CALC-SCNC: 15 MMOL/L — HIGH (ref 7–14)
APTT BLD: 27.5 SEC — SIGNIFICANT CHANGE UP (ref 27–36.3)
BUN SERPL-MCNC: 7 MG/DL — SIGNIFICANT CHANGE UP (ref 7–23)
CALCIUM SERPL-MCNC: 9.3 MG/DL — SIGNIFICANT CHANGE UP (ref 8.4–10.5)
CHLORIDE SERPL-SCNC: 99 MMOL/L — SIGNIFICANT CHANGE UP (ref 98–107)
CO2 SERPL-SCNC: 22 MMOL/L — SIGNIFICANT CHANGE UP (ref 22–31)
CREAT SERPL-MCNC: 0.6 MG/DL — SIGNIFICANT CHANGE UP (ref 0.5–1.3)
EGFR: 110 ML/MIN/1.73M2 — SIGNIFICANT CHANGE UP
GLUCOSE SERPL-MCNC: 128 MG/DL — HIGH (ref 70–99)
HCG SERPL-ACNC: <5 MIU/ML — SIGNIFICANT CHANGE UP
HCT VFR BLD CALC: 33.5 % — LOW (ref 34.5–45)
HGB BLD-MCNC: 9.8 G/DL — LOW (ref 11.5–15.5)
INR BLD: 1.11 RATIO — SIGNIFICANT CHANGE UP (ref 0.88–1.16)
MAGNESIUM SERPL-MCNC: 1.7 MG/DL — SIGNIFICANT CHANGE UP (ref 1.6–2.6)
MCHC RBC-ENTMCNC: 21.9 PG — LOW (ref 27–34)
MCHC RBC-ENTMCNC: 29.3 GM/DL — LOW (ref 32–36)
MCV RBC AUTO: 74.9 FL — LOW (ref 80–100)
NRBC # BLD: 0 /100 WBCS — SIGNIFICANT CHANGE UP (ref 0–0)
NRBC # FLD: 0 K/UL — SIGNIFICANT CHANGE UP (ref 0–0)
PHOSPHATE SERPL-MCNC: 3.7 MG/DL — SIGNIFICANT CHANGE UP (ref 2.5–4.5)
PLATELET # BLD AUTO: 182 K/UL — SIGNIFICANT CHANGE UP (ref 150–400)
POTASSIUM SERPL-MCNC: 4.2 MMOL/L — SIGNIFICANT CHANGE UP (ref 3.5–5.3)
POTASSIUM SERPL-SCNC: 4.2 MMOL/L — SIGNIFICANT CHANGE UP (ref 3.5–5.3)
PROTHROM AB SERPL-ACNC: 12.9 SEC — SIGNIFICANT CHANGE UP (ref 10.5–13.4)
RBC # BLD: 4.47 M/UL — SIGNIFICANT CHANGE UP (ref 3.8–5.2)
RBC # FLD: 18.6 % — HIGH (ref 10.3–14.5)
SODIUM SERPL-SCNC: 136 MMOL/L — SIGNIFICANT CHANGE UP (ref 135–145)
WBC # BLD: 4.28 K/UL — SIGNIFICANT CHANGE UP (ref 3.8–10.5)
WBC # FLD AUTO: 4.28 K/UL — SIGNIFICANT CHANGE UP (ref 3.8–10.5)

## 2023-03-30 PROCEDURE — 99233 SBSQ HOSP IP/OBS HIGH 50: CPT | Mod: GC

## 2023-03-30 DEVICE — PLATE 1 LVL MMF 7H STRL: Type: IMPLANTABLE DEVICE | Status: FUNCTIONAL

## 2023-03-30 DEVICE — PLATE LOCKING 4 HOLE REG 1.0MM THICK CP TI: Type: IMPLANTABLE DEVICE | Status: FUNCTIONAL

## 2023-03-30 DEVICE — SCREW MAXDRIVE 2X5MM: Type: IMPLANTABLE DEVICE | Status: FUNCTIONAL

## 2023-03-30 DEVICE — SCREW LOKG 2X11MM: Type: IMPLANTABLE DEVICE | Status: FUNCTIONAL

## 2023-03-30 DEVICE — PLATE LOCKING FRACTURE 4HOLE 28MM NONCOMP 1.5MM THCK TI6AL4V: Type: IMPLANTABLE DEVICE | Status: FUNCTIONAL

## 2023-03-30 DEVICE — IMPLANTABLE DEVICE: Type: IMPLANTABLE DEVICE | Status: FUNCTIONAL

## 2023-03-30 RX ORDER — HYDROMORPHONE HYDROCHLORIDE 2 MG/ML
0.25 INJECTION INTRAMUSCULAR; INTRAVENOUS; SUBCUTANEOUS
Refills: 0 | Status: DISCONTINUED | OUTPATIENT
Start: 2023-03-30 | End: 2023-03-30

## 2023-03-30 RX ORDER — ONDANSETRON 8 MG/1
4 TABLET, FILM COATED ORAL ONCE
Refills: 0 | Status: DISCONTINUED | OUTPATIENT
Start: 2023-03-30 | End: 2023-03-30

## 2023-03-30 RX ORDER — CHLORHEXIDINE GLUCONATE 213 G/1000ML
15 SOLUTION TOPICAL
Refills: 0 | Status: DISCONTINUED | OUTPATIENT
Start: 2023-03-30 | End: 2023-04-02

## 2023-03-30 RX ORDER — PETROLATUM,WHITE
1 JELLY (GRAM) TOPICAL THREE TIMES A DAY
Refills: 0 | Status: DISCONTINUED | OUTPATIENT
Start: 2023-03-30 | End: 2023-04-02

## 2023-03-30 RX ORDER — FENTANYL CITRATE 50 UG/ML
25 INJECTION INTRAVENOUS
Refills: 0 | Status: DISCONTINUED | OUTPATIENT
Start: 2023-03-30 | End: 2023-03-30

## 2023-03-30 RX ORDER — HYDROMORPHONE HYDROCHLORIDE 2 MG/ML
0.5 INJECTION INTRAMUSCULAR; INTRAVENOUS; SUBCUTANEOUS
Refills: 0 | Status: DISCONTINUED | OUTPATIENT
Start: 2023-03-30 | End: 2023-03-30

## 2023-03-30 RX ORDER — HYDROCORTISONE 1 %
1 OINTMENT (GRAM) TOPICAL
Refills: 0 | Status: COMPLETED | OUTPATIENT
Start: 2023-03-30 | End: 2023-04-01

## 2023-03-30 RX ADMIN — AMPICILLIN SODIUM AND SULBACTAM SODIUM 200 GRAM(S): 250; 125 INJECTION, POWDER, FOR SUSPENSION INTRAMUSCULAR; INTRAVENOUS at 17:06

## 2023-03-30 RX ADMIN — MORPHINE SULFATE 4 MILLIGRAM(S): 50 CAPSULE, EXTENDED RELEASE ORAL at 05:41

## 2023-03-30 RX ADMIN — MORPHINE SULFATE 4 MILLIGRAM(S): 50 CAPSULE, EXTENDED RELEASE ORAL at 05:21

## 2023-03-30 RX ADMIN — Medication 1 APPLICATION(S): at 18:22

## 2023-03-30 RX ADMIN — Medication 105 MILLIGRAM(S): at 05:21

## 2023-03-30 RX ADMIN — HYDROMORPHONE HYDROCHLORIDE 0.25 MILLIGRAM(S): 2 INJECTION INTRAMUSCULAR; INTRAVENOUS; SUBCUTANEOUS at 15:56

## 2023-03-30 RX ADMIN — CHLORHEXIDINE GLUCONATE 15 MILLILITER(S): 213 SOLUTION TOPICAL at 18:22

## 2023-03-30 RX ADMIN — MORPHINE SULFATE 4 MILLIGRAM(S): 50 CAPSULE, EXTENDED RELEASE ORAL at 21:52

## 2023-03-30 RX ADMIN — Medication 105 MILLIGRAM(S): at 21:52

## 2023-03-30 RX ADMIN — AMPICILLIN SODIUM AND SULBACTAM SODIUM 200 GRAM(S): 250; 125 INJECTION, POWDER, FOR SUSPENSION INTRAMUSCULAR; INTRAVENOUS at 04:30

## 2023-03-30 RX ADMIN — LISINOPRIL 10 MILLIGRAM(S): 2.5 TABLET ORAL at 05:21

## 2023-03-30 RX ADMIN — HYDROMORPHONE HYDROCHLORIDE 0.5 MILLIGRAM(S): 2 INJECTION INTRAMUSCULAR; INTRAVENOUS; SUBCUTANEOUS at 17:52

## 2023-03-30 RX ADMIN — MORPHINE SULFATE 4 MILLIGRAM(S): 50 CAPSULE, EXTENDED RELEASE ORAL at 21:37

## 2023-03-30 RX ADMIN — HYDROMORPHONE HYDROCHLORIDE 0.25 MILLIGRAM(S): 2 INJECTION INTRAMUSCULAR; INTRAVENOUS; SUBCUTANEOUS at 15:26

## 2023-03-30 RX ADMIN — HYDROMORPHONE HYDROCHLORIDE 0.5 MILLIGRAM(S): 2 INJECTION INTRAMUSCULAR; INTRAVENOUS; SUBCUTANEOUS at 18:16

## 2023-03-30 NOTE — PRE-OP CHECKLIST - ISOLATION PRECAUTIONS
Neurology Progress Note    NAME:  Krystle Sierra   :   1986   MRN:   [id number]     Date/Time:  2017  Subjective: Krystle Sierra is a 26 y.o. male here today for  Follow up    Review of Systems:   Neurological ROS: positive for - dizziness, headaches, numbness/tingling, seizures and weakness         []Unable to obtain  ROS due to  []mental status change  []sedated   []intubated    Medications reviewed:  Current Outpatient Prescriptions   Medication Sig Dispense Refill    ergocalciferol (ERGOCALCIFEROL) 50,000 unit capsule Take 1 Cap by mouth every seven (7) days. 4 Cap 3    divalproex DR (DEPAKOTE) 250 mg tablet Take 2 Tabs by mouth nightly. 60 Tab 1    levETIRAcetam (KEPPRA) 500 mg tablet Take 1 Tab by mouth two (2) times a day. 60 Tab 2    tiZANidine (ZANAFLEX) 4 mg tablet Take 1 Tab by mouth nightly. 30 Tab 2    HYDROcodone-acetaminophen (NORCO)  mg tablet Take 1 Tab by mouth every eight (8) hours as needed for Pain. Max Daily Amount: 3 Tabs. 40 Tab 0    gabapentin (NEURONTIN) 300 mg capsule Take 1 Cap by mouth three (3) times daily. 90 Cap 1    amoxicillin (AMOXIL) 500 mg capsule Take 1 Cap by mouth three (3) times daily. 21 Cap 1    albuterol (VENTOLIN HFA) 90 mcg/actuation inhaler Take 2 Puffs by inhalation every four (4) hours as needed for Wheezing.  1 Inhaler 0        Objective:   Vitals:  Vitals:    17 1138   BP: 137/75   Pulse: 70   Resp: 16   Temp: 99.2 °F (37.3 °C)   TempSrc: Temporal   SpO2: 97%   Weight: 166 lb 12.8 oz (75.7 kg)   Height: 5' 8\" (1.727 m)   PainSc:  10 - Worst pain ever   PainLoc: Leg               Lab Data Reviewed:  Lab Results   Component Value Date/Time    WBC 5.6 2017 12:32 PM    HCT 42.1 2017 12:32 PM    HGB 14.2 2017 12:32 PM    PLATELET 854  12:32 PM       Lab Results   Component Value Date/Time    Sodium 141 2017 01:44 PM    Potassium 3.6 2017 01:44 PM    Chloride 105 2017 01:44 PM    CO2 26 01/18/2017 01:44 PM    Glucose 98 01/18/2017 01:44 PM    BUN 10 01/18/2017 01:44 PM    Creatinine 0.91 01/18/2017 01:44 PM    Calcium 9.4 01/18/2017 01:44 PM       No components found for: TROPQUANT    No results found for: WARREN      No results found for: HBA1C, HGBE8, CUP2YYFM, GFQ9FNPH, RTF7PZSG     No results found for: B12LT, FOL, RBCF    No results found for: WARREN, ANARX, ANAIGG, XBANA    No results found for: CHOL, CHOLPOCT, CHOLX, CHLST, CHOLV, HDL, HDLPOC, LDL, LDLCPOC, NLDLCT, DLDL, LDLC, DLDLP, VLDLC, VLDL, TGL, TGLX, TRIGL, [id number], TRIGP, TGLPOCT, CHHD, CHHDX      CT Results (recent):    Results from Hospital Encounter encounter on 04/24/12   CT HEAD WO CONT   Narrative **Final Report**      ICD Codes / Adm. Diagnosis: 52  070659 / Headache  Generalized Body Aches  Examination:  CT HEAD WO CON  - [id number] - Apr 24 2012  4:17PM  Accession No:  [id number]  Reason:  headache, seizure      REPORT:  Indication: Headache, seizure    Comparison to 10/13/2011    Multiple axial images were obtained from the skull base to the vertex   without the use of intravenous contrast. Ventricles and sulci are normal for   patient's age. There is no midline shift or herniation. The examination is   negative for acute infarct, mass lesion, or hemorrhage. The visualized   portions of the petrous temporal bones, paranasal sinuses, and orbits are   unremarkable. IMPRESSION: No acute process. Signing/Reading Doctor: Mayela Santos ([id number])    Approved: Mayela Santos ([id number])  04/24/2012                                      MRI Results (recent):    Results from [place] encounter on 07/25/16   MRI BRAIN W WO CONT   Narrative **Final Report**      ICD Codes / Adm. Diagnosis: 341.9  780.39 / Demyelinating disease of centr    Other convulsions  Examination:  MR BRAIN Iggy Saucedo  - [id number] - Jul 25 2016  3:07PM  Accession No:  [id number]  Reason:  Seizures      REPORT:  EXAM:  MR BRAIN W AND WO CON  INDICATION:  Seizures, demyelinating disease, headaches. TECHNIQUE: Sagittal T1, axial FLAIR, T2, T1 and gradient echo T2-weighted   images of the head were obtained followed by intravenous infusion 6 mL   Gadavist repeat axial and coronal T1-weighted images and axial diffusion   weighted images. Thin angled coronal T2 and FLAIR images through the   temporal lobes. Sagittal FLAIR image of the whole head. COMPARISON: None available. FINDINGS:  The ventricular size and configuration are normal. Incidental note of a   small midline 1 cm cyst posterior to the third ventricle but appears   somewhat separate but abutting the superior pineal gland. 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 This should be of no   clinical significance as it is causing no significant mass effect and should   be developmental in origin. Normal signal demonstrated in the cerebral   hemispheres, brain stem and cerebellum. No abnormal areas of intracranial enhancement. No abnormal diffusion. No evidence of intracranial hemorrhage, acute infarct, mass or abnormal   extra-axial fluid collections. Normal flow-voids are present in the vertebral, basilar and carotid artery   systems. Note is made of diminished T1 hyperintensity in the upper cervical spine   marrow signal. This may be related to red marrow replacement. The structures of the cranial base including paranasal sinuses are otherwise   unremarkable. IMPRESSION:   1. Diminished marrow signal in the upper cervical spine may be within normal   variation due to red marrow replacement. Correlate clinically. 2. Otherwise essentially normal MRI of the head.            Signing/Reading Doctor: Mitali Oneill ([id number])    Approved: Mitali Oneill ([id number])  Jul 25 2016  5:11PM                                   IR Results (recent):  No results found for this or any previous visit.    VAS/US Results (recent):  No results found for this or any previous visit. PHYSICAL EXAM:  General:    Alert, cooperative, no distress, appears stated age. Head:   Normocephalic, without obvious abnormality, atraumatic. Eyes:   Conjunctivae/corneas clear. PERRLA  Nose:  Nares normal. No drainage or sinus tenderness. Throat:    Lips, mucosa, and tongue normal.  No Thrush  Neck:  Supple, symmetrical,  no adenopathy, thyroid: non tender    no carotid bruit and no JVD. Back:    Symmetric,  No CVA tenderness. Lungs:   Clear to auscultation bilaterally. No Wheezing or Rhonchi. No rales. Chest wall:  No tenderness or deformity. No Accessory muscle use. Heart:   Regular rate and rhythm,  no murmur, rub or gallop. Abdomen:   Soft, non-tender. Not distended. Bowel sounds normal. No masses  Extremities: Extremities normal, atraumatic, No cyanosis. No edema. No clubbing  Skin:     Texture, turgor normal. No rashes or lesions. Not Jaundiced  Lymph nodes: Cervical, supraclavicular normal.  Psych:  Good insight. Not depressed. Not anxious or agitated. NEUROLOGICAL EXAM:  Appearance: The patient is well developed, well nourished, provides a coherent history and is in no acute distress. Mental Status: Oriented to time, place and person. Mood and affect appropriate. Cranial Nerves:   Intact visual fields. Fundi are benign. TANG, EOM's full, no nystagmus, no ptosis. Facial sensation is normal. Corneal reflexes are intact. Facial movement is symmetric. Hearing is normal bilaterally. Palate is midline with normal sternocleidomastoid and trapezius muscles are normal. Tongue is midline. Motor:  5/5 strength in upper and lower proximal and distal muscles. Normal bulk and tone. No fasciculations. Reflexes:   Deep tendon reflexes 2+/4 and symmetrical.   Sensory:   Normal to touch, pinprick and vibration. Gait:  Normal gait. Tremor:   No tremor noted.    Cerebellar:  No cerebellar signs present. Neurovascular:  Normal heart sounds and regular rhythm, peripheral pulses intact, and no carotid bruits. Assesment  There are no diagnoses linked to this encounter.  ___________________________________________________  PLAN:      ICD-10-CM ICD-9-CM    1. Partial symptomatic epilepsy with complex partial seizures, not intractable, without status epilepticus ([facility] [address] G40.209 345.40    2. Paresthesia R20.2 782.0    3. Migraine without aura and without status migrainosus, not intractable G43.009 346.10    4. Pain in both lower extremities M79.604 729.5     M79.605     5. Myofascial muscle pain M79.1 729.1    6.  Upper respiratory tract infection, unspecified type J06.9 465.9      [unfilled]           ___________________________________________________    Total time spent with patient:  []15   []25   []35   [] __ minutes    Care Plan discussed with:    []Patient   []Family    []Care Manager   []Consultant/Specialist :    ___________________________________________________    Attending Physician: Viet Zhoa MD none

## 2023-03-30 NOTE — CHART NOTE - NSCHARTNOTEFT_GEN_A_CORE
OMFS POST OP CHECK    Patient seen and evaluated bedside in NAD. AVSS. Reports pain well controlled. Tolerated half cup juice. Surgical incision sites hemostatic and sutures c/d/i. [place] arch bars in place with heavy elastics intact. Occlusion stable.       Bartolo Bell   OMFS  #[id number]
Plan for examination under general anesthesia, possible closed reduction, possible open reduction internal fixation as an add-on case on 3/29/23 after 3 PM.  Please have patient NPO after midnight.

## 2023-03-30 NOTE — PROGRESS NOTE ADULT - SUBJECTIVE AND OBJECTIVE BOX
Ernesto Villarreal, PGY1    AYLEEN EDY  47y  Female    Complaints:  Subjective:     No acute o/n events. OMFS unable to take pt to OR yesterday d/t scheduling. Plan for today. Pt continues to endorse facial pain and headache. On morphine and tylenol PRN for pain. Pt denies subj fevers/chills, dizziness, chest pain, palpitations, n/v, abd pain, dysuria, diarrhea      FAMILY HISTORY:  Family history of hypertension (Father, Mother)      47yVital Signs Last 24 Hrs  T(C): 36.8 (30 Mar 2023 05:15), Max: 37 (29 Mar 2023 21:25)  T(F): 98.3 (30 Mar 2023 05:15), Max: 98.6 (29 Mar 2023 21:25)  HR: 78 (30 Mar 2023 05:15) (70 - 86)  BP: 140/83 (30 Mar 2023 05:15) (133/93 - 146/100)  BP(mean): --  RR: 17 (30 Mar 2023 05:15) (17 - 18)  SpO2: 100% (30 Mar 2023 05:15) (100% - 100%)    Parameters below as of 30 Mar 2023 05:15  Patient On (Oxygen Delivery Method): room air          PHYSICAL EXAM:  GENERAL: NAD, well-groomed, well-developed. Anxious  HEAD:  Atraumatic, Normocephalic  EYES: EOMI, PERRLA, conjunctiva and sclera clear  ENMT: No tonsillar erythema, exudates, or enlargement; Moist mucous membranes,   NECK: Supple, No JVD, Normal thyroid  NERVOUS SYSTEM:  Alert & Oriented X3, Poor concentration; Motor Strength 5/5 B/L upper and lower extremities; Flat affect. No tremor  CHEST/LUNG: CTAB; No rales, rhonchi, wheezing,   HEART: Regular rate and rhythm; No murmurs, rubs, or gallops  ABDOMEN: Soft, Nontender, Nondistended; Bowel sounds present  EXTREMITIES:  2+ Peripheral Pulses, No clubbing, cyanosis, or edema  SKIN: No rashes or lesions      Consultant(s) Notes Reviewed:  [x ] YES  [ ] NO  Care Discussed with Consultants/Other Providers [ x] YES  [ ] NO    LABS:                Female  RADIOLOGY & ADDITIONAL TESTS:    Imaging Personally Reviewed:  [ ] YES  [ ] NO    MedsMEDICATIONS  (STANDING):  ampicillin/sulbactam  IVPB 3 Gram(s) IV Intermittent every 6 hours  lisinopril 10 milliGRAM(s) Oral daily  sodium chloride 0.9% 1000 milliLiter(s) (75 mL/Hr) IV Continuous <Continuous>  thiamine IVPB 500 milliGRAM(s) IV Intermittent every 8 hours    MEDICATIONS  (PRN):  morphine  - Injectable 4 milliGRAM(s) IV Push every 6 hours PRN Severe Pain (7 - 10)      HEALTH ISSUES - PROBLEM Dx:  Bilateral mandibular fracture, closed, initial encounter    Alcohol withdrawal    Hypertension    Fall    Anemia    Anxiety and depression    Blood pressure elevated without history of HTN    Medication management    Need for prophylactic measure                   Ernesto Villarreal, PGY1    AYLEEN DEY  47y  Female    Complaints:  Subjective:     No acute o/n events. OMFS unable to take pt to OR yesterday d/t scheduling. Plan for today. Pt continues to endorse facial pain and headache. On morphine and tylenol PRN for pain. Pt denies subj fevers/chills, dizziness, chest pain, palpitations, n/v, abd pain, dysuria, diarrhea      FAMILY HISTORY:  Family history of hypertension (Father, Mother)      47yVital Signs Last 24 Hrs  T(C): 36.8 (30 Mar 2023 05:15), Max: 37 (29 Mar 2023 21:25)  T(F): 98.3 (30 Mar 2023 05:15), Max: 98.6 (29 Mar 2023 21:25)  HR: 78 (30 Mar 2023 05:15) (70 - 86)  BP: 140/83 (30 Mar 2023 05:15) (133/93 - 146/100)  BP(mean): --  RR: 17 (30 Mar 2023 05:15) (17 - 18)  SpO2: 100% (30 Mar 2023 05:15) (100% - 100%)    Parameters below as of 30 Mar 2023 05:15  Patient On (Oxygen Delivery Method): room air          PHYSICAL EXAM:  GENERAL: NAD, well-groomed, well-developed. Anxious  HEAD:  Atraumatic, Normocephalic  EYES: EOMI, PERRLA, conjunctiva and sclera clear  ENMT: No tonsillar erythema, exudates, or enlargement; Moist mucous membranes,   NECK: Supple, No JVD, Normal thyroid  NERVOUS SYSTEM:  Alert & Oriented X3, Poor concentration; Motor Strength 5/5 B/L upper and lower extremities; Flat affect. No tremor  CHEST/LUNG: CTAB; No rales, rhonchi, wheezing,   HEART: Regular rate and rhythm; No murmurs, rubs, or gallops  ABDOMEN: Soft, Nontender, Nondistended; Bowel sounds present  EXTREMITIES:  2+ Peripheral Pulses, No clubbing, cyanosis, or edema  SKIN: No rashes or lesions      Consultant(s) Notes Reviewed:  [x ] YES  [ ] NO  Care Discussed with Consultants/Other Providers [ x] YES  [ ] NO    LABS:                          9.8    4.28  )-----------( 182      ( 30 Mar 2023 06:15 )             33.5       03-30    136  |  99  |  7   ----------------------------<  128<H>  4.2   |  22  |  0.60    Ca    9.3      30 Mar 2023 06:15  Phos  3.7     03-30  Mg     1.70     03-30    TPro  8.0  /  Alb  4.2  /  TBili  0.5  /  DBili  x   /  AST  46<H>  /  ALT  29  /  AlkPhos  72  03-28                  PT/INR - ( 30 Mar 2023 06:15 )   PT: 12.9 sec;   INR: 1.11 ratio         PTT - ( 30 Mar 2023 06:15 )  PTT:27.5 sec          CAPILLARY BLOOD GLUCOSE                          Female  RADIOLOGY & ADDITIONAL TESTS:    Imaging Personally Reviewed:  [ ] YES  [ ] NO    MedsMEDICATIONS  (STANDING):  ampicillin/sulbactam  IVPB 3 Gram(s) IV Intermittent every 6 hours  lisinopril 10 milliGRAM(s) Oral daily  sodium chloride 0.9% 1000 milliLiter(s) (75 mL/Hr) IV Continuous <Continuous>  thiamine IVPB 500 milliGRAM(s) IV Intermittent every 8 hours    MEDICATIONS  (PRN):  morphine  - Injectable 4 milliGRAM(s) IV Push every 6 hours PRN Severe Pain (7 - 10)      HEALTH ISSUES - PROBLEM Dx:  Bilateral mandibular fracture, closed, initial encounter    Alcohol withdrawal    Hypertension    Fall    Anemia    Anxiety and depression    Blood pressure elevated without history of HTN    Medication management    Need for prophylactic measure

## 2023-03-30 NOTE — PROGRESS NOTE ADULT - PROBLEM SELECTOR PLAN 6
Pt reported starting venlafaxine to overnight admitting team, but dose unknown. On my discussion pt denies venlafaxine use.   - Currently denies any depressed mood or SI/HI.  - No venlafaxine prescription noted  - c/t monitor

## 2023-03-30 NOTE — PROGRESS NOTE ADULT - ASSESSMENT
A/P:   47 yr old female presenting with bilateral Condyle fractures, left coronoid process fracture, and left mandibular parasymphsis greenstick fracture s/p intoxicated fall with plan for OR for ORIF bilateral condylar fracture and left parasymphysis fracture and possible closed reduction with Dr. Parra     Plan:  - Add on #4 for OR today 3/30/23 with Dr. Parra   - Please keep NPO  - C/w unasyn 3g q6h  - IVF  - Multimodal pain control per primary  - Appreciate excellent care per primary     Bartolo Bell DDS  Oral and Maxillofacial Surgery   Pager #[phone]  Available on Microsoft Teams

## 2023-03-30 NOTE — PROGRESS NOTE ADULT - PROBLEM SELECTOR PLAN 5
Hgb 10.2 on admission. MCV 72.9, indicating microcytic anemia. Baseline Hgb ranging from 8-11, likely 2/2 iron-deficiency anemia from menorrhagia. Pt's LMP was 3/1/23. No S/S of any active bleed at this time.  - Monitor H/H  - Iron studies indicate LOYDA  - Can start iron supplementation

## 2023-03-30 NOTE — PROGRESS NOTE ADULT - PROBLEM SELECTOR PLAN 2
Pt with history of alcohol use disorder, drinking up to 1L of vodka daily, c/b multiple withdrawals, including with withdrawal seizures. Pt initially admitted to drinking 1 pint of vodka on Saturday prior to fall. CIWA scores on admission ranging from 4-12, currently 3-5.   - Pt has not required any Ativan on symptom triggered CIWA. Will monitor off CIWA. Outside withdrawal window.   - Start high-dose IV thiamine 500 mg q8hrs x3 days  - Start NS with folic acid and MVI at 75 cc/hr x12 hrs while NPO  - SBIRT and SW consults. Pt has been on naltrexone in the past to help with alcohol cessation, now off of naltrexone.

## 2023-03-30 NOTE — PROGRESS NOTE ADULT - PROBLEM SELECTOR PLAN 3
Patient reports history of HTN on home Lisinopril 10mg. BPs elevated on admission to 160 systolic, 100's diastolic, likely multifactorial iso pain, anxiety, with possible alcohol withdrawal  - Monitor VS q4hrs for now  - Will start Lisinopril 10mg w/ holding parameters. Will need confirmation med rec

## 2023-03-30 NOTE — BRIEF OPERATIVE NOTE - NSICDXBRIEFOPLAUNCH_GEN_ALL_CORE
A new work excuse was printed. Tramadol refill was sent. She was advised that this can cause sedation and should not be taken while working or driving.    <--- Click to Launch ICDx for PreOp, PostOp and Procedure

## 2023-03-30 NOTE — BRIEF OPERATIVE NOTE - OPERATION/FINDINGS
ORIF Left symphysis fracture via trans-oral approach, Closed reduction with placement of maxillary and mandibular arch bars. Heavy elastics placed- holding bite closed. Anterior chin scar revision.

## 2023-03-30 NOTE — PROGRESS NOTE ADULT - SUBJECTIVE AND OBJECTIVE BOX
OMFS PROGRESS NOTE    Interval Events:   Patient is HD4 with bilateral Condyle fractures, left coronoid process fracture, and left mandibular parasymphsis greenstick fracture s/p intoxicated fall. KRISTAL o/n. Patient examined this AM bedside in NAD. Currently NPO. AVSS.     HPI:  46 yo woman with history of alcohol use disorder (up to 1L of vodka daily) c/b withdrawal seizures and alcoholic pancreatitis, anxiety/depression, and chronic anemia 2/2 menorrhagia from adenomyosis presents with a broken jaw following a fall 3 days ago. Pt states that she tripped and fell forward, hitting her face on the floor of her apartment, while she was intoxicated on Saturday. Denies any LOC with the fall. Afterwards, pt had worsening jaw pain and has been unable to eat anything, as pt can barely open her mouth and chew due to the jaw pain. Pt went to see an oral surgeon on Monday and was referred to the ED for further eval after pt was found to have a broken jaw in 3 places at the oral surgeon's office. Pt initially admitted to drinking a pint of vodka on Saturday, but later became less than forthcoming as to when she last drank alcohol, reporting that it was last Thursday rather than Saturday. Pt denies any recent fever, chills, chest pain, shortness of breath, palpitations, abdominal pain, nausea, vomiting, diarrhea, constipation, melena, BRBPR, or urinary symptoms. Also denies any headaches, vision changes, or neck or back pain.     Vital Signs Last 24 Hrs  T(C): 36.8 (30 Mar 2023 05:15), Max: 37 (29 Mar 2023 21:25)  T(F): 98.3 (30 Mar 2023 05:15), Max: 98.6 (29 Mar 2023 21:25)  HR: 78 (30 Mar 2023 05:15) (70 - 86)  BP: 140/83 (30 Mar 2023 05:15) (133/93 - 146/100)  BP(mean): --  RR: 17 (30 Mar 2023 05:15) (17 - 18)  SpO2: 100% (30 Mar 2023 05:15) (100% - 100%)    Parameters below as of 30 Mar 2023 05:15  Patient On (Oxygen Delivery Method): room air    PHYSICAL EXAM  Gen: AAOx3, NAD  EOE: chin abrasion scabbed over and healing, most likely due to previous fall.  IOE: GRIFFIN 30mm, patient denies any difficulty/limited opening. #9 fractured, #22 and 23 +1 mobility, patient reports happened after fall. occlusion unstable and not reproducible. Difficulty at this time manipulating patient into proper bite/patient also non-compliant during attempt to achieve occlusion. no intraoral swelling, infection or gross segmental mobility. no step deformity.     LABS:  cret                        9.7    4.92  )-----------( 177      ( 29 Mar 2023 06:40 )             33.8     03-29    136  |  97<L>  |  10  ----------------------------<  108<H>  3.7   |  24  |  0.57    Ca    9.1      29 Mar 2023 06:40  Phos  3.3     03-29  Mg     1.70     03-29    TPro  8.0  /  Alb  4.2  /  TBili  0.5  /  DBili  x   /  AST  46<H>  /  ALT  29  /  AlkPhos  72  03-28    PT/INR - ( 29 Mar 2023 06:40 )   PT: 12.9 sec;   INR: 1.11 ratio         PTT - ( 29 Mar 2023 06:40 )  PTT:27.3 sec

## 2023-03-30 NOTE — BRIEF OPERATIVE NOTE - COMMENTS
Clear liquid diet, no straws, head of bed elevated 30 degrees, Aquaphor and hydrocortisone cream to lower lip, peridex mouth rinse.

## 2023-03-31 ENCOUNTER — TRANSCRIPTION ENCOUNTER (OUTPATIENT)
Age: 48
End: 2023-03-31

## 2023-03-31 LAB
ANION GAP SERPL CALC-SCNC: 14 MMOL/L — SIGNIFICANT CHANGE UP (ref 7–14)
BASOPHILS # BLD AUTO: 0.02 K/UL — SIGNIFICANT CHANGE UP (ref 0–0.2)
BASOPHILS NFR BLD AUTO: 0.2 % — SIGNIFICANT CHANGE UP (ref 0–2)
BUN SERPL-MCNC: 6 MG/DL — LOW (ref 7–23)
CALCIUM SERPL-MCNC: 9.4 MG/DL — SIGNIFICANT CHANGE UP (ref 8.4–10.5)
CHLORIDE SERPL-SCNC: 98 MMOL/L — SIGNIFICANT CHANGE UP (ref 98–107)
CO2 SERPL-SCNC: 23 MMOL/L — SIGNIFICANT CHANGE UP (ref 22–31)
CREAT SERPL-MCNC: 0.6 MG/DL — SIGNIFICANT CHANGE UP (ref 0.5–1.3)
EGFR: 110 ML/MIN/1.73M2 — SIGNIFICANT CHANGE UP
EOSINOPHIL # BLD AUTO: 0 K/UL — SIGNIFICANT CHANGE UP (ref 0–0.5)
EOSINOPHIL NFR BLD AUTO: 0 % — SIGNIFICANT CHANGE UP (ref 0–6)
GLUCOSE SERPL-MCNC: 103 MG/DL — HIGH (ref 70–99)
HCT VFR BLD CALC: 32.9 % — LOW (ref 34.5–45)
HGB BLD-MCNC: 9.3 G/DL — LOW (ref 11.5–15.5)
IANC: 6.41 K/UL — SIGNIFICANT CHANGE UP (ref 1.8–7.4)
IMM GRANULOCYTES NFR BLD AUTO: 0.4 % — SIGNIFICANT CHANGE UP (ref 0–0.9)
LYMPHOCYTES # BLD AUTO: 0.93 K/UL — LOW (ref 1–3.3)
LYMPHOCYTES # BLD AUTO: 11.6 % — LOW (ref 13–44)
MAGNESIUM SERPL-MCNC: 1.8 MG/DL — SIGNIFICANT CHANGE UP (ref 1.6–2.6)
MCHC RBC-ENTMCNC: 21.3 PG — LOW (ref 27–34)
MCHC RBC-ENTMCNC: 28.3 GM/DL — LOW (ref 32–36)
MCV RBC AUTO: 75.5 FL — LOW (ref 80–100)
MONOCYTES # BLD AUTO: 0.66 K/UL — SIGNIFICANT CHANGE UP (ref 0–0.9)
MONOCYTES NFR BLD AUTO: 8.2 % — SIGNIFICANT CHANGE UP (ref 2–14)
NEUTROPHILS # BLD AUTO: 6.41 K/UL — SIGNIFICANT CHANGE UP (ref 1.8–7.4)
NEUTROPHILS NFR BLD AUTO: 79.6 % — HIGH (ref 43–77)
NRBC # BLD: 0 /100 WBCS — SIGNIFICANT CHANGE UP (ref 0–0)
NRBC # FLD: 0 K/UL — SIGNIFICANT CHANGE UP (ref 0–0)
PHOSPHATE SERPL-MCNC: 3 MG/DL — SIGNIFICANT CHANGE UP (ref 2.5–4.5)
PLATELET # BLD AUTO: 210 K/UL — SIGNIFICANT CHANGE UP (ref 150–400)
POTASSIUM SERPL-MCNC: 3.9 MMOL/L — SIGNIFICANT CHANGE UP (ref 3.5–5.3)
POTASSIUM SERPL-SCNC: 3.9 MMOL/L — SIGNIFICANT CHANGE UP (ref 3.5–5.3)
RBC # BLD: 4.36 M/UL — SIGNIFICANT CHANGE UP (ref 3.8–5.2)
RBC # FLD: 18.6 % — HIGH (ref 10.3–14.5)
SODIUM SERPL-SCNC: 135 MMOL/L — SIGNIFICANT CHANGE UP (ref 135–145)
WBC # BLD: 8.05 K/UL — SIGNIFICANT CHANGE UP (ref 3.8–10.5)
WBC # FLD AUTO: 8.05 K/UL — SIGNIFICANT CHANGE UP (ref 3.8–10.5)

## 2023-03-31 PROCEDURE — 99232 SBSQ HOSP IP/OBS MODERATE 35: CPT | Mod: GC

## 2023-03-31 PROCEDURE — 93010 ELECTROCARDIOGRAM REPORT: CPT

## 2023-03-31 RX ORDER — NALOXONE HYDROCHLORIDE 4 MG/.1ML
0.4 SPRAY NASAL ONCE
Refills: 0 | Status: DISCONTINUED | OUTPATIENT
Start: 2023-03-31 | End: 2023-04-02

## 2023-03-31 RX ORDER — LANOLIN ALCOHOL/MO/W.PET/CERES
5 CREAM (GRAM) TOPICAL AT BEDTIME
Refills: 0 | Status: DISCONTINUED | OUTPATIENT
Start: 2023-03-31 | End: 2023-03-31

## 2023-03-31 RX ORDER — ONDANSETRON 8 MG/1
4 TABLET, FILM COATED ORAL EVERY 6 HOURS
Refills: 0 | Status: DISCONTINUED | OUTPATIENT
Start: 2023-03-31 | End: 2023-04-02

## 2023-03-31 RX ORDER — LANOLIN ALCOHOL/MO/W.PET/CERES
6 CREAM (GRAM) TOPICAL AT BEDTIME
Refills: 0 | Status: DISCONTINUED | OUTPATIENT
Start: 2023-03-31 | End: 2023-04-02

## 2023-03-31 RX ORDER — HYDROMORPHONE HYDROCHLORIDE 2 MG/ML
2 INJECTION INTRAMUSCULAR; INTRAVENOUS; SUBCUTANEOUS ONCE
Refills: 0 | Status: DISCONTINUED | OUTPATIENT
Start: 2023-03-31 | End: 2023-03-31

## 2023-03-31 RX ORDER — HYDROMORPHONE HYDROCHLORIDE 2 MG/ML
0.5 INJECTION INTRAMUSCULAR; INTRAVENOUS; SUBCUTANEOUS ONCE
Refills: 0 | Status: DISCONTINUED | OUTPATIENT
Start: 2023-03-31 | End: 2023-03-31

## 2023-03-31 RX ORDER — IBUPROFEN 200 MG
600 TABLET ORAL EVERY 8 HOURS
Refills: 0 | Status: DISCONTINUED | OUTPATIENT
Start: 2023-03-31 | End: 2023-04-01

## 2023-03-31 RX ORDER — ACETAMINOPHEN 500 MG
650 TABLET ORAL EVERY 6 HOURS
Refills: 0 | Status: DISCONTINUED | OUTPATIENT
Start: 2023-03-31 | End: 2023-04-01

## 2023-03-31 RX ORDER — ACETAMINOPHEN 500 MG
1000 TABLET ORAL ONCE
Refills: 0 | Status: COMPLETED | OUTPATIENT
Start: 2023-03-31 | End: 2023-03-31

## 2023-03-31 RX ORDER — ZOLPIDEM TARTRATE 10 MG/1
5 TABLET ORAL AT BEDTIME
Refills: 0 | Status: DISCONTINUED | OUTPATIENT
Start: 2023-03-31 | End: 2023-04-02

## 2023-03-31 RX ORDER — OXYCODONE HYDROCHLORIDE 5 MG/1
10 TABLET ORAL EVERY 4 HOURS
Refills: 0 | Status: DISCONTINUED | OUTPATIENT
Start: 2023-03-31 | End: 2023-04-01

## 2023-03-31 RX ORDER — OXYCODONE HYDROCHLORIDE 5 MG/1
5 TABLET ORAL EVERY 4 HOURS
Refills: 0 | Status: DISCONTINUED | OUTPATIENT
Start: 2023-03-31 | End: 2023-04-01

## 2023-03-31 RX ADMIN — AMPICILLIN SODIUM AND SULBACTAM SODIUM 200 GRAM(S): 250; 125 INJECTION, POWDER, FOR SUSPENSION INTRAMUSCULAR; INTRAVENOUS at 06:25

## 2023-03-31 RX ADMIN — Medication 1000 MILLIGRAM(S): at 01:13

## 2023-03-31 RX ADMIN — MORPHINE SULFATE 4 MILLIGRAM(S): 50 CAPSULE, EXTENDED RELEASE ORAL at 03:53

## 2023-03-31 RX ADMIN — OXYCODONE HYDROCHLORIDE 10 MILLIGRAM(S): 5 TABLET ORAL at 11:50

## 2023-03-31 RX ADMIN — Medication 650 MILLIGRAM(S): at 17:19

## 2023-03-31 RX ADMIN — Medication 600 MILLIGRAM(S): at 17:49

## 2023-03-31 RX ADMIN — Medication 1 APPLICATION(S): at 06:31

## 2023-03-31 RX ADMIN — AMPICILLIN SODIUM AND SULBACTAM SODIUM 200 GRAM(S): 250; 125 INJECTION, POWDER, FOR SUSPENSION INTRAMUSCULAR; INTRAVENOUS at 11:53

## 2023-03-31 RX ADMIN — ONDANSETRON 4 MILLIGRAM(S): 8 TABLET, FILM COATED ORAL at 10:51

## 2023-03-31 RX ADMIN — HYDROMORPHONE HYDROCHLORIDE 0.5 MILLIGRAM(S): 2 INJECTION INTRAMUSCULAR; INTRAVENOUS; SUBCUTANEOUS at 17:53

## 2023-03-31 RX ADMIN — MORPHINE SULFATE 4 MILLIGRAM(S): 50 CAPSULE, EXTENDED RELEASE ORAL at 04:08

## 2023-03-31 RX ADMIN — Medication 1 APPLICATION(S): at 14:00

## 2023-03-31 RX ADMIN — AMPICILLIN SODIUM AND SULBACTAM SODIUM 200 GRAM(S): 250; 125 INJECTION, POWDER, FOR SUSPENSION INTRAMUSCULAR; INTRAVENOUS at 23:27

## 2023-03-31 RX ADMIN — OXYCODONE HYDROCHLORIDE 10 MILLIGRAM(S): 5 TABLET ORAL at 12:20

## 2023-03-31 RX ADMIN — OXYCODONE HYDROCHLORIDE 5 MILLIGRAM(S): 5 TABLET ORAL at 11:07

## 2023-03-31 RX ADMIN — AMPICILLIN SODIUM AND SULBACTAM SODIUM 200 GRAM(S): 250; 125 INJECTION, POWDER, FOR SUSPENSION INTRAMUSCULAR; INTRAVENOUS at 17:30

## 2023-03-31 RX ADMIN — OXYCODONE HYDROCHLORIDE 5 MILLIGRAM(S): 5 TABLET ORAL at 10:37

## 2023-03-31 RX ADMIN — HYDROMORPHONE HYDROCHLORIDE 0.5 MILLIGRAM(S): 2 INJECTION INTRAMUSCULAR; INTRAVENOUS; SUBCUTANEOUS at 18:23

## 2023-03-31 RX ADMIN — Medication 1 APPLICATION(S): at 01:49

## 2023-03-31 RX ADMIN — OXYCODONE HYDROCHLORIDE 10 MILLIGRAM(S): 5 TABLET ORAL at 16:33

## 2023-03-31 RX ADMIN — AMPICILLIN SODIUM AND SULBACTAM SODIUM 200 GRAM(S): 250; 125 INJECTION, POWDER, FOR SUSPENSION INTRAMUSCULAR; INTRAVENOUS at 01:27

## 2023-03-31 RX ADMIN — CHLORHEXIDINE GLUCONATE 15 MILLILITER(S): 213 SOLUTION TOPICAL at 17:23

## 2023-03-31 RX ADMIN — Medication 650 MILLIGRAM(S): at 17:49

## 2023-03-31 RX ADMIN — Medication 400 MILLIGRAM(S): at 00:57

## 2023-03-31 RX ADMIN — ZOLPIDEM TARTRATE 5 MILLIGRAM(S): 10 TABLET ORAL at 20:18

## 2023-03-31 RX ADMIN — OXYCODONE HYDROCHLORIDE 10 MILLIGRAM(S): 5 TABLET ORAL at 16:03

## 2023-03-31 RX ADMIN — Medication 600 MILLIGRAM(S): at 17:20

## 2023-03-31 NOTE — PROGRESS NOTE ADULT - PROBLEM SELECTOR PLAN 6
Pt reports occasionally taking benzodiazapines which her outpatient doctor prescribes her occasionally for anxiety  - Currently denies any depressed mood or SI/HI.  - c/t monitor

## 2023-03-31 NOTE — PROGRESS NOTE ADULT - SUBJECTIVE AND OBJECTIVE BOX
ANESTHESIA POSTOP CHECK    47y Female POSTOP DAY 1 S/P     [ X] NO APPARENT ANESTHESIA COMPLICATIONS      Comments:

## 2023-03-31 NOTE — DISCHARGE NOTE PROVIDER - NSDCMRMEDTOKEN_GEN_ALL_CORE_FT
Ambien 10 mg oral tablet: 1 tab(s) orally once a day (at bedtime) as needed for  insomnia  lisinopril 10 mg oral tablet: 1 orally once a day   acetaminophen 160 mg/5 mL oral suspension: 20 milliliter(s) orally 4 times a day  Ambien 10 mg oral tablet: 1 tab(s) orally once a day (at bedtime) as needed for  insomnia  ibuprofen 50 mg/1.25 mL oral suspension: 10 milliliter(s) orally 4 times a day as needed for  severe pain MDD: 40mL  lisinopril 10 mg oral tablet: 1 orally once a day  naloxone 4 mg/0.1 mL nasal spray: 4 milligram(s) intranasally every 2 minutes as needed for Oversedation or unresponsiveness from opioids  oxyCODONE 5 mg/5 mL oral solution: 10 milliliter(s) orally 4 times a day MDD: 50mL  petrolatum topical ointment: 1 Apply topically to affected area 3 times a day   acetaminophen 160 mg/5 mL oral suspension: 20 milliliter(s) orally 4 times a day  Ambien 10 mg oral tablet: 1 tab(s) orally once a day (at bedtime) as needed for  insomnia  amoxicillin-clavulanate 400 mg-57 mg/5 mL oral liquid: 875 milligram(s) orally 2 times a day  famotidine 20 mg oral tablet: 1 tab(s) orally once a day  ibuprofen 50 mg/1.25 mL oral suspension: 10 milliliter(s) orally 4 times a day as needed for  severe pain MDD: 40mL  ibuprofen 800 mg oral tablet: 1 tab(s) orally every 8 hours  lisinopril 10 mg oral tablet: 1 orally once a day  naloxone 4 mg/0.1 mL nasal spray: 4 milligram(s) intranasally every 2 minutes as needed for Oversedation or unresponsiveness from opioids  oxyCODONE 5 mg/5 mL oral solution: 10 milliliter(s) orally 4 times a day MDD: 50mL  oxyCODONE 5 mg/5 mL oral solution: 10 milliliter(s) orally every 8 hours MDD: 30ml  petrolatum topical ointment: 1 Apply topically to affected area 3 times a day

## 2023-03-31 NOTE — DISCHARGE NOTE PROVIDER - PROVIDER TOKENS
FREE:[LAST:[OMFS],FIRST:[Clinic],PHONE:[phone]],FAX:[(   )    -],ADDRESS:[Oral Maxillofacial Surgery Department  [address] [place] [address]],SCHEDULEDAPPT:[04/07/2023],SCHEDULEDAPPTTIME:[02:00 PM]]

## 2023-03-31 NOTE — DISCHARGE NOTE PROVIDER - NSFOLLOWUPCLINICS_GEN_ALL_ED_FT
[facility] - Primary Care  Primary Care  [address] [place] [address]  Phone: [phone]  Fax:   Follow Up Time: 2 weeks     [facility] - Primary Care  Primary Care  [address] [place] [address]  Phone: [phone]  Fax:   Follow Up Time: 2 weeks    [facility] of Substance Abuse  Psychiatry Substance Abuse  [address] [place] [address]  Phone: [phone]  Fax:   Follow Up Time: 2 weeks     [facility] - Primary Care  Primary Care  [address] [place] [address]  Phone: [phone]  Fax:   Follow Up Time: 2 weeks    [facility] of Substance Abuse  Psychiatry Substance Abuse  [address] [place] [address]  Phone: [phone]  Fax:   Follow Up Time: 2 weeks    [facility] - Ambulatory Care Clinic  OB/GYN & Surg  [address] [place] [address]  Phone: [phone]  Fax:     [facility]  Gynecology Oncology  [address] [place] [address]  Phone: [phone]  Fax: [phone]

## 2023-03-31 NOTE — DISCHARGE NOTE PROVIDER - NSDCCPCAREPLAN_GEN_ALL_CORE_FT
PRINCIPAL DISCHARGE DIAGNOSIS  Diagnosis: Bilateral mandibular fracture, closed, initial encounter  Assessment and Plan of Treatment: You came to the hospital with jaw fractures in multiple places following a fall. You were followed by oral and facial surgery team who took you to the operating room and performed an open reduction and internal fixation. They repaired the fracture and placed arch bars to fix the fracture in place. They placed heavy elastic bands that keep your jaw closed. You are to keep the jaw closed for ~3 weeks per surgeons recommendations. You will be following up with them after you leave the hospital. You have an appointment scheduled with them in 1 week. See the follow up section for additional details. It is important that you stick to a liquid diet while your jaw is closed. You should NOT use any straws. Your pain control medications are in liquid form. Take them as necessary. You have liquid antibiotics called augmentin. Please take as directed 2x per day for 4 days total.     PRINCIPAL DISCHARGE DIAGNOSIS  Diagnosis: Bilateral mandibular fracture, closed, initial encounter  Assessment and Plan of Treatment: You came to the hospital with jaw fractures in multiple places following a fall. You were followed by oral and facial surgery team who took you to the operating room and performed an open reduction and internal fixation. They repaired the fracture and placed arch bars to fix the fracture in place. They placed heavy elastic bands that keep your jaw closed. You are to keep the jaw closed for ~3 weeks per surgeon's recommendations. You will be following up with them after you leave the hospital. You have an appointment scheduled with them on 4/7/23 at 2pm.   See the follow up section for additional details. It is important that you continue liquid diet while your jaw is closed. You should NOT use any straws. Your pain control medications are in liquid form. Take them as necessary. You have liquid antibiotics called augmentin. Please take as directed 2x per day for 7 days total and end date is 4/7/2023.  Continue famotidine while taking Ibuprofen.   - Continue Peridex mouth rinse for a total of 1 week  - Continue pain control (Ibuprofen/Tylenol around the clock and Oxy as needed)  - Head of bed  30 degrees and replace ice packs often as needed.   - Aquaphor to lips three times daily        SECONDARY DISCHARGE DIAGNOSES  Diagnosis: Alcohol withdrawal  Assessment and Plan of Treatment: As discussed it is VERY important that you AVOID alcohol. Please follow up with [facility] substance abuse clinic and follow up with your PCP. You can call your insurance company to find a therapist near by to help support you and manage your stress.    Diagnosis: Anemia  Assessment and Plan of Treatment: please follow up with your PCP for further management and follow up on iron studies. Follow up with your gynecologist for further management of heavy periods.    Diagnosis: Hypertension  Assessment and Plan of Treatment: Continue Lisinopril and follow low salt diet.   Monitor BP and follow up with your PCP for BP check and medication management.

## 2023-03-31 NOTE — PROGRESS NOTE ADULT - SUBJECTIVE AND OBJECTIVE BOX
Ernesto Villarreal, PGY1    AYLEEN DEY  47y  Female    Complaints:  Subjective:     Pt s/p surgery w/ OMFS yesterday. Reports feeling significant pain. received IV dilaudid and IV tylenol o/n for pain.. Pt denies subj fevers/chills, chest pain, palpitations, n/v, abd pain, dysuria, diarrhea      FAMILY HISTORY:  Family history of hypertension (Father, Mother)      47yVital Signs Last 24 Hrs  T(C): 36.7 (30 Mar 2023 16:45), Max: 37.8 (30 Mar 2023 14:40)  T(F): 98 (30 Mar 2023 16:45), Max: 100 (30 Mar 2023 14:40)  HR: 79 (30 Mar 2023 21:36) (79 - 105)  BP: 166/94 (30 Mar 2023 21:36) (128/81 - 167/103)  BP(mean): 106 (30 Mar 2023 15:30) (92 - 111)  RR: 18 (30 Mar 2023 21:36) (11 - 20)  SpO2: 96% (30 Mar 2023 21:36) (94% - 100%)    Parameters below as of 30 Mar 2023 21:36  Patient On (Oxygen Delivery Method): room air          PHYSICAL EXAM:  GENERAL: NAD, well-groomed, well-developed. Anxious  HEAD:  s/p facial surgery w/ OMFS. Dressing in place appears clean/dry  EYES: EOMI, PERRLA, conjunctiva and sclera clear  ENMT: No tonsillar erythema, exudates, or enlargement; Moist mucous membranes,   NECK: Supple, No JVD, Normal thyroid  NERVOUS SYSTEM:  Alert & Oriented X3, Poor concentration; Motor Strength 5/5 B/L upper and lower extremities; Flat affect. No tremor  CHEST/LUNG: CTAB; No rales, rhonchi, wheezing,   HEART: Regular rate and rhythm; No murmurs, rubs, or gallops  ABDOMEN: Soft, Nontender, Nondistended; Bowel sounds present  EXTREMITIES:  2+ Peripheral Pulses, No clubbing, cyanosis, or edema  SKIN: No rashes or lesions      Consultant(s) Notes Reviewed:  [x ] YES  [ ] NO  Care Discussed with Consultants/Other Providers [ x] YES  [ ] NO    LABS:                Female  RADIOLOGY & ADDITIONAL TESTS:    Imaging Personally Reviewed:  [ ] YES  [ ] NO    MedsMEDICATIONS  (STANDING):  ampicillin/sulbactam  IVPB 3 Gram(s) IV Intermittent every 6 hours  AQUAPHOR (petrolatum Ointment) 1 Application(s) Topical three times a day  chlorhexidine 0.12% Liquid 15 milliLiter(s) Swish and Spit two times a day  hydrocortisone 1% Ointment 1 Application(s) Topical two times a day  lisinopril 10 milliGRAM(s) Oral daily  sodium chloride 0.9% 1000 milliLiter(s) (75 mL/Hr) IV Continuous <Continuous>    MEDICATIONS  (PRN):  morphine  - Injectable 4 milliGRAM(s) IV Push every 6 hours PRN Severe Pain (7 - 10)      HEALTH ISSUES - PROBLEM Dx:  Bilateral mandibular fracture, closed, initial encounter    Alcohol withdrawal    Hypertension    Fall    Anemia    Anxiety and depression    Blood pressure elevated without history of HTN    Medication management    Need for prophylactic measure                   Ernesto Villarreal, PGY1    AYLEEN DEY  47y  Female    Complaints:  Subjective:     Pt s/p surgery w/ OMFS yesterday. Pt's mouth wired closed after surgery for 2wks. Pt w/ significant pain. received IV dilaudid and IV tylenol o/n for pain..     FAMILY HISTORY:  Family history of hypertension (Father, Mother)      47yVital Signs Last 24 Hrs  T(C): 36.7 (30 Mar 2023 16:45), Max: 37.8 (30 Mar 2023 14:40)  T(F): 98 (30 Mar 2023 16:45), Max: 100 (30 Mar 2023 14:40)  HR: 79 (30 Mar 2023 21:36) (79 - 105)  BP: 166/94 (30 Mar 2023 21:36) (128/81 - 167/103)  BP(mean): 106 (30 Mar 2023 15:30) (92 - 111)  RR: 18 (30 Mar 2023 21:36) (11 - 20)  SpO2: 96% (30 Mar 2023 21:36) (94% - 100%)    Parameters below as of 30 Mar 2023 21:36  Patient On (Oxygen Delivery Method): room air          PHYSICAL EXAM:  GENERAL: NAD, well-groomed, well-developed. Anxious  HEAD:  s/p facial surgery w/ OMFS. Dressing in place appears clean/dry  EYES: EOMI, PERRLA, conjunctiva and sclera clear  ENMT: No tonsillar erythema, exudates, or enlargement; Moist mucous membranes,   NECK: Supple, No JVD, Normal thyroid  NERVOUS SYSTEM:  Alert & Oriented X3, Poor concentration; Motor Strength 5/5 B/L upper and lower extremities; Flat affect. No tremor  CHEST/LUNG: CTAB; No rales, rhonchi, wheezing,   HEART: Regular rate and rhythm; No murmurs, rubs, or gallops  ABDOMEN: Soft, Nontender, Nondistended; Bowel sounds present  EXTREMITIES:  2+ Peripheral Pulses, No clubbing, cyanosis, or edema  SKIN: No rashes or lesions      Consultant(s) Notes Reviewed:  [x ] YES  [ ] NO  Care Discussed with Consultants/Other Providers [ x] YES  [ ] NO    LABS:                Female  RADIOLOGY & ADDITIONAL TESTS:    Imaging Personally Reviewed:  [ ] YES  [ ] NO    MedsMEDICATIONS  (STANDING):  ampicillin/sulbactam  IVPB 3 Gram(s) IV Intermittent every 6 hours  AQUAPHOR (petrolatum Ointment) 1 Application(s) Topical three times a day  chlorhexidine 0.12% Liquid 15 milliLiter(s) Swish and Spit two times a day  hydrocortisone 1% Ointment 1 Application(s) Topical two times a day  lisinopril 10 milliGRAM(s) Oral daily  sodium chloride 0.9% 1000 milliLiter(s) (75 mL/Hr) IV Continuous <Continuous>    MEDICATIONS  (PRN):  morphine  - Injectable 4 milliGRAM(s) IV Push every 6 hours PRN Severe Pain (7 - 10)      HEALTH ISSUES - PROBLEM Dx:  Bilateral mandibular fracture, closed, initial encounter    Alcohol withdrawal    Hypertension    Fall    Anemia    Anxiety and depression    Blood pressure elevated without history of HTN    Medication management    Need for prophylactic measure

## 2023-03-31 NOTE — DISCHARGE NOTE PROVIDER - CARE PROVIDER_API CALL
[facility] [place]  Oral Maxillofacial Surgery Department  [address] 1st [address]  Phone: [phone]  Fax: (   )    -  Scheduled Appointment: 04/07/2023 02:00 PM

## 2023-03-31 NOTE — DISCHARGE NOTE PROVIDER - NSDCFUADDAPPT_GEN_ALL_CORE_FT
1) Follow up with [facility] ([phone]) on April 7th at 2pm  2) If you need a new PCP, you can make an appointment with General Internal Medicine, [address] [place] [address], [address], [place] [address]. Please call [phone] to make an appointment   1) [facility] with [facility] ([phone]) on April 7th at 2pm  2) If you need a new PCP, you can make an appointment with General Internal Medicine, [address] [place] [address], [address], [place] [address]. Please call [phone] to make an appointment

## 2023-03-31 NOTE — PROGRESS NOTE ADULT - PROBLEM SELECTOR PLAN 1
Pt s/p fall on Saturday while intoxicated, now with acute comminuted fractures of the maxilla of the mandibular condyles bilaterally with overriding fracture fragment and with additional fracture of the left mandibular coronoid process.  - S/p surgery w/ OMFS 3/30  - f/u post-op recs and monitoring. No active medical issues, will c/w pain management  - Pain control with IV Tylenol PRN, Morphine 4mg for severe pain Pt s/p fall on Saturday while intoxicated, now with acute comminuted fractures of the maxilla of the mandibular condyles bilaterally with overriding fracture fragment and with additional fracture of the left mandibular coronoid process.  - S/p surgery w/ OMFS 3/30  - f/u post-op recs and monitoring. No active medical issues, will c/w pain management  - Pain control with IV Tylenol PRN, liquid oxycodone 5mg for moderate and 10mg for severe pain  - Zofran for nausea  - c/w unasyn. Complete total 7 day course of abx. Augmentin on discharge.

## 2023-03-31 NOTE — PROGRESS NOTE ADULT - SUBJECTIVE AND OBJECTIVE BOX
ORAL AND MAXILLOFACIAL SURGERY PROGRESS NOTE    Pre-Op Dx:  Bilateral subcondyar neck fractures and left parasymphysis fracture.   Procedure:  ORIF Left symphysis fracture via trans-oral approach, Closed reduction with placement of maxillary and mandibular arch bars. Heavy elastics placed- holding bite closed. Anterior chin scar revision.  Surgeon: Dr. Miles Parra     24 HOUR EVENTS: No acute events overnight.     SUBJECTIVE:   Patient examined at admitting floor bed. Reports difficulty with sleeping and pain control. Tolerating closed reduction, having sips clear liquids overnight without difficulty.  Patient recovering uneventfully. Vitals WNR. Afebrile. Patient denies fever, chills, nausea, vomiting.     Objective:  Gen: Mildly anxious, NAD, AAOx3  EOE: Anterior mandible edema consistent with procedure. Steristrips in place, no strikethrough. Lower lip edema consistent with procedure and resolving.   IOE: Sutures clean and intact. Incisions hemostatic. Gingiva pink, and well-perfused. Elastics in place. Occlusion held tight with arch bars.  Maxillary and mandibular midlines coincident. Occlusion consistent with intra-op occlusion.      Vital Signs Last 24 Hrs  T(C): 36.3 (31 Mar 2023 05:00), Max: 37.8 (30 Mar 2023 14:40)  T(F): 97.3 (31 Mar 2023 05:00), Max: 100 (30 Mar 2023 14:40)  HR: 81 (31 Mar 2023 05:00) (79 - 105)  BP: 153/90 (31 Mar 2023 05:00) (128/81 - 167/103)  BP(mean): 106 (30 Mar 2023 15:30) (92 - 111)  RR: 18 (31 Mar 2023 05:00) (11 - 20)  SpO2: 100% (31 Mar 2023 05:00) (94% - 100%)      LABS:                        9.3    8.05  )-----------( 210      ( 31 Mar 2023 07:00 )             32.9     03-31    135  |  98  |  6<L>  ----------------------------<  103<H>  3.9   |  23  |  0.60    Ca    9.4      31 Mar 2023 07:00  Phos  3.0     03-31  Mg     1.80     03-31      PT/INR - ( 30 Mar 2023 06:15 )   PT: 12.9 sec;   INR: 1.11 ratio    PTT - ( 30 Mar 2023 06:15 )  PTT:27.5 sec       ORAL AND MAXILLOFACIAL SURGERY PROGRESS NOTE    Pre-Op Dx:  Bilateral subcondyar neck fractures and left parasymphysis fracture.   Procedure:  ORIF Left symphysis fracture via trans-oral approach, Closed reduction with placement of maxillary and mandibular arch bars. Heavy elastics placed- holding bite closed. Anterior chin scar revision.  Surgeon: Dr. Miles Parra     24 HOUR EVENTS: No acute events overnight.     SUBJECTIVE:   Patient examined at admitting floor bed. Reports difficulty with sleeping and pain control. Tolerating closed reduction, having sips clear liquids overnight without difficulty.  Patient recovering uneventfully. Vitals WNR. Afebrile. Patient denies fever, chills, nausea, vomiting.     Objective:  Gen: Mildly anxious, NAD, AAOx3  EOE: Anterior mandible edema consistent with procedure. Steristrips in place, no strikethrough. Lower lip edema consistent with procedure and resolving.   IOE: Sutures clean and intact. Incisions hemostatic. Gingiva pink, and well-perfused. Elastics in place. Occlusion held tight with arch bars.  Maxillary and mandibular midlines coincident. Occlusion consistent with intra-op occlusion.  Neuro: Bilateral v3 intact.       Vital Signs Last 24 Hrs  T(C): 36.3 (31 Mar 2023 05:00), Max: 37.8 (30 Mar 2023 14:40)  T(F): 97.3 (31 Mar 2023 05:00), Max: 100 (30 Mar 2023 14:40)  HR: 81 (31 Mar 2023 05:00) (79 - 105)  BP: 153/90 (31 Mar 2023 05:00) (128/81 - 167/103)  BP(mean): 106 (30 Mar 2023 15:30) (92 - 111)  RR: 18 (31 Mar 2023 05:00) (11 - 20)  SpO2: 100% (31 Mar 2023 05:00) (94% - 100%)      LABS:                        9.3    8.05  )-----------( 210      ( 31 Mar 2023 07:00 )             32.9     03-31    135  |  98  |  6<L>  ----------------------------<  103<H>  3.9   |  23  |  0.60    Ca    9.4      31 Mar 2023 07:00  Phos  3.0     03-31  Mg     1.80     03-31      PT/INR - ( 30 Mar 2023 06:15 )   PT: 12.9 sec;   INR: 1.11 ratio    PTT - ( 30 Mar 2023 06:15 )  PTT:27.5 sec

## 2023-03-31 NOTE — PROGRESS NOTE ADULT - PROBLEM SELECTOR PLAN 2
Pt with history of alcohol use disorder, drinking up to 1L of vodka daily, c/b multiple withdrawals, including with withdrawal seizures. Pt initially admitted to drinking 1 pint of vodka on Saturday prior to fall. CIWA scores on admission ranging from 4-12, currently 3-5.   - Pt has not required any Ativan on symptom triggered CIWA. Will monitor off CIWA. Outside withdrawal window.   - s/p high-dose IV thiamine 500 mg q8hrs x3 days  - s/p NS with folic acid and MVI at 75 cc/hr x12 hrs   - SBIRT and SW consults. Pt has been on naltrexone in the past to help with alcohol cessation, now off of naltrexone.

## 2023-03-31 NOTE — PROGRESS NOTE ADULT - ASSESSMENT
47 yr old female with history of alcohol use disorder (up to 1L of vodka daily; last drink Sat 3/25), Hx of EToH withdrawal (c/b seizures) hx of alcoholic pancreatitis with Bilateral subcondyar neck fractures and left parasymphysis fracture s/p fall now POD1 from ORIF Left symphysis fracture via trans-oral approach, Closed reduction with placement of maxillary and mandibular arch bars. Heavy elastics placed- holding bite closed and Anterior chin scar revision.  - KRISTAL since OR, recovering well from procedure post operatively.   - C/w IV Abx - can transition to *liquid* Augmentin 875mg for total 7 days course.   - C/w peridex mouth rinse for a total of 1 week.   - C/w Pain control (Ibuprofen/Tylenol around the clock, Oxy prn*)   - Encourage PO liquid diet - okay to advance to full liquids w/ ensure protein plus  - HOB 30 degrees and replace ice packs often as needed.   - Aquaphor to lips three times daily, okay to d/c hydrocortisone ointment after today.   - Pain control per primary team.     Dispo: Patient is stable for discharge home on PO liquid medications and full liquid diet.   Patient to follow up in [facility] Clinic in 1 week after discharge.   If planning on discharge home today, please page OMFS team prior to discharging patient so patient can be provided with appointment card for next week.     [facility]  Oral Maxillofacial Surgery Department    [address] [place] [address], 1st Floor  Ph: [phone]    Devaughn Stanley  Oral Maxillofacial Surgery  [facility]: [id number]  [facility]: [phone]  Available on Microsoft Teams 47 yr old female with history of alcohol use disorder (up to 1L of vodka daily; last drink Sat 3/25), Hx of EToH withdrawal (c/b seizures) hx of alcoholic pancreatitis with Bilateral subcondyar neck fractures and left parasymphysis fracture s/p fall now POD1 from ORIF Left symphysis fracture via trans-oral approach, Closed reduction with placement of maxillary and mandibular arch bars. Heavy elastics placed- holding bite closed and Anterior chin scar revision.  - KRISTAL since OR, recovering well from procedure post operatively.   - C/w IV Abx - can transition to *liquid* Augmentin 875mg for total 7 days course.   - C/w peridex mouth rinse for a total of 1 week.   - C/w Pain control (Ibuprofen/Tylenol around the clock, Oxy prn*)   - Encourage PO liquid diet - okay to advance to full liquids w/ ensure protein plus  - HOB 30 degrees and replace ice packs often as needed.   - Aquaphor to lips three times daily, okay to d/c hydrocortisone ointment after today.   - Pain control per primary team.     Dispo: Patient is stable for discharge home on PO liquid medications and full liquid diet.   Patient to follow up in [facility] Clinic - Patient appointment April 7th, 2023, 2pm.   If planning on discharge home today, please page OMFS team prior to discharging patient so patient can be provided with appointment card for next week.     [facility]  Oral Maxillofacial Surgery Department    [address] 1st [address]  Ph: [phone]    Devaughn Stanley  Oral Maxillofacial Surgery  [facility]: [id number]  [facility]: [phone]  Available on [employer] Teams

## 2023-03-31 NOTE — DISCHARGE NOTE PROVIDER - HOSPITAL COURSE
46 yo woman with history of alcohol use disorder (up to 1L of vodka daily) c/b withdrawal seizures and alcoholic pancreatitis, anxiety/depression, and chronic anemia 2/2 menorrhagia from adenomyosis presents with a broken jaw following a fall 3 days ago. Pt states that she tripped and fell forward, hitting her face on the floor of her apartment, while she was intoxicated on Saturday. Pt unable to chew or eat after fall. Seen outpatient by OMFS and asked to come to the ED. Admitted to medicine d/t c/f alcohol withdrawal. Pt initially given a few IVP of ativan and was monitored on a symptom triggered CIWA. Did not require any further ativan. Had hypertension and was started on her home Lisinopril 10mg. Pt went to OR w/ OMFS for ORIF. 46 yo woman with history of alcohol use disorder (up to 1L of vodka daily) c/b withdrawal seizures and alcoholic pancreatitis, anxiety/depression, and chronic anemia 2/2 menorrhagia from adenomyosis presents with a broken jaw following a fall 3 days ago. Pt states that she tripped and fell forward, hitting her face on the floor of her apartment, while she was intoxicated on Saturday. Pt unable to chew or eat after fall. Seen outpatient by OMFS and asked to come to the ED. Admitted to medicine d/t c/f alcohol withdrawal. Pt initially given a few IVP of ativan and was monitored on a symptom triggered CIWA. Did not require any further ativan. Had hypertension and was started on her home Lisinopril 10mg. Pt went to OR w/ OMFS for ORIF. Heavy elastic bands placed to hold jaw bite closed. Pt may not use straws. On liquid diet. Pt will complete a total 7 day course of antibiotics. On unasyn while inpatient. Transitioned to liquid augmentin. Pain control with liquid oxycodone prn. Pt has f/u appointment w/ OMFS in 1 week. 48 yo woman with history of alcohol use disorder (up to 1L of vodka daily) c/b withdrawal seizures and alcoholic pancreatitis, anxiety/depression, and chronic anemia 2/2 menorrhagia from adenomyosis presents with a broken jaw following a fall 3 days prior to ED arrial.   Pt states that she tripped and fell forward, hitting her face on the floor of her apartment, while she was intoxicated on Saturday. Pt unable to chew or eat after fall. Seen outpatient by OMFS and asked to come to the ED. Pt admitted to medicine for alcohol withdrawal, ETOH level undetectable on admission.  Pt initially given a few IVP of ativan and was monitored on a symptom triggered CIWA. She was started on thiamine. folate and IVF. Did not require any further ativan. Had hypertension and was started on her home Lisinopril 10mg. Pt went to OR w/ OMFS for ORIF on 3/30. Heavy elastic bands placed to hold jaw bite closed. Pt may not use straws. She was started on pureed and liquid diet which pt has been tolerating well.   She was also started on Oxycodone for pain control and tolerated it well. She received Unasyn during hospital stay and transitioned to Augmentin for 7 day course after discharge. She was started on AUgmentin prior to discharge and tolerated it well. She received supportive care including IVF and pain control with bowel regimen. OMFS wants pt to follow up outpt after discharge.     PT eval- home no needs    Patient was seen and evaluated today.  She reports feeling better and states pain is well controlled on this current regimen. She has been tolerating liquid/pureed diet and denies any acute complaints at this time.   Discussed discharge medications, plan and outpatient follow up with patient while at bedside. All questions answered and patient in agreement with discharge plan.   Patient is hemodynamically stable for discharge with outpatient follow up with PCP, OMFS, and pyschology.   Pt will complete a total 7 day course of liquid augmentin. Pain control with liquid oxycodone prn. Pt has a follow up appointment w/ OMFS in 1 week.   Discussed alcohol cessation and pt states she has a sponsor to help quit drinking alcohol. will have pt follow up with substance abuse clinic upon discharge

## 2023-03-31 NOTE — DISCHARGE NOTE PROVIDER - NSDCCPTREATMENT_GEN_ALL_CORE_FT
PRINCIPAL PROCEDURE  Procedure: Insertion, arch bar, mandibular  Findings and Treatment: You had surgery on your jaw with OMFS. Arch bars placed. Heavy elastic bands placed. Please adhere to liquid diet. No straws.     PRINCIPAL PROCEDURE  Procedure: Insertion, arch bar, mandibular  Findings and Treatment: You had surgery on your jaw with OMFS. Arch bars placed. Heavy elastic bands placed. Please adhere to liquid diet. No straws.  General:  · Primary Surgeon  Dr. Miles Parra  · Assistant(s)  Devaughn Stanley  Operative Findings:  · Operative Findings  ORIF Left symphysis fracture via trans-oral approach, Closed reduction with placement of maxillary and mandibular arch bars. Heavy elastics placed- holding bite closed. Anterior chin scar revision.  Specimens/Blood Loss/IV/Output/Protocol/VTE:   Specimens/Blood Loss/IV/Output/Protocol/VTE:  · Specimens  None  · Estimated Blood Loss  25 milliLiter(s)  Other Details/Condition Post op/Disposition:  · Comments/Other Details  Clear liquid diet, no straws, head of bed elevated 30 degrees, Aquaphor and hydrocortisone cream to lower lip, peridex mouth rinse.        SECONDARY PROCEDURE  Procedure: CT head wo contrast  Findings and Treatment: FINDINGS:  CT BRAIN:  There is no acute intracranial mass-effect, hemorrhage, midline shift, or   abnormal extra-axial fluid collection.  Ventricles, sulci, and cisterns are normal in size for the patient's age   without evidence of hydrocephalus. Basal cisterns are patent. The   calvarium is intact.  CT FACE  Acute comminuted fractures of the neck of the mandibular condyles   bilaterally. There is overriding of fracture fragments with lateral   displacement of the vertical rami bilaterally relative to the condyles at   the level of the fracture site. The mandibular condyles remain located in   the glenoid fossa bilaterally.  Additional acute comminuted fracture of the left mandibular coronoid   process with medially displaced fracture fragments.  The globes are intact without retrobulbar hematoma. The lenses are   located bilaterally.  Layering fluid and debris within the left sphenoid sinus. Trace left   maxillary sinus mucosal thickening.  The mastoid air cells are clear. The nasopharyngeal contours are within   normal limits.  Upper cervical degenerative change with disc osteophyte complex formation   and anterior wedging.  IMPRESSION:  CT BRAIN: No acute intracranial bleeding  CT FACE: Acute comminuted fractures of the maxilla of the mandibular   condyles bilaterally with overriding fracture fragments. Mandibular   condyles remain located. Additional fracture of the left mandibular   coronoid process.      Procedure: CT, maxillofacial area  Findings and Treatment:   CT FACE  Acute comminuted fractures of the neck of the mandibular condyles   bilaterally. There is overriding of fracture fragments with lateral   displacement of the vertical rami bilaterally relative to the condyles at   the level of the fracture site. The mandibular condyles remain located in   the glenoid fossa bilaterally.  Additional acute comminuted fracture of the left mandibular coronoid   process with medially displaced fracture fragments.  The globes are intact without retrobulbar hematoma. The lenses are   located bilaterally.  Layering fluid and debris within the left sphenoid sinus. Trace left   maxillary sinus mucosal thickening.  The mastoid air cells are clear. The nasopharyngeal contours are within   normal limits.  Upper cervical degenerative change with disc osteophyte complex formation   and anterior wedging.  CT FACE: Acute comminuted fractures of the maxilla of the mandibular   condyles bilaterally with overriding fracture fragments. Mandibular   condyles remain located. Additional fracture of the left mandibular   coronoid process.

## 2023-03-31 NOTE — DISCHARGE NOTE PROVIDER - ATTENDING DISCHARGE PHYSICAL EXAMINATION:
PHYSICAL EXAM:  GENERAL: NAD, well-developed  HEAD:  Atraumatic, Normocephalic  EYES: EOMI, PERRLA, conjunctiva and sclera clear  MOUTH:  Anterior mandible edema, Steristrips in place. Lower lip swelling improving after procedure. Elastics in place.  NECK: Supple, No elevated JVD  CHEST/LUNG: Clear to auscultation bilaterally; No wheeze  HEART: Regular rate and rhythm; No murmurs, rubs, or gallops  ABDOMEN: Soft, Nontender, Nondistended; Bowel sounds present  EXTREMITIES:  2+ Peripheral Pulses, No clubbing, cyanosis, or edema  PSYCH: AAOx3  NEUROLOGY: CN II-XII grossly intact, moving all extremities  SKIN: No rashes or lesions

## 2023-04-01 PROCEDURE — 99232 SBSQ HOSP IP/OBS MODERATE 35: CPT

## 2023-04-01 RX ORDER — PETROLATUM,WHITE
1 JELLY (GRAM) TOPICAL
Qty: 0 | Refills: 0 | DISCHARGE
Start: 2023-04-01

## 2023-04-01 RX ORDER — OXYCODONE HYDROCHLORIDE 5 MG/1
10 TABLET ORAL EVERY 4 HOURS
Refills: 0 | Status: DISCONTINUED | OUTPATIENT
Start: 2023-04-01 | End: 2023-04-01

## 2023-04-01 RX ORDER — OXYCODONE HYDROCHLORIDE 5 MG/1
15 TABLET ORAL EVERY 4 HOURS
Refills: 0 | Status: DISCONTINUED | OUTPATIENT
Start: 2023-04-01 | End: 2023-04-01

## 2023-04-01 RX ORDER — OXYCODONE HYDROCHLORIDE 5 MG/1
10 TABLET ORAL
Qty: 280 | Refills: 0
Start: 2023-04-01 | End: 2023-04-07

## 2023-04-01 RX ORDER — IBUPROFEN 200 MG
400 TABLET ORAL EVERY 6 HOURS
Refills: 0 | Status: DISCONTINUED | OUTPATIENT
Start: 2023-04-01 | End: 2023-04-02

## 2023-04-01 RX ORDER — ACETAMINOPHEN 500 MG
20 TABLET ORAL
Qty: 560 | Refills: 0
Start: 2023-04-01 | End: 2023-04-07

## 2023-04-01 RX ORDER — SENNA PLUS 8.6 MG/1
2 TABLET ORAL AT BEDTIME
Refills: 0 | Status: DISCONTINUED | OUTPATIENT
Start: 2023-04-01 | End: 2023-04-02

## 2023-04-01 RX ORDER — POLYETHYLENE GLYCOL 3350 17 G/17G
17 POWDER, FOR SOLUTION ORAL DAILY
Refills: 0 | Status: DISCONTINUED | OUTPATIENT
Start: 2023-04-01 | End: 2023-04-02

## 2023-04-01 RX ORDER — FAMOTIDINE 10 MG/ML
20 INJECTION INTRAVENOUS DAILY
Refills: 0 | Status: DISCONTINUED | OUTPATIENT
Start: 2023-04-01 | End: 2023-04-02

## 2023-04-01 RX ORDER — ACETAMINOPHEN 500 MG
650 TABLET ORAL EVERY 6 HOURS
Refills: 0 | Status: DISCONTINUED | OUTPATIENT
Start: 2023-04-01 | End: 2023-04-02

## 2023-04-01 RX ORDER — OXYCODONE HYDROCHLORIDE 5 MG/1
5 TABLET ORAL EVERY 6 HOURS
Refills: 0 | Status: DISCONTINUED | OUTPATIENT
Start: 2023-04-01 | End: 2023-04-02

## 2023-04-01 RX ORDER — NALOXONE HYDROCHLORIDE 4 MG/.1ML
4 SPRAY NASAL
Qty: 1 | Refills: 0
Start: 2023-04-01

## 2023-04-01 RX ORDER — IBUPROFEN 200 MG
10 TABLET ORAL
Qty: 280 | Refills: 0
Start: 2023-04-01 | End: 2023-04-07

## 2023-04-01 RX ORDER — OXYCODONE HYDROCHLORIDE 5 MG/1
5 TABLET ORAL ONCE
Refills: 0 | Status: DISCONTINUED | OUTPATIENT
Start: 2023-04-01 | End: 2023-04-01

## 2023-04-01 RX ORDER — OXYCODONE HYDROCHLORIDE 5 MG/1
10 TABLET ORAL EVERY 6 HOURS
Refills: 0 | Status: DISCONTINUED | OUTPATIENT
Start: 2023-04-01 | End: 2023-04-02

## 2023-04-01 RX ADMIN — OXYCODONE HYDROCHLORIDE 5 MILLIGRAM(S): 5 TABLET ORAL at 10:29

## 2023-04-01 RX ADMIN — AMPICILLIN SODIUM AND SULBACTAM SODIUM 200 GRAM(S): 250; 125 INJECTION, POWDER, FOR SUSPENSION INTRAMUSCULAR; INTRAVENOUS at 12:39

## 2023-04-01 RX ADMIN — CHLORHEXIDINE GLUCONATE 15 MILLILITER(S): 213 SOLUTION TOPICAL at 19:40

## 2023-04-01 RX ADMIN — OXYCODONE HYDROCHLORIDE 10 MILLIGRAM(S): 5 TABLET ORAL at 07:57

## 2023-04-01 RX ADMIN — Medication 875 MILLIGRAM(S): at 21:40

## 2023-04-01 RX ADMIN — Medication 1 APPLICATION(S): at 13:40

## 2023-04-01 RX ADMIN — OXYCODONE HYDROCHLORIDE 5 MILLIGRAM(S): 5 TABLET ORAL at 11:29

## 2023-04-01 RX ADMIN — OXYCODONE HYDROCHLORIDE 10 MILLIGRAM(S): 5 TABLET ORAL at 08:57

## 2023-04-01 RX ADMIN — Medication 400 MILLIGRAM(S): at 12:38

## 2023-04-01 RX ADMIN — LISINOPRIL 10 MILLIGRAM(S): 2.5 TABLET ORAL at 05:54

## 2023-04-01 RX ADMIN — OXYCODONE HYDROCHLORIDE 10 MILLIGRAM(S): 5 TABLET ORAL at 02:30

## 2023-04-01 RX ADMIN — AMPICILLIN SODIUM AND SULBACTAM SODIUM 200 GRAM(S): 250; 125 INJECTION, POWDER, FOR SUSPENSION INTRAMUSCULAR; INTRAVENOUS at 05:56

## 2023-04-01 RX ADMIN — Medication 650 MILLIGRAM(S): at 21:40

## 2023-04-01 RX ADMIN — Medication 6 MILLIGRAM(S): at 21:41

## 2023-04-01 RX ADMIN — CHLORHEXIDINE GLUCONATE 15 MILLILITER(S): 213 SOLUTION TOPICAL at 05:54

## 2023-04-01 RX ADMIN — Medication 1 APPLICATION(S): at 05:55

## 2023-04-01 RX ADMIN — OXYCODONE HYDROCHLORIDE 5 MILLIGRAM(S): 5 TABLET ORAL at 13:27

## 2023-04-01 RX ADMIN — OXYCODONE HYDROCHLORIDE 5 MILLIGRAM(S): 5 TABLET ORAL at 19:40

## 2023-04-01 RX ADMIN — OXYCODONE HYDROCHLORIDE 5 MILLIGRAM(S): 5 TABLET ORAL at 20:10

## 2023-04-01 RX ADMIN — Medication 650 MILLIGRAM(S): at 14:56

## 2023-04-01 RX ADMIN — OXYCODONE HYDROCHLORIDE 5 MILLIGRAM(S): 5 TABLET ORAL at 14:27

## 2023-04-01 RX ADMIN — OXYCODONE HYDROCHLORIDE 10 MILLIGRAM(S): 5 TABLET ORAL at 21:41

## 2023-04-01 RX ADMIN — Medication 400 MILLIGRAM(S): at 18:20

## 2023-04-01 RX ADMIN — ZOLPIDEM TARTRATE 5 MILLIGRAM(S): 10 TABLET ORAL at 22:59

## 2023-04-01 RX ADMIN — OXYCODONE HYDROCHLORIDE 10 MILLIGRAM(S): 5 TABLET ORAL at 01:51

## 2023-04-01 RX ADMIN — OXYCODONE HYDROCHLORIDE 10 MILLIGRAM(S): 5 TABLET ORAL at 14:55

## 2023-04-01 RX ADMIN — Medication 1 APPLICATION(S): at 21:43

## 2023-04-01 NOTE — PROGRESS NOTE ADULT - ATTENDING COMMENTS
48 yo F with hx of ETOH abuse c/b withdrawal seizures p/w mandible fx after fall    #mandible fx   -c/w unansyn. transition to augmentin x 7 day course on dc   -c/w supportive care IVF, pain control   -OMFS following, s/p ORIF 3/30. f/u outpt     #ETOH withdrawal  -reports last drink >72 hr ago. ETOH level undetectable on admission  -off CIWA  -c/w thiamine. folate. IVF    #Dispo  -PT eval- home no needs
HPI-   48 yo female with history of alcohol abuse and withdrawal seizures presented to ED after mechanical fall and noted to have mandible fracture after fall.   #Mandible fx- s/p ORIF 3/30. Continue unansyn and can transition to Augmentin for 7 day course upon discharge.   Continue liquid diet, supportive care IVF and pain control with bowel regimen. OMFS following and will need to follow up outpt after discharge.   #ETOH withdrawal-  last drink >72 hr ago. ETOH level undetectable on admission  now off CIWA. Continue thiamine. folate and IVF. SBIRT screen. Alcohol cessation. Can have pt follow up with substance abuse clinic upon discharge   PT eval- home no needs .  Dispo: can likely be dishcharged home in am once pain is well controlled and pt tolerating diet.  I have personally seen and examined patient. I discussed the case with resident and agree with findings and plan as per note above.
48 yo F with hx of ETOH abuse c/b withdrawal seizures p/w mandible fx after fall    #mandible fx   -c/w unansyn   -c/w supportive care IVF, pain control   -OMFS following, plan for ORIF today. f/u post-op recs    #ETOH withdrawal  -reports last drink >72 hr ago. ETOH level undetectable on admission  -off CIWA  -c/w thiamine. folate. IVF    #Dispo  -PT eval- home no needs
46 yo F with hx of ETOH abuse c/b withdrawal seizures p/w mandible fx after fall    #ETOH withdrawal  -reports last drink >72 hr ago. ETOH level undetectable on admission  -current CIWA 0  -dc CIWA  -c/w thiamine. folate. IVF    #mandible fx   -c/w unansyn   -c/w supportive care IVF, pain control   -OMFS following, plan for ORIF today. f/u post-op recs    #Dispo  -PT eval- home no needs
48 yo F with hx of ETOH abuse c/b withdrawal seizures p/w mandible fx after fall    #ETOH withdrawal  -reports last drink >72 hr ago. ETOH level undetectable on admission  -current CIWA 2-3.  -will hold off standing ativan. c/w symptom triggered ativan prn   -c/w thiamine. folate. IVF    #mandible fx   -c/w unansyn   -c/w supportive care IVF, pain control   -OMFS following, plan for ORIF today  -RCRI =0. low cardiac risk for low risk procedure. pt is medically optimized for planned surgery

## 2023-04-01 NOTE — PROGRESS NOTE ADULT - SUBJECTIVE AND OBJECTIVE BOX
Ernesto Villarreal, PGY1    AYLEEN DEY  47y  Female    Complaints:  Subjective:     No acute o/n events. t's mouth wired closed after surgery for 2wks. Pt w/ significant pain. On liquid oxycodone prn. Pt denies subj fevers/chills, dizziness, chest pain, palpitations, n/v, abd pain, dysuria, diarrhea      FAMILY HISTORY:  Family history of hypertension (Father, Mother)      47yVital Signs Last 24 Hrs  T(C): 36.9 (01 Apr 2023 05:38), Max: 37.1 (31 Mar 2023 21:27)  T(F): 98.4 (01 Apr 2023 05:38), Max: 98.8 (31 Mar 2023 21:27)  HR: 100 (01 Apr 2023 05:38) (76 - 100)  BP: 169/98 (01 Apr 2023 05:38) (141/91 - 169/98)  BP(mean): --  RR: 18 (01 Apr 2023 05:38) (18 - 18)  SpO2: 100% (01 Apr 2023 05:38) (100% - 100%)    Parameters below as of 01 Apr 2023 05:38  Patient On (Oxygen Delivery Method): room air          PHYSICAL EXAM:  GENERAL: NAD, well-groomed, well-developed. Anxious  HEAD:  s/p facial surgery w/ OMFS. Dressing in place appears clean/dry  EYES: EOMI, PERRLA, conjunctiva and sclera clear  ENMT: No tonsillar erythema, exudates, or enlargement; Moist mucous membranes,   NECK: Supple, No JVD, Normal thyroid  NERVOUS SYSTEM:  Alert & Oriented X3, Poor concentration; Motor Strength 5/5 B/L upper and lower extremities; Flat affect. No tremor  CHEST/LUNG: CTAB; No rales, rhonchi, wheezing,   HEART: Regular rate and rhythm; No murmurs, rubs, or gallops  ABDOMEN: Soft, Nontender, Nondistended; Bowel sounds present  EXTREMITIES:  2+ Peripheral Pulses, No clubbing, cyanosis, or edema  SKIN: No rashes or lesions        Consultant(s) Notes Reviewed:  [x ] YES  [ ] NO  Care Discussed with Consultants/Other Providers [ x] YES  [ ] NO    LABS:      No labs today          Female  RADIOLOGY & ADDITIONAL TESTS:    Imaging Personally Reviewed:  [ ] YES  [ ] NO    MedsMEDICATIONS  (STANDING):  ampicillin/sulbactam  IVPB 3 Gram(s) IV Intermittent every 6 hours  AQUAPHOR (petrolatum Ointment) 1 Application(s) Topical three times a day  chlorhexidine 0.12% Liquid 15 milliLiter(s) Swish and Spit two times a day  lisinopril 10 milliGRAM(s) Oral daily  melatonin 6 milliGRAM(s) Oral at bedtime  sodium chloride 0.9% 1000 milliLiter(s) (75 mL/Hr) IV Continuous <Continuous>    MEDICATIONS  (PRN):  acetaminophen   Oral Liquid .. 650 milliGRAM(s) Oral every 6 hours PRN Temp greater or equal to 38C (100.4F), Mild Pain (1 - 3), Moderate Pain (4 - 6)  ibuprofen  Suspension. 600 milliGRAM(s) Oral every 8 hours PRN Moderate Pain (4 - 6)  naloxone Injectable 0.4 milliGRAM(s) IV Push once PRN Over sedation  ondansetron Injectable 4 milliGRAM(s) IV Push every 6 hours PRN Nausea and/or Vomiting  oxyCODONE    Solution 5 milliGRAM(s) Oral every 4 hours PRN Moderate Pain (4 - 6)  oxyCODONE    Solution 10 milliGRAM(s) Oral every 4 hours PRN Severe Pain (7 - 10)  zolpidem 5 milliGRAM(s) Oral at bedtime PRN Insomnia      HEALTH ISSUES - PROBLEM Dx:  Bilateral mandibular fracture, closed, initial encounter    Alcohol withdrawal    Hypertension    Fall    Anemia    Anxiety and depression    Medication management    Need for prophylactic measure    Blood pressure elevated without history of HTN                   Ernesto Villarreal, PGY1    AYLEEN DEY  47y  Female    Complaints:  Subjective:     No acute o/n events. t's mouth wired closed after surgery for 2wks. Pt w/ significant pain. On liquid oxycodone prn. Pt denies subj fevers/chills, dizziness, chest pain, palpitations, n/v, abd pain, dysuria, diarrhea      FAMILY HISTORY:  Family history of hypertension (Father, Mother)      47yVital Signs Last 24 Hrs  T(C): 36.9 (01 Apr 2023 05:38), Max: 37.1 (31 Mar 2023 21:27)  T(F): 98.4 (01 Apr 2023 05:38), Max: 98.8 (31 Mar 2023 21:27)  HR: 100 (01 Apr 2023 05:38) (76 - 100)  BP: 169/98 (01 Apr 2023 05:38) (141/91 - 169/98)  BP(mean): --  RR: 18 (01 Apr 2023 05:38) (18 - 18)  SpO2: 100% (01 Apr 2023 05:38) (100% - 100%)    Parameters below as of 01 Apr 2023 05:38  Patient On (Oxygen Delivery Method): room air          PHYSICAL EXAM:  GENERAL: NAD, well-groomed, well-developed. Anxious  HEAD:  s/p facial surgery w/ OMFS. Jaw closed. Lip swelling  EYES: EOMI, PERRLA, conjunctiva and sclera clear  ENMT: No tonsillar erythema, exudates, or enlargement; Moist mucous membranes,   NECK: Supple, No JVD, Normal thyroid  NERVOUS SYSTEM:  Alert & Oriented X3, Poor concentration; Motor Strength 5/5 B/L upper and lower extremities; Flat affect. No tremor  CHEST/LUNG: CTAB; No rales, rhonchi, wheezing,   HEART: Regular rate and rhythm; No murmurs, rubs, or gallops  ABDOMEN: Soft, Nontender, Nondistended; Bowel sounds present  EXTREMITIES:  2+ Peripheral Pulses, No clubbing, cyanosis, or edema  SKIN: No rashes or lesions        Consultant(s) Notes Reviewed:  [x ] YES  [ ] NO  Care Discussed with Consultants/Other Providers [ x] YES  [ ] NO    LABS:      No labs today          Female  RADIOLOGY & ADDITIONAL TESTS:    Imaging Personally Reviewed:  [ ] YES  [ ] NO    MedsMEDICATIONS  (STANDING):  ampicillin/sulbactam  IVPB 3 Gram(s) IV Intermittent every 6 hours  AQUAPHOR (petrolatum Ointment) 1 Application(s) Topical three times a day  chlorhexidine 0.12% Liquid 15 milliLiter(s) Swish and Spit two times a day  lisinopril 10 milliGRAM(s) Oral daily  melatonin 6 milliGRAM(s) Oral at bedtime  sodium chloride 0.9% 1000 milliLiter(s) (75 mL/Hr) IV Continuous <Continuous>    MEDICATIONS  (PRN):  acetaminophen   Oral Liquid .. 650 milliGRAM(s) Oral every 6 hours PRN Temp greater or equal to 38C (100.4F), Mild Pain (1 - 3), Moderate Pain (4 - 6)  ibuprofen  Suspension. 600 milliGRAM(s) Oral every 8 hours PRN Moderate Pain (4 - 6)  naloxone Injectable 0.4 milliGRAM(s) IV Push once PRN Over sedation  ondansetron Injectable 4 milliGRAM(s) IV Push every 6 hours PRN Nausea and/or Vomiting  oxyCODONE    Solution 5 milliGRAM(s) Oral every 4 hours PRN Moderate Pain (4 - 6)  oxyCODONE    Solution 10 milliGRAM(s) Oral every 4 hours PRN Severe Pain (7 - 10)  zolpidem 5 milliGRAM(s) Oral at bedtime PRN Insomnia      HEALTH ISSUES - PROBLEM Dx:  Bilateral mandibular fracture, closed, initial encounter    Alcohol withdrawal    Hypertension    Fall    Anemia    Anxiety and depression    Medication management    Need for prophylactic measure    Blood pressure elevated without history of HTN

## 2023-04-01 NOTE — PROVIDER CONTACT NOTE (OTHER) - ASSESSMENT
A/O x4. Vitals stable. Pt requesting stronger pain meds. Pain is localized to her jaw, she is s/p sx to jaw.
Pt AOx4, awake and alert, no c/o pain, headache, blurry vision
Heart Rate 127

## 2023-04-01 NOTE — PROGRESS NOTE ADULT - PROBLEM SELECTOR PLAN 1
Pt s/p fall on Saturday while intoxicated, now with acute comminuted fractures of the maxilla of the mandibular condyles bilaterally with overriding fracture fragment and with additional fracture of the left mandibular coronoid process.  - S/p surgery w/ OMFS 3/30  - f/u post-op recs and monitoring. No active medical issues, will c/w pain management  - Pain control with IV Tylenol PRN, liquid oxycodone 5mg for moderate and 10mg for severe pain  - Zofran for nausea  - c/w unasyn. Complete total 7 day course of abx. Augmentin on discharge. Pt s/p fall on Saturday while intoxicated, now with acute comminuted fractures of the maxilla of the mandibular condyles bilaterally with overriding fracture fragment and with additional fracture of the left mandibular coronoid process.  - S/p surgery w/ OMFS 3/30  - f/u post-op recs and monitoring. No active medical issues, will c/w pain management  - Pain control with tylenol, ibuprofen, liquid oxycodone 10mg for moderate and 15mg for severe pain  - Zofran for nausea  - c/w unasyn. Complete total 7 day course of abx. Augmentin on discharge.

## 2023-04-01 NOTE — PROGRESS NOTE ADULT - ASSESSMENT
47 yr old female with history of alcohol use disorder (up to 1L of vodka daily; last drink Sat 3/25), Hx of EToH withdrawal (c/b seizures) hx of alcoholic pancreatitis with Bilateral subcondyar neck fractures and left parasymphysis fracture s/p fall now POD2 from ORIF Left symphysis fracture via trans-oral approach, Closed reduction with placement of maxillary and mandibular arch bars. Heavy elastics placed- holding bite closed and Anterior chin scar revision.    Plan:  - C/w IV Abx - can transition to *liquid* Augmentin 875mg for total 7 days course.   - C/w peridex mouth rinse for a total of 1 week.   - C/w Pain control (Ibuprofen/Tylenol around the clock, Oxy prn*)   - Encourage PO liquid diet  - HOB 30 degrees and replace ice packs often as needed.   - Aquaphor to lips three times daily, okay to d/c hydrocortisone ointment after today.   - Pain control per primary team.    Dispo: Patient is stable for discharge home on PO liquid medications and full liquid diet.   Patient to [facility] up in [facility] - Patient appointment April 7th, 2023, 2pm.      [facility]  Oral Maxillofacial Surgery Department    [address], 1st Floor  Ph: [phone]    [facility]  Oral Maxillofacial Surgery  J: [phone]  [facility]: [phone]  Available on Microsoft Teams

## 2023-04-01 NOTE — PROGRESS NOTE ADULT - SUBJECTIVE AND OBJECTIVE BOX
ORAL AND MAXILLOFACIAL SURGERY PROGRESS NOTE    Pre-Op Dx:  Bilateral subcondyar neck fractures and left parasymphysis fracture.   Procedure:  ORIF Left symphysis fracture via trans-oral approach, Closed reduction with placement of maxillary and mandibular arch bars. Heavy elastics placed- holding bite closed. Anterior chin scar revision.  Surgeon: Dr. Miles Parra     4/1/23 POD2  patient visited bedside, resting comfortably. AVSS. no acute events overnight.       SUBJECTIVE:   Patient examined at admitting floor bed. Reports difficulty with  pain control. Tolerating closed reduction, having sips clear liquids overnight without difficulty.  Patient recovering uneventfully. Vitals WNR. Afebrile. Patient denies fever, chills, nausea, vomiting.     Objective:  Gen: Mildly anxious, NAD, AAOx3  EOE: Anterior mandible edema consistent with procedure. Steristrips in place, no strikethrough. Lower lip edema consistent with procedure and resolving.   IOE: Sutures clean and intact. Incisions hemostatic. Gingiva pink, and well-perfused. Elastics in place. Occlusion held tight with arch bars.  Maxillary and mandibular midlines coincident. Occlusion consistent with intra-op occlusion.  Neuro: Bilateral v3 intact.       Vital Signs Last 24 Hrs  T(C): 36.9 (01 Apr 2023 05:38), Max: 37.1 (31 Mar 2023 21:27)  T(F): 98.4 (01 Apr 2023 05:38), Max: 98.8 (31 Mar 2023 21:27)  HR: 100 (01 Apr 2023 05:38) (76 - 100)  BP: 169/98 (01 Apr 2023 05:38) (141/91 - 169/98)  BP(mean): --  RR: 18 (01 Apr 2023 05:38) (18 - 18)  SpO2: 100% (01 Apr 2023 05:38) (100% - 100%)    Parameters below as of 01 Apr 2023 05:38  Patient On (Oxygen Delivery Method): room air        LABS:                             9.3    8.05  )-----------( 210      ( 31 Mar 2023 07:00 )             32.9     03-31    135  |  98  |  6<L>  ----------------------------<  103<H>  3.9   |  23  |  0.60    Ca    9.4      31 Mar 2023 07:00  Phos  3.0     03-31  Mg     1.80     03-31

## 2023-04-01 NOTE — PROVIDER CONTACT NOTE (OTHER) - BACKGROUND
46yo F admitted for ETOH dependence with withdrawal
pt admitted for alcohol withdrawal
Patient admitted for accidental fall and broken jaw.

## 2023-04-02 ENCOUNTER — TRANSCRIPTION ENCOUNTER (OUTPATIENT)
Age: 48
End: 2023-04-02

## 2023-04-02 VITALS
DIASTOLIC BLOOD PRESSURE: 84 MMHG | OXYGEN SATURATION: 99 % | TEMPERATURE: 98 F | SYSTOLIC BLOOD PRESSURE: 132 MMHG | RESPIRATION RATE: 18 BRPM | HEART RATE: 80 BPM

## 2023-04-02 PROCEDURE — 99239 HOSP IP/OBS DSCHRG MGMT >30: CPT

## 2023-04-02 RX ORDER — OXYCODONE HYDROCHLORIDE 5 MG/1
10 TABLET ORAL
Qty: 30 | Refills: 0
Start: 2023-04-02 | End: 2023-04-02

## 2023-04-02 RX ORDER — FAMOTIDINE 10 MG/ML
1 INJECTION INTRAVENOUS
Qty: 30 | Refills: 0
Start: 2023-04-02 | End: 2023-05-01

## 2023-04-02 RX ORDER — IBUPROFEN 200 MG
1 TABLET ORAL
Qty: 6 | Refills: 0
Start: 2023-04-02 | End: 2023-04-03

## 2023-04-02 RX ADMIN — Medication 650 MILLIGRAM(S): at 06:02

## 2023-04-02 RX ADMIN — Medication 1 APPLICATION(S): at 14:08

## 2023-04-02 RX ADMIN — Medication 650 MILLIGRAM(S): at 13:01

## 2023-04-02 RX ADMIN — LISINOPRIL 10 MILLIGRAM(S): 2.5 TABLET ORAL at 06:02

## 2023-04-02 RX ADMIN — Medication 1 APPLICATION(S): at 06:03

## 2023-04-02 RX ADMIN — Medication 875 MILLIGRAM(S): at 06:01

## 2023-04-02 RX ADMIN — Medication 400 MILLIGRAM(S): at 13:02

## 2023-04-02 RX ADMIN — OXYCODONE HYDROCHLORIDE 10 MILLIGRAM(S): 5 TABLET ORAL at 06:00

## 2023-04-02 RX ADMIN — CHLORHEXIDINE GLUCONATE 15 MILLILITER(S): 213 SOLUTION TOPICAL at 06:02

## 2023-04-02 RX ADMIN — OXYCODONE HYDROCHLORIDE 10 MILLIGRAM(S): 5 TABLET ORAL at 13:02

## 2023-04-02 NOTE — PROGRESS NOTE ADULT - PROBLEM SELECTOR PROBLEM 7
Need for prophylactic measure
Medication management
Medication management
Need for prophylactic measure
Need for prophylactic measure
Medication management

## 2023-04-02 NOTE — PROGRESS NOTE ADULT - SUBJECTIVE AND OBJECTIVE BOX
PROGRESS NOTE:   Authored by Dr. Rolando Linares MD ,    Patient is a 47y old  Female who presents with a chief complaint of Alcohol withdrawal, b/l mandibular fractures (01 Apr 2023 09:06)      SUBJECTIVE / OVERNIGHT EVENTS: No events ON. Patient this AM feels improved    ADDITIONAL REVIEW OF SYSTEMS: +Mandibular pain. Denies SOB      MEDICATIONS  (STANDING):  acetaminophen   Oral Liquid .. 650 milliGRAM(s) Oral every 6 hours  amoxicillin   80 mG/mL/clavulanate Suspension 875 milliGRAM(s) Oral every 12 hours  AQUAPHOR (petrolatum Ointment) 1 Application(s) Topical three times a day  chlorhexidine 0.12% Liquid 15 milliLiter(s) Swish and Spit two times a day  famotidine    Tablet 20 milliGRAM(s) Oral daily  ibuprofen  Suspension. 400 milliGRAM(s) Oral every 6 hours  lisinopril 10 milliGRAM(s) Oral daily  melatonin 6 milliGRAM(s) Oral at bedtime  oxyCODONE    Solution 10 milliGRAM(s) Oral every 6 hours  polyethylene glycol 3350 17 Gram(s) Oral daily  senna 2 Tablet(s) Oral at bedtime  sodium chloride 0.9% 1000 milliLiter(s) (75 mL/Hr) IV Continuous <Continuous>    MEDICATIONS  (PRN):  naloxone Injectable 0.4 milliGRAM(s) IV Push once PRN Over sedation  ondansetron Injectable 4 milliGRAM(s) IV Push every 6 hours PRN Nausea and/or Vomiting  oxyCODONE    Solution 5 milliGRAM(s) Oral every 6 hours PRN Breakthrough pain  zolpidem 5 milliGRAM(s) Oral at bedtime PRN Insomnia      CAPILLARY BLOOD GLUCOSE        I&O's Summary      PHYSICAL EXAM:  Vital Signs Last 24 Hrs  T(C): 36.7 (02 Apr 2023 05:20), Max: 37.1 (01 Apr 2023 13:30)  T(F): 98.1 (02 Apr 2023 05:20), Max: 98.7 (01 Apr 2023 13:30)  HR: 87 (02 Apr 2023 05:20) (87 - 127)  BP: 144/88 (02 Apr 2023 05:20) (143/85 - 156/107)  BP(mean): --  RR: 18 (02 Apr 2023 05:20) (17 - 18)  SpO2: 100% (02 Apr 2023 05:20) (97% - 100%)    Parameters below as of 02 Apr 2023 05:20  Patient On (Oxygen Delivery Method): room air        PHYSICAL EXAM:  GENERAL: NAD, well-groomed, well-developed. Anxious  HEAD:  s/p facial surgery w/ OMFS. Jaw closed. Lip swelling  EYES: EOMI, PERRLA, conjunctiva and sclera clear  ENMT: No tonsillar erythema, exudates, or enlargement; Moist mucous membranes,   NECK: Supple, No JVD, Normal thyroid  NERVOUS SYSTEM:  Alert & Oriented X3, Poor concentration; Motor Strength 5/5 B/L upper and lower extremities; Flat affect. No tremor  CHEST/LUNG: CTAB; No rales, rhonchi, wheezing,   HEART: Regular rate and rhythm; No murmurs, rubs, or gallops  ABDOMEN: Soft, Nontender, Nondistended; Bowel sounds present  EXTREMITIES:  2+ Peripheral Pulses, No clubbing, cyanosis, or edema  SKIN: No rashes or lesions      LABS:                      RADIOLOGY & ADDITIONAL TESTS:  Results Reviewed:   Imaging Personally Reviewed:  Electrocardiogram Personally Reviewed:    COORDINATION OF CARE:  Care Discussed with Consultants/Other Providers [Y/N]:  Prior or Outpatient Records Reviewed [Y/N]:

## 2023-04-02 NOTE — DISCHARGE NOTE NURSING/CASE MANAGEMENT/SOCIAL WORK - NSDCFUADDAPPT_GEN_ALL_CORE_FT
1) Follow up with [facility] ([phone]) on April 7th at 2pm  2) If you need a new PCP, you can make an appointment with General Internal Medicine, [address] [place] [address], [address], [place] [address]. Please call [phone] to make an appointment

## 2023-04-02 NOTE — DISCHARGE NOTE NURSING/CASE MANAGEMENT/SOCIAL WORK - NSDCVIVACCINE_GEN_ALL_CORE_FT
influenza, injectable, quadrivalent, preservative free; 23-Oct-2020 20:55; Ramón Wong (RN); [employer]; [id number] (Exp. Date: 30-Jun-2021); IntraMuscular; Deltoid Left.; 0.5 milliLiter(s); VIS (VIS Published: 15-Aug-2019, VIS Presented: 23-Oct-2020);

## 2023-04-02 NOTE — DISCHARGE NOTE NURSING/CASE MANAGEMENT/SOCIAL WORK - NSDCPEFALRISK_GEN_ALL_CORE
For information on Fall & Injury Prevention, visit: [email or web address] OR  [email or web address] OR  https://www.cdc.gov/steadi/patient.html

## 2023-04-02 NOTE — PROGRESS NOTE ADULT - PROBLEM SELECTOR PROBLEM 1
Bilateral mandibular fracture, closed, initial encounter

## 2023-04-02 NOTE — PROGRESS NOTE ADULT - REASON FOR ADMISSION
Alcohol withdrawal, b/l mandibular fractures

## 2023-04-02 NOTE — DISCHARGE NOTE NURSING/CASE MANAGEMENT/SOCIAL WORK - PATIENT PORTAL LINK FT
You can access the FollowMyHealth Patient Portal offered by [facility] by registering at the following website: [email or web address]. By joining [facility]’s FollowMyHealth portal, you will also be able to view your health information using other applications (apps) compatible with our system.

## 2023-04-02 NOTE — PROGRESS NOTE ADULT - PROVIDER SPECIALTY LIST ADULT
Anesthesia
Internal Medicine
Internal Medicine
OMFS
Internal Medicine

## 2023-04-02 NOTE — PROGRESS NOTE ADULT - PROBLEM SELECTOR PLAN 7
- DVT ppx: IMPROVE score 0, low risk for VTE. Encourage OOB and ambulation as tolerated. SCDs.  - Diet: NPO for now given b/l mandibular fractures
- DVT ppx: IMPROVE score 0, low risk for VTE. Encourage OOB and ambulation as tolerated. SCDs.  - Diet: NPO for now given b/l mandibular fractures
Pt unable to remember her home meds.   - Med Rec pharmacist emailed for assistance, f/u Med Rec
- DVT ppx: IMPROVE score 0, low risk for VTE. Encourage OOB and ambulation as tolerated. SCDs.  - Diet: NPO for now given b/l mandibular fractures

## 2023-04-03 NOTE — ED ADULT TRIAGE NOTE - WEIGHT IN LBS
Increase patients ADLs/functional status to baseline. PT eval and tx completed, see note for details. Increase function to baseline. Problem: Discharge Planning  Goal: Discharge to home or other facility with appropriate resources  3/29/2023 1214 by Jayden Damon RN  Outcome: Progressing  Flowsheets (Taken 3/29/2023 0900)  Discharge to home or other facility with appropriate resources:   Identify barriers to discharge with patient and caregiver   Arrange for needed discharge resources and transportation as appropriate   Identify discharge learning needs (meds, wound care, etc)     Problem: Safety - Adult  Goal: Free from fall injury  3/29/2023 1214 by Jayden Damon RN  Outcome: Progressing     Problem: Skin/Tissue Integrity - Adult  Goal: Skin integrity remains intact  3/29/2023 1214 by Jayden Damon RN  Outcome: Progressing  Flowsheets (Taken 3/29/2023 0900)  Skin Integrity Remains Intact: Monitor for areas of redness and/or skin breakdown     Problem: Musculoskeletal - Adult  Goal: Return ADL status to a safe level of function  3/29/2023 1214 by Jayden Damon RN  Outcome: Progressing  Flowsheets (Taken 3/29/2023 0900)  Return ADL Status to a Safe Level of Function: Administer medication as ordered     Problem: Genitourinary - Adult  Goal: Absence of urinary retention  3/29/2023 1214 by Jayden Damon RN  Outcome: Progressing  Flowsheets (Taken 3/29/2023 0900)  Absence of urinary retention: Assess patients ability to void and empty bladder Problem: Discharge Planning  Goal: Discharge to home or other facility with appropriate resources  3/30/2023 0936 by Mahogany Adrian RN  Outcome: Progressing     Problem: Safety - Adult  Goal: Free from fall injury  3/30/2023 0936 by Mahogany Adrian RN  Outcome: Progressing     Problem: Skin/Tissue Integrity - Adult  Goal: Skin integrity remains intact  3/30/2023 0936 by Mahogany Adrian RN  Outcome: Progressing     Problem: Musculoskeletal - Adult  Goal: Return ADL status to a safe level of function  3/30/2023 0936 by Mahogany Adrian RN  Outcome: Progressing     Problem: Genitourinary - Adult  Goal: Absence of urinary retention  3/30/2023 0936 by Mahogany Adrian RN  Outcome: Progressing Problem: Discharge Planning  Goal: Discharge to home or other facility with appropriate resources  4/2/2023 0042 by Taina Ray RN  Outcome: Progressing  4/1/2023 1239 by Juli Humphreys RN  Outcome: Progressing  Flowsheets (Taken 4/1/2023 1751)  Discharge to home or other facility with appropriate resources: Identify barriers to discharge with patient and caregiver     Problem: Safety - Adult  Goal: Free from fall injury  4/2/2023 0042 by Taina Ray RN  Outcome: Progressing  4/1/2023 1239 by Juli Humphreys RN  Outcome: Progressing     Problem: Neurosensory - Adult  Goal: Achieves maximal functionality and self care  4/2/2023 0042 by Taina Ray RN  Outcome: Progressing  4/1/2023 1239 by Juli Humphreys RN  Outcome: Progressing  Flowsheets (Taken 4/1/2023 1690)  Achieves maximal functionality and self care: Monitor swallowing and airway patency with patient fatigue and changes in neurological status     Problem: Respiratory - Adult  Goal: Achieves optimal ventilation and oxygenation  4/2/2023 0042 by Taina Ray RN  Outcome: Progressing  4/1/2023 1239 by Juli Humphreys RN  Outcome: Progressing  Flowsheets (Taken 4/1/2023 9119)  Achieves optimal ventilation and oxygenation: Assess for changes in respiratory status     Problem: Cardiovascular - Adult  Goal: Absence of cardiac dysrhythmias or at baseline  4/2/2023 0042 by Taina Ray RN  Outcome: Progressing  4/1/2023 1239 by Juli Humphreys RN  Outcome: Progressing  Flowsheets (Taken 4/1/2023 5073)  Absence of cardiac dysrhythmias or at baseline: Monitor cardiac rate and rhythm     Problem: Skin/Tissue Integrity - Adult  Goal: Skin integrity remains intact  4/2/2023 0042 by Taina Ray RN  Outcome: Progressing  4/1/2023 1239 by Juli Humphreys RN  Outcome: Progressing  Flowsheets  Taken 4/1/2023 1239  Skin Integrity Remains Intact: Monitor for areas of redness and/or skin breakdown  Taken 4/1/2023 0822  Skin Integrity Remains Intact: Monitor for areas of redness Problem: Discharge Planning  Goal: Discharge to home or other facility with appropriate resources  4/2/2023 1354 by Bryce Armendariz RN  Outcome: Progressing  Flowsheets (Taken 4/2/2023 0831)  Discharge to home or other facility with appropriate resources: Identify barriers to discharge with patient and caregiver  4/2/2023 0042 by Meliton Houston RN  Outcome: Progressing     Problem: Safety - Adult  Goal: Free from fall injury  4/2/2023 1354 by Bryce Armendariz RN  Outcome: Progressing  4/2/2023 0042 by Meliton Houston RN  Outcome: Progressing     Problem: Neurosensory - Adult  Goal: Achieves maximal functionality and self care  4/2/2023 1354 by Bryce Armendariz RN  Outcome: Progressing  Flowsheets (Taken 4/2/2023 0831)  Achieves maximal functionality and self care: Monitor swallowing and airway patency with patient fatigue and changes in neurological status  4/2/2023 0042 by Meliton Houston RN  Outcome: Progressing     Problem: Respiratory - Adult  Goal: Achieves optimal ventilation and oxygenation  4/2/2023 1354 by Bryce Armendariz RN  Outcome: Progressing  Flowsheets (Taken 4/2/2023 0831)  Achieves optimal ventilation and oxygenation: Assess for changes in respiratory status  4/2/2023 0042 by Meliton Houston RN  Outcome: Progressing     Problem: Cardiovascular - Adult  Goal: Absence of cardiac dysrhythmias or at baseline  4/2/2023 1354 by Bryce Armendariz RN  Outcome: Progressing  Flowsheets (Taken 4/2/2023 0831)  Absence of cardiac dysrhythmias or at baseline: Monitor cardiac rate and rhythm  4/2/2023 0042 by Meliton Houston RN  Outcome: Progressing     Problem: Skin/Tissue Integrity - Adult  Goal: Skin integrity remains intact  4/2/2023 1354 by Bryce Armendariz RN  Outcome: Progressing  Flowsheets  Taken 4/2/2023 1353  Skin Integrity Remains Intact: Monitor for areas of redness and/or skin breakdown  Taken 4/2/2023 0831  Skin Integrity Remains Intact: Monitor for areas of redness and/or skin breakdown  4/2/2023 0042 by Meliton Houston Problem: Discharge Planning  Goal: Discharge to home or other facility with appropriate resources  Outcome: Progressing     Problem: Safety - Adult  Goal: Free from fall injury  Outcome: Progressing     Problem: Neurosensory - Adult  Goal: Achieves maximal functionality and self care  Outcome: Progressing     Problem: Respiratory - Adult  Goal: Achieves optimal ventilation and oxygenation  Outcome: Progressing     Problem: Cardiovascular - Adult  Goal: Absence of cardiac dysrhythmias or at baseline  Outcome: Progressing     Problem: Skin/Tissue Integrity - Adult  Goal: Skin integrity remains intact  Outcome: Progressing  Goal: Incisions, wounds, or drain sites healing without S/S of infection  Outcome: Progressing     Problem: Musculoskeletal - Adult  Goal: Return ADL status to a safe level of function  Outcome: Progressing     Problem: Genitourinary - Adult  Goal: Absence of urinary retention  Outcome: Progressing     Problem: Infection - Adult  Goal: Absence of infection at discharge  Outcome: Progressing  Goal: Absence of infection during hospitalization  Outcome: Progressing     Problem: Metabolic/Fluid and Electrolytes - Adult  Goal: Electrolytes maintained within normal limits  Outcome: Progressing Problem: Discharge Planning  Goal: Discharge to home or other facility with appropriate resources  Outcome: Progressing     Problem: Safety - Adult  Goal: Free from fall injury  Outcome: Progressing     Problem: Neurosensory - Adult  Goal: Achieves maximal functionality and self care  Outcome: Progressing     Problem: Respiratory - Adult  Goal: Achieves optimal ventilation and oxygenation  Outcome: Progressing     Problem: Cardiovascular - Adult  Goal: Absence of cardiac dysrhythmias or at baseline  Outcome: Progressing     Problem: Skin/Tissue Integrity - Adult  Goal: Skin integrity remains intact  Outcome: Progressing  Goal: Incisions, wounds, or drain sites healing without S/S of infection  Outcome: Progressing     Problem: Musculoskeletal - Adult  Goal: Return ADL status to a safe level of function  Outcome: Progressing     Problem: Genitourinary - Adult  Goal: Absence of urinary retention  Outcome: Progressing     Problem: Infection - Adult  Goal: Absence of infection at discharge  Outcome: Progressing  Goal: Absence of infection during hospitalization  Outcome: Progressing     Problem: Metabolic/Fluid and Electrolytes - Adult  Goal: Electrolytes maintained within normal limits  Outcome: Progressing Problem: Discharge Planning  Goal: Discharge to home or other facility with appropriate resources  Outcome: Progressing  Flowsheets (Taken 4/1/2023 2028)  Discharge to home or other facility with appropriate resources: Identify barriers to discharge with patient and caregiver     Problem: Safety - Adult  Goal: Free from fall injury  Outcome: Progressing     Problem: Neurosensory - Adult  Goal: Achieves maximal functionality and self care  Outcome: Progressing  Flowsheets (Taken 4/1/2023 1391)  Achieves maximal functionality and self care: Monitor swallowing and airway patency with patient fatigue and changes in neurological status     Problem: Respiratory - Adult  Goal: Achieves optimal ventilation and oxygenation  Outcome: Progressing  Flowsheets (Taken 4/1/2023 8091)  Achieves optimal ventilation and oxygenation: Assess for changes in respiratory status     Problem: Cardiovascular - Adult  Goal: Absence of cardiac dysrhythmias or at baseline  Outcome: Progressing  Flowsheets (Taken 4/1/2023 6165)  Absence of cardiac dysrhythmias or at baseline: Monitor cardiac rate and rhythm     Problem: Skin/Tissue Integrity - Adult  Goal: Skin integrity remains intact  Outcome: Progressing  Flowsheets  Taken 4/1/2023 1239  Skin Integrity Remains Intact: Monitor for areas of redness and/or skin breakdown  Taken 4/1/2023 0822  Skin Integrity Remains Intact: Monitor for areas of redness and/or skin breakdown  Goal: Incisions, wounds, or drain sites healing without S/S of infection  Outcome: Progressing  Flowsheets  Taken 4/1/2023 1239  Incisions, Wounds, or Drain Sites Healing Without Sign and Symptoms of Infection: ADMISSION and DAILY: Assess and document risk factors for pressure ulcer development  Taken 4/1/2023 0822  Incisions, Wounds, or Drain Sites Healing Without Sign and Symptoms of Infection: ADMISSION and DAILY: Assess and document risk factors for pressure ulcer development     Problem: Musculoskeletal - Adult  Goal: Problem: Safety - Adult  Goal: Free from fall injury  Outcome: Progressing     Problem: Respiratory - Adult  Goal: Achieves optimal ventilation and oxygenation  Outcome: Progressing     Problem: Cardiovascular - Adult  Goal: Absence of cardiac dysrhythmias or at baseline  Outcome: Progressing     Problem: Genitourinary - Adult  Goal: Absence of urinary retention  Outcome: Progressing     Problem: Infection - Adult  Goal: Absence of infection during hospitalization  Outcome: Progressing Pt aaox4, vss, no complaints of pain or discomfort. Discharge orders in pending morning CK level below 2000. Last Ck was 2716. Will continue to monitor. of function  3/31/2023 1149 by Flakita Peñaloza RN  Outcome: Progressing     Problem: Genitourinary - Adult  Goal: Absence of urinary retention  3/31/2023 2233 by Kenia Manuel RN  Outcome: Progressing  3/31/2023 1149 by Flakita Peñaloza RN  Outcome: Progressing     Problem: Infection - Adult  Goal: Absence of infection at discharge  3/31/2023 2233 by Kenia Manuel RN  Outcome: Progressing  3/31/2023 1149 by Flakita Peñaloza RN  Outcome: Progressing  Goal: Absence of infection during hospitalization  3/31/2023 1149 by Flakita Peñaloza RN  Outcome: Progressing     Problem: Metabolic/Fluid and Electrolytes - Adult  Goal: Electrolytes maintained within normal limits  3/31/2023 2233 by Kenia Manuel RN  Outcome: Progressing  3/31/2023 1149 by Flakita Peñaloza RN  Outcome: Progressing 160

## 2023-04-07 RX ORDER — CYCLOBENZAPRINE HYDROCHLORIDE 10 MG/1
1 TABLET, FILM COATED ORAL
Qty: 7 | Refills: 0
Start: 2023-04-07

## 2023-04-07 RX ORDER — IBUPROFEN 200 MG
30 TABLET ORAL
Qty: 840 | Refills: 0
Start: 2023-04-07 | End: 2023-04-13

## 2023-04-07 RX ORDER — OXYCODONE HYDROCHLORIDE 5 MG/1
5 TABLET ORAL
Qty: 105 | Refills: 0
Start: 2023-04-07 | End: 2023-04-13

## 2023-04-07 RX ORDER — ACETAMINOPHEN 500 MG
20 TABLET ORAL
Qty: 560 | Refills: 0
Start: 2023-04-07 | End: 2023-04-13

## 2023-04-20 NOTE — PATIENT PROFILE ADULT - BRADEN ACTIVITY
(1) bedfast Olumiant Counseling: I discussed with the patient the risks of Olumiant therapy including but not limited to upper respiratory tract infections, shingles, cold sores, and nausea. Live vaccines should be avoided.  This medication has been linked to serious infections; higher rate of mortality; malignancy and lymphoproliferative disorders; major adverse cardiovascular events; thrombosis; gastrointestinal perforations; neutropenia; lymphopenia; anemia; liver enzyme elevations; and lipid elevations.

## 2023-04-25 ENCOUNTER — EMERGENCY (EMERGENCY)
Facility: HOSPITAL | Age: 48
LOS: 1 days | Discharge: HOME | End: 2023-04-25
Attending: EMERGENCY MEDICINE
Payer: MEDICAID

## 2023-04-25 VITALS
SYSTOLIC BLOOD PRESSURE: 156 MMHG | HEIGHT: 68 IN | HEART RATE: 106 BPM | DIASTOLIC BLOOD PRESSURE: 132 MMHG | TEMPERATURE: 98 F | WEIGHT: 145.06 LBS | OXYGEN SATURATION: 99 % | RESPIRATION RATE: 16 BRPM

## 2023-04-25 DIAGNOSIS — Z98.890 OTHER SPECIFIED POSTPROCEDURAL STATES: Chronic | ICD-10-CM

## 2023-04-25 LAB
ALBUMIN SERPL ELPH-MCNC: 4.1 G/DL — SIGNIFICANT CHANGE UP (ref 3.3–5)
ALP SERPL-CCNC: 75 U/L — SIGNIFICANT CHANGE UP (ref 40–120)
ALT FLD-CCNC: 24 U/L — SIGNIFICANT CHANGE UP (ref 10–45)
ANION GAP SERPL CALC-SCNC: 14 MMOL/L — SIGNIFICANT CHANGE UP (ref 5–17)
ANION GAP SERPL CALC-SCNC: 16 MMOL/L — SIGNIFICANT CHANGE UP (ref 5–17)
ANISOCYTOSIS BLD QL: SLIGHT — SIGNIFICANT CHANGE UP
AST SERPL-CCNC: 61 U/L — HIGH (ref 10–40)
BASOPHILS # BLD AUTO: 0 K/UL — SIGNIFICANT CHANGE UP (ref 0–0.2)
BASOPHILS NFR BLD AUTO: 0 % — SIGNIFICANT CHANGE UP (ref 0–2)
BILIRUB SERPL-MCNC: 0.5 MG/DL — SIGNIFICANT CHANGE UP (ref 0.2–1.2)
BUN SERPL-MCNC: 5 MG/DL — LOW (ref 7–23)
BUN SERPL-MCNC: 6 MG/DL — LOW (ref 7–23)
CALCIUM SERPL-MCNC: 10 MG/DL — SIGNIFICANT CHANGE UP (ref 8.4–10.5)
CALCIUM SERPL-MCNC: 9.2 MG/DL — SIGNIFICANT CHANGE UP (ref 8.4–10.5)
CHLORIDE SERPL-SCNC: 93 MMOL/L — LOW (ref 96–108)
CHLORIDE SERPL-SCNC: 95 MMOL/L — LOW (ref 96–108)
CO2 SERPL-SCNC: 22 MMOL/L — SIGNIFICANT CHANGE UP (ref 22–31)
CO2 SERPL-SCNC: 23 MMOL/L — SIGNIFICANT CHANGE UP (ref 22–31)
CREAT SERPL-MCNC: 0.53 MG/DL — SIGNIFICANT CHANGE UP (ref 0.5–1.3)
CREAT SERPL-MCNC: 0.58 MG/DL — SIGNIFICANT CHANGE UP (ref 0.5–1.3)
EGFR: 111 ML/MIN/1.73M2 — SIGNIFICANT CHANGE UP
EGFR: 113 ML/MIN/1.73M2 — SIGNIFICANT CHANGE UP
EOSINOPHIL # BLD AUTO: 0.2 K/UL — SIGNIFICANT CHANGE UP (ref 0–0.5)
EOSINOPHIL NFR BLD AUTO: 3.5 % — SIGNIFICANT CHANGE UP (ref 0–6)
GLUCOSE SERPL-MCNC: 132 MG/DL — HIGH (ref 70–99)
GLUCOSE SERPL-MCNC: 163 MG/DL — HIGH (ref 70–99)
HCT VFR BLD CALC: 36.9 % — SIGNIFICANT CHANGE UP (ref 34.5–45)
HGB BLD-MCNC: 11 G/DL — LOW (ref 11.5–15.5)
LIDOCAIN IGE QN: 59 U/L — SIGNIFICANT CHANGE UP (ref 7–60)
LYMPHOCYTES # BLD AUTO: 0.98 K/UL — LOW (ref 1–3.3)
LYMPHOCYTES # BLD AUTO: 17.4 % — SIGNIFICANT CHANGE UP (ref 13–44)
MAGNESIUM SERPL-MCNC: 1.3 MG/DL — LOW (ref 1.6–2.6)
MANUAL SMEAR VERIFICATION: SIGNIFICANT CHANGE UP
MCHC RBC-ENTMCNC: 21.5 PG — LOW (ref 27–34)
MCHC RBC-ENTMCNC: 29.8 GM/DL — LOW (ref 32–36)
MCV RBC AUTO: 72.2 FL — LOW (ref 80–100)
MICROCYTES BLD QL: SLIGHT — SIGNIFICANT CHANGE UP
MONOCYTES # BLD AUTO: 0.29 K/UL — SIGNIFICANT CHANGE UP (ref 0–0.9)
MONOCYTES NFR BLD AUTO: 5.2 % — SIGNIFICANT CHANGE UP (ref 2–14)
NEUTROPHILS # BLD AUTO: 4.18 K/UL — SIGNIFICANT CHANGE UP (ref 1.8–7.4)
NEUTROPHILS NFR BLD AUTO: 73.9 % — SIGNIFICANT CHANGE UP (ref 43–77)
OVALOCYTES BLD QL SMEAR: SLIGHT — SIGNIFICANT CHANGE UP
PHOSPHATE SERPL-MCNC: 3 MG/DL — SIGNIFICANT CHANGE UP (ref 2.5–4.5)
PLAT MORPH BLD: NORMAL — SIGNIFICANT CHANGE UP
PLATELET # BLD AUTO: 164 K/UL — SIGNIFICANT CHANGE UP (ref 150–400)
POIKILOCYTOSIS BLD QL AUTO: SLIGHT — SIGNIFICANT CHANGE UP
POTASSIUM SERPL-MCNC: 3.3 MMOL/L — LOW (ref 3.5–5.3)
POTASSIUM SERPL-MCNC: 4 MMOL/L — SIGNIFICANT CHANGE UP (ref 3.5–5.3)
POTASSIUM SERPL-SCNC: 3.3 MMOL/L — LOW (ref 3.5–5.3)
POTASSIUM SERPL-SCNC: 4 MMOL/L — SIGNIFICANT CHANGE UP (ref 3.5–5.3)
PROT SERPL-MCNC: 8.6 G/DL — HIGH (ref 6–8.3)
RBC # BLD: 5.11 M/UL — SIGNIFICANT CHANGE UP (ref 3.8–5.2)
RBC # FLD: 18.8 % — HIGH (ref 10.3–14.5)
RBC BLD AUTO: ABNORMAL
SCHISTOCYTES BLD QL AUTO: SLIGHT — SIGNIFICANT CHANGE UP
SODIUM SERPL-SCNC: 131 MMOL/L — LOW (ref 135–145)
SODIUM SERPL-SCNC: 132 MMOL/L — LOW (ref 135–145)
WBC # BLD: 5.66 K/UL — SIGNIFICANT CHANGE UP (ref 3.8–10.5)
WBC # FLD AUTO: 5.66 K/UL — SIGNIFICANT CHANGE UP (ref 3.8–10.5)

## 2023-04-25 PROCEDURE — 99285 EMERGENCY DEPT VISIT HI MDM: CPT

## 2023-04-25 RX ORDER — POTASSIUM CHLORIDE 20 MEQ
10 PACKET (EA) ORAL
Refills: 0 | Status: DISCONTINUED | OUTPATIENT
Start: 2023-04-25 | End: 2023-04-29

## 2023-04-25 RX ORDER — POTASSIUM CHLORIDE 20 MEQ
40 PACKET (EA) ORAL ONCE
Refills: 0 | Status: COMPLETED | OUTPATIENT
Start: 2023-04-25 | End: 2023-04-25

## 2023-04-25 RX ORDER — MAGNESIUM SULFATE 500 MG/ML
1 VIAL (ML) INJECTION ONCE
Refills: 0 | Status: COMPLETED | OUTPATIENT
Start: 2023-04-25 | End: 2023-04-25

## 2023-04-25 RX ORDER — ACETAMINOPHEN 500 MG
1000 TABLET ORAL ONCE
Refills: 0 | Status: COMPLETED | OUTPATIENT
Start: 2023-04-25 | End: 2023-04-25

## 2023-04-25 RX ORDER — ONDANSETRON 8 MG/1
4 TABLET, FILM COATED ORAL ONCE
Refills: 0 | Status: COMPLETED | OUTPATIENT
Start: 2023-04-25 | End: 2023-04-25

## 2023-04-25 RX ORDER — SODIUM CHLORIDE 9 MG/ML
2000 INJECTION, SOLUTION INTRAVENOUS ONCE
Refills: 0 | Status: COMPLETED | OUTPATIENT
Start: 2023-04-25 | End: 2023-04-25

## 2023-04-25 RX ORDER — FAMOTIDINE 10 MG/ML
20 INJECTION INTRAVENOUS ONCE
Refills: 0 | Status: COMPLETED | OUTPATIENT
Start: 2023-04-25 | End: 2023-04-25

## 2023-04-25 RX ADMIN — Medication 400 MILLIGRAM(S): at 22:14

## 2023-04-25 RX ADMIN — Medication 100 MILLIEQUIVALENT(S): at 23:49

## 2023-04-25 RX ADMIN — Medication 25 MILLIGRAM(S): at 20:36

## 2023-04-25 RX ADMIN — FAMOTIDINE 20 MILLIGRAM(S): 10 INJECTION INTRAVENOUS at 22:14

## 2023-04-25 RX ADMIN — Medication 100 GRAM(S): at 22:25

## 2023-04-25 RX ADMIN — SODIUM CHLORIDE 2000 MILLILITER(S): 9 INJECTION, SOLUTION INTRAVENOUS at 20:36

## 2023-04-25 RX ADMIN — Medication 40 MILLIEQUIVALENT(S): at 23:49

## 2023-04-25 RX ADMIN — ONDANSETRON 4 MILLIGRAM(S): 8 TABLET, FILM COATED ORAL at 20:45

## 2023-04-25 RX ADMIN — Medication 25 MILLIGRAM(S): at 22:25

## 2023-04-25 NOTE — ED ADULT NURSE NOTE - DRUG PRE-SCREENING (DAST -1)
MEDICARE WELLNESS VISIT NOTE      HISTORY OF PRESENT ILLNESS:   Leah Tomlin presents for her Subsequent Annual Medicare Wellness Visit.       Patient Care Team:  Agusto Hearn MD as PCP - General (Family Practice)  Casey Betancourt MD as Ophthalmology (Ophthalmology)  Tyrone Collins MD as Ophthalmology (Ophthalmology)  Ildefonso Giles as Dentist- General Practice (Dentist - General Practice)        Patient Active Problem List   Diagnosis   • Rheumatoid arthritis (CMS/HCC)   • Premature atrial contractions   • Atrial tachycardia (CMS/HCC)   • Aortic insufficiency   • Palpitations   • Premature ventricular contractions   • Asthma   • GERD (gastroesophageal reflux disease)   • IBS (irritable bowel syndrome)   • Seizure disorder (CMS/HCC)         Past Medical History:   Diagnosis Date   • Allergic rhinitis    • Aortic insufficiency     mild   • Asthma    • GERD (gastroesophageal reflux disease)    • Hemangioma of liver    • Palpitations    • Prediabetes    • RA (rheumatoid arthritis) (CMS/HCC)    • Seizures (CMS/HCC)          Past Surgical History:   Procedure Laterality Date   • Echo heart resting  11/2/2010    LVEF 55-60%, MILD AORTIC INSUFFICIENCY   • Holter monitor  11/3/2010    NSR, FEW PVC'S, SVT   • Tonsillectomy and adenoidectomy           Social History     Tobacco Use   • Smoking status: Never Smoker   • Smokeless tobacco: Never Used   Substance Use Topics   • Alcohol use: No   • Drug use: No     Drug use:    Drug Use:    No              Family History   Problem Relation Age of Onset   • Diabetes Father    • Heart Father    • Stroke Father    • Cancer Mother         breast   • Hypertension Mother    • Arthritis Mother    • Psychiatry Mother         depression   • Cancer Paternal Uncle         prostate   • Cancer Paternal Grandmother    • Gastrointestinal Paternal Grandmother        Current Outpatient Medications   Medication Sig Dispense Refill   • Magnesium Hydroxide (MILK OF MAGNESIA PO)  Take 150 mg by mouth daily as needed.     • Glucose Blood strip Blood sugar check daily as needed. Abnormal blood sugar 1CD 9 790.29 100 each 12   • Dispense (CHECK, UNKNOWN CONCENTRATION) Take 75 mg by mouth daily.      • albuterol (PROVENTIL HFA) 108 (90 Base) MCG/ACT inhaler Inhale 2 puffs into the lungs every 4 hours as needed for Shortness of Breath or Wheezing. 1 Inhaler 12     No current facility-administered medications for this visit.         The following items on the Medicare Health Risk Assessment were found to be positive  1.) Do you have an Advance directive, living will, or power of  for health care document that contains your wishes for end of life care?:  No     3.) During the past 4 weeks, how would you rate your health?:  Fair     4.) During the past 4 weeks, what was the hardest physical activity you could do for at least 2 minutes?:  Very Light     8.) During the past 4 weeks, has your physical and emotional health limited your social activities with family, friends, neighbors, or other groups?:  Moderately     11b.) Bowel control problems:  Sometimes     11d.) Bodily pain:  Always     11e.) Tiredness or Fatigue:  Often       Vision and Hearing screens: not applicable    Advance Directive:   The patient has the following documents:  No Advance Directives on file. Patient offered documents.    Cognitive Assessment: no evidence of cognitive dysfunction by direct observation    Recent PHQ 2/9 Score    PHQ 2:  Date Adult PHQ 2 Score   11/6/2019 0     DEPRESSION ASSESSMENT/PLAN:  Depression screening is negative no further plan needed.     Body mass index is 19.82 kg/m².    Needed Screening/Treatment: Colorectal cancer screening , Bone density and Immunizations reviewed and patient needs: Influenza, Prevnar 13 and Herpes Zoster  Needed follow up: None    See orders.   See Patient Instructions section.   No follow-ups on file.   Statement Selected

## 2023-04-25 NOTE — ED PROVIDER NOTE - ATTENDING CONTRIBUTION TO CARE
Patient is a 48 yo F with history of depression, ETOH abuse, pancreatitis here for evaluation of ETOH withdrawal. Patient states that she was drinking heavily from Friday to Sunday.  She states that she felt fine Sunday night.  She started having withdrawal symptoms yesterday.  She states that she has had multiple episodes of vomiting and has abdominal discomfort.  Patient states that she is going through withdrawal.  She reports that she drank a total of 2.5 L of vodka.   Patient states that she last had withdrawal seizures many years ago.    VS noted  Gen: tremulous  HEENT: EOMI, dry MM, + tongue fasciculations  Lungs: CTAB/L no C/ W /R   CVS: tachycardic  Abd; Soft, ttp in epigastric region  Ext: no edema  Skin: no rash  Neuro AAOx3 non focal clear speech  a/p: ETOH withdrawal -   Patient arrives tachycardic, with tongue fasciculations and appears tremulous.  She reports recent binge drinking.  Patient is having alcohol withdrawal symptoms.  Plan to treat alcohol withdrawal with Librium.  Will check labs for metabolic derangement, alcoholic ketoacidosis, pancreatitis, dehydration.  Patient will likely need to be admitted for continued treatment of alcohol withdrawal.  She has a history of seizures many years ago.  Patient is being monitored on [facility] protocol.  - Nadine MORSE. Patient is a 46 yo F with history of depression, ETOH abuse, pancreatitis here for evaluation of ETOH withdrawal. Patient states that she was drinking heavily from Friday to Sunday.  She states that she felt fine Sunday night.  She started having withdrawal symptoms yesterday.  She states that she has had multiple episodes of vomiting and has abdominal discomfort.  Patient states that she is going through withdrawal.  She reports that she drank a total of 2.5 L of vodka.   Patient states that she last had withdrawal seizures many years ago.    VS noted  Gen: tremulous  HEENT: EOMI, dry MM, + tongue fasciculations  Lungs: CTAB/L no C/ W /R   CVS: tachycardic  Abd; Soft, ttp in epigastric region  Ext: no edema  Skin: no rash  Neuro AAOx3 non focal clear speech  a/p: ETOH withdrawal -   Patient arrives tachycardic, with tongue fasciculations and appears tremulous.  She reports recent binge drinking.  Patient is having alcohol withdrawal symptoms.  Plan to treat alcohol withdrawal with Librium.  Will check labs for metabolic derangement, alcoholic ketoacidosis, pancreatitis, dehydration.  Patient will likely need to be admitted for continued treatment of alcohol withdrawal.  She has a history of seizures many years ago.  Patient is being monitored on CIWA protocol.    Signed out to night team pending labs and disposition.   - Nadine MORSE.

## 2023-04-25 NOTE — ED PROVIDER NOTE - PHYSICAL EXAMINATION
General: anxious, alert, oriented to person, time, place  Psych: mood appropriate  Head: normocephalic; atraumatic  Eyes: PERRLA, EOMI, conjunctivae clear bilaterally, sclerae anicteric  ENT: no nasal flaring, patent nares  Cardio: RRR, no m/r/g, pulses 2+ b/l  Resp: CATB, no w/r/r  GI: diffuse tenderness  Neuro: gross tremors in hands; mild tongue fasciculations; normal strength and sensation  Skin: No evidence of rash or bruising  MSK: normal movement of all extremities  Lymph/Vasc: no LE edema

## 2023-04-25 NOTE — ED ADULT TRIAGE NOTE - CHIEF COMPLAINT QUOTE
alcohol withdrawal, last drink saturday night (1L vodka). States does not drink everyday but goes on binges. PMH seizures from withdrawal and hospitalizations

## 2023-04-25 NOTE — ED PROCEDURE NOTE - PROCEDURE ADDITIONAL DETAILS
Jac Webb MD (PGY-1): Emergency Department Focused Ultrasound performed at patient's bedside for educational purposes. The study will have a follow up study performed or was performed in the direct supervision of an ultrasound trained attending.

## 2023-04-25 NOTE — ED PROVIDER NOTE - CLINICAL SUMMARY MEDICAL DECISION MAKING FREE TEXT BOX
47F here for suspected EtOH withdrawal. She has tremors, abd pain, anxiety, mild tongue fasciculations. No concern for seizures at this time but patient requires close monitoring. Will give chlordiazepoxide with frequent reassessments. Will obtain CBC, CMP, lipase, EKG, put patient on CIWA protocol, cardiac monitor.

## 2023-04-25 NOTE — ED ADULT NURSE NOTE - OBJECTIVE STATEMENT
46 YO female with PMH   HTN, seizures, withdraw requiring admission  via EMS in presenting with complaints of  withdraw. pt reports last drink was Friday going into Saturday where she states she drank 2 liters of vodka. pt reports she drinks interminably where she binges and then doesn't drink for many months. pt reports "feeling shaky", abdominal pain, and nausea. Denies fevers, CP, SOB, using recreational drugs, hallucinations,   Pt Axox4, gross neuro intact, respirations even, & non-labored Abdomen soft, non-tender, non-distended. Skin warm, dry, and intact. Pt placed in position of comfort. Pt educated on call bell system and provided call bell. Bed in lowest position, wheels locked, appropriate side rails raised. Pt denies needs at this time. on cardiac moniotr.

## 2023-04-25 NOTE — ED PROVIDER NOTE - NSFOLLOWUPINSTRUCTIONS_ED_ALL_ED_FT
You were seen in the emergency department for alcohol withdrawal. You are leaving prior to receiving all recommended treatments. You are welcome to return any time to be re-evaluated.    During your stay you had the following relevant results: CT of your abdomen/pelvis which shows an atrophic pancreas, very small left renal hypodensity, and enlarged myomatous uterus.    Please follow up with your primary care provider as necessary to discuss the results of your stay in our department.    You may continue to experience pain while at home. For pain, you can take 1000 mg of Tylenol (acetaminophen) every 6 hours. Do not take more than four doses (4000 mg) of Tylenol in a 24h period. You can also take 400 mg of Motrin/Advil (ibuprofen) every 6 hours. Do not take more than four doses (1600 mg) of Motrin/Advil in a 24h period.    If you start to experience worsening symptoms such as tremors, chest pain, shortness of breath, severe headaches, please return to the emergency department for further evaluation.

## 2023-04-25 NOTE — ED PROVIDER NOTE - PATIENT PORTAL LINK FT
You can access the FollowMyHealth Patient Portal offered by [facility] by registering at the following website: [email or web address]. By joining [employer]’s FollowMyHealth portal, you will also be able to view your health information using other applications (apps) compatible with our system.

## 2023-04-25 NOTE — ED PROVIDER NOTE - PROGRESS NOTE DETAILS
Jac Webb MD, PGY-1: Patient continues to endorse abdominal pain s/p multiple pain meds. Will obtain CTAP to assess for intraabdominal pathology. She also expresses concerns with staying in hospital. Will re-visit topic after CT results are back. Jac Webb MD, PGY-1: The patient has decided to leave against medical advice.  The patient is alert and oriented, not intoxicated, and displays normal decision making ability. We discussed all risks, benefits, and alternatives to the progression of treatment and the potential dangers of leaving including but not limited to permanent disability, injury, and death.  The patient was instructed that they are welcome to change their decision to leave against medical advice and return to the emergency department at any time and for any reason in order to allow us to render care.

## 2023-04-26 VITALS
TEMPERATURE: 98 F | RESPIRATION RATE: 18 BRPM | DIASTOLIC BLOOD PRESSURE: 130 MMHG | HEART RATE: 90 BPM | SYSTOLIC BLOOD PRESSURE: 173 MMHG | OXYGEN SATURATION: 96 %

## 2023-04-26 PROBLEM — D64.9 ANEMIA, UNSPECIFIED: Chronic | Status: ACTIVE | Noted: 2023-03-28

## 2023-04-26 PROBLEM — F19.939 OTHER PSYCHOACTIVE SUBSTANCE USE, UNSPECIFIED WITH WITHDRAWAL, UNSPECIFIED: Chronic | Status: ACTIVE | Noted: 2023-03-28

## 2023-04-26 PROCEDURE — 80053 COMPREHEN METABOLIC PANEL: CPT

## 2023-04-26 PROCEDURE — 85025 COMPLETE CBC W/AUTO DIFF WBC: CPT

## 2023-04-26 PROCEDURE — 83735 ASSAY OF MAGNESIUM: CPT

## 2023-04-26 PROCEDURE — 84100 ASSAY OF PHOSPHORUS: CPT

## 2023-04-26 PROCEDURE — 96374 THER/PROPH/DIAG INJ IV PUSH: CPT | Mod: XU

## 2023-04-26 PROCEDURE — 99285 EMERGENCY DEPT VISIT HI MDM: CPT | Mod: 25

## 2023-04-26 PROCEDURE — 80048 BASIC METABOLIC PNL TOTAL CA: CPT

## 2023-04-26 PROCEDURE — 74177 CT ABD & PELVIS W/CONTRAST: CPT | Mod: 26,MA

## 2023-04-26 PROCEDURE — 84702 CHORIONIC GONADOTROPIN TEST: CPT

## 2023-04-26 PROCEDURE — 93005 ELECTROCARDIOGRAM TRACING: CPT

## 2023-04-26 PROCEDURE — 83690 ASSAY OF LIPASE: CPT

## 2023-04-26 PROCEDURE — 96375 TX/PRO/DX INJ NEW DRUG ADDON: CPT

## 2023-04-26 PROCEDURE — 96376 TX/PRO/DX INJ SAME DRUG ADON: CPT

## 2023-04-26 PROCEDURE — 74177 CT ABD & PELVIS W/CONTRAST: CPT | Mod: MA

## 2023-04-26 RX ORDER — ONDANSETRON 8 MG/1
4 TABLET, FILM COATED ORAL ONCE
Refills: 0 | Status: COMPLETED | OUTPATIENT
Start: 2023-04-26 | End: 2023-04-26

## 2023-04-26 RX ORDER — ACETAMINOPHEN 500 MG
1000 TABLET ORAL ONCE
Refills: 0 | Status: COMPLETED | OUTPATIENT
Start: 2023-04-26 | End: 2023-04-26

## 2023-04-26 RX ORDER — KETOROLAC TROMETHAMINE 30 MG/ML
15 SYRINGE (ML) INJECTION ONCE
Refills: 0 | Status: DISCONTINUED | OUTPATIENT
Start: 2023-04-26 | End: 2023-04-26

## 2023-04-26 RX ORDER — LISINOPRIL 2.5 MG/1
20 TABLET ORAL ONCE
Refills: 0 | Status: COMPLETED | OUTPATIENT
Start: 2023-04-26 | End: 2023-04-26

## 2023-04-26 RX ADMIN — LISINOPRIL 20 MILLIGRAM(S): 2.5 TABLET ORAL at 01:09

## 2023-04-26 RX ADMIN — Medication 100 MILLIEQUIVALENT(S): at 02:31

## 2023-04-26 RX ADMIN — Medication 15 MILLIGRAM(S): at 01:09

## 2023-04-26 RX ADMIN — ONDANSETRON 4 MILLIGRAM(S): 8 TABLET, FILM COATED ORAL at 01:10

## 2023-04-26 NOTE — ED ADULT NURSE REASSESSMENT NOTE - NS ED NURSE REASSESS COMMENT FT1
pt endorses feelings of wanting to be discharged home and not admitted. ED MD Webb is aware and at bedside to discuss plan of care with pt.
AS Per MD baum pt removed own IV, signed AMA paper work
MD Webb at bedside for evaluation and placement of US guided IV access.

## 2023-05-07 ENCOUNTER — INPATIENT (INPATIENT)
Facility: HOSPITAL | Age: 48
LOS: 2 days | Discharge: HOME | End: 2023-05-10
Attending: INTERNAL MEDICINE | Admitting: INTERNAL MEDICINE
Payer: MEDICAID

## 2023-05-07 VITALS
HEART RATE: 87 BPM | OXYGEN SATURATION: 98 % | RESPIRATION RATE: 18 BRPM | SYSTOLIC BLOOD PRESSURE: 131 MMHG | DIASTOLIC BLOOD PRESSURE: 79 MMHG | HEIGHT: 68 IN | TEMPERATURE: 98 F

## 2023-05-07 DIAGNOSIS — Z98.890 OTHER SPECIFIED POSTPROCEDURAL STATES: Chronic | ICD-10-CM

## 2023-05-07 LAB
ALBUMIN SERPL ELPH-MCNC: 4 G/DL — SIGNIFICANT CHANGE UP (ref 3.3–5)
ALP SERPL-CCNC: 58 U/L — SIGNIFICANT CHANGE UP (ref 40–120)
ALT FLD-CCNC: 77 U/L — HIGH (ref 4–33)
ANION GAP SERPL CALC-SCNC: 23 MMOL/L — HIGH (ref 7–14)
ANISOCYTOSIS BLD QL: SLIGHT — SIGNIFICANT CHANGE UP
APAP SERPL-MCNC: <10 UG/ML — LOW (ref 15–25)
AST SERPL-CCNC: 99 U/L — HIGH (ref 4–32)
BASE EXCESS BLDV CALC-SCNC: -0.1 MMOL/L — SIGNIFICANT CHANGE UP (ref -2–3)
BASOPHILS # BLD AUTO: 0 K/UL — SIGNIFICANT CHANGE UP (ref 0–0.2)
BASOPHILS NFR BLD AUTO: 0 % — SIGNIFICANT CHANGE UP (ref 0–2)
BILIRUB SERPL-MCNC: <0.2 MG/DL — SIGNIFICANT CHANGE UP (ref 0.2–1.2)
BLOOD GAS VENOUS COMPREHENSIVE RESULT: SIGNIFICANT CHANGE UP
BUN SERPL-MCNC: 13 MG/DL — SIGNIFICANT CHANGE UP (ref 7–23)
CALCIUM SERPL-MCNC: 8.6 MG/DL — SIGNIFICANT CHANGE UP (ref 8.4–10.5)
CHLORIDE BLDV-SCNC: 108 MMOL/L — SIGNIFICANT CHANGE UP (ref 96–108)
CHLORIDE SERPL-SCNC: 103 MMOL/L — SIGNIFICANT CHANGE UP (ref 98–107)
CO2 BLDV-SCNC: 26.8 MMOL/L — HIGH (ref 22–26)
CO2 SERPL-SCNC: 20 MMOL/L — LOW (ref 22–31)
CREAT SERPL-MCNC: 0.57 MG/DL — SIGNIFICANT CHANGE UP (ref 0.5–1.3)
DACRYOCYTES BLD QL SMEAR: SLIGHT — SIGNIFICANT CHANGE UP
EGFR: 111 ML/MIN/1.73M2 — SIGNIFICANT CHANGE UP
EOSINOPHIL # BLD AUTO: 0.08 K/UL — SIGNIFICANT CHANGE UP (ref 0–0.5)
EOSINOPHIL NFR BLD AUTO: 1.7 % — SIGNIFICANT CHANGE UP (ref 0–6)
ETHANOL SERPL-MCNC: 433 MG/DL — HIGH
GAS PNL BLDV: 146 MMOL/L — HIGH (ref 136–145)
GAS PNL BLDV: SIGNIFICANT CHANGE UP
GIANT PLATELETS BLD QL SMEAR: PRESENT — SIGNIFICANT CHANGE UP
GLUCOSE BLDV-MCNC: 84 MG/DL — SIGNIFICANT CHANGE UP (ref 70–99)
GLUCOSE SERPL-MCNC: 85 MG/DL — SIGNIFICANT CHANGE UP (ref 70–99)
HCO3 BLDV-SCNC: 25 MMOL/L — SIGNIFICANT CHANGE UP (ref 22–29)
HCT VFR BLD CALC: 36.4 % — SIGNIFICANT CHANGE UP (ref 34.5–45)
HCT VFR BLDA CALC: 33 % — LOW (ref 34.5–46.5)
HGB BLD CALC-MCNC: 11.1 G/DL — LOW (ref 11.7–16.1)
HGB BLD-MCNC: 10.7 G/DL — LOW (ref 11.5–15.5)
HYPOCHROMIA BLD QL: SLIGHT — SIGNIFICANT CHANGE UP
IANC: 3.01 K/UL — SIGNIFICANT CHANGE UP (ref 1.8–7.4)
LACTATE BLDV-MCNC: 5.3 MMOL/L — CRITICAL HIGH (ref 0.5–2)
LACTATE SERPL-SCNC: 5.4 MMOL/L — CRITICAL HIGH (ref 0.5–2)
LIDOCAIN IGE QN: 16 U/L — SIGNIFICANT CHANGE UP (ref 7–60)
LYMPHOCYTES # BLD AUTO: 0.63 K/UL — LOW (ref 1–3.3)
LYMPHOCYTES # BLD AUTO: 14 % — SIGNIFICANT CHANGE UP (ref 13–44)
MANUAL SMEAR VERIFICATION: SIGNIFICANT CHANGE UP
MCHC RBC-ENTMCNC: 21.4 PG — LOW (ref 27–34)
MCHC RBC-ENTMCNC: 29.4 GM/DL — LOW (ref 32–36)
MCV RBC AUTO: 72.7 FL — LOW (ref 80–100)
MICROCYTES BLD QL: SIGNIFICANT CHANGE UP
MONOCYTES # BLD AUTO: 0.04 K/UL — SIGNIFICANT CHANGE UP (ref 0–0.9)
MONOCYTES NFR BLD AUTO: 0.9 % — LOW (ref 2–14)
MYELOCYTES NFR BLD: 0.9 % — HIGH (ref 0–0)
NEUTROPHILS # BLD AUTO: 3.56 K/UL — SIGNIFICANT CHANGE UP (ref 1.8–7.4)
NEUTROPHILS NFR BLD AUTO: 77.2 % — HIGH (ref 43–77)
NEUTS BAND # BLD: 1.8 % — SIGNIFICANT CHANGE UP (ref 0–6)
OVALOCYTES BLD QL SMEAR: SLIGHT — SIGNIFICANT CHANGE UP
PCO2 BLDV: 44 MMHG — SIGNIFICANT CHANGE UP (ref 39–52)
PH BLDV: 7.37 — SIGNIFICANT CHANGE UP (ref 7.32–7.43)
PLAT MORPH BLD: NORMAL — SIGNIFICANT CHANGE UP
PLATELET # BLD AUTO: 375 K/UL — SIGNIFICANT CHANGE UP (ref 150–400)
PLATELET COUNT - ESTIMATE: NORMAL — SIGNIFICANT CHANGE UP
PO2 BLDV: 38 MMHG — SIGNIFICANT CHANGE UP (ref 25–45)
POIKILOCYTOSIS BLD QL AUTO: SLIGHT — SIGNIFICANT CHANGE UP
POTASSIUM BLDV-SCNC: 3.1 MMOL/L — LOW (ref 3.5–5.1)
POTASSIUM SERPL-MCNC: 3.3 MMOL/L — LOW (ref 3.5–5.3)
POTASSIUM SERPL-SCNC: 3.3 MMOL/L — LOW (ref 3.5–5.3)
PROT SERPL-MCNC: 7.7 G/DL — SIGNIFICANT CHANGE UP (ref 6–8.3)
RBC # BLD: 5.01 M/UL — SIGNIFICANT CHANGE UP (ref 3.8–5.2)
RBC # FLD: 20.4 % — HIGH (ref 10.3–14.5)
RBC BLD AUTO: NORMAL — SIGNIFICANT CHANGE UP
SALICYLATES SERPL-MCNC: <0.3 MG/DL — LOW (ref 15–30)
SAO2 % BLDV: 50.8 % — LOW (ref 67–88)
SODIUM SERPL-SCNC: 146 MMOL/L — HIGH (ref 135–145)
TOXICOLOGY SCREEN, DRUGS OF ABUSE, SERUM RESULT: SIGNIFICANT CHANGE UP
VARIANT LYMPHS # BLD: 3.5 % — SIGNIFICANT CHANGE UP (ref 0–6)
WBC # BLD: 4.51 K/UL — SIGNIFICANT CHANGE UP (ref 3.8–10.5)
WBC # FLD AUTO: 4.51 K/UL — SIGNIFICANT CHANGE UP (ref 3.8–10.5)

## 2023-05-07 PROCEDURE — 70486 CT MAXILLOFACIAL W/O DYE: CPT | Mod: 26,MG

## 2023-05-07 PROCEDURE — G1004: CPT

## 2023-05-07 PROCEDURE — 99285 EMERGENCY DEPT VISIT HI MDM: CPT

## 2023-05-07 PROCEDURE — 70450 CT HEAD/BRAIN W/O DYE: CPT | Mod: 26,MG

## 2023-05-07 PROCEDURE — 72125 CT NECK SPINE W/O DYE: CPT | Mod: 26,MA

## 2023-05-07 RX ORDER — DIAZEPAM 5 MG
5 TABLET ORAL ONCE
Refills: 0 | Status: DISCONTINUED | OUTPATIENT
Start: 2023-05-07 | End: 2023-05-07

## 2023-05-07 RX ORDER — THIAMINE MONONITRATE (VIT B1) 100 MG
100 TABLET ORAL ONCE
Refills: 0 | Status: COMPLETED | OUTPATIENT
Start: 2023-05-07 | End: 2023-05-07

## 2023-05-07 RX ORDER — SODIUM CHLORIDE 9 MG/ML
1000 INJECTION INTRAMUSCULAR; INTRAVENOUS; SUBCUTANEOUS ONCE
Refills: 0 | Status: COMPLETED | OUTPATIENT
Start: 2023-05-07 | End: 2023-05-07

## 2023-05-07 RX ORDER — THIAMINE MONONITRATE (VIT B1) 100 MG
100 TABLET ORAL ONCE
Refills: 0 | Status: DISCONTINUED | OUTPATIENT
Start: 2023-05-07 | End: 2023-05-07

## 2023-05-07 RX ORDER — POTASSIUM CHLORIDE 20 MEQ
40 PACKET (EA) ORAL ONCE
Refills: 0 | Status: COMPLETED | OUTPATIENT
Start: 2023-05-07 | End: 2023-05-07

## 2023-05-07 RX ADMIN — Medication 40 MILLIEQUIVALENT(S): at 22:32

## 2023-05-07 RX ADMIN — Medication 25 MILLIGRAM(S): at 17:39

## 2023-05-07 RX ADMIN — Medication 25 MILLIGRAM(S): at 20:22

## 2023-05-07 RX ADMIN — SODIUM CHLORIDE 2000 MILLILITER(S): 9 INJECTION INTRAMUSCULAR; INTRAVENOUS; SUBCUTANEOUS at 18:14

## 2023-05-07 RX ADMIN — Medication 1 TABLET(S): at 17:39

## 2023-05-07 RX ADMIN — Medication 100 MILLIGRAM(S): at 17:39

## 2023-05-07 RX ADMIN — SODIUM CHLORIDE 1000 MILLILITER(S): 9 INJECTION INTRAMUSCULAR; INTRAVENOUS; SUBCUTANEOUS at 20:32

## 2023-05-07 NOTE — ED PROVIDER NOTE - ATTENDING APP SHARED VISIT CONTRIBUTION OF CARE
Brief HPI:  47-year-old female past medical history of EtOH abuse, alcohol abuse with withdrawal, depression, pancreatitis, mandibular fracture status post ORIF 3/30/2023 presents with headache, feeling unwell, "withdrawal".  Patient states that she had fallen while intoxicated last night, cannot describe circumstances of fall, states she hit her head.  Patient reports last drink was yesterday evening and that she is having shaking and anxiety consistent with previous withdrawal syndromes.  Patient denies seizure, nausea, vomiting, abdominal pain, neck pain, numbness, tingling, weakness.  Patient is not on anticoagulation or antiplatelet agents.  She denies illicit drug use.  Patient also brought to ED at [facility] of family to seek rehab for alcohol withdrawal.    Vitals:   Reviewed    Exam:    GEN:  Non-toxic appearing, non-distressed, speaking full sentences, non-diaphoretic, AAOx3  HEENT:  NCAT, neck supple, EOMI, PERRLA, sclera anicteric, no conjunctival pallor or injection, no stridor, normal voice, no tonsillar exudate, uvula midline, no tongue fasciculation   CV:  regular rhythm and rate, s1/s2 audible, no murmurs, rubs or gallops, peripheral pulses 2+ and symmetric  PULM:  non-labored respirations, lungs clear to auscultation bilaterally, no wheezes, crackles or rales  ABD:  non distended, non-tender, no rebound, no guarding, negative Hillman's sign, bowel sounds normal, no cvat  MSK:  no gross deformity, non-tender extremities and joints, range of motion grossly normal appearing, no extremity edema, extremities warm and well perfused   NEURO:  AAOx3, CN II-XII intact, motor 5/5 in upper and lower extremities bilaterally, sensation grossly intact in extremities and trunk, finger to nose testing wnl, no nystagmus, negative Romberg, no pronator drift, tremor of b/l hands when arms extended, Unable to assess gait at this time  SKIN:  warm, dry, no rash or vesicles   SPINE:  no midline c/t/l spine tenderness     A/P:  47-year-old female past medical history of EtOH abuse, alcohol abuse with withdrawal, depression, pancreatitis, mandibular fracture status post ORIF 3/30/2023 presents with headache, feeling unwell, "withdrawal".   Vital signs stable, normothermic.  Exam nontoxic-appearing, no evidence of trauma.  Patient has tremor of bilateral hands when arms extended, possibly volitional although mild alcohol withdrawal is considered.  No other manifestations of acute alcohol withdrawal.  Acute alcohol intoxication is also considered.  Will send labs, head CT, face CT given recent ORIF, supportive care.  Will empirically treat with low-dose Librium and reassess.  Disposition pending.

## 2023-05-07 NOTE — ED PROVIDER NOTE - NS ED ATTENDING STATEMENT MOD
Attending with This was a shared visit with the JOYCE. I reviewed and verified the documentation and independently performed the documented:

## 2023-05-07 NOTE — ED PROVIDER NOTE - OBJECTIVE STATEMENT
48 yo F with h/o EtOH abuse, ETOH w/d, depression, h/o pancreatitis here for head injury s/p fall. pt was found by her mother who went to her apartment this morning and found her drank 1L of vodka. pt states that she hit her head but not sure if there was LOC. endorses headache. denies dizziness, blurry vision, nvd. pt is also a poor historian.   called sister Marjorie for collateral; per Marjorie, pt would like to go to rehab for detox. pt has ETOH abuse x 19 years, had been to other rehab centers but failed.

## 2023-05-07 NOTE — ED ADULT NURSE NOTE - OBJECTIVE STATEMENT
pt reporting to the ED for a fall into the bathroom door. Reports drinking 1 L of vodka. PMH of ETOH abuse, withdrawals. Pt is intoxicated, +alcohol on breath. Pt not answering questions correctly. VS stables. Spo2 100% on room air, respirations even and unlabored. No pain on palpation of abdomen. No obvious signs of injury, bleeding or deformity noted. Skin intact. pt reporting to the ED for a fall into the bathroom door. Reports drinking 1 L of vodka. PMH of ETOH abuse, withdrawals. Pt is intoxicated, +alcohol on breath. . VS stable. Spo2 100% on room air, respirations even and unlabored. No pain on palpation of abdomen. No obvious signs of injury, bleeding or deformity noted. Skin intact.

## 2023-05-07 NOTE — ED PROVIDER NOTE - PHYSICAL EXAMINATION
CONSTITUTIONAL: poor historian. not well kept. A+O x2. smells ETOH from breath.   NEURO: Sensory and motor functions are grossly intact.  PSYCH: blunt affect. slowly thought process.   NECK: Supple  CARD: Regular rate and rhythm, no murmurs  RESP: No accessory muscle use; breath sounds clear and equal bilaterally; no wheezes, rhonchi, or rales     ABD: Soft; non-distended; non-tender.   MUSCULOSKELETAL/EXTREMITIES: +tremor in hands. FROM in all four extremities; no extremity edema.  SKIN: Warm; dry; no apparent lesions or exudate  HEAD: atraumatic.

## 2023-05-07 NOTE — ED ADULT NURSE NOTE - CHIEF COMPLAINT QUOTE
Pt c/o fall today into bathroom door today after drinking 1L vodka. Pt denies hitting head, LOC. Pt has known jaw fracture from fall 3 weeks ago. As per EMS, Pt family requesting rehab placement. Pt denies SI/HI. Belongings secured across from room 21. Valuables sent to security.

## 2023-05-07 NOTE — ED PROVIDER NOTE - CLINICAL SUMMARY MEDICAL DECISION MAKING FREE TEXT BOX
pt here for head injury s/p fall. h/o ETOH abuse and w/d. exam found pt A+Ox2, poor historian. no trauma found on head. blunt affect. slowly thought process. incoherent history given to triage and ER providers.  ETOH intox vs w/d.   family member would like to get pt in detox program; SW consulted  will check CT head, mandible for h/o jaw fracture, c-spine. labs. etoh level. librium for w/d prophylaxis.

## 2023-05-07 NOTE — ED ADULT TRIAGE NOTE - CHIEF COMPLAINT QUOTE
Pt c/o fall today into bathroom door today after drinking 1L vodka. Pt denies hitting head, LOC. Pt has known jaw fracture from fall 3 weeks ago. As per EMS, Pt family requesting rehab placement. Pt denies SI/HI. Pt c/o fall today into bathroom door today after drinking 1L vodka. Pt denies hitting head, LOC. Pt has known jaw fracture from fall 3 weeks ago. As per EMS, Pt family requesting rehab placement. Pt denies SI/HI. Belongings secured across from room 21. Valuables sent to security.

## 2023-05-07 NOTE — ED PROVIDER NOTE - ATTENDING CONTRIBUTION TO CARE
Brief HPI:  47-year-old female past medical history of EtOH abuse, alcohol abuse with withdrawal, depression, pancreatitis, mandibular fracture status post ORIF 3/30/2023 presents with headache, feeling unwell, "withdrawal".  Patient states that she had fallen while intoxicated last night, cannot describe circumstances of fall, states she hit her head.  Patient reports last drink was yesterday evening and that she is having shaking and anxiety consistent with previous withdrawal syndromes.  Patient denies seizure, nausea, vomiting, abdominal pain, neck pain, numbness, tingling, weakness.  Patient is not on anticoagulation or antiplatelet agents.  She denies illicit drug use.  Patient also brought to ED at [facility] of family to seek rehab for alcohol withdrawal.    Vitals:   Reviewed    Exam:    GEN:  Non-toxic appearing, non-distressed, speaking full sentences, non-diaphoretic, AAOx3  HEENT:  NCAT, neck supple, EOMI, PERRLA, sclera anicteric, no conjunctival pallor or injection, no stridor, normal voice, no tonsillar exudate, uvula midline, no tongue fasciculation   CV:  regular rhythm and rate, s1/s2 audible, no murmurs, rubs or gallops, peripheral pulses 2+ and symmetric  PULM:  non-labored respirations, lungs clear to auscultation bilaterally, no wheezes, crackles or rales  ABD:  non distended, non-tender, no rebound, no guarding, negative Hillman's sign, bowel sounds normal, no cvat  MSK:  no gross deformity, non-tender extremities and joints, range of motion grossly normal appearing, no extremity edema, extremities warm and well perfused   NEURO:  AAOx3, CN II-XII intact, motor 5/5 in upper and lower extremities bilaterally, sensation grossly intact in extremities and trunk, finger to nose testing wnl, no nystagmus, negative Romberg, no pronator drift, tremor of b/l hands when arms extended, Unable to assess gait at this time  SKIN:  warm, dry, no rash or vesicles   SPINE:  no midline c/t/l spine tenderness     A/P:  47-year-old female past medical history of EtOH abuse, alcohol abuse with withdrawal, depression, pancreatitis, mandibular fracture status post ORIF 3/30/2023 presents with headache, feeling unwell, "withdrawal".   Vital signs stable, normothermic.  Exam nontoxic-appearing, no evidence of trauma.  Patient has tremor of bilateral hands when arms extended, possibly volitional although mild alcohol withdrawal is considered.  No other manifestations of acute alcohol withdrawal.  Acute alcohol intoxication is also considered.  Will send labs, head CT, face CT given recent ORIF, supportive care.  Will empirically treat with low-dose Librium and reassess.  Disposition pending.

## 2023-05-07 NOTE — CHART NOTE - NSCHARTNOTEFT_GEN_A_CORE
JUAN made aware by provider that patient came to the ED intoxicated and the family has been calling and requesting patient to go to a detox today. Patient is currently pending labs/CT scan results and provider is unsure of admission at this time. JUAN outreached patients mother Mellisa at [phone] to update her as she found patient intoxicated in her home today. She reports patient has had problems with alcoholism for many years but ever since her jaw fx about 3 weeks ago, things have gotten worse because she reports patient is experiencing pain in her jaw and is using alcohol to cope with that. She is requesting that patient be prescribed something stronger for pain, but doesn't want a controlled substance. Provider aware. JUAN provided information about [facility] detox program as well as [facility] detox and rehab. Patient is still pending work up in our ED at this time. Team updated. JUAN available if needed.

## 2023-05-07 NOTE — ED PROVIDER NOTE - PROGRESS NOTE DETAILS
BRODY Avila- Pt was noted to have CIWA of 13 so ativan 2 mg IV given and CIWA order placed. Pt CT scan shows stable mandibular fxs. CT head without acute bleed. Pt lactate elevated. Abdomen soft and non-tender. Pt appears improved s/p ativan.  Rpt cmp and blood gas pending s/p fluids. Plan for admission for etoh withdrawal

## 2023-05-08 DIAGNOSIS — S02.609A FRACTURE OF MANDIBLE, UNSPECIFIED, INITIAL ENCOUNTER FOR CLOSED FRACTURE: ICD-10-CM

## 2023-05-08 DIAGNOSIS — F10.930 ALCOHOL USE, UNSPECIFIED WITH WITHDRAWAL, UNCOMPLICATED: ICD-10-CM

## 2023-05-08 DIAGNOSIS — F10.139 ALCOHOL ABUSE WITH WITHDRAWAL, UNSPECIFIED: ICD-10-CM

## 2023-05-08 DIAGNOSIS — I10 ESSENTIAL (PRIMARY) HYPERTENSION: ICD-10-CM

## 2023-05-08 LAB
ANION GAP SERPL CALC-SCNC: 15 MMOL/L — HIGH (ref 7–14)
APPEARANCE UR: CLEAR — SIGNIFICANT CHANGE UP
BACTERIA # UR AUTO: NEGATIVE — SIGNIFICANT CHANGE UP
BASE EXCESS BLDV CALC-SCNC: -2.8 MMOL/L — LOW (ref -2–3)
BILIRUB UR-MCNC: NEGATIVE — SIGNIFICANT CHANGE UP
BLOOD GAS VENOUS COMPREHENSIVE RESULT: SIGNIFICANT CHANGE UP
BUN SERPL-MCNC: 12 MG/DL — SIGNIFICANT CHANGE UP (ref 7–23)
CALCIUM SERPL-MCNC: 9 MG/DL — SIGNIFICANT CHANGE UP (ref 8.4–10.5)
CHLORIDE BLDV-SCNC: 107 MMOL/L — SIGNIFICANT CHANGE UP (ref 96–108)
CHLORIDE SERPL-SCNC: 99 MMOL/L — SIGNIFICANT CHANGE UP (ref 98–107)
CO2 BLDV-SCNC: 22.9 MMOL/L — SIGNIFICANT CHANGE UP (ref 22–26)
CO2 SERPL-SCNC: 22 MMOL/L — SIGNIFICANT CHANGE UP (ref 22–31)
COLOR SPEC: SIGNIFICANT CHANGE UP
CREAT SERPL-MCNC: 0.49 MG/DL — LOW (ref 0.5–1.3)
DIFF PNL FLD: ABNORMAL
EGFR: 115 ML/MIN/1.73M2 — SIGNIFICANT CHANGE UP
EPI CELLS # UR: 3 /HPF — SIGNIFICANT CHANGE UP (ref 0–5)
GAS PNL BLDV: 138 MMOL/L — SIGNIFICANT CHANGE UP (ref 136–145)
GAS PNL BLDV: SIGNIFICANT CHANGE UP
GLUCOSE BLDV-MCNC: 68 MG/DL — LOW (ref 70–99)
GLUCOSE SERPL-MCNC: 111 MG/DL — HIGH (ref 70–99)
GLUCOSE UR QL: NEGATIVE — SIGNIFICANT CHANGE UP
HCO3 BLDV-SCNC: 22 MMOL/L — SIGNIFICANT CHANGE UP (ref 22–29)
HCT VFR BLDA CALC: 26 % — LOW (ref 34.5–46.5)
HGB BLD CALC-MCNC: 8.6 G/DL — LOW (ref 11.7–16.1)
HYALINE CASTS # UR AUTO: 1 /LPF — SIGNIFICANT CHANGE UP (ref 0–7)
KETONES UR-MCNC: ABNORMAL
LACTATE BLDV-MCNC: 3.7 MMOL/L — HIGH (ref 0.5–2)
LACTATE SERPL-SCNC: 1.8 MMOL/L — SIGNIFICANT CHANGE UP (ref 0.5–2)
LEUKOCYTE ESTERASE UR-ACNC: NEGATIVE — SIGNIFICANT CHANGE UP
MAGNESIUM SERPL-MCNC: 1.4 MG/DL — LOW (ref 1.6–2.6)
NITRITE UR-MCNC: NEGATIVE — SIGNIFICANT CHANGE UP
PCO2 BLDV: 36 MMHG — LOW (ref 39–52)
PH BLDV: 7.39 — SIGNIFICANT CHANGE UP (ref 7.32–7.43)
PH UR: 6 — SIGNIFICANT CHANGE UP (ref 5–8)
PHOSPHATE SERPL-MCNC: 1.9 MG/DL — LOW (ref 2.5–4.5)
PO2 BLDV: 76 MMHG — HIGH (ref 25–45)
POTASSIUM BLDV-SCNC: 3.6 MMOL/L — SIGNIFICANT CHANGE UP (ref 3.5–5.1)
POTASSIUM SERPL-MCNC: 3.9 MMOL/L — SIGNIFICANT CHANGE UP (ref 3.5–5.3)
POTASSIUM SERPL-SCNC: 3.9 MMOL/L — SIGNIFICANT CHANGE UP (ref 3.5–5.3)
PROT UR-MCNC: ABNORMAL
RBC CASTS # UR COMP ASSIST: 62 /HPF — HIGH (ref 0–4)
SAO2 % BLDV: 95.9 % — HIGH (ref 67–88)
SODIUM SERPL-SCNC: 136 MMOL/L — SIGNIFICANT CHANGE UP (ref 135–145)
SP GR SPEC: 1.02 — SIGNIFICANT CHANGE UP (ref 1.01–1.05)
UROBILINOGEN FLD QL: SIGNIFICANT CHANGE UP
WBC UR QL: 1 /HPF — SIGNIFICANT CHANGE UP (ref 0–5)

## 2023-05-08 PROCEDURE — 99222 1ST HOSP IP/OBS MODERATE 55: CPT

## 2023-05-08 RX ORDER — LISINOPRIL 2.5 MG/1
1 TABLET ORAL
Refills: 0 | DISCHARGE

## 2023-05-08 RX ORDER — ONDANSETRON 8 MG/1
4 TABLET, FILM COATED ORAL ONCE
Refills: 0 | Status: COMPLETED | OUTPATIENT
Start: 2023-05-08 | End: 2023-05-08

## 2023-05-08 RX ORDER — MAGNESIUM SULFATE 500 MG/ML
1 VIAL (ML) INJECTION ONCE
Refills: 0 | Status: COMPLETED | OUTPATIENT
Start: 2023-05-08 | End: 2023-05-08

## 2023-05-08 RX ORDER — THIAMINE MONONITRATE (VIT B1) 100 MG
200 TABLET ORAL DAILY
Refills: 0 | Status: COMPLETED | OUTPATIENT
Start: 2023-05-08 | End: 2023-05-10

## 2023-05-08 RX ORDER — ACETAMINOPHEN 500 MG
650 TABLET ORAL EVERY 6 HOURS
Refills: 0 | Status: DISCONTINUED | OUTPATIENT
Start: 2023-05-08 | End: 2023-05-10

## 2023-05-08 RX ORDER — LISINOPRIL 2.5 MG/1
20 TABLET ORAL DAILY
Refills: 0 | Status: DISCONTINUED | OUTPATIENT
Start: 2023-05-08 | End: 2023-05-10

## 2023-05-08 RX ORDER — SODIUM,POTASSIUM PHOSPHATES 278-250MG
1 POWDER IN PACKET (EA) ORAL
Refills: 0 | Status: COMPLETED | OUTPATIENT
Start: 2023-05-08 | End: 2023-05-09

## 2023-05-08 RX ORDER — FOLIC ACID 0.8 MG
1 TABLET ORAL DAILY
Refills: 0 | Status: DISCONTINUED | OUTPATIENT
Start: 2023-05-08 | End: 2023-05-10

## 2023-05-08 RX ADMIN — Medication 2 MILLIGRAM(S): at 00:25

## 2023-05-08 RX ADMIN — Medication 102 MILLIGRAM(S): at 13:11

## 2023-05-08 RX ADMIN — Medication 2 MILLIGRAM(S): at 12:27

## 2023-05-08 RX ADMIN — Medication 1 MILLIGRAM(S): at 12:27

## 2023-05-08 RX ADMIN — Medication 2 MILLIGRAM(S): at 17:36

## 2023-05-08 RX ADMIN — ONDANSETRON 4 MILLIGRAM(S): 8 TABLET, FILM COATED ORAL at 21:02

## 2023-05-08 RX ADMIN — LISINOPRIL 20 MILLIGRAM(S): 2.5 TABLET ORAL at 12:27

## 2023-05-08 RX ADMIN — Medication 2 MILLIGRAM(S): at 23:00

## 2023-05-08 RX ADMIN — Medication 1 TABLET(S): at 12:27

## 2023-05-08 RX ADMIN — Medication 2 MILLIGRAM(S): at 08:02

## 2023-05-08 RX ADMIN — Medication 2 MILLIGRAM(S): at 10:02

## 2023-05-08 RX ADMIN — Medication 50 MILLIGRAM(S): at 19:39

## 2023-05-08 RX ADMIN — Medication 0.25 MILLIGRAM(S): at 21:02

## 2023-05-08 RX ADMIN — Medication 2 MILLIGRAM(S): at 03:57

## 2023-05-08 RX ADMIN — SODIUM CHLORIDE 1000 MILLILITER(S): 9 INJECTION INTRAMUSCULAR; INTRAVENOUS; SUBCUTANEOUS at 00:04

## 2023-05-08 RX ADMIN — Medication 50 MILLIGRAM(S): at 12:27

## 2023-05-08 RX ADMIN — Medication 2 MILLIGRAM(S): at 14:48

## 2023-05-08 NOTE — ED ADULT NURSE REASSESSMENT NOTE - NS ED NURSE REASSESS COMMENT FT1
pt a&ox4. pt pending admission at this time. pt normal sinus on monitor. pt respirations even and unlabored with no accessory muscle use. pt appears in NAD.

## 2023-05-08 NOTE — H&P ADULT - HISTORY OF PRESENT ILLNESS
47 year old female with history of alcohol abuse disorder (last drink 48 hrs prior to admission with 1L vodka) c/b withdrawal seizures previously, alcoholic pancreatitis, anxiety/depression, chronic anemia 2/2 menorrhagia from adenomyosis, admission here 3/27-4/2/23 for withdrawal and broken jaw after fall, now presents for alcohol withdrawal. Patient states she feels tremoulous, mild headache, no current nausea, vomiting, chest pain, shortness of breath, fever, chills. Reports ongoing jaw pain since trauma causing broken jaw, but improving since last admission.

## 2023-05-08 NOTE — H&P ADULT - NSHPREVIEWOFSYSTEMS_GEN_ALL_CORE
Constitutional: no recent weight changes, fevers, night sweats or fatigue  Dermatologic: no lesions or rashes.  HEENT: denies visual changes, hearing changes, rhinitis, odynophagia, or dysphagia  Cardiovascular: denies palpitations, chest pain, edema  Respiratory: denies SOB, wheezing  Gastrointestinal: denies N/V/D, abdominal pain, hematochezia, melena  : denies dysuria, hematuria  MSK: denies weakness  Endocrine: no cold or heat intolerance or excessive thirst or urination  Hematologic: no excessive bleeding or clotting  Neurologic: no vision changes, dizziness, fainting or numbness, tingling or weakness

## 2023-05-08 NOTE — H&P ADULT - PATIENT'S PREFERRED PRONOUN
Her/She right hand second digit flexor contracture, wrist extension ~5 degrees, full AROM of digits 3-5, limited thumb extension and abduction   left hand first digit flexor contracture, MCP ulnarly deviated, wrist extension less than neutral, limited AROM of hand, limited thumb extension and abduction right hand second digit flexor contracture, wrist extension ~5 degrees, full AROM of digits 3-5, limited thumb extension and abduction   left hand first digit flexor contracture, MCP ulnarly deviated, wrist extension less than neutral, limited AROM of hand, limited thumb extension and abduction

## 2023-05-08 NOTE — ED ADULT NURSE REASSESSMENT NOTE - NS ED NURSE REASSESS COMMENT FT1
report rcd from [facility] nessa aquino. pt a&ox4. CIWA in flowsheet. pt repeat labs collected and sent. pt 20g to the right wrist with no redness or swelling. pt sinus tachy on monitor. pt pmhx etoh withdrawal. pt last drink 48hrs ago. pt denies illicit drug use. pt pending CT at this time. pt respirations even and unlabored with no accessory muscle use. pt appears in NAD and resting in stretcher. pending urine report rcd from [facility] nessa aquino. pt a&ox4. CIWA in flowsheet. pt repeat labs collected and sent. pt 20g to the right wrist with no redness or swelling. pt sinus tachy on monitor. pt pmhx etoh withdrawal. pt last drink 48hrs ago. pt denies illicit drug use. pt pending dispo at this time. pt respirations even and unlabored with no accessory muscle use. pt appears in NAD and resting in stretcher. pending urine

## 2023-05-08 NOTE — SBIRT NOTE ADULT - NSSBIRTBRIEFINTDET_GEN_A_CORE
Provided SBIRT services: Full screen positive. Brief Intervention Performed. Screening results were reviewed with the patient and patient was provided information about healthy guidelines and potential negative consequences associated with level of risk. Motivation and readiness to reduce or stop use was discussed and goals and activities to make changes were suggested/offered.           Discussed w/ pt a referral to a treatment facility but pt is still unsure what treatment option to pursue. Offered pt [employer] for follow-up and external navigation, but pt declined at this time. Pt explained she has information about resources and AA meetings. States she has been to AA meetings before and intends to attend them again.

## 2023-05-08 NOTE — H&P ADULT - PROBLEM SELECTOR PLAN 1
Hx of etoh abuse, 1L vodka daily, previously c/b withdrawal seizures, pancreatitis. Now reports 1L vodka use 48 hrs PTA.  - C/w CIWA w/ ativan along with librium taper given hx of complicated withdrawal previously  - high dose IV thiamine x 3 days  - folic acid, MVI  - SW input appreciated  - Report of possible fall/head trauma PTA, CTH performed here, no acute findings

## 2023-05-08 NOTE — H&P ADULT - NSHPLABSRESULTS_GEN_ALL_CORE
LABS:                        10.7   4.51  )-----------( 375      ( 07 May 2023 17:35 )             36.4     05-07    146<H>  |  103  |  13  ----------------------------<  85  3.3<L>   |  20<L>  |  0.57    Ca    8.6      07 May 2023 18:00  Phos  3.5     05-  Mg     1.80     -    TPro  7.7  /  Alb  4.0  /  TBili  <0.2  /  DBili  x   /  AST  99<H>  /  ALT  77<H>  /  AlkPhos  58  -    Urinalysis Basic - ( 08 May 2023 03:00 )    Color: Light Yellow / Appearance: Clear / S.017 / pH: x  Gluc: x / Ketone: Moderate  / Bili: Negative / Urobili: <2 mg/dL   Blood: x / Protein: Trace / Nitrite: Negative   Leuk Esterase: Negative / RBC: 62 /HPF / WBC 1 /HPF   Sq Epi: x / Non Sq Epi: x / Bacteria: Negative      RADIOLOGY & ADDITIONAL TESTS: Reviewed    COORDINATION OF CARE:  Care Discussed with Consultants/Other Providers [Y- Medicine ACP, ED RN]

## 2023-05-08 NOTE — ED ADULT NURSE REASSESSMENT NOTE - NS ED NURSE REASSESS COMMENT FT1
Hand off report given to [facility] RN No signs of acute distress noted. Patient stable for transport Nancy ROSALES

## 2023-05-08 NOTE — ED ADULT NURSE REASSESSMENT NOTE - NS ED NURSE REASSESS COMMENT FT1
Report and pt received, pt A&Ox4 c/o headache and has obvious tremors at this time. Respirations even and unlabored. Pt denies any chest pain, sob, dizziness, blurry vision or palpitations at this time. CIWA as per flowsheet. BRODY Luevano made aware. Pending medication orders. bed in lowest position, side rails up, call bell in hand, safety maintained.

## 2023-05-08 NOTE — ED ADULT NURSE REASSESSMENT NOTE - NS ED NURSE REASSESS COMMENT FT1
Pt sleeping at this time and appears to be comfortable. Respirations even and unlabored, sating 100% on RA, normal sinus on cardiac monitor. No acute distress noted. Pending admission.

## 2023-05-08 NOTE — ED ADULT NURSE REASSESSMENT NOTE - NS ED NURSE REASSESS COMMENT FT1
Call out made to ACP MD. In regards to IV access. Patient difficult stick. Able to gain IV access for about 1 IVP of ativan and line blows. Patient currently with no access. As per ACP she will find someone to place PIV in mean time patient will have to take PO. Comfort and safety maintained. All current care needs met. Care plan continued Nancy ROSALES

## 2023-05-08 NOTE — ED ADULT NURSE REASSESSMENT NOTE - NS ED NURSE REASSESS COMMENT FT1
pt FS complete as per PA O'Leonel orders. pt asymptomatic. pt provided with PO intake at this time. pt pending admission at this time. pt no pmhx DM.

## 2023-05-08 NOTE — H&P ADULT - ASSESSMENT
47 year old female with history of alcohol abuse disorder (last drink 48 hrs prior to admission with 1L vodka) c/b withdrawal seizures previously, alcoholic pancreatitis, anxiety/depression, chronic anemia 2/2 menorrhagia from adenomyosis, admission here 3/27-4/2/23 for withdrawal and broken jaw after fall, now presents for alcohol withdrawal.

## 2023-05-08 NOTE — ED ADULT NURSE REASSESSMENT NOTE - NSFALLRSKASSESSDT_ED_ALL_ED
08-May-2023 00:17
08-May-2023 02:22
08-May-2023 03:34
08-May-2023 06:37
08-May-2023 08:04
07-May-2023 18:39
08-May-2023 05:53
08-May-2023 08:16
08-May-2023 18:42
08-May-2023 19:54
08-May-2023 17:52

## 2023-05-08 NOTE — ED ADULT NURSE REASSESSMENT NOTE - NS ED NURSE REASSESS COMMENT FT1
Patient received in stretcher. Sleeping. Easily aroused by verbal stimuli. No signs of acute distress noted. Respirations even and unlabored. Spontaneous movement of all extremities noted. Comfort and safety maintained. All current care needs met. Care plan continued Nancy ROSALES

## 2023-05-08 NOTE — ED ADULT NURSE REASSESSMENT NOTE - NS ED NURSE REASSESS COMMENT FT1
Patient received in stretcher. sleeping. Easily aroused by verbal stimuli Respirations even and unlabored. Spontaneous movement of all extremities noted. Nop signs of acute distress noted. Comfort and safety maintained. All current care needs met. Care plan continued Nancy ROSALES

## 2023-05-08 NOTE — H&P ADULT - NSHPPHYSICALEXAM_GEN_ALL_CORE
PHYSICAL EXAM:  Vital Signs Last 24 Hrs  T(C): 37.1 (08 May 2023 09:16), Max: 37.1 (08 May 2023 09:16)  T(F): 98.7 (08 May 2023 09:16), Max: 98.7 (08 May 2023 09:16)  HR: 100 (08 May 2023 09:16) (73 - 100)  BP: 151/100 (08 May 2023 09:16) (131/79 - 163/96)  BP(mean): --  RR: 18 (08 May 2023 09:16) (16 - 20)  SpO2: 100% (08 May 2023 09:16) (98% - 100%)    Parameters below as of 08 May 2023 09:16  Patient On (Oxygen Delivery Method): room air    CONSTITUTIONAL: NAD, laying in bed  EYES: conjunctiva and sclera clear  ENMT: Moist oral mucosa  NECK: Supple, no palpable masses; no thyromegaly  RESPIRATORY: Normal respiratory effort; lungs are clear to auscultation bilaterally  CARDIOVASCULAR: Regular rate and rhythm, normal S1 and S2, no murmur/rub/gallop; No lower extremity edema; Peripheral pulses are 2+ bilaterally  ABDOMEN: Nontender to palpation, normoactive bowel sounds, no rebound/guarding  PSYCH: A+O to person, place, and time; affect appropriate  NEUROLOGY: no focal deficits. +resting tremor on outstretched extremities  SKIN: No rashes; no palpable lesions

## 2023-05-08 NOTE — H&P ADULT - PROBLEM SELECTOR PLAN 2
Hospitalized here approx 1 month ago after fall/trauma and broken jaw. S/p ORIF L symphisis fx, closed reduction. Patient reports following up with OMFS after discharge as instructed, said she was told will need another surgery in about 6 months. CT this admission with continued displaced mandible, no new fracture.   - OMFS follow up as instruction. Consider inpatient input if acute issues.

## 2023-05-08 NOTE — ED ADULT NURSE REASSESSMENT NOTE - NS ED NURSE REASSESS COMMENT FT1
pt a&ox4 medicated as per CIWA order set and parameters. pt sinus tachy on monitor. pt pending bed at this time.

## 2023-05-09 LAB
ANION GAP SERPL CALC-SCNC: 14 MMOL/L — SIGNIFICANT CHANGE UP (ref 7–14)
BUN SERPL-MCNC: 13 MG/DL — SIGNIFICANT CHANGE UP (ref 7–23)
CALCIUM SERPL-MCNC: 8.9 MG/DL — SIGNIFICANT CHANGE UP (ref 8.4–10.5)
CHLORIDE SERPL-SCNC: 100 MMOL/L — SIGNIFICANT CHANGE UP (ref 98–107)
CO2 SERPL-SCNC: 23 MMOL/L — SIGNIFICANT CHANGE UP (ref 22–31)
CREAT SERPL-MCNC: 0.53 MG/DL — SIGNIFICANT CHANGE UP (ref 0.5–1.3)
CULTURE RESULTS: SIGNIFICANT CHANGE UP
EGFR: 113 ML/MIN/1.73M2 — SIGNIFICANT CHANGE UP
GLUCOSE SERPL-MCNC: 99 MG/DL — SIGNIFICANT CHANGE UP (ref 70–99)
HCT VFR BLD CALC: 29.8 % — LOW (ref 34.5–45)
HGB BLD-MCNC: 8.9 G/DL — LOW (ref 11.5–15.5)
MAGNESIUM SERPL-MCNC: 1.4 MG/DL — LOW (ref 1.6–2.6)
MCHC RBC-ENTMCNC: 21.6 PG — LOW (ref 27–34)
MCHC RBC-ENTMCNC: 29.9 GM/DL — LOW (ref 32–36)
MCV RBC AUTO: 72.3 FL — LOW (ref 80–100)
NRBC # BLD: 0 /100 WBCS — SIGNIFICANT CHANGE UP (ref 0–0)
NRBC # FLD: 0 K/UL — SIGNIFICANT CHANGE UP (ref 0–0)
PHOSPHATE SERPL-MCNC: 3.1 MG/DL — SIGNIFICANT CHANGE UP (ref 2.5–4.5)
PLATELET # BLD AUTO: 283 K/UL — SIGNIFICANT CHANGE UP (ref 150–400)
POTASSIUM SERPL-MCNC: 4 MMOL/L — SIGNIFICANT CHANGE UP (ref 3.5–5.3)
POTASSIUM SERPL-SCNC: 4 MMOL/L — SIGNIFICANT CHANGE UP (ref 3.5–5.3)
RBC # BLD: 4.12 M/UL — SIGNIFICANT CHANGE UP (ref 3.8–5.2)
RBC # FLD: 19.6 % — HIGH (ref 10.3–14.5)
SODIUM SERPL-SCNC: 137 MMOL/L — SIGNIFICANT CHANGE UP (ref 135–145)
SPECIMEN SOURCE: SIGNIFICANT CHANGE UP
WBC # BLD: 5.65 K/UL — SIGNIFICANT CHANGE UP (ref 3.8–10.5)
WBC # FLD AUTO: 5.65 K/UL — SIGNIFICANT CHANGE UP (ref 3.8–10.5)

## 2023-05-09 PROCEDURE — 99232 SBSQ HOSP IP/OBS MODERATE 35: CPT

## 2023-05-09 RX ORDER — ONDANSETRON 8 MG/1
4 TABLET, FILM COATED ORAL ONCE
Refills: 0 | Status: COMPLETED | OUTPATIENT
Start: 2023-05-09 | End: 2023-05-09

## 2023-05-09 RX ORDER — LANOLIN ALCOHOL/MO/W.PET/CERES
3 CREAM (GRAM) TOPICAL AT BEDTIME
Refills: 0 | Status: DISCONTINUED | OUTPATIENT
Start: 2023-05-09 | End: 2023-05-10

## 2023-05-09 RX ORDER — MAGNESIUM SULFATE 500 MG/ML
1 VIAL (ML) INJECTION ONCE
Refills: 0 | Status: COMPLETED | OUTPATIENT
Start: 2023-05-09 | End: 2023-05-09

## 2023-05-09 RX ADMIN — Medication 1 TABLET(S): at 13:49

## 2023-05-09 RX ADMIN — Medication 1 TABLET(S): at 17:57

## 2023-05-09 RX ADMIN — Medication 50 MILLIGRAM(S): at 05:06

## 2023-05-09 RX ADMIN — Medication 2 MILLIGRAM(S): at 07:18

## 2023-05-09 RX ADMIN — LISINOPRIL 20 MILLIGRAM(S): 2.5 TABLET ORAL at 05:07

## 2023-05-09 RX ADMIN — ONDANSETRON 4 MILLIGRAM(S): 8 TABLET, FILM COATED ORAL at 07:17

## 2023-05-09 RX ADMIN — Medication 1 TABLET(S): at 12:07

## 2023-05-09 RX ADMIN — Medication 102 MILLIGRAM(S): at 12:06

## 2023-05-09 RX ADMIN — Medication 1 TABLET(S): at 10:43

## 2023-05-09 RX ADMIN — Medication 100 GRAM(S): at 08:10

## 2023-05-09 RX ADMIN — Medication 1 MILLIGRAM(S): at 12:06

## 2023-05-09 RX ADMIN — Medication 3 MILLIGRAM(S): at 21:59

## 2023-05-09 NOTE — PROGRESS NOTE ADULT - SUBJECTIVE AND OBJECTIVE BOX
Patient is a 47y old  Female who presents with a chief complaint of Alcohol withdrawal (08 May 2023 10:26)    Baljit Julian MD   [facility] Medicine   Pager [phone]  Reachable on [employer] Teams     SUBJECTIVE / OVERNIGHT EVENTS:  Patient seen and examined this morning. No events overnight, she states that she feels well today.   States held out hands and states that she is not withdrawing. no longer with shaking.     MEDICATIONS  (STANDING):  folic acid 1 milliGRAM(s) Oral daily  lisinopril 20 milliGRAM(s) Oral daily  multivitamin 1 Tablet(s) Oral daily  potassium phosphate / sodium phosphate Tablet (K-PHOS No. 2) 1 Tablet(s) Oral four times a day with meals  thiamine IVPB 200 milliGRAM(s) IV Intermittent daily    MEDICATIONS  (PRN):  acetaminophen     Tablet .. 650 milliGRAM(s) Oral every 6 hours PRN Temp greater or equal to 38C (100.4F), Mild Pain (1 - 3)  LORazepam     Tablet 2 milliGRAM(s) Oral every 1 hour PRN CIWA-Ar score 8 or greater  LORazepam     Tablet 2 milliGRAM(s) Oral every 2 hours PRN Symptom-triggered 2 point increase in CIWA-Ar      Vital Signs Last 24 Hrs  T(C): 36.8 (09 May 2023 13:00), Max: 37.3 (08 May 2023 17:19)  T(F): 98.2 (09 May 2023 13:00), Max: 99.2 (09 May 2023 05:05)  HR: 83 (09 May 2023 13:00) (83 - 103)  BP: 147/98 (09 May 2023 13:00) (126/96 - 153/94)  BP(mean): --  RR: 18 (09 May 2023 13:00) (14 - 20)  SpO2: 100% (09 May 2023 13:00) (98% - 100%)  CAPILLARY BLOOD GLUCOSE        I&O's Summary      General: woman sitting up in bed appears well in NAD, awake and alert  HENMT: MMM  Respiratory: No respiratory distress, CTABL, No rales, rhonchi, wheezing.  Cardiovascular: S1,S2; Regular rate and rhythm; No m/g/r. 2+ peripheral pulses  Gastrointestinal: Soft, Nontender, Nondistended; +BS.   Extremities: No c/c/e; warm to touch  Skin: No rashes, No erythema   Psych: appropriate mood and affect    LABS:                        8.9    5.65  )-----------( 283      ( 09 May 2023 07:05 )             29.8         137  |  100  |  13  ----------------------------<  99  4.0   |  23  |  0.53    Ca    8.9      09 May 2023 07:05  Phos  3.1       Mg     1.40         TPro  7.7  /  Alb  4.0  /  TBili  <0.2  /  DBili  x   /  AST  99<H>  /  ALT  77<H>  /  AlkPhos  58  07          Urinalysis Basic - ( 08 May 2023 03:00 )    Color: Light Yellow / Appearance: Clear / S.017 / pH: x  Gluc: x / Ketone: Moderate  / Bili: Negative / Urobili: <2 mg/dL   Blood: x / Protein: Trace / Nitrite: Negative   Leuk Esterase: Negative / RBC: 62 /HPF / WBC 1 /HPF   Sq Epi: x / Non Sq Epi: x / Bacteria: Negative        RADIOLOGY & ADDITIONAL TESTS:    Imaging Personally Reviewed:    Consultant(s) Notes Reviewed:      Care Discussed with Consultants/Other Providers:

## 2023-05-09 NOTE — PROGRESS NOTE ADULT - PROBLEM SELECTOR PLAN 1
Hx of etoh abuse, 1L vodka daily, previously c/b withdrawal seizures, pancreatitis. Now reports 1L vodka use 48 hrs PTA.  - C/w CIWA w/ ativan symptom triggered; only drinking for short amount of time so will hold off taper, can restart if requiring multiple PRNs  - epimeric high dose IV thiamine x 3 days  - folic acid, MVI  - SW input appreciated  - Report of possible fall/head trauma PTA, CTH performed here, no acute findings

## 2023-05-09 NOTE — PROGRESS NOTE ADULT - PROBLEM SELECTOR PLAN 2
Hospitalized here approx 1 month ago after fall/trauma and broken jaw. S/p ORIF L symphysis fx, closed reduction. - Patient reports following up with OMFS after discharge as instructed, said she was told will need another surgery in about 6 months.   - CT this admission with continued displaced mandible, no new fracture.   - d/w OMFS resident, will review images

## 2023-05-10 ENCOUNTER — TRANSCRIPTION ENCOUNTER (OUTPATIENT)
Age: 48
End: 2023-05-10

## 2023-05-10 VITALS
TEMPERATURE: 98 F | SYSTOLIC BLOOD PRESSURE: 127 MMHG | HEART RATE: 84 BPM | RESPIRATION RATE: 18 BRPM | DIASTOLIC BLOOD PRESSURE: 89 MMHG | OXYGEN SATURATION: 99 %

## 2023-05-10 PROCEDURE — 99239 HOSP IP/OBS DSCHRG MGMT >30: CPT

## 2023-05-10 RX ORDER — ZOLPIDEM TARTRATE 10 MG/1
1 TABLET ORAL
Refills: 0 | DISCHARGE

## 2023-05-10 RX ORDER — FOLIC ACID 0.8 MG
1 TABLET ORAL
Qty: 0 | Refills: 0 | DISCHARGE
Start: 2023-05-10

## 2023-05-10 RX ADMIN — Medication 102 MILLIGRAM(S): at 12:22

## 2023-05-10 RX ADMIN — Medication 1 TABLET(S): at 12:22

## 2023-05-10 RX ADMIN — Medication 1 MILLIGRAM(S): at 12:22

## 2023-05-10 RX ADMIN — LISINOPRIL 20 MILLIGRAM(S): 2.5 TABLET ORAL at 05:40

## 2023-05-10 NOTE — DISCHARGE NOTE PROVIDER - NSFOLLOWUPCLINICS_GEN_ALL_ED_FT
[facility] Dept of Substance Abuse  Psychiatry Substance Abuse  [address] [place] [address]  Phone: [phone]  Fax:

## 2023-05-10 NOTE — DISCHARGE NOTE NURSING/CASE MANAGEMENT/SOCIAL WORK - PATIENT PORTAL LINK FT
You can access the FollowMyHealth Patient Portal offered by [facility] by registering at the following website: [email or web address]. By joining [facility]’s FollowMyHealth portal, you will also be able to view your health information using other applications (apps) compatible with our system.

## 2023-05-10 NOTE — DISCHARGE NOTE NURSING/CASE MANAGEMENT/SOCIAL WORK - NSDCCRNAME_GEN_ALL_CORE_FT
[facility] is your connection to free, confidential mental health support. Speak to a counselor via phone, text or chat and get access to mental health and substance misuse services, 24/7/365.  •	Peer support and short-term counseling via telephone, text and web  •	Assistance scheduling appointments or accessing other mental health services  English: [email or web address] ([phone]), Press 2      [facility] walk in clinic : Monday-Friday : 9am-5pm ([phone]

## 2023-05-10 NOTE — DISCHARGE NOTE PROVIDER - HOSPITAL COURSE
47 year old female with history of alcohol abuse disorder (last drink 48 hrs prior to admission with 1L vodka) c/b withdrawal seizures previously, alcoholic pancreatitis, anxiety/depression, chronic anemia 2/2 menorrhagia from adenomyosis, admission here 3/27-4/2/23 for withdrawal and broken jaw after fall, now presents for alcohol withdrawal.   Withdrawal symptoms, alcohol, uncomplicated.  Hx of etoh abuse, 1L vodka daily, previously c/b withdrawal seizures, pancreatitis. Now reports 1L vodka use 48 hrs PTA.  - C/w CIWA w/ ativan symptom triggered; only drinking for short amount of time so will hold off taper, can restart if requiring multiple PRNs  - epimeric high dose IV thiamine x 3 days  - folic acid, MVI  -  input appreciated  - Report of possible fall/head trauma PTA, CTH performed here, no acute findings.    Broken jaw.  Hospitalized here approx 1 month ago after fall/trauma and broken jaw. S/p ORIF L symphysis fx, closed reduction. - Patient reports following up with OMFS after discharge as instructed, said she was told will need another surgery in about 6 months.   - CT this admission with continued displaced mandible, no new fracture.   - d/w OMFS resident, will review images.    Hypertension. C/w home lisinopril 20 qd    Patient cleared for discharge home as per Dr. Julian.    47 year old female with history of alcohol abuse disorder (last drink 48 hrs prior to admission with 1L vodka) c/b withdrawal seizures previously, alcoholic pancreatitis, anxiety/depression, chronic anemia 2/2 menorrhagia from adenomyosis, admission here 3/27-4/2/23 for withdrawal and broken jaw after fall, now presents for alcohol withdrawal.   Withdrawal symptoms, alcohol, uncomplicated.  Hx of etoh abuse, 1L vodka daily, previously c/b withdrawal seizures, pancreatitis. Now reports 1L vodka use 48 hrs PTA.  - C/w CIWA w/ ativan symptom triggered; only drinking for short amount of time so will hold off taper, can restart if requiring multiple PRNs  - epimeric high dose IV thiamine x 3 days  - folic acid, MVI  -  input appreciated  - Report of possible fall/head trauma PTA, CTH performed here, no acute findings.    Broken jaw.  Hospitalized here approx 1 month ago after fall/trauma and broken jaw. S/p ORIF L symphysis fx, closed reduction. - Patient reports following up with OMFS after discharge as instructed, said she was told will need another surgery in about 6 months.   - CT this admission with continued displaced mandible, no new fracture.   - d/w OMFS resident, reviewed images -> follow up outpatient.     Hypertension. C/w home lisinopril 20 qd    Patient cleared for discharge home as per Dr. Julian.

## 2023-05-10 NOTE — DISCHARGE NOTE NURSING/CASE MANAGEMENT/SOCIAL WORK - NSDCVIVACCINE_GEN_ALL_CORE_FT
influenza, injectable, quadrivalent, preservative free; 23-Oct-2020 20:55; Ramón Wong (RN); [employer]; [id number] (Exp. Date: 30-Jun-2021); IntraMuscular; Deltoid Left.; 0.5 milliLiter(s); VIS (VIS Published: 15-Aug-2019, VIS Presented: 23-Oct-2020);

## 2023-05-10 NOTE — DISCHARGE NOTE PROVIDER - ATTENDING DISCHARGE PHYSICAL EXAMINATION:
General: woman sitting up in bed appears well in NAD, awake and alert  HENMT: MMM  Respiratory: No respiratory distress, CTABL, No rales, rhonchi, wheezing.  Cardiovascular: S1,S2; Regular rate and rhythm; No m/g/r. 2+ peripheral pulses  Gastrointestinal: Soft, Nontender, Nondistended; +BS.   Extremities: No c/c/e; warm to touch  Skin: No rashes, No erythema   Neuro: no tremors, walking around room with stead gait  Psych: appropriate mood and affect

## 2023-05-10 NOTE — DISCHARGE NOTE PROVIDER - NSDCCPCAREPLAN_GEN_ALL_CORE_FT
PRINCIPAL DISCHARGE DIAGNOSIS  Diagnosis: Alcohol abuse with withdrawal, unspecified  Assessment and Plan of Treatment: In order to optimize your overall health and prevent adverse events, please abstain from ingesting alcohol. Continue to supplement with recommended vitamins and follow-up with your primary care provider for further medical care. It is highly recommended to attend AA meetings to help create a sober lifestyle. If you need immediate assistance with substance abuse you may contact the [facility] by calling [phone].        SECONDARY DISCHARGE DIAGNOSES  Diagnosis: Broken jaw  Assessment and Plan of Treatment: Follow up with [facility] outpatient for further work up and or intervention    Diagnosis: HTN (hypertension)  Assessment and Plan of Treatment: Continue blood pressure medication regimen as directed. Monitor for any visual changes, headaches or dizziness.  Monitor blood pressure regularly.  Follow up with your PCP for further management for high blood pressure.

## 2023-05-10 NOTE — DISCHARGE NOTE PROVIDER - NSDCMRMEDTOKEN_GEN_ALL_CORE_FT
folic acid 1 mg oral tablet: 1 tab(s) orally once a day  lisinopril 20 mg oral tablet: 1 orally once a day  Multiple Vitamins oral tablet: 1 tab(s) orally once a day

## 2023-06-08 ENCOUNTER — INPATIENT (INPATIENT)
Facility: HOSPITAL | Age: 48
LOS: 1 days | Discharge: HOME | DRG: 897 | End: 2023-06-10
Attending: INTERNAL MEDICINE | Admitting: HOSPITALIST
Payer: MEDICAID

## 2023-06-08 VITALS
RESPIRATION RATE: 20 BRPM | OXYGEN SATURATION: 96 % | HEART RATE: 94 BPM | HEIGHT: 68 IN | TEMPERATURE: 99 F | SYSTOLIC BLOOD PRESSURE: 141 MMHG | DIASTOLIC BLOOD PRESSURE: 98 MMHG

## 2023-06-08 DIAGNOSIS — F10.239 ALCOHOL DEPENDENCE WITH WITHDRAWAL, UNSPECIFIED: ICD-10-CM

## 2023-06-08 DIAGNOSIS — I10 ESSENTIAL (PRIMARY) HYPERTENSION: ICD-10-CM

## 2023-06-08 DIAGNOSIS — Z29.9 ENCOUNTER FOR PROPHYLACTIC MEASURES, UNSPECIFIED: ICD-10-CM

## 2023-06-08 DIAGNOSIS — Z98.890 OTHER SPECIFIED POSTPROCEDURAL STATES: Chronic | ICD-10-CM

## 2023-06-08 DIAGNOSIS — D61.818 OTHER PANCYTOPENIA: ICD-10-CM

## 2023-06-08 LAB
ALBUMIN SERPL ELPH-MCNC: 3.9 G/DL — SIGNIFICANT CHANGE UP (ref 3.3–5)
ALP SERPL-CCNC: 56 U/L — SIGNIFICANT CHANGE UP (ref 40–120)
ALT FLD-CCNC: 26 U/L — SIGNIFICANT CHANGE UP (ref 10–45)
ANION GAP SERPL CALC-SCNC: 22 MMOL/L — HIGH (ref 5–17)
ANISOCYTOSIS BLD QL: SIGNIFICANT CHANGE UP
APTT BLD: 25.8 SEC — LOW (ref 27.5–35.5)
AST SERPL-CCNC: 58 U/L — HIGH (ref 10–40)
BASE EXCESS BLDV CALC-SCNC: -0.5 MMOL/L — SIGNIFICANT CHANGE UP (ref -2–3)
BASE EXCESS BLDV CALC-SCNC: -0.7 MMOL/L — SIGNIFICANT CHANGE UP (ref -2–3)
BASOPHILS # BLD AUTO: 0 K/UL — SIGNIFICANT CHANGE UP (ref 0–0.2)
BASOPHILS NFR BLD AUTO: 0 % — SIGNIFICANT CHANGE UP (ref 0–2)
BILIRUB DIRECT SERPL-MCNC: <0.1 MG/DL — SIGNIFICANT CHANGE UP (ref 0–0.3)
BILIRUB INDIRECT FLD-MCNC: >0.2 MG/DL — SIGNIFICANT CHANGE UP (ref 0.2–1)
BILIRUB SERPL-MCNC: 0.3 MG/DL — SIGNIFICANT CHANGE UP (ref 0.2–1.2)
BUN SERPL-MCNC: 11 MG/DL — SIGNIFICANT CHANGE UP (ref 7–23)
CA-I SERPL-SCNC: 1.02 MMOL/L — LOW (ref 1.15–1.33)
CA-I SERPL-SCNC: 1.07 MMOL/L — LOW (ref 1.15–1.33)
CALCIUM SERPL-MCNC: 8.1 MG/DL — LOW (ref 8.4–10.5)
CHLORIDE BLDV-SCNC: 101 MMOL/L — SIGNIFICANT CHANGE UP (ref 96–108)
CHLORIDE BLDV-SCNC: 102 MMOL/L — SIGNIFICANT CHANGE UP (ref 96–108)
CHLORIDE SERPL-SCNC: 101 MMOL/L — SIGNIFICANT CHANGE UP (ref 96–108)
CO2 BLDV-SCNC: 25 MMOL/L — SIGNIFICANT CHANGE UP (ref 22–26)
CO2 BLDV-SCNC: 26 MMOL/L — SIGNIFICANT CHANGE UP (ref 22–26)
CO2 SERPL-SCNC: 19 MMOL/L — LOW (ref 22–31)
CREAT SERPL-MCNC: 0.53 MG/DL — SIGNIFICANT CHANGE UP (ref 0.5–1.3)
DACRYOCYTES BLD QL SMEAR: SLIGHT — SIGNIFICANT CHANGE UP
EGFR: 113 ML/MIN/1.73M2 — SIGNIFICANT CHANGE UP
ELLIPTOCYTES BLD QL SMEAR: SLIGHT — SIGNIFICANT CHANGE UP
EOSINOPHIL # BLD AUTO: 0.03 K/UL — SIGNIFICANT CHANGE UP (ref 0–0.5)
EOSINOPHIL NFR BLD AUTO: 0.9 % — SIGNIFICANT CHANGE UP (ref 0–6)
ETHANOL SERPL-MCNC: 315 MG/DL — HIGH (ref 0–10)
GAS PNL BLDV: 139 MMOL/L — SIGNIFICANT CHANGE UP (ref 136–145)
GAS PNL BLDV: 143 MMOL/L — SIGNIFICANT CHANGE UP (ref 136–145)
GAS PNL BLDV: SIGNIFICANT CHANGE UP
GLUCOSE BLDV-MCNC: 109 MG/DL — HIGH (ref 70–99)
GLUCOSE BLDV-MCNC: 95 MG/DL — SIGNIFICANT CHANGE UP (ref 70–99)
GLUCOSE SERPL-MCNC: 110 MG/DL — HIGH (ref 70–99)
HAPTOGLOB SERPL-MCNC: 48 MG/DL — SIGNIFICANT CHANGE UP (ref 34–200)
HCG SERPL-ACNC: <2 MIU/ML — SIGNIFICANT CHANGE UP
HCO3 BLDV-SCNC: 24 MMOL/L — SIGNIFICANT CHANGE UP (ref 22–29)
HCO3 BLDV-SCNC: 25 MMOL/L — SIGNIFICANT CHANGE UP (ref 22–29)
HCT VFR BLD CALC: 30.8 % — LOW (ref 34.5–45)
HCT VFR BLDA CALC: 27 % — LOW (ref 34.5–46.5)
HCT VFR BLDA CALC: 30 % — LOW (ref 34.5–46.5)
HGB BLD CALC-MCNC: 10 G/DL — LOW (ref 11.7–16.1)
HGB BLD CALC-MCNC: 9 G/DL — LOW (ref 11.7–16.1)
HGB BLD-MCNC: 9.2 G/DL — LOW (ref 11.5–15.5)
INR BLD: 1.13 RATIO — SIGNIFICANT CHANGE UP (ref 0.88–1.16)
LACTATE BLDV-MCNC: 4.2 MMOL/L — CRITICAL HIGH (ref 0.5–2)
LACTATE BLDV-MCNC: 6.4 MMOL/L — CRITICAL HIGH (ref 0.5–2)
LACTATE SERPL-SCNC: 5.1 MMOL/L — CRITICAL HIGH (ref 0.5–2)
LDH SERPL L TO P-CCNC: 201 U/L — SIGNIFICANT CHANGE UP (ref 50–242)
LYMPHOCYTES # BLD AUTO: 0.48 K/UL — LOW (ref 1–3.3)
LYMPHOCYTES # BLD AUTO: 13.1 % — SIGNIFICANT CHANGE UP (ref 13–44)
MACROCYTES BLD QL: SLIGHT — SIGNIFICANT CHANGE UP
MAGNESIUM SERPL-MCNC: 1.6 MG/DL — SIGNIFICANT CHANGE UP (ref 1.6–2.6)
MAGNESIUM SERPL-MCNC: 2.4 MG/DL — SIGNIFICANT CHANGE UP (ref 1.6–2.6)
MANUAL SMEAR VERIFICATION: SIGNIFICANT CHANGE UP
MCHC RBC-ENTMCNC: 21.4 PG — LOW (ref 27–34)
MCHC RBC-ENTMCNC: 29.9 GM/DL — LOW (ref 32–36)
MCV RBC AUTO: 71.8 FL — LOW (ref 80–100)
MICROCYTES BLD QL: SIGNIFICANT CHANGE UP
MONOCYTES # BLD AUTO: 0 K/UL — SIGNIFICANT CHANGE UP (ref 0–0.9)
MONOCYTES NFR BLD AUTO: 0 % — LOW (ref 2–14)
NEUTROPHILS # BLD AUTO: 3.16 K/UL — SIGNIFICANT CHANGE UP (ref 1.8–7.4)
NEUTROPHILS NFR BLD AUTO: 86 % — HIGH (ref 43–77)
OVALOCYTES BLD QL SMEAR: SLIGHT — SIGNIFICANT CHANGE UP
PCO2 BLDV: 40 MMHG — SIGNIFICANT CHANGE UP (ref 39–42)
PCO2 BLDV: 43 MMHG — HIGH (ref 39–42)
PH BLDV: 7.37 — SIGNIFICANT CHANGE UP (ref 7.32–7.43)
PH BLDV: 7.39 — SIGNIFICANT CHANGE UP (ref 7.32–7.43)
PHOSPHATE SERPL-MCNC: 3.4 MG/DL — SIGNIFICANT CHANGE UP (ref 2.5–4.5)
PLAT MORPH BLD: NORMAL — SIGNIFICANT CHANGE UP
PLATELET # BLD AUTO: 106 K/UL — LOW (ref 150–400)
PO2 BLDV: 37 MMHG — SIGNIFICANT CHANGE UP (ref 25–45)
PO2 BLDV: 54 MMHG — HIGH (ref 25–45)
POIKILOCYTOSIS BLD QL AUTO: SLIGHT — SIGNIFICANT CHANGE UP
POTASSIUM BLDV-SCNC: 3.4 MMOL/L — LOW (ref 3.5–5.1)
POTASSIUM BLDV-SCNC: 4.3 MMOL/L — SIGNIFICANT CHANGE UP (ref 3.5–5.1)
POTASSIUM SERPL-MCNC: 3.3 MMOL/L — LOW (ref 3.5–5.3)
POTASSIUM SERPL-SCNC: 3.3 MMOL/L — LOW (ref 3.5–5.3)
PROT SERPL-MCNC: 7.5 G/DL — SIGNIFICANT CHANGE UP (ref 6–8.3)
PROTHROM AB SERPL-ACNC: 13 SEC — SIGNIFICANT CHANGE UP (ref 10.5–13.4)
RAPID RVP RESULT: SIGNIFICANT CHANGE UP
RBC # BLD: 4.29 M/UL — SIGNIFICANT CHANGE UP (ref 3.8–5.2)
RBC # FLD: 21.9 % — HIGH (ref 10.3–14.5)
RBC BLD AUTO: ABNORMAL
SAO2 % BLDV: 55.7 % — LOW (ref 67–88)
SAO2 % BLDV: 83.5 % — SIGNIFICANT CHANGE UP (ref 67–88)
SARS-COV-2 RNA SPEC QL NAA+PROBE: SIGNIFICANT CHANGE UP
SCHISTOCYTES BLD QL AUTO: SLIGHT — SIGNIFICANT CHANGE UP
SODIUM SERPL-SCNC: 142 MMOL/L — SIGNIFICANT CHANGE UP (ref 135–145)
WBC # BLD: 3.67 K/UL — LOW (ref 3.8–10.5)
WBC # FLD AUTO: 3.67 K/UL — LOW (ref 3.8–10.5)

## 2023-06-08 PROCEDURE — 70450 CT HEAD/BRAIN W/O DYE: CPT | Mod: 26,MA

## 2023-06-08 PROCEDURE — 99223 1ST HOSP IP/OBS HIGH 75: CPT | Mod: GC

## 2023-06-08 PROCEDURE — 99232 SBSQ HOSP IP/OBS MODERATE 35: CPT | Mod: GC

## 2023-06-08 PROCEDURE — 99285 EMERGENCY DEPT VISIT HI MDM: CPT

## 2023-06-08 PROCEDURE — 99222 1ST HOSP IP/OBS MODERATE 55: CPT | Mod: GC

## 2023-06-08 RX ORDER — SODIUM CHLORIDE 9 MG/ML
1000 INJECTION INTRAMUSCULAR; INTRAVENOUS; SUBCUTANEOUS ONCE
Refills: 0 | Status: COMPLETED | OUTPATIENT
Start: 2023-06-08 | End: 2023-06-08

## 2023-06-08 RX ORDER — LANOLIN ALCOHOL/MO/W.PET/CERES
3 CREAM (GRAM) TOPICAL AT BEDTIME
Refills: 0 | Status: DISCONTINUED | OUTPATIENT
Start: 2023-06-08 | End: 2023-06-10

## 2023-06-08 RX ORDER — POTASSIUM CHLORIDE 20 MEQ
40 PACKET (EA) ORAL ONCE
Refills: 0 | Status: COMPLETED | OUTPATIENT
Start: 2023-06-08 | End: 2023-06-08

## 2023-06-08 RX ORDER — LISINOPRIL 2.5 MG/1
1 TABLET ORAL
Refills: 0 | DISCHARGE

## 2023-06-08 RX ORDER — ACETAMINOPHEN 500 MG
650 TABLET ORAL EVERY 6 HOURS
Refills: 0 | Status: DISCONTINUED | OUTPATIENT
Start: 2023-06-08 | End: 2023-06-10

## 2023-06-08 RX ORDER — MAGNESIUM SULFATE 500 MG/ML
2 VIAL (ML) INJECTION ONCE
Refills: 0 | Status: COMPLETED | OUTPATIENT
Start: 2023-06-08 | End: 2023-06-08

## 2023-06-08 RX ORDER — FOLIC ACID 0.8 MG
1 TABLET ORAL DAILY
Refills: 0 | Status: DISCONTINUED | OUTPATIENT
Start: 2023-06-08 | End: 2023-06-10

## 2023-06-08 RX ORDER — SODIUM CHLORIDE 9 MG/ML
1000 INJECTION, SOLUTION INTRAVENOUS
Refills: 0 | Status: DISCONTINUED | OUTPATIENT
Start: 2023-06-08 | End: 2023-06-08

## 2023-06-08 RX ORDER — THIAMINE MONONITRATE (VIT B1) 100 MG
100 TABLET ORAL DAILY
Refills: 0 | Status: DISCONTINUED | OUTPATIENT
Start: 2023-06-08 | End: 2023-06-10

## 2023-06-08 RX ORDER — ONDANSETRON 8 MG/1
4 TABLET, FILM COATED ORAL EVERY 8 HOURS
Refills: 0 | Status: DISCONTINUED | OUTPATIENT
Start: 2023-06-08 | End: 2023-06-10

## 2023-06-08 RX ORDER — AMLODIPINE BESYLATE 2.5 MG/1
10 TABLET ORAL DAILY
Refills: 0 | Status: DISCONTINUED | OUTPATIENT
Start: 2023-06-08 | End: 2023-06-10

## 2023-06-08 RX ORDER — SODIUM CHLORIDE 9 MG/ML
1000 INJECTION, SOLUTION INTRAVENOUS
Refills: 0 | Status: COMPLETED | OUTPATIENT
Start: 2023-06-08 | End: 2023-06-09

## 2023-06-08 RX ORDER — FOLIC ACID 0.8 MG
1 TABLET ORAL ONCE
Refills: 0 | Status: COMPLETED | OUTPATIENT
Start: 2023-06-08 | End: 2023-06-08

## 2023-06-08 RX ORDER — THIAMINE MONONITRATE (VIT B1) 100 MG
100 TABLET ORAL ONCE
Refills: 0 | Status: COMPLETED | OUTPATIENT
Start: 2023-06-08 | End: 2023-06-08

## 2023-06-08 RX ADMIN — Medication 1 MILLIGRAM(S): at 05:38

## 2023-06-08 RX ADMIN — Medication 50 MILLIGRAM(S): at 17:48

## 2023-06-08 RX ADMIN — SODIUM CHLORIDE 1000 MILLILITER(S): 9 INJECTION INTRAMUSCULAR; INTRAVENOUS; SUBCUTANEOUS at 05:39

## 2023-06-08 RX ADMIN — Medication 2 MILLIGRAM(S): at 19:40

## 2023-06-08 RX ADMIN — Medication 100 MILLIGRAM(S): at 13:27

## 2023-06-08 RX ADMIN — Medication 1 TABLET(S): at 13:27

## 2023-06-08 RX ADMIN — Medication 50 MILLIGRAM(S): at 12:25

## 2023-06-08 RX ADMIN — AMLODIPINE BESYLATE 10 MILLIGRAM(S): 2.5 TABLET ORAL at 15:48

## 2023-06-08 RX ADMIN — Medication 650 MILLIGRAM(S): at 15:49

## 2023-06-08 RX ADMIN — Medication 100 MILLIGRAM(S): at 04:51

## 2023-06-08 RX ADMIN — SODIUM CHLORIDE 75 MILLILITER(S): 9 INJECTION, SOLUTION INTRAVENOUS at 18:02

## 2023-06-08 RX ADMIN — Medication 2 MILLIGRAM(S): at 13:29

## 2023-06-08 RX ADMIN — Medication 650 MILLIGRAM(S): at 16:19

## 2023-06-08 RX ADMIN — Medication 2 MILLIGRAM(S): at 15:48

## 2023-06-08 RX ADMIN — Medication 1 MILLIGRAM(S): at 13:27

## 2023-06-08 RX ADMIN — SODIUM CHLORIDE 1000 MILLILITER(S): 9 INJECTION INTRAMUSCULAR; INTRAVENOUS; SUBCUTANEOUS at 05:02

## 2023-06-08 RX ADMIN — Medication 25 GRAM(S): at 05:16

## 2023-06-08 RX ADMIN — Medication 40 MILLIEQUIVALENT(S): at 12:28

## 2023-06-08 RX ADMIN — Medication 2 MILLIGRAM(S): at 04:53

## 2023-06-08 NOTE — ED PROVIDER NOTE - PROGRESS NOTE DETAILS
Lori Chavarria PGY1: Elevated lactate. Plan for IVF and repeat lactate. Cameron Wright DO (PGY1)  Received signout from Dr. Tellez from overnight team.  Patient's initial lactate was elevated greater than 6, repeat was down to 5.1.  Reevaluated patient at bedside, still with some tremulous on exam however no signs of worsening withdrawal.  Patient's platelet counts were low at 100, she states that they have been low in the past.  Will order coags LDH and haptoglobin.  Will order UA and urine culture to rule out other infectious etiologies.  Will admit for alcohol withdrawal.  Discussed admission with patient at bedside Cameron Wright DO (PGY1)  Received signout from Dr. Chavarria from overnight team.  Patient's initial lactate was elevated greater than 6, repeat was down to 5.1.  Reevaluated patient at bedside, still with some tremulous on exam however no signs of worsening withdrawal.  Patient's platelet counts were low at 100, she states that they have been low in the past.  Will order coags LDH and haptoglobin.  Will order UA and urine culture to rule out other infectious etiologies.  Will admit for alcohol withdrawal.  Discussed admission with patient at bedside Cameron Wright DO (PGY1)  Received signout from Dr. Chavarria from overnight team.  Patient's initial lactate was elevated greater than 6, repeat was down to 5.1.  Reevaluated patient at bedside, still with some tremulous on exam however no signs of worsening withdrawal.  Patient's platelet counts were low at 100, she states that they have been low in the past.  Will order coags LDH and haptoglobin.  Will order UA and urine culture to rule out other infectious etiologies.  Will order CTH as patient cousin at bedside states questionable seizure as patient was confused when she initially called 911 for tremulousness and intoxication. Will admit for alcohol withdrawal pending updated workup. Discussed admission with patient and family member at bedside. Dr. Ashby's note: Signout from the night team, labs reviewed, patient assessed.  Patient states that last night she felt tremulous like she was going to have a seizure, cousin at bedside reports patient was more confused than usual, patient reports she had a headache, vomited and EMS who she called herself, and patient is starting to feel better after the Ativan.  Patient still with headache, states she had a seizure that was witnessed by her mother approximately 6 years ago, details are not available at this time.  Patient currently is not tremulous, and is exhibiting no signs of withdrawal, patient's labs are remarkable for thrombocytopenia, slight leukopenia, lactic acidosis, and metabolic acidosis.  Patient also with an alcohol level of 315 further suggesting patient was not having withdrawal symptoms last night.  Unclear etiology of patient's thrombocytopenia, would consider viral infection, hematological disorder, medication effect, including alcohol effect.  We will trend labs, including lactate, repeat CBC, add coags to rule out DIC, patient clinically with no signs of DIC at this time but patient will require inpatient care for further work-up.  Rectal temp obtained, normal 98.1, patient with no abdominal pain, no chest pain, no focal neurological symptoms, mild headache left side, will obtain a CT of the head rule out intracranial hemorrhage.

## 2023-06-08 NOTE — H&P ADULT - NSHPREVIEWOFSYSTEMS_GEN_ALL_CORE
CONSTITUTIONAL:  No weight loss, fever, chills, weakness or fatigue.  HEENT:  Eyes:  No visual loss, blurred vision, double vision or yellow sclerae. Ears, Nose, Throat:  No hearing loss, sneezing, congestion, runny nose or sore throat.  SKIN:  No rash or itching.  CARDIOVASCULAR:  No chest pain, chest pressure or chest discomfort. No palpitations.  RESPIRATORY:  No shortness of breath, cough or sputum.  GASTROINTESTINAL:  No anorexia, nausea, vomiting or diarrhea. No abdominal pain or blood.  GENITOURINARY:  Denies hematuria, dysuria.   NEUROLOGICAL:  No headache, dizziness, syncope, paralysis, ataxia, numbness or tingling in the extremities. No change in bowel or bladder control.  MUSCULOSKELETAL:  No muscle, back pain, joint pain or stiffness.  HEMATOLOGIC:  No anemia, bleeding or bruising.  LYMPHATICS:  No enlarged nodes.   PSYCHIATRIC:  No history of depression or anxiety.  ENDOCRINOLOGIC:  No reports of sweating, cold or heat intolerance. No polyuria or polydipsia.  ALLERGIES:  No history of asthma, hives, eczema or rhinitis. CONSTITUTIONAL:  No weight loss, fever, chills, weakness or fatigue.  HEENT:  Eyes:  No visual loss, blurred vision, double vision or yellow sclerae. Ears, Nose, Throat:  No hearing loss, sneezing, congestion, runny nose or sore throat.  SKIN:  No rash or itching.  CARDIOVASCULAR:  No chest pain, chest pressure or chest discomfort. No palpitations.  RESPIRATORY:  No shortness of breath, cough or sputum.  GASTROINTESTINAL: (+) Endorses nausea, vomiting. No anorexia, or diarrhea. No abdominal pain or blood.  GENITOURINARY:  Denies hematuria, dysuria.   NEUROLOGICAL:  No headache, dizziness, syncope, paralysis, ataxia, numbness or tingling in the extremities. No change in bowel or bladder control.  MUSCULOSKELETAL:  No muscle, back pain, joint pain or stiffness.  HEMATOLOGIC:  No anemia, bleeding or bruising.  LYMPHATICS:  No enlarged nodes.   PSYCHIATRIC:  No history of depression or anxiety.  ENDOCRINOLOGIC:  No reports of sweating, cold or heat intolerance. No polyuria or polydipsia.  ALLERGIES:  No history of asthma, hives, eczema or rhinitis.

## 2023-06-08 NOTE — H&P ADULT - NSHPSOCIALHISTORY_GEN_ALL_CORE
Home: Domiciled   ADL: Full ADL   Next of Kin:  Alcohol: Endorses, intermittent binging over past 20 years with longest period of sobriety being 6 years  Smoking: Denies   Drugs: Denies Home: Domiciled   ADL: Full ADL   Alcohol: Endorses, intermittent binging over past 20 years with longest period of sobriety being 6 years  Smoking: Denies   Drugs: Denies

## 2023-06-08 NOTE — H&P ADULT - NSHPPHYSICALEXAM_GEN_ALL_CORE
PHYSICAL EXAM:   GENERAL: Alert. Not confused. No acute distress. Not thin. Not cachectic. Not obese.  HEAD:  Atraumatic. Normocephalic.  EYES: EOMI. PERRLA. Normal conjunctiva/sclera.  ENT: Neck supple. No JVD. Moist oral mucosa. Not edentulous. No thrush.  LYMPH: Normal supraclavicular/cervical lymph nodes.   CARDIAC: Not tachy, Not chito. Regular rhythm. Not irregularly irregular. S1. S2. No murmur. No rub. No distant heart sounds.  LUNG/CHEST: CTAB. BS equal bilaterally. No wheezes. No rales. No rhonchi.  ABDOMEN: Soft. No tenderness. No distension. No fluid wave. Normal bowel sounds.  BACK: No midline/vertebral tenderness. No flank tenderness.  VASCULAR: +2 b/l radial or ulnar pulses. Palpable DP pulses.  EXTREMITIES:  No clubbing. No cyanosis. No edema. Moving all 4.  NEUROLOGY: A&Ox3. Tremulous. Cranial nerves intact. Normal speech. Sensation intact.  PSYCH: Normal behavior. Normal affect.  SKIN: No jaundice. No erythema. No rash/lesion.  ICU Vital Signs Last 24 Hrs  T(C): 36.9 (08 Jun 2023 12:35), Max: 37.1 (08 Jun 2023 04:04)  T(F): 98.5 (08 Jun 2023 12:35), Max: 98.7 (08 Jun 2023 04:04)  HR: 99 (08 Jun 2023 12:35) (90 - 99)  BP: 147/90 (08 Jun 2023 12:35) (141/98 - 149/100)  BP(mean): --  ABP: --  ABP(mean): --  RR: 20 (08 Jun 2023 12:35) (19 - 20)  SpO2: 100% (08 Jun 2023 12:35) (96% - 100%)    O2 Parameters below as of 08 Jun 2023 12:35  Patient On (Oxygen Delivery Method): room air          I&O's Summary PHYSICAL EXAM:   GENERAL: Alert. Not confused. No acute distress. Not thin. Not cachectic. Not obese.  HEAD:  Atraumatic. Normocephalic.  EYES: EOMI. PERRLA. Normal conjunctiva/sclera.  ENT: Neck supple. No JVD. Moist oral mucosa. Not edentulous. No thrush.  LYMPH: Normal supraclavicular/cervical lymph nodes.   CARDIAC: Not tachy, Not chito. Regular rhythm. Not irregularly irregular. S1. S2. No murmur. No rub. No distant heart sounds.  LUNG/CHEST: CTAB. BS equal bilaterally. No wheezes. No rales. No rhonchi.  ABDOMEN: Soft. No tenderness. No distension. No fluid wave. Normal bowel sounds.  BACK: No midline/vertebral tenderness. No flank tenderness.  VASCULAR: +2 b/l radial or ulnar pulses. Palpable DP pulses.  EXTREMITIES:  No clubbing. No cyanosis. No edema. Moving all 4.  NEUROLOGY: A&Ox3. Tremulous with and without intention - noted on outstretched arm. No asterixis. Cranial nerves intact. Normal speech. Sensation intact.  PSYCH: Normal behavior. Normal affect.  SKIN: No jaundice. No erythema. No rash/lesion.  ICU Vital Signs Last 24 Hrs  T(C): 36.9 (08 Jun 2023 12:35), Max: 37.1 (08 Jun 2023 04:04)  T(F): 98.5 (08 Jun 2023 12:35), Max: 98.7 (08 Jun 2023 04:04)  HR: 99 (08 Jun 2023 12:35) (90 - 99)  BP: 147/90 (08 Jun 2023 12:35) (141/98 - 149/100)  BP(mean): --  ABP: --  ABP(mean): --  RR: 20 (08 Jun 2023 12:35) (19 - 20)  SpO2: 100% (08 Jun 2023 12:35) (96% - 100%)    O2 Parameters below as of 08 Jun 2023 12:35  Patient On (Oxygen Delivery Method): room air          I&O's Summary PHYSICAL EXAM:   GENERAL: Alert. Not confused. No acute distress. Not thin. Not cachectic. Not obese.  HEAD:  Atraumatic. Normocephalic.  EYES: EOMI. PERRLA. Normal conjunctiva/sclera.  ENT: Neck supple. No JVD. Moist oral mucosa. Not edentulous. No thrush. Jaw misaligned.   LYMPH: Normal supraclavicular/cervical lymph nodes.   CARDIAC: Not tachy, Not chito. Regular rhythm. Not irregularly irregular. S1. S2. No murmur. No rub. No distant heart sounds.  LUNG/CHEST: CTAB. BS equal bilaterally. No wheezes. No rales. No rhonchi.  ABDOMEN: Soft. No tenderness. No distension. No fluid wave. Normal bowel sounds.  BACK: No midline/vertebral tenderness. No flank tenderness.  VASCULAR: +2 b/l radial or ulnar pulses. Palpable DP pulses.  EXTREMITIES:  No clubbing. No cyanosis. No edema. Moving all 4.  NEUROLOGY: A&Ox3. Tremulous with and without intention - noted on outstretched arm. No asterixis. Cranial nerves intact. Normal speech. Sensation intact.  PSYCH: Normal behavior. Normal affect.  SKIN: No jaundice. No erythema. No rash/lesion.  ICU Vital Signs Last 24 Hrs  T(C): 36.9 (08 Jun 2023 12:35), Max: 37.1 (08 Jun 2023 04:04)  T(F): 98.5 (08 Jun 2023 12:35), Max: 98.7 (08 Jun 2023 04:04)  HR: 99 (08 Jun 2023 12:35) (90 - 99)  BP: 147/90 (08 Jun 2023 12:35) (141/98 - 149/100)  BP(mean): --  ABP: --  ABP(mean): --  RR: 20 (08 Jun 2023 12:35) (19 - 20)  SpO2: 100% (08 Jun 2023 12:35) (96% - 100%)    O2 Parameters below as of 08 Jun 2023 12:35  Patient On (Oxygen Delivery Method): room air          I&O's Summary

## 2023-06-08 NOTE — ED ADULT NURSE NOTE - NSFALLUNIVINTERV_ED_ALL_ED
Bed/Stretcher in lowest position, wheels locked, appropriate side rails in place/Call bell, personal items and telephone in reach/Instruct patient to call for assistance before getting out of bed/chair/stretcher/Non-slip footwear applied when patient is off stretcher/[place] to call system/Physically safe environment - no spills, clutter or unnecessary equipment/Purposeful proactive rounding/Room/bathroom lighting operational, light cord in reach

## 2023-06-08 NOTE — H&P ADULT - ASSESSMENT
Pt is a 47y F with hx of HTN, alcohol use disorder requiring hospitalization for withdrawals and complication of related seizure ~1 year ago presenting for symptoms of tremors, nausea, vomiting - c/f alcohol withdrawal. Admitted to medicine for further management.

## 2023-06-08 NOTE — H&P ADULT - PROBLEM SELECTOR PLAN 2
Pt with known history of microcytic anemia, attributed to adenomyosis. Does not report active bleeding at this time. Possible pancytopenia i/s/o cirrhosis vs infiltrative disease vs infectious.  -Parvo, EBV  -RUQ U/S  -Daily CBC  -HIV/HCV  -Monitor LFTs Pt with known history of microcytic anemia, attributed to adenomyosis. Does not report active bleeding at this time. Possible pancytopenia i/s/o alcohol cirrhosis vs infiltrative disease vs infectious.  -Parvo, EBV  -RUQ U/S  -Daily CBC  -HIV/HCV  -Monitor LFTs  -Hemoglobin electrophoresis to r/o thalassemia

## 2023-06-08 NOTE — ED PROVIDER NOTE - CLINICAL SUMMARY MEDICAL DECISION MAKING FREE TEXT BOX
47-year-old female with past medical history of EtOH use disorder presents emergency department for alcohol withdrawal.  Last drink 18 hours prior to arrival. Drinks 1 L of vodka per day for last 3-4 days. Pt with whole body tremors and tongue fasciculations on exam. Plan for EKG, IVF, labs, ativan, thiamine and folic acid. Dispo admission.

## 2023-06-08 NOTE — H&P ADULT - PROBLEM SELECTOR PLAN 4
DVT PPx: SCDs, IMPROVE score of 0  Diet: Regular  Code status: Full code  Communication: Pt spoken with regard to plan to which understanding was verbalized by party  Dispo: likely home

## 2023-06-08 NOTE — PATIENT PROFILE ADULT - FALL HARM RISK - HARM RISK INTERVENTIONS

## 2023-06-08 NOTE — ED ADULT NURSE NOTE - OBJECTIVE STATEMENT
48 y/o M/F presents to ED from [facility] via EMS for c/o alcohol withdrawal. PMH of HTN, DM, withdrawal seizures, depression, alcoholism. Pts last drink was 18hrs ago. Pt states she started detox 2 days ago, and has been vomiting since 12pm. Endorses HA. Denies SOB, CP, vision changes. Pt is A&Ox3, appears anxious with tremors. Airway patent with spontaneous unlabored breathing,  skin is warm and normal color for race. No diaphoresis or edema noted. Pt placed on continuous cardiac monitor. IV inserted via EMS labs drawn and sent. Safety maintained bed is in the lowest position, locked and call bell in reach. 46 y/o M/F presents to ED from [facility] via EMS for c/o alcohol withdrawal. PMH of HTN, DM, withdrawal seizures, depression, alcoholism. Pts last drink was 18hrs ago. Pt states she started detox 2 days ago, and has been vomiting since 12pm. Endorses HA. Denies SOB, CP, vision changes. Pt is A&Ox3, appears anxious with tremors. Airway patent with spontaneous unlabored breathing,  skin is warm and normal color for race. No diaphoresis or edema noted. Pt placed on continuous cardiac monitor. IV inserted via EMS 20 LAC, labs drawn and sent. Safety maintained bed is in the lowest position, locked and call bell in reach.

## 2023-06-08 NOTE — PATIENT PROFILE ADULT - DIETITIAN.
Results reviewed. CT shows no problems with either kidney at this time and no signs of metastatic disease. 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  No dietitian/nutrition services

## 2023-06-08 NOTE — H&P ADULT - HISTORY OF PRESENT ILLNESS
Pt is a 47y F with hx of HTN, alcohol use disorder requiring hospitalization for withdrawals and complication of related seizure ~1 year ago presenting for symptoms of tremors, nausea, vomiting over past day. Pt with recent binge drinking episodes  Pt is a 47y F with hx of HTN, alcohol use disorder requiring hospitalization for withdrawals and complication of related seizure ~1 year ago presenting for symptoms of tremors, nausea, vomiting over past day. Pt with recent binge drinking episode - 1L per day for 4 days.  Last drink was ~30 hours prior to interview. Latest binge episode was precipitated by stress regarding necessity of surgical correction of jaw fracture that was intervened on in previous admission for alcohol withdrawal (05/2023). She notes that unable to work, speak, or chew correctly due to unaligned jaw has caused her distress. This was exacerbated by the fact that she will be unable to receive revision for ~6 months. Pt reports headache, tactile disturbance of pins and needles, and nonbloody, nonbilious vomiting, most recent episode while in ED. She endorses headache. She denies visual or auditory disturbances, abdominal pain.     In ED, VS: T 98.3, HR 90, /100. Received 1L bolus x2 for lactate ~6. Also given ativan 2mg for withdrawal symptoms.  Pt is a 47y F with hx of HTN, alcohol use disorder requiring hospitalization for withdrawals and complication of related seizure ~1 year ago presenting for symptoms of tremors, nausea, vomiting over past day. Pt with recent binge drinking episode - 1L per day for 4 days.  Last drink was ~30 hours prior to interview. Latest binge episode was precipitated by stress regarding necessity of surgical correction of jaw fracture that was intervened on in previous admission for alcohol withdrawal (05/2023). She notes that unable to work, speak, or chew correctly due to unaligned jaw has caused her distress. This was exacerbated by the fact that she will be unable to receive revision for ~6 months. Pt reports headache, tactile disturbance of pins and needles, and nonbloody, nonbilious vomiting, most recent episode while in ED. She endorses headache. She denies visual or auditory disturbances, abdominal pain.     In ED, VS: T 98.3, HR 90, /100. Received 1L bolus x2 for lactate ~6. Also given ativan 2mg for withdrawal symptoms. ETOH level of 315.

## 2023-06-08 NOTE — H&P ADULT - ATTENDING COMMENTS
47y F with hx of HTN, alcohol use disorder requiring hospitalization for withdrawals and complication of related seizure ~1 year ago presenting for symptoms of tremors, nausea, vomiting - c/f alcohol withdrawal    # alcohol withdrawal  #alcohol use disorder  #HTN  #Microcytic anemia     - Librium taper, high risk CIWA monitoring, MVI, folate, thiamine  -  c/s  - D5NS x 24hrs  - lactate q4hr till <2  - f/u Hb electrophoresis, iron studies    d/w patient and cousin at bedside 47y F with hx of HTN, alcohol use disorder requiring hospitalization for withdrawals and complication of related seizure ~1 year ago presenting for symptoms of tremors, nausea, vomiting - c/f alcohol withdrawal    # alcohol withdrawal  #alcohol use disorder  #HTN  #Microcytic anemia  #pancytopenia - 2/2 to AUD likely    - Librium taper, high risk CIWA monitoring, MVI, folate, thiamine  - SW c/s  - D5NS x 24hrs  - lactate q4hr till <2  - f/u Hb electrophoresis, iron studies      d/w patient and cousin at bedside

## 2023-06-08 NOTE — ED PROVIDER NOTE - OBJECTIVE STATEMENT
47-year-old female with past medical history of EtOH use disorder presents emergency department for alcohol withdrawal.  Patient states her last drink was 18 hours prior to arrival.  Patient has been drinking 1 L of vodka per day for the last 3 to 4 days.  Prior to that patient was sober for about 3 months.  Patient endorses 1 episode of vomiting prior to arrival.  Patient has been struggling with on and off sobriety for the last 15 to 20 years.  Patient endorses history of withdrawal seizures but never required ICU admission.  Patient denies fevers, chills, headache, vision changes, chest pain, trouble breathing, abdominal pain, diarrhea/constipation, dysuria, hematuria.

## 2023-06-08 NOTE — ED PROVIDER NOTE - PHYSICAL EXAMINATION
Constitutional: VS reviewed. Alert and orientedx3, pt tremulous and anxious  HEENT: Atraumatic, EOMI + tongue fasciculations.   CV: RRR  Lungs: Clear and equal bilaterally, no wheezes, rales or crackles  Abdomen: Soft, nondistended, nontender  MSK: No deformities  Skin: Warm and dry. As visualized no rashes, lesions, bruising or erythema  Neuro: + whole body tremors  Lymph: No pitting edema in extremities.

## 2023-06-08 NOTE — ED PROVIDER NOTE - ATTENDING CONTRIBUTION TO CARE
Patient presents in acute alcohol withdrawal, tachycardic, tremors, elevated CIWA.  Patient will get labs to assess for acute metabolic derangement or infectious etiology, will get IV fluids for hydration, thiamine and folate, benzos as needed to treat the withdrawal and will require admission for acute alcohol withdrawal

## 2023-06-08 NOTE — H&P ADULT - NSHPLABSRESULTS_GEN_ALL_CORE
9.2    3.67  )-----------( 106      ( 08 Jun 2023 05:06 )             30.8     06-08    142  |  101  |  11  ----------------------------<  110<H>  3.3<L>   |  19<L>  |  0.53    Ca    8.1<L>      08 Jun 2023 05:06  Phos  3.4     06-08  Mg     2.4     06-08    TPro  7.5  /  Alb  3.9  /  TBili  0.3  /  DBili  <0.1  /  AST  58<H>  /  ALT  26  /  AlkPhos  56  06-08    CT Head No Cont (06.08.23 @ 09:26)    FINDINGS:    The ventricular and sulcal size and configuration is age appropriate.     There is no acute loss of gray-white differentiation.    There is no evidence of mass effect, midline shift, acute intracranial   hemorrhage, or extra-axial collections.     The calvarium is intact. There are frothy secretions in the left   sphenoid sinus.The mastoid air cells are predominantly clear. The orbits   are unremarkable. Redemonstrated subluxation of the left mandibular   condyle. And partially imaged bilateral mandibular fractures.      IMPRESSION:  No acute intracranial hemorrhage or acute territorial infarction. 9.2    3.67  )-----------( 106      ( 08 Jun 2023 05:06 )               30.8     06-08    142  |  101  |  11  ----------------------------<  110<H>  3.3<L>   |  19<L>  |  0.53    Ca    8.1<L>      08 Jun 2023 05:06  Phos  3.4     06-08  Mg     2.4     06-08    TPro  7.5  /  Alb  3.9  /  TBili  0.3  /  DBili  <0.1  /  AST  58<H>  /  ALT  26  /  AlkPhos  56  06-08    CT Head No Cont (06.08.23 @ 09:26)    FINDINGS:    The ventricular and sulcal size and configuration is age appropriate.     There is no acute loss of gray-white differentiation.    There is no evidence of mass effect, midline shift, acute intracranial   hemorrhage, or extra-axial collections.     The calvarium is intact. There are frothy secretions in the left   sphenoid sinus.The mastoid air cells are predominantly clear. The orbits   are unremarkable. Redemonstrated subluxation of the left mandibular   condyle. And partially imaged bilateral mandibular fractures.      IMPRESSION:  No acute intracranial hemorrhage or acute territorial infarction.

## 2023-06-08 NOTE — H&P ADULT - PROBLEM SELECTOR PLAN 1
Pt with hx of alcohol use disorder and associated seizures ~1yr ago without ICU admission requirement.   -CIWA protocol  -Librium taper  -2mg IV ativan, symptom triggered  -multivitamin, high dose thiamine x3 days, folic acid 1mg daily Pt with hx of alcohol use disorder and associated seizures ~1yr ago without ICU admission requirement.   -CIWA protocol  -Librium taper  -2mg IV ativan, symptom triggered  -multivitamin, high dose thiamine x3 days, folic acid 1mg daily  -MYNOR MARTINEZT Pt with hx of alcohol use disorder and associated seizures ~1yr ago without ICU admission requirement.   -CIWA protocol  -Librium taper  -2mg IV ativan, symptom triggered  -multivitamin, high dose thiamine x3 days, folic acid 1mg daily  -JUAN, SBIRT  -elevated lactate of 6 on admission, s/p 1L bolus x2. Repeat lactate decreased to 4. Will cont. w/ maintenance fluids of d5+LR @ 75ccs/hr with PM re-check to trend

## 2023-06-09 ENCOUNTER — TRANSCRIPTION ENCOUNTER (OUTPATIENT)
Age: 48
End: 2023-06-09

## 2023-06-09 LAB
ANION GAP SERPL CALC-SCNC: 15 MMOL/L — SIGNIFICANT CHANGE UP (ref 5–17)
BASOPHILS # BLD AUTO: 0 K/UL — SIGNIFICANT CHANGE UP (ref 0–0.2)
BASOPHILS NFR BLD AUTO: 0 % — SIGNIFICANT CHANGE UP (ref 0–2)
BUN SERPL-MCNC: 9 MG/DL — SIGNIFICANT CHANGE UP (ref 7–23)
CALCIUM SERPL-MCNC: 9.2 MG/DL — SIGNIFICANT CHANGE UP (ref 8.4–10.5)
CHLORIDE SERPL-SCNC: 98 MMOL/L — SIGNIFICANT CHANGE UP (ref 96–108)
CO2 SERPL-SCNC: 24 MMOL/L — SIGNIFICANT CHANGE UP (ref 22–31)
CREAT SERPL-MCNC: 0.5 MG/DL — SIGNIFICANT CHANGE UP (ref 0.5–1.3)
EBV EA AB SER IA-ACNC: 139 U/ML — HIGH
EBV EA AB TITR SER IF: POSITIVE
EBV EA IGG SER-ACNC: POSITIVE
EBV NA IGG SER IA-ACNC: >600 U/ML — HIGH
EBV PATRN SPEC IB-IMP: SIGNIFICANT CHANGE UP
EBV VCA IGG AVIDITY SER QL IA: POSITIVE
EBV VCA IGM SER IA-ACNC: 17 U/ML — SIGNIFICANT CHANGE UP
EBV VCA IGM SER IA-ACNC: >750 U/ML — HIGH
EBV VCA IGM TITR FLD: NEGATIVE — SIGNIFICANT CHANGE UP
EGFR: 115 ML/MIN/1.73M2 — SIGNIFICANT CHANGE UP
EOSINOPHIL # BLD AUTO: 0.05 K/UL — SIGNIFICANT CHANGE UP (ref 0–0.5)
EOSINOPHIL NFR BLD AUTO: 1.8 % — SIGNIFICANT CHANGE UP (ref 0–6)
FERRITIN SERPL-MCNC: 31 NG/ML — SIGNIFICANT CHANGE UP (ref 15–150)
FOLATE SERPL-MCNC: 18 NG/ML — SIGNIFICANT CHANGE UP
GIANT PLATELETS BLD QL SMEAR: PRESENT — SIGNIFICANT CHANGE UP
GLUCOSE SERPL-MCNC: 139 MG/DL — HIGH (ref 70–99)
HCT VFR BLD CALC: 30.6 % — LOW (ref 34.5–45)
HCV AB S/CO SERPL IA: 0.25 S/CO — SIGNIFICANT CHANGE UP (ref 0–0.99)
HCV AB SERPL-IMP: SIGNIFICANT CHANGE UP
HGB BLD-MCNC: 8.9 G/DL — LOW (ref 11.5–15.5)
HIV 1+2 AB+HIV1 P24 AG SERPL QL IA: SIGNIFICANT CHANGE UP
IRON SATN MFR SERPL: 27 % — SIGNIFICANT CHANGE UP (ref 14–50)
IRON SATN MFR SERPL: 84 UG/DL — SIGNIFICANT CHANGE UP (ref 30–160)
LACTATE SERPL-SCNC: 1.4 MMOL/L — SIGNIFICANT CHANGE UP (ref 0.5–2)
LYMPHOCYTES # BLD AUTO: 0.73 K/UL — LOW (ref 1–3.3)
LYMPHOCYTES # BLD AUTO: 27 % — SIGNIFICANT CHANGE UP (ref 13–44)
MAGNESIUM SERPL-MCNC: 1.6 MG/DL — SIGNIFICANT CHANGE UP (ref 1.6–2.6)
MANUAL SMEAR VERIFICATION: SIGNIFICANT CHANGE UP
MCHC RBC-ENTMCNC: 21 PG — LOW (ref 27–34)
MCHC RBC-ENTMCNC: 29.1 GM/DL — LOW (ref 32–36)
MCV RBC AUTO: 72.2 FL — LOW (ref 80–100)
MONOCYTES # BLD AUTO: 0.17 K/UL — SIGNIFICANT CHANGE UP (ref 0–0.9)
MONOCYTES NFR BLD AUTO: 6.3 % — SIGNIFICANT CHANGE UP (ref 2–14)
NEUTROPHILS # BLD AUTO: 1.77 K/UL — LOW (ref 1.8–7.4)
NEUTROPHILS NFR BLD AUTO: 64.9 % — SIGNIFICANT CHANGE UP (ref 43–77)
NRBC # BLD: 5 /100 — HIGH (ref 0–0)
PHOSPHATE SERPL-MCNC: 3 MG/DL — SIGNIFICANT CHANGE UP (ref 2.5–4.5)
PLAT MORPH BLD: ABNORMAL
PLATELET # BLD AUTO: 85 K/UL — LOW (ref 150–400)
POTASSIUM SERPL-MCNC: 3.4 MMOL/L — LOW (ref 3.5–5.3)
POTASSIUM SERPL-SCNC: 3.4 MMOL/L — LOW (ref 3.5–5.3)
RBC # BLD: 4.24 M/UL — SIGNIFICANT CHANGE UP (ref 3.8–5.2)
RBC # FLD: 21.7 % — HIGH (ref 10.3–14.5)
RBC BLD AUTO: SIGNIFICANT CHANGE UP
SODIUM SERPL-SCNC: 137 MMOL/L — SIGNIFICANT CHANGE UP (ref 135–145)
TIBC SERPL-MCNC: 311 UG/DL — SIGNIFICANT CHANGE UP (ref 220–430)
UIBC SERPL-MCNC: 227 UG/DL — SIGNIFICANT CHANGE UP (ref 110–370)
VIT B12 SERPL-MCNC: 836 PG/ML — SIGNIFICANT CHANGE UP (ref 232–1245)
WBC # BLD: 2.72 K/UL — LOW (ref 3.8–10.5)
WBC # FLD AUTO: 2.72 K/UL — LOW (ref 3.8–10.5)

## 2023-06-09 PROCEDURE — 99233 SBSQ HOSP IP/OBS HIGH 50: CPT | Mod: GC

## 2023-06-09 PROCEDURE — 83020 HEMOGLOBIN ELECTROPHORESIS: CPT | Mod: 26

## 2023-06-09 RX ORDER — POTASSIUM CHLORIDE 20 MEQ
40 PACKET (EA) ORAL ONCE
Refills: 0 | Status: COMPLETED | OUTPATIENT
Start: 2023-06-09 | End: 2023-06-09

## 2023-06-09 RX ORDER — MAGNESIUM SULFATE 500 MG/ML
2 VIAL (ML) INJECTION ONCE
Refills: 0 | Status: COMPLETED | OUTPATIENT
Start: 2023-06-09 | End: 2023-06-09

## 2023-06-09 RX ORDER — CHLORHEXIDINE GLUCONATE 213 G/1000ML
1 SOLUTION TOPICAL
Refills: 0 | Status: DISCONTINUED | OUTPATIENT
Start: 2023-06-09 | End: 2023-06-10

## 2023-06-09 RX ADMIN — Medication 50 MILLIGRAM(S): at 00:52

## 2023-06-09 RX ADMIN — Medication 25 GRAM(S): at 13:22

## 2023-06-09 RX ADMIN — Medication 1 MILLIGRAM(S): at 13:18

## 2023-06-09 RX ADMIN — Medication 100 MILLIGRAM(S): at 13:19

## 2023-06-09 RX ADMIN — Medication 40 MILLIEQUIVALENT(S): at 13:23

## 2023-06-09 RX ADMIN — Medication 40 MILLIEQUIVALENT(S): at 15:44

## 2023-06-09 RX ADMIN — Medication 50 MILLIGRAM(S): at 06:11

## 2023-06-09 RX ADMIN — SODIUM CHLORIDE 75 MILLILITER(S): 9 INJECTION, SOLUTION INTRAVENOUS at 15:08

## 2023-06-09 RX ADMIN — Medication 1 TABLET(S): at 13:19

## 2023-06-09 RX ADMIN — AMLODIPINE BESYLATE 10 MILLIGRAM(S): 2.5 TABLET ORAL at 06:11

## 2023-06-09 NOTE — PROGRESS NOTE ADULT - SUBJECTIVE AND OBJECTIVE BOX
Laine Grover MD  PGY 1 Department of Internal Medicine        Patient is a 47y old  Female who presents with a chief complaint of ETOH withdrawal (08 Jun 2023 12:56)      SUBJECTIVE / OVERNIGHT EVENTS: Pt seen and examined. No acute overnight events. Denies fevers, chills, CP, SOB, Abdominal pain, N/V, Constipation, Diarrhea        MEDICATIONS  (STANDING):  amLODIPine   Tablet 10 milliGRAM(s) Oral daily  chlordiazePOXIDE 50 milliGRAM(s) Oral every 8 hours  chlordiazePOXIDE   Oral   dextrose 5% + lactated ringers. 1000 milliLiter(s) (75 mL/Hr) IV Continuous <Continuous>  folic acid 1 milliGRAM(s) Oral daily  multivitamin 1 Tablet(s) Oral daily  thiamine 100 milliGRAM(s) Oral daily    MEDICATIONS  (PRN):  acetaminophen     Tablet .. 650 milliGRAM(s) Oral every 6 hours PRN Temp greater or equal to 38C (100.4F), Mild Pain (1 - 3)  aluminum hydroxide/magnesium hydroxide/simethicone Suspension 30 milliLiter(s) Oral every 4 hours PRN Dyspepsia  LORazepam   Injectable 2 milliGRAM(s) IV Push every 1 hour PRN Symptom-triggered: each CIWA -Ar score 8 or GREATER  melatonin 3 milliGRAM(s) Oral at bedtime PRN Insomnia  ondansetron Injectable 4 milliGRAM(s) IV Push every 8 hours PRN Nausea and/or Vomiting      I&O's Summary      Vital Signs Last 24 Hrs  T(C): 36.6 (09 Jun 2023 04:30), Max: 37.2 (08 Jun 2023 20:52)  T(F): 97.9 (09 Jun 2023 04:30), Max: 98.9 (08 Jun 2023 20:52)  HR: 68 (09 Jun 2023 06:33) (68 - 104)  BP: 138/89 (09 Jun 2023 06:33) (116/75 - 151/105)  BP(mean): 117 (08 Jun 2023 13:30) (117 - 117)  RR: 18 (09 Jun 2023 06:33) (16 - 20)  SpO2: 100% (09 Jun 2023 06:33) (97% - 100%)    Parameters below as of 09 Jun 2023 06:33  Patient On (Oxygen Delivery Method): room air        CAPILLARY BLOOD GLUCOSE          PHYSICAL EXAM:  GENERAL: NAD,   HEAD:  Atraumatic, Normocephalic  EYES: EOMI, PERRL, conjunctiva and sclera clear  NECK: No JVD  CHEST/LUNG: Clear to auscultation bilaterally; No wheeze  HEART: Regular rate and rhythm; No murmurs, rubs, or gallops  ABDOMEN: Soft, Nontender, Nondistended; Bowel sounds present  EXTREMITIES:  2+ Peripheral Pulses, No clubbing, cyanosis, or edema  PSYCH: AAOx3  NEUROLOGY: non-focal  SKIN: No rashes or lesions       LABS:                        9.2    3.67  )-----------( 106      ( 08 Jun 2023 05:06 )             30.8     Auto Eosinophil # 0.03  / Auto Eosinophil % 0.9   / Auto Neutrophil # 3.16  / Auto Neutrophil % 86.0  / BANDS % x        06-08    142  |  101  |  11  ----------------------------<  110<H>  3.3<L>   |  19<L>  |  0.53    Ca    8.1<L>      08 Jun 2023 05:06  Mg     2.4     06-08  Phos  3.4     06-08  TPro  7.5  /  Alb  3.9  /  TBili  0.3  /  DBili  <0.1  /  AST  58<H>  /  ALT  26  /  AlkPhos  56  06-08    PT/INR - ( 08 Jun 2023 08:25 )   PT: 13.0 sec;   INR: 1.13 ratio         PTT - ( 08 Jun 2023 08:25 )  PTT:25.8 sec  CARDIAC MARKERS ( 08 Jun 2023 08:21 )  x     / x     / 67 U/L / x     / x            Lactate, Blood: 5.1 mmol/L (06-08 @ 06:43)        RADIOLOGY & ADDITIONAL TESTS:    Imaging Personally Reviewed:    Consultant(s) Notes Reviewed:      Care Discussed with Consultants/Other Providers:   Laine Grover MD  PGY 1 Department of Internal Medicine        Patient is a 47y old  Female who presents with a chief complaint of ETOH withdrawal (08 Jun 2023 12:56)      SUBJECTIVE / OVERNIGHT EVENTS: Pt seen and examined. No acute overnight events. Feels mildly anxious but improved from prior. Denies fevers, chills, CP, SOB, Abdominal pain, N/V, Constipation, Diarrhea        MEDICATIONS  (STANDING):  amLODIPine   Tablet 10 milliGRAM(s) Oral daily  chlordiazePOXIDE 50 milliGRAM(s) Oral every 8 hours  chlordiazePOXIDE   Oral   dextrose 5% + lactated ringers. 1000 milliLiter(s) (75 mL/Hr) IV Continuous <Continuous>  folic acid 1 milliGRAM(s) Oral daily  multivitamin 1 Tablet(s) Oral daily  thiamine 100 milliGRAM(s) Oral daily    MEDICATIONS  (PRN):  acetaminophen     Tablet .. 650 milliGRAM(s) Oral every 6 hours PRN Temp greater or equal to 38C (100.4F), Mild Pain (1 - 3)  aluminum hydroxide/magnesium hydroxide/simethicone Suspension 30 milliLiter(s) Oral every 4 hours PRN Dyspepsia  LORazepam   Injectable 2 milliGRAM(s) IV Push every 1 hour PRN Symptom-triggered: each CIWA -Ar score 8 or GREATER  melatonin 3 milliGRAM(s) Oral at bedtime PRN Insomnia  ondansetron Injectable 4 milliGRAM(s) IV Push every 8 hours PRN Nausea and/or Vomiting      I&O's Summary      Vital Signs Last 24 Hrs  T(C): 36.6 (09 Jun 2023 04:30), Max: 37.2 (08 Jun 2023 20:52)  T(F): 97.9 (09 Jun 2023 04:30), Max: 98.9 (08 Jun 2023 20:52)  HR: 68 (09 Jun 2023 06:33) (68 - 104)  BP: 138/89 (09 Jun 2023 06:33) (116/75 - 151/105)  BP(mean): 117 (08 Jun 2023 13:30) (117 - 117)  RR: 18 (09 Jun 2023 06:33) (16 - 20)  SpO2: 100% (09 Jun 2023 06:33) (97% - 100%)    Parameters below as of 09 Jun 2023 06:33  Patient On (Oxygen Delivery Method): room air        CAPILLARY BLOOD GLUCOSE          PHYSICAL EXAM:  GENERAL: NAD, lying in bed  HEAD:  Atraumatic, Normocephalic  EYES: EOMI, PERRL, conjunctiva and sclera clear  NECK: No JVD  CHEST/LUNG: Clear to auscultation bilaterally; No wheeze  HEART: Regular rate and rhythm; No murmurs, rubs, or gallops  ABDOMEN: Soft, Nontender, Nondistended; Bowel sounds present  EXTREMITIES:  2+ Peripheral Pulses, No clubbing, cyanosis, or edema  PSYCH: AAOx3  NEUROLOGY: Slight tremor with outstretched hands  SKIN: No rashes or lesions       LABS:                        9.2    3.67  )-----------( 106      ( 08 Jun 2023 05:06 )             30.8     Auto Eosinophil # 0.03  / Auto Eosinophil % 0.9   / Auto Neutrophil # 3.16  / Auto Neutrophil % 86.0  / BANDS % x        06-08    142  |  101  |  11  ----------------------------<  110<H>  3.3<L>   |  19<L>  |  0.53    Ca    8.1<L>      08 Jun 2023 05:06  Mg     2.4     06-08  Phos  3.4     06-08  TPro  7.5  /  Alb  3.9  /  TBili  0.3  /  DBili  <0.1  /  AST  58<H>  /  ALT  26  /  AlkPhos  56  06-08    PT/INR - ( 08 Jun 2023 08:25 )   PT: 13.0 sec;   INR: 1.13 ratio         PTT - ( 08 Jun 2023 08:25 )  PTT:25.8 sec  CARDIAC MARKERS ( 08 Jun 2023 08:21 )  x     / x     / 67 U/L / x     / x            Lactate, Blood: 5.1 mmol/L (06-08 @ 06:43)        Consultant(s) Notes Reviewed:  Yes    Care Discussed with Consultants/Other Providers: Yes

## 2023-06-09 NOTE — PROGRESS NOTE ADULT - ATTENDING COMMENTS
47y F with hx of HTN, alcohol use disorder requiring hospitalization for withdrawals and complication of related seizure ~1 year ago presenting for symptoms of tremors, nausea, vomiting - c/f alcohol withdrawal    # alcohol withdrawal  #alcohol use disorder  #HTN  #Microcytic anemia  #pancytopenia - 2/2 to AUD likely    - Librium taper, high risk CIWA monitoring, MVI, folate, thiamine- taper to end tomorrow, last drink Monday evening  -  c/s  - f/u Hb electrophoresis, iron studies      d/w patient and cousin at bedside.   I think can be DC tomorrow once taper ends

## 2023-06-09 NOTE — SBIRT NOTE ADULT - NSSBIRTUNABLESCROTHER_GEN_A_CORE
Patient declined completing SBIRT and discussing alcohol consumption, stated that she answered these questions last time. Declined substance use resources, requesting mental health resources.

## 2023-06-09 NOTE — DISCHARGE NOTE NURSING/CASE MANAGEMENT/SOCIAL WORK - PATIENT PORTAL LINK FT
You can access the FollowMyHealth Patient Portal offered by [facility] by registering at the following website: [email or web address]. By joining [facility]’s FollowMyHealth portal, you will also be able to view your health information using other applications (apps) compatible with our system.

## 2023-06-09 NOTE — DISCHARGE NOTE NURSING/CASE MANAGEMENT/SOCIAL WORK - NSDCVIVACCINE_GEN_ALL_CORE_FT
influenza, injectable, quadrivalent, preservative free; 23-Oct-2020 20:55; Ramón Wong (RN); [employer]; [id number] (Exp. Date: 30-Jun-2021); IntraMuscular; Deltoid Left.; 0.5 milliLiter(s); VIS (VIS Published: 15-Aug-2019, VIS Presented: 23-Oct-2020);

## 2023-06-10 ENCOUNTER — TRANSCRIPTION ENCOUNTER (OUTPATIENT)
Age: 48
End: 2023-06-10

## 2023-06-10 VITALS
RESPIRATION RATE: 18 BRPM | TEMPERATURE: 98 F | OXYGEN SATURATION: 97 % | SYSTOLIC BLOOD PRESSURE: 124 MMHG | HEART RATE: 99 BPM | DIASTOLIC BLOOD PRESSURE: 93 MMHG

## 2023-06-10 LAB
MRSA PCR RESULT.: SIGNIFICANT CHANGE UP
S AUREUS DNA NOSE QL NAA+PROBE: DETECTED

## 2023-06-10 PROCEDURE — 87799 DETECT AGENT NOS DNA QUANT: CPT

## 2023-06-10 PROCEDURE — 0225U NFCT DS DNA&RNA 21 SARSCOV2: CPT

## 2023-06-10 PROCEDURE — 84100 ASSAY OF PHOSPHORUS: CPT

## 2023-06-10 PROCEDURE — 84295 ASSAY OF SERUM SODIUM: CPT

## 2023-06-10 PROCEDURE — 99285 EMERGENCY DEPT VISIT HI MDM: CPT

## 2023-06-10 PROCEDURE — 86803 HEPATITIS C AB TEST: CPT

## 2023-06-10 PROCEDURE — 83605 ASSAY OF LACTIC ACID: CPT

## 2023-06-10 PROCEDURE — 83735 ASSAY OF MAGNESIUM: CPT

## 2023-06-10 PROCEDURE — 80307 DRUG TEST PRSMV CHEM ANLYZR: CPT

## 2023-06-10 PROCEDURE — 86664 EPSTEIN-BARR NUCLEAR ANTIGEN: CPT

## 2023-06-10 PROCEDURE — 83550 IRON BINDING TEST: CPT

## 2023-06-10 PROCEDURE — 85025 COMPLETE CBC W/AUTO DIFF WBC: CPT

## 2023-06-10 PROCEDURE — 86663 EPSTEIN-BARR ANTIBODY: CPT

## 2023-06-10 PROCEDURE — 85730 THROMBOPLASTIN TIME PARTIAL: CPT

## 2023-06-10 PROCEDURE — 82330 ASSAY OF CALCIUM: CPT

## 2023-06-10 PROCEDURE — 82746 ASSAY OF FOLIC ACID SERUM: CPT

## 2023-06-10 PROCEDURE — 82550 ASSAY OF CK (CPK): CPT

## 2023-06-10 PROCEDURE — 85014 HEMATOCRIT: CPT

## 2023-06-10 PROCEDURE — 96375 TX/PRO/DX INJ NEW DRUG ADDON: CPT

## 2023-06-10 PROCEDURE — 36415 COLL VENOUS BLD VENIPUNCTURE: CPT

## 2023-06-10 PROCEDURE — 82803 BLOOD GASES ANY COMBINATION: CPT

## 2023-06-10 PROCEDURE — 85018 HEMOGLOBIN: CPT

## 2023-06-10 PROCEDURE — 87641 MR-STAPH DNA AMP PROBE: CPT

## 2023-06-10 PROCEDURE — 87640 STAPH A DNA AMP PROBE: CPT

## 2023-06-10 PROCEDURE — 82728 ASSAY OF FERRITIN: CPT

## 2023-06-10 PROCEDURE — 86665 EPSTEIN-BARR CAPSID VCA: CPT

## 2023-06-10 PROCEDURE — 80048 BASIC METABOLIC PNL TOTAL CA: CPT

## 2023-06-10 PROCEDURE — 87389 HIV-1 AG W/HIV-1&-2 AB AG IA: CPT

## 2023-06-10 PROCEDURE — 82947 ASSAY GLUCOSE BLOOD QUANT: CPT

## 2023-06-10 PROCEDURE — 99239 HOSP IP/OBS DSCHRG MGMT >30: CPT

## 2023-06-10 PROCEDURE — 83010 ASSAY OF HAPTOGLOBIN QUANT: CPT

## 2023-06-10 PROCEDURE — 85049 AUTOMATED PLATELET COUNT: CPT

## 2023-06-10 PROCEDURE — 70450 CT HEAD/BRAIN W/O DYE: CPT | Mod: MA

## 2023-06-10 PROCEDURE — 83540 ASSAY OF IRON: CPT

## 2023-06-10 PROCEDURE — 96374 THER/PROPH/DIAG INJ IV PUSH: CPT

## 2023-06-10 PROCEDURE — 83020 HEMOGLOBIN ELECTROPHORESIS: CPT

## 2023-06-10 PROCEDURE — 80076 HEPATIC FUNCTION PANEL: CPT

## 2023-06-10 PROCEDURE — 82607 VITAMIN B-12: CPT

## 2023-06-10 PROCEDURE — 84702 CHORIONIC GONADOTROPIN TEST: CPT

## 2023-06-10 PROCEDURE — 83615 LACTATE (LD) (LDH) ENZYME: CPT

## 2023-06-10 PROCEDURE — 82435 ASSAY OF BLOOD CHLORIDE: CPT

## 2023-06-10 PROCEDURE — 85610 PROTHROMBIN TIME: CPT

## 2023-06-10 PROCEDURE — 84132 ASSAY OF SERUM POTASSIUM: CPT

## 2023-06-10 RX ORDER — THIAMINE MONONITRATE (VIT B1) 100 MG
1 TABLET ORAL
Qty: 30 | Refills: 0
Start: 2023-06-10 | End: 2023-07-09

## 2023-06-10 RX ADMIN — Medication 1 TABLET(S): at 09:53

## 2023-06-10 RX ADMIN — Medication 1 MILLIGRAM(S): at 09:54

## 2023-06-10 RX ADMIN — AMLODIPINE BESYLATE 10 MILLIGRAM(S): 2.5 TABLET ORAL at 06:07

## 2023-06-10 RX ADMIN — Medication 100 MILLIGRAM(S): at 09:54

## 2023-06-10 NOTE — PROGRESS NOTE ADULT - PROBLEM SELECTOR PLAN 2
Pt with known history of microcytic anemia, attributed to adenomyosis. Does not report active bleeding at this time. Possible pancytopenia i/s/o alcohol cirrhosis vs infiltrative disease vs infectious.  -Parvo, EBV  -RUQ U/S  -Daily CBC  -HIV/HCV  -Monitor LFTs  -Hemoglobin electrophoresis to r/o thalassemia
Pt with known history of microcytic anemia, attributed to adenomyosis. Does not report active bleeding at this time. Possible pancytopenia i/s/o alcohol cirrhosis vs infiltrative disease vs infectious.  -Parvo, EBV  -RUQ U/S  -Daily CBC  -HIV/HCV  -Monitor LFTs  -Hemoglobin electrophoresis to r/o thalassemia

## 2023-06-10 NOTE — PROGRESS NOTE ADULT - PROBLEM SELECTOR PLAN 4
DVT PPx: SCDs, IMPROVE score of 0  Diet: Regular  Code status: Full code  Communication: Pt spoken with regard to plan to which understanding was verbalized by party  Dispo: likely home
DVT PPx: SCDs, IMPROVE score of 0  Diet: Regular  Code status: Full code  Communication: Pt spoken with regard to plan to which understanding was verbalized by party  Dispo: likely home

## 2023-06-10 NOTE — PROGRESS NOTE ADULT - ASSESSMENT
Pt is a 47y F with hx of HTN, alcohol use disorder requiring hospitalization for withdrawals and complication of related seizure ~1 year ago presenting for symptoms of tremors, nausea, vomiting - c/f alcohol withdrawal. Admitted to medicine for further management. 
Pt is a 47y F with hx of HTN, alcohol use disorder requiring hospitalization for withdrawals and complication of related seizure ~1 year ago presenting for symptoms of tremors, nausea, vomiting - c/f alcohol withdrawal. Admitted to medicine for further management.

## 2023-06-10 NOTE — PROGRESS NOTE ADULT - PROBLEM SELECTOR PLAN 1
Pt with hx of alcohol use disorder and associated seizures ~1yr ago without ICU admission requirement.   -CIWA protocol  -Librium taper  -2mg IV ativan, symptom triggered  -multivitamin, high dose thiamine x3 days, folic acid 1mg daily  -JUAN, SBIRT  -elevated lactate of 6 on admission, s/p 1L bolus x2. Repeat lactate decreased to 4. Will cont. w/ maintenance fluids of d5+LR @ 75ccs/hr, trending lactate
Pt with hx of alcohol use disorder and associated seizures ~1yr ago without ICU admission requirement.   -CIWA protocol  -Librium taper  -2mg IV ativan, symptom triggered  -multivitamin, high dose thiamine x3 days, folic acid 1mg daily  -JUAN, SBIRT  -elevated lactate of 6 on admission, s/p 1L bolus x2. Repeat lactate decreased to 4. Will cont. w/ maintenance fluids of d5+LR @ 75ccs/hr, trending lactate

## 2023-06-10 NOTE — DISCHARGE NOTE PROVIDER - NSFOLLOWUPCLINICS_GEN_ALL_ED_FT
[facility] [place]  Internal Medicine  [address] [place] [address]  Phone: [phone]  Fax: [phone]

## 2023-06-10 NOTE — DISCHARGE NOTE PROVIDER - NSDCCPTREATMENT_GEN_ALL_CORE_FT
PRINCIPAL PROCEDURE  Procedure: CT head  Findings and Treatment: FINDINGS:  The ventricular and sulcal size and configuration is age appropriate.     There is no acute loss of gray-white differentiation.  There is no evidence of mass effect, midline shift, acute intracranial   hemorrhage, or extra-axial collections.   The calvarium is intact. There are frothy secretions in the left   sphenoid sinus.The mastoid air cells are predominantly clear. The orbits   are unremarkable. Redemonstrated subluxation of the left mandibular   condyle. And partially imaged bilateral mandibular fractures.  IMPRESSION:  No acute intracranial hemorrhage or acute territorial infarction.

## 2023-06-10 NOTE — DISCHARGE NOTE PROVIDER - HOSPITAL COURSE
47y F with hx of alcohol use disorder, HTN presented for nausea/vomiting, tremors with finding of blood alcohol level >300 - admitted for alcohol withdrawal. Last drink 30 hours prior to admission Initially placed on librium to presentation. Placed on librium taper with CIWA scores initially >12 w/ subsequent scores of 1. Pt remained with CIWA <1 after discontinuation of librium. CTH completed on admission with no remarkable findings.     Pt stable and medically cleared for discharge.     Medications initiated: none  Medications stopped: none  Incidental findings: none  Relevant f/u: Internal medicine (f/u for recent hospitalization)

## 2023-06-10 NOTE — DISCHARGE NOTE PROVIDER - ATTENDING DISCHARGE PHYSICAL EXAMINATION:
.  VITAL SIGNS:  T(C): 36.8 (06-10-23 @ 08:43), Max: 36.9 (06-09-23 @ 19:05)  T(F): 98.2 (06-10-23 @ 08:43), Max: 98.5 (06-09-23 @ 19:05)  HR: 99 (06-10-23 @ 08:43) (85 - 99)  BP: 124/93 (06-10-23 @ 08:43) (118/86 - 135/92)  BP(mean): --  RR: 18 (06-10-23 @ 08:43) (18 - 18)  SpO2: 97% (06-10-23 @ 08:43) (97% - 100%)  Wt(kg): --    PHYSICAL EXAM:    Constitutional: WDWN resting comfortably in bed; NAD  Head: NC/AT  Eyes: PERRL, EOMI, anicteric sclera  ENT: no nasal discharge; uvula midline, no oropharyngeal erythema or exudates; MMM  Neck: supple; no JVD or thyromegaly  Respiratory: CTA B/L; no W/R/R, no retractions  Cardiac: +S1/S2; RRR; no M/R/G; PMI non-displaced  Gastrointestinal: abdomen soft, NT/ND; no rebound or guarding; +BSx4  Back: spine midline, no bony tenderness or step-offs; no CVAT B/L  Extremities: WWP, no clubbing or cyanosis; no peripheral edema  Musculoskeletal: NROM x4; no joint swelling, tenderness or erythema  Vascular: 2+ radial, femoral, DP/PT pulses B/L  Dermatologic: skin warm, dry and intact; no rashes, wounds, or scars  Lymphatic: no submandibular or cervical LAD  Neurologic: AAOx3; CNII-XII grossly intact; no focal deficits  Psychiatric: affect and characteristics of appearance, verbalizations, behaviors are appropriate

## 2023-06-10 NOTE — PROGRESS NOTE ADULT - SUBJECTIVE AND OBJECTIVE BOX
Laine Grover MD  PGY 1 Department of Internal Medicine        Patient is a 47y old  Female who presents with a chief complaint of ETOH withdrawal (09 Jun 2023 07:21)      SUBJECTIVE / OVERNIGHT EVENTS: Pt seen and examined. No acute overnight events. Denies fevers, chills, CP, SOB, Abdominal pain, N/V, Constipation, Diarrhea        MEDICATIONS  (STANDING):  amLODIPine   Tablet 10 milliGRAM(s) Oral daily  chlordiazePOXIDE 50 milliGRAM(s) Oral every 12 hours  chlordiazePOXIDE   Oral   chlorhexidine 2% Cloths 1 Application(s) Topical <User Schedule>  folic acid 1 milliGRAM(s) Oral daily  multivitamin 1 Tablet(s) Oral daily  thiamine 100 milliGRAM(s) Oral daily    MEDICATIONS  (PRN):  acetaminophen     Tablet .. 650 milliGRAM(s) Oral every 6 hours PRN Temp greater or equal to 38C (100.4F), Mild Pain (1 - 3)  aluminum hydroxide/magnesium hydroxide/simethicone Suspension 30 milliLiter(s) Oral every 4 hours PRN Dyspepsia  LORazepam   Injectable 2 milliGRAM(s) IV Push every 1 hour PRN Symptom-triggered: each CIWA -Ar score 8 or GREATER  melatonin 3 milliGRAM(s) Oral at bedtime PRN Insomnia  ondansetron Injectable 4 milliGRAM(s) IV Push every 8 hours PRN Nausea and/or Vomiting      I&O's Summary    09 Jun 2023 07:01  -  10 Manuel 2023 07:00  --------------------------------------------------------  IN: 840 mL / OUT: 0 mL / NET: 840 mL        Vital Signs Last 24 Hrs  T(C): 36.8 (10 Manuel 2023 03:36), Max: 36.9 (09 Jun 2023 19:05)  T(F): 98.3 (10 Manuel 2023 03:36), Max: 98.5 (09 Jun 2023 19:05)  HR: 85 (10 Manuel 2023 03:36) (85 - 97)  BP: 118/86 (10 Manuel 2023 03:36) (118/86 - 141/106)  BP(mean): --  RR: 18 (10 Manuel 2023 03:36) (18 - 18)  SpO2: 99% (10 Manuel 2023 03:36) (98% - 100%)    Parameters below as of 10 Manuel 2023 03:36  Patient On (Oxygen Delivery Method): room air        CAPILLARY BLOOD GLUCOSE          PHYSICAL EXAM:  GENERAL: NAD,   HEAD:  Atraumatic, Normocephalic  EYES: EOMI, PERRL, conjunctiva and sclera clear  NECK: No JVD  CHEST/LUNG: Clear to auscultation bilaterally; No wheeze  HEART: Regular rate and rhythm; No murmurs, rubs, or gallops  ABDOMEN: Soft, Nontender, Nondistended; Bowel sounds present  EXTREMITIES:  2+ Peripheral Pulses, No clubbing, cyanosis, or edema  PSYCH: AAOx3  NEUROLOGY: non-focal  SKIN: No rashes or lesions       LABS:                        8.9    2.72  )-----------( 85       ( 09 Jun 2023 12:08 )             30.6     Auto Eosinophil # 0.05  / Auto Eosinophil % 1.8   / Auto Neutrophil # 1.77  / Auto Neutrophil % 64.9  / BANDS % x        06-09    137  |  98  |  9   ----------------------------<  139<H>  3.4<L>   |  24  |  0.50    Ca    9.2      09 Jun 2023 12:08  Mg     1.6     06-09  Phos  3.0     06-09    PT/INR - ( 08 Jun 2023 08:25 )   PT: 13.0 sec;   INR: 1.13 ratio         PTT - ( 08 Jun 2023 08:25 )  PTT:25.8 sec  CARDIAC MARKERS ( 08 Jun 2023 08:21 )  x     / x     / 67 U/L / x     / x            Lactate, Blood: 1.4 mmol/L (06-09 @ 12:05)  Lactate, Blood: 5.1 mmol/L (06-08 @ 06:43)        RADIOLOGY & ADDITIONAL TESTS:    Imaging Personally Reviewed:    Consultant(s) Notes Reviewed:      Care Discussed with Consultants/Other Providers:

## 2023-06-10 NOTE — DISCHARGE NOTE PROVIDER - NSDCMRMEDTOKEN_GEN_ALL_CORE_FT
amLODIPine 10 mg oral tablet: 1 tab(s) orally once a day  folic acid 1 mg oral tablet: 1 tab(s) orally once a day  Multiple Vitamins oral tablet: 1 tab(s) orally once a day  thiamine 100 mg oral tablet: 1 tab(s) orally once a day

## 2023-06-10 NOTE — PROGRESS NOTE ADULT - PROBLEM SELECTOR PLAN 3
On home medication of amlodipine 10mg  -c/w home amlodipine
On home medication of amlodipine 10mg  -c/w home amlodipine

## 2023-06-10 NOTE — DISCHARGE NOTE PROVIDER - NSDCCPCAREPLAN_GEN_ALL_CORE_FT
PRINCIPAL DISCHARGE DIAGNOSIS  Diagnosis: Alcohol dependence with withdrawal  Assessment and Plan of Treatment: You came in with symptoms of alcohol withdrawal. We treated you with a benzodiazepine to prevent seizure and safely allow you to get through your withdrawal symptoms. It is paramount that you stop drinking to prevent any long lasting damage to your liver.   In the event that you develop similar symptoms in the future, please seek medical attention.

## 2023-06-12 LAB
HEMOGLOBIN INTERPRETATION: SIGNIFICANT CHANGE UP
HGB A MFR BLD: 97.8 % — SIGNIFICANT CHANGE UP (ref 95.8–98)
HGB A2 MFR BLD: 2.2 % — SIGNIFICANT CHANGE UP (ref 2–3.2)

## 2023-06-13 LAB — B19V DNA FLD QL NAA+PROBE: SIGNIFICANT CHANGE UP IU/ML

## 2023-06-21 NOTE — ED PROVIDER NOTE - CROS ED ROS STATEMENT
Airway    Performed by: Hayden Cullen CRNA  Authorized by: Nathan Colon DO    Final Airway Type:  Endotracheal airway  Final Endotracheal Airway*:  ETT  ETT Size (mm)*:  7.0  Cuff*:  Regular  Technique Used for Successful ETT Placement:  Direct laryngoscopy  Devices/Methods Used in Placement*:  Mask  Intubation Procedure*:  Preoxygenation, ETCO2, Atraumatic, Dentition Unchanged and Pharynx Clear  Insertion Site:  Oral  Blade Type*:  MAC  Blade Size*:  3  Measured from*:  Lips  Secured at (cm)*:  22  Placement Verified by: auscultation and capnometry    Glottic View*:  2 - partial view of glottis  Attempts*:  2   Patient Identified, Procedure confirmed, Emergency equipment available and Safety protocols followed  Location:  OR  Urgency:  Elective  Difficult Airway: No    Indications for Airway Management:  Anesthesia  Mask Difficulty Assessment:  1 - vent by mask  Start Time: 6/21/2023 2:11 PM      
all other ROS negative except as per HPI

## 2023-06-27 ENCOUNTER — INPATIENT (INPATIENT)
Facility: HOSPITAL | Age: 48
LOS: 5 days | Discharge: HOME | End: 2023-07-03
Attending: STUDENT IN AN ORGANIZED HEALTH CARE EDUCATION/TRAINING PROGRAM | Admitting: STUDENT IN AN ORGANIZED HEALTH CARE EDUCATION/TRAINING PROGRAM
Payer: MEDICAID

## 2023-06-27 VITALS
HEART RATE: 106 BPM | DIASTOLIC BLOOD PRESSURE: 94 MMHG | TEMPERATURE: 98 F | SYSTOLIC BLOOD PRESSURE: 135 MMHG | RESPIRATION RATE: 18 BRPM | OXYGEN SATURATION: 100 % | HEIGHT: 68 IN

## 2023-06-27 DIAGNOSIS — F10.239 ALCOHOL DEPENDENCE WITH WITHDRAWAL, UNSPECIFIED: ICD-10-CM

## 2023-06-27 DIAGNOSIS — Z98.890 OTHER SPECIFIED POSTPROCEDURAL STATES: Chronic | ICD-10-CM

## 2023-06-27 LAB
ALBUMIN SERPL ELPH-MCNC: 4.2 G/DL — SIGNIFICANT CHANGE UP (ref 3.3–5)
ALP SERPL-CCNC: 68 U/L — SIGNIFICANT CHANGE UP (ref 40–120)
ALT FLD-CCNC: 49 U/L — HIGH (ref 4–33)
ANION GAP SERPL CALC-SCNC: 27 MMOL/L — HIGH (ref 7–14)
AST SERPL-CCNC: 111 U/L — HIGH (ref 4–32)
BASE EXCESS BLDV CALC-SCNC: -8.3 MMOL/L — LOW (ref -2–3)
BASE EXCESS BLDV CALC-SCNC: 1.4 MMOL/L — SIGNIFICANT CHANGE UP (ref -2–3)
BASOPHILS # BLD AUTO: 0.04 K/UL — SIGNIFICANT CHANGE UP (ref 0–0.2)
BASOPHILS # BLD AUTO: 0.04 K/UL — SIGNIFICANT CHANGE UP (ref 0–0.2)
BASOPHILS NFR BLD AUTO: 0.8 % — SIGNIFICANT CHANGE UP (ref 0–2)
BASOPHILS NFR BLD AUTO: 1 % — SIGNIFICANT CHANGE UP (ref 0–2)
BILIRUB SERPL-MCNC: 0.4 MG/DL — SIGNIFICANT CHANGE UP (ref 0.2–1.2)
BLOOD GAS VENOUS COMPREHENSIVE RESULT: SIGNIFICANT CHANGE UP
BUN SERPL-MCNC: 12 MG/DL — SIGNIFICANT CHANGE UP (ref 7–23)
CALCIUM SERPL-MCNC: 10.5 MG/DL — SIGNIFICANT CHANGE UP (ref 8.4–10.5)
CHLORIDE BLDV-SCNC: 114 MMOL/L — HIGH (ref 96–108)
CHLORIDE BLDV-SCNC: 99 MMOL/L — SIGNIFICANT CHANGE UP (ref 96–108)
CHLORIDE SERPL-SCNC: 93 MMOL/L — LOW (ref 98–107)
CO2 BLDV-SCNC: 17.1 MMOL/L — LOW (ref 22–26)
CO2 BLDV-SCNC: 28 MMOL/L — HIGH (ref 22–26)
CO2 SERPL-SCNC: 22 MMOL/L — SIGNIFICANT CHANGE UP (ref 22–31)
CREAT SERPL-MCNC: 0.53 MG/DL — SIGNIFICANT CHANGE UP (ref 0.5–1.3)
EGFR: 113 ML/MIN/1.73M2 — SIGNIFICANT CHANGE UP
EOSINOPHIL # BLD AUTO: 0.02 K/UL — SIGNIFICANT CHANGE UP (ref 0–0.5)
EOSINOPHIL # BLD AUTO: 0.02 K/UL — SIGNIFICANT CHANGE UP (ref 0–0.5)
EOSINOPHIL NFR BLD AUTO: 0.4 % — SIGNIFICANT CHANGE UP (ref 0–6)
EOSINOPHIL NFR BLD AUTO: 0.5 % — SIGNIFICANT CHANGE UP (ref 0–6)
ETHANOL SERPL-MCNC: 348 MG/DL — HIGH
GAS PNL BLDV: 139 MMOL/L — SIGNIFICANT CHANGE UP (ref 136–145)
GAS PNL BLDV: 142 MMOL/L — SIGNIFICANT CHANGE UP (ref 136–145)
GLUCOSE BLDV-MCNC: 101 MG/DL — HIGH (ref 70–99)
GLUCOSE BLDV-MCNC: 131 MG/DL — HIGH (ref 70–99)
GLUCOSE SERPL-MCNC: 118 MG/DL — HIGH (ref 70–99)
HCO3 BLDV-SCNC: 16 MMOL/L — LOW (ref 22–29)
HCO3 BLDV-SCNC: 27 MMOL/L — SIGNIFICANT CHANGE UP (ref 22–29)
HCT VFR BLD CALC: 32.8 % — LOW (ref 34.5–45)
HCT VFR BLD CALC: 34 % — LOW (ref 34.5–45)
HCT VFR BLDA CALC: 19 % — CRITICAL LOW (ref 34.5–46.5)
HCT VFR BLDA CALC: 29 % — LOW (ref 34.5–46.5)
HGB BLD CALC-MCNC: 6.2 G/DL — CRITICAL LOW (ref 11.7–16.1)
HGB BLD CALC-MCNC: 9.5 G/DL — LOW (ref 11.7–16.1)
HGB BLD-MCNC: 10.1 G/DL — LOW (ref 11.5–15.5)
HGB BLD-MCNC: 10.2 G/DL — LOW (ref 11.5–15.5)
IANC: 3.12 K/UL — SIGNIFICANT CHANGE UP (ref 1.8–7.4)
IANC: 3.58 K/UL — SIGNIFICANT CHANGE UP (ref 1.8–7.4)
IMM GRANULOCYTES NFR BLD AUTO: 0.2 % — SIGNIFICANT CHANGE UP (ref 0–0.9)
IMM GRANULOCYTES NFR BLD AUTO: 0.2 % — SIGNIFICANT CHANGE UP (ref 0–0.9)
LACTATE BLDV-MCNC: 1.6 MMOL/L — SIGNIFICANT CHANGE UP (ref 0.5–2)
LACTATE BLDV-MCNC: 6.6 MMOL/L — CRITICAL HIGH (ref 0.5–2)
LYMPHOCYTES # BLD AUTO: 0.78 K/UL — LOW (ref 1–3.3)
LYMPHOCYTES # BLD AUTO: 0.96 K/UL — LOW (ref 1–3.3)
LYMPHOCYTES # BLD AUTO: 19 % — SIGNIFICANT CHANGE UP (ref 13–44)
LYMPHOCYTES # BLD AUTO: 20.1 % — SIGNIFICANT CHANGE UP (ref 13–44)
MCHC RBC-ENTMCNC: 20.8 PG — LOW (ref 27–34)
MCHC RBC-ENTMCNC: 21.5 PG — LOW (ref 27–34)
MCHC RBC-ENTMCNC: 30 GM/DL — LOW (ref 32–36)
MCHC RBC-ENTMCNC: 30.8 GM/DL — LOW (ref 32–36)
MCV RBC AUTO: 69.4 FL — LOW (ref 80–100)
MCV RBC AUTO: 69.8 FL — LOW (ref 80–100)
MONOCYTES # BLD AUTO: 0.14 K/UL — SIGNIFICANT CHANGE UP (ref 0–0.9)
MONOCYTES # BLD AUTO: 0.17 K/UL — SIGNIFICANT CHANGE UP (ref 0–0.9)
MONOCYTES NFR BLD AUTO: 3.4 % — SIGNIFICANT CHANGE UP (ref 2–14)
MONOCYTES NFR BLD AUTO: 3.6 % — SIGNIFICANT CHANGE UP (ref 2–14)
NEUTROPHILS # BLD AUTO: 3.12 K/UL — SIGNIFICANT CHANGE UP (ref 1.8–7.4)
NEUTROPHILS # BLD AUTO: 3.58 K/UL — SIGNIFICANT CHANGE UP (ref 1.8–7.4)
NEUTROPHILS NFR BLD AUTO: 74.9 % — SIGNIFICANT CHANGE UP (ref 43–77)
NEUTROPHILS NFR BLD AUTO: 75.9 % — SIGNIFICANT CHANGE UP (ref 43–77)
NRBC # BLD: 0 /100 WBCS — SIGNIFICANT CHANGE UP (ref 0–0)
NRBC # BLD: 0 /100 WBCS — SIGNIFICANT CHANGE UP (ref 0–0)
NRBC # FLD: 0 K/UL — SIGNIFICANT CHANGE UP (ref 0–0)
NRBC # FLD: 0 K/UL — SIGNIFICANT CHANGE UP (ref 0–0)
PCO2 BLDV: 28 MMHG — LOW (ref 39–52)
PCO2 BLDV: 44 MMHG — SIGNIFICANT CHANGE UP (ref 39–52)
PH BLDV: 7.37 — SIGNIFICANT CHANGE UP (ref 7.32–7.43)
PH BLDV: 7.39 — SIGNIFICANT CHANGE UP (ref 7.32–7.43)
PLATELET # BLD AUTO: 117 K/UL — LOW (ref 150–400)
PLATELET # BLD AUTO: 121 K/UL — LOW (ref 150–400)
PO2 BLDV: 121 MMHG — HIGH (ref 25–45)
PO2 BLDV: 49 MMHG — HIGH (ref 25–45)
POTASSIUM BLDV-SCNC: 2.9 MMOL/L — CRITICAL LOW (ref 3.5–5.1)
POTASSIUM BLDV-SCNC: 5.4 MMOL/L — HIGH (ref 3.5–5.1)
POTASSIUM SERPL-MCNC: 3.2 MMOL/L — LOW (ref 3.5–5.3)
POTASSIUM SERPL-SCNC: 3.2 MMOL/L — LOW (ref 3.5–5.3)
PROT SERPL-MCNC: 7.8 G/DL — SIGNIFICANT CHANGE UP (ref 6–8.3)
RBC # BLD: 4.7 M/UL — SIGNIFICANT CHANGE UP (ref 3.8–5.2)
RBC # BLD: 4.9 M/UL — SIGNIFICANT CHANGE UP (ref 3.8–5.2)
RBC # FLD: 22.1 % — HIGH (ref 10.3–14.5)
RBC # FLD: 22.6 % — HIGH (ref 10.3–14.5)
SAO2 % BLDV: 64.3 % — LOW (ref 67–88)
SAO2 % BLDV: 98.1 % — HIGH (ref 67–88)
SODIUM SERPL-SCNC: 142 MMOL/L — SIGNIFICANT CHANGE UP (ref 135–145)
WBC # BLD: 4.11 K/UL — SIGNIFICANT CHANGE UP (ref 3.8–10.5)
WBC # BLD: 4.78 K/UL — SIGNIFICANT CHANGE UP (ref 3.8–10.5)
WBC # FLD AUTO: 4.11 K/UL — SIGNIFICANT CHANGE UP (ref 3.8–10.5)
WBC # FLD AUTO: 4.78 K/UL — SIGNIFICANT CHANGE UP (ref 3.8–10.5)

## 2023-06-27 PROCEDURE — 99223 1ST HOSP IP/OBS HIGH 75: CPT

## 2023-06-27 PROCEDURE — 99291 CRITICAL CARE FIRST HOUR: CPT

## 2023-06-27 RX ORDER — ONDANSETRON 8 MG/1
4 TABLET, FILM COATED ORAL ONCE
Refills: 0 | Status: DISCONTINUED | OUTPATIENT
Start: 2023-06-27 | End: 2023-06-27

## 2023-06-27 RX ORDER — MAGNESIUM SULFATE 500 MG/ML
2 VIAL (ML) INJECTION ONCE
Refills: 0 | Status: COMPLETED | OUTPATIENT
Start: 2023-06-27 | End: 2023-06-27

## 2023-06-27 RX ORDER — ONDANSETRON 8 MG/1
4 TABLET, FILM COATED ORAL ONCE
Refills: 0 | Status: COMPLETED | OUTPATIENT
Start: 2023-06-27 | End: 2023-06-27

## 2023-06-27 RX ORDER — THIAMINE MONONITRATE (VIT B1) 100 MG
500 TABLET ORAL ONCE
Refills: 0 | Status: COMPLETED | OUTPATIENT
Start: 2023-06-27 | End: 2023-06-27

## 2023-06-27 RX ORDER — THIAMINE MONONITRATE (VIT B1) 100 MG
500 TABLET ORAL ONCE
Refills: 0 | Status: DISCONTINUED | OUTPATIENT
Start: 2023-06-27 | End: 2023-06-27

## 2023-06-27 RX ORDER — POTASSIUM CHLORIDE 20 MEQ
10 PACKET (EA) ORAL
Refills: 0 | Status: COMPLETED | OUTPATIENT
Start: 2023-06-27 | End: 2023-06-27

## 2023-06-27 RX ORDER — SODIUM CHLORIDE 9 MG/ML
1000 INJECTION, SOLUTION INTRAVENOUS ONCE
Refills: 0 | Status: COMPLETED | OUTPATIENT
Start: 2023-06-27 | End: 2023-06-27

## 2023-06-27 RX ORDER — SODIUM CHLORIDE 9 MG/ML
1000 INJECTION INTRAMUSCULAR; INTRAVENOUS; SUBCUTANEOUS ONCE
Refills: 0 | Status: DISCONTINUED | OUTPATIENT
Start: 2023-06-27 | End: 2023-06-27

## 2023-06-27 RX ADMIN — ONDANSETRON 4 MILLIGRAM(S): 8 TABLET, FILM COATED ORAL at 22:25

## 2023-06-27 RX ADMIN — Medication 2 MILLIGRAM(S): at 19:29

## 2023-06-27 RX ADMIN — SODIUM CHLORIDE 1000 MILLILITER(S): 9 INJECTION, SOLUTION INTRAVENOUS at 21:17

## 2023-06-27 RX ADMIN — Medication 2 MILLIGRAM(S): at 21:17

## 2023-06-27 RX ADMIN — SODIUM CHLORIDE 1000 MILLILITER(S): 9 INJECTION, SOLUTION INTRAVENOUS at 19:34

## 2023-06-27 RX ADMIN — Medication 25 GRAM(S): at 21:21

## 2023-06-27 RX ADMIN — Medication 100 MILLIEQUIVALENT(S): at 22:28

## 2023-06-27 RX ADMIN — Medication 100 MILLIEQUIVALENT(S): at 21:21

## 2023-06-27 RX ADMIN — Medication 105 MILLIGRAM(S): at 23:34

## 2023-06-27 NOTE — ED PROVIDER NOTE - SKIN NEGATIVE STATEMENT, MLM
Notification of Discharge   This is a Notification of Discharge from our facility [address]  Please be advised that this patient has been discharge from our facility  Below you will find the admission and discharge date and time including the patients disposition  UTILIZATION REVIEW CONTACT:  Lashon Oneil  Utilization   Network Utilization Review Department  Phone: [phone] x carefully listen to the prompts  All voicemails are confidential   Email: [email or web address]     PHYSICIAN ADVISORY SERVICES:  FOR [id number] REVIEW - MEDICAL NECESSITY DENIAL  Phone: [phone]  Fax: [phone]  Email: [email or web address]     PRESENTATION DATE: 8/18/2021  9:22 PM  OBERVATION ADMISSION DATE:   INPATIENT ADMISSION DATE: 8/18/21  9:22 PM   DISCHARGE DATE: 8/23/2021  6:00 PM  DISPOSITION: Non [facility] Non [facility]      IMPORTANT INFORMATION:  Send all requests for admission clinical reviews, approved or denied determinations and any other requests to dedicated fax number below belonging to the campus where the patient is receiving treatment   List of dedicated fax numbers:  [address] DENIALS (Administrative/Medical Necessity) [phone]   [address] (Maternity/NICU/Pediatrics) [phone]   Suraj Burr [phone]   Maurilio Ngo [phone]   Tru Denny [phone]   [facility] [address] [place] [address] [phone]   [facility]  [phone]   [address] [place] [address], [facility]  [address] [facility] [address] [facility] [phone] no abrasions, no jaundice, no lesions, no pruritis, and no rashes.

## 2023-06-27 NOTE — ED PROVIDER NOTE - GASTROINTESTINAL [+], MLM
The patient called noting she has not heard from [facility], yet on the referrals for Ophthalmology and Rheumatology.    She is also requesting medication prescription for Pred Healon four times daily in both eyes. She would like this to go to [facility].  I am sending to the general ophthalmology team   NAUSEA

## 2023-06-27 NOTE — ED ADULT TRIAGE NOTE - CHIEF COMPLAINT QUOTE
Pt reporting to the ED for intox. Pt reports drinking 1 L of vodka and 10 mg of Ambien. Respirations even and unlabored, spo2 100%, pt responsive to verbal stimuli. t denies S/I, H/I. PMH ETOH abuse, HTN.

## 2023-06-27 NOTE — ED ADULT NURSE NOTE - OBJECTIVE STATEMENT
report was received from BOBBY Rao. Pt A&Ox4, resting in stretcher. Pt daily ETOH user, states last drink was 24 hours ago. Pt states she usually consumes 1L of vodka daily. CIWA as noted. Pt noted to have 22G to R hand placed by day shift RN. USIV placed to L AC by MD. Safety measures in place

## 2023-06-27 NOTE — ED PROVIDER NOTE - CLINICAL SUMMARY MEDICAL DECISION MAKING FREE TEXT BOX
48 yo female with PMHX of Adenomyosis, Anemia, Depression, ETOH Abuse and History of pancreatitis presenting to ED with complaints of active ETOH withdrawal. Patient drinks 1L of vodka plus PO Ambien daily, however stopped 24 hrs ago. Patient endorses tremors and nausea. Will obtain basic labs, VBG and ETOH level, give fluids, zofran and librium. Will likely admit for acute withdrawal

## 2023-06-27 NOTE — ED ADULT NURSE NOTE - ED STAT RN HANDOFF DETAILS
Respirations even and nonlabored. No acute distress. Patient taken upstairs with transport. Patient stable upon exiting the room.

## 2023-06-27 NOTE — ED PROVIDER NOTE - CRITICAL CARE ATTENDING CONTRIBUTION TO CARE
47-year-old female past medical history hypertension, daily alcohol abuse (approximately 1 L vodka), chronic pancreatitis, chronic anemia secondary to menorrhagia.  Last night after mother came to visit.  Presents for nausea vomiting not feeling well, extremity shaking.  Does report alcohol withdrawal in the past including seizures.  Was last admitted for alcohol withdrawal earlier in June.  Patient has history of alcohol pancreatitis in the past but denies abdominal pain at this time.  Patient denies dysuria or hematuria.  She denies any hallucinations numbness or weakness.  Denies SI HI.   Exam as above, pupils 4 mm extraocular wounds intact.  Dry mucous membranes mild tongue fasciculation, tachycardic S1-S2.  Abdomen soft nontender.  Awake alert oriented x3, slow to respond with speech.  Cranial nerves II through XII intact, motor and sensory grossly intact.  Patient with extremity tremors after lifting forearms off bed.  Patient with nausea vomiting feeling ill in context of alcohol cessation 1 day ago patient in alcohol withdrawal.  No history of head trauma.  Plan benzo, IV hydration, labs, symptom relief, EKG, reassess.

## 2023-06-27 NOTE — ED ADULT NURSE NOTE - NSFALLRISKINTERV_ED_ALL_ED

## 2023-06-27 NOTE — ED PROVIDER NOTE - INTERNATIONAL TRAVEL
[facility]   Susan vd. [address] [facility]  [phone]      12/10/2019    Generalized abdominal pain 12/10/2019    (Carrolyn Curling, MD at [facility]    transcription that are not intended. No

## 2023-06-27 NOTE — ED PROVIDER NOTE - OBJECTIVE STATEMENT
48 yo female with PMHx of Adenomyosis, Anemia, Depression, ETOH Abuse, History of pancreatitis presents to ED in active ETOH withdrawal. Patient states she has not had a drink in 24 hrs, prior to that consumes 1L of vodka plus Ambien daily.  Patient denies SI/HI. Endorses tremors, nausea, and weakness.

## 2023-06-28 DIAGNOSIS — F10.239 ALCOHOL DEPENDENCE WITH WITHDRAWAL, UNSPECIFIED: ICD-10-CM

## 2023-06-28 DIAGNOSIS — R79.89 OTHER SPECIFIED ABNORMAL FINDINGS OF BLOOD CHEMISTRY: ICD-10-CM

## 2023-06-28 DIAGNOSIS — Z29.9 ENCOUNTER FOR PROPHYLACTIC MEASURES, UNSPECIFIED: ICD-10-CM

## 2023-06-28 DIAGNOSIS — R11.2 NAUSEA WITH VOMITING, UNSPECIFIED: ICD-10-CM

## 2023-06-28 DIAGNOSIS — D50.9 IRON DEFICIENCY ANEMIA, UNSPECIFIED: ICD-10-CM

## 2023-06-28 DIAGNOSIS — E87.8 OTHER DISORDERS OF ELECTROLYTE AND FLUID BALANCE, NOT ELSEWHERE CLASSIFIED: ICD-10-CM

## 2023-06-28 DIAGNOSIS — R74.8 ABNORMAL LEVELS OF OTHER SERUM ENZYMES: ICD-10-CM

## 2023-06-28 DIAGNOSIS — I10 ESSENTIAL (PRIMARY) HYPERTENSION: ICD-10-CM

## 2023-06-28 LAB
ALBUMIN SERPL ELPH-MCNC: 3.8 G/DL — SIGNIFICANT CHANGE UP (ref 3.3–5)
ALP SERPL-CCNC: 56 U/L — SIGNIFICANT CHANGE UP (ref 40–120)
ALT FLD-CCNC: 43 U/L — HIGH (ref 4–33)
ANION GAP SERPL CALC-SCNC: 13 MMOL/L — SIGNIFICANT CHANGE UP (ref 7–14)
ANION GAP SERPL CALC-SCNC: 15 MMOL/L — HIGH (ref 7–14)
AST SERPL-CCNC: 90 U/L — HIGH (ref 4–32)
BASE EXCESS BLDV CALC-SCNC: 8.4 MMOL/L — HIGH (ref -2–3)
BILIRUB SERPL-MCNC: 0.5 MG/DL — SIGNIFICANT CHANGE UP (ref 0.2–1.2)
BUN SERPL-MCNC: 10 MG/DL — SIGNIFICANT CHANGE UP (ref 7–23)
BUN SERPL-MCNC: 11 MG/DL — SIGNIFICANT CHANGE UP (ref 7–23)
CA-I SERPL-SCNC: 1.16 MMOL/L — SIGNIFICANT CHANGE UP (ref 1.15–1.33)
CALCIUM SERPL-MCNC: 8.7 MG/DL — SIGNIFICANT CHANGE UP (ref 8.4–10.5)
CALCIUM SERPL-MCNC: 8.9 MG/DL — SIGNIFICANT CHANGE UP (ref 8.4–10.5)
CHLORIDE BLDV-SCNC: 99 MMOL/L — SIGNIFICANT CHANGE UP (ref 96–108)
CHLORIDE SERPL-SCNC: 94 MMOL/L — LOW (ref 98–107)
CHLORIDE SERPL-SCNC: 97 MMOL/L — LOW (ref 98–107)
CO2 BLDV-SCNC: 33 MMOL/L — HIGH (ref 22–26)
CO2 SERPL-SCNC: 25 MMOL/L — SIGNIFICANT CHANGE UP (ref 22–31)
CO2 SERPL-SCNC: 27 MMOL/L — SIGNIFICANT CHANGE UP (ref 22–31)
CREAT SERPL-MCNC: 0.51 MG/DL — SIGNIFICANT CHANGE UP (ref 0.5–1.3)
CREAT SERPL-MCNC: 0.53 MG/DL — SIGNIFICANT CHANGE UP (ref 0.5–1.3)
EGFR: 113 ML/MIN/1.73M2 — SIGNIFICANT CHANGE UP
EGFR: 114 ML/MIN/1.73M2 — SIGNIFICANT CHANGE UP
GAS PNL BLDV: 134 MMOL/L — LOW (ref 136–145)
GAS PNL BLDV: SIGNIFICANT CHANGE UP
GLUCOSE BLDC GLUCOMTR-MCNC: 124 MG/DL — HIGH (ref 70–99)
GLUCOSE BLDV-MCNC: 151 MG/DL — HIGH (ref 70–99)
GLUCOSE SERPL-MCNC: 144 MG/DL — HIGH (ref 70–99)
GLUCOSE SERPL-MCNC: 90 MG/DL — SIGNIFICANT CHANGE UP (ref 70–99)
HCO3 BLDV-SCNC: 32 MMOL/L — HIGH (ref 22–29)
HCT VFR BLDA CALC: 26 % — LOW (ref 34.5–46.5)
HGB BLD CALC-MCNC: 8.8 G/DL — LOW (ref 11.7–16.1)
LACTATE BLDV-MCNC: 1.6 MMOL/L — SIGNIFICANT CHANGE UP (ref 0.5–2)
MAGNESIUM SERPL-MCNC: 1.6 MG/DL — SIGNIFICANT CHANGE UP (ref 1.6–2.6)
PCO2 BLDV: 38 MMHG — LOW (ref 39–52)
PH BLDV: 7.53 — HIGH (ref 7.32–7.43)
PHOSPHATE SERPL-MCNC: 3.2 MG/DL — SIGNIFICANT CHANGE UP (ref 2.5–4.5)
PO2 BLDV: 86 MMHG — HIGH (ref 25–45)
POTASSIUM BLDV-SCNC: 3.5 MMOL/L — SIGNIFICANT CHANGE UP (ref 3.5–5.1)
POTASSIUM SERPL-MCNC: 3.6 MMOL/L — SIGNIFICANT CHANGE UP (ref 3.5–5.3)
POTASSIUM SERPL-MCNC: 3.9 MMOL/L — SIGNIFICANT CHANGE UP (ref 3.5–5.3)
POTASSIUM SERPL-SCNC: 3.6 MMOL/L — SIGNIFICANT CHANGE UP (ref 3.5–5.3)
POTASSIUM SERPL-SCNC: 3.9 MMOL/L — SIGNIFICANT CHANGE UP (ref 3.5–5.3)
PROT SERPL-MCNC: 6.7 G/DL — SIGNIFICANT CHANGE UP (ref 6–8.3)
SAO2 % BLDV: 97.1 % — HIGH (ref 67–88)
SODIUM SERPL-SCNC: 134 MMOL/L — LOW (ref 135–145)
SODIUM SERPL-SCNC: 137 MMOL/L — SIGNIFICANT CHANGE UP (ref 135–145)

## 2023-06-28 PROCEDURE — 99233 SBSQ HOSP IP/OBS HIGH 50: CPT | Mod: GC

## 2023-06-28 RX ORDER — SODIUM CHLORIDE 9 MG/ML
1000 INJECTION, SOLUTION INTRAVENOUS
Refills: 0 | Status: DISCONTINUED | OUTPATIENT
Start: 2023-06-28 | End: 2023-06-28

## 2023-06-28 RX ORDER — METOCLOPRAMIDE HCL 10 MG
5 TABLET ORAL ONCE
Refills: 0 | Status: COMPLETED | OUTPATIENT
Start: 2023-06-28 | End: 2023-06-28

## 2023-06-28 RX ORDER — SODIUM CHLORIDE 9 MG/ML
1000 INJECTION, SOLUTION INTRAVENOUS
Refills: 0 | Status: DISCONTINUED | OUTPATIENT
Start: 2023-06-28 | End: 2023-06-30

## 2023-06-28 RX ORDER — ONDANSETRON 8 MG/1
4 TABLET, FILM COATED ORAL EVERY 8 HOURS
Refills: 0 | Status: DISCONTINUED | OUTPATIENT
Start: 2023-06-28 | End: 2023-07-03

## 2023-06-28 RX ORDER — ENOXAPARIN SODIUM 100 MG/ML
40 INJECTION SUBCUTANEOUS EVERY 24 HOURS
Refills: 0 | Status: DISCONTINUED | OUTPATIENT
Start: 2023-06-28 | End: 2023-06-30

## 2023-06-28 RX ORDER — LANOLIN ALCOHOL/MO/W.PET/CERES
6 CREAM (GRAM) TOPICAL AT BEDTIME
Refills: 0 | Status: DISCONTINUED | OUTPATIENT
Start: 2023-06-28 | End: 2023-07-03

## 2023-06-28 RX ORDER — MAGNESIUM SULFATE 500 MG/ML
2 VIAL (ML) INJECTION ONCE
Refills: 0 | Status: COMPLETED | OUTPATIENT
Start: 2023-06-28 | End: 2023-06-28

## 2023-06-28 RX ORDER — THIAMINE MONONITRATE (VIT B1) 100 MG
500 TABLET ORAL DAILY
Refills: 0 | Status: COMPLETED | OUTPATIENT
Start: 2023-06-28 | End: 2023-06-30

## 2023-06-28 RX ORDER — LANOLIN ALCOHOL/MO/W.PET/CERES
3 CREAM (GRAM) TOPICAL AT BEDTIME
Refills: 0 | Status: DISCONTINUED | OUTPATIENT
Start: 2023-06-28 | End: 2023-06-28

## 2023-06-28 RX ORDER — ACETAMINOPHEN 500 MG
650 TABLET ORAL EVERY 6 HOURS
Refills: 0 | Status: DISCONTINUED | OUTPATIENT
Start: 2023-06-28 | End: 2023-07-03

## 2023-06-28 RX ORDER — ACETAMINOPHEN 500 MG
1000 TABLET ORAL ONCE
Refills: 0 | Status: COMPLETED | OUTPATIENT
Start: 2023-06-28 | End: 2023-06-28

## 2023-06-28 RX ADMIN — Medication 2 MILLIGRAM(S): at 12:40

## 2023-06-28 RX ADMIN — Medication 25 GRAM(S): at 18:11

## 2023-06-28 RX ADMIN — Medication 2 MILLIGRAM(S): at 00:23

## 2023-06-28 RX ADMIN — Medication 6 MILLIGRAM(S): at 22:08

## 2023-06-28 RX ADMIN — Medication 2 MILLIGRAM(S): at 09:47

## 2023-06-28 RX ADMIN — Medication 2 MILLIGRAM(S): at 22:01

## 2023-06-28 RX ADMIN — Medication 100 MILLIEQUIVALENT(S): at 00:25

## 2023-06-28 RX ADMIN — Medication 2 MILLIGRAM(S): at 06:27

## 2023-06-28 RX ADMIN — Medication 5 MILLIGRAM(S): at 02:40

## 2023-06-28 RX ADMIN — Medication 650 MILLIGRAM(S): at 20:08

## 2023-06-28 RX ADMIN — Medication 400 MILLIGRAM(S): at 02:40

## 2023-06-28 RX ADMIN — Medication 2 MILLIGRAM(S): at 18:10

## 2023-06-28 RX ADMIN — SODIUM CHLORIDE 75 MILLILITER(S): 9 INJECTION, SOLUTION INTRAVENOUS at 12:41

## 2023-06-28 RX ADMIN — Medication 650 MILLIGRAM(S): at 21:03

## 2023-06-28 RX ADMIN — ENOXAPARIN SODIUM 40 MILLIGRAM(S): 100 INJECTION SUBCUTANEOUS at 18:11

## 2023-06-28 RX ADMIN — Medication 105 MILLIGRAM(S): at 12:45

## 2023-06-28 RX ADMIN — Medication 2 MILLIGRAM(S): at 15:14

## 2023-06-28 RX ADMIN — Medication 1000 MILLIGRAM(S): at 03:10

## 2023-06-28 RX ADMIN — SODIUM CHLORIDE 75 MILLILITER(S): 9 INJECTION, SOLUTION INTRAVENOUS at 02:09

## 2023-06-28 RX ADMIN — Medication 2 MILLIGRAM(S): at 02:08

## 2023-06-28 NOTE — PROGRESS NOTE ADULT - PROBLEM SELECTOR PLAN 4
-IV hydration  -Monitor VBG and lytes   -Zofran IV PRN  -Monitor QT for prolongation  -Avoid Reglan   -Repeat EKG in AM ordered, primary team to f/u Plan:  -IV hydration  -Monitor VBG and lytes   -Zofran IV PRN  -Monitor QT for prolongation  -Avoid Reglan   -Repeat EKG in AM ordered, primary team to f/u

## 2023-06-28 NOTE — H&P ADULT - NSHPPHYSICALEXAM_GEN_ALL_CORE
Vital Signs Last 24 Hrs  T(C): 36.7 (27 Jun 2023 20:30), Max: 36.7 (27 Jun 2023 16:52)  T(F): 98.1 (27 Jun 2023 20:30), Max: 98.1 (27 Jun 2023 16:52)  HR: 103 (28 Jun 2023 00:30) (102 - 125)  BP: 153/98 (28 Jun 2023 00:30) (135/94 - 154/103)  BP(mean): --  RR: 14 (28 Jun 2023 00:30) (14 - 20)  SpO2: 99% (28 Jun 2023 00:30) (95% - 100%)    Parameters below as of 28 Jun 2023 00:30  Patient On (Oxygen Delivery Method): room air

## 2023-06-28 NOTE — SBIRT NOTE ADULT - NSSBIRTDRGPOSREINDET_GEN_A_CORE
Patient endorsed using weed 1x per week. Patient reports she does not need her drug use as an issue and declined referral or resources for the weed. No further SW intervention needed

## 2023-06-28 NOTE — H&P ADULT - NSHPLABSRESULTS_GEN_ALL_CORE
.    -Personally interpreted EKG: sinus tach 100bpm, qtc 474, no acute Tw or ST changes, no PACs or PVCs     -Personally reviewed labs below:                        10.1   4.11  )-----------( 121      ( 27 Jun 2023 21:23 )             32.8   06-27    142  |  93<L>  |  12  ----------------------------<  118<H>  3.2<L>   |  22  |  0.53    Ca    10.5      27 Jun 2023 19:00  Phos  3.8     06-27  Mg     1.40     06-27    TPro  7.8  /  Alb  4.2  /  TBili  0.4  /  DBili  x   /  AST  111<H>  /  ALT  49<H>  /  AlkPhos  68  06-27    -Personally reviewed recent CT BRAIN,  06/08/2023    INTERPRETATION: CT OF THE HEAD WITHOUT CONTRAST  CLINICAL INDICATION: headache, r/o intracranial pathology.  TECHNIQUE: Volumetric CT acquisition was performed through the brain and reviewed using brain and bone window technique.  COMPARISON: CT head and mandible 5/7/2023  FINDINGS:  The ventricular and sulcal size and configuration is age appropriate. There is no acute loss of gray-white differentiation.  There is no evidence of mass effect, midline shift, acute intracranial hemorrhage, or extra-axial collections.   The calvarium is intact. There are frothy secretions in the left sphenoid sinus.The mastoid air cells are predominantly clear.  The orbits are unremarkable.  Redemonstrated subluxation of the left mandibular condyle. And partially imaged bilateral mandibular fractures.  IMPRESSION:  No acute intracranial hemorrhage or acute territorial infarction.

## 2023-06-28 NOTE — PROGRESS NOTE ADULT - PROBLEM SELECTOR PLAN 1
Alcohol use disorder with dependence and h/o of withdrawal seizures;  High risk for DT and seizures;   Reviewed CT BRAIN,  06/08/2023  as above  EKG: QTc prolongation 474  -[facility] protocol  -No role for oral Librium taper due to n/v  -Ativan taper ordered   -High risk  IV Ativan per [facility] protocol  -Oral Multivitamin held   -Thiamine IVPB 500mg x3 days  - Oral folic acid held  -SW consult requested  -Seizure precautions   -Bedrest   -Fall, aspiration precautions with HOB elev   -NPO for now due to n/v i/s/o intoxication with withdrawal  -IV hydration Alcohol use disorder with dependence and h/o of withdrawal seizures;  High risk for DT and seizures;   Reviewed CT BRAIN,  06/08/2023  as above  EKG: QTc prolongation 474    Plan:  -[facility] protocol  -No role for oral Librium taper due to n/v  -Ativan taper ordered   -High risk  IV Ativan per [facility] protocol  -Oral Multivitamin held   -Thiamine IVPB 500mg x3 days  - Oral folic acid held  -SW consult requested  -Seizure precautions   -Bedrest   -Fall, aspiration precautions with HOB elev   -NPO for now due to n/v i/s/o intoxication with withdrawal  -IV hydration

## 2023-06-28 NOTE — H&P ADULT - NS ATTEST RISK PROBLEM GEN_ALL_CORE FT
Alcohol withdrawal i/s/o alcohol dependence and tolerance requiring CIWA protocol with Ativan IV Taper and PRN; High risk for DT;

## 2023-06-28 NOTE — PATIENT PROFILE ADULT - INTERNATIONAL TRAVEL
Pt informed and understands. Family member sees Dr Scottie Mobley so they would like referred there instead. No

## 2023-06-28 NOTE — H&P ADULT - PROBLEM SELECTOR PLAN 1
Alcohol use disorder with dependence and h/o of withdrawal seizures   Reviewed CT BRAIN,  06/08/2023  as above  EKG: QTc prolongation 474  -[facility] protocol  -Librium taper  -High risk  IV Ativan per [facility] protocol  -Multivitamin  -Thiamine IV x3 days  - folic acid 1mg daily  -SW consult Alcohol use disorder with dependence and h/o of withdrawal seizures   Reviewed CT BRAIN,  06/08/2023  as above  EKG: QTc prolongation 474  -[facility] protocol  -No role for oral Librium taper due to n/v  -Ativan taper ordered   -High risk  IV Ativan per [facility] protocol  -Oral Multivitamin held   -Thiamine IVPB 500mg x3 days  - Oral folic acid held  -SW consult requested  -Seizure precautions   -Bedrest   -Fall, aspiration precautions with HOB elev   -NPO for now due to n/v i/s/o intoxication with withdrawal  -IV hydration Alcohol use disorder with dependence and h/o of withdrawal seizures;  High risk for DT and seizures;   Reviewed CT BRAIN,  06/08/2023  as above  EKG: QTc prolongation 474  -[facility] protocol  -No role for oral Librium taper due to n/v  -Ativan taper ordered   -High risk  IV Ativan per [facility] protocol  -Oral Multivitamin held   -Thiamine IVPB 500mg x3 days  - Oral folic acid held  -SW consult requested  -Seizure precautions   -Bedrest   -Fall, aspiration precautions with HOB elev   -NPO for now due to n/v i/s/o intoxication with withdrawal  -IV hydration

## 2023-06-28 NOTE — H&P ADULT - PROBLEM SELECTOR PLAN 2
Due to alcohol intoxication; EtOH=348 Due to alcohol intoxication; EtOH=348  Lactic acid= 6.6  Trend LA   IV hydration   Monitor VBG, pH

## 2023-06-28 NOTE — H&P ADULT - HISTORY OF PRESENT ILLNESS
48yo Female with hx of HTN, alcohol use disorder requiring hospitalization for withdrawal, h/o withdrawal  seizure appx 1 year ago  48yo Female with hx of HTN, alcohol use disorder requiring hospitalization for withdrawal, h/o withdrawal  seizure appx 1 year ago, h/o alcoholic pancreatitis, chronic anemia secondary to menorrhagia; Pt c/o several episodes of nausea vomiting with worsening tremors/ "shaking". Sates that was recently admitted for  for alcohol withdrawal (06/2023).  Reports no associated abdominal pain, melena, hematemesis at this time, and no dysuria or hematuria.  Also reports no visual or auditory hallucinations, blurry vision, paresthesias, SI or HI. Reports daily alcohol consumption of appx 1L of vodka.     ED course: several episodes of non bloody vomiting

## 2023-06-28 NOTE — PROGRESS NOTE ADULT - PROBLEM SELECTOR PLAN 2
Due to alcohol intoxication; EtOH=348  Lactic acid= 6.6  Trend LA   IV hydration   Monitor VBG, pH -Improving. Lactic improved to 1.6 from 6.6  -Due to alcohol intoxication; EtOH=348  -Lactic acid= 6.6    Plan:  -IV hydration   -Monitor VBG, pH if symptoms persist

## 2023-06-28 NOTE — PROGRESS NOTE ADULT - ASSESSMENT
46yo Female with hx of HTN, alcohol use disorder requiring hospitalization for withdrawal, h/o withdrawal  seizure appx 1 year ago, h/o alcoholic pancreatitis, chronic anemia secondary to menorrhagia; a/w alcohol withdrawal i/s/o alcohol dependence and tolerance; Found to have elevated transaminases and lactic acid with electrolytes imbalance  i/s/o alcohol intoxication;

## 2023-06-28 NOTE — H&P ADULT - PROBLEM/PLAN-6
[facility]  [address] [place] [address].  [place] [address]  Phone: [phone]  Fax - [phone]    CXR 2-view: $40  
DISPLAY PLAN FREE TEXT

## 2023-06-28 NOTE — PROGRESS NOTE ADULT - PROBLEM SELECTOR PLAN 3
Due to alcohol overuse  AST: ALT ratio 111: 49 appx  2: 1 c/w alcoholic liver disease   Recent INR wnl; Bilirubin wnl; DF low  -Not a candidate for steroids   -Alcohol cessation   -Monitor LFTs daily Due to alcohol overuse  AST: ALT ratio 111: 49 appx  2: 1 c/w alcoholic liver disease   Recent INR wnl; Bilirubin wnl; DF low  -Not a candidate for steroids     Plan:  -Alcohol cessation   -Monitor LFTs daily

## 2023-06-28 NOTE — H&P ADULT - MUSCULOSKELETAL
no joint swelling/no joint erythema/no calf tenderness/strength 5/5 bilateral upper extremities/strength 5/5 bilateral lower extremities details…

## 2023-06-28 NOTE — H&P ADULT - PROBLEM SELECTOR PLAN 3
Due to alcohol overuse ALT: AST ratio 49:111 appx 1: 2 c/w Due to alcohol overuse  AST: ALT ratio 111: 49 appx  2: 1 c/w alcoholic liver disease   Recent INR wnl; Bilirubin wnl; DF low  -Not a candidate for steroids   -Alcohol cessation   -Monitor LFTs daily

## 2023-06-28 NOTE — PROGRESS NOTE ADULT - PROBLEM SELECTOR PLAN 5
Hypokalemia and hypomagnesemia;  -Monitor lytes daily  -s/p Supp potassium and magnesium Hypokalemia and hypomagnesemia;  -Monitor lytes daily  -s/p Supp to K > 4 and Mag > 2

## 2023-06-28 NOTE — SBIRT NOTE ADULT - NSSBIRTALCPASSREFTXDET_GEN_A_CORE
SBIRT completed with patient at bedside, patient presented as A&O X4, patient reports she was drinking 4 liters of vodka daily. Patient reports she was in inpatient substance use rehab in the past (last in a facility 10 year ago per patient). Patient expressed interest in inpatient substance use rehab once she is discharged from the hospital. Patient agreed to be connected with remote SBIRT services. Referral was sent on the patients behalf via online [email or web address]   This writer informed assigned unit SW of the above. Unit SW to remain available throughout hospitalization and assist in safe discharge planning.

## 2023-06-28 NOTE — H&P ADULT - PROBLEM SELECTOR PLAN 6
-Hold Amlodipine due to n/v   -Consider Hydralazine PRN IVP PRN  -VS q4h Chronic anemia secondary to menorrhagia  -f/u with PCP as outpt

## 2023-06-28 NOTE — PROGRESS NOTE ADULT - SUBJECTIVE AND OBJECTIVE BOX
DATE OF SERVICE: 06-28-23 @ 08:04    Patient is a 47y old  Female who presents with a chief complaint of Nausea, vomiting (28 Jun 2023 00:10)      SUBJECTIVE / OVERNIGHT EVENTS:    MEDICATIONS  (STANDING):  lactated ringers. 1000 milliLiter(s) (75 mL/Hr) IV Continuous <Continuous>  LORazepam   Injectable   IV Push   LORazepam   Injectable 2 milliGRAM(s) IV Push every 4 hours  thiamine IVPB 500 milliGRAM(s) IV Intermittent daily    MEDICATIONS  (PRN):  LORazepam   Injectable 2 milliGRAM(s) IV Push every 2 hours PRN Symptom-triggered: each CIWA -Ar score 8 or GREATER  ondansetron Injectable 4 milliGRAM(s) IV Push every 8 hours PRN Nausea and/or Vomiting      Vital Signs Last 24 Hrs  T(C): 37 (28 Jun 2023 07:04), Max: 37 (28 Jun 2023 07:04)  T(F): 98.6 (28 Jun 2023 07:04), Max: 98.6 (28 Jun 2023 07:04)  HR: 108 (28 Jun 2023 07:04) (101 - 125)  BP: 132/99 (28 Jun 2023 07:04) (132/99 - 154/103)  BP(mean): --  RR: 18 (28 Jun 2023 07:04) (14 - 20)  SpO2: 97% (28 Jun 2023 07:04) (95% - 100%)    Parameters below as of 28 Jun 2023 07:04  Patient On (Oxygen Delivery Method): room air      CAPILLARY BLOOD GLUCOSE      POCT Blood Glucose.: 124 mg/dL (27 Jun 2023 17:56)    I&O's Summary      PHYSICAL EXAM:  GENERAL: NAD, well-developed  HEAD:  Atraumatic, Normocephalic  EYES: EOMI, PERRLA, conjunctiva and sclera clear  NECK: Supple, No JVD  CHEST/LUNG: Clear to auscultation bilaterally; No wheeze  HEART: Regular rate and rhythm; No murmurs, rubs, or gallops  ABDOMEN: Soft, Nontender, Nondistended; Bowel sounds present  EXTREMITIES:  2+ Peripheral Pulses, No clubbing, cyanosis, or edema  PSYCH: AAOx3  NEUROLOGY: non-focal  SKIN: No rashes or lesions    LABS:                        10.1   4.11  )-----------( 121      ( 27 Jun 2023 21:23 )             32.8     06-27    142  |  93<L>  |  12  ----------------------------<  118<H>  3.2<L>   |  22  |  0.53    Ca    10.5      27 Jun 2023 19:00  Phos  3.8     06-27  Mg     1.40     06-27    TPro  7.8  /  Alb  4.2  /  TBili  0.4  /  DBili  x   /  AST  111<H>  /  ALT  49<H>  /  AlkPhos  68  06-27          Urinalysis Basic - ( 27 Jun 2023 19:00 )    Color: x / Appearance: x / SG: x / pH: x  Gluc: 118 mg/dL / Ketone: x  / Bili: x / Urobili: x   Blood: x / Protein: x / Nitrite: x   Leuk Esterase: x / RBC: x / WBC x   Sq Epi: x / Non Sq Epi: x / Bacteria: x        RADIOLOGY & ADDITIONAL TESTS:    Imaging Personally Reviewed:    Consultant(s) Notes Reviewed:      Care Discussed with Consultants/Other Providers:   DATE OF SERVICE: 06-28-23 @ 08:04    Patient is a 47y old  Female who presents with a chief complaint of Nausea, vomiting (28 Jun 2023 00:10)    SUBJECTIVE / OVERNIGHT EVENTS: Patient endorses shaking in her hands, nausea/vomiting and sweating. Says the ativan has been helping but she still has the symptoms.    MEDICATIONS  (STANDING):  lactated ringers. 1000 milliLiter(s) (75 mL/Hr) IV Continuous <Continuous>  LORazepam   Injectable   IV Push   LORazepam   Injectable 2 milliGRAM(s) IV Push every 4 hours  thiamine IVPB 500 milliGRAM(s) IV Intermittent daily    MEDICATIONS  (PRN):  LORazepam   Injectable 2 milliGRAM(s) IV Push every 2 hours PRN Symptom-triggered: each CIWA -Ar score 8 or GREATER  ondansetron Injectable 4 milliGRAM(s) IV Push every 8 hours PRN Nausea and/or Vomiting    Vital Signs Last 24 Hrs  T(C): 37 (28 Jun 2023 07:04), Max: 37 (28 Jun 2023 07:04)  T(F): 98.6 (28 Jun 2023 07:04), Max: 98.6 (28 Jun 2023 07:04)  HR: 108 (28 Jun 2023 07:04) (101 - 125)  BP: 132/99 (28 Jun 2023 07:04) (132/99 - 154/103)  BP(mean): --  RR: 18 (28 Jun 2023 07:04) (14 - 20)  SpO2: 97% (28 Jun 2023 07:04) (95% - 100%)    Parameters below as of 28 Jun 2023 07:04  Patient On (Oxygen Delivery Method): room air      CAPILLARY BLOOD GLUCOSE      POCT Blood Glucose.: 124 mg/dL (27 Jun 2023 17:56)    I&O's Summary      PHYSICAL EXAM:  GENERAL: NAD, well-developed  HEAD:  Atraumatic, Normocephalic  EYES: EOMI, PERRLA, conjunctiva and sclera clear  NECK: Supple, No JVD  CHEST/LUNG: Clear to auscultation bilaterally; No wheeze  HEART: Regular rate and rhythm; No murmurs, rubs, or gallops  ABDOMEN: Soft, Nontender, Nondistended; Bowel sounds present  EXTREMITIES:  2+ Peripheral Pulses, No clubbing, cyanosis, or edema  PSYCH: AAOx3  NEUROLOGY: non-focal  SKIN: No rashes or lesions    LABS:                        10.1   4.11  )-----------( 121      ( 27 Jun 2023 21:23 )             32.8     06-27    142  |  93<L>  |  12  ----------------------------<  118<H>  3.2<L>   |  22  |  0.53    Ca    10.5      27 Jun 2023 19:00  Phos  3.8     06-27  Mg     1.40     06-27    TPro  7.8  /  Alb  4.2  /  TBili  0.4  /  DBili  x   /  AST  111<H>  /  ALT  49<H>  /  AlkPhos  68  06-27      RADIOLOGY & ADDITIONAL TESTS:    Imaging Personally Reviewed:    Consultant(s) Notes Reviewed:      Care Discussed with Consultants/Other Providers:   DATE OF SERVICE: 06-28-23 @ 08:04    Patient is a 47y old  Female who presents with a chief complaint of Nausea, vomiting (28 Jun 2023 00:10)    SUBJECTIVE / OVERNIGHT EVENTS: Patient endorses shaking in her hands, nausea/vomiting and sweating. Says the ativan has been helping but she still has the symptoms. Provided her mom's number as an emergency contact: [phone]    MEDICATIONS  (STANDING):  lactated ringers. 1000 milliLiter(s) (75 mL/Hr) IV Continuous <Continuous>  LORazepam   Injectable   IV Push   LORazepam   Injectable 2 milliGRAM(s) IV Push every 4 hours  thiamine IVPB 500 milliGRAM(s) IV Intermittent daily    MEDICATIONS  (PRN):  LORazepam   Injectable 2 milliGRAM(s) IV Push every 2 hours PRN Symptom-triggered: each CIWA -Ar score 8 or GREATER  ondansetron Injectable 4 milliGRAM(s) IV Push every 8 hours PRN Nausea and/or Vomiting    Vital Signs Last 24 Hrs  T(C): 37 (28 Jun 2023 07:04), Max: 37 (28 Jun 2023 07:04)  T(F): 98.6 (28 Jun 2023 07:04), Max: 98.6 (28 Jun 2023 07:04)  HR: 108 (28 Jun 2023 07:04) (101 - 125)  BP: 132/99 (28 Jun 2023 07:04) (132/99 - 154/103)  BP(mean): --  RR: 18 (28 Jun 2023 07:04) (14 - 20)  SpO2: 97% (28 Jun 2023 07:04) (95% - 100%)    Parameters below as of 28 Jun 2023 07:04  Patient On (Oxygen Delivery Method): room air      CAPILLARY BLOOD GLUCOSE      POCT Blood Glucose.: 124 mg/dL (27 Jun 2023 17:56)    I&O's Summary      PHYSICAL EXAM:  GENERAL: NAD, well-developed  HEAD:  Atraumatic, Normocephalic  EYES: EOMI, PERRLA, conjunctiva and sclera clear  NECK: Supple, No JVD  CHEST/LUNG: Clear to auscultation bilaterally; No wheeze  HEART: Regular rate and rhythm; No murmurs, rubs, or gallops  ABDOMEN: Soft, Nontender, Nondistended; Bowel sounds present  EXTREMITIES:  2+ Peripheral Pulses, No clubbing, cyanosis, or edema  PSYCH: AAOx3  NEUROLOGY: non-focal  SKIN: No rashes or lesions    LABS:                        10.1   4.11  )-----------( 121      ( 27 Jun 2023 21:23 )             32.8     06-27    142  |  93<L>  |  12  ----------------------------<  118<H>  3.2<L>   |  22  |  0.53    Ca    10.5      27 Jun 2023 19:00  Phos  3.8     06-27  Mg     1.40     06-27    TPro  7.8  /  Alb  4.2  /  TBili  0.4  /  DBili  x   /  AST  111<H>  /  ALT  49<H>  /  AlkPhos  68  06-27      RADIOLOGY & ADDITIONAL TESTS:    Imaging Personally Reviewed:    Consultant(s) Notes Reviewed:      Care Discussed with Consultants/Other Providers:

## 2023-06-28 NOTE — H&P ADULT - ASSESSMENT
48yo Female with hx of HTN, alcohol use disorder requiring hospitalization for withdrawal, h/o withdrawal  seizure appx 1 year ago, h/o alcoholic pancreatitis, chronic anemia secondary to menorrhagia; a/w alcohol withdrawal i/s/o alcohol dependence and tolerance; Found to have elevated transaminases and lactic acid with electrolytes imbalance  i/s/o alcohol intoxication;

## 2023-06-28 NOTE — PROGRESS NOTE ADULT - PROBLEM SELECTOR PLAN 8
Low risk  SCDs  Would avoid Heparin ppx due to acrite vomiting/ retching Plan:  -Lovenox 40 mg. Can d/c if patient hemoglobin drops

## 2023-06-28 NOTE — H&P ADULT - PROBLEM SELECTOR PLAN 4
-IV hydration  -Monitor VBG and lytes   -Zofran IV PRN  -Monitor QT for prolongation -IV hydration  -Monitor VBG and lytes   -Zofran IV PRN  -Monitor QT for prolongation  -Avoid Reglan   -Repeat EKG in AM ordered, primary team to f/u

## 2023-06-28 NOTE — PATIENT PROFILE ADULT - NSPROHMSYMPCOND_GEN_A_NUR
[id number] [employer]. [address] [place] [address], [place] [address]. All rights reserved. This information is not intended as a  substitute for professional medical care. Always follow your healthcare professional's instructions.            behavioral health/cardiovascular/sleep

## 2023-06-29 DIAGNOSIS — D69.6 THROMBOCYTOPENIA, UNSPECIFIED: ICD-10-CM

## 2023-06-29 LAB
ALBUMIN SERPL ELPH-MCNC: 3.7 G/DL — SIGNIFICANT CHANGE UP (ref 3.3–5)
ALBUMIN SERPL ELPH-MCNC: 3.7 G/DL — SIGNIFICANT CHANGE UP (ref 3.3–5)
ALP SERPL-CCNC: 65 U/L — SIGNIFICANT CHANGE UP (ref 40–120)
ALP SERPL-CCNC: 66 U/L — SIGNIFICANT CHANGE UP (ref 40–120)
ALT FLD-CCNC: 30 U/L — SIGNIFICANT CHANGE UP (ref 4–33)
ALT FLD-CCNC: 31 U/L — SIGNIFICANT CHANGE UP (ref 4–33)
ANION GAP SERPL CALC-SCNC: 12 MMOL/L — SIGNIFICANT CHANGE UP (ref 7–14)
ANION GAP SERPL CALC-SCNC: 14 MMOL/L — SIGNIFICANT CHANGE UP (ref 7–14)
AST SERPL-CCNC: 49 U/L — HIGH (ref 4–32)
AST SERPL-CCNC: 50 U/L — HIGH (ref 4–32)
BASOPHILS # BLD AUTO: 0 K/UL — SIGNIFICANT CHANGE UP (ref 0–0.2)
BASOPHILS NFR BLD AUTO: 0 % — SIGNIFICANT CHANGE UP (ref 0–2)
BILIRUB DIRECT SERPL-MCNC: <0.2 MG/DL — SIGNIFICANT CHANGE UP (ref 0–0.3)
BILIRUB INDIRECT FLD-MCNC: >0.4 MG/DL — SIGNIFICANT CHANGE UP (ref 0–1)
BILIRUB SERPL-MCNC: 0.6 MG/DL — SIGNIFICANT CHANGE UP (ref 0.2–1.2)
BILIRUB SERPL-MCNC: 0.6 MG/DL — SIGNIFICANT CHANGE UP (ref 0.2–1.2)
BUN SERPL-MCNC: 3 MG/DL — LOW (ref 7–23)
BUN SERPL-MCNC: 5 MG/DL — LOW (ref 7–23)
CALCIUM SERPL-MCNC: 9.4 MG/DL — SIGNIFICANT CHANGE UP (ref 8.4–10.5)
CALCIUM SERPL-MCNC: 9.5 MG/DL — SIGNIFICANT CHANGE UP (ref 8.4–10.5)
CHLORIDE SERPL-SCNC: 97 MMOL/L — LOW (ref 98–107)
CHLORIDE SERPL-SCNC: 99 MMOL/L — SIGNIFICANT CHANGE UP (ref 98–107)
CLOSURE TME COLL+EPINEP BLD: 59 K/UL — SIGNIFICANT CHANGE UP (ref 150–400)
CO2 SERPL-SCNC: 23 MMOL/L — SIGNIFICANT CHANGE UP (ref 22–31)
CO2 SERPL-SCNC: 27 MMOL/L — SIGNIFICANT CHANGE UP (ref 22–31)
CREAT SERPL-MCNC: 0.49 MG/DL — LOW (ref 0.5–1.3)
CREAT SERPL-MCNC: 0.5 MG/DL — SIGNIFICANT CHANGE UP (ref 0.5–1.3)
EGFR: 115 ML/MIN/1.73M2 — SIGNIFICANT CHANGE UP
EGFR: 115 ML/MIN/1.73M2 — SIGNIFICANT CHANGE UP
EOSINOPHIL # BLD AUTO: 0.14 K/UL — SIGNIFICANT CHANGE UP (ref 0–0.5)
EOSINOPHIL NFR BLD AUTO: 2.6 % — SIGNIFICANT CHANGE UP (ref 0–6)
GLUCOSE SERPL-MCNC: 152 MG/DL — HIGH (ref 70–99)
GLUCOSE SERPL-MCNC: 152 MG/DL — HIGH (ref 70–99)
HCT VFR BLD CALC: 31.1 % — LOW (ref 34.5–45)
HGB BLD-MCNC: 9.1 G/DL — LOW (ref 11.5–15.5)
HYPOCHROMIA BLD QL: SIGNIFICANT CHANGE UP
IANC: 4.14 K/UL — SIGNIFICANT CHANGE UP (ref 1.8–7.4)
LACTATE SERPL-SCNC: 1.2 MMOL/L — SIGNIFICANT CHANGE UP (ref 0.5–2)
LYMPHOCYTES # BLD AUTO: 0.54 K/UL — LOW (ref 1–3.3)
LYMPHOCYTES # BLD AUTO: 10.4 % — LOW (ref 13–44)
MAGNESIUM SERPL-MCNC: 1.6 MG/DL — SIGNIFICANT CHANGE UP (ref 1.6–2.6)
MANUAL SMEAR VERIFICATION: SIGNIFICANT CHANGE UP
MCHC RBC-ENTMCNC: 21 PG — LOW (ref 27–34)
MCHC RBC-ENTMCNC: 29.3 GM/DL — LOW (ref 32–36)
MCV RBC AUTO: 71.8 FL — LOW (ref 80–100)
MICROCYTES BLD QL: SIGNIFICANT CHANGE UP
MONOCYTES # BLD AUTO: 0.09 K/UL — SIGNIFICANT CHANGE UP (ref 0–0.9)
MONOCYTES NFR BLD AUTO: 1.7 % — LOW (ref 2–14)
NEUTROPHILS # BLD AUTO: 4.39 K/UL — SIGNIFICANT CHANGE UP (ref 1.8–7.4)
NEUTROPHILS NFR BLD AUTO: 84.4 % — HIGH (ref 43–77)
PHOSPHATE SERPL-MCNC: 3.3 MG/DL — SIGNIFICANT CHANGE UP (ref 2.5–4.5)
PLAT MORPH BLD: NORMAL — SIGNIFICANT CHANGE UP
PLATELET # BLD AUTO: 64 K/UL — LOW (ref 150–400)
PLATELET COUNT - ESTIMATE: ABNORMAL
POTASSIUM SERPL-MCNC: 3.1 MMOL/L — LOW (ref 3.5–5.3)
POTASSIUM SERPL-MCNC: 4.1 MMOL/L — SIGNIFICANT CHANGE UP (ref 3.5–5.3)
POTASSIUM SERPL-SCNC: 3.1 MMOL/L — LOW (ref 3.5–5.3)
POTASSIUM SERPL-SCNC: 4.1 MMOL/L — SIGNIFICANT CHANGE UP (ref 3.5–5.3)
PROT SERPL-MCNC: 6.7 G/DL — SIGNIFICANT CHANGE UP (ref 6–8.3)
PROT SERPL-MCNC: 6.7 G/DL — SIGNIFICANT CHANGE UP (ref 6–8.3)
RBC # BLD: 4.33 M/UL — SIGNIFICANT CHANGE UP (ref 3.8–5.2)
RBC # FLD: 22.9 % — HIGH (ref 10.3–14.5)
RBC BLD AUTO: NORMAL — SIGNIFICANT CHANGE UP
ROULEAUX BLD QL SMEAR: PRESENT
SODIUM SERPL-SCNC: 136 MMOL/L — SIGNIFICANT CHANGE UP (ref 135–145)
SODIUM SERPL-SCNC: 136 MMOL/L — SIGNIFICANT CHANGE UP (ref 135–145)
VARIANT LYMPHS # BLD: 0.9 % — SIGNIFICANT CHANGE UP (ref 0–6)
WBC # BLD: 5.2 K/UL — SIGNIFICANT CHANGE UP (ref 3.8–10.5)
WBC # FLD AUTO: 5.2 K/UL — SIGNIFICANT CHANGE UP (ref 3.8–10.5)

## 2023-06-29 PROCEDURE — 99232 SBSQ HOSP IP/OBS MODERATE 35: CPT | Mod: GC

## 2023-06-29 RX ORDER — AMLODIPINE BESYLATE 2.5 MG/1
10 TABLET ORAL DAILY
Refills: 0 | Status: DISCONTINUED | OUTPATIENT
Start: 2023-06-29 | End: 2023-07-03

## 2023-06-29 RX ORDER — MAGNESIUM SULFATE 500 MG/ML
2 VIAL (ML) INJECTION ONCE
Refills: 0 | Status: COMPLETED | OUTPATIENT
Start: 2023-06-29 | End: 2023-06-29

## 2023-06-29 RX ORDER — FOLIC ACID 0.8 MG
1 TABLET ORAL DAILY
Refills: 0 | Status: DISCONTINUED | OUTPATIENT
Start: 2023-06-29 | End: 2023-07-03

## 2023-06-29 RX ORDER — POTASSIUM CHLORIDE 20 MEQ
40 PACKET (EA) ORAL ONCE
Refills: 0 | Status: COMPLETED | OUTPATIENT
Start: 2023-06-29 | End: 2023-06-29

## 2023-06-29 RX ORDER — POTASSIUM CHLORIDE 20 MEQ
10 PACKET (EA) ORAL
Refills: 0 | Status: COMPLETED | OUTPATIENT
Start: 2023-06-29 | End: 2023-06-29

## 2023-06-29 RX ORDER — POLYMYXIN B SULF/TRIMETHOPRIM 10000-1/ML
2 DROPS OPHTHALMIC (EYE)
Refills: 0 | Status: DISCONTINUED | OUTPATIENT
Start: 2023-06-29 | End: 2023-07-01

## 2023-06-29 RX ORDER — AMLODIPINE BESYLATE 2.5 MG/1
10 TABLET ORAL DAILY
Refills: 0 | Status: DISCONTINUED | OUTPATIENT
Start: 2023-06-29 | End: 2023-06-29

## 2023-06-29 RX ADMIN — Medication 2 DROP(S): at 17:44

## 2023-06-29 RX ADMIN — Medication 100 MILLIEQUIVALENT(S): at 10:56

## 2023-06-29 RX ADMIN — Medication 100 MILLIEQUIVALENT(S): at 08:58

## 2023-06-29 RX ADMIN — Medication 25 GRAM(S): at 15:22

## 2023-06-29 RX ADMIN — Medication 1.5 MILLIGRAM(S): at 13:32

## 2023-06-29 RX ADMIN — Medication 100 MILLIEQUIVALENT(S): at 11:24

## 2023-06-29 RX ADMIN — SODIUM CHLORIDE 75 MILLILITER(S): 9 INJECTION, SOLUTION INTRAVENOUS at 06:01

## 2023-06-29 RX ADMIN — Medication 2 DROP(S): at 06:00

## 2023-06-29 RX ADMIN — Medication 6 MILLIGRAM(S): at 21:28

## 2023-06-29 RX ADMIN — Medication 40 MILLIEQUIVALENT(S): at 13:32

## 2023-06-29 RX ADMIN — Medication 1.5 MILLIGRAM(S): at 17:56

## 2023-06-29 RX ADMIN — Medication 105 MILLIGRAM(S): at 11:24

## 2023-06-29 RX ADMIN — AMLODIPINE BESYLATE 10 MILLIGRAM(S): 2.5 TABLET ORAL at 11:23

## 2023-06-29 RX ADMIN — Medication 1.5 MILLIGRAM(S): at 09:57

## 2023-06-29 RX ADMIN — Medication 1.5 MILLIGRAM(S): at 06:00

## 2023-06-29 RX ADMIN — Medication 2 DROP(S): at 11:24

## 2023-06-29 RX ADMIN — Medication 1.5 MILLIGRAM(S): at 21:27

## 2023-06-29 RX ADMIN — Medication 1.5 MILLIGRAM(S): at 02:10

## 2023-06-29 NOTE — CHART NOTE - NSCHARTNOTEFT_GEN_A_CORE
Informed by nurse that patient had new eye discharge. Assessed patient at bedside. She reports eye redness, pain, and yellow-white thick discharge beginning in R eye during daytime 6/28, and spreading to L eye during evening of 6/28. She denies cough, subjective fever, sore throat, rhinorrhea, or other symptoms. Patient denies change to visual acuity. Patient reports history of recurrent bacterial conjunctivitis, most recently 2 years ago, which improves with antibacterial eye drops. Exam demonstrated pupils PERRL b/l, conjunctival injection b/l, eye pain in response to light, and white-yellow mucopurulent discharge b/l. Ordered trimethoprim-polymixin eye drops for bacterial conjunctivitis. Informed by nurse that patient had new eye discharge. Assessed patient at bedside. She reports eye redness, pain, and yellow-white thick discharge beginning in R eye during daytime 6/28, and spreading to L eye during evening of 6/28. She denies cough, subjective fever, sore throat, rhinorrhea, or other symptoms. Patient denies change to visual acuity. She does wear contact lenses, but removed them at beginning of hospitalization. Patient reports history of recurrent bacterial conjunctivitis, most recently 2 years ago, which improves with antibacterial eye drops. Exam demonstrated pupils PERRL b/l, conjunctival injection b/l, eye pain in response to light, and white-yellow mucopurulent discharge b/l. Ordered trimethoprim-polymixin eye drops for bacterial conjunctivitis.

## 2023-06-29 NOTE — PROGRESS NOTE ADULT - PROBLEM SELECTOR PLAN 8
Plan:  -Lovenox 40 mg. Can d/c if patient hemoglobin drops -Improving. Lactic improved to 1.6 from 6.6  -Due to alcohol intoxication; EtOH=348  -Lactic acid= 6.6    Plan:  -IV hydration   -Monitor VBG, pH if symptoms persist

## 2023-06-29 NOTE — PROGRESS NOTE ADULT - PROBLEM SELECTOR PLAN 1
Alcohol use disorder with dependence and h/o of withdrawal seizures;  High risk for DT and seizures;   Reviewed CT BRAIN,  06/08/2023  as above  EKG: QTc prolongation 474    Plan:  -[facility] protocol  -No role for oral Librium taper due to n/v  -Ativan taper ordered   -High risk  IV Ativan per [facility] protocol  -Oral Multivitamin held   -Thiamine IVPB 500mg x3 days  - Oral folic acid held  -SW consult requested  -Seizure precautions   -Bedrest   -Fall, aspiration precautions with HOB elev   -NPO for now due to n/v i/s/o intoxication with withdrawal  -IV hydration Alcohol use disorder with dependence and h/o of withdrawal seizures;  High risk for DT and seizures;   Reviewed CT BRAIN,  06/08/2023  as above  EKG: QTc prolongation 474  -No role for oral Librium taper due to n/v  -[facility] 5-6 today, driven by tremors    Plan:  -Continue CIWA protocol  -Continue Ativan taper   -High risk  IV Ativan per CIWA protocol  -Oral Multivitamin held   -Thiamine IVPB 500mg x3 days  - Oral folic acid held  -SW consult requested  -Seizure precautions   -Bedrest   -Fall, aspiration precautions with HOB elev   -NPO for now due to n/v i/s/o intoxication with withdrawal  -IV hydration

## 2023-06-29 NOTE — PROGRESS NOTE ADULT - PROBLEM SELECTOR PLAN 4
Plan:  -IV hydration  -Monitor VBG and lytes   -Zofran IV PRN  -Monitor QT for prolongation  -Avoid Reglan   -Repeat EKG in AM ordered, primary team to f/u Improving. Tolerating diet    Plan:  -IV hydration  -Monitor VBG and lytes   -Zofran IV PRN  -Monitor QT for prolongation  -Avoid Reglan   -Repeat EKG in AM ordered, primary team to f/u

## 2023-06-29 NOTE — PROGRESS NOTE ADULT - PROBLEM SELECTOR PLAN 3
Due to alcohol overuse  AST: ALT ratio 111: 49 appx  2: 1 c/w alcoholic liver disease   Recent INR wnl; Bilirubin wnl; DF low  -Not a candidate for steroids     Plan:  -Alcohol cessation   -Monitor LFTs daily

## 2023-06-29 NOTE — PROGRESS NOTE ADULT - PROBLEM SELECTOR PLAN 2
-Improving. Lactic improved to 1.6 from 6.6  -Due to alcohol intoxication; EtOH=348  -Lactic acid= 6.6    Plan:  -IV hydration   -Monitor VBG, pH if symptoms persist Plt now 59, normally between 100-200  DDx: alcohol related bone marrow suppression vs liver disease vs folate/B12 deficiency vs hit    Plan:  -daily folic acid  -ctm  -consider fibroscan

## 2023-06-29 NOTE — PROGRESS NOTE ADULT - SUBJECTIVE AND OBJECTIVE BOX
DATE OF SERVICE: 06-29-23 @ 12:04    Patient is a 47y old  Female who presents with a chief complaint of Nausea, vomiting (28 Jun 2023 08:04)      SUBJECTIVE / OVERNIGHT EVENTS:    MEDICATIONS  (STANDING):  amLODIPine   Tablet 10 milliGRAM(s) Oral daily  dextrose 5% + lactated ringers. 1000 milliLiter(s) (75 mL/Hr) IV Continuous <Continuous>  enoxaparin Injectable 40 milliGRAM(s) SubCutaneous every 24 hours  LORazepam   Injectable   IV Push   LORazepam   Injectable 1.5 milliGRAM(s) IV Push every 4 hours  melatonin 6 milliGRAM(s) Oral at bedtime  potassium chloride    Tablet ER 40 milliEquivalent(s) Oral once  thiamine IVPB 500 milliGRAM(s) IV Intermittent daily  trimethoprim/polymyxin Solution 2 Drop(s) Both EYES four times a day    MEDICATIONS  (PRN):  acetaminophen     Tablet .. 650 milliGRAM(s) Oral every 6 hours PRN Mild Pain (1 - 3), Moderate Pain (4 - 6)  LORazepam   Injectable 2 milliGRAM(s) IV Push every 2 hours PRN Symptom-triggered: each CIWA -Ar score 8 or GREATER  ondansetron Injectable 4 milliGRAM(s) IV Push every 8 hours PRN Nausea and/or Vomiting      Vital Signs Last 24 Hrs  T(C): 36.8 (29 Jun 2023 10:00), Max: 37 (28 Jun 2023 12:12)  T(F): 98.2 (29 Jun 2023 10:00), Max: 98.6 (28 Jun 2023 12:12)  HR: 85 (29 Jun 2023 11:22) (85 - 113)  BP: 143/99 (29 Jun 2023 11:22) (139/96 - 153/97)  BP(mean): --  RR: 18 (29 Jun 2023 10:00) (17 - 18)  SpO2: 98% (29 Jun 2023 10:00) (98% - 100%)    Parameters below as of 29 Jun 2023 10:00  Patient On (Oxygen Delivery Method): room air      CAPILLARY BLOOD GLUCOSE        I&O's Summary      PHYSICAL EXAM:  GENERAL: NAD, well-developed  HEAD:  Atraumatic, Normocephalic  EYES: EOMI, PERRLA, conjunctiva and sclera clear  NECK: Supple, No JVD  CHEST/LUNG: Clear to auscultation bilaterally; No wheeze  HEART: Regular rate and rhythm; No murmurs, rubs, or gallops  ABDOMEN: Soft, Nontender, Nondistended; Bowel sounds present  EXTREMITIES:  2+ Peripheral Pulses, No clubbing, cyanosis, or edema  PSYCH: AAOx3  NEUROLOGY: non-focal  SKIN: No rashes or lesions    LABS:                        9.1    5.20  )-----------( 64       ( 29 Jun 2023 07:36 )             31.1     06-29    136  |  97<L>  |  5<L>  ----------------------------<  152<H>  3.1<L>   |  27  |  0.49<L>    Ca    9.4      29 Jun 2023 07:36  Phos  3.3     06-29  Mg     1.60     06-29    TPro  6.7  /  Alb  3.7  /  TBili  0.6  /  DBili  <0.2  /  AST  49<H>  /  ALT  31  /  AlkPhos  65  06-29          Urinalysis Basic - ( 29 Jun 2023 07:36 )    Color: x / Appearance: x / SG: x / pH: x  Gluc: 152 mg/dL / Ketone: x  / Bili: x / Urobili: x   Blood: x / Protein: x / Nitrite: x   Leuk Esterase: x / RBC: x / WBC x   Sq Epi: x / Non Sq Epi: x / Bacteria: x        RADIOLOGY & ADDITIONAL TESTS:    Imaging Personally Reviewed:    Consultant(s) Notes Reviewed:      Care Discussed with Consultants/Other Providers:   DATE OF SERVICE: 06-29-23 @ 12:04    Patient is a 47y old  Female who presents with a chief complaint of Nausea, vomiting (28 Jun 2023 08:04)      SUBJECTIVE / OVERNIGHT EVENTS: Patient developed bilateral conjunctivitis overnight and was started on trimethoprim/polymyxin eye drops. Patient reports feeling much better with regards to the shaking, sweating and nausea. Tolerating diet.    MEDICATIONS  (STANDING):  amLODIPine   Tablet 10 milliGRAM(s) Oral daily  dextrose 5% + lactated ringers. 1000 milliLiter(s) (75 mL/Hr) IV Continuous <Continuous>  enoxaparin Injectable 40 milliGRAM(s) SubCutaneous every 24 hours  LORazepam   Injectable   IV Push   LORazepam   Injectable 1.5 milliGRAM(s) IV Push every 4 hours  melatonin 6 milliGRAM(s) Oral at bedtime  potassium chloride    Tablet ER 40 milliEquivalent(s) Oral once  thiamine IVPB 500 milliGRAM(s) IV Intermittent daily  trimethoprim/polymyxin Solution 2 Drop(s) Both EYES four times a day    MEDICATIONS  (PRN):  acetaminophen     Tablet .. 650 milliGRAM(s) Oral every 6 hours PRN Mild Pain (1 - 3), Moderate Pain (4 - 6)  LORazepam   Injectable 2 milliGRAM(s) IV Push every 2 hours PRN Symptom-triggered: each CIWA -Ar score 8 or GREATER  ondansetron Injectable 4 milliGRAM(s) IV Push every 8 hours PRN Nausea and/or Vomiting      Vital Signs Last 24 Hrs  T(C): 36.8 (29 Jun 2023 10:00), Max: 37 (28 Jun 2023 12:12)  T(F): 98.2 (29 Jun 2023 10:00), Max: 98.6 (28 Jun 2023 12:12)  HR: 85 (29 Jun 2023 11:22) (85 - 113)  BP: 143/99 (29 Jun 2023 11:22) (139/96 - 153/97)  BP(mean): --  RR: 18 (29 Jun 2023 10:00) (17 - 18)  SpO2: 98% (29 Jun 2023 10:00) (98% - 100%)    Parameters below as of 29 Jun 2023 10:00  Patient On (Oxygen Delivery Method): room air      CAPILLARY BLOOD GLUCOSE        I&O's Summary      PHYSICAL EXAM:  GENERAL: NAD, well-developed  HEAD:  Atraumatic, Normocephalic  EYES: EOMI, PERRLA, conjunctiva and sclera clear  NECK: Supple, No JVD  CHEST/LUNG: Clear to auscultation bilaterally; No wheeze  HEART: Regular rate and rhythm; No murmurs, rubs, or gallops  ABDOMEN: Soft, Nontender, Nondistended; Bowel sounds present  EXTREMITIES:  2+ Peripheral Pulses, No clubbing, cyanosis, or edema  PSYCH: AAOx3  NEUROLOGY: non-focal  SKIN: No rashes or lesions    LABS:                        9.1    5.20  )-----------( 64       ( 29 Jun 2023 07:36 )             31.1     06-29    136  |  97<L>  |  5<L>  ----------------------------<  152<H>  3.1<L>   |  27  |  0.49<L>    Ca    9.4      29 Jun 2023 07:36  Phos  3.3     06-29  Mg     1.60     06-29    TPro  6.7  /  Alb  3.7  /  TBili  0.6  /  DBili  <0.2  /  AST  49<H>  /  ALT  31  /  AlkPhos  65  06-29          Urinalysis Basic - ( 29 Jun 2023 07:36 )    Color: x / Appearance: x / SG: x / pH: x  Gluc: 152 mg/dL / Ketone: x  / Bili: x / Urobili: x   Blood: x / Protein: x / Nitrite: x   Leuk Esterase: x / RBC: x / WBC x   Sq Epi: x / Non Sq Epi: x / Bacteria: x        RADIOLOGY & ADDITIONAL TESTS:    Imaging Personally Reviewed:    Consultant(s) Notes Reviewed:      Care Discussed with Consultants/Other Providers:

## 2023-06-30 LAB
ALBUMIN SERPL ELPH-MCNC: 4.2 G/DL — SIGNIFICANT CHANGE UP (ref 3.3–5)
ALP SERPL-CCNC: 71 U/L — SIGNIFICANT CHANGE UP (ref 40–120)
ALT FLD-CCNC: 31 U/L — SIGNIFICANT CHANGE UP (ref 4–33)
ANION GAP SERPL CALC-SCNC: 15 MMOL/L — HIGH (ref 7–14)
AST SERPL-CCNC: 51 U/L — HIGH (ref 4–32)
BASOPHILS # BLD AUTO: 0.04 K/UL — SIGNIFICANT CHANGE UP (ref 0–0.2)
BASOPHILS NFR BLD AUTO: 0.9 % — SIGNIFICANT CHANGE UP (ref 0–2)
BILIRUB SERPL-MCNC: 0.5 MG/DL — SIGNIFICANT CHANGE UP (ref 0.2–1.2)
BLD GP AB SCN SERPL QL: NEGATIVE — SIGNIFICANT CHANGE UP
BUN SERPL-MCNC: 9 MG/DL — SIGNIFICANT CHANGE UP (ref 7–23)
CALCIUM SERPL-MCNC: 9.9 MG/DL — SIGNIFICANT CHANGE UP (ref 8.4–10.5)
CHLORIDE SERPL-SCNC: 98 MMOL/L — SIGNIFICANT CHANGE UP (ref 98–107)
CO2 SERPL-SCNC: 22 MMOL/L — SIGNIFICANT CHANGE UP (ref 22–31)
CREAT SERPL-MCNC: 0.49 MG/DL — LOW (ref 0.5–1.3)
EGFR: 115 ML/MIN/1.73M2 — SIGNIFICANT CHANGE UP
EOSINOPHIL # BLD AUTO: 0.33 K/UL — SIGNIFICANT CHANGE UP (ref 0–0.5)
EOSINOPHIL NFR BLD AUTO: 7.6 % — HIGH (ref 0–6)
GLUCOSE SERPL-MCNC: 122 MG/DL — HIGH (ref 70–99)
HCT VFR BLD CALC: 33.1 % — LOW (ref 34.5–45)
HGB BLD-MCNC: 9.6 G/DL — LOW (ref 11.5–15.5)
IANC: 2.8 K/UL — SIGNIFICANT CHANGE UP (ref 1.8–7.4)
IMM GRANULOCYTES NFR BLD AUTO: 0.5 % — SIGNIFICANT CHANGE UP (ref 0–0.9)
INR BLD: 1.06 RATIO — SIGNIFICANT CHANGE UP (ref 0.88–1.16)
LYMPHOCYTES # BLD AUTO: 0.89 K/UL — LOW (ref 1–3.3)
LYMPHOCYTES # BLD AUTO: 20.4 % — SIGNIFICANT CHANGE UP (ref 13–44)
MAGNESIUM SERPL-MCNC: 1.7 MG/DL — SIGNIFICANT CHANGE UP (ref 1.6–2.6)
MCHC RBC-ENTMCNC: 21.3 PG — LOW (ref 27–34)
MCHC RBC-ENTMCNC: 29 GM/DL — LOW (ref 32–36)
MCV RBC AUTO: 73.6 FL — LOW (ref 80–100)
MONOCYTES # BLD AUTO: 0.28 K/UL — SIGNIFICANT CHANGE UP (ref 0–0.9)
MONOCYTES NFR BLD AUTO: 6.4 % — SIGNIFICANT CHANGE UP (ref 2–14)
NEUTROPHILS # BLD AUTO: 2.8 K/UL — SIGNIFICANT CHANGE UP (ref 1.8–7.4)
NEUTROPHILS NFR BLD AUTO: 64.2 % — SIGNIFICANT CHANGE UP (ref 43–77)
NRBC # BLD: 0 /100 WBCS — SIGNIFICANT CHANGE UP (ref 0–0)
NRBC # FLD: 0 K/UL — SIGNIFICANT CHANGE UP (ref 0–0)
PHOSPHATE SERPL-MCNC: 3.1 MG/DL — SIGNIFICANT CHANGE UP (ref 2.5–4.5)
PLATELET # BLD AUTO: 70 K/UL — LOW (ref 150–400)
POTASSIUM SERPL-MCNC: 3.8 MMOL/L — SIGNIFICANT CHANGE UP (ref 3.5–5.3)
POTASSIUM SERPL-SCNC: 3.8 MMOL/L — SIGNIFICANT CHANGE UP (ref 3.5–5.3)
PROT SERPL-MCNC: 7.6 G/DL — SIGNIFICANT CHANGE UP (ref 6–8.3)
PROTHROM AB SERPL-ACNC: 12.3 SEC — SIGNIFICANT CHANGE UP (ref 10.5–13.4)
RBC # BLD: 4.5 M/UL — SIGNIFICANT CHANGE UP (ref 3.8–5.2)
RBC # FLD: 23.6 % — HIGH (ref 10.3–14.5)
RH IG SCN BLD-IMP: POSITIVE — SIGNIFICANT CHANGE UP
SODIUM SERPL-SCNC: 135 MMOL/L — SIGNIFICANT CHANGE UP (ref 135–145)
WBC # BLD: 4.36 K/UL — SIGNIFICANT CHANGE UP (ref 3.8–10.5)
WBC # FLD AUTO: 4.36 K/UL — SIGNIFICANT CHANGE UP (ref 3.8–10.5)

## 2023-06-30 PROCEDURE — 99232 SBSQ HOSP IP/OBS MODERATE 35: CPT

## 2023-06-30 RX ORDER — POTASSIUM CHLORIDE 20 MEQ
20 PACKET (EA) ORAL ONCE
Refills: 0 | Status: COMPLETED | OUTPATIENT
Start: 2023-06-30 | End: 2023-06-30

## 2023-06-30 RX ORDER — TRAZODONE HCL 50 MG
50 TABLET ORAL AT BEDTIME
Refills: 0 | Status: DISCONTINUED | OUTPATIENT
Start: 2023-06-30 | End: 2023-07-03

## 2023-06-30 RX ADMIN — Medication 20 MILLIEQUIVALENT(S): at 17:03

## 2023-06-30 RX ADMIN — Medication 1 MILLIGRAM(S): at 18:28

## 2023-06-30 RX ADMIN — Medication 2 DROP(S): at 22:43

## 2023-06-30 RX ADMIN — AMLODIPINE BESYLATE 10 MILLIGRAM(S): 2.5 TABLET ORAL at 05:25

## 2023-06-30 RX ADMIN — Medication 2 DROP(S): at 13:48

## 2023-06-30 RX ADMIN — Medication 1 MILLIGRAM(S): at 13:59

## 2023-06-30 RX ADMIN — Medication 1 MILLIGRAM(S): at 13:48

## 2023-06-30 RX ADMIN — Medication 105 MILLIGRAM(S): at 14:00

## 2023-06-30 RX ADMIN — Medication 1 MILLIGRAM(S): at 10:02

## 2023-06-30 RX ADMIN — Medication 2 DROP(S): at 01:32

## 2023-06-30 RX ADMIN — Medication 1 MILLIGRAM(S): at 22:42

## 2023-06-30 RX ADMIN — Medication 50 MILLIGRAM(S): at 22:41

## 2023-06-30 RX ADMIN — Medication 1 MILLIGRAM(S): at 01:29

## 2023-06-30 RX ADMIN — Medication 1 MILLIGRAM(S): at 05:25

## 2023-06-30 RX ADMIN — Medication 6 MILLIGRAM(S): at 22:43

## 2023-06-30 RX ADMIN — Medication 2 DROP(S): at 05:27

## 2023-06-30 RX ADMIN — Medication 2 DROP(S): at 17:03

## 2023-06-30 NOTE — DIETITIAN INITIAL EVALUATION ADULT - PERTINENT MEDS FT
MEDICATIONS  (STANDING):  amLODIPine   Tablet 10 milliGRAM(s) Oral daily  dextrose 5% + lactated ringers. 1000 milliLiter(s) (75 mL/Hr) IV Continuous <Continuous>  folic acid 1 milliGRAM(s) Oral daily  LORazepam   Injectable   IV Push   LORazepam   Injectable 1 milliGRAM(s) IV Push every 4 hours  melatonin 6 milliGRAM(s) Oral at bedtime  thiamine IVPB 500 milliGRAM(s) IV Intermittent daily  trimethoprim/polymyxin Solution 2 Drop(s) Both EYES four times a day    MEDICATIONS  (PRN):  acetaminophen     Tablet .. 650 milliGRAM(s) Oral every 6 hours PRN Mild Pain (1 - 3), Moderate Pain (4 - 6)  LORazepam   Injectable 2 milliGRAM(s) IV Push every 2 hours PRN Symptom-triggered: each CIWA -Ar score 8 or GREATER  ondansetron Injectable 4 milliGRAM(s) IV Push every 8 hours PRN Nausea and/or Vomiting  traZODone 50 milliGRAM(s) Oral at bedtime PRN insomnia

## 2023-06-30 NOTE — DIETITIAN INITIAL EVALUATION ADULT - PERTINENT LABORATORY DATA
06-29    136  |  99  |  3<L>  ----------------------------<  152<H>  4.1   |  23  |  0.50    Ca    9.5      29 Jun 2023 16:22  Phos  3.3     06-29  Mg     1.60     06-29    TPro  6.7  /  Alb  3.7  /  TBili  0.6  /  DBili  <0.2  /  AST  49<H>  /  ALT  31  /  AlkPhos  65  06-29

## 2023-06-30 NOTE — DIETITIAN INITIAL EVALUATION ADULT - PROBLEM SELECTOR PLAN 3
Due to alcohol overuse  AST: ALT ratio 111: 49 appx  2: 1 c/w alcoholic liver disease   Recent INR wnl; Bilirubin wnl; DF low  -Not a candidate for steroids   -Alcohol cessation   -Monitor LFTs daily

## 2023-06-30 NOTE — DIETITIAN INITIAL EVALUATION ADULT - ORAL INTAKE PTA/DIET HISTORY
Patient seen for assessment. Not too receptive to interview at this time. No issues w/ appetite reported.

## 2023-06-30 NOTE — PROGRESS NOTE ADULT - PROBLEM SELECTOR PLAN 1
Alcohol use disorder with dependence and h/o of withdrawal seizures;  High risk for DT and seizures;   Reviewed CT BRAIN,  06/08/2023  as above  EKG: QTc prolongation 474  -No role for oral Librium taper due to n/v  -[facility] 5-6 today, driven by tremors    Plan:  -Continue CIWA protocol  -Continue Ativan taper   -High risk  IV Ativan per CIWA protocol  -Oral Multivitamin held   -Thiamine IVPB 500mg x3 days  - Oral folic acid held  -SW consult requested  -Seizure precautions   -Bedrest   -Fall, aspiration precautions with HOB elev   -NPO for now due to n/v i/s/o intoxication with withdrawal  -IV hydration Alcohol use disorder with dependence and h/o of withdrawal seizures;  High risk for DT and seizures;   Reviewed CT BRAIN,  06/08/2023  as above  EKG: QTc prolongation 474  -CIWA 4-5 today, driven by tremors    Plan:  -Continue CIWA protocol  -Continue Ativan taper   -High risk  IV Ativan per CIWA protocol  -Oral Multivitamin held   -Thiamine IVPB 500mg x3 days  - Oral folic acid held  -SW consult requested  -Seizure precautions   -Bedrest   -Fall, aspiration precautions with HOB elev   -NPO for now due to n/v i/s/o intoxication with withdrawal  -IV hydration

## 2023-06-30 NOTE — PROGRESS NOTE ADULT - PROBLEM SELECTOR PLAN 2
Plt now 59, normally between 100-200  DDx: alcohol related bone marrow suppression vs liver disease vs folate/B12 deficiency vs hit    Plan:  -daily folic acid  -ctm  -consider fibroscan Plt now 59, normally between 100-200  DDx: alcohol related bone marrow suppression vs liver disease vs folate/B12 deficiency   unlikely hit as patient has refused prophylactic lovenox    Plan:  -daily folic acid  -hold lovenox  -ctm

## 2023-06-30 NOTE — DIETITIAN INITIAL EVALUATION ADULT - ADD RECOMMEND
Continue diet as ordered. Continue w/ micronutrient supplementation. Nutrition department will provide Orgain 1x daily (220 kcal, 16 g pro). Document PO intake to monitor trend.

## 2023-06-30 NOTE — DIETITIAN INITIAL EVALUATION ADULT - OTHER INFO
47 year old female with a PMH of HTN, alcohol use disorder, alcoholic pancreatitis, chronic anemia secondary to menorrhagia; a/w alcohol withdrawal i/s/o alcohol dependence and tolerance per chart.    Patient stating good appetite, however intakes are % per RN flow sheet. Amenable to Orgain (220 kcal, 16 g pro). No GI distress or chewing/swallowing difficulties reported. Has no food allergies. UBW is between 138-140 lbs. ABW is 135.6 lbs. (6/28) per chart, will monitor. No edema or pressure injuries noted per RN flow sheet.

## 2023-06-30 NOTE — PROGRESS NOTE ADULT - SUBJECTIVE AND OBJECTIVE BOX
DATE OF SERVICE: 06-30-23 @ 07:20    Patient is a 47y old  Female who presents with a chief complaint of Nausea, vomiting (29 Jun 2023 12:04)      SUBJECTIVE / OVERNIGHT EVENTS:    MEDICATIONS  (STANDING):  amLODIPine   Tablet 10 milliGRAM(s) Oral daily  dextrose 5% + lactated ringers. 1000 milliLiter(s) (75 mL/Hr) IV Continuous <Continuous>  enoxaparin Injectable 40 milliGRAM(s) SubCutaneous every 24 hours  folic acid 1 milliGRAM(s) Oral daily  LORazepam   Injectable   IV Push   LORazepam   Injectable 1 milliGRAM(s) IV Push every 4 hours  melatonin 6 milliGRAM(s) Oral at bedtime  thiamine IVPB 500 milliGRAM(s) IV Intermittent daily  trimethoprim/polymyxin Solution 2 Drop(s) Both EYES four times a day    MEDICATIONS  (PRN):  acetaminophen     Tablet .. 650 milliGRAM(s) Oral every 6 hours PRN Mild Pain (1 - 3), Moderate Pain (4 - 6)  LORazepam   Injectable 2 milliGRAM(s) IV Push every 2 hours PRN Symptom-triggered: each CIWA -Ar score 8 or GREATER  ondansetron Injectable 4 milliGRAM(s) IV Push every 8 hours PRN Nausea and/or Vomiting      Vital Signs Last 24 Hrs  T(C): 36.6 (30 Jun 2023 06:00), Max: 36.8 (29 Jun 2023 10:00)  T(F): 97.9 (30 Jun 2023 06:00), Max: 98.3 (29 Jun 2023 18:00)  HR: 83 (30 Jun 2023 06:00) (83 - 98)  BP: 125/98 (30 Jun 2023 06:00) (125/98 - 146/103)  BP(mean): --  RR: 16 (30 Jun 2023 06:00) (16 - 18)  SpO2: 100% (30 Jun 2023 06:00) (98% - 100%)    Parameters below as of 30 Jun 2023 06:00  Patient On (Oxygen Delivery Method): room air      CAPILLARY BLOOD GLUCOSE        I&O's Summary      PHYSICAL EXAM:  GENERAL: NAD, well-developed  HEAD:  Atraumatic, Normocephalic  EYES: EOMI, PERRLA, conjunctiva and sclera clear  NECK: Supple, No JVD  CHEST/LUNG: Clear to auscultation bilaterally; No wheeze  HEART: Regular rate and rhythm; No murmurs, rubs, or gallops  ABDOMEN: Soft, Nontender, Nondistended; Bowel sounds present  EXTREMITIES:  2+ Peripheral Pulses, No clubbing, cyanosis, or edema  PSYCH: AAOx3  NEUROLOGY: non-focal  SKIN: No rashes or lesions    LABS:                        9.1    5.20  )-----------( 64       ( 29 Jun 2023 07:36 )             31.1     06-29    136  |  99  |  3<L>  ----------------------------<  152<H>  4.1   |  23  |  0.50    Ca    9.5      29 Jun 2023 16:22  Phos  3.3     06-29  Mg     1.60     06-29    TPro  6.7  /  Alb  3.7  /  TBili  0.6  /  DBili  <0.2  /  AST  49<H>  /  ALT  31  /  AlkPhos  65  06-29          Urinalysis Basic - ( 29 Jun 2023 16:22 )    Color: x / Appearance: x / SG: x / pH: x  Gluc: 152 mg/dL / Ketone: x  / Bili: x / Urobili: x   Blood: x / Protein: x / Nitrite: x   Leuk Esterase: x / RBC: x / WBC x   Sq Epi: x / Non Sq Epi: x / Bacteria: x        RADIOLOGY & ADDITIONAL TESTS:    Imaging Personally Reviewed:    Consultant(s) Notes Reviewed:      Care Discussed with Consultants/Other Providers:   DATE OF SERVICE: 06-30-23 @ 07:20    Patient is a 47y old  Female who presents with a chief complaint of Nausea, vomiting (29 Jun 2023 12:04)    SUBJECTIVE / OVERNIGHT EVENTS: No acute events overnight. Feels better compared to the day prior regarding her withdrawals. Reports less irritation in her eyes. Unable to sleep.    MEDICATIONS  (STANDING):  amLODIPine   Tablet 10 milliGRAM(s) Oral daily  dextrose 5% + lactated ringers. 1000 milliLiter(s) (75 mL/Hr) IV Continuous <Continuous>  enoxaparin Injectable 40 milliGRAM(s) SubCutaneous every 24 hours  folic acid 1 milliGRAM(s) Oral daily  LORazepam   Injectable   IV Push   LORazepam   Injectable 1 milliGRAM(s) IV Push every 4 hours  melatonin 6 milliGRAM(s) Oral at bedtime  thiamine IVPB 500 milliGRAM(s) IV Intermittent daily  trimethoprim/polymyxin Solution 2 Drop(s) Both EYES four times a day    MEDICATIONS  (PRN):  acetaminophen     Tablet .. 650 milliGRAM(s) Oral every 6 hours PRN Mild Pain (1 - 3), Moderate Pain (4 - 6)  LORazepam   Injectable 2 milliGRAM(s) IV Push every 2 hours PRN Symptom-triggered: each CIWA -Ar score 8 or GREATER  ondansetron Injectable 4 milliGRAM(s) IV Push every 8 hours PRN Nausea and/or Vomiting      Vital Signs Last 24 Hrs  T(C): 36.6 (30 Jun 2023 06:00), Max: 36.8 (29 Jun 2023 10:00)  T(F): 97.9 (30 Jun 2023 06:00), Max: 98.3 (29 Jun 2023 18:00)  HR: 83 (30 Jun 2023 06:00) (83 - 98)  BP: 125/98 (30 Jun 2023 06:00) (125/98 - 146/103)  BP(mean): --  RR: 16 (30 Jun 2023 06:00) (16 - 18)  SpO2: 100% (30 Jun 2023 06:00) (98% - 100%)    Parameters below as of 30 Jun 2023 06:00  Patient On (Oxygen Delivery Method): room air      CAPILLARY BLOOD GLUCOSE        I&O's Summary      PHYSICAL EXAM:  GENERAL: NAD, well-developed  HEAD:  Atraumatic, Normocephalic  EYES: EOMI, PERRLA, conjunctiva and sclera clear  NECK: Supple, No JVD  CHEST/LUNG: Clear to auscultation bilaterally; No wheeze  HEART: Regular rate and rhythm; No murmurs, rubs, or gallops  ABDOMEN: Soft, Nontender, Nondistended; Bowel sounds present  EXTREMITIES:  2+ Peripheral Pulses, No clubbing, cyanosis, or edema  PSYCH: AAOx3  NEUROLOGY: non-focal  SKIN: No rashes or lesions    LABS:                        9.1    5.20  )-----------( 64       ( 29 Jun 2023 07:36 )             31.1     06-29    136  |  99  |  3<L>  ----------------------------<  152<H>  4.1   |  23  |  0.50    Ca    9.5      29 Jun 2023 16:22  Phos  3.3     06-29  Mg     1.60     06-29    TPro  6.7  /  Alb  3.7  /  TBili  0.6  /  DBili  <0.2  /  AST  49<H>  /  ALT  31  /  AlkPhos  65  06-29          Urinalysis Basic - ( 29 Jun 2023 16:22 )    Color: x / Appearance: x / SG: x / pH: x  Gluc: 152 mg/dL / Ketone: x  / Bili: x / Urobili: x   Blood: x / Protein: x / Nitrite: x   Leuk Esterase: x / RBC: x / WBC x   Sq Epi: x / Non Sq Epi: x / Bacteria: x        RADIOLOGY & ADDITIONAL TESTS:    Imaging Personally Reviewed:    Consultant(s) Notes Reviewed:      Care Discussed with Consultants/Other Providers:

## 2023-06-30 NOTE — PROGRESS NOTE ADULT - PROBLEM SELECTOR PLAN 8
-Improving. Lactic improved to 1.6 from 6.6  -Due to alcohol intoxication; EtOH=348  -Lactic acid= 6.6    Plan:  -IV hydration   -Monitor VBG, pH if symptoms persist

## 2023-06-30 NOTE — PROGRESS NOTE ADULT - PROBLEM SELECTOR PLAN 4
Improving. Tolerating diet    Plan:  -IV hydration  -Monitor VBG and lytes   -Zofran IV PRN  -Monitor QT for prolongation  -Avoid Reglan   -Repeat EKG in AM ordered, primary team to f/u

## 2023-06-30 NOTE — DIETITIAN INITIAL EVALUATION ADULT - PROBLEM SELECTOR PLAN 1
Alcohol use disorder with dependence and h/o of withdrawal seizures;  High risk for DT and seizures;   Reviewed CT BRAIN,  06/08/2023  as above  EKG: QTc prolongation 474  -[facility] protocol  -No role for oral Librium taper due to n/v  -Ativan taper ordered   -High risk  IV Ativan per [facility] protocol  -Oral Multivitamin held   -Thiamine IVPB 500mg x3 days  - Oral folic acid held  -SW consult requested  -Seizure precautions   -Bedrest   -Fall, aspiration precautions with HOB elev   -NPO for now due to n/v i/s/o intoxication with withdrawal  -IV hydration

## 2023-06-30 NOTE — DIETITIAN INITIAL EVALUATION ADULT - PROBLEM SELECTOR PLAN 4
-IV hydration  -Monitor VBG and lytes   -Zofran IV PRN  -Monitor QT for prolongation  -Avoid Reglan   -Repeat EKG in AM ordered, primary team to f/u

## 2023-07-01 DIAGNOSIS — H10.9 UNSPECIFIED CONJUNCTIVITIS: ICD-10-CM

## 2023-07-01 DIAGNOSIS — G47.00 INSOMNIA, UNSPECIFIED: ICD-10-CM

## 2023-07-01 PROCEDURE — 99232 SBSQ HOSP IP/OBS MODERATE 35: CPT | Mod: GC

## 2023-07-01 RX ORDER — NEOMYCIN SULFATE, POLYMYXIN B SULFATE AND HYDROCORTISONE 3.5; 10000; 1 MG/ML; [USP'U]/ML; MG/ML
2 SUSPENSION OPHTHALMIC
Refills: 0 | Status: DISCONTINUED | OUTPATIENT
Start: 2023-07-01 | End: 2023-07-01

## 2023-07-01 RX ORDER — NEOMYCIN/POLYMYXIN B/DEXAMETHA 0.1 %
2 SUSPENSION, DROPS(FINAL DOSAGE FORM)(ML) OPHTHALMIC (EYE)
Refills: 0 | Status: DISCONTINUED | OUTPATIENT
Start: 2023-07-01 | End: 2023-07-03

## 2023-07-01 RX ADMIN — Medication 0.5 MILLIGRAM(S): at 22:40

## 2023-07-01 RX ADMIN — Medication 50 MILLIGRAM(S): at 22:40

## 2023-07-01 RX ADMIN — Medication 2 DROP(S): at 13:48

## 2023-07-01 RX ADMIN — Medication 0.5 MILLIGRAM(S): at 10:02

## 2023-07-01 RX ADMIN — Medication 0.5 MILLIGRAM(S): at 06:02

## 2023-07-01 RX ADMIN — Medication 2 DROP(S): at 18:08

## 2023-07-01 RX ADMIN — Medication 6 MILLIGRAM(S): at 22:41

## 2023-07-01 RX ADMIN — Medication 0.5 MILLIGRAM(S): at 13:47

## 2023-07-01 RX ADMIN — Medication 0.5 MILLIGRAM(S): at 18:08

## 2023-07-01 RX ADMIN — Medication 1 MILLIGRAM(S): at 13:47

## 2023-07-01 RX ADMIN — Medication 2 DROP(S): at 22:41

## 2023-07-01 RX ADMIN — Medication 2 DROP(S): at 06:03

## 2023-07-01 RX ADMIN — Medication 0.5 MILLIGRAM(S): at 02:41

## 2023-07-01 NOTE — PROGRESS NOTE ADULT - PROBLEM SELECTOR PLAN 5
Hypokalemia and hypomagnesemia;  -Monitor lytes daily  -s/p Supp to K > 4 and Mag > 2 Due to alcohol overuse  AST: ALT ratio 111: 49 appx  2: 1 c/w alcoholic liver disease   Recent INR wnl; Bilirubin wnl; DF low  -Not a candidate for steroids     Plan:  -Alcohol cessation   -Monitor LFTs daily

## 2023-07-01 NOTE — PROGRESS NOTE ADULT - SUBJECTIVE AND OBJECTIVE BOX
DATE OF SERVICE: 07-01-23 @ 08:20    Patient is a 47y old  Female who presents with a chief complaint of Alcohol dependence with withdrawal     (30 Jun 2023 11:42)      SUBJECTIVE / OVERNIGHT EVENTS:    MEDICATIONS  (STANDING):  amLODIPine   Tablet 10 milliGRAM(s) Oral daily  folic acid 1 milliGRAM(s) Oral daily  LORazepam   Injectable   IV Push   LORazepam   Injectable 0.5 milliGRAM(s) IV Push every 4 hours  melatonin 6 milliGRAM(s) Oral at bedtime  trimethoprim/polymyxin Solution 2 Drop(s) Both EYES four times a day    MEDICATIONS  (PRN):  acetaminophen     Tablet .. 650 milliGRAM(s) Oral every 6 hours PRN Mild Pain (1 - 3), Moderate Pain (4 - 6)  LORazepam   Injectable 2 milliGRAM(s) IV Push every 2 hours PRN Symptom-triggered: each CIWA -Ar score 8 or GREATER  ondansetron Injectable 4 milliGRAM(s) IV Push every 8 hours PRN Nausea and/or Vomiting  traZODone 50 milliGRAM(s) Oral at bedtime PRN insomnia      Vital Signs Last 24 Hrs  T(C): 36.8 (01 Jul 2023 05:42), Max: 37.2 (30 Jun 2023 22:40)  T(F): 98.3 (01 Jul 2023 05:42), Max: 99 (30 Jun 2023 22:40)  HR: 93 (01 Jul 2023 05:42) (89 - 96)  BP: 115/83 (01 Jul 2023 05:42) (115/83 - 137/92)  BP(mean): --  RR: 18 (01 Jul 2023 05:42) (17 - 18)  SpO2: 100% (01 Jul 2023 05:42) (100% - 100%)    Parameters below as of 01 Jul 2023 05:42  Patient On (Oxygen Delivery Method): room air      CAPILLARY BLOOD GLUCOSE        I&O's Summary      PHYSICAL EXAM:  GENERAL: NAD, well-developed  HEAD:  Atraumatic, Normocephalic  EYES: EOMI, PERRLA, conjunctiva and sclera clear  NECK: Supple, No JVD  CHEST/LUNG: Clear to auscultation bilaterally; No wheeze  HEART: Regular rate and rhythm; No murmurs, rubs, or gallops  ABDOMEN: Soft, Nontender, Nondistended; Bowel sounds present  EXTREMITIES:  2+ Peripheral Pulses, No clubbing, cyanosis, or edema  PSYCH: AAOx3  NEUROLOGY: non-focal  SKIN: No rashes or lesions    LABS:                        9.6    4.36  )-----------( 70       ( 30 Jun 2023 12:11 )             33.1     06-30    135  |  98  |  9   ----------------------------<  122<H>  3.8   |  22  |  0.49<L>    Ca    9.9      30 Jun 2023 12:11  Phos  3.1     06-30  Mg     1.70     06-30    TPro  7.6  /  Alb  4.2  /  TBili  0.5  /  DBili  x   /  AST  51<H>  /  ALT  31  /  AlkPhos  71  06-30    PT/INR - ( 30 Jun 2023 13:10 )   PT: 12.3 sec;   INR: 1.06 ratio               Urinalysis Basic - ( 30 Jun 2023 12:11 )    Color: x / Appearance: x / SG: x / pH: x  Gluc: 122 mg/dL / Ketone: x  / Bili: x / Urobili: x   Blood: x / Protein: x / Nitrite: x   Leuk Esterase: x / RBC: x / WBC x   Sq Epi: x / Non Sq Epi: x / Bacteria: x        RADIOLOGY & ADDITIONAL TESTS:    Imaging Personally Reviewed:    Consultant(s) Notes Reviewed:      Care Discussed with Consultants/Other Providers:   DATE OF SERVICE: 07-01-23 @ 08:20    Patient is a 47y old  Female who presents with a chief complaint of Alcohol dependence with withdrawal     (30 Jun 2023 11:42)      SUBJECTIVE / OVERNIGHT EVENTS: Tremors and withdrawal symptoms improved. Reported better sleep with the trazodone. Pain in left eye    MEDICATIONS  (STANDING):  amLODIPine   Tablet 10 milliGRAM(s) Oral daily  folic acid 1 milliGRAM(s) Oral daily  LORazepam   Injectable   IV Push   LORazepam   Injectable 0.5 milliGRAM(s) IV Push every 4 hours  melatonin 6 milliGRAM(s) Oral at bedtime  trimethoprim/polymyxin Solution 2 Drop(s) Both EYES four times a day    MEDICATIONS  (PRN):  acetaminophen     Tablet .. 650 milliGRAM(s) Oral every 6 hours PRN Mild Pain (1 - 3), Moderate Pain (4 - 6)  LORazepam   Injectable 2 milliGRAM(s) IV Push every 2 hours PRN Symptom-triggered: each CIWA -Ar score 8 or GREATER  ondansetron Injectable 4 milliGRAM(s) IV Push every 8 hours PRN Nausea and/or Vomiting  traZODone 50 milliGRAM(s) Oral at bedtime PRN insomnia      Vital Signs Last 24 Hrs  T(C): 36.8 (01 Jul 2023 05:42), Max: 37.2 (30 Jun 2023 22:40)  T(F): 98.3 (01 Jul 2023 05:42), Max: 99 (30 Jun 2023 22:40)  HR: 93 (01 Jul 2023 05:42) (89 - 96)  BP: 115/83 (01 Jul 2023 05:42) (115/83 - 137/92)  BP(mean): --  RR: 18 (01 Jul 2023 05:42) (17 - 18)  SpO2: 100% (01 Jul 2023 05:42) (100% - 100%)    Parameters below as of 01 Jul 2023 05:42  Patient On (Oxygen Delivery Method): room air      CAPILLARY BLOOD GLUCOSE        I&O's Summary      PHYSICAL EXAM:  GENERAL: NAD, well-developed  HEAD:  Atraumatic, Normocephalic  EYES: EOMI, PERRLA, conjuncitval injection on left eye, none on right  CHEST/LUNG: Clear to auscultation bilaterally; No wheeze  HEART: Regular rate and rhythm; No murmurs, rubs, or gallops  ABDOMEN: Soft, Nontender, Nondistended; Bowel sounds present  EXTREMITIES:  2+ Peripheral Pulses, No clubbing, cyanosis, or edema  PSYCH: AAOx3  NEUROLOGY: non-focal  SKIN: No rashes or lesions    LABS:                        9.6    4.36  )-----------( 70       ( 30 Jun 2023 12:11 )             33.1     06-30    135  |  98  |  9   ----------------------------<  122<H>  3.8   |  22  |  0.49<L>    Ca    9.9      30 Jun 2023 12:11  Phos  3.1     06-30  Mg     1.70     06-30    TPro  7.6  /  Alb  4.2  /  TBili  0.5  /  DBili  x   /  AST  51<H>  /  ALT  31  /  AlkPhos  71  06-30    PT/INR - ( 30 Jun 2023 13:10 )   PT: 12.3 sec;   INR: 1.06 ratio               Urinalysis Basic - ( 30 Jun 2023 12:11 )    Color: x / Appearance: x / SG: x / pH: x  Gluc: 122 mg/dL / Ketone: x  / Bili: x / Urobili: x   Blood: x / Protein: x / Nitrite: x   Leuk Esterase: x / RBC: x / WBC x   Sq Epi: x / Non Sq Epi: x / Bacteria: x        RADIOLOGY & ADDITIONAL TESTS:    Imaging Personally Reviewed:    Consultant(s) Notes Reviewed:      Care Discussed with Consultants/Other Providers:

## 2023-07-01 NOTE — PROGRESS NOTE ADULT - PROBLEM SELECTOR PLAN 3
Due to alcohol overuse  AST: ALT ratio 111: 49 appx  2: 1 c/w alcoholic liver disease   Recent INR wnl; Bilirubin wnl; DF low  -Not a candidate for steroids     Plan:  -Alcohol cessation   -Monitor LFTs daily Patient has insomnia at home and experiencing withdrawals    Plan:  -melatonin 6 and trazodone 50

## 2023-07-01 NOTE — PROGRESS NOTE ADULT - PROBLEM SELECTOR PLAN 7
-Hold Amlodipine due to n/v   -Consider Hydralazine PRN IVP PRN  -VS q4h Hypokalemia and hypomagnesemia;  -Monitor lytes daily  -s/p Supp to K > 4 and Mag > 2

## 2023-07-01 NOTE — PROGRESS NOTE ADULT - PROBLEM SELECTOR PLAN 1
Alcohol use disorder with dependence and h/o of withdrawal seizures;  High risk for DT and seizures;   Reviewed CT BRAIN,  06/08/2023  as above  EKG: QTc prolongation 474  -CIWA 4-5 today, driven by tremors    Plan:  -Continue CIWA protocol  -Continue Ativan taper   -High risk  IV Ativan per CIWA protocol  -Oral Multivitamin held   -Thiamine IVPB 500mg x3 days  - Oral folic acid held  -SW consult requested  -Seizure precautions   -Bedrest   -Fall, aspiration precautions with HOB elev   -NPO for now due to n/v i/s/o intoxication with withdrawal  -IV hydration Alcohol use disorder with dependence and h/o of withdrawal seizures;  High risk for DT and seizures;   Reviewed CT BRAIN,  06/08/2023  as above  EKG: QTc prolongation 474  -CIWA 4-5 today, driven by tremors    Plan:  -Continue CIWA protocol  -Continue Ativan taper, on 0.5 mg doses q12h   -Oral Multivitamin held   -Thiamine IVPB 500mg x3 days  -Oral folic acid  -SW consult requested  -Seizure precautions   -Bedrest   -Fall, aspiration precautions with HOB elev   -NPO for now due to n/v i/s/o intoxication with withdrawal  -IV hydration

## 2023-07-01 NOTE — PROGRESS NOTE ADULT - PROBLEM SELECTOR PLAN 2
Plt now 59, normally between 100-200  DDx: alcohol related bone marrow suppression vs liver disease vs folate/B12 deficiency   unlikely hit as patient has refused prophylactic lovenox    Plan:  -daily folic acid  -hold lovenox  -ctm bilateral conjunctival injection, worse on left. history of corneal ulcers  DDx: viral vs bacterial conjunctivitis.     Plan:  -switch eyedrops from trimethoprim/polymyxin to something stronger  -consider optho consult consult admission

## 2023-07-01 NOTE — PROGRESS NOTE ADULT - PROBLEM SELECTOR PLAN 8
-Improving. Lactic improved to 1.6 from 6.6  -Due to alcohol intoxication; EtOH=348  -Lactic acid= 6.6    Plan:  -IV hydration   -Monitor VBG, pH if symptoms persist Chronic anemia secondary to menorrhagia  -f/u with PCP as outpt

## 2023-07-01 NOTE — PROGRESS NOTE ADULT - PROBLEM SELECTOR PLAN 6
Chronic anemia secondary to menorrhagia  -f/u with PCP as outpt Improving. Tolerating diet    Plan:  -IV hydration  -Monitor VBG and lytes   -Zofran IV PRN  -Monitor QT for prolongation  -Avoid Reglan   -Repeat EKG in AM ordered, primary team to f/u

## 2023-07-01 NOTE — PROGRESS NOTE ADULT - PROBLEM SELECTOR PLAN 9
Plan:  -Lovenox 40 mg. Can d/c if patient hemoglobin drops -Hold Amlodipine due to n/v   -Consider Hydralazine PRN IVP PRN  -VS q4h

## 2023-07-01 NOTE — PROGRESS NOTE ADULT - PROBLEM SELECTOR PLAN 4
Improving. Tolerating diet    Plan:  -IV hydration  -Monitor VBG and lytes   -Zofran IV PRN  -Monitor QT for prolongation  -Avoid Reglan   -Repeat EKG in AM ordered, primary team to f/u Plt now 59, normally between 100-200  DDx: alcohol related bone marrow suppression vs liver disease vs folate/B12 deficiency   unlikely hit as patient has refused prophylactic lovenox    Plan:  -daily folic acid  -hold lovenox  -ctm

## 2023-07-02 LAB
ALBUMIN SERPL ELPH-MCNC: 3.8 G/DL — SIGNIFICANT CHANGE UP (ref 3.3–5)
ALBUMIN SERPL ELPH-MCNC: 3.9 G/DL — SIGNIFICANT CHANGE UP (ref 3.3–5)
ALP SERPL-CCNC: 65 U/L — SIGNIFICANT CHANGE UP (ref 40–120)
ALP SERPL-CCNC: 66 U/L — SIGNIFICANT CHANGE UP (ref 40–120)
ALT FLD-CCNC: 44 U/L — HIGH (ref 4–33)
ALT FLD-CCNC: 46 U/L — HIGH (ref 4–33)
ANION GAP SERPL CALC-SCNC: 11 MMOL/L — SIGNIFICANT CHANGE UP (ref 7–14)
ANION GAP SERPL CALC-SCNC: 13 MMOL/L — SIGNIFICANT CHANGE UP (ref 7–14)
AST SERPL-CCNC: 65 U/L — HIGH (ref 4–32)
AST SERPL-CCNC: 67 U/L — HIGH (ref 4–32)
BASOPHILS # BLD AUTO: 0.05 K/UL — SIGNIFICANT CHANGE UP (ref 0–0.2)
BASOPHILS NFR BLD AUTO: 1.4 % — SIGNIFICANT CHANGE UP (ref 0–2)
BILIRUB SERPL-MCNC: 0.4 MG/DL — SIGNIFICANT CHANGE UP (ref 0.2–1.2)
BILIRUB SERPL-MCNC: 0.5 MG/DL — SIGNIFICANT CHANGE UP (ref 0.2–1.2)
BUN SERPL-MCNC: 22 MG/DL — SIGNIFICANT CHANGE UP (ref 7–23)
BUN SERPL-MCNC: 22 MG/DL — SIGNIFICANT CHANGE UP (ref 7–23)
CALCIUM SERPL-MCNC: 9.3 MG/DL — SIGNIFICANT CHANGE UP (ref 8.4–10.5)
CALCIUM SERPL-MCNC: 9.4 MG/DL — SIGNIFICANT CHANGE UP (ref 8.4–10.5)
CHLORIDE SERPL-SCNC: 101 MMOL/L — SIGNIFICANT CHANGE UP (ref 98–107)
CHLORIDE SERPL-SCNC: 104 MMOL/L — SIGNIFICANT CHANGE UP (ref 98–107)
CO2 SERPL-SCNC: 19 MMOL/L — LOW (ref 22–31)
CO2 SERPL-SCNC: 20 MMOL/L — LOW (ref 22–31)
CREAT SERPL-MCNC: 0.61 MG/DL — SIGNIFICANT CHANGE UP (ref 0.5–1.3)
CREAT SERPL-MCNC: 0.62 MG/DL — SIGNIFICANT CHANGE UP (ref 0.5–1.3)
EGFR: 109 ML/MIN/1.73M2 — SIGNIFICANT CHANGE UP
EGFR: 110 ML/MIN/1.73M2 — SIGNIFICANT CHANGE UP
EOSINOPHIL # BLD AUTO: 0.25 K/UL — SIGNIFICANT CHANGE UP (ref 0–0.5)
EOSINOPHIL NFR BLD AUTO: 6.8 % — HIGH (ref 0–6)
GLUCOSE SERPL-MCNC: 106 MG/DL — HIGH (ref 70–99)
GLUCOSE SERPL-MCNC: 106 MG/DL — HIGH (ref 70–99)
HCT VFR BLD CALC: 33.6 % — LOW (ref 34.5–45)
HGB BLD-MCNC: 9.9 G/DL — LOW (ref 11.5–15.5)
IANC: 1.75 K/UL — LOW (ref 1.8–7.4)
IMM GRANULOCYTES NFR BLD AUTO: 0.8 % — SIGNIFICANT CHANGE UP (ref 0–0.9)
LYMPHOCYTES # BLD AUTO: 1.21 K/UL — SIGNIFICANT CHANGE UP (ref 1–3.3)
LYMPHOCYTES # BLD AUTO: 32.9 % — SIGNIFICANT CHANGE UP (ref 13–44)
MCHC RBC-ENTMCNC: 21.8 PG — LOW (ref 27–34)
MCHC RBC-ENTMCNC: 29.5 GM/DL — LOW (ref 32–36)
MCV RBC AUTO: 74 FL — LOW (ref 80–100)
MONOCYTES # BLD AUTO: 0.39 K/UL — SIGNIFICANT CHANGE UP (ref 0–0.9)
MONOCYTES NFR BLD AUTO: 10.6 % — SIGNIFICANT CHANGE UP (ref 2–14)
NEUTROPHILS # BLD AUTO: 1.75 K/UL — LOW (ref 1.8–7.4)
NEUTROPHILS NFR BLD AUTO: 47.5 % — SIGNIFICANT CHANGE UP (ref 43–77)
NRBC # BLD: 0 /100 WBCS — SIGNIFICANT CHANGE UP (ref 0–0)
NRBC # FLD: 0 K/UL — SIGNIFICANT CHANGE UP (ref 0–0)
PLATELET # BLD AUTO: 90 K/UL — LOW (ref 150–400)
POTASSIUM SERPL-MCNC: 3.9 MMOL/L — SIGNIFICANT CHANGE UP (ref 3.5–5.3)
POTASSIUM SERPL-MCNC: 3.9 MMOL/L — SIGNIFICANT CHANGE UP (ref 3.5–5.3)
POTASSIUM SERPL-SCNC: 3.9 MMOL/L — SIGNIFICANT CHANGE UP (ref 3.5–5.3)
POTASSIUM SERPL-SCNC: 3.9 MMOL/L — SIGNIFICANT CHANGE UP (ref 3.5–5.3)
PROT SERPL-MCNC: 7.3 G/DL — SIGNIFICANT CHANGE UP (ref 6–8.3)
PROT SERPL-MCNC: 7.5 G/DL — SIGNIFICANT CHANGE UP (ref 6–8.3)
RBC # BLD: 4.54 M/UL — SIGNIFICANT CHANGE UP (ref 3.8–5.2)
RBC # FLD: 24.3 % — HIGH (ref 10.3–14.5)
SODIUM SERPL-SCNC: 133 MMOL/L — LOW (ref 135–145)
SODIUM SERPL-SCNC: 135 MMOL/L — SIGNIFICANT CHANGE UP (ref 135–145)
WBC # BLD: 3.68 K/UL — LOW (ref 3.8–10.5)
WBC # FLD AUTO: 3.68 K/UL — LOW (ref 3.8–10.5)

## 2023-07-02 PROCEDURE — 99232 SBSQ HOSP IP/OBS MODERATE 35: CPT | Mod: GC

## 2023-07-02 RX ORDER — POLYMYXIN B SULF/TRIMETHOPRIM 10000-1/ML
1 DROPS OPHTHALMIC (EYE) DAILY
Refills: 0 | Status: DISCONTINUED | OUTPATIENT
Start: 2023-07-02 | End: 2023-07-02

## 2023-07-02 RX ADMIN — Medication 1 MILLIGRAM(S): at 11:43

## 2023-07-02 RX ADMIN — Medication 0.5 MILLIGRAM(S): at 20:48

## 2023-07-02 RX ADMIN — Medication 2 DROP(S): at 17:17

## 2023-07-02 RX ADMIN — Medication 2 DROP(S): at 06:07

## 2023-07-02 RX ADMIN — Medication 2 DROP(S): at 23:50

## 2023-07-02 RX ADMIN — Medication 6 MILLIGRAM(S): at 22:23

## 2023-07-02 RX ADMIN — AMLODIPINE BESYLATE 10 MILLIGRAM(S): 2.5 TABLET ORAL at 06:07

## 2023-07-02 RX ADMIN — Medication 50 MILLIGRAM(S): at 22:23

## 2023-07-02 RX ADMIN — Medication 0.5 MILLIGRAM(S): at 10:45

## 2023-07-02 RX ADMIN — Medication 1 DROP(S): at 06:09

## 2023-07-02 RX ADMIN — Medication 2 DROP(S): at 11:42

## 2023-07-02 NOTE — PROGRESS NOTE ADULT - PROBLEM SELECTOR PLAN 3
Patient has insomnia at home and experiencing withdrawals    Plan:  -melatonin 6 and trazodone 50 Patient has insomnia at home and experiencing withdrawals    Plan:  -melatonin 6 and trazodone 50  -discharge on trazodone 50

## 2023-07-02 NOTE — PROGRESS NOTE ADULT - PROBLEM SELECTOR PLAN 4
Plt now 59, normally between 100-200  DDx: alcohol related bone marrow suppression vs liver disease vs folate/B12 deficiency   unlikely hit as patient has refused prophylactic lovenox    Plan:  -daily folic acid  -hold lovenox  -ctm Plt improved to 90 from low of 59, normally between 100-200  DDx: alcohol related bone marrow suppression vs liver disease vs folate/B12 deficiency   unlikely hit as patient has refused prophylactic lovenox    Plan:  -daily folic acid  -hold lovenox  -ctm

## 2023-07-02 NOTE — PROGRESS NOTE ADULT - SUBJECTIVE AND OBJECTIVE BOX
DATE OF SERVICE: 07-02-23 @ 07:43    Patient is a 47y old  Female who presents with a chief complaint of Nausea, vomiting (01 Jul 2023 08:19)      SUBJECTIVE / OVERNIGHT EVENTS:    MEDICATIONS  (STANDING):  amLODIPine   Tablet 10 milliGRAM(s) Oral daily  dexamethasone/neomycin/polymyxin Suspension 2 Drop(s) Left EYE four times a day  folic acid 1 milliGRAM(s) Oral daily  LORazepam   Injectable   IV Push   LORazepam   Injectable 0.5 milliGRAM(s) IV Push every 12 hours  melatonin 6 milliGRAM(s) Oral at bedtime    MEDICATIONS  (PRN):  acetaminophen     Tablet .. 650 milliGRAM(s) Oral every 6 hours PRN Mild Pain (1 - 3), Moderate Pain (4 - 6)  LORazepam   Injectable 2 milliGRAM(s) IV Push every 2 hours PRN Symptom-triggered: each CIWA -Ar score 8 or GREATER  ondansetron Injectable 4 milliGRAM(s) IV Push every 8 hours PRN Nausea and/or Vomiting  traZODone 50 milliGRAM(s) Oral at bedtime PRN insomnia  trimethoprim/polymyxin Solution 1 Drop(s) Right EYE daily PRN Conjunctivitis      Vital Signs Last 24 Hrs  T(C): 36.8 (02 Jul 2023 05:40), Max: 36.8 (01 Jul 2023 14:00)  T(F): 98.2 (02 Jul 2023 05:40), Max: 98.3 (01 Jul 2023 18:00)  HR: 80 (02 Jul 2023 05:40) (80 - 96)  BP: 108/71 (02 Jul 2023 05:40) (108/71 - 145/97)  BP(mean): --  RR: 18 (02 Jul 2023 05:40) (17 - 18)  SpO2: 100% (02 Jul 2023 05:40) (100% - 100%)    Parameters below as of 02 Jul 2023 05:40  Patient On (Oxygen Delivery Method): room air      CAPILLARY BLOOD GLUCOSE        I&O's Summary      PHYSICAL EXAM:  GENERAL: NAD, well-developed  HEAD:  Atraumatic, Normocephalic  EYES: EOMI, PERRLA, conjunctiva and sclera clear  NECK: Supple, No JVD  CHEST/LUNG: Clear to auscultation bilaterally; No wheeze  HEART: Regular rate and rhythm; No murmurs, rubs, or gallops  ABDOMEN: Soft, Nontender, Nondistended; Bowel sounds present  EXTREMITIES:  2+ Peripheral Pulses, No clubbing, cyanosis, or edema  PSYCH: AAOx3  NEUROLOGY: non-focal  SKIN: No rashes or lesions    LABS:                        9.9    3.68  )-----------( x        ( 02 Jul 2023 06:25 )             33.6     06-30    135  |  98  |  9   ----------------------------<  122<H>  3.8   |  22  |  0.49<L>    Ca    9.9      30 Jun 2023 12:11  Phos  3.1     06-30  Mg     1.70     06-30    TPro  7.6  /  Alb  4.2  /  TBili  0.5  /  DBili  x   /  AST  51<H>  /  ALT  31  /  AlkPhos  71  06-30    PT/INR - ( 30 Jun 2023 13:10 )   PT: 12.3 sec;   INR: 1.06 ratio               Urinalysis Basic - ( 30 Jun 2023 12:11 )    Color: x / Appearance: x / SG: x / pH: x  Gluc: 122 mg/dL / Ketone: x  / Bili: x / Urobili: x   Blood: x / Protein: x / Nitrite: x   Leuk Esterase: x / RBC: x / WBC x   Sq Epi: x / Non Sq Epi: x / Bacteria: x        RADIOLOGY & ADDITIONAL TESTS:    Imaging Personally Reviewed:    Consultant(s) Notes Reviewed:      Care Discussed with Consultants/Other Providers:   DATE OF SERVICE: 07-02-23 @ 07:43    Patient is a 47y old  Female who presents with a chief complaint of Nausea, vomiting (01 Jul 2023 08:19)      SUBJECTIVE / OVERNIGHT EVENTS: No acute events overnight. Eyes feel less irritated.    MEDICATIONS  (STANDING):  amLODIPine   Tablet 10 milliGRAM(s) Oral daily  dexamethasone/neomycin/polymyxin Suspension 2 Drop(s) Left EYE four times a day  folic acid 1 milliGRAM(s) Oral daily  LORazepam   Injectable   IV Push   LORazepam   Injectable 0.5 milliGRAM(s) IV Push every 12 hours  melatonin 6 milliGRAM(s) Oral at bedtime    MEDICATIONS  (PRN):  acetaminophen     Tablet .. 650 milliGRAM(s) Oral every 6 hours PRN Mild Pain (1 - 3), Moderate Pain (4 - 6)  LORazepam   Injectable 2 milliGRAM(s) IV Push every 2 hours PRN Symptom-triggered: each CIWA -Ar score 8 or GREATER  ondansetron Injectable 4 milliGRAM(s) IV Push every 8 hours PRN Nausea and/or Vomiting  traZODone 50 milliGRAM(s) Oral at bedtime PRN insomnia  trimethoprim/polymyxin Solution 1 Drop(s) Right EYE daily PRN Conjunctivitis      Vital Signs Last 24 Hrs  T(C): 36.8 (02 Jul 2023 05:40), Max: 36.8 (01 Jul 2023 14:00)  T(F): 98.2 (02 Jul 2023 05:40), Max: 98.3 (01 Jul 2023 18:00)  HR: 80 (02 Jul 2023 05:40) (80 - 96)  BP: 108/71 (02 Jul 2023 05:40) (108/71 - 145/97)  BP(mean): --  RR: 18 (02 Jul 2023 05:40) (17 - 18)  SpO2: 100% (02 Jul 2023 05:40) (100% - 100%)    Parameters below as of 02 Jul 2023 05:40  Patient On (Oxygen Delivery Method): room air      CAPILLARY BLOOD GLUCOSE        I&O's Summary      PHYSICAL EXAM:  GENERAL: NAD, well-developed  HEAD:  Atraumatic, Normocephalic  EYES: EOMI, PERRLA, conjunctiva and sclera clear bilaterally  NECK: Supple, No JVD  CHEST/LUNG: Clear to auscultation bilaterally; No wheeze  HEART: Regular rate and rhythm; No murmurs, rubs, or gallops  ABDOMEN: Soft, Nontender, Nondistended; Bowel sounds present  EXTREMITIES:  2+ Peripheral Pulses, No clubbing, cyanosis, or edema  PSYCH: AAOx3  NEUROLOGY: non-focal  SKIN: No rashes or lesions    LABS:                        9.9    3.68  )-----------( x        ( 02 Jul 2023 06:25 )             33.6     06-30    135  |  98  |  9   ----------------------------<  122<H>  3.8   |  22  |  0.49<L>    Ca    9.9      30 Jun 2023 12:11  Phos  3.1     06-30  Mg     1.70     06-30    TPro  7.6  /  Alb  4.2  /  TBili  0.5  /  DBili  x   /  AST  51<H>  /  ALT  31  /  AlkPhos  71  06-30    PT/INR - ( 30 Jun 2023 13:10 )   PT: 12.3 sec;   INR: 1.06 ratio               Urinalysis Basic - ( 30 Jun 2023 12:11 )    Color: x / Appearance: x / SG: x / pH: x  Gluc: 122 mg/dL / Ketone: x  / Bili: x / Urobili: x   Blood: x / Protein: x / Nitrite: x   Leuk Esterase: x / RBC: x / WBC x   Sq Epi: x / Non Sq Epi: x / Bacteria: x        RADIOLOGY & ADDITIONAL TESTS:    Imaging Personally Reviewed:    Consultant(s) Notes Reviewed:      Care Discussed with Consultants/Other Providers:

## 2023-07-02 NOTE — PROGRESS NOTE ADULT - PROBLEM SELECTOR PLAN 2
bilateral conjunctival injection, worse on left. history of corneal ulcers  DDx: viral vs bacterial conjunctivitis.     Plan:  -switch eyedrops from trimethoprim/polymyxin to something stronger  -consider optho consult bilateral conjunctival injection, worse on left. history of corneal ulcers  DDx: viral vs bacterial conjunctivitis.   Eyedrops switched from trimethoprim/polymyxin to dexamethasone/neomycin/polymyxin    Plan:  -discharge on 3 day course

## 2023-07-02 NOTE — PROGRESS NOTE ADULT - PROBLEM SELECTOR PLAN 1
Alcohol use disorder with dependence and h/o of withdrawal seizures;  High risk for DT and seizures;   Reviewed CT BRAIN,  06/08/2023  as above  EKG: QTc prolongation 474  -CIWA 4-5 today, driven by tremors    Plan:  -Continue CIWA protocol  -Continue Ativan taper, on 0.5 mg doses q12h   -Oral Multivitamin held   -Thiamine IVPB 500mg x3 days  -Oral folic acid  -SW consult requested  -Seizure precautions   -Bedrest   -Fall, aspiration precautions with HOB elev   -NPO for now due to n/v i/s/o intoxication with withdrawal  -IV hydration Alcohol use disorder with dependence and h/o of withdrawal seizures;  High risk for DT and seizures;   Reviewed CT BRAIN,  06/08/2023  as above  EKG: QTc prolongation 474  -CIWA < 4     Plan:   D/c CIWA protocols  -Continue Ativan taper, on 0.5 mg PO doses q12h   -Oral Multivitamin held   -Thiamine IVPB 500mg x3 days  -Oral folic acid  -SW consult requested  -Seizure precautions   -Bedrest   -Fall, aspiration precautions with HOB elev   -NPO for now due to n/v i/s/o intoxication with withdrawal  -IV hydration

## 2023-07-03 ENCOUNTER — TRANSCRIPTION ENCOUNTER (OUTPATIENT)
Age: 48
End: 2023-07-03

## 2023-07-03 VITALS
HEART RATE: 86 BPM | TEMPERATURE: 97 F | SYSTOLIC BLOOD PRESSURE: 126 MMHG | OXYGEN SATURATION: 100 % | RESPIRATION RATE: 19 BRPM | DIASTOLIC BLOOD PRESSURE: 86 MMHG

## 2023-07-03 PROCEDURE — 99239 HOSP IP/OBS DSCHRG MGMT >30: CPT

## 2023-07-03 RX ORDER — NEOMYCIN/POLYMYXIN B/DEXAMETHA 0.1 %
2 SUSPENSION, DROPS(FINAL DOSAGE FORM)(ML) OPHTHALMIC (EYE)
Qty: 1 | Refills: 0
Start: 2023-07-03 | End: 2023-07-05

## 2023-07-03 RX ADMIN — Medication 1 MILLIGRAM(S): at 11:21

## 2023-07-03 RX ADMIN — Medication 2 DROP(S): at 17:13

## 2023-07-03 RX ADMIN — AMLODIPINE BESYLATE 10 MILLIGRAM(S): 2.5 TABLET ORAL at 06:09

## 2023-07-03 RX ADMIN — Medication 0.5 MILLIGRAM(S): at 06:09

## 2023-07-03 RX ADMIN — Medication 2 DROP(S): at 11:21

## 2023-07-03 RX ADMIN — Medication 2 DROP(S): at 06:09

## 2023-07-03 RX ADMIN — Medication 0.5 MILLIGRAM(S): at 17:12

## 2023-07-03 NOTE — PROGRESS NOTE ADULT - PROBLEM SELECTOR PLAN 1
Alcohol use disorder with dependence and h/o of withdrawal seizures;  High risk for DT and seizures;   Reviewed CT BRAIN,  06/08/2023  as above  EKG: QTc prolongation 474  -CIWA < 4     Plan:   D/c CIWA protocols  -Continue Ativan taper, on 0.5 mg PO doses q12h   -Oral Multivitamin held   -Thiamine IVPB 500mg x3 days  -Oral folic acid  -SW consult requested  -Seizure precautions   -Bedrest   -Fall, aspiration precautions with HOB elev   -NPO for now due to n/v i/s/o intoxication with withdrawal  -IV hydration Alcohol use disorder with dependence and h/o of withdrawal seizures;  High risk for DT and seizures;   Reviewed CT BRAIN,  06/08/2023  as above  EKG: QTc prolongation 474  -CIWA < 4     Plan:   D/c CIWA protocols  -Stop ativan taper  -Oral Multivitamin held   -Thiamine IVPB 500mg x3 days  -Oral folic acid  -SW consult requested  -Seizure precautions   -Bedrest   -Fall, aspiration precautions with HOB elev   -NPO for now due to n/v i/s/o intoxication with withdrawal  -IV hydration

## 2023-07-03 NOTE — PROGRESS NOTE ADULT - REASON FOR ADMISSION
Nausea, vomiting

## 2023-07-03 NOTE — PROGRESS NOTE ADULT - PROBLEM SELECTOR PLAN 11
Plan:  -Lovenox 40 mg. Can d/c if patient hemoglobin drops

## 2023-07-03 NOTE — DISCHARGE NOTE NURSING/CASE MANAGEMENT/SOCIAL WORK - NSDCVIVACCINE_GEN_ALL_CORE_FT
influenza, injectable, quadrivalent, preservative free; 23-Oct-2020 20:55; Ramón Wong (RN); [employer]; [id number] (Exp. Date: 30-Jun-2021); IntraMuscular; Deltoid Left.; 0.5 milliLiter(s); VIS (VIS Published: 15-Aug-2019, VIS Presented: 23-Oct-2020);

## 2023-07-03 NOTE — PROGRESS NOTE ADULT - PROBLEM SELECTOR PLAN 4
Plt improved to 90 from low of 59, normally between 100-200  DDx: alcohol related bone marrow suppression vs liver disease vs folate/B12 deficiency   unlikely hit as patient has refused prophylactic lovenox    Plan:  -daily folic acid  -hold lovenox  -ctm

## 2023-07-03 NOTE — DISCHARGE NOTE PROVIDER - NSFOLLOWUPCLINICS_GEN_ALL_ED_FT
[facility] [place]  Internal Medicine  [address] [place] [address]  Phone: [phone]  Fax: [phone]  Follow Up Time: 1 week

## 2023-07-03 NOTE — DISCHARGE NOTE PROVIDER - NSDCCPCAREPLAN_GEN_ALL_CORE_FT
PRINCIPAL DISCHARGE DIAGNOSIS  Diagnosis: Alcohol dependence with withdrawal  Assessment and Plan of Treatment: Patient presented to the hospital for alcohol withdrawal. The hospital started you on a medication taper with ativan which helps treat the acute phase of the alcohol withdrawal. Patient improved while on the ativan taper and is not experiencing withdrawals.      SECONDARY DISCHARGE DIAGNOSES  Diagnosis: Bilateral conjunctivitis  Assessment and Plan of Treatment: Patient developed bilateral conjunctivitis while in the hospital, which is an infection of the eyes. The source of the infection is uncertain but the condition was treated with the dexamethasone/neomycin/polymyxin eye drops.     PRINCIPAL DISCHARGE DIAGNOSIS  Diagnosis: Alcohol dependence with withdrawal  Assessment and Plan of Treatment: Patient presented to the hospital for alcohol withdrawal. The hospital started you on a medication taper with ativan which helps treat the acute phase of the alcohol withdrawal. Patient improved while on the ativan taper and is not experiencing withdrawals.  Please follow up with your PCP for further evalaution and assistance with your alcohol withdrawal; please attend inpatient rehab for treatment of your alcohol use disorder,  Please return to the ED if you begin to experience any seizure like episodes, become unresponsive, or have any episodes of bloody vomiting.      SECONDARY DISCHARGE DIAGNOSES  Diagnosis: Bilateral conjunctivitis  Assessment and Plan of Treatment: Patient developed bilateral conjunctivitis while in the hospital, which is an infection of the eyes. The source of the infection is uncertain but the condition was treated with the dexamethasone/neomycin/polymyxin eye drops.  Please continue these drops for 4 more days; 4 times a day 2 drops in each eye to finish your course

## 2023-07-03 NOTE — DISCHARGE NOTE PROVIDER - HOSPITAL COURSE
Ms. Catherine Estrada is our 47 year old female with a history of alcohol use disorder, history of pancreatitis, adenomyosis and depression who presented to the hospital due to acute alcohol withdrawals on 06/27/2023. Patient was experiencing severe episodes of nausea and shaking tremors. Patient was placed on CIWA protocols and started on an ativan taper. Improved with the taper. During the hospitalization she developed conjunctivitis and was treated with trimethoprim/polymyxin antibiotic eyedrops. The drops did not fully treat the conjunctivitis and she was switched to dexamethasone/neomycin/polymyxin eye drops and the conjunctivitis resolved. On 07/03, patient is no longer experiencing withdrawals and is ready for discharge. Ms. Catherine Estrada is our 47 year old female with a history of alcohol use disorder, history of pancreatitis, adenomyosis and depression who presented to the hospital due to acute alcohol withdrawals on 06/27/2023. Patient started and improved on Ativan taper; course complicated by conjunctivitis and was started on dexamethasone/neomycin/polymyxin eye drops and the conjunctivitis resolved. On 07/03, patient is no longer experiencing withdrawals and is ready for discharge. Discussed going to inpatient rehab with patient . She was agreeable; stating she would go home first for her birthday, then would go to rehab afterwards.       MAJOR MEDICAL CHANGES;  Please follow up with PCP  Please continue with the antibiotic eye drops for the next 4 days for a total 5 days course  Please abstain from further alcohol use.

## 2023-07-03 NOTE — PROGRESS NOTE ADULT - ASSESSMENT
48yo Female with hx of HTN, alcohol use disorder requiring hospitalization for withdrawal, h/o withdrawal  seizure appx 1 year ago, h/o alcoholic pancreatitis, chronic anemia secondary to menorrhagia; a/w alcohol withdrawal i/s/o alcohol dependence and tolerance; Found to have elevated transaminases and lactic acid with electrolytes imbalance  i/s/o alcohol intoxication;  48yo Female with hx of HTN, alcohol use disorder requiring hospitalization for withdrawal, h/o withdrawal  seizure appx 1 year ago, h/o alcoholic pancreatitis, chronic anemia secondary to menorrhagia; a/w alcohol withdrawal i/s/o alcohol dependence and tolerance; Found to have elevated transaminases and lactic acid with electrolytes imbalance  i/s/o alcohol intoxication;

## 2023-07-03 NOTE — PROGRESS NOTE ADULT - PROBLEM SELECTOR PLAN 3
Patient has insomnia at home and experiencing withdrawals    Plan:  -melatonin 6 and trazodone 50  -discharge on trazodone 50 Patient has insomnia at home and experiencing withdrawals    Plan:  -melatonin 6 and trazodone 50

## 2023-07-03 NOTE — DISCHARGE NOTE NURSING/CASE MANAGEMENT/SOCIAL WORK - PATIENT PORTAL LINK FT
You can access the FollowMyHealth Patient Portal offered by [facility] by registering at the following website: [email or web address]. By joining [facility]’s FollowMyHealth portal, you will also be able to view your health information using other applications (apps) compatible with our system.

## 2023-07-03 NOTE — PROGRESS NOTE ADULT - ATTENDING COMMENTS
ETOH w/d - CIWA 8-10  would favor phenobarb but w ongoing n/v unable so for now on IV Ativan taper   monitor CIWA closely - might need gtt/MICU eval depending on clinical source  high dose thiamine, antiemetics, IVF - diet challenge later today   abd exam is benign - will hold off on imaging for now and see if GI symptoms improve along w lactate trend
improving CIWA and clinical w/d symptoms  transaminitis improving  required 1 PRN yesterday  considering severity of w/d will not shorten taper  high dose Thiamine   resolved GI symptoms and lactate - meseret reg diet, dc IVF   noted w b/l conjunctival injection with exudate - I suspect viral conductivities but pt reports hx of bacterial infx so will allow to complete course of drops. if no clinical improvement will have ophth input  pt requesting medical information not to be disclosed to family
refused labs this AM d.t hard stick - explained importance of labs considering thrombocytopenia and LFT monitoring - pt agreeable now, dw nursing   ETOH w/d on Ativan taper, clinically much improved, tremors mostly resolved - would not shorten taper d.t sig w/d on admission and hx of DTs in the past - taper to complete on Monday  transaminitis was improving - awaiting labs   n/v, elevated lactate d.t ETOH w/d, abuse - resolved, meseret reg diet, DC IVF  possible bacterial conjunctivitis - exam much improved on eye gtt - complete 7 day course   dc Lovenox as blue top plt 59 and pt refused labs this morning - awaiting repeat labs, start Venodyne for now
Patient seen and examined. Case discussed with the medical team on rounds. I agree with the findings and the plan above.    Patient undergoing taper for withdrawals. Conjunctivitis improving.   Continue the rest of the work up and management as stated above.
48 y/o F with PMH HTN, EtOH use disorder with history of withdrawal seizure, EtOH pancreatitis and chronic anemia 2/2 menorrhagia who presents for EtOH withdrawal. S/p ativan taper. Pt has elevated LFTs 2/2 EtOH use. Pt. s/p withdrawal. Would like to go to inpatient rehab but is going home first. Medically stable for discharge. Remainder of plan as stated above.     >30 minutes of time spent coordinating care and discharge management.
Patient seen and examined. Case discussed with the medical team on rounds. I agree with the findings and the plan above.    Patient undergoing taper for withdrawals. Also c/o conjunctivitis.  Continue the rest of the work up and management as stated above.

## 2023-07-03 NOTE — PROGRESS NOTE ADULT - PROBLEM SELECTOR PLAN 2
bilateral conjunctival injection, worse on left. history of corneal ulcers  DDx: viral vs bacterial conjunctivitis.   Eyedrops switched from trimethoprim/polymyxin to dexamethasone/neomycin/polymyxin    Plan:  -discharge on 3 day course

## 2023-07-03 NOTE — DISCHARGE NOTE PROVIDER - DID THE PATIENT PRESENT WITH OR WAS TREATED FOR MALNUTRITION DURING THIS ADMISSION
Health Maintenance Summary     Topic Due On Due Status Completed On    Colorectal Cancer Screening - Colonoscopy Feb 13, 2028 Not Due Feb 13, 2018    MAMMOGRAM - BREAST CANCER SCREENING Oct 27, 2018 Not Due Oct 27, 2017    Immunization - TDAP Pregnancy  Hidden       Completed Mar 2, 2016    IMMUNIZATION - DTaP/Tdap/Td Mar 29, 2020 Not Due Mar 29, 2010    Immunization-Influenza Sep 1, 2018 Not Due Oct 12, 2017    Hepatitis C Screening  Completed Sep 12, 2017          Patient is up to date, no discussion needed .           No

## 2023-07-03 NOTE — PROGRESS NOTE ADULT - SUBJECTIVE AND OBJECTIVE BOX
DATE OF SERVICE: 07-03-23 @ 07:58    Patient is a 47y old  Female who presents with a chief complaint of Nausea, vomiting (02 Jul 2023 07:43)      SUBJECTIVE / OVERNIGHT EVENTS:    MEDICATIONS  (STANDING):  amLODIPine   Tablet 10 milliGRAM(s) Oral daily  dexamethasone/neomycin/polymyxin Suspension 2 Drop(s) Left EYE four times a day  folic acid 1 milliGRAM(s) Oral daily  LORazepam     Tablet 0.5 milliGRAM(s) Oral every 12 hours  melatonin 6 milliGRAM(s) Oral at bedtime    MEDICATIONS  (PRN):  acetaminophen     Tablet .. 650 milliGRAM(s) Oral every 6 hours PRN Mild Pain (1 - 3), Moderate Pain (4 - 6)  ondansetron Injectable 4 milliGRAM(s) IV Push every 8 hours PRN Nausea and/or Vomiting  traZODone 50 milliGRAM(s) Oral at bedtime PRN insomnia      Vital Signs Last 24 Hrs  T(C): 36.8 (02 Jul 2023 21:49), Max: 37 (02 Jul 2023 12:32)  T(F): 98.2 (02 Jul 2023 21:49), Max: 98.6 (02 Jul 2023 12:32)  HR: 92 (02 Jul 2023 21:49) (92 - 100)  BP: 122/82 (02 Jul 2023 21:49) (122/82 - 127/86)  BP(mean): --  RR: 19 (02 Jul 2023 21:49) (19 - 19)  SpO2: 100% (02 Jul 2023 21:49) (100% - 100%)    Parameters below as of 02 Jul 2023 21:49  Patient On (Oxygen Delivery Method): room air      CAPILLARY BLOOD GLUCOSE        I&O's Summary      PHYSICAL EXAM:  GENERAL: NAD, well-developed  HEAD:  Atraumatic, Normocephalic  EYES: EOMI, PERRLA, conjunctiva and sclera clear  NECK: Supple, No JVD  CHEST/LUNG: Clear to auscultation bilaterally; No wheeze  HEART: Regular rate and rhythm; No murmurs, rubs, or gallops  ABDOMEN: Soft, Nontender, Nondistended; Bowel sounds present  EXTREMITIES:  2+ Peripheral Pulses, No clubbing, cyanosis, or edema  PSYCH: AAOx3  NEUROLOGY: non-focal  SKIN: No rashes or lesions    LABS:                        9.9    3.68  )-----------( 90       ( 02 Jul 2023 06:25 )             33.6     07-02    133<L>  |  101  |  22  ----------------------------<  106<H>  3.9   |  19<L>  |  0.62    Ca    9.4      02 Jul 2023 06:25    TPro  7.5  /  Alb  3.9  /  TBili  0.4  /  DBili  x   /  AST  67<H>  /  ALT  46<H>  /  AlkPhos  65  07-02          Urinalysis Basic - ( 02 Jul 2023 06:25 )    Color: x / Appearance: x / SG: x / pH: x  Gluc: 106 mg/dL / Ketone: x  / Bili: x / Urobili: x   Blood: x / Protein: x / Nitrite: x   Leuk Esterase: x / RBC: x / WBC x   Sq Epi: x / Non Sq Epi: x / Bacteria: x        RADIOLOGY & ADDITIONAL TESTS:    Imaging Personally Reviewed:    Consultant(s) Notes Reviewed:      Care Discussed with Consultants/Other Providers:   DATE OF SERVICE: 07-03-23 @ 07:58    Patient is a 47y old  Female who presents with a chief complaint of Nausea, vomiting (02 Jul 2023 07:43)      SUBJECTIVE / OVERNIGHT EVENTS: no acute events    MEDICATIONS  (STANDING):  amLODIPine   Tablet 10 milliGRAM(s) Oral daily  dexamethasone/neomycin/polymyxin Suspension 2 Drop(s) Left EYE four times a day  folic acid 1 milliGRAM(s) Oral daily  LORazepam     Tablet 0.5 milliGRAM(s) Oral every 12 hours  melatonin 6 milliGRAM(s) Oral at bedtime    MEDICATIONS  (PRN):  acetaminophen     Tablet .. 650 milliGRAM(s) Oral every 6 hours PRN Mild Pain (1 - 3), Moderate Pain (4 - 6)  ondansetron Injectable 4 milliGRAM(s) IV Push every 8 hours PRN Nausea and/or Vomiting  traZODone 50 milliGRAM(s) Oral at bedtime PRN insomnia      Vital Signs Last 24 Hrs  T(C): 36.8 (02 Jul 2023 21:49), Max: 37 (02 Jul 2023 12:32)  T(F): 98.2 (02 Jul 2023 21:49), Max: 98.6 (02 Jul 2023 12:32)  HR: 92 (02 Jul 2023 21:49) (92 - 100)  BP: 122/82 (02 Jul 2023 21:49) (122/82 - 127/86)  BP(mean): --  RR: 19 (02 Jul 2023 21:49) (19 - 19)  SpO2: 100% (02 Jul 2023 21:49) (100% - 100%)    Parameters below as of 02 Jul 2023 21:49  Patient On (Oxygen Delivery Method): room air      CAPILLARY BLOOD GLUCOSE        I&O's Summary      PHYSICAL EXAM:  GENERAL: NAD, well-developed  HEAD:  Atraumatic, Normocephalic  EYES: EOMI, PERRLA, conjunctiva and sclera clear  NECK: Supple, No JVD  CHEST/LUNG: Clear to auscultation bilaterally; No wheeze  HEART: Regular rate and rhythm; No murmurs, rubs, or gallops  ABDOMEN: Soft, Nontender, Nondistended; Bowel sounds present  EXTREMITIES:  2+ Peripheral Pulses, No clubbing, cyanosis, or edema  PSYCH: AAOx3  NEUROLOGY: non-focal  SKIN: No rashes or lesions    LABS:                        9.9    3.68  )-----------( 90       ( 02 Jul 2023 06:25 )             33.6     07-02    133<L>  |  101  |  22  ----------------------------<  106<H>  3.9   |  19<L>  |  0.62    Ca    9.4      02 Jul 2023 06:25    TPro  7.5  /  Alb  3.9  /  TBili  0.4  /  DBili  x   /  AST  67<H>  /  ALT  46<H>  /  AlkPhos  65  07-02          Urinalysis Basic - ( 02 Jul 2023 06:25 )    Color: x / Appearance: x / SG: x / pH: x  Gluc: 106 mg/dL / Ketone: x  / Bili: x / Urobili: x   Blood: x / Protein: x / Nitrite: x   Leuk Esterase: x / RBC: x / WBC x   Sq Epi: x / Non Sq Epi: x / Bacteria: x        RADIOLOGY & ADDITIONAL TESTS:    Imaging Personally Reviewed:    Consultant(s) Notes Reviewed:      Care Discussed with Consultants/Other Providers:

## 2023-07-10 NOTE — PATIENT PROFILE ADULT - OVER THE PAST TWO WEEKS, HAVE YOU FELT LITTLE INTEREST OR PLEASURE IN DOING THINGS?
Refill received for following medication:   Disp Refills Start End     bisoprolol-hydrochlorothiazide (ZIAC) 10-6.25 MG per tablet 90 tablet 4 11/2/2021     Sig - Route: Take 1 tablet by mouth daily. - Oral    Sent to pharmacy as: Bisoprolol-hydroCHLOROthiazide 10-6.25 MG Oral Tablet (ZIAC)    Class: Eprescribe       Disp Refills Start End    lisinopril (ZESTRIL) 40 MG tablet 90 tablet 1 1/3/2023     Sig - Route: Take 1 tablet by mouth daily. - Oral    Sent to pharmacy as: Lisinopril 40 MG Oral Tablet (ZESTRIL)    Class: Eprescribe       Disp Refills Start End    dapagliflozin (FARXIGA) 10 MG tablet 90 tablet 1 1/3/2023 7/13/2023    Sig - Route: Take 1 tablet by mouth daily. - Oral    Sent to pharmacy as: Dapagliflozin Propanediol 10 MG Oral Tablet (FARXIGA)    Class: Eprescribe       Disp Refills Start End    atorvastatin (LIPITOR) 40 MG tablet 90 tablet 1 1/3/2023     Sig - Route: Take 1 tablet by mouth daily. - Oral    Sent to pharmacy as: Atorvastatin Calcium 40 MG Oral Tablet (LIPITOR)    Class: Eprescribe      Last office visit date: 5/8/23    Next appointment scheduled?: Yes   Patient contacted for home blood pressure readings.   Number of refills given: 0      
no

## 2023-07-27 NOTE — ED PROVIDER NOTE - PSH
S/P laparoscopy  for heavy menstrual bleeding  S/P Tonsillectomy       Cheek-To-Nose Interpolation Flap Text: A decision was made to reconstruct the defect utilizing an interpolation axial flap and a staged reconstruction.  A telfa template was made of the defect.  This telfa template was then used to outline the Cheek-To-Nose Interpolation flap.  The donor area for the pedicle flap was then injected with anesthesia.  The flap was excised through the skin and subcutaneous tissue down to the layer of the underlying musculature.  The interpolation flap was carefully excised within this deep plane to maintain its blood supply.  The edges of the donor site were undermined.   The donor site was closed in a primary fashion.  The pedicle was then rotated into position and sutured.  Once the tube was sutured into place, adequate blood supply was confirmed with blanching and refill.  The pedicle was then wrapped with xeroform gauze and dressed appropriately with a telfa and gauze bandage to ensure continued blood supply and protect the attached pedicle.

## 2023-08-24 NOTE — ED ADULT TRIAGE NOTE - DOMESTIC TRAVEL HIGH RISK QUESTION
"Subjective   Emma Taylor is a 9 y.o. female.     History of Present Illness  Patient is here today with her mother for complaints of runny nose, sore throat, postnasal drainage and cough for the past couple days.  Mother reports that her brother had these symptoms earlier this week for just a couple days but they resolved without any treatment.  Mother reports she did not want to send her to school today with the symptoms in case she has something contagious.  She would like to make sure it is just a cold or allergies and not flu or COVID. She has not had any fever that she knows of but has not taken it.  She has not had any change in activity or appetite.    The following portions of the patient's history were reviewed and updated as appropriate: allergies, current medications, past family history, past medical history, past social history, past surgical history, and problem list.    Review of Systems   Constitutional:  Negative for activity change, appetite change, fatigue, fever and irritability.   HENT:  Positive for postnasal drip, rhinorrhea and sore throat.    Eyes:  Negative for discharge, redness and itching.   Respiratory:  Positive for cough. Negative for chest tightness, shortness of breath and wheezing.    Gastrointestinal:  Negative for diarrhea, nausea and vomiting.   Genitourinary:  Negative for decreased urine volume.   Musculoskeletal:  Negative for arthralgias and myalgias.   Skin:  Negative for color change and rash.   Neurological:  Negative for weakness and headaches.   Psychiatric/Behavioral:  Negative for behavioral problems and sleep disturbance.    Vitals:    08/24/23 1517   BP: 98/60   BP Location: Left arm   Patient Position: Sitting   Cuff Size: Pediatric   Pulse: (!) 138   Temp: (!) 100.5 øF (38.1 øC)   TempSrc: Temporal   SpO2: 98%   Weight: 42.4 kg (93 lb 6.4 oz)   Height: 133 cm (52.36\")      Objective   Physical Exam  Vitals reviewed.   Constitutional:       Appearance: Normal " appearance. She is not ill-appearing.   HENT:      Right Ear: No middle ear effusion. Tympanic membrane is not erythematous.      Left Ear:  No middle ear effusion. Tympanic membrane is not erythematous.      Nose: Mucosal edema, congestion and rhinorrhea present.      Mouth/Throat:      Pharynx: Posterior oropharyngeal erythema present.      Comments: PND  Cardiovascular:      Rate and Rhythm: Normal rate and regular rhythm.   Pulmonary:      Effort: Pulmonary effort is normal.      Breath sounds: Normal breath sounds.   Neurological:      General: No focal deficit present.      Mental Status: She is alert.       Assessment & Plan   Diagnoses and all orders for this visit:    1. Sore throat (Primary)  -     POCT rapid strep A  -     loratadine (CLARITIN) 10 MG tablet; Take 1 tablet by mouth Daily.  Dispense: 30 tablet; Refill: 5    2. Cough, unspecified type  -     POCT SARS-CoV-2 Antigen GENI + Flu  -     loratadine (CLARITIN) 10 MG tablet; Take 1 tablet by mouth Daily.  Dispense: 30 tablet; Refill: 5    3. PND (post-nasal drip)  -     fluticasone (FLONASE) 50 MCG/ACT nasal spray; 1 spray into the nostril(s) as directed by provider Daily.  Dispense: 16 g; Refill: 5    Other orders  -     brompheniramine-pseudoephedrine-DM 30-2-10 MG/5ML syrup; Take 5 mL by mouth 4 (Four) Times a Day As Needed for Allergies for up to 10 days.  Dispense: 473 mL; Refill: 0       Sore throat/PND/cough  -Flu, COVID and strep screening negative in clinic today.  Claritin refilled today and mother advised to give this as directed until symptoms resolve.  Flonase prescribed and mother educated on how to use this.  Bromfed prescribed and mother educated on how to use this.  She reports she has used this in the past.    Mother encouraged to keep me informed of any acute changes, lack of improvement, or any new concerning symptoms.     RTC in 1 week if symptoms have no improved, sooner if they worsen.             No

## 2023-09-01 ENCOUNTER — APPOINTMENT (OUTPATIENT)
Age: 48
End: 2023-09-01

## 2023-09-14 NOTE — PROGRESS NOTE ADULT - PROBLEM/PLAN-2
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 Global developmental delay           Clinician Name:  NICK Alford  Time In:  9:00  Time out:  11:00    Reason for Visit:  EI Evaluation  Type of Evaluation:  Medical Diagnostic Evaluation    Evaluator:       Place of Service:  Center    Eligibility evaluation took place today with the following members present: Parent, Psychology, Developmental Pediatrician, DT, ST and OT      TOTAL EVALUATION:  120     SCORIN  WRITE UP TIME:  30    Please see Full EI report for details    NICK Alford  2023   DISPLAY PLAN FREE TEXT

## 2023-09-29 ENCOUNTER — APPOINTMENT (OUTPATIENT)
Age: 48
End: 2023-09-29
Payer: SELF-PAY

## 2023-09-29 PROCEDURE — 99024 POSTOP FOLLOW-UP VISIT: CPT

## 2023-10-16 ENCOUNTER — APPOINTMENT (OUTPATIENT)
Age: 48
End: 2023-10-16

## 2023-10-20 ENCOUNTER — APPOINTMENT (OUTPATIENT)
Age: 48
End: 2023-10-20

## 2023-10-20 NOTE — H&P ADULT - NEGATIVE SKIN SYMPTOMS
After obtaining consent, and per orders of Ivette Jones  injection of pneumovax and flu was given in the right and left deltoid respectively by Sebas Saba LPN  Pt tolerated well and had no reactions.
Vascular Surgery Progress Note  10/20/2023       Subjective:  Extubated on room air, therapeutic heparin infusion, Hgb 7 this morning, receiving     Objective:  Temp:  [97.8  F (36.6  C)-98.9  F (37.2  C)] 98.7  F (37.1  C)  Pulse:  [] 112  Resp:  [10-27] 25  BP: (102-147)/(64-96) 147/75  MAP:  [66 mmHg-110 mmHg] 94 mmHg  Arterial Line BP: (100-174)/(51-83) 161/67  FiO2 (%):  [30 %] 30 %  SpO2:  [96 %-100 %] 100 %    I/O last 3 completed shifts:  In: 2058.16 [I.V.:686.56; NG/GT:450]  Out: 1050 [Urine:250; Emesis/NG output:600; Stool:200]      Gen: resting comfortably in bed in ICU, extubated, whispering words with hoarse voice.  CV: off pressors, regular HR per tele   Resp: non-labored, on room air  Abd: Wound vac in place, holding seal, abd binder on  Ext: RLE wwp, palpable DP pulse, LLE stump with ACE WRAP    Labs:  Recent Labs   Lab 10/20/23  0408 10/19/23  0317 10/18/23  0344   WBC 14.2* 15.1* 14.3*   HGB 7.0* 7.5* 6.8*   * 132* 131*       Recent Labs   Lab 10/20/23  0408 10/20/23  0050 10/19/23  0841 10/19/23  0317 10/18/23  0409 10/18/23  0344   *  --   --  130*  --  128*   POTASSIUM 3.9  --   --  3.6  --  4.1   CHLORIDE 95*  --   --  94*  --  90*   CO2 17*  --   --  18*  --  15*   .0*  --   --  80.7*  --  110.0*   CR 4.95*  --   --  3.73*  --  5.66*   *  144* 142*   < > 204*   < > 171*   OMAR 8.5*  --   --  8.4*  --  8.0*   MAG 2.7*  --   --  2.0  --  2.5*   PHOS 3.5  --   --  3.2  --  5.0*    < > = values in this interval not displayed.      Imaging  Narrative & Impression   EXAMINATION: CT CHEST/ABDOMEN/PELVIS W CONTRAST, 10/13/2023 11:33 PM     COMPARISON STUDY: Abdominal radiograph 10/10/2023     INDICATION: new desat episode, hypotension, eval ? infectious source     TECHNIQUE: CT scan of the chest, abdomen and pelvis was performed on  multidetector CT scanner using volumetric acquisition technique and  images were reconstructed in multiple planes with variable 
thickness  and reviewed on dedicated workstations.      CONTRAST: iopamidol (ISOVUE-370) solution 104 m. Without oral  contrast.     CT scan radiation dose is optimized to minimum requisite dose using  automated dose modulation techniques.     Findings:      Chest:  Lungs: Bilateral small-to-moderate pleural effusions with associated  atelectasis and ground glass opacities, left greater than right.  Basilar predominant interlobular septal thickening. No pneumothorax.     Mediastinum: Heart size is within normal limits. No pericardial  effusion. Aorta and main pulmonary artery caliber are within normal  limits No mediastinal lymphadenopathy. Small volume of air within the  left brachiocephalic vein (series 5, image 33) just prior to entering  the superior vena cava, likely iatrogenic secondary left upper  extremity central venous catheter. Enteric tube coursing the esophagus  terminating in the stomach lumen, otherwise the esophagus is  unremarkable.         Abdomen/Pelvis:    Liver: No mass. Mild intrahepatic biliary dilatation. Hypodensity at  the lateral aspect of the right hepatic lobe measuring up to 11 mm  (series 5, image 103).      Gallbladder/CBD: Mildly distended gallbladder with layering sludge.  Common bile duct within normal limits for patient's age.     Pancreas: Pancreas appears to be normal enhancing with peripancreatic  hypoattenuating collection this primarily hypoattenuating but with  areas of increased hyperattenuation. This suggests peripancreatic  fluid collection with hemorrhage.     Spleen: Surrounded by hypoattenuating fluid, otherwise unremarkable.     Adrenal glands: Normal.     Kidneys/ureters/bladder: Heterogeneous hypoattenuation of the kidneys  bilaterally with wedgelike confluent hypoattenuation in the interpolar  and superior pole of the left kidney (series series 5, images  127-156). No obstructing renal stone, hydronephrosis, renal masses.      Genitourinary/reproductive tract: 
Normal bladder. Reproductive organs  are within normal limits.     Gastrointestinal: Hypoattenuating of the large bowel with wall  thickening starting from the rectum and extending to the mid  transverse colon with surrounding fat stranding (160-257). Colonic  diverticulosis. Normal caliber small bowel. Appendix not seen. Stomach  and esophagus are unremarkable.     Vasculature: Postsurgical changes of ruptured AAA open repair with  mixed attenuation clot surrounding the postsurgical repairs. Narrow  lumen inferior vena cava compressed by hematoma at the level of the  kidneys. Small hematoma at the aortic bifurcation. Patent hepatic  portal vein.     Retroperitoneum/peritoneum/mesentery: Ascites throughout the  peritoneal cavity surrounding the liver or spleen, infrarenal aorta  and layering in the pelvis. 2 cm hematoma at the aortic bifurcation. 2  cm hematoma just below the level of the renal veins.     Bones: No acute or aggressive appearing osseous bodies. Ultimately  degenerative changes of the spine.     Soft tissues: Enlarged left iliacus muscle with hypodense  heterogeneous appearance measuring up to 6.6 cm (series 5, image 224).  Diffuse anasarca.                                                                      IMPRESSION:   1.  Enlarged heterogeneously hypoattenuating left illiacus muscle  measuring up to 6.6 cm. Findings may represent developing iliacus  hematoma. Consider CTA abdomen/pelvis to further evaluate for active  bleeding within the hematoma.  2.  Narrow caliber infrahepatic IVC with appearance of compression in  between thrombus secondary to AAA rupture and abdominal aorta;  findings may represent both hypotension and potential compression  secondary to the surrounding hematoma.   3.  Bilateral, left greater than right, wedge-shaped hypodensities  suggesting infarcts.   4.  Edematous left colon. There appears to be mucosal enhancement. No  evidence of pneumatosis intestinalis or free air in 
the abdomen.  5.  Pancreas appears perfused but there is peripancreatic fluid with  hemorrhage. No pseudoaneurysm or extravasation identified.   6.  Diffuse intraperitoneal ascites and mixed attenuating hematoma,  along the right inferior renal aorta and aortic bifurcation, likely  related to recent AAA rupture and subsequent repair.  7.  Bilateral pleural effusions with associated atelectasis and  groundglass opacities, likely representing pulmonary edema.  8.  Diffuse anasarca.              [Urgent Result: Suspected left iliac is hematoma measuring up 6.6 cm;  suspected devascularized interpolar and superior left kidney.]     Finding was identified on 10/14/2023 12:09 PM.        Assessment/Plan:   68 year old male with PMH of HTN and previous TIA who presented with R sided abdominal pain found to have contained ruptured AAA, now s/p open AAA repair 9/24 into 9/25am with 30 min supraceliac clamp time, prolonged inter-renal clamp time, temporary abdominal closure. He did have bloody stool postop with flex sig 9/25 demonstrating ischemic mucosal changes of the rectum, repeated abdominal without evidence of transmural necrosis of the small or large intestine, or the rectum. On 9/29 he was started on CRRT given ongoing low UOP and rising Cr and failure of lasix challenge. Now on iHD. Hemodynamically continues to do well off pressors. S/p closure of abdominal fascia and subcutaneous tissue left open with wound VAC applied 10/2/23. With ischemic LLE, AKA deferred until 10/17/2023 due to leukocytosis, hypoxia requiring reintubation (10/13/23) and critical illness. ADDIS on 10/16/23 without evidence of embolic source. Now status post L AKA on 10/17/2023. In OR today 10/20/23 for abdominal incision closure.    Neuro:   - Appears intact: appreciate monitoring neuro status  - Stroke workup negative -- MRI with multiple small infarcts, follow-up with neurology 4-6 weeks after discharge, ok with a/c.   - ADDIS for workup 10/16/23 
demonstrated no thromboembolic source or shunt identified in cardiac chambers. Grade IV atheroma in descending aorta and aortic arch   CV:   - Off pressors, midodrine 20mg daily prn with HD runs   - Goal SBP <160, MAP >65  - Labetalol prn for SBP >160  - Continue statin  - PE+, venous duplex with nonocclusive to occlusive thrombus of the left GSV from the level of the knee to the groin and nonocclusive thrombus of the left distal femoral vein and popliteal veins, increased in extent compared to 9/30/2023.   - Therapeutic Heparin gtt   - ADDIS 10/16/23 demonstrated no thromboembolic source or shunt, has grade IV atheroma in descending aorta and aortic arch   Resp:   - No new acute concerns, minimal vent settings  - Vent settings per SICU, wean as tolerated.   - Plan for extubation today  GI:   - Pancreatitis with peripancreatic fluid collection on CT with question of bleeding into the collection.  - Do not expect this anterior location of pancreatic fluid to be related to surgical procedure as we were in the RP and did see inferior/posterior aspect of pancreas. Fluid collection is not surrounding aortic graft, this is reassuring.  - GI signed off as collection not drainable  - TF resumed, increasing goal, tolerating  - Continue TPN   FEN:   - Electrolyte replacement prn   Renal:   - Appreciate nephrology recommendations  - Continue iHD  - Tunneled line with IR placed 10/18/2023  Heme:   - Hgb 7 this morning, receiving 1 PRBCs. CTA to assess for bleeding given requirements of transfusions every other day  - DVT as above, PE   - Therapeutic heparin gtt   - PT/OT ROM  ID:   - known aspiration event 10/9   - monitor signs of pneumonia  - nystatin swish for oral candida  - improving leukocytosis, downtrending, monitor for now.   MSK:   - L AKA 10/17/23   Derm:   - Has rash on abdomen, trunk, anterior bilateral thighs, erythematous, blanching, however maculopapular does not seem consistent with cellulitis  - Pharmacy 
reviewed medications for possible causes of rash and felt all were relatively low risk however not 0 risk   - benadryl cream topically applied with no improvement  - trialed abd binder off for short time to see if improvement (?moisture rash), none seen.   - Appreciate dermatology recs, likely morbilliform eruption vs. Low concern for possible DRESS   - CBC with diff daily    - LFTs daily    - triamcinolone 0.1% twice daily to areas w/ rash    - switched cefepime to levofloxacin  Misc:   - abd binder on at all times.  - Heparin gtt    Discussed patient with Dr. Lowe, vascular surgery staff    Miryam Carrasco, SACHIN  Vascular Surgery  Pager: [phone]  [email or web address]  Send message or 10 digit call back number Securely via Mip[employer] with the [employer] Web Console (learn more here)    
no rash/no itching

## 2023-10-24 ENCOUNTER — OUTPATIENT (OUTPATIENT)
Dept: OUTPATIENT SERVICES | Facility: HOSPITAL | Age: 48
LOS: 1 days | End: 2023-10-24
Payer: MEDICAID

## 2023-10-24 VITALS
TEMPERATURE: 98 F | OXYGEN SATURATION: 100 % | HEIGHT: 67.5 IN | WEIGHT: 154.1 LBS | DIASTOLIC BLOOD PRESSURE: 87 MMHG | RESPIRATION RATE: 16 BRPM | HEART RATE: 66 BPM | SYSTOLIC BLOOD PRESSURE: 129 MMHG

## 2023-10-24 DIAGNOSIS — S02.609K FRACTURE OF MANDIBLE, UNSPECIFIED, SUBSEQUENT ENCOUNTER FOR FRACTURE WITH NONUNION: ICD-10-CM

## 2023-10-24 DIAGNOSIS — Z98.890 OTHER SPECIFIED POSTPROCEDURAL STATES: Chronic | ICD-10-CM

## 2023-10-24 DIAGNOSIS — I10 ESSENTIAL (PRIMARY) HYPERTENSION: ICD-10-CM

## 2023-10-24 DIAGNOSIS — S02.609A FRACTURE OF MANDIBLE, UNSPECIFIED, INITIAL ENCOUNTER FOR CLOSED FRACTURE: ICD-10-CM

## 2023-10-24 DIAGNOSIS — S02.609A FRACTURE OF MANDIBLE, UNSPECIFIED, INITIAL ENCOUNTER FOR CLOSED FRACTURE: Chronic | ICD-10-CM

## 2023-10-24 LAB
ALBUMIN SERPL ELPH-MCNC: 4.2 G/DL — SIGNIFICANT CHANGE UP (ref 3.3–5)
ALBUMIN SERPL ELPH-MCNC: 4.2 G/DL — SIGNIFICANT CHANGE UP (ref 3.3–5)
ALP SERPL-CCNC: 55 U/L — SIGNIFICANT CHANGE UP (ref 40–120)
ALP SERPL-CCNC: 55 U/L — SIGNIFICANT CHANGE UP (ref 40–120)
ALT FLD-CCNC: 9 U/L — SIGNIFICANT CHANGE UP (ref 4–33)
ALT FLD-CCNC: 9 U/L — SIGNIFICANT CHANGE UP (ref 4–33)
ANION GAP SERPL CALC-SCNC: 10 MMOL/L — SIGNIFICANT CHANGE UP (ref 7–14)
AST SERPL-CCNC: 18 U/L — SIGNIFICANT CHANGE UP (ref 4–32)
AST SERPL-CCNC: 18 U/L — SIGNIFICANT CHANGE UP (ref 4–32)
BILIRUB SERPL-MCNC: <0.2 MG/DL — SIGNIFICANT CHANGE UP (ref 0.2–1.2)
BILIRUB SERPL-MCNC: <0.2 MG/DL — SIGNIFICANT CHANGE UP (ref 0.2–1.2)
BLD GP AB SCN SERPL QL: NEGATIVE — SIGNIFICANT CHANGE UP
BLD GP AB SCN SERPL QL: NEGATIVE — SIGNIFICANT CHANGE UP
BUN SERPL-MCNC: 23 MG/DL — SIGNIFICANT CHANGE UP (ref 7–23)
BUN SERPL-MCNC: 23 MG/DL — SIGNIFICANT CHANGE UP (ref 7–23)
BUN SERPL-MCNC: 24 MG/DL — HIGH (ref 7–23)
BUN SERPL-MCNC: 24 MG/DL — HIGH (ref 7–23)
CALCIUM SERPL-MCNC: 9.3 MG/DL — SIGNIFICANT CHANGE UP (ref 8.4–10.5)
CHLORIDE SERPL-SCNC: 103 MMOL/L — SIGNIFICANT CHANGE UP (ref 98–107)
CO2 SERPL-SCNC: 24 MMOL/L — SIGNIFICANT CHANGE UP (ref 22–31)
CREAT SERPL-MCNC: 0.8 MG/DL — SIGNIFICANT CHANGE UP (ref 0.5–1.3)
CREAT SERPL-MCNC: 0.8 MG/DL — SIGNIFICANT CHANGE UP (ref 0.5–1.3)
CREAT SERPL-MCNC: 0.82 MG/DL — SIGNIFICANT CHANGE UP (ref 0.5–1.3)
CREAT SERPL-MCNC: 0.82 MG/DL — SIGNIFICANT CHANGE UP (ref 0.5–1.3)
EGFR: 87 ML/MIN/1.73M2 — SIGNIFICANT CHANGE UP
EGFR: 87 ML/MIN/1.73M2 — SIGNIFICANT CHANGE UP
EGFR: 90 ML/MIN/1.73M2 — SIGNIFICANT CHANGE UP
EGFR: 90 ML/MIN/1.73M2 — SIGNIFICANT CHANGE UP
GLUCOSE SERPL-MCNC: 88 MG/DL — SIGNIFICANT CHANGE UP (ref 70–99)
HCG SERPL-ACNC: <1 MIU/ML — SIGNIFICANT CHANGE UP
HCG SERPL-ACNC: <1 MIU/ML — SIGNIFICANT CHANGE UP
HCT VFR BLD CALC: 33.5 % — LOW (ref 34.5–45)
HCT VFR BLD CALC: 33.5 % — LOW (ref 34.5–45)
HGB BLD-MCNC: 9.9 G/DL — LOW (ref 11.5–15.5)
HGB BLD-MCNC: 9.9 G/DL — LOW (ref 11.5–15.5)
MCHC RBC-ENTMCNC: 22.6 PG — LOW (ref 27–34)
MCHC RBC-ENTMCNC: 22.6 PG — LOW (ref 27–34)
MCHC RBC-ENTMCNC: 29.6 GM/DL — LOW (ref 32–36)
MCHC RBC-ENTMCNC: 29.6 GM/DL — LOW (ref 32–36)
MCV RBC AUTO: 76.3 FL — LOW (ref 80–100)
MCV RBC AUTO: 76.3 FL — LOW (ref 80–100)
NRBC # BLD: 0 /100 WBCS — SIGNIFICANT CHANGE UP (ref 0–0)
NRBC # BLD: 0 /100 WBCS — SIGNIFICANT CHANGE UP (ref 0–0)
NRBC # FLD: 0 K/UL — SIGNIFICANT CHANGE UP (ref 0–0)
NRBC # FLD: 0 K/UL — SIGNIFICANT CHANGE UP (ref 0–0)
PLATELET # BLD AUTO: 286 K/UL — SIGNIFICANT CHANGE UP (ref 150–400)
PLATELET # BLD AUTO: 286 K/UL — SIGNIFICANT CHANGE UP (ref 150–400)
POTASSIUM SERPL-MCNC: 4.3 MMOL/L — SIGNIFICANT CHANGE UP (ref 3.5–5.3)
POTASSIUM SERPL-MCNC: 4.3 MMOL/L — SIGNIFICANT CHANGE UP (ref 3.5–5.3)
POTASSIUM SERPL-MCNC: 4.4 MMOL/L — SIGNIFICANT CHANGE UP (ref 3.5–5.3)
POTASSIUM SERPL-MCNC: 4.4 MMOL/L — SIGNIFICANT CHANGE UP (ref 3.5–5.3)
POTASSIUM SERPL-SCNC: 4.3 MMOL/L — SIGNIFICANT CHANGE UP (ref 3.5–5.3)
POTASSIUM SERPL-SCNC: 4.3 MMOL/L — SIGNIFICANT CHANGE UP (ref 3.5–5.3)
POTASSIUM SERPL-SCNC: 4.4 MMOL/L — SIGNIFICANT CHANGE UP (ref 3.5–5.3)
POTASSIUM SERPL-SCNC: 4.4 MMOL/L — SIGNIFICANT CHANGE UP (ref 3.5–5.3)
PROT SERPL-MCNC: 7.9 G/DL — SIGNIFICANT CHANGE UP (ref 6–8.3)
PROT SERPL-MCNC: 7.9 G/DL — SIGNIFICANT CHANGE UP (ref 6–8.3)
RBC # BLD: 4.39 M/UL — SIGNIFICANT CHANGE UP (ref 3.8–5.2)
RBC # BLD: 4.39 M/UL — SIGNIFICANT CHANGE UP (ref 3.8–5.2)
RBC # FLD: 19.5 % — HIGH (ref 10.3–14.5)
RBC # FLD: 19.5 % — HIGH (ref 10.3–14.5)
RH IG SCN BLD-IMP: POSITIVE — SIGNIFICANT CHANGE UP
RH IG SCN BLD-IMP: POSITIVE — SIGNIFICANT CHANGE UP
SODIUM SERPL-SCNC: 137 MMOL/L — SIGNIFICANT CHANGE UP (ref 135–145)
WBC # BLD: 6.51 K/UL — SIGNIFICANT CHANGE UP (ref 3.8–10.5)
WBC # BLD: 6.51 K/UL — SIGNIFICANT CHANGE UP (ref 3.8–10.5)
WBC # FLD AUTO: 6.51 K/UL — SIGNIFICANT CHANGE UP (ref 3.8–10.5)
WBC # FLD AUTO: 6.51 K/UL — SIGNIFICANT CHANGE UP (ref 3.8–10.5)

## 2023-10-24 PROCEDURE — 93010 ELECTROCARDIOGRAM REPORT: CPT

## 2023-10-24 RX ORDER — SODIUM CHLORIDE 9 MG/ML
1000 INJECTION, SOLUTION INTRAVENOUS
Refills: 0 | Status: DISCONTINUED | OUTPATIENT
Start: 2023-10-26 | End: 2023-10-26

## 2023-10-24 RX ORDER — AMLODIPINE BESYLATE 2.5 MG/1
1 TABLET ORAL
Refills: 0 | DISCHARGE

## 2023-10-24 NOTE — H&P PST ADULT - PROBLEM SELECTOR PLAN 1
Pt s scheduled for prosthetic joint reconstruction on 10/26/23.  Verbal and written pre op instructions reviewed with patient and pt able to verbalize understanding.

## 2023-10-24 NOTE — H&P PST ADULT - ATTENDING COMMENTS
indicated for bilateral tmj replacement for correction of post traumatic defect/ malocclusion.  Work up completed and virtual planning for replacements.  Full discussion of RBA performed pre op and in outpatient consultation.

## 2023-10-24 NOTE — H&P PST ADULT - NSICDXPASTMEDICALHX_GEN_ALL_CORE_FT
PAST MEDICAL HISTORY:  Adenomyosis     Anemia     Depression     ETOH Abuse     History of pancreatitis     HTN (hypertension)     Mandible fracture     Withdrawal seizures

## 2023-10-24 NOTE — H&P PST ADULT - NSALCOHOLUSECOMMENT_GEN_ALL_CORE_FT
h/o ETOH, last drink 7/2023 "cant quantify" h/o ETOH, last drink 7/2023 "cant quantify how much" - per ED note from 3/2023 "up to 1L Vodka per day"

## 2023-10-24 NOTE — H&P PST ADULT - HISTORY OF PRESENT ILLNESS
49 yo female with h/o mandible fracture s/p ORIF mandible 3/2023, now with malocclusion.  Pt s scheduled for prosthetic joint reconstruction. 47 yo female with h/o ETOH(states last drink was Jul 2023) s/p fall in 3/2023 sustained mandible fracture s/p ORIF mandible 3/2023.  Now with malocclusion.  Pt s scheduled for prosthetic joint reconstruction.

## 2023-10-24 NOTE — H&P PST ADULT - ENMT COMMENTS
malocclusion s/p ORIF mandible- on soft diet Denies loose teeth or dentures.  Mallampati Denies loose teeth or dentures.  Mallampati IV

## 2023-10-24 NOTE — H&P PST ADULT - NSICDXPASTSURGICALHX_GEN_ALL_CORE_FT
PAST SURGICAL HISTORY:  Mandible fracture      PAST SURGICAL HISTORY:  History of laparoscopy     Mandible fracture

## 2023-10-25 ENCOUNTER — TRANSCRIPTION ENCOUNTER (OUTPATIENT)
Age: 48
End: 2023-10-25

## 2023-10-25 NOTE — ASU PATIENT PROFILE, ADULT - VISION (WITH CORRECTIVE LENSES IF THE PATIENT USUALLY WEARS THEM):
glasses / contavcts/Normal vision: sees adequately in most situations; can see medication labels, newsprint

## 2023-10-26 ENCOUNTER — INPATIENT (INPATIENT)
Facility: HOSPITAL | Age: 48
LOS: 2 days | Discharge: HOME | End: 2023-10-29
Attending: ORAL & MAXILLOFACIAL SURGERY | Admitting: ORAL & MAXILLOFACIAL SURGERY
Payer: MEDICAID

## 2023-10-26 ENCOUNTER — TRANSCRIPTION ENCOUNTER (OUTPATIENT)
Age: 48
End: 2023-10-26

## 2023-10-26 ENCOUNTER — APPOINTMENT (OUTPATIENT)
Age: 48
End: 2023-10-26
Payer: MEDICAID

## 2023-10-26 VITALS
SYSTOLIC BLOOD PRESSURE: 143 MMHG | HEART RATE: 77 BPM | DIASTOLIC BLOOD PRESSURE: 81 MMHG | TEMPERATURE: 98 F | HEIGHT: 67.5 IN | RESPIRATION RATE: 18 BRPM | WEIGHT: 154.1 LBS | OXYGEN SATURATION: 100 %

## 2023-10-26 DIAGNOSIS — Z98.890 OTHER SPECIFIED POSTPROCEDURAL STATES: Chronic | ICD-10-CM

## 2023-10-26 DIAGNOSIS — S02.609A FRACTURE OF MANDIBLE, UNSPECIFIED, INITIAL ENCOUNTER FOR CLOSED FRACTURE: Chronic | ICD-10-CM

## 2023-10-26 DIAGNOSIS — S02.609K FRACTURE OF MANDIBLE, UNSPECIFIED, SUBSEQUENT ENCOUNTER FOR FRACTURE WITH NONUNION: ICD-10-CM

## 2023-10-26 LAB
HCG UR QL: NEGATIVE — SIGNIFICANT CHANGE UP
HCG UR QL: NEGATIVE — SIGNIFICANT CHANGE UP

## 2023-10-26 PROCEDURE — 21243 RECONSTRUCTION OF JAW JOINT: CPT | Mod: 50

## 2023-10-26 PROCEDURE — 88311 DECALCIFY TISSUE: CPT | Mod: 26

## 2023-10-26 PROCEDURE — ZZZZZ: CPT

## 2023-10-26 PROCEDURE — XXXXX: CPT | Mod: 1L

## 2023-10-26 PROCEDURE — 88305 TISSUE EXAM BY PATHOLOGIST: CPT | Mod: 26

## 2023-10-26 DEVICE — IMPLANTABLE DEVICE: Type: IMPLANTABLE DEVICE | Status: FUNCTIONAL

## 2023-10-26 DEVICE — CLIP APPLIER COVIDIEN SURGICLIP III 9" SM: Type: IMPLANTABLE DEVICE | Status: FUNCTIONAL

## 2023-10-26 RX ORDER — IBUPROFEN 200 MG
600 TABLET ORAL EVERY 6 HOURS
Refills: 0 | Status: DISCONTINUED | OUTPATIENT
Start: 2023-10-26 | End: 2023-10-29

## 2023-10-26 RX ORDER — OXYCODONE HYDROCHLORIDE 5 MG/1
5 TABLET ORAL ONCE
Refills: 0 | Status: DISCONTINUED | OUTPATIENT
Start: 2023-10-26 | End: 2023-10-26

## 2023-10-26 RX ORDER — ACETAMINOPHEN 500 MG
650 TABLET ORAL EVERY 6 HOURS
Refills: 0 | Status: DISCONTINUED | OUTPATIENT
Start: 2023-10-26 | End: 2023-10-29

## 2023-10-26 RX ORDER — ZOLPIDEM TARTRATE 10 MG/1
5 TABLET ORAL AT BEDTIME
Refills: 0 | Status: DISCONTINUED | OUTPATIENT
Start: 2023-10-26 | End: 2023-10-29

## 2023-10-26 RX ORDER — CHLORHEXIDINE GLUCONATE 213 G/1000ML
15 SOLUTION TOPICAL
Refills: 0 | Status: DISCONTINUED | OUTPATIENT
Start: 2023-10-26 | End: 2023-10-29

## 2023-10-26 RX ORDER — LOSARTAN POTASSIUM 100 MG/1
50 TABLET, FILM COATED ORAL DAILY
Refills: 0 | Status: DISCONTINUED | OUTPATIENT
Start: 2023-10-26 | End: 2023-10-29

## 2023-10-26 RX ORDER — OXYCODONE HYDROCHLORIDE 5 MG/1
5 TABLET ORAL EVERY 4 HOURS
Refills: 0 | Status: DISCONTINUED | OUTPATIENT
Start: 2023-10-26 | End: 2023-10-26

## 2023-10-26 RX ORDER — ONDANSETRON 8 MG/1
4 TABLET, FILM COATED ORAL ONCE
Refills: 0 | Status: COMPLETED | OUTPATIENT
Start: 2023-10-26 | End: 2023-10-26

## 2023-10-26 RX ORDER — HYDROMORPHONE HYDROCHLORIDE 2 MG/ML
0.5 INJECTION INTRAMUSCULAR; INTRAVENOUS; SUBCUTANEOUS
Refills: 0 | Status: DISCONTINUED | OUTPATIENT
Start: 2023-10-26 | End: 2023-10-26

## 2023-10-26 RX ORDER — ONDANSETRON 8 MG/1
4 TABLET, FILM COATED ORAL EVERY 6 HOURS
Refills: 0 | Status: DISCONTINUED | OUTPATIENT
Start: 2023-10-26 | End: 2023-10-29

## 2023-10-26 RX ORDER — ONDANSETRON 8 MG/1
4 TABLET, FILM COATED ORAL EVERY 6 HOURS
Refills: 0 | Status: DISCONTINUED | OUTPATIENT
Start: 2023-10-26 | End: 2023-10-26

## 2023-10-26 RX ORDER — ENOXAPARIN SODIUM 100 MG/ML
40 INJECTION SUBCUTANEOUS EVERY 24 HOURS
Refills: 0 | Status: DISCONTINUED | OUTPATIENT
Start: 2023-10-26 | End: 2023-10-29

## 2023-10-26 RX ORDER — OXYCODONE HYDROCHLORIDE 5 MG/1
10 TABLET ORAL EVERY 4 HOURS
Refills: 0 | Status: DISCONTINUED | OUTPATIENT
Start: 2023-10-26 | End: 2023-10-26

## 2023-10-26 RX ORDER — SODIUM CHLORIDE 9 MG/ML
1000 INJECTION, SOLUTION INTRAVENOUS
Refills: 0 | Status: DISCONTINUED | OUTPATIENT
Start: 2023-10-26 | End: 2023-10-29

## 2023-10-26 RX ORDER — AMPICILLIN SODIUM AND SULBACTAM SODIUM 250; 125 MG/ML; MG/ML
3 INJECTION, POWDER, FOR SUSPENSION INTRAMUSCULAR; INTRAVENOUS EVERY 6 HOURS
Refills: 0 | Status: DISCONTINUED | OUTPATIENT
Start: 2023-10-26 | End: 2023-10-26

## 2023-10-26 RX ORDER — NALOXONE HYDROCHLORIDE 4 MG/.1ML
0.1 SPRAY NASAL
Refills: 0 | Status: DISCONTINUED | OUTPATIENT
Start: 2023-10-26 | End: 2023-10-29

## 2023-10-26 RX ORDER — HYDROMORPHONE HYDROCHLORIDE 2 MG/ML
30 INJECTION INTRAMUSCULAR; INTRAVENOUS; SUBCUTANEOUS
Refills: 0 | Status: DISCONTINUED | OUTPATIENT
Start: 2023-10-26 | End: 2023-10-28

## 2023-10-26 RX ORDER — CEFAZOLIN SODIUM 1 G
2000 VIAL (EA) INJECTION EVERY 8 HOURS
Refills: 0 | Status: DISCONTINUED | OUTPATIENT
Start: 2023-10-26 | End: 2023-10-29

## 2023-10-26 RX ORDER — HYDROMORPHONE HYDROCHLORIDE 2 MG/ML
0.5 INJECTION INTRAMUSCULAR; INTRAVENOUS; SUBCUTANEOUS
Refills: 0 | Status: DISCONTINUED | OUTPATIENT
Start: 2023-10-26 | End: 2023-10-28

## 2023-10-26 RX ORDER — HYDROMORPHONE HYDROCHLORIDE 2 MG/ML
0.5 INJECTION INTRAMUSCULAR; INTRAVENOUS; SUBCUTANEOUS ONCE
Refills: 0 | Status: DISCONTINUED | OUTPATIENT
Start: 2023-10-26 | End: 2023-10-27

## 2023-10-26 RX ORDER — NALOXONE HYDROCHLORIDE 4 MG/.1ML
0.1 SPRAY NASAL
Refills: 0 | Status: DISCONTINUED | OUTPATIENT
Start: 2023-10-26 | End: 2023-10-26

## 2023-10-26 RX ORDER — HYDROMORPHONE HYDROCHLORIDE 2 MG/ML
30 INJECTION INTRAMUSCULAR; INTRAVENOUS; SUBCUTANEOUS
Refills: 0 | Status: DISCONTINUED | OUTPATIENT
Start: 2023-10-26 | End: 2023-10-26

## 2023-10-26 RX ADMIN — HYDROMORPHONE HYDROCHLORIDE 30 MILLILITER(S): 2 INJECTION INTRAMUSCULAR; INTRAVENOUS; SUBCUTANEOUS at 23:50

## 2023-10-26 RX ADMIN — SODIUM CHLORIDE 75 MILLILITER(S): 9 INJECTION, SOLUTION INTRAVENOUS at 22:39

## 2023-10-26 RX ADMIN — HYDROMORPHONE HYDROCHLORIDE 0.5 MILLIGRAM(S): 2 INJECTION INTRAMUSCULAR; INTRAVENOUS; SUBCUTANEOUS at 23:30

## 2023-10-26 RX ADMIN — HYDROMORPHONE HYDROCHLORIDE 0.5 MILLIGRAM(S): 2 INJECTION INTRAMUSCULAR; INTRAVENOUS; SUBCUTANEOUS at 23:03

## 2023-10-26 RX ADMIN — HYDROMORPHONE HYDROCHLORIDE 0.5 MILLIGRAM(S): 2 INJECTION INTRAMUSCULAR; INTRAVENOUS; SUBCUTANEOUS at 22:39

## 2023-10-26 RX ADMIN — HYDROMORPHONE HYDROCHLORIDE 0.5 MILLIGRAM(S): 2 INJECTION INTRAMUSCULAR; INTRAVENOUS; SUBCUTANEOUS at 22:55

## 2023-10-26 RX ADMIN — ONDANSETRON 4 MILLIGRAM(S): 8 TABLET, FILM COATED ORAL at 23:08

## 2023-10-26 NOTE — BRIEF OPERATIVE NOTE - OPERATION/FINDINGS
Bilateral total joint arthroplasty of TMJ; placement of hybrid arch bars in mandible and maxilla. Bilateral total joint replacement of TMJ; placement of hybrid arch bars in mandible and maxilla.

## 2023-10-26 NOTE — BRIEF OPERATIVE NOTE - NSICDXBRIEFPROCEDURE_GEN_ALL_CORE_FT
PROCEDURES:  Arthroplasty, TMJ, total 26-Oct-2023 22:43:11  Simba Bo   PROCEDURES:  Total replacement of both temporomandibular joints (TMJ) 27-Oct-2023 07:26:15  Simba Bo

## 2023-10-27 ENCOUNTER — RESULT REVIEW (OUTPATIENT)
Age: 48
End: 2023-10-27

## 2023-10-27 ENCOUNTER — TRANSCRIPTION ENCOUNTER (OUTPATIENT)
Age: 48
End: 2023-10-27

## 2023-10-27 PROCEDURE — G1004: CPT

## 2023-10-27 PROCEDURE — 70486 CT MAXILLOFACIAL W/O DYE: CPT | Mod: 26,MG

## 2023-10-27 RX ORDER — CYCLOBENZAPRINE HYDROCHLORIDE 10 MG/1
1 TABLET, FILM COATED ORAL
Qty: 7 | Refills: 0
Start: 2023-10-27 | End: 2023-11-02

## 2023-10-27 RX ORDER — CHLORHEXIDINE GLUCONATE 213 G/1000ML
15 SOLUTION TOPICAL
Qty: 1 | Refills: 0
Start: 2023-10-27 | End: 2023-11-09

## 2023-10-27 RX ORDER — LANOLIN ALCOHOL/MO/W.PET/CERES
3 CREAM (GRAM) TOPICAL AT BEDTIME
Refills: 0 | Status: DISCONTINUED | OUTPATIENT
Start: 2023-10-27 | End: 2023-10-29

## 2023-10-27 RX ORDER — CYCLOBENZAPRINE HYDROCHLORIDE 10 MG/1
5 TABLET, FILM COATED ORAL ONCE
Refills: 0 | Status: COMPLETED | OUTPATIENT
Start: 2023-10-27 | End: 2023-10-28

## 2023-10-27 RX ORDER — AMOXICILLIN 250 MG/5ML
10 SUSPENSION, RECONSTITUTED, ORAL (ML) ORAL
Qty: 2 | Refills: 0
Start: 2023-10-27 | End: 2023-11-05

## 2023-10-27 RX ORDER — PETROLATUM,WHITE
1 JELLY (GRAM) TOPICAL EVERY 4 HOURS
Refills: 0 | Status: DISCONTINUED | OUTPATIENT
Start: 2023-10-27 | End: 2023-10-29

## 2023-10-27 RX ORDER — OXYCODONE HYDROCHLORIDE 5 MG/1
5 TABLET ORAL
Qty: 100 | Refills: 0
Start: 2023-10-27 | End: 2023-10-31

## 2023-10-27 RX ORDER — IBUPROFEN 200 MG
30 TABLET ORAL
Qty: 840 | Refills: 0
Start: 2023-10-27 | End: 2023-11-02

## 2023-10-27 RX ORDER — ACETAMINOPHEN 500 MG
20.3 TABLET ORAL
Qty: 568.4 | Refills: 0
Start: 2023-10-27 | End: 2023-11-02

## 2023-10-27 RX ADMIN — Medication 650 MILLIGRAM(S): at 19:46

## 2023-10-27 RX ADMIN — Medication 600 MILLIGRAM(S): at 02:00

## 2023-10-27 RX ADMIN — CHLORHEXIDINE GLUCONATE 15 MILLILITER(S): 213 SOLUTION TOPICAL at 18:38

## 2023-10-27 RX ADMIN — Medication 100 MILLIGRAM(S): at 22:31

## 2023-10-27 RX ADMIN — Medication 650 MILLIGRAM(S): at 20:16

## 2023-10-27 RX ADMIN — Medication 1 APPLICATION(S): at 16:59

## 2023-10-27 RX ADMIN — ONDANSETRON 4 MILLIGRAM(S): 8 TABLET, FILM COATED ORAL at 02:58

## 2023-10-27 RX ADMIN — Medication 650 MILLIGRAM(S): at 08:02

## 2023-10-27 RX ADMIN — Medication 650 MILLIGRAM(S): at 14:11

## 2023-10-27 RX ADMIN — HYDROMORPHONE HYDROCHLORIDE 30 MILLILITER(S): 2 INJECTION INTRAMUSCULAR; INTRAVENOUS; SUBCUTANEOUS at 02:45

## 2023-10-27 RX ADMIN — Medication 100 MILLIGRAM(S): at 14:45

## 2023-10-27 RX ADMIN — HYDROMORPHONE HYDROCHLORIDE 30 MILLILITER(S): 2 INJECTION INTRAMUSCULAR; INTRAVENOUS; SUBCUTANEOUS at 08:17

## 2023-10-27 RX ADMIN — Medication 600 MILLIGRAM(S): at 13:42

## 2023-10-27 RX ADMIN — SODIUM CHLORIDE 75 MILLILITER(S): 9 INJECTION, SOLUTION INTRAVENOUS at 10:24

## 2023-10-27 RX ADMIN — CHLORHEXIDINE GLUCONATE 15 MILLILITER(S): 213 SOLUTION TOPICAL at 07:32

## 2023-10-27 RX ADMIN — Medication 600 MILLIGRAM(S): at 07:32

## 2023-10-27 RX ADMIN — Medication 600 MILLIGRAM(S): at 14:11

## 2023-10-27 RX ADMIN — Medication 3 MILLIGRAM(S): at 22:39

## 2023-10-27 RX ADMIN — Medication 100 MILLIGRAM(S): at 07:33

## 2023-10-27 RX ADMIN — Medication 600 MILLIGRAM(S): at 08:02

## 2023-10-27 RX ADMIN — Medication 600 MILLIGRAM(S): at 19:46

## 2023-10-27 RX ADMIN — Medication 650 MILLIGRAM(S): at 13:41

## 2023-10-27 RX ADMIN — LOSARTAN POTASSIUM 50 MILLIGRAM(S): 100 TABLET, FILM COATED ORAL at 07:32

## 2023-10-27 RX ADMIN — Medication 650 MILLIGRAM(S): at 07:32

## 2023-10-27 RX ADMIN — HYDROMORPHONE HYDROCHLORIDE 30 MILLILITER(S): 2 INJECTION INTRAMUSCULAR; INTRAVENOUS; SUBCUTANEOUS at 20:16

## 2023-10-27 RX ADMIN — Medication 600 MILLIGRAM(S): at 20:16

## 2023-10-27 NOTE — DISCHARGE NOTE PROVIDER - HOSPITAL COURSE
10/26/23 HD1  48 yr old female with PMH of substance abuse with PSH of ORIF of mandibular fractures presenting today for bilateral TMJ replacement for correction of occlusal discrepancy.    10/27/23 HD2 POD1  patient progressing well. no acute events overnight.       10/26/23 HD1  48 yr old female with PMH of substance abuse with PSH of ORIF of mandibular fractures presenting today for bilateral TMJ replacement for correction of occlusal discrepancy.    10/27/23 HD2 POD1  patient progressing well. no acute events overnight.      10/28/23 HD3 POD2  patient progressing well. no acute events overnight.      10/29/23 HD4 POD3  patient progressing well. no acute events overnight.

## 2023-10-27 NOTE — PATIENT PROFILE ADULT - FALL HARM RISK - HARM RISK INTERVENTIONS
Assistance with ambulation/Assistance OOB with selected safe patient handling equipment/Communicate Risk of Fall with Harm to all staff/Monitor gait and stability/Reinforce activity limits and safety measures with patient and family/Sit up slowly, dangle for a short time, stand at bedside before walking/Tailored Fall Risk Interventions/Visual Cue: Yellow wristband and red socks/Bed in lowest position, wheels locked, appropriate side rails in place/Call bell, personal items and telephone in reach/Instruct patient to call for assistance before getting out of bed or chair/Non-slip footwear when patient is out of bed/[place] to call system/Physically safe environment - no spills, clutter or unnecessary equipment/Purposeful Proactive Rounding/Room/bathroom lighting operational, light cord in reach

## 2023-10-27 NOTE — DISCHARGE NOTE PROVIDER - ATTENDING ATTESTATION STATEMENT
"Chief Complaint   Patient presents with     RECHECK     Patient being seen for follow up.       /70   Pulse 89   Ht 4' 8.97\" (144.7 cm)   Wt 86 lb 3.2 oz (39.1 kg)   BMI 18.67 kg/m      Naina Laughlin MA  August 14, 2019  " I have personally seen and examined the patient. I have collaborated with and supervised the

## 2023-10-27 NOTE — DISCHARGE NOTE PROVIDER - NSDCMRMEDTOKEN_GEN_ALL_CORE_FT
Ambien 10 mg oral tablet: 1 tab(s) orally once a day (at bedtime)  Calcium , 1 Tab, PO, Daily:   Fish oil, 1 tab, Po, Daily, last dose 10/24/23:   losartan 50 mg oral tablet: 1 tab(s) orally once a day  Multiple Vitamins oral tablet: 1 tab(s) orally once a day Last dose 10/24/23   acetaminophen 650 mg/20.3 mL oral suspension: 20.3 milliliter(s) orally every 6 hours  Ambien 10 mg oral tablet: 1 tab(s) orally once a day (at bedtime)  amoxicillin 400 mg/5 mL oral liquid: 10 milliliter(s) orally 2 times a day  Calcium , 1 Tab, PO, Daily:   chlorhexidine 0.12% mucous membrane liquid: 15 milliliter(s) orally 2 times a day  Fish oil, 1 tab, Po, Daily, last dose 10/24/23:   ibuprofen 100 mg/5 mL oral suspension: 30 milliliter(s) orally every 6 hours  losartan 50 mg oral tablet: 1 tab(s) orally once a day  Multiple Vitamins oral tablet: 1 tab(s) orally once a day Last dose 10/24/23  oxyCODONE 5 mg/5 mL oral solution: 5 milliliter(s) orally every 6 hours as needed for  severe pain MDD: 20 mL   acetaminophen 650 mg/20.3 mL oral suspension: 20.3 milliliter(s) orally every 6 hours  Ambien 10 mg oral tablet: 1 tab(s) orally once a day (at bedtime)  amoxicillin 400 mg/5 mL oral liquid: 10 milliliter(s) orally 2 times a day  Calcium , 1 Tab, PO, Daily:   chlorhexidine 0.12% mucous membrane liquid: 15 milliliter(s) orally 2 times a day  cyclobenzaprine 10 mg oral tablet: 1 tab(s) orally once a day (at bedtime)  Fish oil, 1 tab, Po, Daily, last dose 10/24/23:   gabapentin 250 mg/5 mL oral solution: 2 milliliter(s) orally 3 times a day  ibuprofen 100 mg/5 mL oral suspension: 30 milliliter(s) orally every 6 hours  losartan 50 mg oral tablet: 1 tab(s) orally once a day  Multiple Vitamins oral tablet: 1 tab(s) orally once a day Last dose 10/24/23  oxyCODONE 5 mg/5 mL oral solution: 5 milliliter(s) orally every 6 hours as needed for  severe pain MDD: 20 mL  oxyCODONE 5 mg/5 mL oral solution: 10 milliliter(s) orally every 3 hours As needed Moderate to Severe Pain (4 - 10)   acetaminophen 650 mg/20.3 mL oral suspension: 20.3 milliliter(s) orally every 6 hours  Ambien 10 mg oral tablet: 1 tab(s) orally once a day (at bedtime)  Calcium , 1 Tab, PO, Daily:   cephalexin 250 mg/5 mL oral liquid: 10 milliliter(s) orally 3 times a day  chlorhexidine 0.12% mucous membrane liquid: 15 milliliter(s) orally 2 times a day  cyclobenzaprine 10 mg oral tablet: 1 tab(s) orally once a day (at bedtime)  Fish oil, 1 tab, Po, Daily, last dose 10/24/23:   gabapentin 250 mg/5 mL oral solution: 2 milliliter(s) orally 3 times a day  ibuprofen 100 mg/5 mL oral suspension: 30 milliliter(s) orally every 6 hours  losartan 50 mg oral tablet: 1 tab(s) orally once a day  Multiple Vitamins oral tablet: 1 tab(s) orally once a day Last dose 10/24/23  oxyCODONE 5 mg/5 mL oral solution: 5 milliliter(s) orally every 6 hours as needed for  severe pain MDD: 20 mL  oxyCODONE 5 mg/5 mL oral solution: 10 milliliter(s) orally every 3 hours As needed Moderate to Severe Pain (4 - 10)

## 2023-10-27 NOTE — PROGRESS NOTE ADULT - SUBJECTIVE AND OBJECTIVE BOX
Anesthesia Pain Management Service    SUBJECTIVE: Pt doing well with IV PCA without problems reported.    Therapy:	  [ X] IV PCA	   [ ] Epidural           [ ] s/p Spinal Opoid              [ ] Postpartum infusion	  [ ] Patient controlled regional anesthesia (PCRA)    [ ] prn Analgesics    Allergies    No Known Allergies    Intolerances      MEDICATIONS  (STANDING):  acetaminophen   Oral Liquid .. 650 milliGRAM(s) Oral every 6 hours  ceFAZolin   IVPB 2000 milliGRAM(s) IV Intermittent every 8 hours  chlorhexidine 0.12% Liquid 15 milliLiter(s) Oral Mucosa two times a day  enoxaparin Injectable 40 milliGRAM(s) SubCutaneous every 24 hours  HYDROmorphone PCA (1 mG/mL) 30 milliLiter(s) PCA Continuous PCA Continuous  ibuprofen  Suspension. 600 milliGRAM(s) Oral every 6 hours  lactated ringers. 1000 milliLiter(s) (75 mL/Hr) IV Continuous <Continuous>  losartan 50 milliGRAM(s) Oral daily    MEDICATIONS  (PRN):  HYDROmorphone PCA (1 mG/mL) Rescue Clinician Bolus 0.5 milliGRAM(s) IV Push every 30 minutes PRN Severe Pain (7 - 10)  naloxone Injectable 0.1 milliGRAM(s) IV Push every 3 minutes PRN For ANY of the following changes in patient status:  A. RR LESS THAN 10 breaths per minute, B. Oxygen saturation LESS THAN 90%, C. Sedation score of 6  ondansetron Injectable 4 milliGRAM(s) IV Push every 6 hours PRN Nausea  zolpidem 5 milliGRAM(s) Oral at bedtime PRN Insomnia  zolpidem 5 milliGRAM(s) Oral at bedtime PRN Insomnia      OBJECTIVE:   [X] No new signs     [ ] Other:    Side Effects:  [X ] None			[ ] Other:    Assessment of Catheter Site:		[ ] Intact		[ ] Other:    ASSESSMENT/PLAN  [ X] Continue current therapy    [ ] Therapy changed to:    [ ] IV PCA       [ ] Epidural     [ ] prn Analgesics     Comments:

## 2023-10-27 NOTE — PROGRESS NOTE ADULT - SUBJECTIVE AND OBJECTIVE BOX
ANESTHESIA POSTOP CHECK    48y Female POSTOP DAY 1     No COMPLAINTS    NO APPARENT ANESTHESIA COMPLICATIONS

## 2023-10-27 NOTE — PROGRESS NOTE ADULT - SUBJECTIVE AND OBJECTIVE BOX
Anesthesia Pain Management Service    SUBJECTIVE: Patient is doing well with IV PCA and no significant problems reported. Patient reports being afraid of over-using PCA. Reviewed PCA use with patient.    Pain Scale Score	At rest: ___ 	With Activity: ___ 	[X ] Refer to charted pain scores    THERAPY:    [ ] IV PCA Morphine		[ ] 5 mg/mL	[ ] 1 mg/mL  [X ] IV PCA Hydromorphone	[ ] 5 mg/mL	[X ] 1 mg/mL  [ ] IV PCA Fentanyl		[ ] 50 micrograms/mL    Demand dose __0.2_ lockout __6_ (minutes) Continuous Rate _0__ Total: _2.4__  mg used (in past 24 hours)      MEDICATIONS  (STANDING):  acetaminophen   Oral Liquid .. 650 milliGRAM(s) Oral every 6 hours  ceFAZolin   IVPB 2000 milliGRAM(s) IV Intermittent every 8 hours  chlorhexidine 0.12% Liquid 15 milliLiter(s) Oral Mucosa two times a day  enoxaparin Injectable 40 milliGRAM(s) SubCutaneous every 24 hours  HYDROmorphone PCA (1 mG/mL) 30 milliLiter(s) PCA Continuous PCA Continuous  ibuprofen  Suspension. 600 milliGRAM(s) Oral every 6 hours  lactated ringers. 1000 milliLiter(s) (75 mL/Hr) IV Continuous <Continuous>  losartan 50 milliGRAM(s) Oral daily    MEDICATIONS  (PRN):  HYDROmorphone PCA (1 mG/mL) Rescue Clinician Bolus 0.5 milliGRAM(s) IV Push every 30 minutes PRN Severe Pain (7 - 10)  naloxone Injectable 0.1 milliGRAM(s) IV Push every 3 minutes PRN For ANY of the following changes in patient status:  A. RR LESS THAN 10 breaths per minute, B. Oxygen saturation LESS THAN 90%, C. Sedation score of 6  ondansetron Injectable 4 milliGRAM(s) IV Push every 6 hours PRN Nausea  zolpidem 5 milliGRAM(s) Oral at bedtime PRN Insomnia  zolpidem 5 milliGRAM(s) Oral at bedtime PRN Insomnia      OBJECTIVE: Patient sitting in bed.    Sedation Score:	[ X] Alert	[ ] Drowsy 	[ ] Arousable	[ ] Asleep	[ ] Unresponsive    Side Effects:	[X ] None	[ ] Nausea	[ ] Vomiting	[ ] Pruritus  		[ ] Other:    Vital Signs Last 24 Hrs  T(C): 36.7 (27 Oct 2023 09:37), Max: 37 (26 Oct 2023 22:30)  T(F): 98.1 (27 Oct 2023 09:37), Max: 98.6 (26 Oct 2023 22:30)  HR: 70 (27 Oct 2023 09:37) (64 - 96)  BP: 115/69 (27 Oct 2023 09:37) (107/83 - 163/110)  BP(mean): 91 (27 Oct 2023 02:00) (84 - 123)  RR: 18 (27 Oct 2023 09:37) (10 - 24)  SpO2: 100% (27 Oct 2023 09:37) (97% - 100%)    Parameters below as of 27 Oct 2023 06:00  Patient On (Oxygen Delivery Method): room air        ASSESSMENT/ PLAN    Therapy to  be:	[ X] Continue   [ ] Discontinued   [ ] Change to prn Analgesics    Documentation and Verification of current medications:   [X] Done	[ ] Not done, not elligible    Comments: Continue IV PCA. Recommend non-opioid adjuvant analgesics to be used when possible and when allowed by primary surgical team.    Progress Note written now but Patient was seen earlier.

## 2023-10-27 NOTE — PROGRESS NOTE ADULT - SUBJECTIVE AND OBJECTIVE BOX
48 yr old female with PMH of substance abuse with PSH of ORIF of mandibular fractures POD1 s/p bilateral TMJ replacement, and maxillary/mandibular hybrid arch bar placement for correction of occlusal discrepancy.     INTERVAL EVENTS: Ashley RODRIGUEZ.     ICU Vital Signs Last 24 Hrs  T(C): 36.3 (27 Oct 2023 02:56), Max: 37 (26 Oct 2023 22:30)  T(F): 97.4 (27 Oct 2023 02:56), Max: 98.6 (26 Oct 2023 22:30)  HR: 67 (27 Oct 2023 02:56) (64 - 96)  BP: 146/86 (27 Oct 2023 02:56) (107/83 - 163/110)  BP(mean): 91 (27 Oct 2023 02:00) (84 - 123)  ABP: --  ABP(mean): --  RR: 18 (27 Oct 2023 02:56) (10 - 24)  SpO2: 100% (27 Oct 2023 02:56) (97% - 100%)    O2 Parameters below as of 27 Oct 2023 02:56  Patient On (Oxygen Delivery Method): room air    I&O's Detail    26 Oct 2023 07:01  -  27 Oct 2023 07:00  --------------------------------------------------------  IN:    Lactated Ringers: 300 mL    Oral Fluid: 100 mL  Total IN: 400 mL    OUT:    Indwelling Catheter - Urethral (mL): 325 mL  Total OUT: 325 mL    Total NET: 75 mL    MEDICATIONS  (STANDING):  acetaminophen   Oral Liquid .. 650 milliGRAM(s) Oral every 6 hours  ceFAZolin   IVPB 2000 milliGRAM(s) IV Intermittent every 8 hours  chlorhexidine 0.12% Liquid 15 milliLiter(s) Oral Mucosa two times a day  enoxaparin Injectable 40 milliGRAM(s) SubCutaneous every 24 hours  HYDROmorphone PCA (1 mG/mL) 30 milliLiter(s) PCA Continuous PCA Continuous  ibuprofen  Suspension. 600 milliGRAM(s) Oral every 6 hours  lactated ringers. 1000 milliLiter(s) (75 mL/Hr) IV Continuous <Continuous>  losartan 50 milliGRAM(s) Oral daily    MEDICATIONS  (PRN):  HYDROmorphone  Injectable 0.5 milliGRAM(s) IV Push once PRN for breakthrough pain only  HYDROmorphone PCA (1 mG/mL) Rescue Clinician Bolus 0.5 milliGRAM(s) IV Push every 30 minutes PRN Severe Pain (7 - 10)  naloxone Injectable 0.1 milliGRAM(s) IV Push every 3 minutes PRN For ANY of the following changes in patient status:  A. RR LESS THAN 10 breaths per minute, B. Oxygen saturation LESS THAN 90%, C. Sedation score of 6  ondansetron Injectable 4 milliGRAM(s) IV Push every 6 hours PRN Nausea  zolpidem 5 milliGRAM(s) Oral at bedtime PRN Insomnia  zolpidem 5 milliGRAM(s) Oral at bedtime PRN Insomnia    Focused Exam:  Gen: AAOx3, NAD  H: jaw bra in place, R +L incision sites intact, hemostatic, clean, mild b/l facial edema c/w surgery  E: PERRL, EOMI b/l, vision at baseline  E: no otorrhea, hearing at baseline  N: no epistaxis, nares patent b/l  Maxillofacial: hybrid arch bars present on maxilla and mandible with elastics in place. Occlusion strable.

## 2023-10-27 NOTE — DISCHARGE NOTE PROVIDER - PROVIDER TOKENS
FREE:[LAST:[Ben],FIRST:[Vikram],PHONE:[phone]],FAX:[(   [phone]    -],ADDRESS:[[facility]  [phone] [place] [address]],SCHEDULEDAPPT:[11/03/2023]]

## 2023-10-27 NOTE — DISCHARGE NOTE PROVIDER - CARE PROVIDER_API CALL
Vikram Contreras [facility]  [address] [place] [address]  Phone: [phone]  Fax: (   )    -  Scheduled Appointment: 11/03/2023

## 2023-10-28 RX ORDER — OXYCODONE HYDROCHLORIDE 5 MG/1
10 TABLET ORAL
Refills: 0 | Status: DISCONTINUED | OUTPATIENT
Start: 2023-10-28 | End: 2023-10-29

## 2023-10-28 RX ORDER — GABAPENTIN 400 MG/1
100 CAPSULE ORAL THREE TIMES A DAY
Refills: 0 | Status: DISCONTINUED | OUTPATIENT
Start: 2023-10-28 | End: 2023-10-29

## 2023-10-28 RX ORDER — HYDROMORPHONE HYDROCHLORIDE 2 MG/ML
0.3 INJECTION INTRAMUSCULAR; INTRAVENOUS; SUBCUTANEOUS
Refills: 0 | Status: DISCONTINUED | OUTPATIENT
Start: 2023-10-28 | End: 2023-10-29

## 2023-10-28 RX ORDER — GABAPENTIN 400 MG/1
100 CAPSULE ORAL EVERY 8 HOURS
Refills: 0 | Status: DISCONTINUED | OUTPATIENT
Start: 2023-10-28 | End: 2023-10-28

## 2023-10-28 RX ADMIN — Medication 650 MILLIGRAM(S): at 10:47

## 2023-10-28 RX ADMIN — Medication 100 MILLIGRAM(S): at 14:04

## 2023-10-28 RX ADMIN — OXYCODONE HYDROCHLORIDE 10 MILLIGRAM(S): 5 TABLET ORAL at 18:22

## 2023-10-28 RX ADMIN — OXYCODONE HYDROCHLORIDE 10 MILLIGRAM(S): 5 TABLET ORAL at 14:20

## 2023-10-28 RX ADMIN — Medication 650 MILLIGRAM(S): at 11:00

## 2023-10-28 RX ADMIN — CHLORHEXIDINE GLUCONATE 15 MILLILITER(S): 213 SOLUTION TOPICAL at 05:39

## 2023-10-28 RX ADMIN — ONDANSETRON 4 MILLIGRAM(S): 8 TABLET, FILM COATED ORAL at 01:16

## 2023-10-28 RX ADMIN — Medication 600 MILLIGRAM(S): at 00:56

## 2023-10-28 RX ADMIN — Medication 100 MILLIGRAM(S): at 05:38

## 2023-10-28 RX ADMIN — GABAPENTIN 100 MILLIGRAM(S): 400 CAPSULE ORAL at 14:03

## 2023-10-28 RX ADMIN — Medication 3 MILLIGRAM(S): at 22:03

## 2023-10-28 RX ADMIN — CHLORHEXIDINE GLUCONATE 15 MILLILITER(S): 213 SOLUTION TOPICAL at 18:22

## 2023-10-28 RX ADMIN — CYCLOBENZAPRINE HYDROCHLORIDE 5 MILLIGRAM(S): 10 TABLET, FILM COATED ORAL at 01:16

## 2023-10-28 RX ADMIN — OXYCODONE HYDROCHLORIDE 10 MILLIGRAM(S): 5 TABLET ORAL at 22:02

## 2023-10-28 RX ADMIN — Medication 650 MILLIGRAM(S): at 22:03

## 2023-10-28 RX ADMIN — Medication 600 MILLIGRAM(S): at 14:02

## 2023-10-28 RX ADMIN — HYDROMORPHONE HYDROCHLORIDE 30 MILLILITER(S): 2 INJECTION INTRAMUSCULAR; INTRAVENOUS; SUBCUTANEOUS at 00:50

## 2023-10-28 RX ADMIN — Medication 600 MILLIGRAM(S): at 00:26

## 2023-10-28 RX ADMIN — Medication 100 MILLIGRAM(S): at 22:04

## 2023-10-28 RX ADMIN — Medication 650 MILLIGRAM(S): at 22:33

## 2023-10-28 RX ADMIN — Medication 650 MILLIGRAM(S): at 00:26

## 2023-10-28 RX ADMIN — Medication 600 MILLIGRAM(S): at 19:22

## 2023-10-28 RX ADMIN — OXYCODONE HYDROCHLORIDE 10 MILLIGRAM(S): 5 TABLET ORAL at 18:40

## 2023-10-28 RX ADMIN — OXYCODONE HYDROCHLORIDE 10 MILLIGRAM(S): 5 TABLET ORAL at 22:32

## 2023-10-28 RX ADMIN — Medication 650 MILLIGRAM(S): at 00:56

## 2023-10-28 RX ADMIN — Medication 650 MILLIGRAM(S): at 18:40

## 2023-10-28 RX ADMIN — Medication 600 MILLIGRAM(S): at 14:20

## 2023-10-28 RX ADMIN — Medication 600 MILLIGRAM(S): at 19:52

## 2023-10-28 RX ADMIN — OXYCODONE HYDROCHLORIDE 10 MILLIGRAM(S): 5 TABLET ORAL at 14:03

## 2023-10-28 RX ADMIN — ENOXAPARIN SODIUM 40 MILLIGRAM(S): 100 INJECTION SUBCUTANEOUS at 18:23

## 2023-10-28 RX ADMIN — GABAPENTIN 100 MILLIGRAM(S): 400 CAPSULE ORAL at 22:01

## 2023-10-28 RX ADMIN — HYDROMORPHONE HYDROCHLORIDE 30 MILLILITER(S): 2 INJECTION INTRAMUSCULAR; INTRAVENOUS; SUBCUTANEOUS at 08:34

## 2023-10-28 RX ADMIN — OXYCODONE HYDROCHLORIDE 10 MILLIGRAM(S): 5 TABLET ORAL at 11:00

## 2023-10-28 RX ADMIN — OXYCODONE HYDROCHLORIDE 10 MILLIGRAM(S): 5 TABLET ORAL at 10:46

## 2023-10-28 RX ADMIN — Medication 650 MILLIGRAM(S): at 18:22

## 2023-10-28 NOTE — PROGRESS NOTE ADULT - SUBJECTIVE AND OBJECTIVE BOX
Anesthesia Pain Management Service    SUBJECTIVE: Patient reports her pain was severe last night. States the IV PCA does not help much and is making her nauseous.   Pain Scale Score	At rest: _7/10__ 	With Activity: ___ 	[X ] Refer to charted pain scores    THERAPY:    [ ] IV PCA Morphine		[ ] 5 mg/mL	[ ] 1 mg/mL  [X ] IV PCA Hydromorphone	[ ] 5 mg/mL	[X ] 1 mg/mL  [ ] IV PCA Fentanyl		[ ] 50 micrograms/mL    Demand dose __0.2_ lockout __6_ (minutes) Continuous Rate _0__ Total: _4.8__   mg used (in past 24 hrs)      MEDICATIONS  (STANDING):  acetaminophen   Oral Liquid .. 650 milliGRAM(s) Oral every 6 hours  ceFAZolin   IVPB 2000 milliGRAM(s) IV Intermittent every 8 hours  chlorhexidine 0.12% Liquid 15 milliLiter(s) Oral Mucosa two times a day  enoxaparin Injectable 40 milliGRAM(s) SubCutaneous every 24 hours  gabapentin 100 milliGRAM(s) Oral every 8 hours  ibuprofen  Suspension. 600 milliGRAM(s) Oral every 6 hours  lactated ringers. 1000 milliLiter(s) (75 mL/Hr) IV Continuous <Continuous>  losartan 50 milliGRAM(s) Oral daily  melatonin 3 milliGRAM(s) Oral at bedtime    MEDICATIONS  (PRN):  AQUAPHOR (petrolatum Ointment) 1 Application(s) Topical every 4 hours PRN Dry lips  HYDROmorphone  Injectable 0.3 milliGRAM(s) IV Push every 3 hours PRN Severe BreakthroughPain (7 - 10)  naloxone Injectable 0.1 milliGRAM(s) IV Push every 3 minutes PRN For ANY of the following changes in patient status:  A. RR LESS THAN 10 breaths per minute, B. Oxygen saturation LESS THAN 90%, C. Sedation score of 6  ondansetron Injectable 4 milliGRAM(s) IV Push every 6 hours PRN Nausea  oxyCODONE    Solution 10 milliGRAM(s) Oral every 3 hours PRN Moderate to Severe Pain (4 - 10)  zolpidem 5 milliGRAM(s) Oral at bedtime PRN Insomnia  zolpidem 5 milliGRAM(s) Oral at bedtime PRN Insomnia       OBJECTIVE: Patient sitting up in bed.    Sedation Score:	[ X] Alert	[ ] Drowsy 	[ ] Arousable	[ ] Asleep	[ ] Unresponsive    Side Effects:	[X ] None	[ ] Nausea	[ ] Vomiting	[ ] Pruritus  		[ ] Other:    Vital Signs Last 24 Hrs  T(C): 36.8 (28 Oct 2023 10:00), Max: 36.9 (27 Oct 2023 21:11)  T(F): 98.3 (28 Oct 2023 10:00), Max: 98.4 (27 Oct 2023 21:11)  HR: 76 (28 Oct 2023 10:00) (64 - 80)  BP: 128/80 (28 Oct 2023 10:00) (111/71 - 147/80)  BP(mean): --  RR: 18 (28 Oct 2023 10:00) (17 - 18)  SpO2: 100% (28 Oct 2023 10:00) (100% - 100%)    Parameters below as of 28 Oct 2023 10:00  Patient On (Oxygen Delivery Method): room air        ASSESSMENT/ PLAN    Therapy to  be:	[ ] Continue   [ X] Discontinued   [X ] Change to prn Analgesics    Documentation and Verification of current medications:   [X] Done	[ ] Not done, not elligible    Comments: IV PCA discontinued. Plan discussed with primary team. PRN Oral/IV opioids and/or Adjuvant non-opioid medication to be ordered at this point.    Progress Note written now but Patient was seen earlier. Anesthesia Pain Management Service    SUBJECTIVE: Patient reports her pain was severe last night. States the IV PCA does not help much and is making her nauseous.   Pain Scale Score	At rest: _7/10__ 	With Activity: ___ 	[X ] Refer to charted pain scores    THERAPY:    [ ] IV PCA Morphine		[ ] 5 mg/mL	[ ] 1 mg/mL  [X ] IV PCA Hydromorphone	[ ] 5 mg/mL	[X ] 1 mg/mL  [ ] IV PCA Fentanyl		[ ] 50 micrograms/mL    Demand dose __0.2_ lockout __6_ (minutes) Continuous Rate _0__ Total: _4.8__   mg used (in past 24 hrs)      MEDICATIONS  (STANDING):  acetaminophen   Oral Liquid .. 650 milliGRAM(s) Oral every 6 hours  ceFAZolin   IVPB 2000 milliGRAM(s) IV Intermittent every 8 hours  chlorhexidine 0.12% Liquid 15 milliLiter(s) Oral Mucosa two times a day  enoxaparin Injectable 40 milliGRAM(s) SubCutaneous every 24 hours  gabapentin 100 milliGRAM(s) Oral every 8 hours  ibuprofen  Suspension. 600 milliGRAM(s) Oral every 6 hours  lactated ringers. 1000 milliLiter(s) (75 mL/Hr) IV Continuous <Continuous>  losartan 50 milliGRAM(s) Oral daily  melatonin 3 milliGRAM(s) Oral at bedtime    MEDICATIONS  (PRN):  AQUAPHOR (petrolatum Ointment) 1 Application(s) Topical every 4 hours PRN Dry lips  HYDROmorphone  Injectable 0.3 milliGRAM(s) IV Push every 3 hours PRN Severe BreakthroughPain (7 - 10)  naloxone Injectable 0.1 milliGRAM(s) IV Push every 3 minutes PRN For ANY of the following changes in patient status:  A. RR LESS THAN 10 breaths per minute, B. Oxygen saturation LESS THAN 90%, C. Sedation score of 6  ondansetron Injectable 4 milliGRAM(s) IV Push every 6 hours PRN Nausea  oxyCODONE    Solution 10 milliGRAM(s) Oral every 3 hours PRN Moderate to Severe Pain (4 - 10)  zolpidem 5 milliGRAM(s) Oral at bedtime PRN Insomnia  zolpidem 5 milliGRAM(s) Oral at bedtime PRN Insomnia       OBJECTIVE: Patient sitting up in bed.    Sedation Score:	[ X] Alert	[ ] Drowsy 	[ ] Arousable	[ ] Asleep	[ ] Unresponsive    Side Effects:	[] None	[ x] Nausea	[ ] Vomiting	[ ] Pruritus  		[ ] Other:    Vital Signs Last 24 Hrs  T(C): 36.8 (28 Oct 2023 10:00), Max: 36.9 (27 Oct 2023 21:11)  T(F): 98.3 (28 Oct 2023 10:00), Max: 98.4 (27 Oct 2023 21:11)  HR: 76 (28 Oct 2023 10:00) (64 - 80)  BP: 128/80 (28 Oct 2023 10:00) (111/71 - 147/80)  BP(mean): --  RR: 18 (28 Oct 2023 10:00) (17 - 18)  SpO2: 100% (28 Oct 2023 10:00) (100% - 100%)    Parameters below as of 28 Oct 2023 10:00  Patient On (Oxygen Delivery Method): room air        ASSESSMENT/ PLAN    Therapy to  be:	[ ] Continue   [ X] Discontinued   [X ] Change to prn Analgesics    Documentation and Verification of current medications:   [X] Done	[ ] Not done, not elligible    Comments: IV PCA discontinued. Plan discussed with primary team. PRN Oral/IV opioids and/or Adjuvant non-opioid medication to be ordered at this point.    Progress Note written now but Patient was seen earlier.

## 2023-10-28 NOTE — CHART NOTE - NSCHARTNOTEFT_GEN_A_CORE
48F with no sig PMH presents to [facility] for Bilateral total joint arthroplasty of TMJ; placement of hybrid arch bars in mandible and maxilla with elastic bands. total joint arthroplasty of TMJ; placement of hybrid arch bars in mandible and maxilla.    Pain well controlled. aVSS.    Vitals:     Vital Signs Last 24 Hrs  T(C): 36.3 (27 Oct 2023 02:56), Max: 37 (26 Oct 2023 22:30)  T(F): 97.4 (27 Oct 2023 02:56), Max: 98.6 (26 Oct 2023 22:30)  HR: 67 (27 Oct 2023 02:56) (64 - 96)  BP: 146/86 (27 Oct 2023 02:56) (107/83 - 163/110)  BP(mean): 91 (27 Oct 2023 02:00) (84 - 123)  RR: 18 (27 Oct 2023 02:56) (10 - 24)  SpO2: 100% (27 Oct 2023 02:56) (97% - 100%)    Parameters below as of 27 Oct 2023 02:56  Patient On (Oxygen Delivery Method): room air        I&O's Detail    26 Oct 2023 07:01  -  27 Oct 2023 04:29  --------------------------------------------------------  IN:    Lactated Ringers: 300 mL    Oral Fluid: 100 mL  Total IN: 400 mL    OUT:    Indwelling Catheter - Urethral (mL): 325 mL  Total OUT: 325 mL    Total NET: 75 mL          Medications:    MEDICATIONS  (STANDING):  acetaminophen   Oral Liquid .. 650 milliGRAM(s) Oral every 6 hours  ceFAZolin   IVPB 2000 milliGRAM(s) IV Intermittent every 8 hours  chlorhexidine 0.12% Liquid 15 milliLiter(s) Oral Mucosa two times a day  enoxaparin Injectable 40 milliGRAM(s) SubCutaneous every 24 hours  HYDROmorphone PCA (1 mG/mL) 30 milliLiter(s) PCA Continuous PCA Continuous  ibuprofen  Suspension. 600 milliGRAM(s) Oral every 6 hours  lactated ringers. 1000 milliLiter(s) (75 mL/Hr) IV Continuous <Continuous>  losartan 50 milliGRAM(s) Oral daily    MEDICATIONS  (PRN):  HYDROmorphone  Injectable 0.5 milliGRAM(s) IV Push once PRN for breakthrough pain only  HYDROmorphone PCA (1 mG/mL) Rescue Clinician Bolus 0.5 milliGRAM(s) IV Push every 30 minutes PRN Severe Pain (7 - 10)  naloxone Injectable 0.1 milliGRAM(s) IV Push every 3 minutes PRN For ANY of the following changes in patient status:  A. RR LESS THAN 10 breaths per minute, B. Oxygen saturation LESS THAN 90%, C. Sedation score of 6  ondansetron Injectable 4 milliGRAM(s) IV Push every 6 hours PRN Nausea  zolpidem 5 milliGRAM(s) Oral at bedtime PRN Insomnia  zolpidem 5 milliGRAM(s) Oral at bedtime PRN Insomnia      Focused Exam:  Gen: AAOx3, NAD  H: jaw bra in place, R +L incision sites intact, hemostatic, clean, mild b/l facial edema c/w surgery  E: PERRL, EOMI b/l, vision at baseline  E: no otorrhea, hearing at baseline  N: no epistaxis, nares patent b/l  Maxillofacial: hybrid arch bars present on maxilla and mandible with elastics in place. Occlusion strable.     Assessment:   48F s/p bilateral total joint arthroplasty of TMJ with placement of hybrid arch bars in both maxilla and mandible with elastics.    Plan:  - Verify pain control, pt able to ambulate, and able to drink and void on own  - Plan for dispo to home today 10/28/23    Oral and Maxillofacial Surgery  #[id number].
Nutrition Services    Consult received for "MST Score >2." Upon chart review, patient with stable weight, does not currently meet criteria for Protein Calorie Malnutrition risk per MST. RD remains available for assessment per protocol or as needed.     Ivan Escobar, [id number] or TEAMS

## 2023-10-28 NOTE — PROGRESS NOTE ADULT - SUBJECTIVE AND OBJECTIVE BOX
48 yr old female with PMH of substance abuse with PSH of ORIF of mandibular fractures POD2 s/p bilateral TMJ replacement, and maxillary/mandibular hybrid arch bar placement for correction of occlusal discrepancy.     10/28/23 HD3 POD2    INTERVAL EVENTS: Ashley RODRIGUEZ.       Vitals:     Vital Signs Last 24 Hrs  T(C): 36.3 (28 Oct 2023 06:00), Max: 36.9 (27 Oct 2023 21:11)  T(F): 97.4 (28 Oct 2023 06:00), Max: 98.4 (27 Oct 2023 21:11)  HR: 64 (28 Oct 2023 06:00) (64 - 80)  BP: 111/71 (28 Oct 2023 06:00) (111/71 - 147/80)  BP(mean): --  RR: 17 (28 Oct 2023 06:00) (17 - 18)  SpO2: 100% (28 Oct 2023 06:00) (100% - 100%)    Parameters below as of 28 Oct 2023 06:00  Patient On (Oxygen Delivery Method): room air        I&O's Detail    27 Oct 2023 07:01  -  28 Oct 2023 07:00  --------------------------------------------------------  IN:    Lactated Ringers: 1500 mL    Oral Fluid: 1330 mL  Total IN: 2830 mL    OUT:    Indwelling Catheter - Urethral (mL): 2450 mL    Voided (mL): 250 mL  Total OUT: 2700 mL    Total NET: 130 mL          Medications:    MEDICATIONS  (STANDING):  acetaminophen   Oral Liquid .. 650 milliGRAM(s) Oral every 6 hours  ceFAZolin   IVPB 2000 milliGRAM(s) IV Intermittent every 8 hours  chlorhexidine 0.12% Liquid 15 milliLiter(s) Oral Mucosa two times a day  enoxaparin Injectable 40 milliGRAM(s) SubCutaneous every 24 hours  HYDROmorphone PCA (1 mG/mL) 30 milliLiter(s) PCA Continuous PCA Continuous  ibuprofen  Suspension. 600 milliGRAM(s) Oral every 6 hours  lactated ringers. 1000 milliLiter(s) (75 mL/Hr) IV Continuous <Continuous>  losartan 50 milliGRAM(s) Oral daily  melatonin 3 milliGRAM(s) Oral at bedtime    MEDICATIONS  (PRN):  AQUAPHOR (petrolatum Ointment) 1 Application(s) Topical every 4 hours PRN Dry lips  HYDROmorphone PCA (1 mG/mL) Rescue Clinician Bolus 0.5 milliGRAM(s) IV Push every 30 minutes PRN Severe Pain (7 - 10)  naloxone Injectable 0.1 milliGRAM(s) IV Push every 3 minutes PRN For ANY of the following changes in patient status:  A. RR LESS THAN 10 breaths per minute, B. Oxygen saturation LESS THAN 90%, C. Sedation score of 6  ondansetron Injectable 4 milliGRAM(s) IV Push every 6 hours PRN Nausea  zolpidem 5 milliGRAM(s) Oral at bedtime PRN Insomnia  zolpidem 5 milliGRAM(s) Oral at bedtime PRN Insomnia      Focused Exam:  Gen: AAOx3, NAD  H: jaw bra in place, R +L incision sites intact, hemostatic, clean, mild b/l facial edema c/w surgery  E: PERRL, EOMI b/l, vision at baseline  E: no otorrhea, hearing at baseline  N: no epistaxis, nares patent b/l  Maxillofacial: hybrid arch bars present on maxilla and mandible with elastics in place. Occlusion stable and reproducible.

## 2023-10-29 ENCOUNTER — TRANSCRIPTION ENCOUNTER (OUTPATIENT)
Age: 48
End: 2023-10-29

## 2023-10-29 VITALS
SYSTOLIC BLOOD PRESSURE: 164 MMHG | TEMPERATURE: 98 F | OXYGEN SATURATION: 100 % | HEART RATE: 88 BPM | RESPIRATION RATE: 17 BRPM | DIASTOLIC BLOOD PRESSURE: 87 MMHG

## 2023-10-29 RX ORDER — OXYCODONE HYDROCHLORIDE 5 MG/1
10 TABLET ORAL
Qty: 600 | Refills: 0
Start: 2023-10-29 | End: 2023-11-07

## 2023-10-29 RX ORDER — CEPHALEXIN 500 MG
10 CAPSULE ORAL
Qty: 2 | Refills: 0
Start: 2023-10-29 | End: 2023-11-04

## 2023-10-29 RX ORDER — OXYCODONE HYDROCHLORIDE 5 MG/1
5 TABLET ORAL
Qty: 75 | Refills: 0
Start: 2023-10-29 | End: 2023-11-02

## 2023-10-29 RX ORDER — GABAPENTIN 400 MG/1
2 CAPSULE ORAL
Qty: 0 | Refills: 0 | DISCHARGE
Start: 2023-10-29

## 2023-10-29 RX ORDER — ACETAMINOPHEN 500 MG
20.3 TABLET ORAL
Qty: 568.4 | Refills: 0
Start: 2023-10-29 | End: 2023-11-04

## 2023-10-29 RX ORDER — CYCLOBENZAPRINE HYDROCHLORIDE 10 MG/1
1 TABLET, FILM COATED ORAL
Qty: 7 | Refills: 0
Start: 2023-10-29 | End: 2023-11-04

## 2023-10-29 RX ORDER — OXYCODONE HYDROCHLORIDE 5 MG/1
10 TABLET ORAL
Qty: 0 | Refills: 0 | DISCHARGE
Start: 2023-10-29

## 2023-10-29 RX ORDER — OXYCODONE HYDROCHLORIDE 5 MG/1
5 TABLET ORAL
Qty: 100 | Refills: 0
Start: 2023-10-29 | End: 2023-11-02

## 2023-10-29 RX ORDER — AMOXICILLIN 250 MG/5ML
5 SUSPENSION, RECONSTITUTED, ORAL (ML) ORAL
Qty: 1 | Refills: 0
Start: 2023-10-29 | End: 2023-11-07

## 2023-10-29 RX ORDER — IBUPROFEN 200 MG
30 TABLET ORAL
Qty: 840 | Refills: 0
Start: 2023-10-29 | End: 2023-11-04

## 2023-10-29 RX ORDER — AMOXICILLIN 250 MG/5ML
10 SUSPENSION, RECONSTITUTED, ORAL (ML) ORAL
Qty: 2 | Refills: 0
Start: 2023-10-29 | End: 2023-11-07

## 2023-10-29 RX ADMIN — Medication 650 MILLIGRAM(S): at 11:00

## 2023-10-29 RX ADMIN — Medication 600 MILLIGRAM(S): at 12:44

## 2023-10-29 RX ADMIN — GABAPENTIN 100 MILLIGRAM(S): 400 CAPSULE ORAL at 14:26

## 2023-10-29 RX ADMIN — Medication 600 MILLIGRAM(S): at 13:30

## 2023-10-29 RX ADMIN — Medication 650 MILLIGRAM(S): at 10:15

## 2023-10-29 RX ADMIN — GABAPENTIN 100 MILLIGRAM(S): 400 CAPSULE ORAL at 06:22

## 2023-10-29 RX ADMIN — Medication 100 MILLIGRAM(S): at 06:21

## 2023-10-29 RX ADMIN — OXYCODONE HYDROCHLORIDE 10 MILLIGRAM(S): 5 TABLET ORAL at 06:44

## 2023-10-29 RX ADMIN — OXYCODONE HYDROCHLORIDE 10 MILLIGRAM(S): 5 TABLET ORAL at 11:00

## 2023-10-29 RX ADMIN — Medication 600 MILLIGRAM(S): at 06:15

## 2023-10-29 RX ADMIN — Medication 600 MILLIGRAM(S): at 02:16

## 2023-10-29 RX ADMIN — OXYCODONE HYDROCHLORIDE 10 MILLIGRAM(S): 5 TABLET ORAL at 06:14

## 2023-10-29 RX ADMIN — Medication 600 MILLIGRAM(S): at 01:46

## 2023-10-29 RX ADMIN — ONDANSETRON 4 MILLIGRAM(S): 8 TABLET, FILM COATED ORAL at 15:15

## 2023-10-29 RX ADMIN — CHLORHEXIDINE GLUCONATE 15 MILLILITER(S): 213 SOLUTION TOPICAL at 06:21

## 2023-10-29 RX ADMIN — OXYCODONE HYDROCHLORIDE 10 MILLIGRAM(S): 5 TABLET ORAL at 02:16

## 2023-10-29 RX ADMIN — OXYCODONE HYDROCHLORIDE 10 MILLIGRAM(S): 5 TABLET ORAL at 14:26

## 2023-10-29 RX ADMIN — OXYCODONE HYDROCHLORIDE 10 MILLIGRAM(S): 5 TABLET ORAL at 01:46

## 2023-10-29 RX ADMIN — OXYCODONE HYDROCHLORIDE 10 MILLIGRAM(S): 5 TABLET ORAL at 10:16

## 2023-10-29 RX ADMIN — Medication 100 MILLIGRAM(S): at 14:27

## 2023-10-29 RX ADMIN — LOSARTAN POTASSIUM 50 MILLIGRAM(S): 100 TABLET, FILM COATED ORAL at 06:21

## 2023-10-29 RX ADMIN — Medication 600 MILLIGRAM(S): at 06:45

## 2023-10-29 NOTE — PROGRESS NOTE ADULT - ASSESSMENT
48 yr old female POD1 s/p bilateral TMJ replacement, and maxillary/mandibular hybrid arch bar placement for correction of occlusal discrepancy. Pt is progressing well w/o acute events.    Plan:  - AM CT Max face   - OOBTC  - FLD  - Discharge today    Oral and Maxillofacial Surgery  #[id number]
48 yr old female POD3 s/p bilateral TMJ replacement, and maxillary/mandibular hybrid arch bar placement for correction of occlusal discrepancy. Pt is progressing well w/o acute events.    Plan:  - multimodal pain control  - FLD  - plan for d/c today    Julián Vasquez  Oral and Maxillofacial Surgery  [facility] Pager #[phone]  Available on Teams
48 yr old female POD2 s/p bilateral TMJ replacement, and maxillary/mandibular hybrid arch bar placement for correction of occlusal discrepancy. Pt is progressing well w/o acute events.    Plan:  - multimodal pain control  - FLD  - consider switch to oral pain control later today      Oral and Maxillofacial Surgery  #[id number]

## 2023-10-29 NOTE — DISCHARGE NOTE NURSING/CASE MANAGEMENT/SOCIAL WORK - NSDCPNINST_GEN_ALL_CORE
notify provider if experience severe pain, fever (temperature is greater [id number]), chills, nausea, vomiting, active bleeding or shortness of breath. No driving while taking narcotic pain medication, it causes drowsiness & constipation. Drink 6-8 glasses of fluids daily to promote hydration. Follow up with the MD as per discharge instructions

## 2023-10-29 NOTE — DISCHARGE NOTE NURSING/CASE MANAGEMENT/SOCIAL WORK - PATIENT PORTAL LINK FT
You can access the FollowMyHealth Patient Portal offered by [facility] by registering at the following website: [email or web address]. By joining [facility]’s FollowMyHealth portal, you will also be able to view your health information using other applications (apps) compatible with our system.

## 2023-10-29 NOTE — DISCHARGE NOTE NURSING/CASE MANAGEMENT/SOCIAL WORK - NSDCPEFALRISK_GEN_ALL_CORE
For information on Fall & Injury Prevention, visit: [email or web address] OR  [email or web address] OR  https://www.cdc.gov/steadi/patient.html

## 2023-10-29 NOTE — PROGRESS NOTE ADULT - SUBJECTIVE AND OBJECTIVE BOX
48 yr old female with PMH of substance abuse with PSH of ORIF of mandibular fractures POD3 s/p bilateral TMJ replacement, and maxillary/mandibular hybrid arch bar placement for correction of occlusal discrepancy.     10/29/23 HD4 POD3    INTERVAL EVENTS: Ashley RODRIGUEZ.     Vital Signs Last 24 Hrs  T(C): 36.4 (29 Oct 2023 06:00), Max: 36.9 (28 Oct 2023 18:00)  T(F): 97.6 (29 Oct 2023 06:00), Max: 98.5 (28 Oct 2023 18:00)  HR: 80 (29 Oct 2023 06:00) (62 - 80)  BP: 120/76 (29 Oct 2023 06:00) (118/67 - 153/94)  BP(mean): --  RR: 18 (29 Oct 2023 06:00) (17 - 18)  SpO2: 99% (29 Oct 2023 06:00) (99% - 100%)    Parameters below as of 29 Oct 2023 06:00  Patient On (Oxygen Delivery Method): room air    I&O's Detail    28 Oct 2023 07:01  -  29 Oct 2023 07:00  --------------------------------------------------------  IN:    IV PiggyBack: 50 mL    Lactated Ringers: 1650 mL    Oral Fluid: 1550 mL  Total IN: 3250 mL    OUT:  Total OUT: 0 mL    Total NET: 3250 mL    Medications:    MEDICATIONS  (STANDING):  acetaminophen   Oral Liquid .. 650 milliGRAM(s) Oral every 6 hours  ceFAZolin   IVPB 2000 milliGRAM(s) IV Intermittent every 8 hours  chlorhexidine 0.12% Liquid 15 milliLiter(s) Oral Mucosa two times a day  enoxaparin Injectable 40 milliGRAM(s) SubCutaneous every 24 hours  gabapentin Solution 100 milliGRAM(s) Oral three times a day  ibuprofen  Suspension. 600 milliGRAM(s) Oral every 6 hours  lactated ringers. 1000 milliLiter(s) (75 mL/Hr) IV Continuous <Continuous>  losartan 50 milliGRAM(s) Oral daily  melatonin 3 milliGRAM(s) Oral at bedtime    MEDICATIONS  (PRN):  AQUAPHOR (petrolatum Ointment) 1 Application(s) Topical every 4 hours PRN Dry lips  HYDROmorphone  Injectable 0.3 milliGRAM(s) IV Push every 3 hours PRN Severe BreakthroughPain (7 - 10)  naloxone Injectable 0.1 milliGRAM(s) IV Push every 3 minutes PRN For ANY of the following changes in patient status:  A. RR LESS THAN 10 breaths per minute, B. Oxygen saturation LESS THAN 90%, C. Sedation score of 6  ondansetron Injectable 4 milliGRAM(s) IV Push every 6 hours PRN Nausea  oxyCODONE    Solution 10 milliGRAM(s) Oral every 3 hours PRN Moderate to Severe Pain (4 - 10)  zolpidem 5 milliGRAM(s) Oral at bedtime PRN Insomnia  zolpidem 5 milliGRAM(s) Oral at bedtime PRN Insomnia      Labs: Pt refused    Focused Exam:  Gen: AAOx3, NAD  H: jaw bra in place, R +L incision sites intact, hemostatic, clean, mild b/l facial edema c/w surgery  E: PERRL, EOMI b/l, vision at baseline  E: no otorrhea, hearing at baseline  N: no epistaxis, nares patent b/l  Maxillofacial: hybrid arch bars present on maxilla and mandible with elastics in place. Occlusion stable and reproducible.

## 2023-10-30 ENCOUNTER — APPOINTMENT (OUTPATIENT)
Age: 48
End: 2023-10-30
Payer: MEDICAID

## 2023-10-30 PROBLEM — S02.609A FRACTURE OF MANDIBLE, UNSPECIFIED, INITIAL ENCOUNTER FOR CLOSED FRACTURE: Chronic | Status: ACTIVE | Noted: 2023-10-24

## 2023-10-30 PROCEDURE — XXXXX: CPT | Mod: 1L

## 2023-10-30 RX ORDER — OXYCODONE HYDROCHLORIDE 5 MG/1
10 TABLET ORAL
Qty: 300 | Refills: 0
Start: 2023-10-30 | End: 2023-11-03

## 2023-11-03 ENCOUNTER — APPOINTMENT (OUTPATIENT)
Age: 48
End: 2023-11-03
Payer: COMMERCIAL

## 2023-11-03 PROCEDURE — 99024 POSTOP FOLLOW-UP VISIT: CPT

## 2023-11-06 LAB
SURGICAL PATHOLOGY STUDY: SIGNIFICANT CHANGE UP
SURGICAL PATHOLOGY STUDY: SIGNIFICANT CHANGE UP

## 2023-11-10 ENCOUNTER — APPOINTMENT (OUTPATIENT)
Age: 48
End: 2023-11-10
Payer: COMMERCIAL

## 2023-11-10 PROCEDURE — 99024 POSTOP FOLLOW-UP VISIT: CPT

## 2023-11-19 ENCOUNTER — INPATIENT (INPATIENT)
Facility: HOSPITAL | Age: 48
LOS: 5 days | Discharge: HOME | End: 2023-11-25
Attending: INTERNAL MEDICINE | Admitting: INTERNAL MEDICINE
Payer: MEDICAID

## 2023-11-19 VITALS
HEIGHT: 67.5 IN | OXYGEN SATURATION: 100 % | SYSTOLIC BLOOD PRESSURE: 151 MMHG | DIASTOLIC BLOOD PRESSURE: 107 MMHG | HEART RATE: 77 BPM | RESPIRATION RATE: 13 BRPM | TEMPERATURE: 98 F

## 2023-11-19 DIAGNOSIS — Z98.890 OTHER SPECIFIED POSTPROCEDURAL STATES: Chronic | ICD-10-CM

## 2023-11-19 DIAGNOSIS — S02.609A FRACTURE OF MANDIBLE, UNSPECIFIED, INITIAL ENCOUNTER FOR CLOSED FRACTURE: Chronic | ICD-10-CM

## 2023-11-19 LAB
ALBUMIN SERPL ELPH-MCNC: 4.5 G/DL — SIGNIFICANT CHANGE UP (ref 3.3–5)
ALBUMIN SERPL ELPH-MCNC: 4.5 G/DL — SIGNIFICANT CHANGE UP (ref 3.3–5)
ALP SERPL-CCNC: 73 U/L — SIGNIFICANT CHANGE UP (ref 40–120)
ALP SERPL-CCNC: 73 U/L — SIGNIFICANT CHANGE UP (ref 40–120)
ALT FLD-CCNC: 12 U/L — SIGNIFICANT CHANGE UP (ref 4–33)
ALT FLD-CCNC: 12 U/L — SIGNIFICANT CHANGE UP (ref 4–33)
ANION GAP SERPL CALC-SCNC: 18 MMOL/L — HIGH (ref 7–14)
ANION GAP SERPL CALC-SCNC: 18 MMOL/L — HIGH (ref 7–14)
APAP SERPL-MCNC: <10 UG/ML — LOW (ref 15–25)
APAP SERPL-MCNC: <10 UG/ML — LOW (ref 15–25)
APPEARANCE UR: CLEAR — SIGNIFICANT CHANGE UP
APPEARANCE UR: CLEAR — SIGNIFICANT CHANGE UP
AST SERPL-CCNC: 30 U/L — SIGNIFICANT CHANGE UP (ref 4–32)
AST SERPL-CCNC: 30 U/L — SIGNIFICANT CHANGE UP (ref 4–32)
BACTERIA # UR AUTO: NEGATIVE /HPF — SIGNIFICANT CHANGE UP
BACTERIA # UR AUTO: NEGATIVE /HPF — SIGNIFICANT CHANGE UP
BASE EXCESS BLDV CALC-SCNC: -3.5 MMOL/L — LOW (ref -2–3)
BASE EXCESS BLDV CALC-SCNC: -3.5 MMOL/L — LOW (ref -2–3)
BASOPHILS # BLD AUTO: 0.08 K/UL — SIGNIFICANT CHANGE UP (ref 0–0.2)
BASOPHILS # BLD AUTO: 0.08 K/UL — SIGNIFICANT CHANGE UP (ref 0–0.2)
BASOPHILS NFR BLD AUTO: 1 % — SIGNIFICANT CHANGE UP (ref 0–2)
BASOPHILS NFR BLD AUTO: 1 % — SIGNIFICANT CHANGE UP (ref 0–2)
BILIRUB SERPL-MCNC: 0.2 MG/DL — SIGNIFICANT CHANGE UP (ref 0.2–1.2)
BILIRUB SERPL-MCNC: 0.2 MG/DL — SIGNIFICANT CHANGE UP (ref 0.2–1.2)
BILIRUB UR-MCNC: NEGATIVE — SIGNIFICANT CHANGE UP
BILIRUB UR-MCNC: NEGATIVE — SIGNIFICANT CHANGE UP
BLOOD GAS VENOUS COMPREHENSIVE RESULT: SIGNIFICANT CHANGE UP
BUN SERPL-MCNC: 11 MG/DL — SIGNIFICANT CHANGE UP (ref 7–23)
BUN SERPL-MCNC: 11 MG/DL — SIGNIFICANT CHANGE UP (ref 7–23)
CALCIUM SERPL-MCNC: 9.4 MG/DL — SIGNIFICANT CHANGE UP (ref 8.4–10.5)
CALCIUM SERPL-MCNC: 9.4 MG/DL — SIGNIFICANT CHANGE UP (ref 8.4–10.5)
CHLORIDE BLDV-SCNC: 110 MMOL/L — HIGH (ref 96–108)
CHLORIDE BLDV-SCNC: 110 MMOL/L — HIGH (ref 96–108)
CHLORIDE SERPL-SCNC: 104 MMOL/L — SIGNIFICANT CHANGE UP (ref 98–107)
CHLORIDE SERPL-SCNC: 104 MMOL/L — SIGNIFICANT CHANGE UP (ref 98–107)
CO2 BLDV-SCNC: 23.4 MMOL/L — SIGNIFICANT CHANGE UP (ref 22–26)
CO2 BLDV-SCNC: 23.4 MMOL/L — SIGNIFICANT CHANGE UP (ref 22–26)
CO2 SERPL-SCNC: 24 MMOL/L — SIGNIFICANT CHANGE UP (ref 22–31)
CO2 SERPL-SCNC: 24 MMOL/L — SIGNIFICANT CHANGE UP (ref 22–31)
COLOR SPEC: YELLOW — SIGNIFICANT CHANGE UP
COLOR SPEC: YELLOW — SIGNIFICANT CHANGE UP
CREAT SERPL-MCNC: 0.55 MG/DL — SIGNIFICANT CHANGE UP (ref 0.5–1.3)
CREAT SERPL-MCNC: 0.55 MG/DL — SIGNIFICANT CHANGE UP (ref 0.5–1.3)
DIFF PNL FLD: ABNORMAL
DIFF PNL FLD: ABNORMAL
EGFR: 112 ML/MIN/1.73M2 — SIGNIFICANT CHANGE UP
EGFR: 112 ML/MIN/1.73M2 — SIGNIFICANT CHANGE UP
EOSINOPHIL # BLD AUTO: 0.27 K/UL — SIGNIFICANT CHANGE UP (ref 0–0.5)
EOSINOPHIL # BLD AUTO: 0.27 K/UL — SIGNIFICANT CHANGE UP (ref 0–0.5)
EOSINOPHIL NFR BLD AUTO: 3.3 % — SIGNIFICANT CHANGE UP (ref 0–6)
EOSINOPHIL NFR BLD AUTO: 3.3 % — SIGNIFICANT CHANGE UP (ref 0–6)
EPI CELLS # UR: SIGNIFICANT CHANGE UP
EPI CELLS # UR: SIGNIFICANT CHANGE UP
ETHANOL SERPL-MCNC: 458 MG/DL — HIGH
ETHANOL SERPL-MCNC: 458 MG/DL — HIGH
GAS PNL BLDV: 146 MMOL/L — HIGH (ref 136–145)
GAS PNL BLDV: 146 MMOL/L — HIGH (ref 136–145)
GLUCOSE BLDV-MCNC: 100 MG/DL — HIGH (ref 70–99)
GLUCOSE BLDV-MCNC: 100 MG/DL — HIGH (ref 70–99)
GLUCOSE SERPL-MCNC: 101 MG/DL — HIGH (ref 70–99)
GLUCOSE SERPL-MCNC: 101 MG/DL — HIGH (ref 70–99)
GLUCOSE UR QL: NEGATIVE MG/DL — SIGNIFICANT CHANGE UP
GLUCOSE UR QL: NEGATIVE MG/DL — SIGNIFICANT CHANGE UP
HCG SERPL-ACNC: <1 MIU/ML — SIGNIFICANT CHANGE UP
HCG SERPL-ACNC: <1 MIU/ML — SIGNIFICANT CHANGE UP
HCO3 BLDV-SCNC: 22 MMOL/L — SIGNIFICANT CHANGE UP (ref 22–29)
HCO3 BLDV-SCNC: 22 MMOL/L — SIGNIFICANT CHANGE UP (ref 22–29)
HCT VFR BLD CALC: 38.2 % — SIGNIFICANT CHANGE UP (ref 34.5–45)
HCT VFR BLD CALC: 38.2 % — SIGNIFICANT CHANGE UP (ref 34.5–45)
HCT VFR BLDA CALC: 31 % — LOW (ref 34.5–46.5)
HCT VFR BLDA CALC: 31 % — LOW (ref 34.5–46.5)
HGB BLD CALC-MCNC: 10.4 G/DL — LOW (ref 11.7–16.1)
HGB BLD CALC-MCNC: 10.4 G/DL — LOW (ref 11.7–16.1)
HGB BLD-MCNC: 11.4 G/DL — LOW (ref 11.5–15.5)
HGB BLD-MCNC: 11.4 G/DL — LOW (ref 11.5–15.5)
HYALINE CASTS # UR AUTO: SIGNIFICANT CHANGE UP
HYALINE CASTS # UR AUTO: SIGNIFICANT CHANGE UP
IANC: 6.07 K/UL — SIGNIFICANT CHANGE UP (ref 1.8–7.4)
IANC: 6.07 K/UL — SIGNIFICANT CHANGE UP (ref 1.8–7.4)
IMM GRANULOCYTES NFR BLD AUTO: 0.4 % — SIGNIFICANT CHANGE UP (ref 0–0.9)
IMM GRANULOCYTES NFR BLD AUTO: 0.4 % — SIGNIFICANT CHANGE UP (ref 0–0.9)
KETONES UR-MCNC: NEGATIVE MG/DL — SIGNIFICANT CHANGE UP
KETONES UR-MCNC: NEGATIVE MG/DL — SIGNIFICANT CHANGE UP
LACTATE BLDV-MCNC: 6.8 MMOL/L — CRITICAL HIGH (ref 0.5–2)
LACTATE BLDV-MCNC: 6.8 MMOL/L — CRITICAL HIGH (ref 0.5–2)
LEUKOCYTE ESTERASE UR-ACNC: NEGATIVE — SIGNIFICANT CHANGE UP
LEUKOCYTE ESTERASE UR-ACNC: NEGATIVE — SIGNIFICANT CHANGE UP
LYMPHOCYTES # BLD AUTO: 1.58 K/UL — SIGNIFICANT CHANGE UP (ref 1–3.3)
LYMPHOCYTES # BLD AUTO: 1.58 K/UL — SIGNIFICANT CHANGE UP (ref 1–3.3)
LYMPHOCYTES # BLD AUTO: 19 % — SIGNIFICANT CHANGE UP (ref 13–44)
LYMPHOCYTES # BLD AUTO: 19 % — SIGNIFICANT CHANGE UP (ref 13–44)
MAGNESIUM SERPL-MCNC: 1.9 MG/DL — SIGNIFICANT CHANGE UP (ref 1.6–2.6)
MAGNESIUM SERPL-MCNC: 1.9 MG/DL — SIGNIFICANT CHANGE UP (ref 1.6–2.6)
MCHC RBC-ENTMCNC: 22.4 PG — LOW (ref 27–34)
MCHC RBC-ENTMCNC: 22.4 PG — LOW (ref 27–34)
MCHC RBC-ENTMCNC: 29.8 GM/DL — LOW (ref 32–36)
MCHC RBC-ENTMCNC: 29.8 GM/DL — LOW (ref 32–36)
MCV RBC AUTO: 75 FL — LOW (ref 80–100)
MCV RBC AUTO: 75 FL — LOW (ref 80–100)
MONOCYTES # BLD AUTO: 0.27 K/UL — SIGNIFICANT CHANGE UP (ref 0–0.9)
MONOCYTES # BLD AUTO: 0.27 K/UL — SIGNIFICANT CHANGE UP (ref 0–0.9)
MONOCYTES NFR BLD AUTO: 3.3 % — SIGNIFICANT CHANGE UP (ref 2–14)
MONOCYTES NFR BLD AUTO: 3.3 % — SIGNIFICANT CHANGE UP (ref 2–14)
NEUTROPHILS # BLD AUTO: 6.07 K/UL — SIGNIFICANT CHANGE UP (ref 1.8–7.4)
NEUTROPHILS # BLD AUTO: 6.07 K/UL — SIGNIFICANT CHANGE UP (ref 1.8–7.4)
NEUTROPHILS NFR BLD AUTO: 73 % — SIGNIFICANT CHANGE UP (ref 43–77)
NEUTROPHILS NFR BLD AUTO: 73 % — SIGNIFICANT CHANGE UP (ref 43–77)
NITRITE UR-MCNC: NEGATIVE — SIGNIFICANT CHANGE UP
NITRITE UR-MCNC: NEGATIVE — SIGNIFICANT CHANGE UP
NRBC # BLD: 0 /100 WBCS — SIGNIFICANT CHANGE UP (ref 0–0)
NRBC # BLD: 0 /100 WBCS — SIGNIFICANT CHANGE UP (ref 0–0)
NRBC # FLD: 0 K/UL — SIGNIFICANT CHANGE UP (ref 0–0)
NRBC # FLD: 0 K/UL — SIGNIFICANT CHANGE UP (ref 0–0)
PCO2 BLDV: 41 MMHG — SIGNIFICANT CHANGE UP (ref 39–52)
PCO2 BLDV: 41 MMHG — SIGNIFICANT CHANGE UP (ref 39–52)
PCP SPEC-MCNC: SIGNIFICANT CHANGE UP
PCP SPEC-MCNC: SIGNIFICANT CHANGE UP
PH BLDV: 7.34 — SIGNIFICANT CHANGE UP (ref 7.32–7.43)
PH BLDV: 7.34 — SIGNIFICANT CHANGE UP (ref 7.32–7.43)
PH UR: 6 — SIGNIFICANT CHANGE UP (ref 5–8)
PH UR: 6 — SIGNIFICANT CHANGE UP (ref 5–8)
PHOSPHATE SERPL-MCNC: 2.9 MG/DL — SIGNIFICANT CHANGE UP (ref 2.5–4.5)
PHOSPHATE SERPL-MCNC: 2.9 MG/DL — SIGNIFICANT CHANGE UP (ref 2.5–4.5)
PLATELET # BLD AUTO: 312 K/UL — SIGNIFICANT CHANGE UP (ref 150–400)
PLATELET # BLD AUTO: 312 K/UL — SIGNIFICANT CHANGE UP (ref 150–400)
PO2 BLDV: 71 MMHG — HIGH (ref 25–45)
PO2 BLDV: 71 MMHG — HIGH (ref 25–45)
POTASSIUM BLDV-SCNC: 4.4 MMOL/L — SIGNIFICANT CHANGE UP (ref 3.5–5.1)
POTASSIUM BLDV-SCNC: 4.4 MMOL/L — SIGNIFICANT CHANGE UP (ref 3.5–5.1)
POTASSIUM SERPL-MCNC: 3.6 MMOL/L — SIGNIFICANT CHANGE UP (ref 3.5–5.3)
POTASSIUM SERPL-MCNC: 3.6 MMOL/L — SIGNIFICANT CHANGE UP (ref 3.5–5.3)
POTASSIUM SERPL-SCNC: 3.6 MMOL/L — SIGNIFICANT CHANGE UP (ref 3.5–5.3)
POTASSIUM SERPL-SCNC: 3.6 MMOL/L — SIGNIFICANT CHANGE UP (ref 3.5–5.3)
PROT SERPL-MCNC: 8.7 G/DL — HIGH (ref 6–8.3)
PROT SERPL-MCNC: 8.7 G/DL — HIGH (ref 6–8.3)
PROT UR-MCNC: SIGNIFICANT CHANGE UP MG/DL
PROT UR-MCNC: SIGNIFICANT CHANGE UP MG/DL
RBC # BLD: 5.09 M/UL — SIGNIFICANT CHANGE UP (ref 3.8–5.2)
RBC # BLD: 5.09 M/UL — SIGNIFICANT CHANGE UP (ref 3.8–5.2)
RBC # FLD: 19.2 % — HIGH (ref 10.3–14.5)
RBC # FLD: 19.2 % — HIGH (ref 10.3–14.5)
RBC CASTS # UR COMP ASSIST: 2 /HPF — SIGNIFICANT CHANGE UP (ref 0–4)
RBC CASTS # UR COMP ASSIST: 2 /HPF — SIGNIFICANT CHANGE UP (ref 0–4)
SALICYLATES SERPL-MCNC: <0.3 MG/DL — LOW (ref 15–30)
SALICYLATES SERPL-MCNC: <0.3 MG/DL — LOW (ref 15–30)
SAO2 % BLDV: 90.3 % — HIGH (ref 67–88)
SAO2 % BLDV: 90.3 % — HIGH (ref 67–88)
SODIUM SERPL-SCNC: 146 MMOL/L — HIGH (ref 135–145)
SODIUM SERPL-SCNC: 146 MMOL/L — HIGH (ref 135–145)
SP GR SPEC: 1.01 — SIGNIFICANT CHANGE UP (ref 1–1.03)
SP GR SPEC: 1.01 — SIGNIFICANT CHANGE UP (ref 1–1.03)
TSH SERPL-MCNC: 1.21 UIU/ML — SIGNIFICANT CHANGE UP (ref 0.27–4.2)
TSH SERPL-MCNC: 1.21 UIU/ML — SIGNIFICANT CHANGE UP (ref 0.27–4.2)
UROBILINOGEN FLD QL: 0.2 MG/DL — SIGNIFICANT CHANGE UP (ref 0.2–1)
UROBILINOGEN FLD QL: 0.2 MG/DL — SIGNIFICANT CHANGE UP (ref 0.2–1)
WBC # BLD: 8.3 K/UL — SIGNIFICANT CHANGE UP (ref 3.8–10.5)
WBC # BLD: 8.3 K/UL — SIGNIFICANT CHANGE UP (ref 3.8–10.5)
WBC # FLD AUTO: 8.3 K/UL — SIGNIFICANT CHANGE UP (ref 3.8–10.5)
WBC # FLD AUTO: 8.3 K/UL — SIGNIFICANT CHANGE UP (ref 3.8–10.5)
WBC UR QL: 0 /HPF — SIGNIFICANT CHANGE UP (ref 0–5)
WBC UR QL: 0 /HPF — SIGNIFICANT CHANGE UP (ref 0–5)

## 2023-11-19 PROCEDURE — 99285 EMERGENCY DEPT VISIT HI MDM: CPT

## 2023-11-19 PROCEDURE — 93010 ELECTROCARDIOGRAM REPORT: CPT | Mod: 1L

## 2023-11-19 PROCEDURE — 70450 CT HEAD/BRAIN W/O DYE: CPT | Mod: 26,MA

## 2023-11-19 RX ORDER — THIAMINE MONONITRATE (VIT B1) 100 MG
100 TABLET ORAL ONCE
Refills: 0 | Status: COMPLETED | OUTPATIENT
Start: 2023-11-19 | End: 2023-11-19

## 2023-11-19 RX ORDER — SODIUM CHLORIDE 9 MG/ML
1000 INJECTION INTRAMUSCULAR; INTRAVENOUS; SUBCUTANEOUS ONCE
Refills: 0 | Status: DISCONTINUED | OUTPATIENT
Start: 2023-11-19 | End: 2023-11-19

## 2023-11-19 RX ORDER — SODIUM CHLORIDE 9 MG/ML
500 INJECTION INTRAMUSCULAR; INTRAVENOUS; SUBCUTANEOUS ONCE
Refills: 0 | Status: COMPLETED | OUTPATIENT
Start: 2023-11-19 | End: 2023-11-19

## 2023-11-19 RX ORDER — SODIUM CHLORIDE 9 MG/ML
1000 INJECTION INTRAMUSCULAR; INTRAVENOUS; SUBCUTANEOUS ONCE
Refills: 0 | Status: COMPLETED | OUTPATIENT
Start: 2023-11-19 | End: 2023-11-19

## 2023-11-19 RX ORDER — HALOPERIDOL DECANOATE 100 MG/ML
2.5 INJECTION INTRAMUSCULAR ONCE
Refills: 0 | Status: COMPLETED | OUTPATIENT
Start: 2023-11-19 | End: 2023-11-19

## 2023-11-19 RX ADMIN — HALOPERIDOL DECANOATE 2.5 MILLIGRAM(S): 100 INJECTION INTRAMUSCULAR at 18:12

## 2023-11-19 RX ADMIN — Medication 100 MILLIGRAM(S): at 21:44

## 2023-11-19 RX ADMIN — SODIUM CHLORIDE 500 MILLILITER(S): 9 INJECTION INTRAMUSCULAR; INTRAVENOUS; SUBCUTANEOUS at 21:44

## 2023-11-19 RX ADMIN — SODIUM CHLORIDE 1000 MILLILITER(S): 9 INJECTION INTRAMUSCULAR; INTRAVENOUS; SUBCUTANEOUS at 16:15

## 2023-11-19 RX ADMIN — SODIUM CHLORIDE 1000 MILLILITER(S): 9 INJECTION INTRAMUSCULAR; INTRAVENOUS; SUBCUTANEOUS at 17:00

## 2023-11-19 NOTE — ED PROVIDER NOTE - ATTENDING CONTRIBUTION TO CARE
Brief HPI:  48-year-old female past medical history of alcohol abuse, recent mandibular surgery presents as suspected overdose.  Per EMS, patient was found in home minimally responsive with pill bottles and bottle of vodka nearby.  EMS reports that patient left a suicide note approximately 1 week ago.  On arrival patient's somnolent and intermittently awake.  She is responsive to noxious stimuli, however unable to provide history.  She has spontaneous respirations and is tolerating secretions.  Vitals are stable, no hypoxemia.  Fingerstick glucose without hypoglycemia.  Pupils are normal size, no findings consistent with opioid toxidrome.  Suspect alcohol intoxication versus sedative hypnotic overdose.  Will send labs, EKG, supportive care,  consult.  Disposition pending.

## 2023-11-19 NOTE — ED PROVIDER NOTE - SHIFT CHANGE DETAILS
VALERIO:  Patient signed out to Dr. Epps pending repeat lactate.  Patient hd stable and protecting airway at time of signout.

## 2023-11-19 NOTE — ED ADULT NURSE NOTE - OBJECTIVE STATEMENT
ER notification, pt lethargic, non verbal, as per EMS, her mother call EMS when found pt since pt did not answered the mother's call, EMS found 1 bottle of alcohol empty in the house, they also found a suicidal note written a while back, place on CM NSR Hr. 87, resp. 20b/min, left hand 20g angio cath done by EMS. a 2nd IV line place to right hand 20g angio cath, respiration 20b/min RA, place pt on 1:1 for safety.     Twyla Eastman RN (facilitator coverage BK)

## 2023-11-19 NOTE — ED PROVIDER NOTE - CLINICAL SUMMARY MEDICAL DECISION MAKING FREE TEXT BOX
This is a 48-year-old female with a past medical history of alcohol abuse came in unresponsive with EMS.  Patient was found by her house with bottles of alcohol. Alcohol abuse-labs, fluids, medications, reassess

## 2023-11-19 NOTE — ED ADULT NURSE REASSESSMENT NOTE - NS ED NURSE REASSESS COMMENT FT1
break coverage rn. received report from RN. pt A&Ox4,  denies chest pain, SOB,n/v,headache, dizziness, numbness/tingling to hands/feet. resp even unlabored, abd soft, pedal pulses 2+ bilaterally. PCA at bedside for 1:1. Safety maintained. awaiting orders

## 2023-11-19 NOTE — ED ADULT TRIAGE NOTE - CHIEF COMPLAINT QUOTE
pt arrives as un-notified notification for unresponsiveness. per EMS, was found with suicidal note recently. also noted an empty bottle of vodka. per EMS, also with recent jaw surgery. blood glucose level 84 in the field. past medical history of seizures, alcohol use, HTN    pt brought directly to room 25.

## 2023-11-19 NOTE — ED PROVIDER NOTE - OBJECTIVE STATEMENT
Mellisa-[id number] Mellisa-[id number]. This is a 48-year-old female with a past medical history of alcohol abuse came in unresponsive with EMS.  Patient was found by her house with bottles of alcohol.  She was also found with her medications including oxycodone and Ambien in her blood pressure medication.  As per mother patient had given a suicidal note last week to her dad.  She had fallen a few weeks prior and broken her jaw which required surgery.  She has attempted to go to rehab multiple times however has not been able to sustain her treatments.  She was found 2 PM by her mother at her house and was unresponsive last spoken to was at 1 PM yesterday.  No obvious trauma noted patient still unresponsive pupils dilated no signs of opioid toxicity

## 2023-11-19 NOTE — ED PROVIDER NOTE - PROGRESS NOTE DETAILS
BRODY Fish: she is a chronic alcoholic, found unresponsive, She has bolts in her mouth from her jaw surgery. Oxycodone, Last time she was responsive was yesterday 1pm which mother states that her speech was slurred. She was found at 2pm and was unresponsive. She was found with a bottle of alcohol. She mailed a goodbye  letter to her dad last week. She is a chronic alcohol, she fell and broke her jaw. she is concerned because her mother will be gone for 4 months. She has been attempted to go rehab and get the proper help however has unable sustain her treatments. [place]: uptrending lactate. Likely alcohol induced lactic acidosis. less likely infection, afebrile, no leukocytosis. Pt more alert at this time. Utox +THC and oxycodone. ETOH level 458. Will continue to monitor.

## 2023-11-20 DIAGNOSIS — F10.929 ALCOHOL USE, UNSPECIFIED WITH INTOXICATION, UNSPECIFIED: ICD-10-CM

## 2023-11-20 DIAGNOSIS — I10 ESSENTIAL (PRIMARY) HYPERTENSION: ICD-10-CM

## 2023-11-20 DIAGNOSIS — S02.609A FRACTURE OF MANDIBLE, UNSPECIFIED, INITIAL ENCOUNTER FOR CLOSED FRACTURE: ICD-10-CM

## 2023-11-20 DIAGNOSIS — F32.9 MAJOR DEPRESSIVE DISORDER, SINGLE EPISODE, UNSPECIFIED: ICD-10-CM

## 2023-11-20 DIAGNOSIS — Z29.9 ENCOUNTER FOR PROPHYLACTIC MEASURES, UNSPECIFIED: ICD-10-CM

## 2023-11-20 DIAGNOSIS — F10.239 ALCOHOL DEPENDENCE WITH WITHDRAWAL, UNSPECIFIED: ICD-10-CM

## 2023-11-20 DIAGNOSIS — R79.89 OTHER SPECIFIED ABNORMAL FINDINGS OF BLOOD CHEMISTRY: ICD-10-CM

## 2023-11-20 LAB
ALBUMIN SERPL ELPH-MCNC: 3.7 G/DL — SIGNIFICANT CHANGE UP (ref 3.3–5)
ALBUMIN SERPL ELPH-MCNC: 3.7 G/DL — SIGNIFICANT CHANGE UP (ref 3.3–5)
ALP SERPL-CCNC: 54 U/L — SIGNIFICANT CHANGE UP (ref 40–120)
ALP SERPL-CCNC: 54 U/L — SIGNIFICANT CHANGE UP (ref 40–120)
ALT FLD-CCNC: 12 U/L — SIGNIFICANT CHANGE UP (ref 4–33)
ALT FLD-CCNC: 12 U/L — SIGNIFICANT CHANGE UP (ref 4–33)
ANION GAP SERPL CALC-SCNC: 14 MMOL/L — SIGNIFICANT CHANGE UP (ref 7–14)
ANION GAP SERPL CALC-SCNC: 14 MMOL/L — SIGNIFICANT CHANGE UP (ref 7–14)
AST SERPL-CCNC: 23 U/L — SIGNIFICANT CHANGE UP (ref 4–32)
AST SERPL-CCNC: 23 U/L — SIGNIFICANT CHANGE UP (ref 4–32)
BASE EXCESS BLDV CALC-SCNC: 0 MMOL/L — SIGNIFICANT CHANGE UP (ref -2–3)
BASE EXCESS BLDV CALC-SCNC: 0 MMOL/L — SIGNIFICANT CHANGE UP (ref -2–3)
BILIRUB SERPL-MCNC: 0.3 MG/DL — SIGNIFICANT CHANGE UP (ref 0.2–1.2)
BILIRUB SERPL-MCNC: 0.3 MG/DL — SIGNIFICANT CHANGE UP (ref 0.2–1.2)
BLOOD GAS VENOUS COMPREHENSIVE RESULT: SIGNIFICANT CHANGE UP
BUN SERPL-MCNC: 13 MG/DL — SIGNIFICANT CHANGE UP (ref 7–23)
BUN SERPL-MCNC: 13 MG/DL — SIGNIFICANT CHANGE UP (ref 7–23)
CALCIUM SERPL-MCNC: 8 MG/DL — LOW (ref 8.4–10.5)
CALCIUM SERPL-MCNC: 8 MG/DL — LOW (ref 8.4–10.5)
CHLORIDE BLDV-SCNC: 108 MMOL/L — SIGNIFICANT CHANGE UP (ref 96–108)
CHLORIDE BLDV-SCNC: 108 MMOL/L — SIGNIFICANT CHANGE UP (ref 96–108)
CHLORIDE SERPL-SCNC: 103 MMOL/L — SIGNIFICANT CHANGE UP (ref 98–107)
CHLORIDE SERPL-SCNC: 103 MMOL/L — SIGNIFICANT CHANGE UP (ref 98–107)
CK SERPL-CCNC: 193 U/L — HIGH (ref 25–170)
CK SERPL-CCNC: 193 U/L — HIGH (ref 25–170)
CK SERPL-CCNC: 234 U/L — HIGH (ref 25–170)
CK SERPL-CCNC: 234 U/L — HIGH (ref 25–170)
CO2 BLDV-SCNC: 26 MMOL/L — SIGNIFICANT CHANGE UP (ref 22–26)
CO2 BLDV-SCNC: 26 MMOL/L — SIGNIFICANT CHANGE UP (ref 22–26)
CO2 SERPL-SCNC: 22 MMOL/L — SIGNIFICANT CHANGE UP (ref 22–31)
CO2 SERPL-SCNC: 22 MMOL/L — SIGNIFICANT CHANGE UP (ref 22–31)
CREAT SERPL-MCNC: 0.6 MG/DL — SIGNIFICANT CHANGE UP (ref 0.5–1.3)
CREAT SERPL-MCNC: 0.6 MG/DL — SIGNIFICANT CHANGE UP (ref 0.5–1.3)
EGFR: 109 ML/MIN/1.73M2 — SIGNIFICANT CHANGE UP
EGFR: 109 ML/MIN/1.73M2 — SIGNIFICANT CHANGE UP
GAS PNL BLDV: 141 MMOL/L — SIGNIFICANT CHANGE UP (ref 136–145)
GAS PNL BLDV: 141 MMOL/L — SIGNIFICANT CHANGE UP (ref 136–145)
GLUCOSE BLDV-MCNC: 93 MG/DL — SIGNIFICANT CHANGE UP (ref 70–99)
GLUCOSE BLDV-MCNC: 93 MG/DL — SIGNIFICANT CHANGE UP (ref 70–99)
GLUCOSE SERPL-MCNC: 85 MG/DL — SIGNIFICANT CHANGE UP (ref 70–99)
GLUCOSE SERPL-MCNC: 85 MG/DL — SIGNIFICANT CHANGE UP (ref 70–99)
HCO3 BLDV-SCNC: 25 MMOL/L — SIGNIFICANT CHANGE UP (ref 22–29)
HCO3 BLDV-SCNC: 25 MMOL/L — SIGNIFICANT CHANGE UP (ref 22–29)
HCT VFR BLD CALC: 29.4 % — LOW (ref 34.5–45)
HCT VFR BLD CALC: 29.4 % — LOW (ref 34.5–45)
HCT VFR BLDA CALC: 29 % — LOW (ref 34.5–46.5)
HCT VFR BLDA CALC: 29 % — LOW (ref 34.5–46.5)
HGB BLD CALC-MCNC: 9.6 G/DL — LOW (ref 11.7–16.1)
HGB BLD CALC-MCNC: 9.6 G/DL — LOW (ref 11.7–16.1)
HGB BLD-MCNC: 9 G/DL — LOW (ref 11.5–15.5)
HGB BLD-MCNC: 9 G/DL — LOW (ref 11.5–15.5)
LACTATE BLDV-MCNC: 5.9 MMOL/L — CRITICAL HIGH (ref 0.5–2)
LACTATE BLDV-MCNC: 5.9 MMOL/L — CRITICAL HIGH (ref 0.5–2)
LACTATE SERPL-SCNC: 1.9 MMOL/L — SIGNIFICANT CHANGE UP (ref 0.5–2)
LACTATE SERPL-SCNC: 1.9 MMOL/L — SIGNIFICANT CHANGE UP (ref 0.5–2)
MAGNESIUM SERPL-MCNC: 1.4 MG/DL — LOW (ref 1.6–2.6)
MAGNESIUM SERPL-MCNC: 1.4 MG/DL — LOW (ref 1.6–2.6)
MCHC RBC-ENTMCNC: 23.2 PG — LOW (ref 27–34)
MCHC RBC-ENTMCNC: 23.2 PG — LOW (ref 27–34)
MCHC RBC-ENTMCNC: 30.6 GM/DL — LOW (ref 32–36)
MCHC RBC-ENTMCNC: 30.6 GM/DL — LOW (ref 32–36)
MCV RBC AUTO: 75.8 FL — LOW (ref 80–100)
MCV RBC AUTO: 75.8 FL — LOW (ref 80–100)
NRBC # BLD: 0 /100 WBCS — SIGNIFICANT CHANGE UP (ref 0–0)
NRBC # BLD: 0 /100 WBCS — SIGNIFICANT CHANGE UP (ref 0–0)
NRBC # FLD: 0 K/UL — SIGNIFICANT CHANGE UP (ref 0–0)
NRBC # FLD: 0 K/UL — SIGNIFICANT CHANGE UP (ref 0–0)
PCO2 BLDV: 40 MMHG — SIGNIFICANT CHANGE UP (ref 39–52)
PCO2 BLDV: 40 MMHG — SIGNIFICANT CHANGE UP (ref 39–52)
PH BLDV: 7.4 — SIGNIFICANT CHANGE UP (ref 7.32–7.43)
PH BLDV: 7.4 — SIGNIFICANT CHANGE UP (ref 7.32–7.43)
PHOSPHATE SERPL-MCNC: 2.1 MG/DL — LOW (ref 2.5–4.5)
PHOSPHATE SERPL-MCNC: 2.1 MG/DL — LOW (ref 2.5–4.5)
PLATELET # BLD AUTO: 252 K/UL — SIGNIFICANT CHANGE UP (ref 150–400)
PLATELET # BLD AUTO: 252 K/UL — SIGNIFICANT CHANGE UP (ref 150–400)
PO2 BLDV: 49 MMHG — HIGH (ref 25–45)
PO2 BLDV: 49 MMHG — HIGH (ref 25–45)
POTASSIUM BLDV-SCNC: 4 MMOL/L — SIGNIFICANT CHANGE UP (ref 3.5–5.1)
POTASSIUM BLDV-SCNC: 4 MMOL/L — SIGNIFICANT CHANGE UP (ref 3.5–5.1)
POTASSIUM SERPL-MCNC: 3.8 MMOL/L — SIGNIFICANT CHANGE UP (ref 3.5–5.3)
POTASSIUM SERPL-MCNC: 3.8 MMOL/L — SIGNIFICANT CHANGE UP (ref 3.5–5.3)
POTASSIUM SERPL-SCNC: 3.8 MMOL/L — SIGNIFICANT CHANGE UP (ref 3.5–5.3)
POTASSIUM SERPL-SCNC: 3.8 MMOL/L — SIGNIFICANT CHANGE UP (ref 3.5–5.3)
PROT SERPL-MCNC: 6.7 G/DL — SIGNIFICANT CHANGE UP (ref 6–8.3)
PROT SERPL-MCNC: 6.7 G/DL — SIGNIFICANT CHANGE UP (ref 6–8.3)
RBC # BLD: 3.88 M/UL — SIGNIFICANT CHANGE UP (ref 3.8–5.2)
RBC # BLD: 3.88 M/UL — SIGNIFICANT CHANGE UP (ref 3.8–5.2)
RBC # FLD: 18.6 % — HIGH (ref 10.3–14.5)
RBC # FLD: 18.6 % — HIGH (ref 10.3–14.5)
SAO2 % BLDV: 77.7 % — SIGNIFICANT CHANGE UP (ref 67–88)
SAO2 % BLDV: 77.7 % — SIGNIFICANT CHANGE UP (ref 67–88)
SODIUM SERPL-SCNC: 139 MMOL/L — SIGNIFICANT CHANGE UP (ref 135–145)
SODIUM SERPL-SCNC: 139 MMOL/L — SIGNIFICANT CHANGE UP (ref 135–145)
TROPONIN T, HIGH SENSITIVITY RESULT: 8 NG/L — SIGNIFICANT CHANGE UP
TROPONIN T, HIGH SENSITIVITY RESULT: 8 NG/L — SIGNIFICANT CHANGE UP
WBC # BLD: 5.9 K/UL — SIGNIFICANT CHANGE UP (ref 3.8–10.5)
WBC # BLD: 5.9 K/UL — SIGNIFICANT CHANGE UP (ref 3.8–10.5)
WBC # FLD AUTO: 5.9 K/UL — SIGNIFICANT CHANGE UP (ref 3.8–10.5)
WBC # FLD AUTO: 5.9 K/UL — SIGNIFICANT CHANGE UP (ref 3.8–10.5)

## 2023-11-20 PROCEDURE — 76705 ECHO EXAM OF ABDOMEN: CPT | Mod: 26

## 2023-11-20 PROCEDURE — 99223 1ST HOSP IP/OBS HIGH 75: CPT | Mod: GC

## 2023-11-20 PROCEDURE — 99253 IP/OBS CNSLTJ NEW/EST LOW 45: CPT

## 2023-11-20 RX ORDER — FOLIC ACID 0.8 MG
1 TABLET ORAL DAILY
Refills: 0 | Status: DISCONTINUED | OUTPATIENT
Start: 2023-11-20 | End: 2023-11-25

## 2023-11-20 RX ORDER — ACETAMINOPHEN 500 MG
1000 TABLET ORAL ONCE
Refills: 0 | Status: COMPLETED | OUTPATIENT
Start: 2023-11-20 | End: 2023-11-20

## 2023-11-20 RX ORDER — MAGNESIUM SULFATE 500 MG/ML
2 VIAL (ML) INJECTION ONCE
Refills: 0 | Status: COMPLETED | OUTPATIENT
Start: 2023-11-20 | End: 2023-11-20

## 2023-11-20 RX ORDER — DESVENLAFAXINE 50 MG/1
50 TABLET, EXTENDED RELEASE ORAL DAILY
Refills: 0 | Status: DISCONTINUED | OUTPATIENT
Start: 2023-11-20 | End: 2023-11-25

## 2023-11-20 RX ORDER — THIAMINE MONONITRATE (VIT B1) 100 MG
100 TABLET ORAL DAILY
Refills: 0 | Status: DISCONTINUED | OUTPATIENT
Start: 2023-11-20 | End: 2023-11-23

## 2023-11-20 RX ORDER — SODIUM CHLORIDE 9 MG/ML
1000 INJECTION INTRAMUSCULAR; INTRAVENOUS; SUBCUTANEOUS
Refills: 0 | Status: DISCONTINUED | OUTPATIENT
Start: 2023-11-20 | End: 2023-11-20

## 2023-11-20 RX ORDER — ENOXAPARIN SODIUM 100 MG/ML
40 INJECTION SUBCUTANEOUS EVERY 24 HOURS
Refills: 0 | Status: DISCONTINUED | OUTPATIENT
Start: 2023-11-20 | End: 2023-11-25

## 2023-11-20 RX ORDER — ACETAMINOPHEN 500 MG
650 TABLET ORAL EVERY 6 HOURS
Refills: 0 | Status: DISCONTINUED | OUTPATIENT
Start: 2023-11-20 | End: 2023-11-25

## 2023-11-20 RX ORDER — VENLAFAXINE HCL 75 MG
50 CAPSULE, EXT RELEASE 24 HR ORAL DAILY
Refills: 0 | Status: DISCONTINUED | OUTPATIENT
Start: 2023-11-20 | End: 2023-11-20

## 2023-11-20 RX ORDER — SODIUM CHLORIDE 9 MG/ML
1000 INJECTION, SOLUTION INTRAVENOUS
Refills: 0 | Status: DISCONTINUED | OUTPATIENT
Start: 2023-11-20 | End: 2023-11-21

## 2023-11-20 RX ORDER — DESVENLAFAXINE 50 MG/1
50 TABLET, EXTENDED RELEASE ORAL DAILY
Refills: 0 | Status: DISCONTINUED | OUTPATIENT
Start: 2023-11-20 | End: 2023-11-20

## 2023-11-20 RX ORDER — SODIUM CHLORIDE 9 MG/ML
1000 INJECTION, SOLUTION INTRAVENOUS
Refills: 0 | Status: DISCONTINUED | OUTPATIENT
Start: 2023-11-20 | End: 2023-11-20

## 2023-11-20 RX ORDER — ZOLPIDEM TARTRATE 10 MG/1
1 TABLET ORAL
Refills: 0 | DISCHARGE

## 2023-11-20 RX ORDER — LOSARTAN POTASSIUM 100 MG/1
50 TABLET, FILM COATED ORAL DAILY
Refills: 0 | Status: DISCONTINUED | OUTPATIENT
Start: 2023-11-20 | End: 2023-11-21

## 2023-11-20 RX ORDER — THIAMINE MONONITRATE (VIT B1) 100 MG
500 TABLET ORAL THREE TIMES A DAY
Refills: 0 | Status: COMPLETED | OUTPATIENT
Start: 2023-11-20 | End: 2023-11-23

## 2023-11-20 RX ORDER — LANOLIN ALCOHOL/MO/W.PET/CERES
3 CREAM (GRAM) TOPICAL AT BEDTIME
Refills: 0 | Status: DISCONTINUED | OUTPATIENT
Start: 2023-11-20 | End: 2023-11-25

## 2023-11-20 RX ORDER — ONDANSETRON 8 MG/1
4 TABLET, FILM COATED ORAL EVERY 8 HOURS
Refills: 0 | Status: DISCONTINUED | OUTPATIENT
Start: 2023-11-20 | End: 2023-11-25

## 2023-11-20 RX ADMIN — Medication 2 MILLIGRAM(S): at 14:05

## 2023-11-20 RX ADMIN — Medication 400 MILLIGRAM(S): at 01:45

## 2023-11-20 RX ADMIN — Medication 1 MILLIGRAM(S): at 01:45

## 2023-11-20 RX ADMIN — Medication 85 MILLIMOLE(S): at 11:44

## 2023-11-20 RX ADMIN — Medication 1 TABLET(S): at 11:41

## 2023-11-20 RX ADMIN — Medication 2 MILLIGRAM(S): at 10:02

## 2023-11-20 RX ADMIN — LOSARTAN POTASSIUM 50 MILLIGRAM(S): 100 TABLET, FILM COATED ORAL at 05:48

## 2023-11-20 RX ADMIN — Medication 100 MILLIGRAM(S): at 11:41

## 2023-11-20 RX ADMIN — Medication 2 MILLIGRAM(S): at 17:41

## 2023-11-20 RX ADMIN — Medication 2 MILLIGRAM(S): at 22:48

## 2023-11-20 RX ADMIN — Medication 25 GRAM(S): at 07:57

## 2023-11-20 RX ADMIN — Medication 105 MILLIGRAM(S): at 22:18

## 2023-11-20 RX ADMIN — Medication 2 MILLIGRAM(S): at 05:45

## 2023-11-20 RX ADMIN — SODIUM CHLORIDE 100 MILLILITER(S): 9 INJECTION, SOLUTION INTRAVENOUS at 22:48

## 2023-11-20 RX ADMIN — Medication 1 MILLIGRAM(S): at 11:41

## 2023-11-20 RX ADMIN — Medication 2 MILLIGRAM(S): at 02:45

## 2023-11-20 NOTE — PROGRESS NOTE ADULT - PROBLEM SELECTOR PLAN 5
-Patient on losartan 50 at home  -will cont -Lovenox 40  -Diet: Patient discharged on full liquid diet last time; will cont; f/u SLP eval. Stable since last time.  -GOC: full code

## 2023-11-20 NOTE — PROGRESS NOTE ADULT - PROBLEM SELECTOR PLAN 2
Patient with prior hospitalization for alcohol withdrawal. Also with recent jaw ORIF iso jaw fracture iso alcohol intoxication. Patient currently presents with a CIWA of 8, however still in the withdrawal window.  -high risk ciwa taper  -aspiration precautions  -Vitals q4 -Patient with fellings of guilt, hopelessness, changes in appetite. loss of concentration. Patient also endorsing alcohol interfering with her day to day life as she has also lost her job due to this. Recently wrote a letter regarding her lifes refleciton and states that she hates her life right now. However, denies this being a suicide attempt and active ideation. No active SI at this time    Plan  -1:1  -f/u Psych  -f/u psych about restarting desvenlafaxine

## 2023-11-20 NOTE — H&P ADULT - NSHPREVIEWOFSYSTEMS_GEN_ALL_CORE
REVIEW OF SYSTEMS:  CONSTITUTIONAL: No weakness, fevers, chills, sick contacts, or unintended weight loss  EYES: No visual changes or vertigo, +nausea  ENT: No throat pain, rhinorrhea, or hearing loss   NECK: No pain or stiffness  RESPIRATORY: No cough, wheezing, hemoptysis; No shortness of breath  CARDIOVASCULAR: No chest pain or palpitations  GASTROINTESTINAL: No abdominal or epigastric pain. No nausea, vomiting, or hematemesis; No diarrhea or constipation. No melena or hematochezia.  GENITOURINARY: No dysuria, frequency or hematuria  NEUROLOGICAL: No numbness or weakness, +HA  SKIN: No itching, rashes, or bruises  Psych: +anxious, +tremulous, REVIEW OF SYSTEMS:  CONSTITUTIONAL: No weakness, fevers, chills, sick contacts, or unintended weight loss  EYES: No visual changes or vertigo  ENT: No throat pain, rhinorrhea, or hearing loss   NECK: No pain or stiffness  RESPIRATORY: No cough, wheezing, hemoptysis; No shortness of breath  CARDIOVASCULAR: No chest pain or palpitations; No LE edema  GASTROINTESTINAL: No abdominal or epigastric pain. (+) nausea, vomiting; No hematemesis; No diarrhea or constipation. No melena or hematochezia.  GENITOURINARY: No dysuria, frequency or hematuria  NEUROLOGICAL: No numbness, paresthesias or weakness, +HA; No syncope; No lightheadedness/dizziness  SKIN: No itching, rashes, or bruises  MSK: ambulates without aid; denies any recent falls  Psych: +anxious, +tremulous,

## 2023-11-20 NOTE — BH CONSULTATION LIAISON ASSESSMENT NOTE - NSBHMSEMUSCLE_PSY_A_CORE
Form completed  Placed in blue folder at clerical work-flow station    Thank you Normal muscle tone/strength

## 2023-11-20 NOTE — BH CONSULTATION LIAISON ASSESSMENT NOTE - NSBHCONSFOLLOWNEEDS_PSY_ALL_CORE
"                                                    Physical Therapy Treatment Note     Name: Mirna Cline  Clinic Number: [id number]  Diagnosis:   Encounter Diagnosis   Name Primary?    Neck pain Yes     Physician: Rosie Russell A.M., MD  Treatment Orders: PT treatment and evaluation  Past Medical History:   Diagnosis Date    Angio-edema     Arthritis     Cancer     stomach cancer    Dementia     GERD (gastroesophageal reflux disease)     History of psychiatric hospitalization     Hyperlipidemia     Hypertension     MR (congenital mitral regurgitation)     mild    Obesity     Palpitations     Recurrent upper respiratory infection (URI)     Syncope     Urticaria     VPC's        Precautions: standard    Evaluation date: 10/18/17  Visit #: 4/12   Authorization period expiration: 12/31/17    Subjective     Pt reports: doing ok today, still has tenderness in levator scapulae  Pain Scale: before treatment: 6/10 neck pain currently; post treatment: NT    Objective     Therapeutic exercise: Mirna Clien received therapeutic exercises to develop strength, flexibility and scapular stabilization for 15 minutes including:     Sitting:  Upper trap stretch 3x30"  Levator scapulae stretch 3x30"   Chin tucks 15x5"    Pt received manual therapy for 20 min including: STM to bilateral UT and levator scapula, and myofascial release to bilateral upper traps and levator scapulae. Included cupping to bilateral levator scapulae today.    Modalities: patient received the following modalities after being cleared for contraindications: moist heat to the cervical spine for 10 min prior to treatment    Written Home Exercises Provided:   Pt demo good understanding of the education provided during initial evaluation. Pt demonstrated good return demonstration of activities.     Pt. education:  · Posture re-education, body/gait mechanics, HEP, activity modification/avoidance  · No spiritual or educational barriers to " learning provided  · Pt has no cultural, educational or language barriers to learning provided.    Assessment   Pt tolerated treatment well and reported no L eye pain post tx session. Pt reported significant relief at end of session. Pt tolerated tx well w/o any increase in symptoms. Tx focused on decreasing muscle guarding and stiffness. Treatment session decreased due to late arrival today. Will progress as mo. Pt will continue to benefit from skilled outpatient physical therapy to address the remaining functional deficits, provide pt/family education, and to maximize pt's level of independence in the home and community environment.     Anticipated barriers to physical therapy: none    · Pt's spiritual, cultural and educational needs considered and pt agreeable to plan of care and goals as stated below:     Medical necessity is demonstrated by the following IMPAIRMENTS/PROBLEM LIST:              1)Increase in pain level limiting function              2)Decreased range of motion limiting function              3)Decreased strength limiting function              4)Impaired posture              5)Lack of HEP     GOALS: Short Term Goals: 2 weeks  1.Report decreased neck pain  <   / =  5  /10  to increase tolerance for completion of ADLs  2. Pt to complete upper trap stretches without pain in order to demonstrate improved activity tolerance  3. Increased MMT  for middle trapezius to 4+/5 to increase tolerance for ADL and work activities.  4. Pt to report a decreased frequency of headaches to demonstrate improvements in painful condition   5. Pt to tolerate HEP to improve ROM and independence with ADL's     Long Term Goals: 4 weeks  1. Report decreased neck pain  <   / =  4  /10  to increase tolerance for completion of ADLs.  2. Increased MMT for middle trapezius to 5/5 to increase tolerance for ADL and work activities  3. Increased MMT  for lower trapezius to 4+/5  to increase tolerance for ADL and work activities.  4.  Pt will report at CK level (40-60% impaired) on FOTO score for neck pain disability to demonstrate decrease in disability and improvement in neck pain.  5. Pt to be Independent with HEP to improve ROM and independence with ADL's       Plan   Pt will be treated by physical therapy 1-3 times a week for 6 weeks for Pt Education, HEP, therapeutic exercises, neuromuscular re-education, joint mobilizations, modalities prn to achieve established goals. Mirna may at times be seen by a PTA as part of the Rehab Team.      Nathalia Ortega, PT  11/16/2017     Needs further psych safety assessment prior to discharge

## 2023-11-20 NOTE — BH CONSULTATION LIAISON ASSESSMENT NOTE - NSBHCHARTREVIEWVS_PSY_A_CORE FT
Vital Signs Last 24 Hrs  T(C): 36.8 (20 Nov 2023 14:00), Max: 37.3 (20 Nov 2023 03:49)  T(F): 98.2 (20 Nov 2023 14:00), Max: 99.1 (20 Nov 2023 03:49)  HR: 107 (20 Nov 2023 14:00) (62 - 118)  BP: 157/91 (20 Nov 2023 14:00) (118/84 - 157/91)  BP(mean): --  RR: 20 (20 Nov 2023 14:00) (13 - 22)  SpO2: 100% (20 Nov 2023 14:00) (97% - 100%)    Parameters below as of 20 Nov 2023 14:00  Patient On (Oxygen Delivery Method): room air

## 2023-11-20 NOTE — PROGRESS NOTE ADULT - SUBJECTIVE AND OBJECTIVE BOX
***************************************************************  Julián Gaspar, PGY1  Internal Medicine   TEAMS Preferred  ***************************************************************    AYLEEN DEY  48y Female  MRN: [id number]  11-20-23    Patient is a 48y old  Female who presents with a chief complaint of Alcohol abuse (20 Nov 2023 03:42)      SUBJECTIVE / OVERNIGHT EVENTS:   No acute overnight events.   Patient seen and examined at bedside this AM.  Denies any CP, SOB, N/V, constipation, diarrhea, abdominal pain, dysuria, fever, chills, or headaches.     12 Point ROS negative with the exception of the above    MEDICATIONS  (STANDING):  enoxaparin Injectable 40 milliGRAM(s) SubCutaneous every 24 hours  folic acid 1 milliGRAM(s) Oral daily  LORazepam   Injectable 2 milliGRAM(s) IV Push every 4 hours  LORazepam   Injectable   IV Push   losartan 50 milliGRAM(s) Oral daily  multivitamin 1 Tablet(s) Oral daily  sodium chloride 0.9%. 1000 milliLiter(s) (100 mL/Hr) IV Continuous <Continuous>  thiamine 100 milliGRAM(s) Oral daily    MEDICATIONS  (PRN):  acetaminophen     Tablet .. 650 milliGRAM(s) Oral every 6 hours PRN Temp greater or equal to 38C (100.4F), Mild Pain (1 - 3)  LORazepam   Injectable 2 milliGRAM(s) IV Push every 2 hours PRN CIWA-Ar score increase by 2 points and a total score of 7 or less  LORazepam   Injectable 2 milliGRAM(s) IntraMuscular every 1 hour PRN CIWA-Ar score 8 or greater  melatonin 3 milliGRAM(s) Oral at bedtime PRN Insomnia      OBJECTIVE:  Vital Signs Last 24 Hrs  T(C): 36.9 (20 Nov 2023 06:35), Max: 37.3 (20 Nov 2023 03:49)  T(F): 98.5 (20 Nov 2023 06:35), Max: 99.1 (20 Nov 2023 03:49)  HR: 110 (20 Nov 2023 06:35) (62 - 110)  BP: 139/92 (20 Nov 2023 06:35) (118/84 - 151/107)  BP(mean): --  RR: 22 (20 Nov 2023 06:35) (13 - 22)  SpO2: 99% (20 Nov 2023 06:35) (97% - 100%)    Parameters below as of 20 Nov 2023 06:35  Patient On (Oxygen Delivery Method): room air        I&O's Summary      PHYSICAL EXAM:  GENERAL: Laying comfortably, NAD  HEENT: NCAT, PERRLA, EOMI, no scleral icterus, no LAD  NECK: No JVD, supple  LUNG: CTABL; No wheezes, crackles, or rhonchi  HEART: RRR; normal S1/S2; No murmurs, rubs, or gallops  ABDOMEN: +BS, soft, nontender, nondistended, no HSM; No rebound, guarding, or rigidity  EXTREMITIES:  No LE edema b/l, 2+ Peripheral Pulses, No clubbing or cyanosis  NEUROLOGY: AOx3, non-focal, strength 5/5 in all extremities, sensation intact  PSYCH: calm and cooperative  SKIN: No rashes or lesions    LABS:                        9.0    5.90  )-----------( 252      ( 20 Nov 2023 05:45 )             29.4     Auto Eosinophil # x     / Auto Eosinophil % x     / Auto Neutrophil # x     / Auto Neutrophil % x     / BANDS % x                            11.4   8.30  )-----------( 312      ( 19 Nov 2023 15:30 )             38.2     Auto Eosinophil # 0.27  / Auto Eosinophil % 3.3   / Auto Neutrophil # 6.07  / Auto Neutrophil % 73.0  / BANDS % x        11-20    139  |  103  |  13  ----------------------------<  85  3.8   |  22  |  0.60  11-19    146<H>  |  104  |  11  ----------------------------<  101<H>  3.6   |  24  |  0.55    Ca    8.0<L>      20 Nov 2023 05:45  Ca    9.4      19 Nov 2023 15:30  Phos  2.1     11-20  Mg     1.40     11-20    TPro  6.7  /  Alb  3.7  /  TBili  0.3  /  DBili  x   /  AST  23  /  ALT  12  /  AlkPhos  54  11-20  TPro  8.7<H>  /  Alb  4.5  /  TBili  0.2  /  DBili  x   /  AST  30  /  ALT  12  /  AlkPhos  73  11-19      CARDIAC MARKERS ( 20 Nov 2023 05:45 )  x     / x     / 193 U/L / x     / x          Urinalysis Basic - ( 20 Nov 2023 05:45 )    Color: x / Appearance: x / SG: x / pH: x  Gluc: 85 mg/dL / Ketone: x  / Bili: x / Urobili: x   Blood: x / Protein: x / Nitrite: x   Leuk Esterase: x / RBC: x / WBC x   Sq Epi: x / Non Sq Epi: x / Bacteria: x        Lactate, Blood: 6.7 mmol/L (11-19 @ 19:41)    CAPILLARY BLOOD GLUCOSE      POCT Blood Glucose.: 100 mg/dL (19 Nov 2023 15:58)        RADIOLOGY & ADDITIONAL TESTS:     ***************************************************************  Julián Gaspar, PGY1  Internal Medicine   TEAMS Preferred  ***************************************************************    AYLEEN DEY  48y Female  MRN: [id number]  11-20-23    Patient is a 48y old  Female who presents with a chief complaint of Alcohol abuse (20 Nov 2023 03:42)      SUBJECTIVE / OVERNIGHT EVENTS:   No acute overnight events.   Patient seen and examined at bedside this AM. Has light sensitivity and tremoring.   Denies any CP, SOB, N/V, constipation, diarrhea, abdominal pain, dysuria, fever, chills, or headaches.     12 Point ROS negative with the exception of the above    MEDICATIONS  (STANDING):  enoxaparin Injectable 40 milliGRAM(s) SubCutaneous every 24 hours  folic acid 1 milliGRAM(s) Oral daily  LORazepam   Injectable 2 milliGRAM(s) IV Push every 4 hours  LORazepam   Injectable   IV Push   losartan 50 milliGRAM(s) Oral daily  multivitamin 1 Tablet(s) Oral daily  sodium chloride 0.9%. 1000 milliLiter(s) (100 mL/Hr) IV Continuous <Continuous>  thiamine 100 milliGRAM(s) Oral daily    MEDICATIONS  (PRN):  acetaminophen     Tablet .. 650 milliGRAM(s) Oral every 6 hours PRN Temp greater or equal to 38C (100.4F), Mild Pain (1 - 3)  LORazepam   Injectable 2 milliGRAM(s) IV Push every 2 hours PRN CIWA-Ar score increase by 2 points and a total score of 7 or less  LORazepam   Injectable 2 milliGRAM(s) IntraMuscular every 1 hour PRN CIWA-Ar score 8 or greater  melatonin 3 milliGRAM(s) Oral at bedtime PRN Insomnia      OBJECTIVE:  Vital Signs Last 24 Hrs  T(C): 36.9 (20 Nov 2023 06:35), Max: 37.3 (20 Nov 2023 03:49)  T(F): 98.5 (20 Nov 2023 06:35), Max: 99.1 (20 Nov 2023 03:49)  HR: 110 (20 Nov 2023 06:35) (62 - 110)  BP: 139/92 (20 Nov 2023 06:35) (118/84 - 151/107)  BP(mean): --  RR: 22 (20 Nov 2023 06:35) (13 - 22)  SpO2: 99% (20 Nov 2023 06:35) (97% - 100%)    Parameters below as of 20 Nov 2023 06:35  Patient On (Oxygen Delivery Method): room air        I&O's Summary      PHYSICAL EXAM:  GENERAL: Laying comfortably, NAD  HEENT: NCAT, PERRLA, EOMI, no scleral icterus, no LAD  LUNG: CTABL; No wheezes, crackles, or rhonchi  HEART: RRR; normal S1/S2; No murmurs, rubs, or gallops  ABDOMEN: +BS, soft, nontender, nondistended, no HSM; No rebound, guarding, or rigidity  EXTREMITIES:  No LE edema b/l, 2+ Peripheral Pulses, No clubbing or cyanosis  NEUROLOGY: AOx3, non-focal, strength 5/5 in all extremities, sensation intact, tremor noted, has some residual sensory changes in facial area s/p OMFS surgery  LABS:                        9.0    5.90  )-----------( 252      ( 20 Nov 2023 05:45 )             29.4     Auto Eosinophil # x     / Auto Eosinophil % x     / Auto Neutrophil # x     / Auto Neutrophil % x     / BANDS % x                            11.4   8.30  )-----------( 312      ( 19 Nov 2023 15:30 )             38.2     Auto Eosinophil # 0.27  / Auto Eosinophil % 3.3   / Auto Neutrophil # 6.07  / Auto Neutrophil % 73.0  / BANDS % x        11-20    139  |  103  |  13  ----------------------------<  85  3.8   |  22  |  0.60  11-19    146<H>  |  104  |  11  ----------------------------<  101<H>  3.6   |  24  |  0.55    Ca    8.0<L>      20 Nov 2023 05:45  Ca    9.4      19 Nov 2023 15:30  Phos  2.1     11-20  Mg     1.40     11-20    TPro  6.7  /  Alb  3.7  /  TBili  0.3  /  DBili  x   /  AST  23  /  ALT  12  /  AlkPhos  54  11-20  TPro  8.7<H>  /  Alb  4.5  /  TBili  0.2  /  DBili  x   /  AST  30  /  ALT  12  /  AlkPhos  73  11-19      CARDIAC MARKERS ( 20 Nov 2023 05:45 )  x     / x     / 193 U/L / x     / x          Urinalysis Basic - ( 20 Nov 2023 05:45 )    Color: x / Appearance: x / SG: x / pH: x  Gluc: 85 mg/dL / Ketone: x  / Bili: x / Urobili: x   Blood: x / Protein: x / Nitrite: x   Leuk Esterase: x / RBC: x / WBC x   Sq Epi: x / Non Sq Epi: x / Bacteria: x        Lactate, Blood: 6.7 mmol/L (11-19 @ 19:41)    CAPILLARY BLOOD GLUCOSE      POCT Blood Glucose.: 100 mg/dL (19 Nov 2023 15:58)        RADIOLOGY & ADDITIONAL TESTS:

## 2023-11-20 NOTE — BH CONSULTATION LIAISON ASSESSMENT NOTE - SUMMARY
Patient is a 48 years old female with a past hx of alcohol use disorder, with prior detox and rehab treatments, DTs, withdrawal seizures, has a psychiatrist, on prestiq 50 mg but unclear for what reason. medical hx of h/o alcoholic pancreatitis, chronic anemia, HTN presents after being found unresponsive next to alcohol bottles and ambien/oxycodon pills in her BP bottle. Psych was consulted to rule out suicidal attempt. Patient is adamantly denies SI right now or in the past. Current presentation is most likely consistent with alcohol withdrawal. Mood disorder with mixed etiology.  Recommendations:  1:1 observation for suicidality  Ativan Taper  CIWA  Thiamine 500 mg IV TID x 3 days for Wernicke prophylaxis  Can Continue with prestiq 50 mg daily  Check amylase, lipase.  Patient is a 48 years old female with a past hx of alcohol use disorder, with prior detox and rehab treatments, DTs, withdrawal seizures, has a psychiatrist, on prestiq 50 mg but unclear for what reason. medical hx of h/o alcoholic pancreatitis, chronic anemia, HTN presents after being found unresponsive next to alcohol bottles and ambien/oxycodon pills in her BP bottle. Psych was consulted for SI and etoh withdrawal. Patient is adamantly denies SI right now or in the past. Current presentation is most likely consistent with alcohol withdrawal and Mood disorder.     Recommendations:  CO 1:1 observation for suicidality/safety  Ativan Taper as per team. (monitor closely and adjust the taper accordingly)  CIWA with ativan PRN as per CIWA  Thiamine 500 mg IV TID x 3 days for Wernicke prophylaxis  Can Continue with pristiq 50 mg daily   Check amylase, lipase.

## 2023-11-20 NOTE — H&P ADULT - PROBLEM SELECTOR PLAN 6
-Lovenox 40  -Diet: minced and moist DASH  -GOC: full code -Lovenox 40  -Diet: Patient discharged on full liquid diet last time; will cont; f/u SLP eval. Stable since last time.  -GOC: full code -Lovenox 40  -Diet: Patient discharged on full liquid diet last time; will cont; f/u SLP eval. Stable since last time.  -GOC: full code    #microcytic anemia  chronic/stable  monitor/trend   check iron/TIBC, ferritin    #EKG w/TWI III, V2  -no chest pain  -inversions new from prior, add on trop

## 2023-11-20 NOTE — BH CONSULTATION LIAISON ASSESSMENT NOTE - CURRENT MEDICATION
MEDICATIONS  (STANDING):  enoxaparin Injectable 40 milliGRAM(s) SubCutaneous every 24 hours  folic acid 1 milliGRAM(s) Oral daily  lactated ringers. 1000 milliLiter(s) (100 mL/Hr) IV Continuous <Continuous>  LORazepam   Injectable 2 milliGRAM(s) IV Push every 4 hours  LORazepam   Injectable   IV Push   losartan 50 milliGRAM(s) Oral daily  multivitamin 1 Tablet(s) Oral daily  sodium chloride 0.9%. 1000 milliLiter(s) (100 mL/Hr) IV Continuous <Continuous>  thiamine 100 milliGRAM(s) Oral daily    MEDICATIONS  (PRN):  acetaminophen     Tablet .. 650 milliGRAM(s) Oral every 6 hours PRN Temp greater or equal to 38C (100.4F), Mild Pain (1 - 3)  LORazepam   Injectable 2 milliGRAM(s) IV Push every 1 hour PRN CIWA-Ar score 8 or greater  LORazepam   Injectable 2 milliGRAM(s) IV Push every 2 hours PRN CIWA-Ar score increase by 2 points and a total score of 7 or less  melatonin 3 milliGRAM(s) Oral at bedtime PRN Insomnia  ondansetron Injectable 4 milliGRAM(s) IV Push every 8 hours PRN Nausea and/or Vomiting

## 2023-11-20 NOTE — H&P ADULT - NSHPLABSRESULTS_GEN_ALL_CORE
.  LABS:                         11.4   8.30  )-----------( 312      ( 2023 15:30 )             38.2         146<H>  |  104  |  11  ----------------------------<  101<H>  3.6   |  24  |  0.55    Ca    9.4      2023 15:30  Phos  2.9       Mg     1.90         TPro  8.7<H>  /  Alb  4.5  /  TBili  0.2  /  DBili  x   /  AST  30  /  ALT  12  /  AlkPhos  73        Urinalysis Basic - ( 2023 21:38 )    Color: Yellow / Appearance: Clear / S.011 / pH: x  Gluc: x / Ketone: Negative mg/dL  / Bili: Negative / Urobili: 0.2 mg/dL   Blood: x / Protein: Trace mg/dL / Nitrite: Negative   Leuk Esterase: Negative / RBC: 2 /HPF / WBC 0 /HPF   Sq Epi: x / Non Sq Epi: x / Bacteria: Negative /HPF        Lactate, Blood: 6.7 mmol/L ( @ 19:41)    CThead  Impression: Motion degraded exam. No definite acute findings.    RADIOLOGY, EKG & ADDITIONAL TESTS: Reviewed. .  LABS:                         11.4     )-----------( 312      ( 2023 15:30 )             38.2         146<H>  |  104  |  11  ----------------------------<  101<H>  3.6   |  24  |  0.55    Ca    9.4      2023 15:30  Phos  2.9       Mg     1.90         TPro  8.7<H>  /  Alb  4.5  /  TBili  0.2  /  DBili  x   /  AST  30  /  ALT  12  /  AlkPhos  73      Lactate, Blood: 6.7 mmol/L ( @ 19:41)    23:31 - VBG - pH: 7.40  | pCO2: 40    | pO2: 49    | Lactate: 5.9    20:03 - VBG - pH: 7.34  | pCO2: 41    | pO2: 71    | Lactate: 6.8    16:15 - VBG - pH: 7.40  | pCO2: 41    | pO2: 85    | Lactate: 4.9      Thyroid Stimulating Hormone, Serum: 1.21 uIU/mL (23 @ 20:03)    HCG Quantitative, Serum: <1.0 mIU/mL (23 @ 15:30)    Acetaminophen Level, Serum: <10 ug/mL (23 @ 15:30)  Salicylate Level, Serum: <0.3 mg/dL (23 @ 15:30)  Alcohol, Blood: 458 mg/dL (23 @ 15:30)    Urinalysis Basic - ( 2023 21:38 )  Color: Yellow / Appearance: Clear / S.011 / pH: x  Gluc: x / Ketone: Negative mg/dL  / Bili: Negative / Urobili: 0.2 mg/dL   Blood: x / Protein: Trace mg/dL / Nitrite: Negative   Leuk Esterase: Negative / RBC: 2 /HPF / WBC 0 /HPF   Sq Epi: x / Non Sq Epi: x / Bacteria: Negative /HPF    THC, Urine Qualitative: Positive (23 @ 18:45)  Oxycodone, Urine: Positive (23 @ 18:45)    < from: CT Head No Cont (23 @ 19:23) >  Exam is degraded by motion artifact at the level of the skull base. The ventricles and sulci are normal in size for the patient's stated age.  No acute intracranial hemorrhage is identified.  There is no extra-axial fluid collection. No mass effect or midline shift is seen.  There is no evidence of acute territorial infarct. There is near complete opacification of the left sphenoid sinus. Fixation hardware is partially imaged in the zygomatic arches and mandibles. The mastoid air cells are well aerated.  No acute osseous abnormality is seen.   Impression: Motion degraded exam. No definite acute findings.  < end of copied text >    EKG personally reviewed and interpreted - NSR 87bpm, TWI in III, V2; QTc 454ms

## 2023-11-20 NOTE — H&P ADULT - PROBLEM SELECTOR PLAN 4
Patient s/p ORIF, healing well. Not able to eat/swallow well 2/2 to pain  -will CTM Patient s/p ORIF, healing well. Not able to eat/swallow well 2/2 to pain from surgery  -will CTM Patient s/p ORIF, healing well. Not able to eat/swallow well 2/2 to pain from surgery  -call [facility] to supply bands as patient reports left bands at home and family will not be able to bring in

## 2023-11-20 NOTE — BH CONSULTATION LIAISON ASSESSMENT NOTE - NSACTIVEVENT_PSY_ALL_CORE
AMA papers prepared and pt signed. Pt is refusing to go to the ER for further evaluation as recommended by Ghulam Oakley. Triggering events leading to humiliation, shame, and/or despair (e.g., Loss of relationship, financial or health status) (real or anticipated)/Current or pending social isolation/Chronic pain or other acute medical condition/Substance intoxication or withdrawal/Inadequate social supports

## 2023-11-20 NOTE — PROGRESS NOTE ADULT - PROBLEM SELECTOR PLAN 6
-Lovenox 40  -Diet: Patient discharged on full liquid diet last time; will cont; f/u SLP eval. Stable since last time.  -GOC: full code

## 2023-11-20 NOTE — PROGRESS NOTE ADULT - PROBLEM SELECTOR PLAN 3
-Patient with fellings of guilt, hopelessness, changes in appetite. loss of concentration. Patient also endorsing alcohol interfering with her day to day life as she has also lost her job due to this. Recently wrote a letter regarding her lifes refleciton and states that she hates her life right now. However, denies this being a suicide attempt and active ideation.  -1:1  -Psych c/s in the AM  -Will continue desvenlafaxine?--patient will need to bring in the AM, appreciate psych input on this as well  -Further benzo use per psych Patient s/p ORIF, healing well. Not able to eat/swallow well 2/2 to pain from surgery    - f/u [facility] for bands  - f/u S&S

## 2023-11-20 NOTE — H&P ADULT - TIME BILLING
Preparing to see the patient including review of tests and other providers' notes, confirming history with patient, performing medical examination and evaluation, counseling and educating the patient, documenting clinical information in the EMR, independently interpreting results and communicating results to the patient, care coordination

## 2023-11-20 NOTE — BH CONSULTATION LIAISON ASSESSMENT NOTE - NSBHMSEGAIT_PSY_A_CORE
1320 spoke to pts rn regarding ordered paracentesis. Verified that pt did not received ordered lovenox, pt ate breakfst at 0800, npo since and pt is a&ox4, Divehi speaking only, family at bedside. Requested to keep her npo,hold all thinners &  obtain stat pt inr. Will send for pt with results are back. Unable to assess

## 2023-11-20 NOTE — BH CONSULTATION LIAISON ASSESSMENT NOTE - RISK ASSESSMENT
Risk factors:  h/o psych admissions, active substance abuse, chronic pain, recent loss, requests to assist her to commit suicide    Protective factors:  no access to weapons,  domiciled,  social supports, positive therapeutic relationship, engaged in treatment, compliant with treatment    Overall, pt is at High risk of harm will require 1:1 observation for suicidality. Risk factors:  h/o psych admissions, active substance abuse, chronic pain, recent loss, requests to assist her to commit suicide 2months ago    Protective factors:  no access to weapons,  domiciled,  social supports, positive therapeutic relationship, engaged in treatment, compliant with treatment    Overall, pt is at High risk of harm will require 1:1 observation for suicidality.

## 2023-11-20 NOTE — H&P ADULT - HISTORY OF PRESENT ILLNESS
48yo Female with hx of HTN, alcohol use disorder requiring hospitalization for withdrawal, h/o withdrawal  seizure appx 1 year ago, h/o alcoholic pancreatitis, chronic anemia presents after being found unresponsive next to alcohol bottles. She states that she had been drinking for the past two days. Reportedly drank half a pint each time. Patient recently discharged s/p ORIF of mandibular fracture in the setting of alcohol intoxication on oxycodone that she has been taking. Patient also gets xanax and ambien from her psychiatr 46yo Female with hx of HTN, alcohol use disorder requiring hospitalization for withdrawal, h/o withdrawal  seizure appx 1 year ago, h/o alcoholic pancreatitis, chronic anemia presents after being found unresponsive next to alcohol bottles. She states that she had been drinking for the past two days. Reportedly drank half a pint each time. Patient recently discharged s/p ORIF of mandibular fracture in the setting of alcohol intoxication on oxycodone that she has been taking. Patient also gets xanax and ambien from her psychiatrist that she reports taking two days ago. Patient denies this being a suicide attempt. She recently wrote a letter to her dad which per mom was a "reflection of her life" and how she hates her life right now. However, mom denies any active or passive suicidal ideation when she talked to her last. On interview patient endorses feelings of hopelessness, guilt, decreased appetite and also recently losing her job due to her alcohol use. Patient has unsuccessfully tried to quit before. Patient also endorses a headache, tremors, anxiety, and nausea. Patient reports an episode of emesis yesterday but denies any blood. Denies any visual, tactile, auditory hallucinations.   In the ED, patient afebrile, hypertensive. Patient received tylenol, haldol, ativan IV 2mg, 1mg, thiamine and 2.5L NS bolus and placed on a high risk ciwa with ativan taper. 48 -year-old female with hx of HTN, alcohol use disorder requiring hospitalization for withdrawal, h/o withdrawal  seizure appx 1 year ago, h/o alcoholic pancreatitis, chronic anemia presents after being found unresponsive next to alcohol bottles. She states that she had been drinking for the past two days. Reportedly drank half a pint each time. Patient recently discharged s/p ORIF of mandibular fracture in the setting of alcohol intoxication, on oxycodone that she has been taking PRN. Patient also gets xanax and ambien from her psychiatrist that she reports taking two days ago. Patient denies this being a suicide attempt. She denies any intake of pills aside from those she is prescribed and denies any excessive intake of OTC meds including acetaminophen. She reportedly recently wrote a letter to her dad which per mom was a "reflection of her life" and how she hates her life right now. However, mom denies any active or passive suicidal ideation when she talked to her last. On interview patient endorses feelings of hopelessness, guilt, decreased appetite and also recently losing her job due to her alcohol use. Patient has unsuccessfully tried to quit before but notes being sober for up to 6 years in the past. Patient also endorses a headache, tremors, anxiety, and nausea. Patient reports an episode of emesis yesterday but denies any blood. Denies any visual, tactile, auditory hallucinations.   In the ED, patient afebrile, hypertensive. Patient received tylenol, haldol, ativan IV 2mg, 1mg, thiamine and 2.5L NS bolus and placed on a high risk ciwa with ativan taper.

## 2023-11-20 NOTE — H&P ADULT - ATTENDING COMMENTS
48 -year-old female with HTN, alcohol use disorder requiring hospitalization for withdrawal, h/o withdrawal seizure appx 1 year ago, h/o alcoholic pancreatitis, chronic anemia presents after being found unresponsive next to alcohol bottles likely iso alcohol intoxication. Patient denies SI/HI, denies writing any letters to family members, alluded to recent will that she completed prior to her recent jaw surgery. Patient reports tolerating regular diet at home however did not bring bands with her that she is required to wear when she's not eating, will need to call [facility] as she states her family would not be able to find them at home to bring them in.

## 2023-11-20 NOTE — BH CONSULTATION LIAISON ASSESSMENT NOTE - HPI (INCLUDE ILLNESS QUALITY, SEVERITY, DURATION, TIMING, CONTEXT, MODIFYING FACTORS, ASSOCIATED SIGNS AND SYMPTOMS)
Patient is a 48 years old female with a past hx of alcohol use disorder, with prior detox and rehab treatments, DTs, withdrawal seizures, has a psychiatrist, on prestiq 50 mg but unclear for what reason. medical hx of h/o alcoholic pancreatitis, chronic anemia, HTN presents after being found unresponsive next to alcohol bottles and ambien/oxycodon pills in her BP bottle.   on admission, utox + THC, oxycodone  Patient appeared minimizing, not forthcoming with information and unreliable historian. She reported that she does not need  to see a psychiatrist, because she was "not suicidal", she reported that she " just wrote a nice letter", to " try out my new calligraphy pencils", " remembering all the good times" and it was "misinterpreted". Patient appears emotionally labile, sweaty, with hand tremors, tongue fasciculations, horizontal nystagmus, c/o nausea and headache (CIWA 9). Patient denied suicidal or homicidal ideations, intent or a plan. Denied prior suicidal attempts. Denied feeling depressed or anxious. Denied any PTSD symptoms. Denied auditory or visual hallucinations. Denied auditory or visual hallucinations.   Pt reported that she has never been in psych hospital. She was started by her psychiatrist dr. Valladares from [place], [place] on antidepressants for the first time 3 weeks ago to treat " alcohol addiction"?   Pt reported that she has been drinking since the age of 28, 1 bottle of vodka per day. Prior hx of rehab, detox, withdrawal seizures,  DTs. She was sober for 2 years, but relapsed 6 months ago. Last episode of drinking lasted for 3 days. Denied any other substances use.  Collateral from sister: main problem is alcohol for the past 15 years. Was seeing therapist on video and possibly he is prescribing her something. No SA in the past. Unaware of the content of the letter. Has been in psych Hospital many times, but many years ago. Thinks that it was related to alcohol. Recently received bad news about "renewal of her license", but did not want to clarify.  Mother Mellisa: Reported that 13 y.o ago and then 2 months ago the patient asked her mother to assisted in committing suicide. 2 days ago she cried while drunk on the phone saying that she " just wants to die." In the letter there were not statements about wanting to die or saying good byes, she was just talking about her childhood and how good it was. Mother denied previous psych admissions.  Called psychiatrist Richard Valladares: left a voicemail to call back.  I-stop, Reference #: [id number]: 07/05/2023, prescribed, but dispensed 11/08/2023 zolpidem tartrate 10 mg tablet x	30 for 30 days.	Gitlin, Kevin N MD  Prescribed 10/25/2023, but dispensed 11/08/2023 alprazolam 1 mg tablet x 60 for 30 days. Patient is a 48 years old female with a past hx of alcohol use disorder, with prior detox and rehab treatments, DTs, withdrawal seizures, has a psychiatrist, on prestiq 50 mg but unclear for what reason. medical hx of h/o alcoholic pancreatitis, chronic anemia, HTN presents after being found unresponsive next to alcohol bottles and ambien/oxycodon pills in her BP bottle.   on admission, utox + THC, oxycodone  Patient appeared minimizing, not forthcoming with information and unreliable historian. She reported that she does not need  to see a psychiatrist, because she was "not suicidal", she reported that she " just wrote a nice letter", to " try out my new calligraphy pencils", " remembering all the good times" and it was "misinterpreted". Patient appears emotionally labile, sweaty, with hand tremors, tongue fasciculations, horizontal nystagmus, c/o nausea and headache (CIWA 9). Patient denied suicidal or homicidal ideations, intent or a plan. Denied prior suicidal attempts. Denied feeling depressed or anxious. Denied any PTSD symptoms. Denied auditory or visual hallucinations.   Pt reported that she has never been in psych hospital. She was started by her psychiatrist dr. Valladares from [place], [place] on antidepressants for the first time 3 weeks ago to treat " alcohol addiction"?   Pt reported that she has been drinking since the age of 28, 1 bottle of vodka per day. Prior hx of rehab, detox, withdrawal seizures,  DTs. She was sober for 2 years, but relapsed 6 months ago. Last episode of drinking lasted for 3 days. Denied any other substances use.    Collateral from sister: main problem is alcohol for the past 15 years. Was seeing therapist on video and possibly he is prescribing her something. No SA in the past. Unaware of the content of the letter. Has been in psych Hospital many times, but many years ago. Thinks that it was related to alcohol. Recently received bad news about "renewal of her license", but did not want to clarify.  Mother Mellisa: Reported that 13 y.o ago and then 2 months ago the patient asked her mother to assisted in committing suicide. 2 days ago she cried while drunk on the phone saying that she " just wants to die." In the letter there were not statements about wanting to die or saying good byes, she was just talking about her childhood and how good it was. Mother denied previous psych admissions.  Called psychiatrist Richard Valladares: left a voicemail to call back.  I-stop, Reference #: [id number]: 07/05/2023, prescribed, but dispensed 11/08/2023 zolpidem tartrate 10 mg tablet x	30 for 30 days.	Gitlin, Kevin N MD  Prescribed 10/25/2023, but dispensed 11/08/2023 alprazolam 1 mg tablet x 60 for 30 days.

## 2023-11-20 NOTE — H&P ADULT - ASSESSMENT
48yo Female with hx of HTN, alcohol use disorder requiring hospitalization for withdrawal, h/o withdrawal  seizure appx 1 year ago, h/o alcoholic pancreatitis, chronic anemia presents after being found unresponsive next to alcohol bottles likely iso alcohol intoxication 48 -year-old female with HTN, alcohol use disorder requiring hospitalization for withdrawal, h/o withdrawal  seizure appx 1 year ago, h/o alcoholic pancreatitis, chronic anemia presents after being found unresponsive next to alcohol bottles likely iso alcohol intoxication

## 2023-11-20 NOTE — H&P ADULT - PROBLEM SELECTOR PLAN 1
Patient with alcohol intoxication found unresponsive with levels 458 in her serum. Patient s/p 2.5L NS bolus. Patient responsive on interview now and mentating well.  -c/w maintenance fluids Patient with alcohol intoxication found unresponsive with levels 458 in her serum. Patient s/p 2.5L NS bolus. Patient responsive on interview now and mentating well.  -c/w maintenance fluids    Patient with prior hospitalization for alcohol withdrawal. Also with recent jaw ORIF iso jaw fracture iso alcohol intoxication. Patient currently presents with a CIWA of 8, however still in the withdrawal window.  -high risk ciwa taper  -aspiration precautions  -Vitals q4  -thiamine, MV, folic acid supplementation  -SW consult  -ondansetron PRN nausea  -acetaminophen PRN HA

## 2023-11-20 NOTE — H&P ADULT - PROBLEM SELECTOR PLAN 3
-Patient with fellings of guilt, hopelessness, changes in appetite. loss of concentration. Patient also endorsing alcohol interfering with her day to day life as she has also lost her job due to this. Recently wrote a letter regarding her lifes refleciton and states that she hates her life right now. However, denies this being a suicide attempt and active ideation.  -1:1  -Psych c/s in the AM  -Will continue desvenlafaxine -Patient with fellings of guilt, hopelessness, changes in appetite. loss of concentration. Patient also endorsing alcohol interfering with her day to day life as she has also lost her job due to this. Recently wrote a letter regarding her lifes refleciton and states that she hates her life right now. However, denies this being a suicide attempt and active ideation.  -1:1  -Psych c/s in the AM  -Will continue desvenlafaxine?--patient will need to bring in the AM -Patient with fellings of guilt, hopelessness, changes in appetite. loss of concentration. Patient also endorsing alcohol interfering with her day to day life as she has also lost her job due to this. Recently wrote a letter regarding her lifes refleciton and states that she hates her life right now. However, denies this being a suicide attempt and active ideation.  -1:1  -Psych c/s in the AM  -Will continue desvenlafaxine?--patient will need to bring in the AM, appreciate psych input on this as well  -Further benzo use per psych likely secondary to alcohol   c/w IVF, downtrending on repeat, continue to trend   denies s/sx of infection, no leukocytosis   continue to monitor   UA negative

## 2023-11-20 NOTE — H&P ADULT - PROBLEM SELECTOR PLAN 2
Patient with prior hospitalization for alcohol withdrawal. Also with recent jaw ORIF iso jaw fracture iso alcohol intoxication. Patient currently presents with a CIWA of 8, however still in the withdrawal window.  -high risk ciwa taper  -aspiration precautions  -Vitals q4 -Patient with feelings of guilt, hopelessness, changes in appetite. loss of concentration. Patient also endorsing alcohol interfering with her day to day life as she has also lost her job due to this. Recently wrote a letter regarding her life's refleciton and states that she hates her life right now. However, denies this being a suicide attempt and active ideation.  -1:1  -Psych c/s in the AM  -Will continue desvenlafaxine?--patient will need to bring in the AM, appreciate psych input on this as well  -Further benzo use per psych

## 2023-11-20 NOTE — PROGRESS NOTE ADULT - ASSESSMENT
48yo Female with hx of HTN, alcohol use disorder requiring hospitalization for withdrawal, h/o withdrawal  seizure appx 1 year ago, h/o alcoholic pancreatitis, chronic anemia presents after being found unresponsive next to alcohol bottles likely iso alcohol intoxication

## 2023-11-20 NOTE — H&P ADULT - NSHPPHYSICALEXAM_GEN_ALL_CORE
Vital Signs Last 24 Hrs  T(C): 37.3 (20 Nov 2023 03:49), Max: 37.3 (20 Nov 2023 03:49)  T(F): 99.1 (20 Nov 2023 03:49), Max: 99.1 (20 Nov 2023 03:49)  HR: 93 (20 Nov 2023 03:49) (62 - 93)  BP: 130/95 (20 Nov 2023 03:49) (130/95 - 151/107)  BP(mean): --  RR: 20 (20 Nov 2023 03:49) (13 - 22)  SpO2: 99% (20 Nov 2023 03:49) (97% - 100%)    Parameters below as of 20 Nov 2023 03:49  Patient On (Oxygen Delivery Method): room air        PHYSICAL EXAM:  General: +bl shaking tremor,   Head: NC/NT.  Eyes: PERRLA, EOMI. Conjunctiva and sclera clear.  ENT: No tonsillar erythema, exudates, or enlargement. Moist mucous membranes, good dentition, no lesions.  Neck: Supple. No JVD. Trachea midline. No thyromegaly. No carotid bruits.  Lungs: Airway patent. CTA B/L. Normal breath sounds. No wheezes, rales, rhonchi.  Heart: RRR. Normal S1/S2. No murmurs appreciated.  Abdomen: +BS, ND, Soft, Nontender, no guarding, no rebound. No CVAT.  Extremities:  2+ B/L peripheral Pulses. No clubbing, cyanosis, or edema. Calf soft, nontender b/l.   Neurological:  AAOx3. Good concentration. CN II-XII grossly intact. Strength 5/5 B/L upper and lower extremities. Sensation intact to all extremities. DTRs 2+ intact and symmetric. Normal Rhomberg and pronator drift.  Lymphatic: No LAD.  Skin: Warm and dry. No rashes or lesions. Vital Signs Last 24 Hrs  T(C): 37.3 (20 Nov 2023 03:49), Max: 37.3 (20 Nov 2023 03:49)  T(F): 99.1 (20 Nov 2023 03:49), Max: 99.1 (20 Nov 2023 03:49)  HR: 93 (20 Nov 2023 03:49) (62 - 93)  BP: 130/95 (20 Nov 2023 03:49) (130/95 - 151/107)  RR: 20 (20 Nov 2023 03:49) (13 - 22)  SpO2: 99% (20 Nov 2023 03:49) (97% - 100%)    Parameters below as of 20 Nov 2023 03:49  Patient On (Oxygen Delivery Method): room air    PHYSICAL EXAM:  General: +bl shaking tremor,   Head: NC/NT.  Eyes: PERRLA, EOMI. Conjunctiva and sclera clear.  ENT: No tonsillar erythema, exudates, or enlargement. Moist mucous membranes, good dentition, no lesions.  Neck: Supple. No JVD. Trachea midline. No thyromegaly. No carotid bruits.  Lungs: Airway patent. CTA B/L. Normal breath sounds. No wheezes, rales, rhonchi.  Heart: RRR. Normal S1/S2. No murmurs appreciated.  Abdomen: +BS, ND, Soft, Nontender, no guarding, no rebound. No CVAT.  Extremities:  2+ B/L peripheral Pulses. No clubbing, cyanosis, or edema. Calf soft, nontender b/l.   Neurological:  AAOx3. Good concentration. CN II-XII grossly intact. Strength 5/5 B/L upper and lower extremities. Sensation intact to all extremities. DTRs 2+ intact and symmetric. Normal Rhomberg and pronator drift.  Lymphatic: No LAD.  Skin: Warm and dry. No rashes or lesions.

## 2023-11-20 NOTE — BH CONSULTATION LIAISON ASSESSMENT NOTE - NSBHCHARTREVIEWLAB_PSY_A_CORE FT
9.0    5.90  )-----------( 252      ( 20 Nov 2023 05:45 )             29.4     11-20    139  |  103  |  13  ----------------------------<  85  3.8   |  22  |  0.60    Ca    8.0<L>      20 Nov 2023 05:45  Phos  2.1     11-20  Mg     1.40     11-20    TPro  6.7  /  Alb  3.7  /  TBili  0.3  /  DBili  x   /  AST  23  /  ALT  12  /  AlkPhos  54  11-20

## 2023-11-20 NOTE — H&P ADULT - NSHPSOCIALHISTORY_GEN_ALL_CORE
Patient lives by herself. Endorses daily alcohol use. Takes xanax and ambien. Denies smoking. Endorses marijuana use. Patient lives by herself. Takes xanax and ambien reportedly only as prescribed.

## 2023-11-20 NOTE — PROGRESS NOTE ADULT - PROBLEM SELECTOR PLAN 1
Patient with alcohol intoxication found unresponsive with levels 458 in her serum. Patient s/p 2.5L NS bolus. Patient responsive on interview now and mentating well.  -c/w maintenance fluids Patient with prior hospitalization for alcohol withdrawal. Also with recent jaw ORIF iso jaw fracture iso alcohol intoxication. Patient currently presents with a CIWA of 8, however still in the withdrawal window.  RUQ US negative for cirrhosis   Evidence of Rhabdo and low fluid state given high CK and lactate    Plan  -high risk ciwa   - Ativan taper  -aspiration precautions  -Vitals q4  - f/u lactate  - f/u Psych  - Thiamine, Folate, MV  - IV fluids  - SBIRT, SW consilt

## 2023-11-20 NOTE — PROGRESS NOTE ADULT - PROBLEM SELECTOR PLAN 4
Patient s/p ORIF, healing well. Not able to eat/swallow well 2/2 to pain from surgery  -will CTM -Patient on losartan 50 at home  -will cont

## 2023-11-20 NOTE — PROGRESS NOTE ADULT - ATTENDING COMMENTS
46 yo F w/ hx HTN, alcohol use hx hosp fo WD, hx WD seizure p/w alcohol intox now with alcohol WD. + prior Hx fall and mandibular fracture.     Alcohol WD- SBIRT c/s                   - Thiamine/MVI/folate                   - CIWA protocol with standing ativan taper                   - elevated lactate 3.9 c/w IVF and repeat lactate   MDD and anxiety - on SSRI/ xanax hold for now; Psych f/u  mandibular fracture- [facility] in regards to bands; S./s

## 2023-11-20 NOTE — BH CONSULTATION LIAISON ASSESSMENT NOTE - ADDITIONAL DETAILS ALL
called mother asking to help her to commit suicide Mother Reported that 13 y.o ago and then 2 months ago the patient asked her mother to assisted in committing suicide.  pt. denies current and past SIIP

## 2023-11-21 LAB
ALBUMIN SERPL ELPH-MCNC: 4.4 G/DL — SIGNIFICANT CHANGE UP (ref 3.3–5)
ALBUMIN SERPL ELPH-MCNC: 4.4 G/DL — SIGNIFICANT CHANGE UP (ref 3.3–5)
ALP SERPL-CCNC: 74 U/L — SIGNIFICANT CHANGE UP (ref 40–120)
ALP SERPL-CCNC: 74 U/L — SIGNIFICANT CHANGE UP (ref 40–120)
ALT FLD-CCNC: 15 U/L — SIGNIFICANT CHANGE UP (ref 4–33)
ALT FLD-CCNC: 15 U/L — SIGNIFICANT CHANGE UP (ref 4–33)
AMYLASE P1 CFR SERPL: 43 U/L — SIGNIFICANT CHANGE UP (ref 25–125)
AMYLASE P1 CFR SERPL: 43 U/L — SIGNIFICANT CHANGE UP (ref 25–125)
ANION GAP SERPL CALC-SCNC: 10 MMOL/L — SIGNIFICANT CHANGE UP (ref 7–14)
ANION GAP SERPL CALC-SCNC: 10 MMOL/L — SIGNIFICANT CHANGE UP (ref 7–14)
AST SERPL-CCNC: 32 U/L — SIGNIFICANT CHANGE UP (ref 4–32)
AST SERPL-CCNC: 32 U/L — SIGNIFICANT CHANGE UP (ref 4–32)
BASOPHILS # BLD AUTO: 0.04 K/UL — SIGNIFICANT CHANGE UP (ref 0–0.2)
BASOPHILS # BLD AUTO: 0.04 K/UL — SIGNIFICANT CHANGE UP (ref 0–0.2)
BASOPHILS NFR BLD AUTO: 0.9 % — SIGNIFICANT CHANGE UP (ref 0–2)
BASOPHILS NFR BLD AUTO: 0.9 % — SIGNIFICANT CHANGE UP (ref 0–2)
BILIRUB SERPL-MCNC: 0.9 MG/DL — SIGNIFICANT CHANGE UP (ref 0.2–1.2)
BILIRUB SERPL-MCNC: 0.9 MG/DL — SIGNIFICANT CHANGE UP (ref 0.2–1.2)
BLOOD GAS VENOUS COMPREHENSIVE RESULT: SIGNIFICANT CHANGE UP
BLOOD GAS VENOUS COMPREHENSIVE RESULT: SIGNIFICANT CHANGE UP
BUN SERPL-MCNC: 9 MG/DL — SIGNIFICANT CHANGE UP (ref 7–23)
BUN SERPL-MCNC: 9 MG/DL — SIGNIFICANT CHANGE UP (ref 7–23)
CALCIUM SERPL-MCNC: 9.9 MG/DL — SIGNIFICANT CHANGE UP (ref 8.4–10.5)
CALCIUM SERPL-MCNC: 9.9 MG/DL — SIGNIFICANT CHANGE UP (ref 8.4–10.5)
CHLORIDE SERPL-SCNC: 100 MMOL/L — SIGNIFICANT CHANGE UP (ref 98–107)
CHLORIDE SERPL-SCNC: 100 MMOL/L — SIGNIFICANT CHANGE UP (ref 98–107)
CO2 SERPL-SCNC: 27 MMOL/L — SIGNIFICANT CHANGE UP (ref 22–31)
CO2 SERPL-SCNC: 27 MMOL/L — SIGNIFICANT CHANGE UP (ref 22–31)
CREAT SERPL-MCNC: 0.61 MG/DL — SIGNIFICANT CHANGE UP (ref 0.5–1.3)
CREAT SERPL-MCNC: 0.61 MG/DL — SIGNIFICANT CHANGE UP (ref 0.5–1.3)
EGFR: 109 ML/MIN/1.73M2 — SIGNIFICANT CHANGE UP
EGFR: 109 ML/MIN/1.73M2 — SIGNIFICANT CHANGE UP
EOSINOPHIL # BLD AUTO: 0.22 K/UL — SIGNIFICANT CHANGE UP (ref 0–0.5)
EOSINOPHIL # BLD AUTO: 0.22 K/UL — SIGNIFICANT CHANGE UP (ref 0–0.5)
EOSINOPHIL NFR BLD AUTO: 4.7 % — SIGNIFICANT CHANGE UP (ref 0–6)
EOSINOPHIL NFR BLD AUTO: 4.7 % — SIGNIFICANT CHANGE UP (ref 0–6)
FERRITIN SERPL-MCNC: 34 NG/ML — SIGNIFICANT CHANGE UP (ref 13–330)
FERRITIN SERPL-MCNC: 34 NG/ML — SIGNIFICANT CHANGE UP (ref 13–330)
GLUCOSE SERPL-MCNC: 103 MG/DL — HIGH (ref 70–99)
GLUCOSE SERPL-MCNC: 103 MG/DL — HIGH (ref 70–99)
HCT VFR BLD CALC: 30.7 % — LOW (ref 34.5–45)
HCT VFR BLD CALC: 30.7 % — LOW (ref 34.5–45)
HGB BLD-MCNC: 9.5 G/DL — LOW (ref 11.5–15.5)
HGB BLD-MCNC: 9.5 G/DL — LOW (ref 11.5–15.5)
IANC: 3.14 K/UL — SIGNIFICANT CHANGE UP (ref 1.8–7.4)
IANC: 3.14 K/UL — SIGNIFICANT CHANGE UP (ref 1.8–7.4)
IMM GRANULOCYTES NFR BLD AUTO: 0.2 % — SIGNIFICANT CHANGE UP (ref 0–0.9)
IMM GRANULOCYTES NFR BLD AUTO: 0.2 % — SIGNIFICANT CHANGE UP (ref 0–0.9)
IRON SATN MFR SERPL: 154 UG/DL — SIGNIFICANT CHANGE UP (ref 30–160)
IRON SATN MFR SERPL: 154 UG/DL — SIGNIFICANT CHANGE UP (ref 30–160)
IRON SATN MFR SERPL: 42 % — SIGNIFICANT CHANGE UP (ref 14–50)
IRON SATN MFR SERPL: 42 % — SIGNIFICANT CHANGE UP (ref 14–50)
LIDOCAIN IGE QN: 9 U/L — SIGNIFICANT CHANGE UP (ref 7–60)
LIDOCAIN IGE QN: 9 U/L — SIGNIFICANT CHANGE UP (ref 7–60)
LYMPHOCYTES # BLD AUTO: 0.88 K/UL — LOW (ref 1–3.3)
LYMPHOCYTES # BLD AUTO: 0.88 K/UL — LOW (ref 1–3.3)
LYMPHOCYTES # BLD AUTO: 18.9 % — SIGNIFICANT CHANGE UP (ref 13–44)
LYMPHOCYTES # BLD AUTO: 18.9 % — SIGNIFICANT CHANGE UP (ref 13–44)
MAGNESIUM SERPL-MCNC: 1.9 MG/DL — SIGNIFICANT CHANGE UP (ref 1.6–2.6)
MAGNESIUM SERPL-MCNC: 1.9 MG/DL — SIGNIFICANT CHANGE UP (ref 1.6–2.6)
MCHC RBC-ENTMCNC: 23.1 PG — LOW (ref 27–34)
MCHC RBC-ENTMCNC: 23.1 PG — LOW (ref 27–34)
MCHC RBC-ENTMCNC: 30.9 GM/DL — LOW (ref 32–36)
MCHC RBC-ENTMCNC: 30.9 GM/DL — LOW (ref 32–36)
MCV RBC AUTO: 74.5 FL — LOW (ref 80–100)
MCV RBC AUTO: 74.5 FL — LOW (ref 80–100)
MONOCYTES # BLD AUTO: 0.36 K/UL — SIGNIFICANT CHANGE UP (ref 0–0.9)
MONOCYTES # BLD AUTO: 0.36 K/UL — SIGNIFICANT CHANGE UP (ref 0–0.9)
MONOCYTES NFR BLD AUTO: 7.7 % — SIGNIFICANT CHANGE UP (ref 2–14)
MONOCYTES NFR BLD AUTO: 7.7 % — SIGNIFICANT CHANGE UP (ref 2–14)
NEUTROPHILS # BLD AUTO: 3.14 K/UL — SIGNIFICANT CHANGE UP (ref 1.8–7.4)
NEUTROPHILS # BLD AUTO: 3.14 K/UL — SIGNIFICANT CHANGE UP (ref 1.8–7.4)
NEUTROPHILS NFR BLD AUTO: 67.6 % — SIGNIFICANT CHANGE UP (ref 43–77)
NEUTROPHILS NFR BLD AUTO: 67.6 % — SIGNIFICANT CHANGE UP (ref 43–77)
NRBC # BLD: 0 /100 WBCS — SIGNIFICANT CHANGE UP (ref 0–0)
NRBC # BLD: 0 /100 WBCS — SIGNIFICANT CHANGE UP (ref 0–0)
NRBC # FLD: 0 K/UL — SIGNIFICANT CHANGE UP (ref 0–0)
NRBC # FLD: 0 K/UL — SIGNIFICANT CHANGE UP (ref 0–0)
PHOSPHATE SERPL-MCNC: 3.3 MG/DL — SIGNIFICANT CHANGE UP (ref 2.5–4.5)
PHOSPHATE SERPL-MCNC: 3.3 MG/DL — SIGNIFICANT CHANGE UP (ref 2.5–4.5)
PLATELET # BLD AUTO: 226 K/UL — SIGNIFICANT CHANGE UP (ref 150–400)
PLATELET # BLD AUTO: 226 K/UL — SIGNIFICANT CHANGE UP (ref 150–400)
POTASSIUM SERPL-MCNC: 3.8 MMOL/L — SIGNIFICANT CHANGE UP (ref 3.5–5.3)
POTASSIUM SERPL-MCNC: 3.8 MMOL/L — SIGNIFICANT CHANGE UP (ref 3.5–5.3)
POTASSIUM SERPL-SCNC: 3.8 MMOL/L — SIGNIFICANT CHANGE UP (ref 3.5–5.3)
POTASSIUM SERPL-SCNC: 3.8 MMOL/L — SIGNIFICANT CHANGE UP (ref 3.5–5.3)
PROT SERPL-MCNC: 8.3 G/DL — SIGNIFICANT CHANGE UP (ref 6–8.3)
PROT SERPL-MCNC: 8.3 G/DL — SIGNIFICANT CHANGE UP (ref 6–8.3)
RBC # BLD: 4.12 M/UL — SIGNIFICANT CHANGE UP (ref 3.8–5.2)
RBC # BLD: 4.12 M/UL — SIGNIFICANT CHANGE UP (ref 3.8–5.2)
RBC # FLD: 18.6 % — HIGH (ref 10.3–14.5)
RBC # FLD: 18.6 % — HIGH (ref 10.3–14.5)
SODIUM SERPL-SCNC: 137 MMOL/L — SIGNIFICANT CHANGE UP (ref 135–145)
SODIUM SERPL-SCNC: 137 MMOL/L — SIGNIFICANT CHANGE UP (ref 135–145)
TIBC SERPL-MCNC: 365 UG/DL — SIGNIFICANT CHANGE UP (ref 220–430)
TIBC SERPL-MCNC: 365 UG/DL — SIGNIFICANT CHANGE UP (ref 220–430)
UIBC SERPL-MCNC: 211 UG/DL — SIGNIFICANT CHANGE UP (ref 110–370)
UIBC SERPL-MCNC: 211 UG/DL — SIGNIFICANT CHANGE UP (ref 110–370)
WBC # BLD: 4.65 K/UL — SIGNIFICANT CHANGE UP (ref 3.8–10.5)
WBC # BLD: 4.65 K/UL — SIGNIFICANT CHANGE UP (ref 3.8–10.5)
WBC # FLD AUTO: 4.65 K/UL — SIGNIFICANT CHANGE UP (ref 3.8–10.5)
WBC # FLD AUTO: 4.65 K/UL — SIGNIFICANT CHANGE UP (ref 3.8–10.5)

## 2023-11-21 PROCEDURE — 99233 SBSQ HOSP IP/OBS HIGH 50: CPT | Mod: GC

## 2023-11-21 RX ORDER — LOSARTAN POTASSIUM 100 MG/1
50 TABLET, FILM COATED ORAL
Refills: 0 | Status: DISCONTINUED | OUTPATIENT
Start: 2023-11-21 | End: 2023-11-23

## 2023-11-21 RX ADMIN — LOSARTAN POTASSIUM 50 MILLIGRAM(S): 100 TABLET, FILM COATED ORAL at 17:43

## 2023-11-21 RX ADMIN — Medication 1 TABLET(S): at 13:45

## 2023-11-21 RX ADMIN — Medication 105 MILLIGRAM(S): at 13:50

## 2023-11-21 RX ADMIN — Medication 1 MILLIGRAM(S): at 13:46

## 2023-11-21 RX ADMIN — Medication 1.5 MILLIGRAM(S): at 02:16

## 2023-11-21 RX ADMIN — LOSARTAN POTASSIUM 50 MILLIGRAM(S): 100 TABLET, FILM COATED ORAL at 06:36

## 2023-11-21 RX ADMIN — Medication 2 MILLIGRAM(S): at 13:44

## 2023-11-21 RX ADMIN — Medication 2 MILLIGRAM(S): at 21:48

## 2023-11-21 RX ADMIN — Medication 105 MILLIGRAM(S): at 06:29

## 2023-11-21 RX ADMIN — Medication 1.5 MILLIGRAM(S): at 10:39

## 2023-11-21 RX ADMIN — DESVENLAFAXINE 50 MILLIGRAM(S): 50 TABLET, EXTENDED RELEASE ORAL at 13:45

## 2023-11-21 RX ADMIN — Medication 100 MILLIGRAM(S): at 13:46

## 2023-11-21 RX ADMIN — Medication 2 MILLIGRAM(S): at 17:42

## 2023-11-21 RX ADMIN — Medication 1.5 MILLIGRAM(S): at 06:29

## 2023-11-21 NOTE — PROGRESS NOTE ADULT - PROBLEM SELECTOR PLAN 2
-Patient with fellings of guilt, hopelessness, changes in appetite. loss of concentration. Patient also endorsing alcohol interfering with her day to day life as she has also lost her job due to this. Recently wrote a letter regarding her lifes refleciton and states that she hates her life right now. However, denies this being a suicide attempt and active ideation. No active SI at this time    Plan  -1:1  -f/u Psych  -f/u psych about restarting desvenlafaxine -Patient with fellings of guilt, hopelessness, changes in appetite. loss of concentration. Patient also endorsing alcohol interfering with her day to day life as she has also lost her job due to this. Recently wrote a letter regarding her lifes refleciton and states that she hates her life right now. However, denies this being a suicide attempt and active ideation.   Hx of psych admissoins  Apr psych: high acute suicide risk at this time, 15 years ago and 2 months ago asked mother to assist with suicide, called mother 2 days ago saying she "just wants to die",     Plan  >consults: Psych  - c/w continuous observation 1:1 for suicidality/safety  - c.w pristiq 50 mg daily

## 2023-11-21 NOTE — PROGRESS NOTE ADULT - PROBLEM SELECTOR PLAN 3
Patient s/p ORIF, healing well. Not able to eat/swallow well 2/2 to pain from surgery    - f/u [facility] for bands  - f/u S&S Patient s/p ORIF, healing well. Not able to eat/swallow well 2/2 to pain from surgery  Apr [facility] eval, no indication for further bands at this time, patient stable    Plan  - restart diet   - patient has appt with [facility] on Monday for hardware removal

## 2023-11-21 NOTE — PROGRESS NOTE ADULT - SUBJECTIVE AND OBJECTIVE BOX
***************************************************************  Julián Gaspar, PGY1  Internal Medicine   TEAMS Preferred  ***************************************************************    AYLEEN DEY  48y  MRN: [id number]  11-20-23 (1d)    Patient is a 48y old  Female who presents with a chief complaint of Alcohol abuse (20 Nov 2023 07:09)      SUBJECTIVE / OVERNIGHT EVENTS:   No acute overnight events. Pt seen and examined at bedside. Denies fevers, chills, CP, SOB, Abdominal pain, N/V, Constipation, Diarrhea. Last BM     12 Point ROS negative with the exception of the above    MEDICATIONS  (STANDING):  desvenlafaxine ER 50 milliGRAM(s) Oral daily  enoxaparin Injectable 40 milliGRAM(s) SubCutaneous every 24 hours  folic acid 1 milliGRAM(s) Oral daily  lactated ringers. 1000 milliLiter(s) (100 mL/Hr) IV Continuous <Continuous>  LORazepam   Injectable   IV Push   LORazepam   Injectable 1.5 milliGRAM(s) IV Push every 4 hours  losartan 50 milliGRAM(s) Oral daily  multivitamin 1 Tablet(s) Oral daily  thiamine 100 milliGRAM(s) Oral daily  thiamine IVPB 500 milliGRAM(s) IV Intermittent three times a day    MEDICATIONS  (PRN):  acetaminophen     Tablet .. 650 milliGRAM(s) Oral every 6 hours PRN Temp greater or equal to 38C (100.4F), Mild Pain (1 - 3)  LORazepam   Injectable 2 milliGRAM(s) IV Push every 2 hours PRN CIWA-Ar score increase by 2 points and a total score of 7 or less  LORazepam   Injectable 2 milliGRAM(s) IV Push every 1 hour PRN CIWA-Ar score 8 or greater  melatonin 3 milliGRAM(s) Oral at bedtime PRN Insomnia  ondansetron Injectable 4 milliGRAM(s) IV Push every 8 hours PRN Nausea and/or Vomiting      OBJECTIVE:  Vital Signs Last 24 Hrs  T(C): 36.5 (21 Nov 2023 06:33), Max: 37.1 (21 Nov 2023 00:38)  T(F): 97.7 (21 Nov 2023 06:33), Max: 98.8 (21 Nov 2023 00:38)  HR: 88 (21 Nov 2023 06:33) (84 - 118)  BP: 128/104 (21 Nov 2023 06:33) (128/104 - 157/91)  BP(mean): --  RR: 18 (21 Nov 2023 06:33) (17 - 20)  SpO2: 98% (21 Nov 2023 06:33) (95% - 100%)    Parameters below as of 21 Nov 2023 06:33  Patient On (Oxygen Delivery Method): room air        I&O's Summary      PHYSICAL EXAM:  GENERAL: Laying comfortably, NAD  HEENT: NCAT, PERRLA, EOMI, no scleral icterus, no LAD  NECK: No JVD, supple  LUNG: CTABL; No wheezes, crackles, or rhonchi  HEART: RRR; normal S1/S2; No murmurs, rubs, or gallops  ABDOMEN: +BS, soft, nontender, nondistended, no HSM; No rebound, guarding, or rigidity  EXTREMITIES:  No LE edema b/l, 2+ Peripheral Pulses, No clubbing or cyanosis  NEUROLOGY: AOx3, non-focal, strength 5/5 in all extremities, sensation intact  PSYCH: calm and cooperative  SKIN: No rashes or lesions    LABS:                        9.5    4.65  )-----------( 226      ( 21 Nov 2023 06:55 )             30.7     Auto Eosinophil # x     / Auto Eosinophil % x     / Auto Neutrophil # x     / Auto Neutrophil % x     / BANDS % x                            9.0    5.90  )-----------( 252      ( 20 Nov 2023 05:45 )             29.4     Auto Eosinophil # x     / Auto Eosinophil % x     / Auto Neutrophil # x     / Auto Neutrophil % x     / BANDS % x                            11.4   8.30  )-----------( 312      ( 19 Nov 2023 15:30 )             38.2     Auto Eosinophil # 0.27  / Auto Eosinophil % 3.3   / Auto Neutrophil # 6.07  / Auto Neutrophil % 73.0  / BANDS % x        11-20    139  |  103  |  13  ----------------------------<  85  3.8   |  22  |  0.60  11-19    146<H>  |  104  |  11  ----------------------------<  101<H>  3.6   |  24  |  0.55    Ca    8.0<L>      20 Nov 2023 05:45  Ca    9.4      19 Nov 2023 15:30  Phos  2.1     11-20  Mg     1.40     11-20    TPro  6.7  /  Alb  3.7  /  TBili  0.3  /  DBili  x   /  AST  23  /  ALT  12  /  AlkPhos  54  11-20  TPro  8.7<H>  /  Alb  4.5  /  TBili  0.2  /  DBili  x   /  AST  30  /  ALT  12  /  AlkPhos  73  11-19      CARDIAC MARKERS ( 20 Nov 2023 16:40 )  x     / x     / 234 U/L / x     / x      CARDIAC MARKERS ( 20 Nov 2023 05:45 )  x     / x     / 193 U/L / x     / x          Urinalysis Basic - ( 20 Nov 2023 05:45 )    Color: x / Appearance: x / SG: x / pH: x  Gluc: 85 mg/dL / Ketone: x  / Bili: x / Urobili: x   Blood: x / Protein: x / Nitrite: x   Leuk Esterase: x / RBC: x / WBC x   Sq Epi: x / Non Sq Epi: x / Bacteria: x        Lactate, Blood: 1.9 mmol/L (11-20 @ 16:40)  Lactate, Blood: 6.7 mmol/L (11-19 @ 19:41)    CAPILLARY BLOOD GLUCOSE            RADIOLOGY & ADDITIONAL TESTS:     ***************************************************************  Julián Gaspar, PGY1  Internal Medicine   TEAMS Preferred  ***************************************************************    AYLEEN DEY  48y  MRN: [id number]  11-20-23 (1d)    Patient is a 48y old  Female who presents with a chief complaint of Alcohol abuse (20 Nov 2023 07:09)      SUBJECTIVE / OVERNIGHT EVENTS:   No acute overnight events. CIWA 5 -> 3 -> 2   Pt seen and examined at bedside. Says she is feeling much better and would like to advance her taper. Wants to be home for Thanksgiving.   Denies fevers, chills, CP, SOB, Abdominal pain, N/V, Constipation, Diarrhea. Last BM     12 Point ROS negative with the exception of the above    MEDICATIONS  (STANDING):  desvenlafaxine ER 50 milliGRAM(s) Oral daily  enoxaparin Injectable 40 milliGRAM(s) SubCutaneous every 24 hours  folic acid 1 milliGRAM(s) Oral daily  lactated ringers. 1000 milliLiter(s) (100 mL/Hr) IV Continuous <Continuous>  LORazepam   Injectable   IV Push   LORazepam   Injectable 1.5 milliGRAM(s) IV Push every 4 hours  losartan 50 milliGRAM(s) Oral daily  multivitamin 1 Tablet(s) Oral daily  thiamine 100 milliGRAM(s) Oral daily  thiamine IVPB 500 milliGRAM(s) IV Intermittent three times a day    MEDICATIONS  (PRN):  acetaminophen     Tablet .. 650 milliGRAM(s) Oral every 6 hours PRN Temp greater or equal to 38C (100.4F), Mild Pain (1 - 3)  LORazepam   Injectable 2 milliGRAM(s) IV Push every 2 hours PRN CIWA-Ar score increase by 2 points and a total score of 7 or less  LORazepam   Injectable 2 milliGRAM(s) IV Push every 1 hour PRN CIWA-Ar score 8 or greater  melatonin 3 milliGRAM(s) Oral at bedtime PRN Insomnia  ondansetron Injectable 4 milliGRAM(s) IV Push every 8 hours PRN Nausea and/or Vomiting      OBJECTIVE:  Vital Signs Last 24 Hrs  T(C): 36.5 (21 Nov 2023 06:33), Max: 37.1 (21 Nov 2023 00:38)  T(F): 97.7 (21 Nov 2023 06:33), Max: 98.8 (21 Nov 2023 00:38)  HR: 88 (21 Nov 2023 06:33) (84 - 118)  BP: 128/104 (21 Nov 2023 06:33) (128/104 - 157/91)  BP(mean): --  RR: 18 (21 Nov 2023 06:33) (17 - 20)  SpO2: 98% (21 Nov 2023 06:33) (95% - 100%)    Parameters below as of 21 Nov 2023 06:33  Patient On (Oxygen Delivery Method): room air        I&O's Summary      PHYSICAL EXAM:  GENERAL: Laying comfortably, NAD, anxious  HEENT: NCAT, PERRLA, EOMI, no scleral icterus, no LAD  LUNG: CTABL; No wheezes, crackles, or rhonchi  HEART: Tachycardic   ABDOMEN: +BS, soft, nontender, nondistended, no HSM; No rebound, guarding, or rigidity  EXTREMITIES:  No LE edema b/l, 2+ Peripheral Pulses, No clubbing or cyanosis  NEUROLOGY: AOx3, non-focal, no tremors noted on exam    LABS:                        9.5    4.65  )-----------( 226      ( 21 Nov 2023 06:55 )             30.7     Auto Eosinophil # x     / Auto Eosinophil % x     / Auto Neutrophil # x     / Auto Neutrophil % x     / BANDS % x                            9.0    5.90  )-----------( 252      ( 20 Nov 2023 05:45 )             29.4     Auto Eosinophil # x     / Auto Eosinophil % x     / Auto Neutrophil # x     / Auto Neutrophil % x     / BANDS % x                            11.4   8.30  )-----------( 312      ( 19 Nov 2023 15:30 )             38.2     Auto Eosinophil # 0.27  / Auto Eosinophil % 3.3   / Auto Neutrophil # 6.07  / Auto Neutrophil % 73.0  / BANDS % x        11-20    139  |  103  |  13  ----------------------------<  85  3.8   |  22  |  0.60  11-19    146<H>  |  104  |  11  ----------------------------<  101<H>  3.6   |  24  |  0.55    Ca    8.0<L>      20 Nov 2023 05:45  Ca    9.4      19 Nov 2023 15:30  Phos  2.1     11-20  Mg     1.40     11-20    TPro  6.7  /  Alb  3.7  /  TBili  0.3  /  DBili  x   /  AST  23  /  ALT  12  /  AlkPhos  54  11-20  TPro  8.7<H>  /  Alb  4.5  /  TBili  0.2  /  DBili  x   /  AST  30  /  ALT  12  /  AlkPhos  73  11-19      CARDIAC MARKERS ( 20 Nov 2023 16:40 )  x     / x     / 234 U/L / x     / x      CARDIAC MARKERS ( 20 Nov 2023 05:45 )  x     / x     / 193 U/L / x     / x          Urinalysis Basic - ( 20 Nov 2023 05:45 )    Color: x / Appearance: x / SG: x / pH: x  Gluc: 85 mg/dL / Ketone: x  / Bili: x / Urobili: x   Blood: x / Protein: x / Nitrite: x   Leuk Esterase: x / RBC: x / WBC x   Sq Epi: x / Non Sq Epi: x / Bacteria: x        Lactate, Blood: 1.9 mmol/L (11-20 @ 16:40)  Lactate, Blood: 6.7 mmol/L (11-19 @ 19:41)    CAPILLARY BLOOD GLUCOSE            RADIOLOGY & ADDITIONAL TESTS:

## 2023-11-21 NOTE — BH CONSULTATION LIAISON PROGRESS NOTE - NSBHASSESSMENTFT_PSY_ALL_CORE
Patient is a 48 years old female with a past hx of alcohol use disorder, with prior detox and rehab treatments, DTs, withdrawal seizures, has a psychiatrist, on prestiq 50 mg but unclear for what reason. medical hx of h/o alcoholic pancreatitis, chronic anemia, HTN presents after being found unresponsive next to alcohol bottles and ambien/oxycodon pills in her BP bottle. Psych was consulted for SI and etoh withdrawal. Patient is adamantly denies SI right now or in the past. Current presentation is most likely consistent with alcohol withdrawal and Mood disorder.    11/21: Patient alert and sitting up in bed. Pt expresses desire to go home for Thanksgiving. Pt "doesn't understand" how her note could be misconstrued and denies expressing suicidality to her mother. Multiple discrepancies between pt story and collateral information.      Recommendations:  CO 1:1 observation for suicidality/safety  Ativan Taper as per team. (Ativan has be increased by the primary medical team and the [facility] psychiatry agreed. Discussed with patient)  CIWA with ativan PRN as per CIWA  Thiamine 500 mg IV TID x 3 days for Wernicke prophylaxis  Can Continue with pristiq 50 mg daily   Check amylase, lipase  Continue to follow up with the pt. family  Patient is a 48 years old female with a past hx of alcohol use disorder, with prior detox and rehab treatments, DTs, withdrawal seizures, has a psychiatrist, on prestiq 50 mg but unclear for what reason. medical hx of h/o alcoholic pancreatitis, chronic anemia, HTN presents after being found unresponsive next to alcohol bottles and ambien/oxycodon pills in her BP bottle. Psych was consulted for SI and etoh withdrawal. Patient is adamantly denies SI right now or in the past. Current presentation is most likely consistent with alcohol withdrawal and Mood disorder.    11/21: Patient alert and sitting up in bed. Pt expresses desire to go home.  Denies current SI and HI. Pt denies expressing suicidality to her mother. Multiple discrepancies between pt story and collateral information.        Recommendations:  CO 1:1 observation for suicidality/safety  Ativan Taper as per team. (Ativan taper has been increased by the primary medical team and the  psychiatry agreed. Also discussed with patient)  CIWA with ativan PRN as per CIWA  Thiamine 500 mg IV TID x 3 days to prevent Wernicke encephalopathy  Can Continue with pristiq 50 mg daily   Check amylase, lipase (results reviewed)  Will obtain further collateral from family

## 2023-11-21 NOTE — BH CONSULTATION LIAISON PROGRESS NOTE - NSBHATTESTTYPEVISIT_PSY_A_CORE
On-site Attending with Resident/Fellow/Student and JOYCE (99XXX codes) Attending with Resident/Fellow/Student

## 2023-11-21 NOTE — PROGRESS NOTE ADULT - PROBLEM SELECTOR PLAN 5
-Lovenox 40  -Diet: Patient discharged on full liquid diet last time; will cont; f/u SLP eval. Stable since last time.  -GOC: full code -Lovenox 40  -Diet: Regular diet  -GOC: full code

## 2023-11-21 NOTE — SWALLOW BEDSIDE ASSESSMENT ADULT - COMMENTS
Progress Note- Internal Medicine 11/21: "46yo Female with hx of HTN, alcohol use disorder requiring hospitalization for withdrawal, h/o withdrawal  seizure appx 1 year ago, h/o alcoholic pancreatitis, chronic anemia presents after being found unresponsive next to alcohol bottles likely iso alcohol intoxication."     Patient seen awake/alert and oriented state during clinical swallow evaluation this PM. Patient adamantly denies difficulty swallowing and states she is eating softer foods given recent mandible fracture.

## 2023-11-21 NOTE — PROGRESS NOTE ADULT - ASSESSMENT
46yo Female with hx of HTN, alcohol use disorder requiring hospitalization for withdrawal, h/o withdrawal  seizure appx 1 year ago, h/o alcoholic pancreatitis, chronic anemia presents after being found unresponsive next to alcohol bottles likely iso alcohol intoxication

## 2023-11-21 NOTE — PROGRESS NOTE ADULT - PROBLEM SELECTOR PLAN 1
Patient with prior hospitalization for alcohol withdrawal. Also with recent jaw ORIF iso jaw fracture iso alcohol intoxication. Patient currently presents with a CIWA of 8, however still in the withdrawal window.  RUQ US negative for cirrhosis   Evidence of Rhabdo and low fluid state given high CK and lactate    Plan  -high risk ciwa   - Ativan taper  -aspiration precautions  -Vitals q4  - f/u lactate  - f/u Psych  - Thiamine, Folate, MV  - IV fluids  - SBIRT, SW consilt Patient with prior hospitalization for alcohol withdrawal. Also with recent jaw ORIF iso jaw fracture iso alcohol intoxication. Patient currently presents with a CIWA of 8, however still in the withdrawal window.  RUQ US negative for cirrhosis   Evidence of Rhabdo and low fluid state given high CK and lactate, improved this AM, amylase lipase neg  Per psych had hx of DTs in the past had multiple relapses with alcohol and sobriety periods for as long as 2 years, recently had issue with occupation and started to drink again    Plan  -high risk ciwa   - Ativan taper  - Thiamine, Folate, MV  - Thiamine 500 mg IV TID x 3 days for Wernicke prophylaxis  - IV fluids  - SBIRT, SW consilt

## 2023-11-21 NOTE — BH CONSULTATION LIAISON PROGRESS NOTE - OTHER
Pt seems defensive  Pt is preoccupied with discharge  superficial cooperative, minimizing improving tremors improved from yesterday limited fine

## 2023-11-21 NOTE — PROGRESS NOTE ADULT - ATTENDING COMMENTS
46 yo F w/ hx HTN, alcohol use hx hosp fo WD, hx WD seizure p/w alcohol intox now with alcohol WD. + prior Hx fall and mandibular fracture.     Alcohol WD- SBIRT c/s                   - Thiamine/MVI/folate                   - elevated lactate 3.9 c/w IVF and repeat lactate normal                    - + tongue fasciulation/ anxiety/HTN remains borderline tachycardic despite IVF                    -  2 days ago she cried while drunk on the phone saying that she " just wants to die."                   - c/w 1:1                   - d/w Psych Dr. Rojas discussed increase in ativan taper to 2mg   HTN- c/w losartan; Monitor BP   MDD and anxiety - c/w SSRI/ xanax hold for now;   mandibular fracture- regular diet and outpt hardware removal Monday

## 2023-11-21 NOTE — BH CONSULTATION LIAISON PROGRESS NOTE - NSBHFUPINTERVALHXFT_PSY_A_CORE
Chart reviewed. Ativan PRN but dose increased by primary medical team after our interview.   Patient VSS, AO x4, sitting up in bed.  Pt remembered speaking with the psychiatry team yesterday. Reports she has been feeling better and reports getting sleep "as good as you can get in the hospital". Pt was able to eat, drink juice and a whole pitcher of water. Pt. self reports that her mood is "improved" and she "does not understand how a letter that was supposed to be sweet" ended up with her in the hospital. 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 Patient insistently asked if the psychiatric team was the deciding factor in her release date and seems superficially in her attestation of mood improvement. When questioned about her expression of suicidality to her mother, patient denies any phone call and repeats "motivated people don't kill themselves". Patient denies experiencing any craving or symptoms of withdraw from alcohol. Pt. reports not contacting her family because she is not in possession of her phone.  Chart reviewed. On Ativan taper, but dose increased by primary medical team.   Patient AO x4, sitting up in bed.  Pt remembered speaking with the psychiatry team yesterday. Reports she has been feeling better and reports getting sleep "as good as you can get in the hospital". Pt was able to eat, drink juice and a whole pitcher of water. Pt. self reports that her mood is "improved" and she "does not understand how a letter that was supposed to be sweet" ended up with her in the hospital. 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 When questioned about her expression of suicidality to her mother, patient denies and repeats "motivated people don't kill themselves". Patient denies experiencing any craving or symptoms of withdraw from alcohol.

## 2023-11-21 NOTE — SWALLOW BEDSIDE ASSESSMENT ADULT - SWALLOW EVAL: DIAGNOSIS
1- Functional oral stage for regular solids and thin liquids marked by adequate oral containment, adequate bolus manipulation, adequate mastication for solids, adequate anterior to posterior transfer, adequate oral clearance. 2- Functional pharyngea stage for regular solids and thin liquids marked by initiation of the pharyngeal swallow with hyolaryngeal excursion upon palpation without evidence of impaired airway protection.

## 2023-11-22 PROCEDURE — 99232 SBSQ HOSP IP/OBS MODERATE 35: CPT | Mod: GC

## 2023-11-22 PROCEDURE — 99232 SBSQ HOSP IP/OBS MODERATE 35: CPT

## 2023-11-22 RX ORDER — AMLODIPINE BESYLATE 2.5 MG/1
5 TABLET ORAL DAILY
Refills: 0 | Status: DISCONTINUED | OUTPATIENT
Start: 2023-11-22 | End: 2023-11-23

## 2023-11-22 RX ADMIN — Medication 1 MILLIGRAM(S): at 13:25

## 2023-11-22 RX ADMIN — LOSARTAN POTASSIUM 50 MILLIGRAM(S): 100 TABLET, FILM COATED ORAL at 18:01

## 2023-11-22 RX ADMIN — DESVENLAFAXINE 50 MILLIGRAM(S): 50 TABLET, EXTENDED RELEASE ORAL at 13:24

## 2023-11-22 RX ADMIN — LOSARTAN POTASSIUM 50 MILLIGRAM(S): 100 TABLET, FILM COATED ORAL at 06:00

## 2023-11-22 RX ADMIN — AMLODIPINE BESYLATE 5 MILLIGRAM(S): 2.5 TABLET ORAL at 13:32

## 2023-11-22 RX ADMIN — Medication 1.5 MILLIGRAM(S): at 18:01

## 2023-11-22 RX ADMIN — Medication 2 MILLIGRAM(S): at 01:39

## 2023-11-22 RX ADMIN — Medication 100 MILLIGRAM(S): at 13:25

## 2023-11-22 RX ADMIN — ENOXAPARIN SODIUM 40 MILLIGRAM(S): 100 INJECTION SUBCUTANEOUS at 18:01

## 2023-11-22 RX ADMIN — Medication 2 MILLIGRAM(S): at 10:36

## 2023-11-22 RX ADMIN — Medication 1 TABLET(S): at 13:25

## 2023-11-22 RX ADMIN — Medication 1.5 MILLIGRAM(S): at 22:33

## 2023-11-22 RX ADMIN — Medication 1.5 MILLIGRAM(S): at 13:25

## 2023-11-22 RX ADMIN — Medication 2 MILLIGRAM(S): at 06:32

## 2023-11-22 NOTE — PROGRESS NOTE ADULT - SUBJECTIVE AND OBJECTIVE BOX
***************************************************************  Julián Gaspar, PGY1  Internal Medicine   TEAMS Preferred  ***************************************************************    AYLEEN DEY  48y  MRN: [id number]  11-20-23 (2d)    Patient is a 48y old  Female who presents with a chief complaint of Alcohol abuse (21 Nov 2023 07:29)      SUBJECTIVE / OVERNIGHT EVENTS:   No acute overnight events. Pt seen and examined at bedside. Denies fevers, chills, CP, SOB, Abdominal pain, N/V, Constipation, Diarrhea. Last BM     12 Point ROS negative with the exception of the above    MEDICATIONS  (STANDING):  desvenlafaxine ER 50 milliGRAM(s) Oral daily  enoxaparin Injectable 40 milliGRAM(s) SubCutaneous every 24 hours  folic acid 1 milliGRAM(s) Oral daily  LORazepam     Tablet 1.5 milliGRAM(s) Oral every 4 hours  LORazepam     Tablet 2 milliGRAM(s) Oral every 4 hours  LORazepam     Tablet   Oral   losartan 50 milliGRAM(s) Oral two times a day  multivitamin 1 Tablet(s) Oral daily  thiamine 100 milliGRAM(s) Oral daily  thiamine IVPB 500 milliGRAM(s) IV Intermittent three times a day    MEDICATIONS  (PRN):  acetaminophen     Tablet .. 650 milliGRAM(s) Oral every 6 hours PRN Temp greater or equal to 38C (100.4F), Mild Pain (1 - 3)  LORazepam     Tablet 2 milliGRAM(s) Oral every 2 hours PRN Symptom-triggered 2 point increase in CIWA-Ar  LORazepam     Tablet 4 milliGRAM(s) Oral every 1 hour PRN Symptom-triggered: each CIWA -Ar score 8 or GREATER  LORazepam   Injectable 2 milliGRAM(s) IV Push every 1 hour PRN CIWA-Ar score 8 or greater  melatonin 3 milliGRAM(s) Oral at bedtime PRN Insomnia  ondansetron Injectable 4 milliGRAM(s) IV Push every 8 hours PRN Nausea and/or Vomiting      OBJECTIVE:  Vital Signs Last 24 Hrs  T(C): 36.8 (22 Nov 2023 05:00), Max: 37.1 (21 Nov 2023 09:30)  T(F): 98.2 (22 Nov 2023 05:00), Max: 98.7 (21 Nov 2023 09:30)  HR: 81 (22 Nov 2023 05:00) (75 - 97)  BP: 146/97 (22 Nov 2023 05:00) (146/97 - 167/91)  BP(mean): --  RR: 18 (22 Nov 2023 05:00) (16 - 18)  SpO2: 98% (22 Nov 2023 05:00) (96% - 100%)    Parameters below as of 22 Nov 2023 05:00  Patient On (Oxygen Delivery Method): room air        I&O's Summary      PHYSICAL EXAM:  GENERAL: Laying comfortably, NAD  HEENT: NCAT, PERRLA, EOMI, no scleral icterus, no LAD  NECK: No JVD, supple  LUNG: CTABL; No wheezes, crackles, or rhonchi  HEART: RRR; normal S1/S2; No murmurs, rubs, or gallops  ABDOMEN: +BS, soft, nontender, nondistended, no HSM; No rebound, guarding, or rigidity  EXTREMITIES:  No LE edema b/l, 2+ Peripheral Pulses, No clubbing or cyanosis  NEUROLOGY: AOx3, non-focal, strength 5/5 in all extremities, sensation intact  PSYCH: calm and cooperative  SKIN: No rashes or lesions    LABS:                        9.5    4.65  )-----------( 226      ( 21 Nov 2023 06:55 )             30.7     Auto Eosinophil # 0.22  / Auto Eosinophil % 4.7   / Auto Neutrophil # 3.14  / Auto Neutrophil % 67.6  / BANDS % x        11-21    137  |  100  |  9   ----------------------------<  103<H>  3.8   |  27  |  0.61  11-20    139  |  103  |  13  ----------------------------<  85  3.8   |  22  |  0.60  11-19    146<H>  |  104  |  11  ----------------------------<  101<H>  3.6   |  24  |  0.55    Ca    9.9      21 Nov 2023 06:55  Ca    8.0<L>      20 Nov 2023 05:45  Ca    9.4      19 Nov 2023 15:30  Phos  3.3     11-21  Mg     1.90     11-21    TPro  8.3  /  Alb  4.4  /  TBili  0.9  /  DBili  x   /  AST  32  /  ALT  15  /  AlkPhos  74  11-21  TPro  6.7  /  Alb  3.7  /  TBili  0.3  /  DBili  x   /  AST  23  /  ALT  12  /  AlkPhos  54  11-20  TPro  8.7<H>  /  Alb  4.5  /  TBili  0.2  /  DBili  x   /  AST  30  /  ALT  12  /  AlkPhos  73  11-19      CARDIAC MARKERS ( 21 Nov 2023 06:55 )  x     / x     / 226 U/L / x     / x      CARDIAC MARKERS ( 20 Nov 2023 16:40 )  x     / x     / 234 U/L / x     / x          Urinalysis Basic - ( 21 Nov 2023 06:55 )    Color: x / Appearance: x / SG: x / pH: x  Gluc: 103 mg/dL / Ketone: x  / Bili: x / Urobili: x   Blood: x / Protein: x / Nitrite: x   Leuk Esterase: x / RBC: x / WBC x   Sq Epi: x / Non Sq Epi: x / Bacteria: x        Lactate, Blood: 1.9 mmol/L (11-20 @ 16:40)  Lactate, Blood: 6.7 mmol/L (11-19 @ 19:41)    CAPILLARY BLOOD GLUCOSE            RADIOLOGY & ADDITIONAL TESTS:     ***************************************************************  Julián Gaspar, PGY1  Internal Medicine   TEAMS Preferred  ***************************************************************    ALYEEN DEY  48y  MRN: [id number]  11-20-23 (2d)    Patient is a 48y old  Female who presents with a chief complaint of Alcohol abuse (21 Nov 2023 07:29)      SUBJECTIVE / OVERNIGHT EVENTS:   No acute overnight events. CIWA 2 -> 1, no additional ativan use. IV infiltrated and patient refused new IV placement. Changed to PO regimen. Discussed with mother yesterday who felt very concerned that patient acts wreckless and does not have any regard for her own safety. Has had an emotional event due to issues at her job after which started drinking again. Mother concerned that patient Psychiatrist does not take part in her care adequately and is requesting a different psychiatrist.  Pt seen and examined at bedside. Upset at misunderstanding that she wrote letter to hurt herself. Stated that she would never hurt herself and loves life and wrote letter as a positive affirmation about the new steps in her life including a surprise new job she will be starting next week. Requesting to leave by Friday.  Denies fevers, chills, CP, SOB, Abdominal pain, N/V, Constipation, Diarrhea.      12 Point ROS negative with the exception of the above    MEDICATIONS  (STANDING):  desvenlafaxine ER 50 milliGRAM(s) Oral daily  enoxaparin Injectable 40 milliGRAM(s) SubCutaneous every 24 hours  folic acid 1 milliGRAM(s) Oral daily  LORazepam     Tablet 1.5 milliGRAM(s) Oral every 4 hours  LORazepam     Tablet 2 milliGRAM(s) Oral every 4 hours  LORazepam     Tablet   Oral   losartan 50 milliGRAM(s) Oral two times a day  multivitamin 1 Tablet(s) Oral daily  thiamine 100 milliGRAM(s) Oral daily  thiamine IVPB 500 milliGRAM(s) IV Intermittent three times a day    MEDICATIONS  (PRN):  acetaminophen     Tablet .. 650 milliGRAM(s) Oral every 6 hours PRN Temp greater or equal to 38C (100.4F), Mild Pain (1 - 3)  LORazepam     Tablet 2 milliGRAM(s) Oral every 2 hours PRN Symptom-triggered 2 point increase in CIWA-Ar  LORazepam     Tablet 4 milliGRAM(s) Oral every 1 hour PRN Symptom-triggered: each CIWA -Ar score 8 or GREATER  LORazepam   Injectable 2 milliGRAM(s) IV Push every 1 hour PRN CIWA-Ar score 8 or greater  melatonin 3 milliGRAM(s) Oral at bedtime PRN Insomnia  ondansetron Injectable 4 milliGRAM(s) IV Push every 8 hours PRN Nausea and/or Vomiting      OBJECTIVE:  Vital Signs Last 24 Hrs  T(C): 36.8 (22 Nov 2023 05:00), Max: 37.1 (21 Nov 2023 09:30)  T(F): 98.2 (22 Nov 2023 05:00), Max: 98.7 (21 Nov 2023 09:30)  HR: 81 (22 Nov 2023 05:00) (75 - 97)  BP: 146/97 (22 Nov 2023 05:00) (146/97 - 167/91)  BP(mean): --  RR: 18 (22 Nov 2023 05:00) (16 - 18)  SpO2: 98% (22 Nov 2023 05:00) (96% - 100%)    Parameters below as of 22 Nov 2023 05:00  Patient On (Oxygen Delivery Method): room air        I&O's Summary      PHYSICAL EXAM:  GENERAL: Laying comfortably, NAD  HEENT: NCAT, PERRLA, EOMI, no scleral icterus, no LAD  LUNG: CTABL; No wheezes, crackles, or rhonchi  HEART: RRR; normal S1/S2; No murmurs, rubs, or gallops  ABDOMEN: +BS, soft, nontender, nondistended, no HSM; No rebound, guarding, or rigidity  EXTREMITIES:  No LE edema b/l, 2+ Peripheral Pulses, No clubbing or cyanosis  NEUROLOGY: AOx3, non-focal, no tremors noted, no nystagmus    LABS:                        9.5    4.65  )-----------( 226      ( 21 Nov 2023 06:55 )             30.7     Auto Eosinophil # 0.22  / Auto Eosinophil % 4.7   / Auto Neutrophil # 3.14  / Auto Neutrophil % 67.6  / BANDS % x        11-21    137  |  100  |  9   ----------------------------<  103<H>  3.8   |  27  |  0.61  11-20    139  |  103  |  13  ----------------------------<  85  3.8   |  22  |  0.60  11-19    146<H>  |  104  |  11  ----------------------------<  101<H>  3.6   |  24  |  0.55    Ca    9.9      21 Nov 2023 06:55  Ca    8.0<L>      20 Nov 2023 05:45  Ca    9.4      19 Nov 2023 15:30  Phos  3.3     11-21  Mg     1.90     11-21    TPro  8.3  /  Alb  4.4  /  TBili  0.9  /  DBili  x   /  AST  32  /  ALT  15  /  AlkPhos  74  11-21  TPro  6.7  /  Alb  3.7  /  TBili  0.3  /  DBili  x   /  AST  23  /  ALT  12  /  AlkPhos  54  11-20  TPro  8.7<H>  /  Alb  4.5  /  TBili  0.2  /  DBili  x   /  AST  30  /  ALT  12  /  AlkPhos  73  11-19      CARDIAC MARKERS ( 21 Nov 2023 06:55 )  x     / x     / 226 U/L / x     / x      CARDIAC MARKERS ( 20 Nov 2023 16:40 )  x     / x     / 234 U/L / x     / x          Urinalysis Basic - ( 21 Nov 2023 06:55 )    Color: x / Appearance: x / SG: x / pH: x  Gluc: 103 mg/dL / Ketone: x  / Bili: x / Urobili: x   Blood: x / Protein: x / Nitrite: x   Leuk Esterase: x / RBC: x / WBC x   Sq Epi: x / Non Sq Epi: x / Bacteria: x        Lactate, Blood: 1.9 mmol/L (11-20 @ 16:40)  Lactate, Blood: 6.7 mmol/L (11-19 @ 19:41)    CAPILLARY BLOOD GLUCOSE            RADIOLOGY & ADDITIONAL TESTS:     ***************************************************************  Julián Gaspar, PGY1  Internal Medicine   TEAMS Preferred  ***************************************************************    AYLEEN DEY  48y  MRN: [id number]  11-20-23 (2d)    Patient is a 48y old  Female who presents with a chief complaint of Alcohol abuse (21 Nov 2023 07:29)      SUBJECTIVE / OVERNIGHT EVENTS:   No acute overnight events. CIWA 2 -> 1, no additional ativan use. IV infiltrated and patient refused new IV placement. Changed to PO regimen. Discussed with mother yesterday who felt very concerned that patient acts reckless and does not have any regard for her own safety. Has had an emotional event due to issues at her job after which started drinking again. Mother concerned that patient Psychiatrist does not take part in her care adequately and is requesting a different psychiatrist.  Pt seen and examined at bedside. Upset at misunderstanding that she wrote letter to hurt herself. Stated that she would never hurt herself and loves life and wrote letter as a positive affirmation about the new steps in her life including a surprise new job she will be starting next week. Requesting to leave by Friday.  Denies fevers, chills, CP, SOB, Abdominal pain, N/V, Constipation, Diarrhea.      12 Point ROS negative with the exception of the above    MEDICATIONS  (STANDING):  desvenlafaxine ER 50 milliGRAM(s) Oral daily  enoxaparin Injectable 40 milliGRAM(s) SubCutaneous every 24 hours  folic acid 1 milliGRAM(s) Oral daily  LORazepam     Tablet 1.5 milliGRAM(s) Oral every 4 hours  LORazepam     Tablet 2 milliGRAM(s) Oral every 4 hours  LORazepam     Tablet   Oral   losartan 50 milliGRAM(s) Oral two times a day  multivitamin 1 Tablet(s) Oral daily  thiamine 100 milliGRAM(s) Oral daily  thiamine IVPB 500 milliGRAM(s) IV Intermittent three times a day    MEDICATIONS  (PRN):  acetaminophen     Tablet .. 650 milliGRAM(s) Oral every 6 hours PRN Temp greater or equal to 38C (100.4F), Mild Pain (1 - 3)  LORazepam     Tablet 2 milliGRAM(s) Oral every 2 hours PRN Symptom-triggered 2 point increase in CIWA-Ar  LORazepam     Tablet 4 milliGRAM(s) Oral every 1 hour PRN Symptom-triggered: each CIWA -Ar score 8 or GREATER  LORazepam   Injectable 2 milliGRAM(s) IV Push every 1 hour PRN CIWA-Ar score 8 or greater  melatonin 3 milliGRAM(s) Oral at bedtime PRN Insomnia  ondansetron Injectable 4 milliGRAM(s) IV Push every 8 hours PRN Nausea and/or Vomiting      OBJECTIVE:  Vital Signs Last 24 Hrs  T(C): 36.8 (22 Nov 2023 05:00), Max: 37.1 (21 Nov 2023 09:30)  T(F): 98.2 (22 Nov 2023 05:00), Max: 98.7 (21 Nov 2023 09:30)  HR: 81 (22 Nov 2023 05:00) (75 - 97)  BP: 146/97 (22 Nov 2023 05:00) (146/97 - 167/91)  BP(mean): --  RR: 18 (22 Nov 2023 05:00) (16 - 18)  SpO2: 98% (22 Nov 2023 05:00) (96% - 100%)    Parameters below as of 22 Nov 2023 05:00  Patient On (Oxygen Delivery Method): room air        I&O's Summary      PHYSICAL EXAM:  GENERAL: Laying comfortably, NAD  HEENT: NCAT, PERRLA, EOMI, no scleral icterus, no LAD  LUNG: CTABL; No wheezes, crackles, or rhonchi  HEART: RRR; normal S1/S2; No murmurs, rubs, or gallops  ABDOMEN: +BS, soft, nontender, nondistended, no HSM; No rebound, guarding, or rigidity  EXTREMITIES:  No LE edema b/l, 2+ Peripheral Pulses, No clubbing or cyanosis  NEUROLOGY: AOx3, non-focal, no tremors noted, no nystagmus    LABS:                        9.5    4.65  )-----------( 226      ( 21 Nov 2023 06:55 )             30.7     Auto Eosinophil # 0.22  / Auto Eosinophil % 4.7   / Auto Neutrophil # 3.14  / Auto Neutrophil % 67.6  / BANDS % x        11-21    137  |  100  |  9   ----------------------------<  103<H>  3.8   |  27  |  0.61  11-20    139  |  103  |  13  ----------------------------<  85  3.8   |  22  |  0.60  11-19    146<H>  |  104  |  11  ----------------------------<  101<H>  3.6   |  24  |  0.55    Ca    9.9      21 Nov 2023 06:55  Ca    8.0<L>      20 Nov 2023 05:45  Ca    9.4      19 Nov 2023 15:30  Phos  3.3     11-21  Mg     1.90     11-21    TPro  8.3  /  Alb  4.4  /  TBili  0.9  /  DBili  x   /  AST  32  /  ALT  15  /  AlkPhos  74  11-21  TPro  6.7  /  Alb  3.7  /  TBili  0.3  /  DBili  x   /  AST  23  /  ALT  12  /  AlkPhos  54  11-20  TPro  8.7<H>  /  Alb  4.5  /  TBili  0.2  /  DBili  x   /  AST  30  /  ALT  12  /  AlkPhos  73  11-19      CARDIAC MARKERS ( 21 Nov 2023 06:55 )  x     / x     / 226 U/L / x     / x      CARDIAC MARKERS ( 20 Nov 2023 16:40 )  x     / x     / 234 U/L / x     / x          Urinalysis Basic - ( 21 Nov 2023 06:55 )    Color: x / Appearance: x / SG: x / pH: x  Gluc: 103 mg/dL / Ketone: x  / Bili: x / Urobili: x   Blood: x / Protein: x / Nitrite: x   Leuk Esterase: x / RBC: x / WBC x   Sq Epi: x / Non Sq Epi: x / Bacteria: x        Lactate, Blood: 1.9 mmol/L (11-20 @ 16:40)  Lactate, Blood: 6.7 mmol/L (11-19 @ 19:41)    CAPILLARY BLOOD GLUCOSE            RADIOLOGY & ADDITIONAL TESTS:

## 2023-11-22 NOTE — PROGRESS NOTE ADULT - ASSESSMENT
46yo Female with hx of HTN, alcohol use disorder requiring hospitalization for withdrawal, h/o withdrawal  seizure appx 1 year ago, h/o alcoholic pancreatitis, chronic anemia presents after being found unresponsive next to alcohol bottles likely iso alcohol intoxication 46yo Female with hx of HTN, alcohol use disorder requiring hospitalization for withdrawal, h/o withdrawal  seizure appx 1 year ago, h/o alcoholic pancreatitis, chronic anemia presents after being found unresponsive next to alcohol bottles likely iso alcohol intoxication. Also noted to have written note concerning for suicidality

## 2023-11-22 NOTE — SBIRT NOTE ADULT - NSSBIRTALCPASSREFTXDET_GEN_A_CORE
Remote SBIRT resource provided to pt. Pt declined active referral to treatment, stating that her AA sponsor is currently working on connecting her to [facility] program, [facility] ([address] [place] [address]), for admission next week. Pt stated program would be 3hrs a day x 3 days per week.

## 2023-11-22 NOTE — BH CONSULTATION LIAISON PROGRESS NOTE - NSBHFUPINTERVALHXFT_PSY_A_CORE
Chart reviewed. On Ativan taper, but dose increased by primary medical team.   Patient AO x4, woken up for the interview. She reported that she had hard time sleeping. BP is still elevated. However, patient is calm, pleasant, no tremors appreciated, denied headache, nausea, vomiting. Mild tongue fasciculations. Adamantly denies suicidal ideations now or in the past, "never in my life". Reported that prestiq was prescribed to her after escitalopram that " did not do anything" for depression, evasive about experience of depression. However, patient is taking her medications regularly. 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 She was able to identify reasons for living and stated that her " live is pretty good". She also reported that during her 6 years of sobriety she experienced less emotional " ups and downs" and felt more emotionally stable.    Attempted to reach her mother, but she was not available, left a VM. Chart reviewed. On Ativan taper, but dose increased by primary medical team yesterday.   Patient AO x4, woken up for the interview. She reported that she had hard time sleeping. BP is still elevated. However, patient is calm, pleasant, no tremors appreciated, denied headache, nausea, vomiting. Mild tongue fasciculations. Adamantly denies suicidal ideations now or in the past, "never in my life". Reported that pristiq was prescribed to her after escitalopram that " did not do anything" for depression, evasive about experience of depression. However, patient is taking her medications regularly. 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 She was able to identify reasons for living and stated that her " life is pretty good, I want to live for my nephew". She also reported that during her 6 years of sobriety she experienced less emotional " ups and downs" and felt more emotionally stable. Denies HI, denies AVH    Attempted to reach her mother, but she was not available, left a VM.

## 2023-11-22 NOTE — PROGRESS NOTE ADULT - PROBLEM SELECTOR PLAN 3
Patient s/p ORIF, healing well. Not able to eat/swallow well 2/2 to pain from surgery  Apr [facility] eval, no indication for further bands at this time, patient stable    Plan  - restart diet   - patient has appt with [facility] on Monday for hardware removal

## 2023-11-22 NOTE — PROGRESS NOTE ADULT - PROBLEM SELECTOR PLAN 2
-Patient with fellings of guilt, hopelessness, changes in appetite. loss of concentration. Patient also endorsing alcohol interfering with her day to day life as she has also lost her job due to this. Recently wrote a letter regarding her lifes refleciton and states that she hates her life right now. However, denies this being a suicide attempt and active ideation.   Hx of psych admissoins  Apr psych: high acute suicide risk at this time, 15 years ago and 2 months ago asked mother to assist with suicide, called mother 2 days ago saying she "just wants to die",     Plan  >consults: Psych  - c/w continuous observation 1:1 for suicidality/safety  - c.w pristiq 50 mg daily -Patient with fellings of guilt, hopelessness, changes in appetite. loss of concentration. Patient also endorsing alcohol interfering with her day to day life as she has also lost her job due to this. Recently wrote a letter regarding her lifes refleciton and states that she hates her life right now. However, denies this being a suicide attempt and active ideation. Hx of psych admissoins  Apr psych: high acute suicide risk at this time, 15 years ago and 2 months ago asked mother to assist with suicide, called mother 2 days ago saying she "just wants to die"  Denies any suicidal ideation or thoughts  Psych concerned for substance induced mood disorder    Plan  >consults: Psych  - c/w continuous observation 1:1 for suicidality/safety  - c.w pristiq 50 mg daily

## 2023-11-22 NOTE — PROGRESS NOTE ADULT - PROBLEM SELECTOR PLAN 1
Patient with prior hospitalization for alcohol withdrawal. Also with recent jaw ORIF iso jaw fracture iso alcohol intoxication. Patient currently presents with a CIWA of 8, however still in the withdrawal window.  RUQ US negative for cirrhosis   Evidence of Rhabdo and low fluid state given high CK and lactate, improved this AM, amylase lipase neg  Per psych had hx of DTs in the past had multiple relapses with alcohol and sobriety periods for as long as 2 years, recently had issue with occupation and started to drink again    Plan  -high risk ciwa   - Ativan taper  - Thiamine, Folate, MV  - Thiamine 500 mg IV TID x 3 days for Wernicke prophylaxis  - IV fluids  - SBIRT, SW consilt

## 2023-11-22 NOTE — PROGRESS NOTE ADULT - PROBLEM SELECTOR PLAN 4
-Patient on losartan 50 at home  - losartan 50 bid -Patient on losartan 50 at home  - losartan 50 bid  - amlodipine 10mg

## 2023-11-22 NOTE — SBIRT NOTE ADULT - NSSBIRTDRGBRIEFINTDET_GEN_A_CORE
SW discussed with pt the risks of utilizing non-prescription drugs. SW advised pt to remain apprised of her body's signals and reactions when utilizing non-prescription drugs.

## 2023-11-22 NOTE — BH CONSULTATION LIAISON PROGRESS NOTE - NSBHASSESSMENTFT_PSY_ALL_CORE
Patient is a 48 years old female with a past hx of alcohol use disorder, with prior detox and rehab treatments, DTs, withdrawal seizures, has a psychiatrist, on prestiq 50 mg but unclear for what reason. medical hx of h/o alcoholic pancreatitis, chronic anemia, HTN presents after being found unresponsive next to alcohol bottles and ambien/oxycodon pills in her BP bottle. Psych was consulted for SI and etoh withdrawal. Patient is adamantly denies SI right now or in the past. Current presentation is most likely consistent with alcohol withdrawal and Mood disorder.    11/21: Patient alert and sitting up in bed. Pt expresses desire to go home.  Denies current SI and HI. Pt denies expressing suicidality to her mother. Multiple discrepancies between pt story and collateral information.   11/22: Patient is improving, more calm, less withdrawal symptoms, however, there is still autonomic instability, tongue fasciculations, poor sleep. Patient has been denying SI since the day she got here. Identifies reasons for living, future oriented, seeking help with alcohol dependance treatment. At this time given rapid improvement in her mood symptoms, hx of being more emotionally stable when sober makes us think about the potential diagnosis of substance induced mood disorder.       Recommendations:  CO 1:1 observation for elopement  Ativan Taper as per team. (Ativan taper has been increased by the primary medical team and the  psychiatry agreed. Also discussed with patient)  CIWA with ativan PRN as per CIWA  Thiamine 500 mg IV TID x 3 days to prevent Wernicke encephalopathy  Can Continue with pristiq 50 mg daily   Will obtain further collateral from family  Patient is a 48 years old female with a past hx of alcohol use disorder, with prior detox and rehab treatments, DTs, withdrawal seizures, has a psychiatrist, on prestiq 50 mg but unclear for what reason. medical hx of h/o alcoholic pancreatitis, chronic anemia, HTN presents after being found unresponsive next to alcohol bottles and ambien/oxycodon pills in her BP bottle. Psych was consulted for SI and etoh withdrawal. Patient is adamantly denies SI right now or in the past. Current presentation is most likely consistent with alcohol withdrawal and Mood disorder.    11/21: Patient alert and sitting up in bed. Pt expresses desire to go home.  Denies current SI and HI. Pt denies expressing suicidality to her mother. Multiple discrepancies between pt story and collateral information.   11/22: Patient is improving, more calm, less withdrawal symptoms, however, there is still autonomic instability, tongue fasciculations, poor sleep. Patient has been denying SIIP since the day she got here. Identifies reasons for living, future oriented, seeking help with alcohol dependance treatment. Patient is amenable to follow up with her outpatient psychiatrist Dr. Gitlin. At this time given rapid improvement in her mood symptoms, hx of being more emotionally stable when sober makes us think about the potential diagnosis of substance induced mood disorder.     Recommendations:  CO 1:1 observation for elopement risk, No si or hi, future oriented  Ativan Taper as per team. (Ativan taper has been increased by the primary medical team yesterday and the  psychiatry agreed. Also discussed with patient)  CIWA with ativan PRN as per CIWA  Thiamine 500 mg IV TID x 3 days to prevent Wernicke encephalopathy  Can Continue with pristiq 50 mg daily   Will obtain further collateral from family,  attempted to reach her mother, but she was not available, left a VM. Patient is a 48 years old female with a past hx of alcohol use disorder, with prior detox and rehab treatments, DTs, withdrawal seizures, has a psychiatrist, on prestiq 50 mg but unclear for what reason. medical hx of h/o alcoholic pancreatitis, chronic anemia, HTN presents after being found unresponsive next to alcohol bottles and ambien/oxycodon pills in her BP bottle. Psych was consulted for SI and etoh withdrawal. Patient is adamantly denies SI right now or in the past. Current presentation is most likely consistent with alcohol withdrawal and Mood disorder.    11/21: Patient alert and sitting up in bed. Pt expresses desire to go home.  Denies current SI and HI. Pt denies expressing suicidality to her mother. Multiple discrepancies between pt story and collateral information.   11/22: Patient is improving, more calm, less withdrawal symptoms, however, there is still autonomic instability, tongue fasciculations, poor sleep. Patient has been denying SIIP since the day she got here. Identifies reasons for living, future oriented, seeking help with alcohol dependance treatment. Patient is amenable to follow up with her outpatient psychiatrist Dr. Gitlin. At this time given rapid improvement in her mood symptoms, hx of being more emotionally stable when sober makes us think about the potential diagnosis of substance induced mood disorder. Patient is not able to appreciate the consequences of not completing her withdrawal treatment, minimizing her symptoms and inquiring about "speeding up the taper".     Recommendations:  CO 1:1 observation for elopement risk, No si or hi, future oriented, no capacity to leave AMA at this time.  Ativan Taper as per team. (Ativan taper has been increased by the primary medical team yesterday and the  psychiatry agreed. Also discussed with patient)  CIWA with ativan PRN as per CIWA  Thiamine 500 mg IV TID x 3 days to prevent Wernicke encephalopathy  Can Continue with Pristiq 50 mg daily   Will obtain further collateral from family,  attempted to reach her mother, but she was not available, left a .  Dispo: pending. will have to re-assess the patient and discuss with the family.

## 2023-11-22 NOTE — SBIRT NOTE ADULT - NSSBIRTOFTENALCOHOL_GEN_A_CORE
Pt states she has on and off bouts of sobriety and binge-drinks once/twice before re-attempting sobriety./Monthly or less

## 2023-11-22 NOTE — PROGRESS NOTE ADULT - ATTENDING COMMENTS
48 yo F w/ hx HTN, alcohol use hx hosp fo WD, hx WD seizure p/w alcohol intox now with alcohol WD. + prior Hx fall and mandibular fracture.     Alcohol WD- SBIRT c/s                   - Thiamine/MVI/folate                   - elevated lactate 3.9 c/w IVF and repeat lactate normal                     - 2 days ago she cried while drunk on the phone saying that she " just wants to die."; currently denies SI/HI                    - c/w 1:1;  CIWA improved now with CIWA 1-2                   - f/u psych recs   HTN- losartan 100 mg PO qd; BP still slightly elevated add norvasc   MDD and anxiety - c/w SSRI/ xanax hold for now;   mandibular fracture- regular diet and outpt hardware removal Monday

## 2023-11-23 PROCEDURE — 99232 SBSQ HOSP IP/OBS MODERATE 35: CPT | Mod: GC

## 2023-11-23 RX ORDER — LABETALOL HCL 100 MG
100 TABLET ORAL EVERY 8 HOURS
Refills: 0 | Status: DISCONTINUED | OUTPATIENT
Start: 2023-11-23 | End: 2023-11-24

## 2023-11-23 RX ORDER — LABETALOL HCL 100 MG
100 TABLET ORAL EVERY 8 HOURS
Refills: 0 | Status: DISCONTINUED | OUTPATIENT
Start: 2023-11-23 | End: 2023-11-23

## 2023-11-23 RX ORDER — THIAMINE MONONITRATE (VIT B1) 100 MG
100 TABLET ORAL DAILY
Refills: 0 | Status: DISCONTINUED | OUTPATIENT
Start: 2023-11-23 | End: 2023-11-25

## 2023-11-23 RX ORDER — AMLODIPINE BESYLATE 2.5 MG/1
10 TABLET ORAL DAILY
Refills: 0 | Status: DISCONTINUED | OUTPATIENT
Start: 2023-11-23 | End: 2023-11-25

## 2023-11-23 RX ORDER — LOSARTAN POTASSIUM 100 MG/1
100 TABLET, FILM COATED ORAL DAILY
Refills: 0 | Status: DISCONTINUED | OUTPATIENT
Start: 2023-11-23 | End: 2023-11-25

## 2023-11-23 RX ADMIN — Medication 100 MILLIGRAM(S): at 22:13

## 2023-11-23 RX ADMIN — Medication 1 MILLIGRAM(S): at 22:13

## 2023-11-23 RX ADMIN — Medication 100 MILLIGRAM(S): at 10:42

## 2023-11-23 RX ADMIN — Medication 1.5 MILLIGRAM(S): at 10:42

## 2023-11-23 RX ADMIN — LOSARTAN POTASSIUM 50 MILLIGRAM(S): 100 TABLET, FILM COATED ORAL at 06:11

## 2023-11-23 RX ADMIN — Medication 100 MILLIGRAM(S): at 13:44

## 2023-11-23 RX ADMIN — Medication 1.5 MILLIGRAM(S): at 06:12

## 2023-11-23 RX ADMIN — AMLODIPINE BESYLATE 5 MILLIGRAM(S): 2.5 TABLET ORAL at 06:11

## 2023-11-23 RX ADMIN — Medication 1 MILLIGRAM(S): at 14:46

## 2023-11-23 RX ADMIN — Medication 1 TABLET(S): at 10:42

## 2023-11-23 RX ADMIN — ENOXAPARIN SODIUM 40 MILLIGRAM(S): 100 INJECTION SUBCUTANEOUS at 18:40

## 2023-11-23 RX ADMIN — Medication 1 MILLIGRAM(S): at 10:42

## 2023-11-23 RX ADMIN — DESVENLAFAXINE 50 MILLIGRAM(S): 50 TABLET, EXTENDED RELEASE ORAL at 10:42

## 2023-11-23 RX ADMIN — Medication 1.5 MILLIGRAM(S): at 02:49

## 2023-11-23 RX ADMIN — Medication 1 MILLIGRAM(S): at 18:39

## 2023-11-23 NOTE — PROGRESS NOTE ADULT - PROBLEM SELECTOR PLAN 2
-Patient with fellings of guilt, hopelessness, changes in appetite. loss of concentration. Patient also endorsing alcohol interfering with her day to day life as she has also lost her job due to this. Recently wrote a letter regarding her lifes refleciton and states that she hates her life right now. However, denies this being a suicide attempt and active ideation. Hx of psych admissoins  Apr psych: high acute suicide risk at this time, 15 years ago and 2 months ago asked mother to assist with suicide, called mother 2 days ago saying she "just wants to die"  Denies any suicidal ideation or thoughts  Psych concerned for substance induced mood disorder    Plan  >consults: Psych  - c/w continuous observation 1:1 for elopement   - c.w pristiq 50 mg daily

## 2023-11-23 NOTE — PROGRESS NOTE ADULT - ATTENDING COMMENTS
48 yo F w/ hx HTN, alcohol use hx hosp fo WD, hx WD seizure p/w alcohol intox now with alcohol WD. + prior Hx fall and mandibular fracture.     Alcohol WD- SBIRT c/s- done                    - Thiamine/MVI/folate                   - elevated lactate 3.9 c/w IVF and repeat lactate normal                     - 2 days ago she cried while drunk on the phone saying that she " just wants to die."; currently denies SI/HI                    - c/w 1:1;  CIWA improved                    - no capacity to leave AMA                   - f/u BMP                    - f/u psych recs   HTN- losartan 100 mg PO qd; BP elevated; increased norvasc; add labetalol 100 TID  MDD and anxiety - c/w SSRI/ xanax hold for now;   mandibular fracture- regular diet and outpt hardware removal Monday 48 yo F w/ hx HTN, alcohol use hx hosp for WD, hx WD seizure p/w alcohol intox now with alcohol WD. + prior Hx fall and mandibular fracture. + SI while intoxicated    Alcohol WD- SBIRT c/s- done decline referral to active treatment                    - Thiamine/MVI/folate                   - elevated lactate 3.9 s/p IVF and repeat lactate normal                    - c/w 1:1;  CIWA improved                    - no capacity to leave AMA                   - f/u BMP monitor Cr/Mg/Phos                   - f/u psych recs   HTN- losartan 100 mg PO qd; BP elevated off fluids; increased norvasc; add labetalol 100 TID  MDD and anxiety - c/w SSRI/ xanax hold for now;   mandibular fracture- regular diet and outpt hardware removal Monday

## 2023-11-23 NOTE — PROVIDER CONTACT NOTE (OTHER) - ASSESSMENT
Patient is alert and oriented X4. . No acute distress noted.
Patient is alert and oriented X4. . No acute distress noted.
Patient is alert and oriented X4. Patient refusing morning blood work at this time, wants to do with IV access placement.
Patient is alert and oriented. Patient currently does not have IV access. Day team providers were to put US guided IV access, but were unable to. Changed ativan taper IVP to oral. However patient has thiamine IVPG.
pt is alert and oriented. she is asymptomatic. denies pain or any discomfort.
Patient is alert and oriented X4. . No acute distress noted.
pt is alert and oriented. she is asymptomatic. denies pain or any discomfort.
pt is alert and oriented. she is asymptomatic. denies pain or any discomfort.
Patient is alert and oriented X4. /106, HR 79. No acute distress noted. Patient is anxious about going home for the holidays.
Patient is alert and oriented. Patient is currently on CIWA protocol and on an ativan taper. Patient also on constant observation for SI. Patient states she wants to go home for the holiday and would like to speak to provider regarding going home on Ativan.
Patient is alert and oriented X4. /102, HR 83. No acute distress noted.
Patient is alert and oriented X4. /106, HR 80. No acute distress noted.

## 2023-11-23 NOTE — PROGRESS NOTE ADULT - ASSESSMENT
46yo Female with hx of HTN, alcohol use disorder requiring hospitalization for withdrawal, h/o withdrawal  seizure appx 1 year ago, h/o alcoholic pancreatitis, chronic anemia presents after being found unresponsive next to alcohol bottles likely iso alcohol intoxication. Also noted to have written note concerning for suicidality

## 2023-11-23 NOTE — PROGRESS NOTE ADULT - PROBLEM SELECTOR PLAN 4
-Patient on losartan 50 at home  - losartan 50 bid  - amlodipine 10mg -Patient on losartan 50 at home  - losartan 100 daily  - amlodipine 10mg  - labetalol 100 q8

## 2023-11-23 NOTE — PROGRESS NOTE ADULT - SUBJECTIVE AND OBJECTIVE BOX
***************************************************************  Julián Gaspar, PGY1  Internal Medicine   TEAMS Preferred  ***************************************************************    AYLEEN DEY  48y Female  MRN: [id number]  11-20-23 (3d)    Patient is a 48y old  Female who presents with a chief complaint of Alcohol abuse (22 Nov 2023 07:18)      SUBJECTIVE / OVERNIGHT EVENTS:   No acute overnight events.   Patient seen and examined at bedside this AM.  Denies any CP, SOB, N/V, constipation, diarrhea, abdominal pain, dysuria, fever, chills, or headaches.     12 Point ROS negative with the exception of the above    MEDICATIONS  (STANDING):  amLODIPine   Tablet 10 milliGRAM(s) Oral daily  desvenlafaxine ER 50 milliGRAM(s) Oral daily  enoxaparin Injectable 40 milliGRAM(s) SubCutaneous every 24 hours  folic acid 1 milliGRAM(s) Oral daily  LORazepam     Tablet 1.5 milliGRAM(s) Oral every 4 hours  LORazepam     Tablet 1 milliGRAM(s) Oral every 4 hours  LORazepam     Tablet   Oral   losartan 50 milliGRAM(s) Oral two times a day  multivitamin 1 Tablet(s) Oral daily  thiamine 100 milliGRAM(s) Oral daily  thiamine IVPB 500 milliGRAM(s) IV Intermittent three times a day    MEDICATIONS  (PRN):  acetaminophen     Tablet .. 650 milliGRAM(s) Oral every 6 hours PRN Temp greater or equal to 38C (100.4F), Mild Pain (1 - 3)  LORazepam     Tablet 2 milliGRAM(s) Oral every 2 hours PRN Symptom-triggered 2 point increase in CIWA-Ar  LORazepam     Tablet 4 milliGRAM(s) Oral every 1 hour PRN Symptom-triggered: each CIWA -Ar score 8 or GREATER  LORazepam   Injectable 2 milliGRAM(s) IV Push every 1 hour PRN CIWA-Ar score 8 or greater  melatonin 3 milliGRAM(s) Oral at bedtime PRN Insomnia  ondansetron Injectable 4 milliGRAM(s) IV Push every 8 hours PRN Nausea and/or Vomiting      OBJECTIVE:  Vital Signs Last 24 Hrs  T(C): 36.9 (23 Nov 2023 05:36), Max: 37.3 (22 Nov 2023 17:56)  T(F): 98.5 (23 Nov 2023 05:36), Max: 99.2 (22 Nov 2023 17:56)  HR: 76 (23 Nov 2023 05:36) (76 - 110)  BP: 132/97 (23 Nov 2023 05:36) (132/97 - 158/100)  BP(mean): --  RR: 17 (23 Nov 2023 05:36) (17 - 18)  SpO2: 100% (23 Nov 2023 05:36) (98% - 100%)    Parameters below as of 23 Nov 2023 05:36  Patient On (Oxygen Delivery Method): room air        I&O's Summary      PHYSICAL EXAM:  GENERAL: Laying comfortably, NAD  HEENT: NCAT, PERRLA, EOMI, no scleral icterus, no LAD  NECK: No JVD, supple  LUNG: CTABL; No wheezes, crackles, or rhonchi  HEART: RRR; normal S1/S2; No murmurs, rubs, or gallops  ABDOMEN: +BS, soft, nontender, nondistended, no HSM; No rebound, guarding, or rigidity  EXTREMITIES:  No LE edema b/l, 2+ Peripheral Pulses, No clubbing or cyanosis  NEUROLOGY: AOx3, non-focal, strength 5/5 in all extremities, sensation intact  PSYCH: calm and cooperative  SKIN: No rashes or lesions    LABS:                        9.5    4.65  )-----------( 226      ( 21 Nov 2023 06:55 )             30.7     Auto Eosinophil # 0.22  / Auto Eosinophil % 4.7   / Auto Neutrophil # 3.14  / Auto Neutrophil % 67.6  / BANDS % x        11-21    137  |  100  |  9   ----------------------------<  103<H>  3.8   |  27  |  0.61    Ca    9.9      21 Nov 2023 06:55  Phos  3.3     11-21  Mg     1.90     11-21    TPro  8.3  /  Alb  4.4  /  TBili  0.9  /  DBili  x   /  AST  32  /  ALT  15  /  AlkPhos  74  11-21      CARDIAC MARKERS ( 21 Nov 2023 06:55 )  x     / x     / 226 U/L / x     / x          Urinalysis Basic - ( 21 Nov 2023 06:55 )    Color: x / Appearance: x / SG: x / pH: x  Gluc: 103 mg/dL / Ketone: x  / Bili: x / Urobili: x   Blood: x / Protein: x / Nitrite: x   Leuk Esterase: x / RBC: x / WBC x   Sq Epi: x / Non Sq Epi: x / Bacteria: x        Lactate, Blood: 1.9 mmol/L (11-20 @ 16:40)  Lactate, Blood: 6.7 mmol/L (11-19 @ 19:41)    CAPILLARY BLOOD GLUCOSE            RADIOLOGY & ADDITIONAL TESTS:     ***************************************************************  Julián Gaspar, PGY1  Internal Medicine   TEAMS Preferred  ***************************************************************    AYLEEN DEY  48y Female  MRN: [id number]  11-20-23 (3d)    Patient is a 48y old  Female who presents with a chief complaint of Alcohol abuse (22 Nov 2023 07:18)      SUBJECTIVE / OVERNIGHT EVENTS:   No acute overnight events.   Patient seen and examined at bedside this AM. CIWA 1 -> 0 overnight. No additional ativan use. Doing much better, much happier. Future oriented towards leaving and getting help outpatient for alcohol use. Denies any SI/HI.  Denies any CP, SOB, N/V, constipation, diarrhea, abdominal pain, dysuria, fever, chills, or headaches.     12 Point ROS negative with the exception of the above    MEDICATIONS  (STANDING):  amLODIPine   Tablet 10 milliGRAM(s) Oral daily  desvenlafaxine ER 50 milliGRAM(s) Oral daily  enoxaparin Injectable 40 milliGRAM(s) SubCutaneous every 24 hours  folic acid 1 milliGRAM(s) Oral daily  LORazepam     Tablet 1.5 milliGRAM(s) Oral every 4 hours  LORazepam     Tablet 1 milliGRAM(s) Oral every 4 hours  LORazepam     Tablet   Oral   losartan 50 milliGRAM(s) Oral two times a day  multivitamin 1 Tablet(s) Oral daily  thiamine 100 milliGRAM(s) Oral daily  thiamine IVPB 500 milliGRAM(s) IV Intermittent three times a day    MEDICATIONS  (PRN):  acetaminophen     Tablet .. 650 milliGRAM(s) Oral every 6 hours PRN Temp greater or equal to 38C (100.4F), Mild Pain (1 - 3)  LORazepam     Tablet 2 milliGRAM(s) Oral every 2 hours PRN Symptom-triggered 2 point increase in CIWA-Ar  LORazepam     Tablet 4 milliGRAM(s) Oral every 1 hour PRN Symptom-triggered: each CIWA -Ar score 8 or GREATER  LORazepam   Injectable 2 milliGRAM(s) IV Push every 1 hour PRN CIWA-Ar score 8 or greater  melatonin 3 milliGRAM(s) Oral at bedtime PRN Insomnia  ondansetron Injectable 4 milliGRAM(s) IV Push every 8 hours PRN Nausea and/or Vomiting      OBJECTIVE:  Vital Signs Last 24 Hrs  T(C): 36.9 (23 Nov 2023 05:36), Max: 37.3 (22 Nov 2023 17:56)  T(F): 98.5 (23 Nov 2023 05:36), Max: 99.2 (22 Nov 2023 17:56)  HR: 76 (23 Nov 2023 05:36) (76 - 110)  BP: 132/97 (23 Nov 2023 05:36) (132/97 - 158/100)  BP(mean): --  RR: 17 (23 Nov 2023 05:36) (17 - 18)  SpO2: 100% (23 Nov 2023 05:36) (98% - 100%)    Parameters below as of 23 Nov 2023 05:36  Patient On (Oxygen Delivery Method): room air        I&O's Summary      PHYSICAL EXAM:  GENERAL: Laying comfortably, NAD  HEENT: NCAT, PERRLA, EOMI, no scleral icterus, no LAD  LUNG: CTABL; No wheezes, crackles, or rhonchi  HEART: RRR; normal S1/S2; No murmurs, rubs, or gallops  ABDOMEN: +BS, soft, nontender, nondistended, no HSM; No rebound, guarding, or rigidity  EXTREMITIES:  No LE edema b/l, 2+ Peripheral Pulses, No clubbing or cyanosis  NEUROLOGY: AOx3, non-focal, no tremors noted    LABS:                        9.5    4.65  )-----------( 226      ( 21 Nov 2023 06:55 )             30.7     Auto Eosinophil # 0.22  / Auto Eosinophil % 4.7   / Auto Neutrophil # 3.14  / Auto Neutrophil % 67.6  / BANDS % x        11-21    137  |  100  |  9   ----------------------------<  103<H>  3.8   |  27  |  0.61    Ca    9.9      21 Nov 2023 06:55  Phos  3.3     11-21  Mg     1.90     11-21    TPro  8.3  /  Alb  4.4  /  TBili  0.9  /  DBili  x   /  AST  32  /  ALT  15  /  AlkPhos  74  11-21      CARDIAC MARKERS ( 21 Nov 2023 06:55 )  x     / x     / 226 U/L / x     / x          Urinalysis Basic - ( 21 Nov 2023 06:55 )    Color: x / Appearance: x / SG: x / pH: x  Gluc: 103 mg/dL / Ketone: x  / Bili: x / Urobili: x   Blood: x / Protein: x / Nitrite: x   Leuk Esterase: x / RBC: x / WBC x   Sq Epi: x / Non Sq Epi: x / Bacteria: x        Lactate, Blood: 1.9 mmol/L (11-20 @ 16:40)  Lactate, Blood: 6.7 mmol/L (11-19 @ 19:41)    CAPILLARY BLOOD GLUCOSE            RADIOLOGY & ADDITIONAL TESTS:

## 2023-11-24 ENCOUNTER — TRANSCRIPTION ENCOUNTER (OUTPATIENT)
Age: 48
End: 2023-11-24

## 2023-11-24 PROCEDURE — 99233 SBSQ HOSP IP/OBS HIGH 50: CPT

## 2023-11-24 PROCEDURE — 99232 SBSQ HOSP IP/OBS MODERATE 35: CPT | Mod: GC

## 2023-11-24 RX ORDER — THIAMINE MONONITRATE (VIT B1) 100 MG
1 TABLET ORAL
Qty: 30 | Refills: 0
Start: 2023-11-24 | End: 2023-12-23

## 2023-11-24 RX ORDER — AMLODIPINE BESYLATE 2.5 MG/1
1 TABLET ORAL
Qty: 30 | Refills: 0
Start: 2023-11-24 | End: 2023-12-23

## 2023-11-24 RX ORDER — LOSARTAN POTASSIUM 100 MG/1
1 TABLET, FILM COATED ORAL
Qty: 30 | Refills: 0
Start: 2023-11-24 | End: 2023-12-23

## 2023-11-24 RX ORDER — FOLIC ACID 0.8 MG
1 TABLET ORAL
Qty: 30 | Refills: 0
Start: 2023-11-24 | End: 2023-12-23

## 2023-11-24 RX ADMIN — Medication 100 MILLIGRAM(S): at 11:32

## 2023-11-24 RX ADMIN — Medication 1 MILLIGRAM(S): at 02:10

## 2023-11-24 RX ADMIN — Medication 650 MILLIGRAM(S): at 18:30

## 2023-11-24 RX ADMIN — Medication 650 MILLIGRAM(S): at 17:30

## 2023-11-24 RX ADMIN — Medication 1 TABLET(S): at 11:31

## 2023-11-24 RX ADMIN — Medication 1 MILLIGRAM(S): at 05:55

## 2023-11-24 RX ADMIN — Medication 1 MILLIGRAM(S): at 11:31

## 2023-11-24 RX ADMIN — DESVENLAFAXINE 50 MILLIGRAM(S): 50 TABLET, EXTENDED RELEASE ORAL at 11:31

## 2023-11-24 RX ADMIN — AMLODIPINE BESYLATE 10 MILLIGRAM(S): 2.5 TABLET ORAL at 05:55

## 2023-11-24 RX ADMIN — Medication 0.5 MILLIGRAM(S): at 10:03

## 2023-11-24 RX ADMIN — LOSARTAN POTASSIUM 100 MILLIGRAM(S): 100 TABLET, FILM COATED ORAL at 05:55

## 2023-11-24 RX ADMIN — Medication 0.5 MILLIGRAM(S): at 17:28

## 2023-11-24 NOTE — DISCHARGE NOTE PROVIDER - CARE PROVIDER_API CALL
Ivone Palmer [facility]  Internal Medicine  [address], [address]  [place] [address]  Phone: [phone]  Fax: [phone]  Follow Up Time:     Stone Quintanilla  Oral/Maxillofacial Surgery  [address] [place] [address]  Phone: [phone]  Fax: [phone]  Follow Up Time:     Gitlin, Kevin  Phone: (   )    -  Fax: (   )    -  Follow Up Time:     Josh Dalal  Psychology  [address] [place] [id number]  Phone: [phone]  Fax: [phone]  Follow Up Time:

## 2023-11-24 NOTE — PROGRESS NOTE ADULT - PROBLEM SELECTOR PLAN 1
Patient with prior hospitalization for alcohol withdrawal. Also with recent jaw ORIF iso jaw fracture iso alcohol intoxication. Patient currently presents with a CIWA of 8, however still in the withdrawal window.  RUQ US negative for cirrhosis   Evidence of Rhabdo and low fluid state given high CK and lactate, improved this AM, amylase lipase neg  Per psych had hx of DTs in the past had multiple relapses with alcohol and sobriety periods for as long as 2 years, recently had issue with occupation and started to drink again    Plan  -high risk ciwa   - Ativan taper  - Thiamine, Folate, MV  - Thiamine 500 mg IV TID x 3 days for Wernicke prophylaxis  - IV fluids  - SBIRT, SW consilt Patient with prior hospitalization for alcohol withdrawal. Also with recent jaw ORIF iso jaw fracture iso alcohol intoxication. Patient currently presents with a CIWA of 8, however still in the withdrawal window.  RUQ US negative for cirrhosis   Evidence of Rhabdo and low fluid state given high CK and lactate, improved this AM, amylase lipase neg  Per psych had hx of DTs in the past had multiple relapses with alcohol and sobriety periods for as long as 2 years, recently had issue with occupation and started to drink again    Plan  -high risk ciwa   - Ativan taper, ativan 0.5 q8 x 2 then ativan 0.5 q12   - Thiamine, Folate, MV  - IV fluids  - SBIRT, SW consilt

## 2023-11-24 NOTE — PROGRESS NOTE ADULT - PROBLEM SELECTOR PLAN 4
-Patient on losartan 50 at home  - losartan 100 daily  - amlodipine 10mg  - labetalol 100 q8 -Patient on losartan 50 at home  - losartan 100 daily  - amlodipine 10mg  - hold labetalol 100 q8

## 2023-11-24 NOTE — BH CONSULTATION LIAISON PROGRESS NOTE - NSBHCONSULTFOLLOWAFTERCARE_PSY_A_CORE FT
[facility] crisis [facility]  [facility] follow up with her outpatient psychiatrist Dr. Gitlin.     [facility] Substance Abuse Outpatient Program ([facility]): [phone]    [facility] Outpatient services  For acute crisis: [facility]  [phone] [place] [address], [address] Floor, [place] [address]  [phone]  [email or web address]

## 2023-11-24 NOTE — BH CONSULTATION LIAISON PROGRESS NOTE - NSBHATTESTBILLING_PSY_A_CORE
17436-Clqvolegke OBS or IP - high complexity OR 50-79 mins
23205-Yutesfabij OBS or IP - moderate complexity OR 35-49 mins
55982-Douzvozydj OBS or IP - moderate complexity OR 35-49 mins

## 2023-11-24 NOTE — DISCHARGE NOTE PROVIDER - NPI NUMBER (FOR SYSADMIN USE ONLY) :
Patient: Ag Perez    Procedure(s):  EXPLORATORY LAPAROTOMY WITH RESECTION OF SMALL BOWEL, JEJUNUM, TEMPORARY ABDOMINAL WALL CLOSURE, SEROSAL REPAIR    Diagnosis: INCARCERATED INCISIONAL HERNIA  Diagnosis Additional Information: No value filed.    Anesthesia Type:   General, Epidural     Note:  Airway :Ventilator  Patient transferred to:ICU  ICU Handoff: Call for PAUSE to initiate/utilize ICU HANDOFF, Identified Patient, Identified Responsible Provider, Reviewed the Pertinent Medical History, Discussed Surgical Course, Reviewed Intra-OP Anesthesia Management and Issues during Anesthesia, Set Expectations for Post Procedure Period and Allowed Opportunity for Questions and Acknowledgement of Understanding      Vitals: (Last set prior to Anesthesia Care Transfer)    CRNA VITALS  5/31/2019 1242 - 5/31/2019 1331      5/31/2019             Resp Rate (observed):  12    Resp Rate (set):  12                Electronically Signed By: JIN Hutchins CRNA  May 31, 2019  1:31 PM  
[[id number]],[[id number]],[[id number]],[[id number]]

## 2023-11-24 NOTE — DISCHARGE NOTE PROVIDER - NSDCCPTREATMENT_GEN_ALL_CORE_FT
PRINCIPAL PROCEDURE  Procedure: Ultrasound of upper abdomen  Findings and Treatment: FINDINGS:  Liver: Within normal limits.  Bile ducts: Normal caliber. Common bile duct measures 3 mm.  Gallbladder: Within normal limits.  Pancreas: Visualized portions are within normal limits.  Right kidney: 10.6 cm. No hydronephrosis.  Ascites: None.  IVC: Visualized portions are within normal limits.  IMPRESSION:  Normal right upper quadrant abdominal ultrasound.        SECONDARY PROCEDURE  Procedure: CT head  Findings and Treatment: Findings: Exam is degraded by motion artifact at the level of the skull   base.  The ventricles and sulci are normal in size for the patient's stated age.    No acute intracranial hemorrhage is identified.  There is no extra-axial   fluid collection. No mass effect or midline shift is seen.  There is no   evidence of acute territorial infarct.  There is near complete opacification of the left sphenoid sinus. Fixation   hardware is partially imaged in the zygomatic arches and mandibles.. The   mastoid air cells are well aerated.  No acute osseous abnormality is seen.  Impression: Motion degraded exam. No definite acute findings.

## 2023-11-24 NOTE — PROGRESS NOTE ADULT - SUBJECTIVE AND OBJECTIVE BOX
***************************************************************  Julián Gaspar, PGY1  Internal Medicine   TEAMS Preferred  ***************************************************************    AYLEEN DEY  48y Female  MRN: [id number]  11-20-23 (4d)    Patient is a 48y old  Female who presents with a chief complaint of Alcohol abuse (23 Nov 2023 06:44)      SUBJECTIVE / OVERNIGHT EVENTS:   No acute overnight events.   Patient seen and examined at bedside this AM.  Denies any CP, SOB, N/V, constipation, diarrhea, abdominal pain, dysuria, fever, chills, or headaches.     12 Point ROS negative with the exception of the above    MEDICATIONS  (STANDING):  amLODIPine   Tablet 10 milliGRAM(s) Oral daily  desvenlafaxine ER 50 milliGRAM(s) Oral daily  enoxaparin Injectable 40 milliGRAM(s) SubCutaneous every 24 hours  folic acid 1 milliGRAM(s) Oral daily  labetalol 100 milliGRAM(s) Oral every 8 hours  LORazepam     Tablet 1 milliGRAM(s) Oral every 4 hours  LORazepam     Tablet   Oral   LORazepam     Tablet 0.5 milliGRAM(s) Oral every 4 hours  losartan 100 milliGRAM(s) Oral daily  multivitamin 1 Tablet(s) Oral daily  thiamine 100 milliGRAM(s) Oral daily    MEDICATIONS  (PRN):  acetaminophen     Tablet .. 650 milliGRAM(s) Oral every 6 hours PRN Temp greater or equal to 38C (100.4F), Mild Pain (1 - 3)  LORazepam     Tablet 4 milliGRAM(s) Oral every 1 hour PRN Symptom-triggered: each CIWA -Ar score 8 or GREATER  LORazepam     Tablet 2 milliGRAM(s) Oral every 2 hours PRN Symptom-triggered 2 point increase in CIWA-Ar  LORazepam   Injectable 2 milliGRAM(s) IV Push every 1 hour PRN CIWA-Ar score 8 or greater  melatonin 3 milliGRAM(s) Oral at bedtime PRN Insomnia  ondansetron Injectable 4 milliGRAM(s) IV Push every 8 hours PRN Nausea and/or Vomiting      OBJECTIVE:  Vital Signs Last 24 Hrs  T(C): 36.6 (24 Nov 2023 05:19), Max: 37 (23 Nov 2023 13:43)  T(F): 97.8 (24 Nov 2023 05:19), Max: 98.6 (23 Nov 2023 13:43)  HR: 69 (24 Nov 2023 05:49) (69 - 100)  BP: 124/84 (24 Nov 2023 05:49) (114/76 - 145/96)  BP(mean): 114 (23 Nov 2023 09:02) (114 - 114)  RR: 16 (24 Nov 2023 05:49) (16 - 18)  SpO2: 98% (24 Nov 2023 05:49) (97% - 100%)    Parameters below as of 24 Nov 2023 05:49  Patient On (Oxygen Delivery Method): room air        I&O's Summary      PHYSICAL EXAM:  GENERAL: Laying comfortably, NAD  HEENT: NCAT, PERRLA, EOMI, no scleral icterus, no LAD  NECK: No JVD, supple  LUNG: CTABL; No wheezes, crackles, or rhonchi  HEART: RRR; normal S1/S2; No murmurs, rubs, or gallops  ABDOMEN: +BS, soft, nontender, nondistended, no HSM; No rebound, guarding, or rigidity  EXTREMITIES:  No LE edema b/l, 2+ Peripheral Pulses, No clubbing or cyanosis  NEUROLOGY: AOx3, non-focal, strength 5/5 in all extremities, sensation intact  PSYCH: calm and cooperative  SKIN: No rashes or lesions    LABS:    11-21    137  |  100  |  9   ----------------------------<  103<H>  3.8   |  27  |  0.61    Ca    9.9      21 Nov 2023 06:55    TPro  8.3  /  Alb  4.4  /  TBili  0.9  /  DBili  x   /  AST  32  /  ALT  15  /  AlkPhos  74  11-21              Lactate, Blood: 1.9 mmol/L (11-20 @ 16:40)  Lactate, Blood: 6.7 mmol/L (11-19 @ 19:41)    CAPILLARY BLOOD GLUCOSE            RADIOLOGY & ADDITIONAL TESTS:     ***************************************************************  Julián Gaspar, PGY1  Internal Medicine   TEAMS Preferred  ***************************************************************    AYLEEN DEY  48y Female  MRN: [id number]  11-20-23 (4d)    Patient is a 48y old  Female who presents with a chief complaint of Alcohol abuse (23 Nov 2023 06:44)      SUBJECTIVE / OVERNIGHT EVENTS:   No acute overnight events.   CIWA 0 overnight, did not require additional ativan overnight.  Denies any CP, SOB, N/V, constipation, diarrhea, abdominal pain, dysuria, fever, chills, or headaches.     12 Point ROS negative with the exception of the above    MEDICATIONS  (STANDING):  amLODIPine   Tablet 10 milliGRAM(s) Oral daily  desvenlafaxine ER 50 milliGRAM(s) Oral daily  enoxaparin Injectable 40 milliGRAM(s) SubCutaneous every 24 hours  folic acid 1 milliGRAM(s) Oral daily  labetalol 100 milliGRAM(s) Oral every 8 hours  LORazepam     Tablet 1 milliGRAM(s) Oral every 4 hours  LORazepam     Tablet   Oral   LORazepam     Tablet 0.5 milliGRAM(s) Oral every 4 hours  losartan 100 milliGRAM(s) Oral daily  multivitamin 1 Tablet(s) Oral daily  thiamine 100 milliGRAM(s) Oral daily    MEDICATIONS  (PRN):  acetaminophen     Tablet .. 650 milliGRAM(s) Oral every 6 hours PRN Temp greater or equal to 38C (100.4F), Mild Pain (1 - 3)  LORazepam     Tablet 4 milliGRAM(s) Oral every 1 hour PRN Symptom-triggered: each CIWA -Ar score 8 or GREATER  LORazepam     Tablet 2 milliGRAM(s) Oral every 2 hours PRN Symptom-triggered 2 point increase in CIWA-Ar  LORazepam   Injectable 2 milliGRAM(s) IV Push every 1 hour PRN CIWA-Ar score 8 or greater  melatonin 3 milliGRAM(s) Oral at bedtime PRN Insomnia  ondansetron Injectable 4 milliGRAM(s) IV Push every 8 hours PRN Nausea and/or Vomiting      OBJECTIVE:  Vital Signs Last 24 Hrs  T(C): 36.6 (24 Nov 2023 05:19), Max: 37 (23 Nov 2023 13:43)  T(F): 97.8 (24 Nov 2023 05:19), Max: 98.6 (23 Nov 2023 13:43)  HR: 69 (24 Nov 2023 05:49) (69 - 100)  BP: 124/84 (24 Nov 2023 05:49) (114/76 - 145/96)  BP(mean): 114 (23 Nov 2023 09:02) (114 - 114)  RR: 16 (24 Nov 2023 05:49) (16 - 18)  SpO2: 98% (24 Nov 2023 05:49) (97% - 100%)    Parameters below as of 24 Nov 2023 05:49  Patient On (Oxygen Delivery Method): room air        I&O's Summary      PHYSICAL EXAM:  GENERAL: Laying comfortably, NAD  HEENT: NCAT, PERRLA, EOMI, no scleral icterus, no LAD  LUNG: CTABL; No wheezes, crackles, or rhonchi  HEART: RRR; normal S1/S2; No murmurs, rubs, or gallops  ABDOMEN: +BS, soft, nontender, nondistended, no HSM; No rebound, guarding, or rigidity  EXTREMITIES:  No LE edema b/l, 2+ Peripheral Pulses, No clubbing or cyanosis  NEUROLOGY: AOx3, non-focal, no tremmors     LABS:    11-21    137  |  100  |  9   ----------------------------<  103<H>  3.8   |  27  |  0.61    Ca    9.9      21 Nov 2023 06:55    TPro  8.3  /  Alb  4.4  /  TBili  0.9  /  DBili  x   /  AST  32  /  ALT  15  /  AlkPhos  74  11-21              Lactate, Blood: 1.9 mmol/L (11-20 @ 16:40)  Lactate, Blood: 6.7 mmol/L (11-19 @ 19:41)    CAPILLARY BLOOD GLUCOSE            RADIOLOGY & ADDITIONAL TESTS:

## 2023-11-24 NOTE — BH CONSULTATION LIAISON PROGRESS NOTE - NSICDXBHSECONDARYDX_PSY_ALL_CORE
Alcohol dependence with withdrawal   F10.239  

## 2023-11-24 NOTE — DISCHARGE NOTE PROVIDER - PROVIDER TOKENS
PROVIDER:[TOKEN:[[id number]]],PROVIDER:[TOKEN:[[id number]]],FREE:[LAST:[[place]],FIRST:[Richard],PHONE:[(   )    -],FAX:[(   )    -]],PROVIDER:[TOKEN:[[id number]]]

## 2023-11-24 NOTE — BH CONSULTATION LIAISON PROGRESS NOTE - NSBHMSESPEECH_PSY_A_CORE
Post-Care Instructions: I reviewed with the patient in detail post-care instructions. Patient is to wear sunprotection, and avoid picking at any of the treated lesions. Pt may apply Vaseline to crusted or scabbing areas.
Render Post-Care Instructions In Note?: no
Consent: The patient's consent was obtained including but not limited to risks of crusting, scabbing, blistering, scarring, darker or lighter pigmentary change, recurrence, incomplete removal and infection.
Duration Of Freeze Thaw-Cycle (Seconds): 0
Detail Level: Detailed
Normal volume, rate, productivity, spontaneity and articulation

## 2023-11-24 NOTE — DISCHARGE NOTE PROVIDER - NSDCMRMEDTOKEN_GEN_ALL_CORE_FT
Ambien 10 mg oral tablet: 1 tab(s) orally once a day (at bedtime)  Calcium , 1 Tab, PO, Daily:   losartan 50 mg oral tablet: 1 tab(s) orally once a day  Pristiq 50 mg oral tablet, extended release: 1 tab(s) orally once a day  Xanax 0.5 mg oral tablet: 1 tab(s) orally once a day   amLODIPine 10 mg oral tablet: 1 tab(s) orally once a day  folic acid 1 mg oral tablet: 1 tab(s) orally once a day  losartan 100 mg oral tablet: 1 tab(s) orally once a day  Multiple Vitamins oral tablet: 1 tab(s) orally once a day  Pristiq 50 mg oral tablet, extended release: 1 tab(s) orally once a day  thiamine 100 mg oral tablet: 1 tab(s) orally once a day

## 2023-11-24 NOTE — PROVIDER CONTACT NOTE (OTHER) - BACKGROUND
pt is admitted with alcohol intoxication
Patient admitted with alcohol withdrawal. Hx of etoh abuse, anemia, htn, depression
pt is admitted with alcohol intoxication

## 2023-11-24 NOTE — PROVIDER CONTACT NOTE (MEDICATION) - ASSESSMENT
Pt A&O x4. No acute distress noted & pt states 1mg makes her too lethargic and drowsy, prefers a lower dosage.

## 2023-11-24 NOTE — DISCHARGE NOTE PROVIDER - NSDCFUADDAPPT_GEN_ALL_CORE_FT
APPTS ARE READY TO BE MADE: [x] YES    Best Family or Patient Contact (if needed):    Additional Information about above appointments (if needed):    1: [facility] ([facility]): [phone]  2: [facility]  [address] [place] [address], [place] [address]  3: Psychology  4: Psychiatrist   5: PCP Dr. Palmer  6: OMFS    Other comments or requests:    APPTS ARE READY TO BE MADE: [x] YES    Best Family or Patient Contact (if needed):    Additional Information about above appointments (if needed):    1: [facility] ([facility]): [phone]  2: [facility]  [address] [place] [address], [place] [address]  3: Psychology  4: Psychiatrist   5: PCP Dr. Palmer  6: FS    Other comments or requests:   Patient was previously scheduled for a dental appointment on 11/27 at 8:45am at [address] [place] [address] with Dr. Contreras as the referring provider.     Patient advised she was previously contacted yesterday from another department regarding follow up care, therefore the patient was unwilling to discuss if any appointments are made. Patient advised assistance is not needed and disconnected the call.

## 2023-11-24 NOTE — PROVIDER CONTACT NOTE (OTHER) - NAME OF MD/NP/PA/DO NOTIFIED:
Dr.Martinez Kincaid/[id number]
Dr.Martinez Kinciad/[id number]
Sanjiv Rich
Dr.Martinez Kincaid/[id number]
Julián Gaspar
Julián Gaspar
Dr.Martinez Kincaid/[id number]
Dr.Martinez Kincaid/[id number]
Dr.Martinez Kincaid/[id number]
Dr.Martinez Kincaid/[id number]
Dr.Martinez Kincaid/[id number]
Julián Gaspar
Dr.Martinez Kincaid/[id number]

## 2023-11-24 NOTE — BH CONSULTATION LIAISON PROGRESS NOTE - NSBHCHARTREVIEWVS_PSY_A_CORE FT
Vital Signs Last 24 Hrs  T(C): 36.7 (21 Nov 2023 13:30), Max: 37.1 (21 Nov 2023 00:38)  T(F): 98.1 (21 Nov 2023 13:30), Max: 98.8 (21 Nov 2023 00:38)  HR: 82 (21 Nov 2023 13:30) (82 - 104)  BP: 148/102 (21 Nov 2023 13:30) (128/104 - 162/106)  BP(mean): --  RR: 17 (21 Nov 2023 13:30) (16 - 18)  SpO2: 98% (21 Nov 2023 13:30) (95% - 100%)    Parameters below as of 21 Nov 2023 13:30  Patient On (Oxygen Delivery Method): room air    
Vital Signs Last 24 Hrs  T(C): 36.6 (24 Nov 2023 13:00), Max: 36.8 (24 Nov 2023 09:03)  T(F): 97.9 (24 Nov 2023 13:00), Max: 98.2 (24 Nov 2023 09:03)  HR: 73 (24 Nov 2023 13:00) (69 - 82)  BP: 131/94 (24 Nov 2023 13:00) (107/82 - 145/96)  BP(mean): --  RR: 17 (24 Nov 2023 13:00) (16 - 18)  SpO2: 99% (24 Nov 2023 13:00) (97% - 100%)    Parameters below as of 24 Nov 2023 13:00  Patient On (Oxygen Delivery Method): room air    
Vital Signs Last 24 Hrs  T(C): 36.4 (22 Nov 2023 08:54), Max: 36.9 (21 Nov 2023 17:30)  T(F): 97.5 (22 Nov 2023 08:54), Max: 98.4 (21 Nov 2023 17:30)  HR: 99 (22 Nov 2023 08:54) (75 - 99)  BP: 144/96 (22 Nov 2023 08:54) (144/96 - 167/91)  BP(mean): --  RR: 17 (22 Nov 2023 08:54) (17 - 18)  SpO2: 100% (22 Nov 2023 08:54) (96% - 100%)    Parameters below as of 22 Nov 2023 08:54  Patient On (Oxygen Delivery Method): room air

## 2023-11-24 NOTE — BH CONSULTATION LIAISON PROGRESS NOTE - NSBHFUPINTERVALCCFT_PSY_A_CORE
Patient reports "I am feeling better" 
Patient reports "I am feeling good" 
Patient reports "I am feeling better"

## 2023-11-24 NOTE — DISCHARGE NOTE PROVIDER - NSRESEARCHGRANT_HIDDEN_GEN_A_CORE
Called pt and informed. Gave numbers for [facility] and Dr. Maricarmen Arizmendi. Unable to sign message due to pending Rx's.
Do not recommend Klonopin. Will send in RX for Vistaril TId PRN. May cause drowsiness just like Klonopin. Recommend referral to [facility]. Please give her. Umber to schedule.
Dr. Garry Ogden in [place] is also accepting new patients - may give information for her to see Dr. Garry Ogden with psychiatry
Pt called stating for the last couple of days she's been having panic attacks. States she does have a lot going on, dad is dying, mom is depressed and at the nursing home, and family just got over [address]. Pt states she did find an old Rx of klonopin 2mg from 2018 and wanting to know if she could take 2 of them or if PCP had other recommendations. Pt did say that she's tried to get in to see a counselor, but they are either not taking new pt's or will only do a VV(which pt doesn't want to do).  Call back pt with recommendations [phone]
Routing to [facility] due to PCP out of office
Yes

## 2023-11-24 NOTE — PROVIDER CONTACT NOTE (OTHER) - ACTION/TREATMENT ORDERED:
MD notified and aware. No interventions at this time.
MD notified and aware. No interventions at this time. Continue to monitor.
no recommendation at this time. will continue monitoring.
Risks and benefits explained. Patient verbalized understanding but continues to refuse
MD notified and aware. No interventions at this time. Continue to monitor.
MD notified and aware. BP med given.
MD notified and aware. No interventions at this time. Continue to monitor.
MD notified and aware.
MD notified and aware. Provider will try US guided IV access and will hang IVPG Thiamine when access is given. Continue to monitor.
MD notified and aware. No interventions at this time. Continue to monitor.
MD notified and aware. Provider will come see provider at bedside. Continue to monitor.
no recommendation at this time. pt is on ativan iv. will continue monitoring.
no recommendation at this time. pt is on ativan iv. will continue monitoring.

## 2023-11-24 NOTE — PROVIDER CONTACT NOTE (OTHER) - REASON
/106
pt's bp 128/105, HR 88
/102
/102
Patient does not have IV access
pt's bp 147/105, HR 89
/106
Patient refused labetalol this am because bp is 124/84
Patient wants to see provider at bedside re d/c
pt's bp 140/105, HR 88
/109
/102
Patient refusing morning blood work at this time

## 2023-11-24 NOTE — BH CONSULTATION LIAISON PROGRESS NOTE - CURRENT MEDICATION
MEDICATIONS  (STANDING):  amLODIPine   Tablet 10 milliGRAM(s) Oral daily  desvenlafaxine ER 50 milliGRAM(s) Oral daily  enoxaparin Injectable 40 milliGRAM(s) SubCutaneous every 24 hours  folic acid 1 milliGRAM(s) Oral daily  LORazepam     Tablet 0.5 milliGRAM(s) Oral every 8 hours  losartan 100 milliGRAM(s) Oral daily  multivitamin 1 Tablet(s) Oral daily  thiamine 100 milliGRAM(s) Oral daily    MEDICATIONS  (PRN):  acetaminophen     Tablet .. 650 milliGRAM(s) Oral every 6 hours PRN Temp greater or equal to 38C (100.4F), Mild Pain (1 - 3)  LORazepam     Tablet 2 milliGRAM(s) Oral every 2 hours PRN Symptom-triggered 2 point increase in CIWA-Ar  LORazepam     Tablet 4 milliGRAM(s) Oral every 1 hour PRN Symptom-triggered: each CIWA -Ar score 8 or GREATER  LORazepam   Injectable 2 milliGRAM(s) IV Push every 1 hour PRN CIWA-Ar score 8 or greater  melatonin 3 milliGRAM(s) Oral at bedtime PRN Insomnia  ondansetron Injectable 4 milliGRAM(s) IV Push every 8 hours PRN Nausea and/or Vomiting  
MEDICATIONS  (STANDING):  amLODIPine   Tablet 5 milliGRAM(s) Oral daily  desvenlafaxine ER 50 milliGRAM(s) Oral daily  enoxaparin Injectable 40 milliGRAM(s) SubCutaneous every 24 hours  folic acid 1 milliGRAM(s) Oral daily  LORazepam     Tablet 1.5 milliGRAM(s) Oral every 4 hours  LORazepam     Tablet   Oral   losartan 50 milliGRAM(s) Oral two times a day  multivitamin 1 Tablet(s) Oral daily  thiamine 100 milliGRAM(s) Oral daily  thiamine IVPB 500 milliGRAM(s) IV Intermittent three times a day    MEDICATIONS  (PRN):  acetaminophen     Tablet .. 650 milliGRAM(s) Oral every 6 hours PRN Temp greater or equal to 38C (100.4F), Mild Pain (1 - 3)  LORazepam     Tablet 2 milliGRAM(s) Oral every 2 hours PRN Symptom-triggered 2 point increase in CIWA-Ar  LORazepam     Tablet 4 milliGRAM(s) Oral every 1 hour PRN Symptom-triggered: each CIWA -Ar score 8 or GREATER  LORazepam   Injectable 2 milliGRAM(s) IV Push every 1 hour PRN CIWA-Ar score 8 or greater  melatonin 3 milliGRAM(s) Oral at bedtime PRN Insomnia  ondansetron Injectable 4 milliGRAM(s) IV Push every 8 hours PRN Nausea and/or Vomiting  
MEDICATIONS  (STANDING):  desvenlafaxine ER 50 milliGRAM(s) Oral daily  enoxaparin Injectable 40 milliGRAM(s) SubCutaneous every 24 hours  folic acid 1 milliGRAM(s) Oral daily  lactated ringers. 1000 milliLiter(s) (100 mL/Hr) IV Continuous <Continuous>  LORazepam   Injectable 2 milliGRAM(s) IV Push every 4 hours  LORazepam   Injectable   IV Push   losartan 50 milliGRAM(s) Oral daily  multivitamin 1 Tablet(s) Oral daily  thiamine 100 milliGRAM(s) Oral daily  thiamine IVPB 500 milliGRAM(s) IV Intermittent three times a day    MEDICATIONS  (PRN):  acetaminophen     Tablet .. 650 milliGRAM(s) Oral every 6 hours PRN Temp greater or equal to 38C (100.4F), Mild Pain (1 - 3)  LORazepam     Tablet 2 milliGRAM(s) Oral every 2 hours PRN Symptom-triggered 2 point increase in CIWA-Ar but CIWA<7  LORazepam   Injectable 2 milliGRAM(s) IV Push every 1 hour PRN Symptom-triggered: each CIWA -Ar score 8 or GREATER  LORazepam   Injectable 2 milliGRAM(s) IV Push every 1 hour PRN CIWA-Ar score 8 or greater  melatonin 3 milliGRAM(s) Oral at bedtime PRN Insomnia  ondansetron Injectable 4 milliGRAM(s) IV Push every 8 hours PRN Nausea and/or Vomiting

## 2023-11-24 NOTE — PROVIDER CONTACT NOTE (OTHER) - SITUATION
pt's bp 140/105, HR 88
/109
Patient wants to see provider at bedside re d/c
/102
pt's bp 128/105, HR 88
/102
/106
/106
Patient refused labetalol this am because bp is 124/84
pt's bp 147/105, HR 89
Patient refusing morning blood work at this time
Patient does not have IV access
/102

## 2023-11-24 NOTE — DISCHARGE NOTE PROVIDER - NSDCFUSCHEDAPPT_GEN_ALL_CORE_FT
[facility] Physician [facility]  [address]ENTAL [phone]  Scheduled Appointment: 11/27/2023     [facility] Physician Critical access hospital  DENTAL [phone]  Scheduled Appointment: 12/18/2023

## 2023-11-24 NOTE — DISCHARGE NOTE PROVIDER - NSDCCPCAREPLAN_GEN_ALL_CORE_FT
PRINCIPAL DISCHARGE DIAGNOSIS  Diagnosis: Alcohol intoxication  Assessment and Plan of Treatment: Upon arrival to the hospital you were noted to be intoxicated and unconscious after drinking a significant amount of alcohol. As you were unconscious, we obtained imaging to rule out any strokes which was negative. Your blood alcohol level was significantly elevated and thus you were started on an Ativan taper to help prevent you from having significant withdrawal. Fortunately, you tolerated the ativan taper well and were able to complete the taper inpatient. You were also started on vitamins to help protect your liver and body from further injury. Additionally, an Ultrasound was obtained which showed normal findings suggesting no injury to the liver. On the day of discharge you were stable for discharge with close outpatient followup at [facility]  TO DO  - Please make sure to follow up at the [facility] to help manage your alcohol use  - Please discuss with the providers at [facility] about starting Naltrexone to help with your alcohol use  - Please make sure to continue to take vitamins regularly to help protect against damage associated with alcohol use  - Please consider joining an AA group to help reinforce your alcohol abstinence behavior. As you had limited liver injury on imaging, there is a lot of potential for you to reverse the adverse effects that alcohol has on your body. Please continue to work on abstinence to help maintain your health  - Please continue to maintain strong relationships with your family. They care about you and believe in you and with their help you may be able to permenantly overcome this challenge. YOU CAN DO THIS!      SECONDARY DISCHARGE DIAGNOSES  Diagnosis: MDD (major depressive disorder)  Assessment and Plan of Treatment: While in the hospital there was some concern for suicidal thoughts that prompted us to call the Psychiatry team to help you during your difficult time in need. Initially they were worried about you and recommended to monitor you with constant observation for your safety. As the Psychiatry team continued to re-evaluated you, they aquired more information and felt you were no longer at risk for harming yourself and discontinued the constant observation. They discussed with you several options on how to address your depressive symtpoms and at the day of discharge  they had a plan for you to follow up at [facility] for further management  TO DO   - Please make sure to follow up at [facility]    Diagnosis: HTN (hypertension)  Assessment and Plan of Treatment: While in the hospital your blood pressure was elevated. While initially there was concern this was related to your withdrawal we felt this may be due to underlying high blood pressure. Thus we increased your blood pressure medications and titrated them to an acceptable level  TO DO  - Please start Losartan 100mg daily and Amlodipine 10mg daily   - Please make sure to follow up with your PCP within 1-2 weeks of discharge to titrate your blood pressure medications

## 2023-11-24 NOTE — PROVIDER CONTACT NOTE (OTHER) - DATE AND TIME:
23-Nov-2023 01:30
24-Nov-2023 06:19
21-Nov-2023 07:22
22-Nov-2023 01:53
22-Nov-2023 06:30
21-Nov-2023 22:00
21-Nov-2023 00:55
21-Nov-2023 02:30
21-Nov-2023 22:50
22-Nov-2023 13:01
22-Nov-2023 17:57
21-Nov-2023 22:00
22-Nov-2023 13:32

## 2023-11-24 NOTE — DISCHARGE NOTE PROVIDER - NSFOLLOWUPCLINICSTOKEN_GEN_ALL_ED_FT
pt presents to ED sent by MD for surgery to RUE. pt dx with blood clots to his RUE. pt placed on Xarelto one week ago. + swelling noted. pt c/o pain to extremity. denies SOB. breathing is even and unlabored. a&ox3. will continue to montiro and reassess
[id number]: || ||00\01||False;[id number]: || ||00\01||False;

## 2023-11-24 NOTE — BH CONSULTATION LIAISON PROGRESS NOTE - NSBHFUPINTERVALHXFT_PSY_A_CORE
Chart reviewed. On Ativan taper  Patient AO x4, calm, cooperative. She denied any symptoms of depression, alexis, PTSD, psychosis. Patient denied suicidal or homicidal ideations, intent or a plan at the time of the interview or in the past. Pt is inquiring about outpatient substance treatment options and would like to see an outpatient psychiatrist.    Spoke with her mother at her bedside. Mother does not have any safety concerns at this time, she reported that she is concerned for her safety only when she is drunk. Mother would like to take her home.   Patient seen twice today.   Chart reviewed. On Ativan taper  Patient AO x4, calm, cooperative. She denied any symptoms of depression, alexis, PTSD, psychosis. Patient denied suicidal or homicidal ideations, intent or a plan at the time of the interview or in the past. Pt is inquiring about outpatient substance treatment options and would like to see an outpatient psychiatrist.     Patient seen again with her mother at her bedside (pt. gave consent to speak with her mother). Mother does not have any acute psychiatric safety concerns at this time, does not think pt. is a danger to herself or others, she reported that she is concerned for substance use only. Mother would like to take her home.

## 2023-11-24 NOTE — BH CONSULTATION LIAISON PROGRESS NOTE - NSBHATTESTCOMMENTATTENDFT_PSY_A_CORE
Chart reviewed, pt. seen/evaluated with Dr. Cox, I agree with above assessment/plan. Patient alert/oriented, more cooperative though still guarded at times, firmly denies passive or active SIIP, denies AVH, denies HI. Patient expressed motivation to be sober again, she is looking forward to start her new job and wants to spend time with her family, amenable to continue to follow up with her outpatient psychiatrist. Overall alcohol withdrawal sxs are improving. Plan as above, will follow
Chart reviewed, pt. seen/evaluated with the student, I agree with above assessment/plan. Patient oriented, superficially cooperative, dismissive and likely minimizing, reports mood is better, denies SI and HI, no evidence of acute psychosis, denies any recent stressors. Mild tremors and + tongue fasciculation on exam, patient also hypertensive. Plan as above, case d/w Dr. Kenny, psychiatry C/L team agreed with the current ativan taper.  Will follow
Chart reviewed, pt. seen/evaluated with Dr. Cox, I agree with above assessment/plan. Patient oriented, well engaged with linear/coherent thought process, denies sxs of severe depression, denies hopelessness, firmly denies passive or active SIIP, denies HIIP, no evidence of psychosis, expressed motivation to stay sober.  Care coordinated with mother at bedside. Plan as above. Call with questions.

## 2023-11-24 NOTE — BH CONSULTATION LIAISON PROGRESS NOTE - NSBHASSESSMENTFT_PSY_ALL_CORE
Patient is a 48 years old female with a past hx of alcohol use disorder, with prior detox and rehab treatments, DTs, withdrawal seizures, has a psychiatrist, on prestiq 50 mg but unclear for what reason. medical hx of h/o alcoholic pancreatitis, chronic anemia, HTN presents after being found unresponsive next to alcohol bottles and ambien/oxycodon pills in her BP bottle. Psych was consulted for SI and etoh withdrawal. Patient is adamantly denies SI right now or in the past. Current presentation is most likely consistent with alcohol withdrawal and Mood disorder.    11/21: Patient alert and sitting up in bed. Pt expresses desire to go home.  Denies current SI and HI. Pt denies expressing suicidality to her mother. Multiple discrepancies between pt story and collateral information.   11/22: Patient is improving, more calm, less withdrawal symptoms, however, there is still autonomic instability, tongue fasciculations, poor sleep. Patient has been denying SIIP since the day she got here. Identifies reasons for living, future oriented, seeking help with alcohol dependance treatment. Patient is amenable to follow up with her outpatient psychiatrist Dr. Gitlin. At this time given rapid improvement in her mood symptoms, hx of being more emotionally stable when sober makes us think about the potential diagnosis of substance induced mood disorder. Patient is not able to appreciate the consequences of not completing her withdrawal treatment, minimizing her symptoms and inquiring about "speeding up the taper".  11/24: Pt does not have signs of psychosis, depression or suicidal/homicidal ideations. Pt is psych cleared.     Recommendations:  Psych CLEARED  CO 1:1 can be discontinued.  Ativan Taper as per team. (Ativan taper has been increased by the primary medical team yesterday and the  psychiatry agreed. Also discussed with patient)  CIWA with ativan PRN as per CIWA  Thiamine 500 mg IV TID x 3 days to prevent Wernicke encephalopathy-received.  Can Continue with Pristiq 50 mg daily   Will obtain further collateral from family,  attempted to reach her mother, but she was not available, left a VM.  Dispo: psych cleared. No safety concerns at this time. Will need outpatient follow up Patient is a 48 years old female with a past hx of alcohol use disorder, with prior detox and rehab treatments, DTs, withdrawal seizures, has a psychiatrist, on prestiq 50 mg but unclear for what reason. medical hx of h/o alcoholic pancreatitis, chronic anemia, HTN presents after being found unresponsive next to alcohol bottles and ambien/oxycodon pills in her BP bottle. Psych was consulted for SI and etoh withdrawal. Patient is adamantly denies SI right now or in the past. Current presentation is most likely consistent with alcohol withdrawal and Mood disorder.    11/21: Patient alert and sitting up in bed. Pt expresses desire to go home.  Denies current SI and HI. Pt denies expressing suicidality to her mother. Multiple discrepancies between pt story and collateral information.   11/22: Patient is improving, more calm, less withdrawal symptoms, however, there is still autonomic instability, tongue fasciculations, poor sleep. Patient has been denying SIIP since the day she got here. Identifies reasons for living, future oriented, seeking help with alcohol dependance treatment. Patient is amenable to follow up with her outpatient psychiatrist Dr. Gitlin. At this time given rapid improvement in her mood symptoms, hx of being more emotionally stable when sober makes us think about the potential diagnosis of substance induced mood disorder. Patient is not able to appreciate the consequences of not completing her withdrawal treatment, minimizing her symptoms and inquiring about "speeding up the taper".  11/24: Pt does not have signs of psychosis, depression or suicidal/homicidal ideations. She is overall future oriented, compliant with treatment, treatment seeking, hopeful, amenable to follow up with her psychiatrist and substance abuse programs, she is looking forward to start her new part-time job, mother has no safety concerns, pt. has been consistently denying SIIP.   Patient is not at acute risk of harm to self or others and does not meet criteria for involuntary psychiatric admission at this time.      Recommendations:  CO 1:1 can be discontinued from psychiatric perspective.  Ativan Taper as per team.   CIWA with ativan PRN as per CIWA  Thiamine 500 mg IV TID x 3 days to prevent Wernicke encephalopathy-received.  Can Continue with Pristiq 50 mg daily   Care coordinated with pt. mother at bedside.   Dispo: No psychiatric contraindication to discharge, outpatient referrals as below

## 2023-11-24 NOTE — DISCHARGE NOTE PROVIDER - NSDCFUADDINST_GEN_ALL_CORE_FT
[facility] [phone]    [facility]  For acute crisis: [facility]  [address] [facility], [place] [address]  [phone]  [email or web address]

## 2023-11-24 NOTE — DISCHARGE NOTE PROVIDER - HOSPITAL COURSE
HPI:  48 -year-old female with hx of HTN, alcohol use disorder requiring hospitalization for withdrawal, h/o withdrawal  seizure appx 1 year ago, h/o alcoholic pancreatitis, chronic anemia presents after being found unresponsive next to alcohol bottles. She states that she had been drinking for the past two days. Reportedly drank half a pint each time. Patient recently discharged s/p ORIF of mandibular fracture in the setting of alcohol intoxication, on oxycodone that she has been taking PRN. Patient also gets xanax and ambien from her psychiatrist that she reports taking two days ago. Patient denies this being a suicide attempt. She denies any intake of pills aside from those she is prescribed and denies any excessive intake of OTC meds including acetaminophen. She reportedly recently wrote a letter to her dad which per mom was a "reflection of her life" and how she hates her life right now. However, mom denies any active or passive suicidal ideation when she talked to her last. On interview patient endorses feelings of hopelessness, guilt, decreased appetite and also recently losing her job due to her alcohol use. Patient has unsuccessfully tried to quit before but notes being sober for up to 6 years in the past. Patient also endorses a headache, tremors, anxiety, and nausea. Patient reports an episode of emesis yesterday but denies any blood. Denies any visual, tactile, auditory hallucinations.   In the ED, patient afebrile, hypertensive. Patient received tylenol, haldol, ativan IV 2mg, 1mg, thiamine and 2.5L NS bolus and placed on a high risk ciwa with ativan taper. (20 Nov 2023 03:42)    Hospital Course:  While in the hospital patient was assessed by Psych who initially were concerned for suicidal ideation in regards to the letter she wrote to her family. They felt that given her alcohol history and discussion with family about reckless behavior including her jaw fracture, there was a high risk for suicide attempt given patients social situation with recent job difficulties. Additionally, patient demonstrated a lack of capacity regarding her state of health and repeatedly asked to leave AMA despite being told about the risks of leaving including seizures, delirium tremens, and even death as patient has a history of significant withdrawal. Patient was deemed as not having capacity to leave AMA and was placed on 1:1 for monitoring of potential suicide attempt and was started on an Ativan taper to manage her alcohol withdrawal. Patient successfully went through her ativan taper without significant withdrawal symptoms. She was re-evaluated by psych who felt initially that patient no longer had SI/HI and was not a danger to herself but did feel patient did not have capacity to leave AMA regarding her medical care. Patient was then evaluated to have capacity to make medical decisions and agreed to complete her Ativan taper in the hospital. She was recommended to go to [facility] to help with her substance induced mood disorder. Of note, during her hospital stay, patient was noted to have high diastolic blood pressure. While initially this was attributed to withdrawal, patients blood pressure medications were uptitrated and she was started on amlodipine and labetalol for further management. Her blood pressure improved on her regimen and labetalol was able to be discontinued. On the day of discharge patient was stable for discharge with close outpatient followup.     Important Medication Changes and Reason:  - Started amlodipine 10mg daily   - Uptitrated Losartan to 100mg daily    Active or Pending Issues Requiring Follow-up:  - Patient will require close follow up at [facility] to help with her substance abuse and mood disorder    Advanced Directives:   [x] Full code  [ ] DNR  [ ] Hospice    Discharge Diagnoses:  Substance induced mood disorder

## 2023-11-24 NOTE — PROVIDER CONTACT NOTE (OTHER) - RECOMMENDATIONS
Notify MD.
notify MD
Notify MD.
Sanjiv Rich notified
notify MD
notify MD
Notify MD.

## 2023-11-24 NOTE — DISCHARGE NOTE PROVIDER - NSFOLLOWUPCLINICS_GEN_ALL_ED_FT
[facility] Psych Dept of Substance Abuse  Psychiatry Substance Abuse  [address] [place] [address]  Phone: [phone]  Fax:     [facility]  Psychiatry  [address] [place] [address]  Phone: [phone]  Fax:

## 2023-11-24 NOTE — PROGRESS NOTE ADULT - PROBLEM SELECTOR PLAN 2
-Patient with fellings of guilt, hopelessness, changes in appetite. loss of concentration. Patient also endorsing alcohol interfering with her day to day life as she has also lost her job due to this. Recently wrote a letter regarding her lifes refleciton and states that she hates her life right now. However, denies this being a suicide attempt and active ideation. Hx of psych admissoins  Apr psych: high acute suicide risk at this time, 15 years ago and 2 months ago asked mother to assist with suicide, called mother 2 days ago saying she "just wants to die"  Denies any suicidal ideation or thoughts  Psych concerned for substance induced mood disorder    Plan  >consults: Psych  - c/w continuous observation 1:1 for elopement   - c.w pristiq 50 mg daily -Patient with fellings of guilt, hopelessness, changes in appetite. loss of concentration. Patient also endorsing alcohol interfering with her day to day life as she has also lost her job due to this. Recently wrote a letter regarding her lifes refleciton and states that she hates her life right now. However, denies this being a suicide attempt and active ideation. Hx of psych admissoins  Apr psych: high acute suicide risk at this time, 15 years ago and 2 months ago asked mother to assist with suicide, called mother 2 days ago saying she "just wants to die"  Denies any suicidal ideation or thoughts  Psych concerned for substance induced mood disorder, has capacity per psych    Plan  >consults: Psych  - d/c continuous observation 1:1 for elopement   - c.w pristiq 50 mg daily  - f/u psych [facility] crisis clinic outpatient

## 2023-11-24 NOTE — BH CONSULTATION LIAISON PROGRESS NOTE - NSBHCHARTREVIEWLAB_PSY_A_CORE FT
9.5    4.65  )-----------( 226      ( 21 Nov 2023 06:55 )             30.7     11-21    137  |  100  |  9   ----------------------------<  103<H>  3.8   |  27  |  0.61    Ca    9.9      21 Nov 2023 06:55  Phos  3.3     11-21  Mg     1.90     11-21    TPro  8.3  /  Alb  4.4  /  TBili  0.9  /  DBili  x   /  AST  32  /  ALT  15  /  AlkPhos  74  11-21  
                      9.5    4.65  )-----------( 226      ( 21 Nov 2023 06:55 )             30.7     11-21    137  |  100  |  9   ----------------------------<  103<H>  3.8   |  27  |  0.61    Ca    9.9      21 Nov 2023 06:55  Phos  3.3     11-21  Mg     1.90     11-21    TPro  8.3  /  Alb  4.4  /  TBili  0.9  /  DBili  x   /  AST  32  /  ALT  15  /  AlkPhos  74  11-21

## 2023-11-24 NOTE — PROGRESS NOTE ADULT - ATTENDING COMMENTS
46 yo F w/ hx HTN, alcohol use hx hosp for WD, hx WD seizure p/w alcohol intox now with alcohol WD. + prior Hx fall and mandibular fracture. + SI while intoxicated    Alcohol WD- SBIRT c/s- done decline referral to active treatment                    - Thiamine/MVI/folate                   - c/w 1:1;  CIWA currently 0                    - refusing labs and wants to go home;                    - ativan 0.5 q8 x 2 then ativan 0.5 q12                    - denies SI/HI                   - psych f/u   HTN- losartan 100 mg PO qd; c/w norvasc 10 ; hold labetalol on dc  MDD and anxiety - c/w SSRI/ xanax hold for now;   mandibular fracture- regular diet and outpt hardware removal Monday

## 2023-11-25 ENCOUNTER — TRANSCRIPTION ENCOUNTER (OUTPATIENT)
Age: 48
End: 2023-11-25

## 2023-11-25 VITALS
DIASTOLIC BLOOD PRESSURE: 95 MMHG | HEART RATE: 69 BPM | RESPIRATION RATE: 17 BRPM | SYSTOLIC BLOOD PRESSURE: 131 MMHG | OXYGEN SATURATION: 100 % | TEMPERATURE: 98 F

## 2023-11-25 PROCEDURE — 99239 HOSP IP/OBS DSCHRG MGMT >30: CPT

## 2023-11-25 RX ORDER — ALPRAZOLAM 0.25 MG
1 TABLET ORAL
Refills: 0 | DISCHARGE

## 2023-11-25 RX ORDER — LOSARTAN POTASSIUM 100 MG/1
1 TABLET, FILM COATED ORAL
Refills: 0 | DISCHARGE

## 2023-11-25 RX ORDER — ZOLPIDEM TARTRATE 10 MG/1
1 TABLET ORAL
Refills: 0 | DISCHARGE

## 2023-11-25 RX ORDER — AMLODIPINE BESYLATE 2.5 MG/1
1 TABLET ORAL
Qty: 30 | Refills: 0
Start: 2023-11-25 | End: 2023-12-24

## 2023-11-25 RX ORDER — THIAMINE MONONITRATE (VIT B1) 100 MG
1 TABLET ORAL
Qty: 30 | Refills: 0
Start: 2023-11-25 | End: 2023-12-24

## 2023-11-25 RX ORDER — FOLIC ACID 0.8 MG
1 TABLET ORAL
Qty: 30 | Refills: 0
Start: 2023-11-25 | End: 2023-12-24

## 2023-11-25 RX ORDER — LOSARTAN POTASSIUM 100 MG/1
1 TABLET, FILM COATED ORAL
Qty: 30 | Refills: 0
Start: 2023-11-25 | End: 2023-12-24

## 2023-11-25 RX ADMIN — Medication 0.5 MILLIGRAM(S): at 02:59

## 2023-11-25 RX ADMIN — DESVENLAFAXINE 50 MILLIGRAM(S): 50 TABLET, EXTENDED RELEASE ORAL at 12:40

## 2023-11-25 RX ADMIN — Medication 1 TABLET(S): at 12:41

## 2023-11-25 RX ADMIN — Medication 100 MILLIGRAM(S): at 12:41

## 2023-11-25 RX ADMIN — LOSARTAN POTASSIUM 100 MILLIGRAM(S): 100 TABLET, FILM COATED ORAL at 06:15

## 2023-11-25 RX ADMIN — AMLODIPINE BESYLATE 10 MILLIGRAM(S): 2.5 TABLET ORAL at 06:15

## 2023-11-25 RX ADMIN — Medication 1 MILLIGRAM(S): at 12:41

## 2023-11-25 NOTE — DISCHARGE NOTE NURSING/CASE MANAGEMENT/SOCIAL WORK - NSDCPEFALRISK_GEN_ALL_CORE
For information on Fall & Injury Prevention, visit: [email or web address] OR  [email or web address] OR  https://www.cdc.gov/steadi/patient.html

## 2023-11-25 NOTE — PROGRESS NOTE ADULT - SUBJECTIVE AND OBJECTIVE BOX
INTERVAL HPI/OVERNIGHT EVENTS:    SUBJECTIVE: Patient seen and examined at bedside.         OBJECTIVE:    VITAL SIGNS:  ICU Vital Signs Last 24 Hrs  T(C): 36.6 (25 Nov 2023 05:02), Max: 36.9 (24 Nov 2023 17:00)  T(F): 97.8 (25 Nov 2023 05:02), Max: 98.5 (24 Nov 2023 17:00)  HR: 69 (25 Nov 2023 05:02) (69 - 80)  BP: 131/95 (25 Nov 2023 05:02) (107/82 - 134/90)  BP(mean): --  ABP: --  ABP(mean): --  RR: 17 (25 Nov 2023 05:02) (16 - 18)  SpO2: 100% (25 Nov 2023 05:02) (98% - 100%)    O2 Parameters below as of 25 Nov 2023 05:02  Patient On (Oxygen Delivery Method): room air            Plateau pressure:   P/F ratio:     CAPILLARY BLOOD GLUCOSE      PHYSICAL EXAM:  GENERAL: Laying comfortably, NAD  HEENT: NCAT, PERRLA, EOMI, no scleral icterus, no LAD  LUNG: CTABL; No wheezes, crackles, or rhonchi  HEART: RRR; normal S1/S2; No murmurs, rubs, or gallops  ABDOMEN: +BS, soft, nontender, nondistended, no HSM; No rebound, guarding, or rigidity  EXTREMITIES:  No LE edema b/l, 2+ Peripheral Pulses, No clubbing or cyanosis  NEUROLOGY: AOx3, non-focal, no tremmors     MEDICATIONS:  MEDICATIONS  (STANDING):  amLODIPine   Tablet 10 milliGRAM(s) Oral daily  desvenlafaxine ER 50 milliGRAM(s) Oral daily  enoxaparin Injectable 40 milliGRAM(s) SubCutaneous every 24 hours  folic acid 1 milliGRAM(s) Oral daily  LORazepam     Tablet 0.5 milliGRAM(s) Oral once  losartan 100 milliGRAM(s) Oral daily  multivitamin 1 Tablet(s) Oral daily  thiamine 100 milliGRAM(s) Oral daily    MEDICATIONS  (PRN):  acetaminophen     Tablet .. 650 milliGRAM(s) Oral every 6 hours PRN Temp greater or equal to 38C (100.4F), Mild Pain (1 - 3)  LORazepam     Tablet 2 milliGRAM(s) Oral every 2 hours PRN Symptom-triggered 2 point increase in CIWA-Ar  LORazepam     Tablet 4 milliGRAM(s) Oral every 1 hour PRN Symptom-triggered: each CIWA -Ar score 8 or GREATER  LORazepam   Injectable 2 milliGRAM(s) IV Push every 1 hour PRN CIWA-Ar score 8 or greater  melatonin 3 milliGRAM(s) Oral at bedtime PRN Insomnia  ondansetron Injectable 4 milliGRAM(s) IV Push every 8 hours PRN Nausea and/or Vomiting      LABS:                RADIOLOGY & ADDITIONAL TESTS: Reviewed.     INTERVAL HPI/OVERNIGHT EVENTS:    SUBJECTIVE: Patient seen and examined at bedside. pt CIWA 0 in AM. Refused AM labs. Denies cp, tremors, sob, n/v/abd pain. States wants to be discharged today      OBJECTIVE:    VITAL SIGNS:  Vital Signs Last 24 Hrs  T(C): 36.6 (25 Nov 2023 05:02), Max: 36.9 (24 Nov 2023 17:00)  T(F): 97.8 (25 Nov 2023 05:02), Max: 98.5 (24 Nov 2023 17:00)  HR: 69 (25 Nov 2023 05:02) (69 - 80)  BP: 131/95 (25 Nov 2023 05:02) (107/82 - 134/90)  BP(mean): --  ABP: --  ABP(mean): --  RR: 17 (25 Nov 2023 05:02) (16 - 18)  SpO2: 100% (25 Nov 2023 05:02) (98% - 100%)    O2 Parameters below as of 25 Nov 2023 05:02  Patient On (Oxygen Delivery Method): room air            Plateau pressure:   P/F ratio:     CAPILLARY BLOOD GLUCOSE      PHYSICAL EXAM:  GENERAL: Laying comfortably, NAD  HEENT: NCAT, PERRLA, EOMI, no scleral icterus, no LAD  LUNG: CTABL; No wheezes, crackles, or rhonchi  HEART: RRR; normal S1/S2; No murmurs, rubs, or gallops  ABDOMEN: +BS, soft, nontender, nondistended, no HSM; No rebound, guarding, or rigidity  EXTREMITIES:  No LE edema b/l, 2+ Peripheral Pulses, No clubbing or cyanosis  NEUROLOGY: AOx3, non-focal, no tremmors     MEDICATIONS:  MEDICATIONS  (STANDING):  amLODIPine   Tablet 10 milliGRAM(s) Oral daily  desvenlafaxine ER 50 milliGRAM(s) Oral daily  enoxaparin Injectable 40 milliGRAM(s) SubCutaneous every 24 hours  folic acid 1 milliGRAM(s) Oral daily  LORazepam     Tablet 0.5 milliGRAM(s) Oral once  losartan 100 milliGRAM(s) Oral daily  multivitamin 1 Tablet(s) Oral daily  thiamine 100 milliGRAM(s) Oral daily    MEDICATIONS  (PRN):  acetaminophen     Tablet .. 650 milliGRAM(s) Oral every 6 hours PRN Temp greater or equal to 38C (100.4F), Mild Pain (1 - 3)  LORazepam     Tablet 2 milliGRAM(s) Oral every 2 hours PRN Symptom-triggered 2 point increase in CIWA-Ar  LORazepam     Tablet 4 milliGRAM(s) Oral every 1 hour PRN Symptom-triggered: each CIWA -Ar score 8 or GREATER  LORazepam   Injectable 2 milliGRAM(s) IV Push every 1 hour PRN CIWA-Ar score 8 or greater  melatonin 3 milliGRAM(s) Oral at bedtime PRN Insomnia  ondansetron Injectable 4 milliGRAM(s) IV Push every 8 hours PRN Nausea and/or Vomiting      LABS:                RADIOLOGY & ADDITIONAL TESTS: Reviewed.

## 2023-11-25 NOTE — DISCHARGE NOTE NURSING/CASE MANAGEMENT/SOCIAL WORK - NSDCVIVACCINE_GEN_ALL_CORE_FT
influenza, injectable, quadrivalent, preservative free; 23-Oct-2020 20:55; Ramón Wong (RN); [employer]; [id number] (Exp. Date: 30-Jun-2021); IntraMuscular; Deltoid Left.; 0.5 milliLiter(s); VIS (VIS Published: 15-Aug-2019, VIS Presented: 23-Oct-2020);

## 2023-11-25 NOTE — PROGRESS NOTE ADULT - PROBLEM SELECTOR PLAN 1
Patient with prior hospitalization for alcohol withdrawal. Also with recent jaw ORIF iso jaw fracture iso alcohol intoxication. Patient currently presents with a CIWA of 8, however still in the withdrawal window.  RUQ US negative for cirrhosis   Evidence of Rhabdo and low fluid state given high CK and lactate, improved this AM, amylase lipase neg  Per psych had hx of DTs in the past had multiple relapses with alcohol and sobriety periods for as long as 2 years, recently had issue with occupation and started to drink again    Plan  -high risk ciwa   - Ativan taper, ativan 0.5 q8 x 2 then ativan 0.5 q12   - Thiamine, Folate, MV  - IV fluids  - SBIRT, SW consilt Patient with prior hospitalization for alcohol withdrawal. Also with recent jaw ORIF iso jaw fracture iso alcohol intoxication. Patient currently presents with a CIWA of 8, however still in the withdrawal window.  RUQ US negative for cirrhosis   Evidence of Rhabdo and low fluid state given high CK and lactate, improved this AM, amylase lipase neg  Per psych had hx of DTs in the past had multiple relapses with alcohol and sobriety periods for as long as 2 years, recently had issue with occupation and started to drink again    Plan  -high risk ciwa   - Ativan taper complete 11/25  - Thiamine, Folate, MV  - IV fluids  - SBIRT, SW consilt

## 2023-11-25 NOTE — PROGRESS NOTE ADULT - ASSESSMENT
46yo Female with hx of HTN, alcohol use disorder requiring hospitalization for withdrawal, h/o withdrawal  seizure appx 1 year ago, h/o alcoholic pancreatitis, chronic anemia presents after being found unresponsive next to alcohol bottles likely iso alcohol intoxication. Also noted to have written note concerning for suicidality  48yo Female with hx of HTN, alcohol use disorder requiring hospitalization for withdrawal, h/o withdrawal  seizure appx 1 year ago, h/o alcoholic pancreatitis, chronic anemia presents after being found unresponsive next to alcohol bottles likely iso alcohol intoxication s/p ativan taper

## 2023-11-25 NOTE — DISCHARGE NOTE NURSING/CASE MANAGEMENT/SOCIAL WORK - NSDCFUADDAPPT_GEN_ALL_CORE_FT
APPTS ARE READY TO BE MADE: [x] YES    Best Family or Patient Contact (if needed):    Additional Information about above appointments (if needed):    1: [facility] ([facility]): [phone]  2: [facility]  [address] [place] [address], [place] [address]  3: Psychology  4: Psychiatrist   5: PCP Dr. Palmer  6: OMFS    Other comments or requests:

## 2023-11-25 NOTE — PROGRESS NOTE ADULT - PROBLEM SELECTOR PLAN 5
-Lovenox 40  -Diet: Regular diet  -GOC: full code -Lovenox 40  -Diet: Regular diet  -GOC: full code  Dispo: home 11/25

## 2023-11-25 NOTE — DISCHARGE NOTE NURSING/CASE MANAGEMENT/SOCIAL WORK - PATIENT PORTAL LINK FT
You can access the FollowMyHealth Patient Portal offered by [facility] by registering at the following website: [email or web address]. By joining [employer]’s FollowMyHealth portal, you will also be able to view your health information using other applications (apps) compatible with our system.

## 2023-11-25 NOTE — PROGRESS NOTE ADULT - TIME BILLING
Reviewed lab data, radiology results, consultants' recommendations, documentation in [place], discussed with family, ACP, interdisciplinary staff and/or intervention were performed.
Reviewed lab data, radiology results, consultants' recommendations, documentation in [place], discussed with family, ACP, interdisciplinary staff and/or intervention were performed.

## 2023-11-25 NOTE — PROGRESS NOTE ADULT - PROBLEM SELECTOR PLAN 2
-Patient with fellings of guilt, hopelessness, changes in appetite. loss of concentration. Patient also endorsing alcohol interfering with her day to day life as she has also lost her job due to this. Recently wrote a letter regarding her lifes refleciton and states that she hates her life right now. However, denies this being a suicide attempt and active ideation. Hx of psych admissoins  Apr psych: high acute suicide risk at this time, 15 years ago and 2 months ago asked mother to assist with suicide, called mother 2 days ago saying she "just wants to die"  Denies any suicidal ideation or thoughts  Psych concerned for substance induced mood disorder, has capacity per psych    Plan  >consults: Psych  - d/c continuous observation 1:1 for elopement   - c.w pristiq 50 mg daily  - f/u psych [facility] crisis clinic outpatient -Patient with fellings of guilt, hopelessness, changes in appetite. loss of concentration. Patient also endorsing alcohol interfering with her day to day life as she has also lost her job due to this. Recently wrote a letter regarding her lifes refleciton and states that she hates her life right now. However, denies this being a suicide attempt and active ideation. Hx of psych admissoins  Apr psych: high acute suicide risk at this time, 15 years ago and 2 months ago asked mother to assist with suicide, called mother 2 days ago saying she "just wants to die"  Denies any suicidal ideation or thoughts  Psych concerned for substance induced mood disorder, has capacity per psych    Plan  >consults: Psych  - d/c continuous observation 1:1 for elopement   - c.w pristiq 50 mg daily  - f/u psych [facility] crisis clinic outpatient  - psych: has capacity to AMA, denies SI/HI

## 2023-11-27 ENCOUNTER — APPOINTMENT (OUTPATIENT)
Age: 48
End: 2023-11-27
Payer: MEDICAID

## 2023-11-27 PROCEDURE — 99214 OFFICE O/P EST MOD 30 MIN: CPT | Mod: 24

## 2023-11-28 NOTE — CHART NOTE - NSCHARTNOTEFT_GEN_A_CORE
Left (2) message(s) for patient in regards to follow up care with callback information.
OMFS Exam:    patient visited bedside in ED.    patient's occlusion stable and reproducible.   no indication to continue elastics.     no further OMFS needs during this admission.    patient has appt with OMFS on Monday for hardware removal.     Stone Quintanilla DDS  OMFS  [facility] Pager: [phone]  [place] Pager: [phone]  [facility] Pager: [phone]  [employer] on [employer] Teams (PLEASE USE RESIDENT)
Left (1) message(s) for patient in regards to follow up care with callback information.
This report was requested by: Kristy Navarro | Reference #: [id number]    Practitioner Count: 2  Pharmacy Count: 3  Current Opioid Prescriptions: 0  Current Benzodiazepine Prescriptions: 1  Current Stimulant Prescriptions: 0      Patient Demographic Information (PDI)       PDI	First Name	Last Name	Birth Date	Gender	Street Address	[facility] Code  A	Catherine Estrada	1975	Female	[address]	[facility]	[place]	[id number]  B	Catherine Estrada	1975	Female	[address] [place] [id number]  C	Catherine Estrada	1975	Female	[address]	[facility]	[id number]  D	Catherine Estrada	1975	Female	[address]	[facility]	[place]	[id number]    Prescription Information      PDI Filter:    PDI	Current Rx	Drug Type	Rx Written	Rx Dispensed	Drug	Quantity	Days Supply	Prescriber Name	Prescriber VANESA #	Payment Method	Dispenser  A	Y		07/05/2023	11/08/2023	zolpidem tartrate 10 mg tablet	30	30	GitRichard bermudez MD	[id number]	Medicaid	[facility]  A	Y	B	10/25/2023	11/08/2023	alprazolam 1 mg tablet	60	30	GitRichard bermudez MD	[id number]	Medicaid	[facility]  A	N		07/05/2023	10/09/2023	zolpidem tartrate 10 mg tablet	30	30	GitRichard bermudez MD	[id number]	Medicaid	[facility]  A	N		07/05/2023	09/05/2023	zolpidem tartrate 10 mg tablet	30	30	GitRichard bermudez MD	[id number]	Medicaid	[facility]  A	N	B	09/05/2023	09/05/2023	alprazolam 1 mg tablet	60	30	[place], Kevin N MD	[id number]	Medicaid	[facility]  A	N		07/05/2023	08/05/2023	zolpidem tartrate 10 mg tablet	30	30	Gitlin, Kevin N MD	[id number]	Medicaid	[facility]  A	N	B	07/18/2023	07/18/2023	alprazolam 1 mg tablet	60	30	GitRichard bermudez MD	[id number]	Medicaid	[facility]  A	N		04/24/2023	04/25/2023	zolpidem tartrate 10 mg tablet	30	30	GitRichard bermudez MD	[id number]	Medicaid	[facility]  B	N	O	10/30/2023	10/30/2023	oxycodone hcl 5 mg/5 ml soln	300ml	5	[facility]	[id number]	Medicaid	[facility]  C	N	O	04/01/2023	04/03/2023	oxycodone hcl 5 mg/5 ml soln	280ml	7	[facility]	[id number]	[facility]  D	N	O	10/29/2023	10/29/2023	oxycodone hcl 5 mg/5 ml soln	75ml	4	[facility]	[id number]	[employer]	[id number]  D	N	O	04/07/2023	04/08/2023	oxycodone hcl 5 mg/5 ml soln	105ml	7	[facility]	[id number]	[employer]	[id number]  D	N	O	04/02/2023	04/02/2023	oxycodone hcl 5 mg/5 ml soln	30ml	1	[facility]	[id number]	[employer]	[id number]

## 2023-12-05 ENCOUNTER — OFFICE (OUTPATIENT)
Dept: URBAN - METROPOLITAN AREA CLINIC 35 | Facility: CLINIC | Age: 48
Setting detail: OPHTHALMOLOGY
End: 2023-12-05
Payer: COMMERCIAL

## 2023-12-05 DIAGNOSIS — H40.013: ICD-10-CM

## 2023-12-05 PROCEDURE — 92004 COMPRE OPH EXAM NEW PT 1/>: CPT | Performed by: OPHTHALMOLOGY

## 2023-12-05 ASSESSMENT — REFRACTION_AUTOREFRACTION
OD_SPHERE: -0.75
OS_CYLINDER: +1.25
OD_CYLINDER: +1.75
OS_SPHERE: -0.75
OD_AXIS: 100
OS_AXIS: 085

## 2023-12-05 ASSESSMENT — SPHEQUIV_DERIVED
OS_SPHEQUIV: -0.125
OD_SPHEQUIV: 0.125

## 2023-12-05 ASSESSMENT — CONFRONTATIONAL VISUAL FIELD TEST (CVF)
OS_FINDINGS: FULL
OD_FINDINGS: FULL

## 2023-12-11 ENCOUNTER — OFFICE (OUTPATIENT)
Dept: URBAN - METROPOLITAN AREA CLINIC 35 | Facility: CLINIC | Age: 48
Setting detail: OPHTHALMOLOGY
End: 2023-12-11
Payer: COMMERCIAL

## 2023-12-11 DIAGNOSIS — H40.013: ICD-10-CM

## 2023-12-11 PROCEDURE — 92012 INTRM OPH EXAM EST PATIENT: CPT | Performed by: OPHTHALMOLOGY

## 2023-12-11 PROCEDURE — 76514 ECHO EXAM OF EYE THICKNESS: CPT | Performed by: OPHTHALMOLOGY

## 2023-12-11 PROCEDURE — 92083 EXTENDED VISUAL FIELD XM: CPT | Performed by: OPHTHALMOLOGY

## 2023-12-11 ASSESSMENT — REFRACTION_AUTOREFRACTION
OD_SPHERE: -4.25
OS_SPHERE: +4.00
OD_CYLINDER: +1.75
OS_AXIS: 082
OD_AXIS: 094
OS_CYLINDER: +1.25

## 2023-12-11 ASSESSMENT — SPHEQUIV_DERIVED
OD_SPHEQUIV: -3.375
OS_SPHEQUIV: 4.625

## 2023-12-11 ASSESSMENT — REFRACTION_CURRENTRX
OD_AXIS: 082
OS_AXIS: 094
OD_ADD: +1.50
OD_SPHERE: -6.00
OS_SPHERE: -6.25
OS_CYLINDER: +1.75
OS_OVR_VA: 20/
OS_ADD: +1.50
OD_CYLINDER: +1.75
OD_OVR_VA: 20/

## 2023-12-12 NOTE — H&P PST ADULT - HEIGHT IN FEET
Problem: Potential for Falls  Goal: Patient will remain free of falls  Description: INTERVENTIONS:  - Educate patient/family on patient safety including physical limitations  - Instruct patient to call for assistance with activity   - Consult OT/PT to assist with strengthening/mobility   - Keep Call bell within reach  - Keep bed low and locked with side rails adjusted as appropriate  - Keep care items and personal belongings within reach  - Initiate and maintain comfort rounds  - Make Fall Risk Sign visible to staff  - Offer Toileting every 2 Hours, in advance of need  - Initiate/Maintain bed alarm  - Obtain necessary fall risk management equipment:   - Apply yellow socks and bracelet for high fall risk patients  - Consider moving patient to room near nurses station  Outcome: Progressing     Problem: PAIN - ADULT  Goal: Verbalizes/displays adequate comfort level or baseline comfort level  Description: Interventions:  - Encourage patient to monitor pain and request assistance  - Assess pain using appropriate pain scale  - Administer analgesics based on type and severity of pain and evaluate response  - Implement non-pharmacological measures as appropriate and evaluate response  - Consider cultural and social influences on pain and pain management  - Notify physician/advanced practitioner if interventions unsuccessful or patient reports new pain  Outcome: Progressing     Problem: INFECTION - ADULT  Goal: Absence or prevention of progression during hospitalization  Description: INTERVENTIONS:  - Assess and monitor for signs and symptoms of infection  - Monitor lab/diagnostic results  - Monitor all insertion sites, i.e. indwelling lines, tubes, and drains  - Monitor endotracheal if appropriate and nasal secretions for changes in amount and color  - [place] appropriate cooling/warming therapies per order  - Administer medications as ordered  - Instruct and encourage patient and family to use good hand hygiene  technique  - Identify and instruct in appropriate isolation precautions for identified infection/condition  Outcome: Progressing  Goal: Absence of fever/infection during neutropenic period  Description: INTERVENTIONS:  - Monitor WBC    Outcome: Progressing     Problem: SAFETY ADULT  Goal: Patient will remain free of falls  Description: INTERVENTIONS:  - Educate patient/family on patient safety including physical limitations  - Instruct patient to call for assistance with activity   - Consult OT/PT to assist with strengthening/mobility   - Keep Call bell within reach  - Keep bed low and locked with side rails adjusted as appropriate  - Keep care items and personal belongings within reach  - Initiate and maintain comfort rounds  - Make Fall Risk Sign visible to staff  - Offer Toileting every 2 Hours, in advance of need  - Initiate/Maintain bed alarm  - Obtain necessary fall risk management equipment:   - Apply yellow socks and bracelet for high fall risk patients  - Consider moving patient to room near nurses station  Outcome: Progressing  Goal: Maintain or return to baseline ADL function  Description: INTERVENTIONS:  -  Assess patient's ability to carry out ADLs; assess patient's baseline for ADL function and identify physical deficits which impact ability to perform ADLs (bathing, care of mouth/teeth, toileting, grooming, dressing, etc.)  - Assess/evaluate cause of self-care deficits   - Assess range of motion  - Assess patient's mobility; develop plan if impaired  - Assess patient's need for assistive devices and provide as appropriate  - Encourage maximum independence but intervene and supervise when necessary  - Involve family in performance of ADLs  - Assess for home care needs following discharge   - Consider OT consult to assist with ADL evaluation and planning for discharge  - Provide patient education as appropriate  Outcome: Progressing  Goal: Maintains/Returns to pre admission functional  level  Description: INTERVENTIONS:  - Perform AM-PAC 6 Click Basic Mobility/ Daily Activity assessment daily.  - Set and communicate daily mobility goal to care team and patient/family/caregiver.   - Collaborate with rehabilitation services on mobility goals if consulted  - Perform Range of Motion 3 times a day.  - Reposition patient every 2 hours.  - Dangle patient 3 times a day  - Stand patient 3 times a day  - Ambulate patient 3 times a day  - Out of bed to chair 3 times a day   - Out of bed for meals 3 times a day  - Out of bed for toileting  - Record patient progress and toleration of activity level   Outcome: Progressing     Problem: DISCHARGE PLANNING  Goal: Discharge to home or other facility with appropriate resources  Description: INTERVENTIONS:  - Identify barriers to discharge w/patient and caregiver  - Arrange for needed discharge resources and transportation as appropriate  - Identify discharge learning needs (meds, wound care, etc.)  - Arrange for interpretive services to assist at discharge as needed  - Refer to Case Management Department for coordinating discharge planning if the patient needs post-hospital services based on physician/advanced practitioner order or complex needs related to functional status, cognitive ability, or social support system  Outcome: Progressing     Problem: Knowledge Deficit  Goal: Patient/family/caregiver demonstrates understanding of disease process, treatment plan, medications, and discharge instructions  Description: Complete learning assessment and assess knowledge base.  Interventions:  - Provide teaching at level of understanding  - Provide teaching via preferred learning methods  Outcome: Progressing     Problem: CARDIOVASCULAR - ADULT  Goal: Maintains optimal cardiac output and hemodynamic stability  Description: INTERVENTIONS:  - Monitor I/O, vital signs and rhythm  - Monitor for S/S and trends of decreased cardiac output  - Administer and titrate ordered  vasoactive medications to optimize hemodynamic stability  - Assess quality of pulses, skin color and temperature  - Assess for signs of decreased coronary artery perfusion  - Instruct patient to report change in severity of symptoms  Outcome: Progressing  Goal: Absence of cardiac dysrhythmias or at baseline rhythm  Description: INTERVENTIONS:  - Continuous cardiac monitoring, vital signs, obtain 12 lead EKG if ordered  - Administer antiarrhythmic and heart rate control medications as ordered  - Monitor electrolytes and administer replacement therapy as ordered  Outcome: Progressing     Problem: RESPIRATORY - ADULT  Goal: Achieves optimal ventilation and oxygenation  Description: INTERVENTIONS:  - Assess for changes in respiratory status  - Assess for changes in mentation and behavior  - Position to facilitate oxygenation and minimize respiratory effort  - Oxygen administered by appropriate delivery if ordered  - Initiate smoking cessation education as indicated  - Encourage broncho-pulmonary hygiene including cough, deep breathe, Incentive Spirometry  - Assess the need for suctioning and aspirate as needed  - Assess and instruct to report SOB or any respiratory difficulty  - Respiratory Therapy support as indicated  Outcome: Progressing     Problem: Prexisting or High Potential for Compromised Skin Integrity  Goal: Skin integrity is maintained or improved  Description: INTERVENTIONS:  - Identify patients at risk for skin breakdown  - Assess and monitor skin integrity  - Assess and monitor nutrition and hydration status  - Monitor labs   - Assess for incontinence   - Turn and reposition patient  - Assist with mobility/ambulation  - Relieve pressure over bony prominences  - Avoid friction and shearing  - Provide appropriate hygiene as needed including keeping skin clean and dry  - Evaluate need for skin moisturizer/barrier cream  - Collaborate with interdisciplinary team   - Patient/family teaching  - Consider wound  care consult   Outcome: Progressing      5

## 2023-12-18 ENCOUNTER — APPOINTMENT (OUTPATIENT)
Age: 48
End: 2023-12-18

## 2023-12-29 NOTE — DISCHARGE NOTE NURSING/CASE MANAGEMENT/SOCIAL WORK - NSDCPEFALRISK_GEN_ALL_CORE
Patient comes for pov on her left ankle.  Pain much better and minimal.  PT going well.      Physical Exam:  Left ankle  : incisions healed, full ankle and ST motion, calf soft and supple, toes pink and warm with good capillary refill, pulses intact, limb swelling appropriate, wiggles toes    Assessment: s/p left ankle ocd drilling, ST joint scope with debridement, peroneal tendoscopy 11/26/23    Plan:   - Weightbearing instructions and restrictions discussed with patient  - PT order updated, can start WBAT LLE  - DVT prophylaxis finish up  - follow-up visit 3 weeks- 6 weeks  Patient's questions were answered in detail and they are agreeable to above plan.     For information on Fall & Injury Prevention, visit: [email or web address] OR  [email or web address] OR  https://www.cdc.gov/steadi/patient.html

## 2024-02-05 NOTE — H&P ADULT - NSICDXFAMILYHX_GEN_ALL_CORE_FT
Detail Level: Zone
Patient Specific Otc Recommendations (Will Not Stick From Patient To Patient): Revox Line Relaxer for undereyes.
FAMILY HISTORY:  Father  Still living? Unknown  Family history of hypertension, Age at diagnosis: Age Unknown    Mother  Still living? Unknown  Family history of hypertension, Age at diagnosis: Age Unknown

## 2024-03-07 NOTE — ED ADULT TRIAGE NOTE - BANDS:
NP from [facility] is calling.   Did an accessment in the patient's home yesterday.  Bp was elevated a 152/92 and 148/90.   Also noticed and irregular Heart beats.  And some cognitive decline.   Patient states she had a fall a few weeks ago.   NP is unsure if this is from that or a progression with age.    NP is Debbie # [phone]   Fall Risk;

## 2024-03-07 NOTE — ED PROVIDER NOTE - CCCP TRG CHIEF CMPLNT
Physical Therapy Evaluation    Visit Type: Initial Evaluation  Visit: 1  Referring Provider: Ronnie Munoz MD  Medical Diagnosis (from order): M54.50 - Low back pain  M19.90 - Arthritis   Treatment Diagnosis: lumbar - impaired strength, increased pain/symptoms, impaired posture, impaired range of motion, impaired muscle length/flexibility and impaired activity tolerance.  Onset  - Date of onset: 2/7/2024  Chart reviewed at time of initial evaluation (relevant co-morbidities, allergies, tests and medications listed):   - Diagnostic tests reviewed: X-Ray          SUBJECTIVE                                                                                                               Patient reports that he had back surgery in 2021 and recently he has been having pain in his back after playing pickle ball and now the pain is higher than the level where the surgery was. He also notices the pain after getting up from bed and walking. He doesn't have pain with sitting and there is a routine he does every morning that helps with his pain. He has venous stasis in his legs and his function is limited because of that. He has a history of COPD and his actvity tolerance is limited and needs to take breaks often. He does play pickle ball and goes to the gym 3-4x/week and works out mostly his legs which is slightly limited now because of the pain and slowly wants to get back into it.     Pain / Symptoms  - Pain rating (out of 10): Current: 2   - Location: Lumbar spine  - Quality / Description: ache  - Alleviating Factors: over-the-counter medication    Function:   Limitations / Exacerbation Factors:   - Patient reports pain and difficulty with function reported below.  - , house/yard work, walking, squatting/lifting and standing  Prior Level of Function: declining function, therefore referred to therapy,    Patient Goals: return to sport/leisure activities and decreased pain.    Prior treatment  - massage services  - Discharged  from hospital, home health, or skilled nursing facility in last 30 days: no  Home Environment   - Patient lives with: alone  - Type of home: single level home  - Assistance available: as needed  - Denies 2 or more falls or an unexplained fall with injury in the last year.      OBJECTIVE                                                                                                                     Range of Motion (ROM)   (degrees unless noted; active unless noted; norms in ( ); negative=lacking to 0, positive=beyond 0)  Lumbar:  Impairment Level:       - Flexion: minimal impairment    - Extension: severe impairment    - Rotation:         Left: moderate impairment          Right: moderate impairment     - Side Bend:         Left: minimal impairment          Right: minimal impairment     Strength  (out of 5 unless noted, standard test position unless noted)   Hip:    - Flexion:         Left: 5         Right: 5    - Extension:         Left: 4         Right: 4    - Abduction:         Left: 4+         Right: 4+  Knee:    - Extension:         Left: 5         Right: 5          Muscle Flexibility  - Hip External Rotators:  Left: minimal limitation  Right: minimal limitation  - Hip Internal Rotators:  Left: moderate limitation  Right: moderate limitation  - Knee Flexors:  Left: moderate limitation  Right: moderate limitation        Special Tests  Lumbar: Nerve Tests  - Straight Leg Raise:  Left: positive Right: positive  - Supine Passive Straight Leg Raise: Left: positive Right: positive            Outcome/Assessments  Outcome Measures:   OSWESTRY Total Scored: 16  OSWESTRY Total Possible Score: 50  OSWESTRY Score Calculated: 32 %  (0-20% = minimal disability; 20-40% = moderate disability; 40-60% = severe disability; 60-80% = crippled; % = bed bound) see flowsheet for additional documentation        Treatment     Therapeutic Exercise  Supine lateral trunk rotation x5 each way  Supine bridges x10  Supine modified  piriformis stretch x2 with 30 seconds hold on the right   Patient education on plan of care and HEP    Skilled input: verbal instruction/cues    Writer verbally educated and received verbal consent for hand placement, positioning of patient, and techniques to be performed today from patient for clothing adjustments for techniques as described above and how they are pertinent to the patient's plan of care.  Home Exercise Program  Access Code: [email or web address]  URL: [email or web address]  Date: 03/07/2024  Prepared by: Cleveland Law    Exercises  - Supine Lower Trunk Rotation  - 1 x daily - 7 x weekly - 2 sets - 10 reps  - Supine Bridge  - 1 x daily - 7 x weekly - 2 sets - 10 reps  - Supine Piriformis Stretch with Foot on Ground  - 1 x daily - 7 x weekly - 3 sets - 20 hold      ASSESSMENT                                                                                                          78 year old patient has reported functional limitations listed above impacted by signs and symptoms consistent with treatment diagnosis below.  Treatment Diagnosis:   - Involved: lumbar.  - Symptoms/impairments: impaired strength, increased pain/symptoms, impaired posture, impaired range of motion, impaired muscle length/flexibility and impaired activity tolerance.    Patient is a 78 year old male with signs and symptoms consistent with lumbar OA. He has slightly decreased lumbar mobility in each direction especially extension and rotation, decreased bilateral hip extension and abduction strength, decreased activity tolerance and endurance. His functional limitations include but is not limited to pain with walking and getting up from a sitting position, playing pickle ball, doing gym activities, going up/down the stairs and lifting something heavy from the floor. Patient will benefit from skilled therapy services to improve his overall lumbar mobility, bilateral leg strength, activity tolerance and endurance so he  can perform his daily activities with improved functional independence.      Prognosis: Patient will benefit from skilled therapy.  Rehabilitative potential is: good.  Predicted patient presentation: Low (stable) - Patient comorbidities and complexities, as defined above, will have little effect on progress for prescribed plan of care.  Education:   - Present and ready to learn: patient  - Results of above outlined education: Verbalizes understanding and Demonstrates understanding    PLAN                                                                                                                         The following skilled interventions to be implemented to achieve goals listed below:  Neuromuscular Re-Education (78516)  Therapeutic Activity (55045)  Therapeutic Exercise (82183)  Manual Therapy (60158)  Gait Training (59235)  Electrical Stimulation Unattended (11946 or )  Electrical Stimulation Attended (30714)  Heat/Cold (81810)  Activities of Daily Living/Self Care (81799)  Ultrasound/Phonophoresis (94317)    Frequency / Duration  1 times per week tapering as patient progresses for 5 weeks for an estimated total of 5 visits    Patient involved in and agreed to plan of care and goals.    Suggestions for next session as indicated: Assess response to HEP    Goals  Long Term Goals: to be met by end of plan of care  1. In 5 visits, patient will demonstrate improved thoraco-lumbar AROM to at least 80% with no compensations and <3/10 pain on the VAS in order to allow for full and pain-free mobility during everyday activities and ADLs.   2. In 5 visits, patient will demonstrate at least 4+/5 strength on all leg myotomal strength measures in order to progress towards everyday activities like walking longer distances  3. In 5 visits, patient will demonstrate the ability to walk for >20 minutes, with no compensations, and <3/10 pain on the VAS in order to progress towards everyday requirements and to be able move  around while playing pickleball.  4. Oswestry: Patient will complete form to reflect an improved score to less than or equal to 20% to indicate patient reported improvement in function/disability/impairment (minimal detectable change: 12%).      Therapy procedure time and total treatment time can be found documented on the Time Entry flowsheet     intox

## 2024-03-29 NOTE — PATIENT PROFILE ADULT - TRANSPORTATION
[Gradual] : gradual [Dull/Aching] : dull/aching [Frequent] : frequent [de-identified] : 59 yo rhd with r shoulder pain for about 6 months. no injury. pain gradually worsening. no radic sx. [] : no [FreeTextEntry1] : right shoulder  [FreeTextEntry3] : 6 months  [FreeTextEntry5] : no injury, patient is feeling localized pain in shoulder  no

## 2024-04-13 NOTE — ED PROVIDER NOTE - NSTIMEPROVIDERCAREINITIATE_GEN_ER
[facility] - Progress Note    Admit Date: 4/8/2024  Attending Cardiologist: Dr. Chuck Ramírez    Assessment:     -Dyspnea, multifactorial in setting of below.   -Anemia/possible GIB, +FOBT, Hgb 5.9 on admission, s/p transfusions. EGD cancelled d/t orthopnea.   -A/c HFpEF exacerbation, EF 50-55% by TTE 04/2024, 01/2024  -SSS/Persistent Afib with SVR, asymptomatic, historically limiting rate controlling agents. Patient previously declined to be on anticoagulation as outpatient. On ASA only.   -HTN, uncontrolled.   -DM2  -CHIOMA, uses cpap.   -Hx CVA with residual right sided deficits   -ALECT2 Renal Amyloidosis; nephrotic syndrome  -Obesity     Primary cardiologist, Dr. Chuck Ramírez      Plan:     -Diuresis with IV bumex 1 mg BID, appreciate nephrology involvement. Got HD yesterday  -Continue Hydralazine, Imdur.   -No ACEi/ARB/ARNI/AA/SGLT2i secondary to renal function.  -No BB due to slow ventricular rates, asymptomatic.   -Anemia mgmt per primary team.     Subjective:     Still with pitting edema and orthopnea Overall improving  HD yesterday    Objective:      Patient Vitals for the past 8 hrs:   Temp Pulse Resp BP SpO2   04/13/24 0710 98.5 °F (36.9 °C) 50 20 (!) 155/70 97 %   04/13/24 0400 97.6 °F (36.4 °C) 52 20 (!) 166/71 98 %           Patient Vitals for the past 96 hrs:   Weight   04/11/24 1405 103 kg (227 lb)   04/11/24 0607 103 kg (227 lb 1.6 oz)   04/10/24 0421 99.5 kg (219 lb 4.8 oz)                  Current Facility-Administered Medications   Medication Dose Route Frequency    iron sucrose (VENOFER) injection 200 mg  200 mg IntraVENous Q24H    bumetanide (BUMEX) injection 1 mg  1 mg IntraVENous BID    cefTRIAXone (ROCEPHIN) 1,000 mg in sterile water 10 mL IV syringe  1,000 mg IntraVENous Q24H    azithromycin (ZITHROMAX) 500 mg in sodium chloride 0.9% 250 mL (vial-mate/adapter) IVPB  500 mg IntraVENous Q24H    0.9 % sodium chloride infusion   IntraVENous PRN    pantoprazole (PROTONIX) 40 mg in sodium  27-Jun-2023 18:30

## 2024-04-25 ENCOUNTER — INPATIENT (INPATIENT)
Facility: HOSPITAL | Age: 49
LOS: 1 days | Discharge: LEFT BEFORE DISCHARGE | End: 2024-04-27
Attending: HOSPITALIST | Admitting: HOSPITALIST
Payer: MEDICAID

## 2024-04-25 VITALS
HEART RATE: 100 BPM | TEMPERATURE: 99 F | DIASTOLIC BLOOD PRESSURE: 95 MMHG | OXYGEN SATURATION: 100 % | SYSTOLIC BLOOD PRESSURE: 140 MMHG | RESPIRATION RATE: 18 BRPM

## 2024-04-25 DIAGNOSIS — Z98.890 OTHER SPECIFIED POSTPROCEDURAL STATES: Chronic | ICD-10-CM

## 2024-04-25 DIAGNOSIS — S02.609A FRACTURE OF MANDIBLE, UNSPECIFIED, INITIAL ENCOUNTER FOR CLOSED FRACTURE: Chronic | ICD-10-CM

## 2024-04-25 PROCEDURE — 99285 EMERGENCY DEPT VISIT HI MDM: CPT

## 2024-04-25 RX ORDER — SODIUM CHLORIDE 9 MG/ML
1000 INJECTION INTRAMUSCULAR; INTRAVENOUS; SUBCUTANEOUS ONCE
Refills: 0 | Status: COMPLETED | OUTPATIENT
Start: 2024-04-25 | End: 2024-04-25

## 2024-04-25 RX ORDER — FOLIC ACID 0.8 MG
1 TABLET ORAL ONCE
Refills: 0 | Status: COMPLETED | OUTPATIENT
Start: 2024-04-25 | End: 2024-04-25

## 2024-04-25 RX ORDER — THIAMINE MONONITRATE (VIT B1) 100 MG
100 TABLET ORAL ONCE
Refills: 0 | Status: COMPLETED | OUTPATIENT
Start: 2024-04-25 | End: 2024-04-25

## 2024-04-25 NOTE — ED ADULT TRIAGE NOTE - CHIEF COMPLAINT QUOTE
Pt intox, as per EMS has been binge drinking since monday. Pt refusing to answer how much alcohol was consumed. Hx ETOH, substance abuse, jaw surgery - with difficulty speaking s/p procedure Pt intox, as per EMS has been binge drinking since monday. Pt refusing to answer how much alcohol was consumed. Hx ETOH, substance abuse, jaw surgery - with difficulty speaking and deformity s/p procedure Pt intox, as per EMS has been binge drinking since monday. Pt refusing to answer how much alcohol was consumed. Denies falls. Hx ETOH, substance abuse, jaw surgery - with difficulty speaking and deformity s/p procedure Pt intox, as per EMS has been binge drinking since monday. Pt refusing to answer how much alcohol was consumed. Denies SI/HI. Denies falls. Hx ETOH, substance abuse, jaw surgery - with difficulty speaking and deformity s/p procedure

## 2024-04-25 NOTE — ED ADULT NURSE NOTE - CHIEF COMPLAINT QUOTE
Pt intox, as per EMS has been binge drinking since monday. Pt refusing to answer how much alcohol was consumed. Denies SI/HI. Denies falls. Hx ETOH, substance abuse, jaw surgery - with difficulty speaking and deformity s/p procedure

## 2024-04-26 DIAGNOSIS — R00.0 TACHYCARDIA, UNSPECIFIED: ICD-10-CM

## 2024-04-26 DIAGNOSIS — F10.939 ALCOHOL USE, UNSPECIFIED WITH WITHDRAWAL, UNSPECIFIED: ICD-10-CM

## 2024-04-26 DIAGNOSIS — F10.239 ALCOHOL DEPENDENCE WITH WITHDRAWAL, UNSPECIFIED: ICD-10-CM

## 2024-04-26 DIAGNOSIS — I10 ESSENTIAL (PRIMARY) HYPERTENSION: ICD-10-CM

## 2024-04-26 LAB
ALBUMIN SERPL ELPH-MCNC: 4 G/DL — SIGNIFICANT CHANGE UP (ref 3.3–5)
ALP SERPL-CCNC: 60 U/L — SIGNIFICANT CHANGE UP (ref 40–120)
ALT FLD-CCNC: 20 U/L — SIGNIFICANT CHANGE UP (ref 4–33)
ANION GAP SERPL CALC-SCNC: 23 MMOL/L — HIGH (ref 7–14)
AST SERPL-CCNC: 44 U/L — HIGH (ref 4–32)
BASOPHILS # BLD AUTO: 0.06 K/UL — SIGNIFICANT CHANGE UP (ref 0–0.2)
BASOPHILS NFR BLD AUTO: 1 % — SIGNIFICANT CHANGE UP (ref 0–2)
BILIRUB SERPL-MCNC: 0.4 MG/DL — SIGNIFICANT CHANGE UP (ref 0.2–1.2)
BUN SERPL-MCNC: 17 MG/DL — SIGNIFICANT CHANGE UP (ref 7–23)
CALCIUM SERPL-MCNC: 8.4 MG/DL — SIGNIFICANT CHANGE UP (ref 8.4–10.5)
CHLORIDE SERPL-SCNC: 98 MMOL/L — SIGNIFICANT CHANGE UP (ref 98–107)
CO2 SERPL-SCNC: 15 MMOL/L — LOW (ref 22–31)
CREAT SERPL-MCNC: 0.49 MG/DL — LOW (ref 0.5–1.3)
EGFR: 115 ML/MIN/1.73M2 — SIGNIFICANT CHANGE UP
EOSINOPHIL # BLD AUTO: 0.07 K/UL — SIGNIFICANT CHANGE UP (ref 0–0.5)
EOSINOPHIL NFR BLD AUTO: 1.2 % — SIGNIFICANT CHANGE UP (ref 0–6)
ETHANOL SERPL-MCNC: 177 MG/DL — HIGH
GLUCOSE SERPL-MCNC: 108 MG/DL — HIGH (ref 70–99)
HCG SERPL-ACNC: <1 MIU/ML — SIGNIFICANT CHANGE UP
HCT VFR BLD CALC: 39 % — SIGNIFICANT CHANGE UP (ref 34.5–45)
HGB BLD-MCNC: 12.5 G/DL — SIGNIFICANT CHANGE UP (ref 11.5–15.5)
IANC: 4.46 K/UL — SIGNIFICANT CHANGE UP (ref 1.8–7.4)
IMM GRANULOCYTES NFR BLD AUTO: 0.2 % — SIGNIFICANT CHANGE UP (ref 0–0.9)
LIDOCAIN IGE QN: 11 U/L — SIGNIFICANT CHANGE UP (ref 7–60)
LYMPHOCYTES # BLD AUTO: 1.23 K/UL — SIGNIFICANT CHANGE UP (ref 1–3.3)
LYMPHOCYTES # BLD AUTO: 20.3 % — SIGNIFICANT CHANGE UP (ref 13–44)
MAGNESIUM SERPL-MCNC: 1.5 MG/DL — LOW (ref 1.6–2.6)
MCHC RBC-ENTMCNC: 25.4 PG — LOW (ref 27–34)
MCHC RBC-ENTMCNC: 32.1 GM/DL — SIGNIFICANT CHANGE UP (ref 32–36)
MCV RBC AUTO: 79.1 FL — LOW (ref 80–100)
MONOCYTES # BLD AUTO: 0.24 K/UL — SIGNIFICANT CHANGE UP (ref 0–0.9)
MONOCYTES NFR BLD AUTO: 4 % — SIGNIFICANT CHANGE UP (ref 2–14)
NEUTROPHILS # BLD AUTO: 4.46 K/UL — SIGNIFICANT CHANGE UP (ref 1.8–7.4)
NEUTROPHILS NFR BLD AUTO: 73.3 % — SIGNIFICANT CHANGE UP (ref 43–77)
NRBC # BLD: 0 /100 WBCS — SIGNIFICANT CHANGE UP (ref 0–0)
NRBC # FLD: 0 K/UL — SIGNIFICANT CHANGE UP (ref 0–0)
PHOSPHATE SERPL-MCNC: 2.3 MG/DL — LOW (ref 2.5–4.5)
PLATELET # BLD AUTO: 205 K/UL — SIGNIFICANT CHANGE UP (ref 150–400)
POTASSIUM SERPL-MCNC: 4.7 MMOL/L — SIGNIFICANT CHANGE UP (ref 3.5–5.3)
POTASSIUM SERPL-SCNC: 4.7 MMOL/L — SIGNIFICANT CHANGE UP (ref 3.5–5.3)
PROT SERPL-MCNC: 7.7 G/DL — SIGNIFICANT CHANGE UP (ref 6–8.3)
RBC # BLD: 4.93 M/UL — SIGNIFICANT CHANGE UP (ref 3.8–5.2)
RBC # FLD: 16.9 % — HIGH (ref 10.3–14.5)
SODIUM SERPL-SCNC: 136 MMOL/L — SIGNIFICANT CHANGE UP (ref 135–145)
WBC # BLD: 6.07 K/UL — SIGNIFICANT CHANGE UP (ref 3.8–10.5)
WBC # FLD AUTO: 6.07 K/UL — SIGNIFICANT CHANGE UP (ref 3.8–10.5)

## 2024-04-26 PROCEDURE — 99223 1ST HOSP IP/OBS HIGH 75: CPT

## 2024-04-26 RX ORDER — DESVENLAFAXINE 50 MG/1
1 TABLET, EXTENDED RELEASE ORAL
Refills: 0 | DISCHARGE

## 2024-04-26 RX ORDER — SODIUM,POTASSIUM PHOSPHATES 278-250MG
1 POWDER IN PACKET (EA) ORAL ONCE
Refills: 0 | Status: COMPLETED | OUTPATIENT
Start: 2024-04-26 | End: 2024-04-26

## 2024-04-26 RX ORDER — FOLIC ACID 0.8 MG
1 TABLET ORAL DAILY
Refills: 0 | Status: DISCONTINUED | OUTPATIENT
Start: 2024-04-26 | End: 2024-04-27

## 2024-04-26 RX ORDER — ONDANSETRON 8 MG/1
4 TABLET, FILM COATED ORAL EVERY 8 HOURS
Refills: 0 | Status: DISCONTINUED | OUTPATIENT
Start: 2024-04-26 | End: 2024-04-27

## 2024-04-26 RX ORDER — ACETAMINOPHEN 500 MG
650 TABLET ORAL ONCE
Refills: 0 | Status: COMPLETED | OUTPATIENT
Start: 2024-04-26 | End: 2024-04-26

## 2024-04-26 RX ORDER — LANOLIN ALCOHOL/MO/W.PET/CERES
3 CREAM (GRAM) TOPICAL AT BEDTIME
Refills: 0 | Status: DISCONTINUED | OUTPATIENT
Start: 2024-04-26 | End: 2024-04-27

## 2024-04-26 RX ORDER — LOSARTAN POTASSIUM 100 MG/1
100 TABLET, FILM COATED ORAL DAILY
Refills: 0 | Status: DISCONTINUED | OUTPATIENT
Start: 2024-04-26 | End: 2024-04-27

## 2024-04-26 RX ORDER — MAGNESIUM SULFATE 500 MG/ML
2 VIAL (ML) INJECTION ONCE
Refills: 0 | Status: COMPLETED | OUTPATIENT
Start: 2024-04-26 | End: 2024-04-26

## 2024-04-26 RX ORDER — THIAMINE MONONITRATE (VIT B1) 100 MG
500 TABLET ORAL DAILY
Refills: 0 | Status: DISCONTINUED | OUTPATIENT
Start: 2024-04-26 | End: 2024-04-27

## 2024-04-26 RX ORDER — ACETAMINOPHEN 500 MG
650 TABLET ORAL EVERY 6 HOURS
Refills: 0 | Status: DISCONTINUED | OUTPATIENT
Start: 2024-04-26 | End: 2024-04-27

## 2024-04-26 RX ADMIN — Medication 1 MILLIGRAM(S): at 15:24

## 2024-04-26 RX ADMIN — Medication 1 TABLET(S): at 07:50

## 2024-04-26 RX ADMIN — LOSARTAN POTASSIUM 100 MILLIGRAM(S): 100 TABLET, FILM COATED ORAL at 15:23

## 2024-04-26 RX ADMIN — Medication 1 TABLET(S): at 15:24

## 2024-04-26 RX ADMIN — Medication 650 MILLIGRAM(S): at 14:30

## 2024-04-26 RX ADMIN — Medication 1 MILLIGRAM(S): at 00:24

## 2024-04-26 RX ADMIN — Medication 1 MILLIGRAM(S): at 09:24

## 2024-04-26 RX ADMIN — Medication 1 MILLIGRAM(S): at 18:28

## 2024-04-26 RX ADMIN — Medication 100 MILLIGRAM(S): at 00:24

## 2024-04-26 RX ADMIN — Medication 1 MILLIGRAM(S): at 06:31

## 2024-04-26 RX ADMIN — Medication 1 MILLIGRAM(S): at 12:13

## 2024-04-26 RX ADMIN — Medication 25 GRAM(S): at 07:50

## 2024-04-26 RX ADMIN — Medication 50 MILLIGRAM(S): at 00:23

## 2024-04-26 RX ADMIN — SODIUM CHLORIDE 1000 MILLILITER(S): 9 INJECTION INTRAMUSCULAR; INTRAVENOUS; SUBCUTANEOUS at 00:24

## 2024-04-26 RX ADMIN — Medication 105 MILLIGRAM(S): at 18:23

## 2024-04-26 NOTE — ED PROVIDER NOTE - PHYSICAL EXAMINATION
GENERAL: no acute distress, mesomorphic body habitus  HEENT: atraumatic, normocephalic, vision grossly intact, EOMI, no conjunctivitis or discharge, hearing grossly intact, no nasal discharge or epistaxis, clear pharynx  CV: regular rate, normal rhythm, normal S1/S2, no murmurs/rubs, no cyanosis  PULM: normal work of breathing, normal O2 saturation on RA, clear breath sounds in b/l upper/lower lung fields, no crackles/rales/rhonchi/wheezing  GI: soft/non-tender/nondistended abdomen, no guarding or rebound tenderness, no palpable masses  : no CVA tenderness  NEURO: delayed/slurred speech, A&Ox2, follows commands, no focal motor or sensory deficits  MSK: no joint tenderness/swelling/erythema, ranging all extremities with no appreciable loss of ROM  EXT: no peripheral edema, no calf tenderness, no redness or swelling  SKIN: warm, dry, and intact, no rashes  PSYCH: appropriate mood and affect

## 2024-04-26 NOTE — PATIENT PROFILE ADULT - FALL HARM RISK - HARM RISK INTERVENTIONS

## 2024-04-26 NOTE — H&P ADULT - NSICDXFAMILYHX_GEN_ALL_CORE_FT
FAMILY HISTORY:  FH: heart failure    Father  Still living? Unknown  Family history of hypertension, Age at diagnosis: Age Unknown    Mother  Still living? Unknown  Family history of hypertension, Age at diagnosis: Age Unknown

## 2024-04-26 NOTE — ED PROVIDER NOTE - CLINICAL SUMMARY MEDICAL DECISION MAKING FREE TEXT BOX
49 yo F w/ PMHx of HTN, alcohol use disorder with withdrawal seizures in the past (last seizure, 1 year ago), alcoholic pancreatitis, and chronic anemia presents for acute alcohol intoxication after drinking 1 pint of liquor.  Vital signs remarkable for  and /95.  Physical exam is unremarkable including no asterixis/tongue fasciculations.  Concern for acute intoxication with impending withdrawal.  Also concern for Warnicke's encephalopathy and/or metabolic encephalopathy.  Plan for labs, chlordiazepoxide, folic acid, thiamine, CIWA protocol, and EKG.

## 2024-04-26 NOTE — H&P ADULT - NSHPREVIEWOFSYSTEMS_GEN_ALL_CORE
CONSTITUTIONAL: No fever, weight loss, or fatigue  EYES: No eye pain, visual disturbances, or discharge  ENMT:  No difficulty hearing, tinnitus, vertigo; No sinus or throat pain  NECK: No pain or stiffness  BREASTS: No pain, masses, or nipple discharge  RESPIRATORY: No cough, wheezing, chills or hemoptysis; No shortness of breath  CARDIOVASCULAR: No chest pain, palpitations, dizziness, or leg swelling  GASTROINTESTINAL: No abdominal or epigastric pain. No nausea, vomiting, or hematemesis; No diarrhea or constipation. No melena or hematochezia.  GENITOURINARY: No dysuria, frequency, hematuria, or incontinence  NEUROLOGICAL: No headaches, memory loss, loss of strength, numbness; +b/l UE tremors  SKIN: No itching, burning, rashes, or lesions   LYMPH NODES: No enlarged glands  ENDOCRINE: No heat or cold intolerance; No hair loss  MUSCULOSKELETAL: No joint pain or swelling; No muscle, back, or extremity pain  PSYCHIATRIC: No depression, anxiety, mood swings, or difficulty sleeping  HEME/LYMPH: No easy bruising, or bleeding gums  ALLERGY AND IMMUNOLOGIC: No hives or eczema

## 2024-04-26 NOTE — H&P ADULT - ASSESSMENT
Patient is a 47yo F with PMH significant for HTN and alcohol use disorder with 1 episode withdrawal seizure 2022, hx of alcoholic pancreatitis, mandibular fx repair 11/2023, and chronic anemia who presented with alcohol intoxication and subsequent withdrawal symptoms.

## 2024-04-26 NOTE — ED PROVIDER NOTE - ATTENDING CONTRIBUTION TO CARE
48F p/w came in due to alcohol intoxication.  Pt apparently has been drinking all week including today and her MO convinced her to come to ED.  Pt reports has been admitted to the hospital before for ETOH.  PMHX HTN, ETOH abuse with w/d sz, pancreatitis; was here ~6 mos ago for ORIF of mandibular fx.  On exam VS...  Malaise, appears intoxicated, mildly tremulous.  Nontender abd.  Moves all extrems well.  No sign of trauma.  Impression ETOH intox with signs of ETOH withdrawal, volume depletion.  Plan check labs, give fluids, librium, thiamine, reass.  If pt's withdrawal worsens rx BZD and admit.    VS:  unremarkable except tachycardia     GEN - malaise, mildly intoxicated, mildly tremulous;   A+O x3   HEAD - NC/AT     ENT - PEERL, EOMI, mucous membranes   dry , no discharge    Tongue fasciculations.  NECK: Neck supple, non-tender without lymphadenopathy, no masses, no JVD  PULM - CTA b/l,  symmetric breath sounds  COR -  normal heart sounds    ABD - , ND, NT, soft,  BACK - no CVA tenderness, nontender spine     EXTREMS - no edema, no deformity, warm and well perfused   (+)hand tremor.  SKIN - no rash    or bruising      NEUROLOGIC - alert, face symmetric, speech fluent, sensation nl, motor no focal deficit.

## 2024-04-26 NOTE — H&P ADULT - HISTORY OF PRESENT ILLNESS
Patient is a 49yo F with PMH significant for HTN and alcohol use disorder with 1 episode withdrawal seizure 2022, hx of alcoholic pancreatitis, mandibular fx repair 11/2023, and chronic anemia who presented with alcohol intoxication.     She states she has intermittent episodes of EtOH use, most recently beginning again for the past 1 month. She has poor po intake otherwise. Denies fevers, chills, n/v/d/c.    States she agreed for voluntary admission for detoxification but would prefer discharge by 4/27 to home.    Vital signs significant for: afebrile, HR up to 116bpm, hypertensive up to 151/99 or 144/101, RR 17-20, SpO2 % on RA. CIWA score: initially 8, peak of 9, latest 6.  Labs significant for: CBC with MCV 79.1, Hb 12.5, CMP with Scr 0.49, AG 23, AST 44 (mod hemolyzed), Mg 1.50, phos 2.3, lipase 11, glucose 78, .  Imaging: none performed.

## 2024-04-26 NOTE — H&P ADULT - NSHPLABSRESULTS_GEN_ALL_CORE
LABS:                        12.5   6.07  )-----------( 205      ( 25 Apr 2024 23:59 )             39.0     04-25    136  |  98  |  17  ----------------------------<  108<H>  4.7   |  15<L>  |  0.49<L>    Ca    8.4      25 Apr 2024 23:59  Phos  2.3     04-25  Mg     1.50     04-25    TPro  7.7  /  Alb  4.0  /  TBili  0.4  /  DBili  x   /  AST  44<H>  /  ALT  20  /  AlkPhos  60  04-25          Urinalysis Basic - ( 25 Apr 2024 23:59 )    Color: x / Appearance: x / SG: x / pH: x  Gluc: 108 mg/dL / Ketone: x  / Bili: x / Urobili: x   Blood: x / Protein: x / Nitrite: x   Leuk Esterase: x / RBC: x / WBC x   Sq Epi: x / Non Sq Epi: x / Bacteria: x

## 2024-04-26 NOTE — H&P ADULT - SOCIAL HISTORY: ALCOHOL USE
Frequent ingestion, does not quantify, last drink shortly prior to arrival; intermittently abstains but has been drinking excessively for the past 1 month

## 2024-04-26 NOTE — H&P ADULT - TIME BILLING
Time-based billing (NON-critical care).     79 minutes spent on total encounter; more than 50% of the visit was spent counseling and / or coordinating care by the attending physician.  The necessity of the time spent during the encounter on this date of service was due to:     review of laboratory data, radiology results, consultants' recommendations, documentation in [place], discussion with patient

## 2024-04-26 NOTE — H&P ADULT - PROBLEM SELECTOR PLAN 1
- CIWA protocol, initial score 8 confers low risk so not on benzo taper  - seizure and fall precautions  - supplement electrolytes  - offer IV thiamine supplementation  - folate, MVI  - offer resources to help with alcohol abstinence on discharge  - most recent liver imaging was Nov 2023 – will repeat RUQ US to assess liver morphology

## 2024-04-26 NOTE — SBIRT NOTE ADULT - NSSBIRTALCPASSREFTXDET_GEN_A_CORE
Provided SBIRT services: Full screen positive. Referral to Treatment Performed. Screening results were reviewed with the patient and patient was provided information about healthy guidelines and potential negative consequences associated with level of risk. Motivation and readiness to reduce or stop use was discussed and goals and activities to make changes were suggested/offered. Referral for complete assessment and level of care determination at a certified treatment facility was completed by giving the patient information for treatment facilities that met their needs and encouraging them to call for an appointment. A call was not made to a facility because Patient not interested at this time. Patient states she is active in AA.

## 2024-04-26 NOTE — PATIENT PROFILE ADULT - FUNCTIONAL ASSESSMENT - BASIC MOBILITY 6.
4 = No assist / stand by assistance No. SKYE screening performed.  STOP BANG Legend: 0-2 = LOW Risk; 3-4 = INTERMEDIATE Risk; 5-8 = HIGH Risk

## 2024-04-26 NOTE — ED PROVIDER NOTE - PROGRESS NOTE DETAILS
Ruben PGY2: Labs remarkable for phosphorus 2.3, magnesium 1.5.  Blood alcohol level was 177.  Patient was given chlordiazepoxide, folic acid, thiamine, and fluids in anticipation of withdrawal.  On reassessment, patient was having asterixis and tongue fasciculations, but was more alert than initial presentation.  CIWA ~7.  Patient given Ativan 1 mg IV push and phosphorus repleted with potassium phosphate.  Admitted to medicine for alcohol withdrawal.

## 2024-04-26 NOTE — H&P ADULT - PROBLEM SELECTOR PLAN 3
- sinus tachycardia likely related to withdrawal – no indication for telemetry; treat withdrawal and encourage oral hydration

## 2024-04-26 NOTE — H&P ADULT - NSHPPHYSICALEXAM_GEN_ALL_CORE
PHYSICAL EXAM:  Vital Signs Last 24 Hrs  T(C): 36.8 (26 Apr 2024 13:04), Max: 37.2 (25 Apr 2024 20:31)  T(F): 98.3 (26 Apr 2024 13:04), Max: 98.9 (25 Apr 2024 20:31)  HR: 105 (26 Apr 2024 14:00) (97 - 116)  BP: 146/106 (26 Apr 2024 14:00) (128/95 - 151/99)  BP(mean): 115 (26 Apr 2024 09:00) (115 - 115)  RR: 22 (26 Apr 2024 13:04) (17 - 22)  SpO2: 95% (26 Apr 2024 13:04) (95% - 100%)    Parameters below as of 26 Apr 2024 13:04  Patient On (Oxygen Delivery Method): room air      CONSTITUTIONAL: NAD, well-developed, well-groomed  EYES: PERRLA; conjunctiva and sclera clear  ENMT: Moist oral mucosa, no pharyngeal injection or exudates; normal dentition  NECK: Supple, no palpable masses; no thyromegaly  RESPIRATORY: Normal respiratory effort; lungs are clear to auscultation bilaterally  CARDIOVASCULAR: Regular rhythm, tachycardic, normal S1 and S2, no murmur/rub/gallop; No lower extremity edema; Peripheral pulses are 2+ bilaterally  ABDOMEN: Nontender to palpation, normoactive bowel sounds, no rebound/guarding; No hepatosplenomegaly  MUSCULOSKELETAL:  no clubbing or cyanosis of digits; no joint swelling or tenderness to palpation  PSYCH: A+O to person, place, and time; affect appropriate  NEUROLOGY: CN 2-12 are intact and symmetric; no gross sensory deficits   SKIN: No rashes; no palpable lesions

## 2024-04-26 NOTE — ED PROVIDER NOTE - OBJECTIVE STATEMENT
47 yo F w/ PMHx of alcohol use disorder, HTN, and recent jaw surgery for fall presents for acute alcohol intoxication.  She drank 1 pint of liquor earlier today, but denies drinking regularly she states that her mom asked her to present to the ED.  She denies any history of withdrawal symptoms, but states that she has been admitted to the hospital for drinking in the past.  Denies any CP, SOB, abdominal pain, /GI symptoms, fever/chills/cough/sore throat.  HPI limited due to patient's mental status.  Attempted to contact sister for collateral information, but sister not picking up phone.  Requested patient's mom's phone number, but patient did not provide it and does not wish for family members to be contacted.    On chart review, patient has a history of HTN, alcohol use disorder with withdrawal seizures in the past (last seizure, 1 year ago), alcoholic pancreatitis, and chronic anemia.  She was admitted to [facility] 11/20/2023 - 11/25/2023 for an ORIF of mandibular fracture.

## 2024-04-26 NOTE — H&P ADULT - PROBLEM SELECTOR PLAN 2
- hypertensive in setting of withdrawal  - resume home losartan 100mg po daily; goal normotension  - DASH diet

## 2024-04-26 NOTE — ED ADULT NURSE REASSESSMENT NOTE - NS ED NURSE REASSESS COMMENT FT1
BREAK RN: pt a&ox4, offers no complaints. resting comfortably in stretcher. respirations even & unlabored with no accessory muscle use. safety maintained
Pt a&ox3 offers no complaints at this time. Respirations even and unlabored. Pt resting comfortably in bed. Stretcher in lowest position, wheels locked, appropriate side rails in place, call bell in reach.

## 2024-04-27 ENCOUNTER — TRANSCRIPTION ENCOUNTER (OUTPATIENT)
Age: 49
End: 2024-04-27

## 2024-04-27 VITALS
SYSTOLIC BLOOD PRESSURE: 114 MMHG | DIASTOLIC BLOOD PRESSURE: 84 MMHG | HEART RATE: 106 BPM | RESPIRATION RATE: 18 BRPM | TEMPERATURE: 98 F | OXYGEN SATURATION: 100 %

## 2024-04-27 LAB
ALBUMIN SERPL ELPH-MCNC: 3.8 G/DL — SIGNIFICANT CHANGE UP (ref 3.3–5)
ALP SERPL-CCNC: 62 U/L — SIGNIFICANT CHANGE UP (ref 40–120)
ALT FLD-CCNC: 21 U/L — SIGNIFICANT CHANGE UP (ref 4–33)
ANION GAP SERPL CALC-SCNC: 12 MMOL/L — SIGNIFICANT CHANGE UP (ref 7–14)
AST SERPL-CCNC: 35 U/L — HIGH (ref 4–32)
BASOPHILS # BLD AUTO: 0.05 K/UL — SIGNIFICANT CHANGE UP (ref 0–0.2)
BASOPHILS NFR BLD AUTO: 1.1 % — SIGNIFICANT CHANGE UP (ref 0–2)
BILIRUB SERPL-MCNC: 1.2 MG/DL — SIGNIFICANT CHANGE UP (ref 0.2–1.2)
BUN SERPL-MCNC: 14 MG/DL — SIGNIFICANT CHANGE UP (ref 7–23)
CALCIUM SERPL-MCNC: 8.7 MG/DL — SIGNIFICANT CHANGE UP (ref 8.4–10.5)
CHLORIDE SERPL-SCNC: 101 MMOL/L — SIGNIFICANT CHANGE UP (ref 98–107)
CO2 SERPL-SCNC: 23 MMOL/L — SIGNIFICANT CHANGE UP (ref 22–31)
CREAT SERPL-MCNC: 0.55 MG/DL — SIGNIFICANT CHANGE UP (ref 0.5–1.3)
EGFR: 112 ML/MIN/1.73M2 — SIGNIFICANT CHANGE UP
EOSINOPHIL # BLD AUTO: 0.2 K/UL — SIGNIFICANT CHANGE UP (ref 0–0.5)
EOSINOPHIL NFR BLD AUTO: 4.3 % — SIGNIFICANT CHANGE UP (ref 0–6)
GLUCOSE SERPL-MCNC: 111 MG/DL — HIGH (ref 70–99)
HCT VFR BLD CALC: 36 % — SIGNIFICANT CHANGE UP (ref 34.5–45)
HGB BLD-MCNC: 11.5 G/DL — SIGNIFICANT CHANGE UP (ref 11.5–15.5)
IANC: 3.14 K/UL — SIGNIFICANT CHANGE UP (ref 1.8–7.4)
IMM GRANULOCYTES NFR BLD AUTO: 0.2 % — SIGNIFICANT CHANGE UP (ref 0–0.9)
LYMPHOCYTES # BLD AUTO: 0.93 K/UL — LOW (ref 1–3.3)
LYMPHOCYTES # BLD AUTO: 19.8 % — SIGNIFICANT CHANGE UP (ref 13–44)
MAGNESIUM SERPL-MCNC: 1.8 MG/DL — SIGNIFICANT CHANGE UP (ref 1.6–2.6)
MCHC RBC-ENTMCNC: 25.1 PG — LOW (ref 27–34)
MCHC RBC-ENTMCNC: 31.9 GM/DL — LOW (ref 32–36)
MCV RBC AUTO: 78.4 FL — LOW (ref 80–100)
MONOCYTES # BLD AUTO: 0.37 K/UL — SIGNIFICANT CHANGE UP (ref 0–0.9)
MONOCYTES NFR BLD AUTO: 7.9 % — SIGNIFICANT CHANGE UP (ref 2–14)
NEUTROPHILS # BLD AUTO: 3.14 K/UL — SIGNIFICANT CHANGE UP (ref 1.8–7.4)
NEUTROPHILS NFR BLD AUTO: 66.7 % — SIGNIFICANT CHANGE UP (ref 43–77)
NRBC # BLD: 0 /100 WBCS — SIGNIFICANT CHANGE UP (ref 0–0)
NRBC # FLD: 0 K/UL — SIGNIFICANT CHANGE UP (ref 0–0)
PHOSPHATE SERPL-MCNC: 2.6 MG/DL — SIGNIFICANT CHANGE UP (ref 2.5–4.5)
PLATELET # BLD AUTO: 184 K/UL — SIGNIFICANT CHANGE UP (ref 150–400)
POTASSIUM SERPL-MCNC: 4.1 MMOL/L — SIGNIFICANT CHANGE UP (ref 3.5–5.3)
POTASSIUM SERPL-SCNC: 4.1 MMOL/L — SIGNIFICANT CHANGE UP (ref 3.5–5.3)
PROT SERPL-MCNC: 7.4 G/DL — SIGNIFICANT CHANGE UP (ref 6–8.3)
RBC # BLD: 4.59 M/UL — SIGNIFICANT CHANGE UP (ref 3.8–5.2)
RBC # FLD: 16.2 % — HIGH (ref 10.3–14.5)
SODIUM SERPL-SCNC: 136 MMOL/L — SIGNIFICANT CHANGE UP (ref 135–145)
WBC # BLD: 4.7 K/UL — SIGNIFICANT CHANGE UP (ref 3.8–10.5)
WBC # FLD AUTO: 4.7 K/UL — SIGNIFICANT CHANGE UP (ref 3.8–10.5)

## 2024-04-27 PROCEDURE — 99239 HOSP IP/OBS DSCHRG MGMT >30: CPT

## 2024-04-27 RX ORDER — FOLIC ACID 0.8 MG
1 TABLET ORAL
Qty: 30 | Refills: 0
Start: 2024-04-27 | End: 2024-05-26

## 2024-04-27 RX ORDER — THIAMINE MONONITRATE (VIT B1) 100 MG
1 TABLET ORAL
Qty: 30 | Refills: 0
Start: 2024-04-27 | End: 2024-05-26

## 2024-04-27 RX ORDER — ACETAMINOPHEN 500 MG
2 TABLET ORAL
Qty: 0 | Refills: 0 | DISCHARGE
Start: 2024-04-27

## 2024-04-27 RX ADMIN — LOSARTAN POTASSIUM 100 MILLIGRAM(S): 100 TABLET, FILM COATED ORAL at 06:03

## 2024-04-27 NOTE — DISCHARGE NOTE NURSING/CASE MANAGEMENT/SOCIAL WORK - PATIENT PORTAL LINK FT
You can access the FollowMyHealth Patient Portal offered by [facility] by registering at the following website: [email or web address]. By joining [employer]’s FollowMyHealth portal, you will also be able to view your health information using other applications (apps) compatible with our system.

## 2024-04-27 NOTE — DISCHARGE NOTE PROVIDER - NSDCMRMEDTOKEN_GEN_ALL_CORE_FT
acetaminophen 325 mg oral tablet: 2 tab(s) orally every 6 hours As needed Temp greater or equal to 38C (100.4F), Mild Pain (1 - 3)  folic acid 1 mg oral tablet: 1 tab(s) orally once a day  losartan 100 mg oral tablet: 1 tab(s) orally once a day  Multiple Vitamins oral tablet: 1 tab(s) orally once a day  thiamine 250 mg oral tablet: 1 tab(s) orally once a day

## 2024-04-27 NOTE — DISCHARGE NOTE PROVIDER - CARE PROVIDER_API CALL
[facility]  Internal Medicine  [address], [address]  [place] [address]  Phone: [phone]  Fax: [phone]  Follow Up Time: 1-3 days

## 2024-04-27 NOTE — DISCHARGE NOTE PROVIDER - NSDCCPCAREPLAN_GEN_ALL_CORE_FT
PRINCIPAL DISCHARGE DIAGNOSIS  Diagnosis: Alcohol dependence with withdrawal  Assessment and Plan of Treatment: Please abstain from ingesting alcohol in order to optimize your health and prevent adverse events. Continue to supplement with vitamins and follow-up with your primary care provider for further medical care. If you need immediate assistance with substance abuse you may contact the [facility] by calling [phone] open 9am-7pm.  Follow up with primary care doctor to follow up US abdomen to assess liver.

## 2024-04-27 NOTE — DISCHARGE NOTE NURSING/CASE MANAGEMENT/SOCIAL WORK - NSDCVIVACCINE_GEN_ALL_CORE_FT
influenza, injectable, quadrivalent, preservative free; 23-Oct-2020 20:55; Ramón Wong (RN); [employer]; [id number] (Exp. Date: 30-Jun-2021); IntraMuscular; Deltoid Left.; 0.5 milliLiter(s); VIS (VIS Published: 15-Aug-2019, VIS Presented: 23-Oct-2020);

## 2024-04-27 NOTE — DISCHARGE NOTE PROVIDER - HOSPITAL COURSE
Patient is a 47yo F with PMH significant for HTN and alcohol use disorder with 1 episode withdrawal seizure 2022, hx of alcoholic pancreatitis, mandibular fx repair 11/2023, and chronic anemia who presented with alcohol intoxication and subsequent withdrawal symptoms.      Alcohol use with withdrawal.   - CIWA protocol, initial score 8 confers low risk so not on benzo taper  - seizure and fall precautions  - supplement electrolytes  - offer IV thiamine supplementation  - folate, MVI  - offer resources to help with alcohol abstinence on discharge  - most recent liver imaging was Nov 2023 – will repeat RUQ US to assess liver morphology.  - leaving AMA    HTN (hypertension).   - hypertensive in setting of withdrawal  - resume home losartan 100mg po daily; goal normotension  - DASH diet.     Sinus tachycardia.    - sinus tachycardia likely related to withdrawal – no indication for telemetry; treat withdrawal and encourage oral hydration    Called by nurse to evaluate patient who wishes to leave the hospital against medical advice. Patient is A&O x3 and has full capacity to make his or her own medical decisions. Pt was informed of their medical conditions, benefits, and alternatives to treatment as well as the risks of refusing treatment and the seriousness of leaving against medical advice such as the risks of heart attack, stroke, seizure, and even death were fully explained to the patient.  After expressing full understanding, patient then signs out against medical advice witnessed by the nurse.  Attending made aware.      Case discussed with Dr. Gomze on 4/27/24 and patient cleared for discharge. Reviewed discharge medications with patient; All new medications requiring new prescription sent to pharmacy of patients choice. Reviewed need for prescription for previous home medications and new prescriptions sent if requested. Patient in agreement and understands.

## 2024-04-27 NOTE — CHART NOTE - NSCHARTNOTEFT_GEN_A_CORE
Called by RN, as patient wishes to leave AMA.   Patient seen at bedside to further discuss plan of care. Discussed risks of leaving against medical advice, which includes worsening of current medical condition, up to and including death. Patient understands risks and still wishes to leave. AMA paperwork signed and placed in chart. Dr. Gomez made aware.       Erika Stovall NP  [id number]

## 2024-04-27 NOTE — PROGRESS NOTE ADULT - SUBJECTIVE AND OBJECTIVE BOX
Patient is a 48y old  Female who presents with a chief complaint of EtOH intoxication and withdrawal (26 Apr 2024 14:24)      INTERVAL HPI/OVERNIGHT EVENTS: CIWA scores up to 4 overnight. Has required Ativan for scores >8 yesterday.      REVIEW OF SYSTEMS:    CONSTITUTIONAL: No weakness, fevers or chills  EYES/ENT: No visual changes;  No vertigo or throat pain   NECK: No pain or stiffness  RESPIRATORY: No cough, wheezing, hemoptysis; No shortness of breath  CARDIOVASCULAR: No chest pain or palpitations  GASTROINTESTINAL: No abdominal or epigastric pain. No nausea, vomiting, or hematemesis; No diarrhea or constipation. No melena or hematochezia.  GENITOURINARY: No dysuria, frequency or hematuria  NEUROLOGICAL: No numbness or weakness  All other review of systems is negative unless indicated above.    FAMILY HISTORY:  Family history of hypertension (Father, Mother)    FH: heart failure      T(C): 36.4 (04-27-24 @ 05:40), Max: 36.8 (04-26-24 @ 11:43)  HR: 89 (04-27-24 @ 05:40) (81 - 116)  BP: 117/75 (04-27-24 @ 05:40) (102/76 - 149/101)  RR: 17 (04-27-24 @ 05:40) (16 - 22)  SpO2: 100% (04-27-24 @ 05:40) (95% - 100%)  Wt(kg): --Vital Signs Last 24 Hrs  T(C): 36.4 (27 Apr 2024 05:40), Max: 36.8 (26 Apr 2024 11:43)  T(F): 97.6 (27 Apr 2024 05:40), Max: 98.3 (26 Apr 2024 11:43)  HR: 89 (27 Apr 2024 05:40) (81 - 116)  BP: 117/75 (27 Apr 2024 05:40) (102/76 - 149/101)  BP(mean): --  RR: 17 (27 Apr 2024 05:40) (16 - 22)  SpO2: 100% (27 Apr 2024 05:40) (95% - 100%)    Parameters below as of 27 Apr 2024 05:40  Patient On (Oxygen Delivery Method): room air        PHYSICAL EXAM:  CONSTITUTIONAL: NAD, well-developed, well-groomed  EYES: PERRLA; conjunctiva and sclera clear  ENMT: Moist oral mucosa, no pharyngeal injection or exudates; normal dentition  NECK: Supple, no palpable masses; no thyromegaly  RESPIRATORY: Normal respiratory effort; lungs are clear to auscultation bilaterally  CARDIOVASCULAR: Regular rhythm, tachycardic, normal S1 and S2, no murmur/rub/gallop; No lower extremity edema; Peripheral pulses are 2+ bilaterally  ABDOMEN: Nontender to palpation, normoactive bowel sounds, no rebound/guarding; No hepatosplenomegaly  MUSCULOSKELETAL:  no clubbing or cyanosis of digits; no joint swelling or tenderness to palpation  PSYCH: A+O to person, place, and time; affect appropriate  NEUROLOGY: CN 2-12 are intact and symmetric; no gross sensory deficits   SKIN: No rashes; no palpable lesions    Consultant(s) Notes Reviewed:  [x ] YES  [ ] NO  Care Discussed with Consultants/Other Providers [ x] YES  [ ] NO    LABS:                        11.5   4.70  )-----------( 184      ( 27 Apr 2024 07:40 )             36.0     27 Apr 2024 07:40    136    |  101    |  14     ----------------------------<  111    4.1     |  23     |  0.55     Ca    8.7        27 Apr 2024 07:40  Phos  2.6       27 Apr 2024 07:40  Mg     1.80      27 Apr 2024 07:40    TPro  7.4    /  Alb  3.8    /  TBili  1.2    /  DBili  x      /  AST  35     /  ALT  21     /  AlkPhos  62     27 Apr 2024 07:40      CAPILLARY BLOOD GLUCOSE        BLOOD CULTURE      RADIOLOGY & ADDITIONAL TESTS:    Imaging Personally Reviewed:  [ ] YES  [ ] NO  acetaminophen     Tablet .. 650 milliGRAM(s) Oral every 6 hours PRN  aluminum hydroxide/magnesium hydroxide/simethicone Suspension 30 milliLiter(s) Oral every 4 hours PRN  folic acid 1 milliGRAM(s) Oral daily  LORazepam   Injectable 1 milliGRAM(s) IV Push every 1 hour PRN  LORazepam   Injectable 1 milliGRAM(s) IV Push every 2 hours PRN  losartan 100 milliGRAM(s) Oral daily  melatonin 3 milliGRAM(s) Oral at bedtime PRN  multivitamin 1 Tablet(s) Oral daily  ondansetron Injectable 4 milliGRAM(s) IV Push every 8 hours PRN  thiamine IVPB 500 milliGRAM(s) IV Intermittent daily      HEALTH ISSUES - PROBLEM Dx:  Alcohol use with withdrawal    HTN (hypertension)    Sinus tachycardia

## 2024-04-27 NOTE — DISCHARGE NOTE PROVIDER - NSFOLLOWUPCLINICS_GEN_ALL_ED_FT
[facility]  Behavioral Health  [address] [place] [address]  Phone: [phone]  Fax:   Follow Up Time: 1-3 days

## 2024-04-27 NOTE — PROGRESS NOTE ADULT - PROBLEM SELECTOR PLAN 3
- sinus tachycardia likely related to withdrawal – no indication for telemetry; treat withdrawal and encourage oral hydration    I spent a total of 40 minutes on this encounter on providing discharge services (including discussion of the hospital stay, instructions for continuing care, and preparation of discharge records).

## 2024-05-06 NOTE — DISCHARGE NOTE NURSING/CASE MANAGEMENT/SOCIAL WORK - NSPROEXTENSIONSOFSELF_GEN_A_NUR
none Attending Only I have personally provided the amount of critical care time documented below excluding time spent on separate procedures.

## 2024-05-13 NOTE — H&P ADULT - NSHPSOURCEINFORD_GEN_ALL_CORE
Onset: last night      Location / description: Reports urinary frequency and lower abdominal discomfort - denies odor, fevers or low back pain    Precipitating Factors: + test strips for leukocytes     Pain Scale (1-10), 10 highest: 5/10    What improves/worsens symptoms: urinating worsens symptoms - pyridium helping     Symptom specific medications: pyridium     LMP : No LMP recorded. Patient is postmenopausal.     Are you pregnant or breast feeding: n/a    Recent visits (last 3-4 weeks) for same reason or recent surgery:  no recent visits     PLAN:    Provider's office  See Provider within 24 hour    Appointment scheduled with PCP office for later this afternoon - will route message to PCP.     Patient/Caller agrees to follow recommendations.    Reason for Disposition   Urinating more frequently than usual (i.e., frequency)    Protocols used: Urinary Symptoms-A-OH     Chart(s)/Patient

## 2024-08-19 NOTE — DISCHARGE NOTE NURSING/CASE MANAGEMENT/SOCIAL WORK - NSDCVIVACCINE_GEN_ALL_CORE_FT
SOB
influenza, injectable, quadrivalent, preservative free; 23-Oct-2020 20:55; Ramón Wong (RN); [employer]; [id number] (Exp. Date: 30-Jun-2021); IntraMuscular; Deltoid Left.; 0.5 milliLiter(s); VIS (VIS Published: 15-Aug-2019, VIS Presented: 23-Oct-2020);

## 2024-09-09 NOTE — BH CONSULTATION LIAISON ASSESSMENT NOTE - GENERAL APPEARANCE
Speech-Language Pathology                 Therapy Communication Note    Patient Name: Aaron La  MRN: [id number]  Today's Date: 9/9/2024     Discipline: Speech Language Pathology    Missed Visit Reason: Aaron's mom contacted the clinician to cancel his appointment due to being 2 weeks postpartum with a new baby. Will plan to see at next session.     Missed Time: Cancel    Comment:            No deformities present

## 2024-09-10 NOTE — PHYSICAL THERAPY INITIAL EVALUATION ADULT - DID THE PATIENT HAVE SURGERY?
[FreeTextEntry1] : Discussed with patient at length symptoms and diagnosis.  Lengthy discussion with patient regarding nonoperative and operative risks and benefits as well as surgical expectations and postop protocols and expectations, including the possibility that surgery may fail to satisfactorily resolve patient's condition / symptoms.  Patient expresses understanding and patient's questions were answered.  Patient elects to proceed with surgery. n/a

## 2024-09-12 NOTE — PROGRESS NOTE ADULT - PROBLEM SELECTOR PLAN 10
Yes
- DVT ppx: IMPROVE score 0, low risk for VTE. No indication for pharmacologic ppx. Encourage OOB and ambulation as tolerated and with assistance.   - Diet: DASH/TLC

## 2024-09-16 NOTE — ED ADULT NURSE NOTE - RESPIRATORY ASSESSMENT
Mom instructed on ORT tracking tool, verbalized understanding to start 30 mins after zofran administration   - - -

## 2024-10-22 ENCOUNTER — INPATIENT (INPATIENT)
Facility: HOSPITAL | Age: 49
LOS: 2 days | Discharge: HOME | DRG: 897 | End: 2024-10-25
Attending: STUDENT IN AN ORGANIZED HEALTH CARE EDUCATION/TRAINING PROGRAM | Admitting: STUDENT IN AN ORGANIZED HEALTH CARE EDUCATION/TRAINING PROGRAM
Payer: COMMERCIAL

## 2024-10-22 VITALS
HEART RATE: 114 BPM | SYSTOLIC BLOOD PRESSURE: 140 MMHG | RESPIRATION RATE: 18 BRPM | OXYGEN SATURATION: 94 % | DIASTOLIC BLOOD PRESSURE: 96 MMHG | WEIGHT: 149.91 LBS | HEIGHT: 68 IN | TEMPERATURE: 98 F

## 2024-10-22 DIAGNOSIS — Z98.890 OTHER SPECIFIED POSTPROCEDURAL STATES: Chronic | ICD-10-CM

## 2024-10-22 DIAGNOSIS — S02.609A FRACTURE OF MANDIBLE, UNSPECIFIED, INITIAL ENCOUNTER FOR CLOSED FRACTURE: Chronic | ICD-10-CM

## 2024-10-22 DIAGNOSIS — F10.929 ALCOHOL USE, UNSPECIFIED WITH INTOXICATION, UNSPECIFIED: ICD-10-CM

## 2024-10-22 LAB
ALBUMIN SERPL ELPH-MCNC: 3.8 G/DL — SIGNIFICANT CHANGE UP (ref 3.3–5)
ALBUMIN SERPL ELPH-MCNC: 4.2 G/DL — SIGNIFICANT CHANGE UP (ref 3.3–5)
ALP SERPL-CCNC: 64 U/L — SIGNIFICANT CHANGE UP (ref 40–120)
ALP SERPL-CCNC: 77 U/L — SIGNIFICANT CHANGE UP (ref 40–120)
ALT FLD-CCNC: 19 U/L — SIGNIFICANT CHANGE UP (ref 10–45)
ALT FLD-CCNC: 23 U/L — SIGNIFICANT CHANGE UP (ref 10–45)
ANION GAP SERPL CALC-SCNC: 17 MMOL/L — SIGNIFICANT CHANGE UP (ref 5–17)
ANION GAP SERPL CALC-SCNC: 22 MMOL/L — HIGH (ref 5–17)
AST SERPL-CCNC: 38 U/L — SIGNIFICANT CHANGE UP (ref 10–40)
AST SERPL-CCNC: 46 U/L — HIGH (ref 10–40)
BASOPHILS # BLD AUTO: 0.03 K/UL — SIGNIFICANT CHANGE UP (ref 0–0.2)
BASOPHILS NFR BLD AUTO: 0.6 % — SIGNIFICANT CHANGE UP (ref 0–2)
BILIRUB DIRECT SERPL-MCNC: 0.1 MG/DL — SIGNIFICANT CHANGE UP (ref 0–0.3)
BILIRUB DIRECT SERPL-MCNC: 0.1 MG/DL — SIGNIFICANT CHANGE UP (ref 0–0.3)
BILIRUB INDIRECT FLD-MCNC: 0.3 MG/DL — SIGNIFICANT CHANGE UP (ref 0.2–1)
BILIRUB INDIRECT FLD-MCNC: 0.4 MG/DL — SIGNIFICANT CHANGE UP (ref 0.2–1)
BILIRUB SERPL-MCNC: 0.4 MG/DL — SIGNIFICANT CHANGE UP (ref 0.2–1.2)
BILIRUB SERPL-MCNC: 0.5 MG/DL — SIGNIFICANT CHANGE UP (ref 0.2–1.2)
BUN SERPL-MCNC: 15 MG/DL — SIGNIFICANT CHANGE UP (ref 7–23)
BUN SERPL-MCNC: 16 MG/DL — SIGNIFICANT CHANGE UP (ref 7–23)
CALCIUM SERPL-MCNC: 7.7 MG/DL — LOW (ref 8.4–10.5)
CALCIUM SERPL-MCNC: 8.6 MG/DL — SIGNIFICANT CHANGE UP (ref 8.4–10.5)
CHLORIDE SERPL-SCNC: 96 MMOL/L — SIGNIFICANT CHANGE UP (ref 96–108)
CHLORIDE SERPL-SCNC: 98 MMOL/L — SIGNIFICANT CHANGE UP (ref 96–108)
CO2 SERPL-SCNC: 21 MMOL/L — LOW (ref 22–31)
CO2 SERPL-SCNC: 23 MMOL/L — SIGNIFICANT CHANGE UP (ref 22–31)
CREAT SERPL-MCNC: 0.51 MG/DL — SIGNIFICANT CHANGE UP (ref 0.5–1.3)
CREAT SERPL-MCNC: 0.56 MG/DL — SIGNIFICANT CHANGE UP (ref 0.5–1.3)
EGFR: 110 ML/MIN/1.73M2 — SIGNIFICANT CHANGE UP
EGFR: 113 ML/MIN/1.73M2 — SIGNIFICANT CHANGE UP
EOSINOPHIL # BLD AUTO: 0.21 K/UL — SIGNIFICANT CHANGE UP (ref 0–0.5)
EOSINOPHIL NFR BLD AUTO: 4 % — SIGNIFICANT CHANGE UP (ref 0–6)
ETHANOL SERPL-MCNC: 358 MG/DL — HIGH (ref 0–10)
GLUCOSE SERPL-MCNC: 100 MG/DL — HIGH (ref 70–99)
GLUCOSE SERPL-MCNC: 87 MG/DL — SIGNIFICANT CHANGE UP (ref 70–99)
HCG SERPL-ACNC: <2 MIU/ML — SIGNIFICANT CHANGE UP
HCT VFR BLD CALC: 32.9 % — LOW (ref 34.5–45)
HGB BLD-MCNC: 10.5 G/DL — LOW (ref 11.5–15.5)
IMM GRANULOCYTES NFR BLD AUTO: 0.6 % — SIGNIFICANT CHANGE UP (ref 0–0.9)
LIDOCAIN IGE QN: 11 U/L — SIGNIFICANT CHANGE UP (ref 7–60)
LYMPHOCYTES # BLD AUTO: 1.23 K/UL — SIGNIFICANT CHANGE UP (ref 1–3.3)
LYMPHOCYTES # BLD AUTO: 23.7 % — SIGNIFICANT CHANGE UP (ref 13–44)
MAGNESIUM SERPL-MCNC: 1.2 MG/DL — LOW (ref 1.6–2.6)
MAGNESIUM SERPL-MCNC: 1.6 MG/DL — SIGNIFICANT CHANGE UP (ref 1.6–2.6)
MCHC RBC-ENTMCNC: 25.8 PG — LOW (ref 27–34)
MCHC RBC-ENTMCNC: 31.9 GM/DL — LOW (ref 32–36)
MCV RBC AUTO: 80.8 FL — SIGNIFICANT CHANGE UP (ref 80–100)
MONOCYTES # BLD AUTO: 0.41 K/UL — SIGNIFICANT CHANGE UP (ref 0–0.9)
MONOCYTES NFR BLD AUTO: 7.9 % — SIGNIFICANT CHANGE UP (ref 2–14)
NEUTROPHILS # BLD AUTO: 3.29 K/UL — SIGNIFICANT CHANGE UP (ref 1.8–7.4)
NEUTROPHILS NFR BLD AUTO: 63.2 % — SIGNIFICANT CHANGE UP (ref 43–77)
NRBC # BLD: 0 /100 WBCS — SIGNIFICANT CHANGE UP (ref 0–0)
PHOSPHATE SERPL-MCNC: 1.6 MG/DL — LOW (ref 2.5–4.5)
PHOSPHATE SERPL-MCNC: 1.9 MG/DL — LOW (ref 2.5–4.5)
PLATELET # BLD AUTO: 150 K/UL — SIGNIFICANT CHANGE UP (ref 150–400)
POTASSIUM SERPL-MCNC: 3.4 MMOL/L — LOW (ref 3.5–5.3)
POTASSIUM SERPL-MCNC: 3.8 MMOL/L — SIGNIFICANT CHANGE UP (ref 3.5–5.3)
POTASSIUM SERPL-SCNC: 3.4 MMOL/L — LOW (ref 3.5–5.3)
POTASSIUM SERPL-SCNC: 3.8 MMOL/L — SIGNIFICANT CHANGE UP (ref 3.5–5.3)
PROT SERPL-MCNC: 7 G/DL — SIGNIFICANT CHANGE UP (ref 6–8.3)
PROT SERPL-MCNC: 8.1 G/DL — SIGNIFICANT CHANGE UP (ref 6–8.3)
RBC # BLD: 4.07 M/UL — SIGNIFICANT CHANGE UP (ref 3.8–5.2)
RBC # FLD: 18.5 % — HIGH (ref 10.3–14.5)
SODIUM SERPL-SCNC: 136 MMOL/L — SIGNIFICANT CHANGE UP (ref 135–145)
SODIUM SERPL-SCNC: 141 MMOL/L — SIGNIFICANT CHANGE UP (ref 135–145)
WBC # BLD: 5.2 K/UL — SIGNIFICANT CHANGE UP (ref 3.8–10.5)
WBC # FLD AUTO: 5.2 K/UL — SIGNIFICANT CHANGE UP (ref 3.8–10.5)

## 2024-10-22 PROCEDURE — 99223 1ST HOSP IP/OBS HIGH 75: CPT

## 2024-10-22 PROCEDURE — 99285 EMERGENCY DEPT VISIT HI MDM: CPT

## 2024-10-22 RX ORDER — ACETAMINOPHEN 500 MG
650 TABLET ORAL ONCE
Refills: 0 | Status: COMPLETED | OUTPATIENT
Start: 2024-10-22 | End: 2024-10-22

## 2024-10-22 RX ORDER — ALPRAZOLAM 0.25 MG
1 TABLET ORAL
Refills: 0 | DISCHARGE

## 2024-10-22 RX ORDER — THIAMINE HCL 100 MG
500 TABLET ORAL DAILY
Refills: 0 | Status: DISCONTINUED | OUTPATIENT
Start: 2024-10-22 | End: 2024-10-23

## 2024-10-22 RX ORDER — FOLIC ACID 1 MG/1
1 TABLET ORAL ONCE
Refills: 0 | Status: COMPLETED | OUTPATIENT
Start: 2024-10-22 | End: 2024-10-22

## 2024-10-22 RX ORDER — LORAZEPAM 2 MG
4 TABLET ORAL ONCE
Refills: 0 | Status: DISCONTINUED | OUTPATIENT
Start: 2024-10-22 | End: 2024-10-22

## 2024-10-22 RX ORDER — CHOLECALCIFEROL (VITAMIN D3) 625 MCG
1 CAPSULE ORAL
Refills: 0 | DISCHARGE

## 2024-10-22 RX ORDER — B-COMPLEX WITH VITAMIN C
1 VIAL (ML) INJECTION DAILY
Refills: 0 | Status: DISCONTINUED | OUTPATIENT
Start: 2024-10-22 | End: 2024-10-25

## 2024-10-22 RX ORDER — FOLIC ACID 1 MG/1
1 TABLET ORAL DAILY
Refills: 0 | Status: DISCONTINUED | OUTPATIENT
Start: 2024-10-22 | End: 2024-10-25

## 2024-10-22 RX ORDER — LORAZEPAM 2 MG
1.5 TABLET ORAL EVERY 4 HOURS
Refills: 0 | Status: DISCONTINUED | OUTPATIENT
Start: 2024-10-23 | End: 2024-10-23

## 2024-10-22 RX ORDER — LOSARTAN POTASSIUM 25 MG/1
1 TABLET ORAL
Refills: 0 | DISCHARGE

## 2024-10-22 RX ORDER — CHLORDIAZEPOXIDE HCL 10 MG
50 CAPSULE ORAL ONCE
Refills: 0 | Status: DISCONTINUED | OUTPATIENT
Start: 2024-10-22 | End: 2024-10-22

## 2024-10-22 RX ORDER — HALOPERIDOL DECANOATE 50 MG/ML
5 INJECTION INTRAMUSCULAR ONCE
Refills: 0 | Status: COMPLETED | OUTPATIENT
Start: 2024-10-22 | End: 2024-10-22

## 2024-10-22 RX ORDER — LORAZEPAM 2 MG
TABLET ORAL
Refills: 0 | Status: DISCONTINUED | OUTPATIENT
Start: 2024-10-22 | End: 2024-10-23

## 2024-10-22 RX ORDER — SODIUM CHLORIDE 9 MG/ML
1000 INJECTION, SOLUTION INTRAMUSCULAR; INTRAVENOUS; SUBCUTANEOUS ONCE
Refills: 0 | Status: COMPLETED | OUTPATIENT
Start: 2024-10-22 | End: 2024-10-22

## 2024-10-22 RX ORDER — LORAZEPAM 2 MG
2 TABLET ORAL ONCE
Refills: 0 | Status: DISCONTINUED | OUTPATIENT
Start: 2024-10-22 | End: 2024-10-22

## 2024-10-22 RX ORDER — THIAMINE HCL 100 MG
100 TABLET ORAL ONCE
Refills: 0 | Status: COMPLETED | OUTPATIENT
Start: 2024-10-22 | End: 2024-10-22

## 2024-10-22 RX ORDER — SODIUM PHOSPHATE, MONOBASIC, MONOHYDRATE AND SODIUM PHOSPHATE, DIBASIC ANHYDROUS 276; 142 MG/ML; MG/ML
15 INJECTION, SOLUTION INTRAVENOUS ONCE
Refills: 0 | Status: COMPLETED | OUTPATIENT
Start: 2024-10-22 | End: 2024-10-22

## 2024-10-22 RX ORDER — LORAZEPAM 2 MG
2 TABLET ORAL EVERY 4 HOURS
Refills: 0 | Status: DISCONTINUED | OUTPATIENT
Start: 2024-10-22 | End: 2024-10-23

## 2024-10-22 RX ADMIN — Medication 2 MILLIGRAM(S): at 14:27

## 2024-10-22 RX ADMIN — Medication 2 MILLIGRAM(S): at 21:13

## 2024-10-22 RX ADMIN — Medication 105 MILLIGRAM(S): at 22:28

## 2024-10-22 RX ADMIN — Medication 650 MILLIGRAM(S): at 22:58

## 2024-10-22 RX ADMIN — Medication 2 MILLIGRAM(S): at 15:05

## 2024-10-22 RX ADMIN — Medication 4 MILLIGRAM(S): at 18:00

## 2024-10-22 RX ADMIN — Medication 1 TABLET(S): at 22:59

## 2024-10-22 RX ADMIN — HALOPERIDOL DECANOATE 5 MILLIGRAM(S): 50 INJECTION INTRAMUSCULAR at 15:02

## 2024-10-22 RX ADMIN — SODIUM PHOSPHATE, MONOBASIC, MONOHYDRATE AND SODIUM PHOSPHATE, DIBASIC ANHYDROUS 63.75 MILLIMOLE(S): 276; 142 INJECTION, SOLUTION INTRAVENOUS at 11:52

## 2024-10-22 RX ADMIN — Medication 100 MILLIGRAM(S): at 11:48

## 2024-10-22 RX ADMIN — FOLIC ACID 1 MILLIGRAM(S): 1 TABLET ORAL at 11:52

## 2024-10-22 RX ADMIN — SODIUM CHLORIDE 1000 MILLILITER(S): 9 INJECTION, SOLUTION INTRAMUSCULAR; INTRAVENOUS; SUBCUTANEOUS at 11:47

## 2024-10-22 RX ADMIN — Medication 50 MILLIGRAM(S): at 10:47

## 2024-10-22 NOTE — ED ADULT TRIAGE NOTE - AS HEIGHT TYPE
Leonela Faria is a 68 year old female.   Chief Complaint   Patient presents with    Follow - Up     Patient complains of tenderness in neck area after surgery     HPI:   Patient here for follow-up after feeling like she strained her neck trying to push an object that was too heavy for her.    Current Outpatient Medications   Medication Sig Dispense Refill    UNITHROID 50 MCG Oral Tab Take 1 tablet (50 mcg total) by mouth before breakfast. 90 tablet 0    sulfamethoxazole-trimethoprim -160 MG Oral Tab per tablet Take 1 tablet by mouth every 12 (twelve) hours. (Patient not taking: Reported on 7/26/2024) 14 tablet 0    traMADol 50 MG Oral Tab Take 1 tablet (50 mg total) by mouth every 6 (six) hours as needed for Pain. (Patient not taking: Reported on 10/11/2024) 15 tablet 0    cephalexin 500 MG Oral Cap Take 1 capsule (500 mg total) by mouth every 8 (eight) hours. (Patient not taking: Reported on 10/11/2024) 21 capsule 0    ondansetron (ZOFRAN) 4 mg tablet Take 1 tablet (4 mg total) by mouth every 8 (eight) hours as needed for Nausea. (Patient not taking: Reported on 10/11/2024) 6 tablet 0      Past Medical History:    Anxiety    Disorder of thyroid    History of stomach ulcers    IBS (irritable bowel syndrome)    Migraines    after surgery migraines    PONV (postoperative nausea and vomiting)      Social History:  Social History     Socioeconomic History    Marital status:    Tobacco Use    Smoking status: Never    Smokeless tobacco: Never   Vaping Use    Vaping status: Never Used   Substance and Sexual Activity    Alcohol use: Never    Drug use: Never     Social Drivers of Health     Food Insecurity: No Food Insecurity (6/17/2024)    Food Insecurity     Food Insecurity: Never true   Transportation Needs: No Transportation Needs (6/17/2024)    Transportation Needs     Lack of Transportation: No   Housing Stability: Low Risk  (6/17/2024)    Housing Stability     Housing Instability: No        REVIEW  OF SYSTEMS:   GENERAL HEALTH: feels well otherwise  GENERAL : denies fever, chills, sweats, weight loss, weight gain  SKIN: denies any unusual skin lesions or rashes  RESPIRATORY: denies shortness of breath with exertion  NEURO: denies headaches    EXAM:   Pulse 63   Resp 16   Ht 5' 7\" (1.702 m)   Wt 117 lb (53.1 kg)   SpO2 97%   BMI 18.32 kg/m²   System Details   Skin Inspection - Normal.   Constitutional Overall appearance - Normal.   Head/Face Facial features - Normal. Eyebrows - Normal. Skull - Normal.   Eyes Conjunctiva - Right: Normal, Left: Normal. Pupil - Right: Normal, Left: Normal.    Ears Inspection - Right: Normal, Left: Normal.   Canal - Right: Normal, Left: Normal.   TM - Right: Normal, Left: Normal.   Nasal External nose - Normal.   Nasal septum - Normal.  Turbinates - Normal.   Oral/Oropharynx Lips - Normal, Tonsils - Normal, Tongue - Normal    Neck Exam Inspection - Normal. Palpation - Normal. Parotid gland - Normal. Thyroid gland - Normal.  Neck incision well-healed.  No erythema.   Lymph Detail Submental. Submandibular. Anterior cervical. Posterior cervical. Supraclavicular all without enlargement   Psychiatric Orientation - Oriented to time, place, person & situation. Appropriate mood and affect.   Neurological Memory - Normal. Cranial nerves - Cranial nerves II through XII grossly intact.     ASSESSMENT AND PLAN:   1. Oropharyngeal dysphagia  Continue to advance diet as tolerated.    2. Neck pain  Continue to exercise as tolerated.  You can even consider getting weights which might help you better control the amount of effort in a more uniform way.  Follow-up in 2 months time, sooner if problems.        The patient indicates understanding of these issues and agrees to the plan.      Melinda Jaramillo MD  10/11/2024  9:53 PM   stated

## 2024-10-22 NOTE — ED ADULT NURSE NOTE - OBJECTIVE STATEMENT
48 yo female presents to the ED aaox4 ambulatory with complaints of intox x today. History of ETOH abuse. Per ems, family called to check on patient as she was not responding to them, pt was found supine in bed with vodka on table. Pt states she drank a little less than 1 liter of vodka. Pt endorsing ha. Upon exposure, no acute signs of trauma noted. Dried blood noted on R hand. Pt denies recent falls / trauma. Pt Denies , dizziness, vision changes, chest pain, shortness of breath, abdominal pain, nausea, vomiting, diarrhea, fevers, chills, dysuria, hematuria, recent illness travel or fall.

## 2024-10-22 NOTE — ED ADULT NURSE REASSESSMENT NOTE - NS ED NURSE REASSESS COMMENT FT1
Rn attempted to call MAR regarding pt's CIWA of 9. MAR currently in rapid, instructed to call again in 15 min. will attempt again

## 2024-10-22 NOTE — H&P ADULT - PROBLEM SELECTOR PLAN 1
- CIWA triggered ativan prn, monitor scores to consider conversion to taper if persistently elevated  - thiamine, folate, MV  - fall/aspiration/seizure precautions  - SW, SBIRT c/s

## 2024-10-22 NOTE — H&P ADULT - HISTORY OF PRESENT ILLNESS
49F PMH HTN, AUD (c/b withdrawal seizure 2022, pancreatitis), mandibular fracture repair (11/2023), chronic anemia, admit 4/2024 for EtOH withdrawal at which time left AMA, now p/w EtOH intoxication.     HR 90s-110s, -170s/90s-110s, RR 18-22    H/H 10.5/32.9 (normocytic); Mg 1.2, Ph 1.6, K 3.4, Ca2+ 7.9 (corrected); EtOH 358 49F PMH HTN, AUD (c/b withdrawal seizure 2022, pancreatitis), mandibular fracture repair (11/2023), chronic anemia, admit 4/2024 for EtOH withdrawal at which time left AMA, now p/w EtOH intoxication. AOx4, is vague regarding volume of EtOH use, though reports increased EtOH intake x weeks, culminating in drinking 1 bottle of liquor today. Denies hallucinations, anxiety, SI/HI, recent life stressors prompting EtOH use, stating that she "simply wants to get through this". Otherwise feels well, denies complaints.    HR 90s-110s, -170s/90s-110s, RR 18-22    H/H 10.5/32.9 (normocytic); Mg 1.2, Ph 1.6, K 3.4, Ca2+ 7.9 (corrected); EtOH 358     no imaging obtained in ED    s/p librium 50mg PO, tylenol 650mg PO, folic acid 1mg PO, haldol 5mg IM, ativan 8mg IV,

## 2024-10-22 NOTE — H&P ADULT - ASSESSMENT
49F PMH HTN, AUD (c/b withdrawal seizure 2022, pancreatitis), mandibular fracture repair (11/2023), chronic anemia, admit 4/2024 for EtOH withdrawal at which time left AMA, now p/w EtOH intoxication admit for EtOH withdrawal

## 2024-10-22 NOTE — H&P ADULT - NSHPADDITIONALINFOADULT_GEN_ALL_CORE
Urgent medical problems addressed overnight, comprehensive care to be assumed by day colleagues during course of admission.    Please refer to detailed radiology reports above, provide to patient upon discharge for presentation to PCP at which time abnormal findings not addressed inpatient can be followed up.

## 2024-10-22 NOTE — ED ADULT NURSE REASSESSMENT NOTE - NS ED NURSE REASSESS COMMENT FT1
RN contacted TENA Guadalupe for orders, LESLY guadalupe state that he is in ED and will come assess pt.

## 2024-10-22 NOTE — ED PROVIDER NOTE - CLINICAL SUMMARY MEDICAL DECISION MAKING FREE TEXT BOX
QUIN Alfredo PGY2- patient with hx etoh abuse, 1 prior withdrawal seizure, presenting with alcohol intoxication, drank 1 bottle of vodka prior to arrival. no signs of injury. tender to epigastrium on exam, tachycardic. will obtain labs, medicate as needed if patient exhibits signs of withdrawal, dispo pending clinical course.

## 2024-10-22 NOTE — H&P ADULT - NSHPREVIEWOFSYSTEMS_GEN_ALL_CORE
REVIEW OF SYSTEMS:  CONSTITUTIONAL: No fever or chills  EYES: No visual disturbances or eye pain  ENMT: No sinus or throat pain  RESPIRATORY: No shortness of breath or cough  CARDIOVASCULAR: No chest pain, palpitations, or dizziness  GASTROINTESTINAL: No abdominal or epigastric pain. No diarrhea or constipation. No melena or hematochezia.   GENITOURINARY: No dysuria or hematuria  NEUROLOGICAL: No headaches, loss of strength or numbness +as per HPI   MUSCULOSKELETAL: No joint pain or swelling; No muscle, back, or extremity pain

## 2024-10-22 NOTE — ED PROVIDER NOTE - OBJECTIVE STATEMENT
The patient is a 48 y/o F with past medical history of etoh abuse, prior withdrawal seizure x1 (in 2022), pancreatitis, chronic anemia, HTN, mandible fracture, presenting with ETOH intoxication. Per EMS patient lives alone, was found in bed by brother who came to check on her, with near-empty bottle of vodka. Patient admitted for detox in April 2024, states has been drinking for past 3 days, does endorse drinking almost entire bottle of vodka this morning. Denies abd pain, vomiting, chest pain, or any other symptoms.

## 2024-10-22 NOTE — ED ADULT NURSE REASSESSMENT NOTE - NS ED NURSE REASSESS COMMENT FT1
pt asked for food, CIWA score 4, no nausea at this moment, as per MD, pt can eat. Tray offered, food cut up, pt eating without assistance.

## 2024-10-22 NOTE — H&P ADULT - NSHPPHYSICALEXAM_GEN_ALL_CORE
PHYSICAL EXAM:  GENERAL: +Anxious-appearing, well-groomed, well-developed, pleasant to interview  HEAD: Atraumatic, Normocephalic  EYES: PERRL, conjunctiva and sclera clear  ENMT: No tonsillar erythema, exudates, or enlargement; Moist mucous membranes  NECK: Supple w/o JVD  NERVOUS SYSTEM: Alert & Oriented X4, Good concentration; Motor Strength 5/5 B/L upper and lower extremities. Sensation equal/intact b/l UE and LE. CN II-XII grossly intact. +UE tremulous   CHEST/LUNG: Clear to auscultation bilaterally; No rales, rhonchi, wheezing, or rubs  HEART: +Tachycardic and regular rhythm; No murmurs, rubs, or gallops  ABDOMEN: Soft, Nontender, Nondistended; Bowel sounds present. No guarding, rebound tenderness, or rigidity.  EXTREMITIES: 2+ Peripheral Pulses, No edema/warmth/erythema/tenderness to palpation b/l LE

## 2024-10-22 NOTE — H&P ADULT - PROBLEM SELECTOR PLAN 5
- HSQ VTE PPx  - precautions above  - DASH diet  - disposition pending course - lovenox VTE ppx  - precautions above  - DASH diet  - disposition pending course

## 2024-10-22 NOTE — ED PROVIDER NOTE - PHYSICAL EXAMINATION
General: intoxicated  Head: normocephalic; atraumatic  Eyes: conjunctivae clear bilaterally, sclerae anicteric  ENT: no nasal flaring, patent nares  Cardio: tachycardia with regular rhythm  Resp: clear to auscultation bilaterally  GI: abdomen soft, nondistended; mild epigastric tenderness  Neuro: somnolent but arousable, responding appropriately   Skin: no rashes or bruising noted  MSK: normal movement of extremities  Lymph/Vasc: no LE edema General: intoxicated  Head: normocephalic; atraumatic  Eyes: conjunctivae clear bilaterally, sclerae anicteric  ENT: no nasal flaring, patent nares  Cardio: tachycardia with regular rhythm  Resp: clear to auscultation bilaterally  GI: abdomen soft, nondistended; mild epigastric tenderness  Neuro: somnolent but arousable, responding appropriately to questions  Skin: no rashes or bruising noted  MSK: normal movement of extremities  Lymph/Vasc: no LE edema

## 2024-10-22 NOTE — ED PROVIDER NOTE - ATTENDING CONTRIBUTION TO CARE
Attending MD Frank: I have seen and examined this patient and fully participated in the care of this patient as the teaching attending. I personally made/approved the management plan and take responsibility for the patient management.     I have discussed the patient's case presentation with resident. I have also personally performed a face-to-face evaluation of the patient. I agree with the resident's assessment and plan.     *The above represents an initial assessment/impression. Please refer to progress notes for potential changes in patient clinical course*

## 2024-10-22 NOTE — ED PROVIDER NOTE - PROGRESS NOTE DETAILS
QUIN Alfredo PGY2- patient attempting to get out of bed and states wants to leave though still clinically intoxicated. will medicate with haldol and ativan

## 2024-10-23 DIAGNOSIS — E87.8 OTHER DISORDERS OF ELECTROLYTE AND FLUID BALANCE, NOT ELSEWHERE CLASSIFIED: ICD-10-CM

## 2024-10-23 DIAGNOSIS — Z29.9 ENCOUNTER FOR PROPHYLACTIC MEASURES, UNSPECIFIED: ICD-10-CM

## 2024-10-23 DIAGNOSIS — F10.239 ALCOHOL DEPENDENCE WITH WITHDRAWAL, UNSPECIFIED: ICD-10-CM

## 2024-10-23 DIAGNOSIS — D64.9 ANEMIA, UNSPECIFIED: ICD-10-CM

## 2024-10-23 DIAGNOSIS — I10 ESSENTIAL (PRIMARY) HYPERTENSION: ICD-10-CM

## 2024-10-23 LAB
ANION GAP SERPL CALC-SCNC: 13 MMOL/L — SIGNIFICANT CHANGE UP (ref 5–17)
BUN SERPL-MCNC: 12 MG/DL — SIGNIFICANT CHANGE UP (ref 7–23)
CALCIUM SERPL-MCNC: 8.2 MG/DL — LOW (ref 8.4–10.5)
CHLORIDE SERPL-SCNC: 95 MMOL/L — LOW (ref 96–108)
CO2 SERPL-SCNC: 25 MMOL/L — SIGNIFICANT CHANGE UP (ref 22–31)
CREAT SERPL-MCNC: 0.49 MG/DL — LOW (ref 0.5–1.3)
EGFR: 114 ML/MIN/1.73M2 — SIGNIFICANT CHANGE UP
FERRITIN SERPL-MCNC: 44 NG/ML — SIGNIFICANT CHANGE UP (ref 13–330)
FOLATE SERPL-MCNC: 15.6 NG/ML — SIGNIFICANT CHANGE UP
GLUCOSE SERPL-MCNC: 130 MG/DL — HIGH (ref 70–99)
HCT VFR BLD CALC: 33.6 % — LOW (ref 34.5–45)
HGB BLD-MCNC: 10.9 G/DL — LOW (ref 11.5–15.5)
IRON SATN MFR SERPL: 259 UG/DL — HIGH (ref 30–160)
IRON SATN MFR SERPL: 86 % — HIGH (ref 14–50)
MAGNESIUM SERPL-MCNC: 1.9 MG/DL — SIGNIFICANT CHANGE UP (ref 1.6–2.6)
MCHC RBC-ENTMCNC: 26.1 PG — LOW (ref 27–34)
MCHC RBC-ENTMCNC: 32.4 GM/DL — SIGNIFICANT CHANGE UP (ref 32–36)
MCV RBC AUTO: 80.4 FL — SIGNIFICANT CHANGE UP (ref 80–100)
NRBC # BLD: 0 /100 WBCS — SIGNIFICANT CHANGE UP (ref 0–0)
PHOSPHATE SERPL-MCNC: 2.3 MG/DL — LOW (ref 2.5–4.5)
PLATELET # BLD AUTO: 155 K/UL — SIGNIFICANT CHANGE UP (ref 150–400)
POTASSIUM SERPL-MCNC: 4.3 MMOL/L — SIGNIFICANT CHANGE UP (ref 3.5–5.3)
POTASSIUM SERPL-SCNC: 4.3 MMOL/L — SIGNIFICANT CHANGE UP (ref 3.5–5.3)
RBC # BLD: 4.18 M/UL — SIGNIFICANT CHANGE UP (ref 3.8–5.2)
RBC # BLD: 4.18 M/UL — SIGNIFICANT CHANGE UP (ref 3.8–5.2)
RBC # FLD: 18.3 % — HIGH (ref 10.3–14.5)
RETICS #: 48.5 K/UL — SIGNIFICANT CHANGE UP (ref 25–125)
RETICS/RBC NFR: 1.2 % — SIGNIFICANT CHANGE UP (ref 0.5–2.5)
SODIUM SERPL-SCNC: 133 MMOL/L — LOW (ref 135–145)
TIBC SERPL-MCNC: 300 UG/DL — SIGNIFICANT CHANGE UP (ref 220–430)
TRANSFERRIN SERPL-MCNC: 260 MG/DL — SIGNIFICANT CHANGE UP (ref 200–360)
UIBC SERPL-MCNC: 41 UG/DL — LOW (ref 110–370)
VIT B12 SERPL-MCNC: 575 PG/ML — SIGNIFICANT CHANGE UP (ref 232–1245)
WBC # BLD: 4.54 K/UL — SIGNIFICANT CHANGE UP (ref 3.8–10.5)
WBC # FLD AUTO: 4.54 K/UL — SIGNIFICANT CHANGE UP (ref 3.8–10.5)

## 2024-10-23 PROCEDURE — 99232 SBSQ HOSP IP/OBS MODERATE 35: CPT

## 2024-10-23 RX ORDER — THIAMINE HCL 100 MG
100 TABLET ORAL DAILY
Refills: 0 | Status: DISCONTINUED | OUTPATIENT
Start: 2024-10-23 | End: 2024-10-23

## 2024-10-23 RX ORDER — ENOXAPARIN SODIUM 40MG/0.4ML
40 SYRINGE (ML) SUBCUTANEOUS EVERY 24 HOURS
Refills: 0 | Status: DISCONTINUED | OUTPATIENT
Start: 2024-10-23 | End: 2024-10-25

## 2024-10-23 RX ORDER — LORAZEPAM 2 MG
2 TABLET ORAL
Refills: 0 | Status: DISCONTINUED | OUTPATIENT
Start: 2024-10-23 | End: 2024-10-23

## 2024-10-23 RX ORDER — MELATONIN 5 MG
3 TABLET ORAL AT BEDTIME
Refills: 0 | Status: DISCONTINUED | OUTPATIENT
Start: 2024-10-23 | End: 2024-10-25

## 2024-10-23 RX ORDER — DIBASIC SODIUM PHOSPHATE, MONOBASIC POTASSIUM PHOSPHATE AND MONOBASIC SODIUM PHOSPHATE 852; 155; 130 MG/1; MG/1; MG/1
1 TABLET ORAL
Refills: 0 | Status: COMPLETED | OUTPATIENT
Start: 2024-10-23 | End: 2024-10-23

## 2024-10-23 RX ORDER — LORAZEPAM 2 MG
TABLET ORAL
Refills: 0 | Status: DISCONTINUED | OUTPATIENT
Start: 2024-10-24 | End: 2024-10-24

## 2024-10-23 RX ORDER — LOSARTAN POTASSIUM 25 MG/1
50 TABLET ORAL DAILY
Refills: 0 | Status: DISCONTINUED | OUTPATIENT
Start: 2024-10-23 | End: 2024-10-25

## 2024-10-23 RX ORDER — ACETAMINOPHEN 500 MG
650 TABLET ORAL EVERY 6 HOURS
Refills: 0 | Status: DISCONTINUED | OUTPATIENT
Start: 2024-10-23 | End: 2024-10-25

## 2024-10-23 RX ORDER — LORAZEPAM 2 MG
3 TABLET ORAL EVERY 4 HOURS
Refills: 0 | Status: DISCONTINUED | OUTPATIENT
Start: 2024-10-24 | End: 2024-10-24

## 2024-10-23 RX ORDER — LORAZEPAM 2 MG
2 TABLET ORAL
Refills: 0 | Status: DISCONTINUED | OUTPATIENT
Start: 2024-10-23 | End: 2024-10-25

## 2024-10-23 RX ORDER — LORAZEPAM 2 MG
4 TABLET ORAL EVERY 4 HOURS
Refills: 0 | Status: DISCONTINUED | OUTPATIENT
Start: 2024-10-23 | End: 2024-10-24

## 2024-10-23 RX ORDER — MAGNESIUM SULFATE IN 0.9% NACL 2 G/50 ML
2 INTRAVENOUS SOLUTION, PIGGYBACK (ML) INTRAVENOUS ONCE
Refills: 0 | Status: COMPLETED | OUTPATIENT
Start: 2024-10-23 | End: 2024-10-23

## 2024-10-23 RX ORDER — POTASSIUM CHLORIDE 10 MEQ
40 TABLET, EXTENDED RELEASE ORAL ONCE
Refills: 0 | Status: COMPLETED | OUTPATIENT
Start: 2024-10-23 | End: 2024-10-23

## 2024-10-23 RX ADMIN — LOSARTAN POTASSIUM 50 MILLIGRAM(S): 25 TABLET ORAL at 06:33

## 2024-10-23 RX ADMIN — DIBASIC SODIUM PHOSPHATE, MONOBASIC POTASSIUM PHOSPHATE AND MONOBASIC SODIUM PHOSPHATE 1 PACKET(S): 852; 155; 130 TABLET ORAL at 06:34

## 2024-10-23 RX ADMIN — Medication 4 MILLIGRAM(S): at 10:15

## 2024-10-23 RX ADMIN — DIBASIC SODIUM PHOSPHATE, MONOBASIC POTASSIUM PHOSPHATE AND MONOBASIC SODIUM PHOSPHATE 1 PACKET(S): 852; 155; 130 TABLET ORAL at 12:30

## 2024-10-23 RX ADMIN — Medication 4 MILLIGRAM(S): at 17:37

## 2024-10-23 RX ADMIN — Medication 2 MILLIGRAM(S): at 03:22

## 2024-10-23 RX ADMIN — Medication 40 MILLIEQUIVALENT(S): at 01:23

## 2024-10-23 RX ADMIN — Medication 4 MILLIGRAM(S): at 14:26

## 2024-10-23 RX ADMIN — Medication 4 MILLIGRAM(S): at 22:08

## 2024-10-23 RX ADMIN — Medication 1 TABLET(S): at 12:29

## 2024-10-23 RX ADMIN — FOLIC ACID 1 MILLIGRAM(S): 1 TABLET ORAL at 12:29

## 2024-10-23 RX ADMIN — Medication 2 MILLIGRAM(S): at 07:54

## 2024-10-23 RX ADMIN — Medication 2 MILLIGRAM(S): at 06:42

## 2024-10-23 RX ADMIN — Medication 2 MILLIGRAM(S): at 04:32

## 2024-10-23 RX ADMIN — Medication 25 GRAM(S): at 01:24

## 2024-10-23 RX ADMIN — Medication 2 MILLIGRAM(S): at 12:29

## 2024-10-23 RX ADMIN — Medication 2 MILLIGRAM(S): at 01:24

## 2024-10-23 RX ADMIN — DIBASIC SODIUM PHOSPHATE, MONOBASIC POTASSIUM PHOSPHATE AND MONOBASIC SODIUM PHOSPHATE 1 PACKET(S): 852; 155; 130 TABLET ORAL at 01:31

## 2024-10-23 NOTE — SBIRT NOTE ADULT - NSSBIRTALCPOSREINDET_GEN_A_CORE
LMSW provided positive reinforcement and provided patient with SBIRT contact info and [facility]. Patient receptive and appreciative.

## 2024-10-23 NOTE — PATIENT PROFILE ADULT - FALL HARM RISK - HARM RISK INTERVENTIONS

## 2024-10-23 NOTE — PATIENT PROFILE ADULT - MEDICATIONS/VISITS
Vishnu Cerda (:  2003) is a 19 y.o. female,Established patient, here for evaluation of the following chief complaint(s): Depression and Anxiety         ASSESSMENT/PLAN:  1. Anxiety and depression  2. 33 weeks gestation of pregnancy    Anxiety and depression/preganancy:     After discussion she is going to start zoloft, she is not sure if she will start now or at 36 weeks. Continue following with therapist/psychaitrist, inform us of any intolerances or adverse effects with zoloft or any worsening symptoms anxiety/depression. Return in about 11 days (around 2021) for ob check. SUBJECTIVE/OBJECTIVE:  DELTA Jackson wants to discuss starting zoloft in third trimester. She has history of anxiety and depression, she follows with therapist and psychiatrist.  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 We discussed risks related to zoloft and pregnancy and she is in third trimester now. She was informed we always need to outweigh adverse effects vs untreated anxiety and depression. Denies suicidal/homicidial ideations or plans. + fetal movement, no contractions  Review of Systems   Constitutional: Negative for chills and fever. Respiratory: Negative for shortness of breath and wheezing. Cardiovascular: Negative for chest pain and leg swelling. Gastrointestinal: Negative for nausea and vomiting. Genitourinary: Negative for vaginal bleeding, vaginal discharge and vaginal pain.    Psychiatric/Behavioral: Negative for self-injury and suicidal ideas. The patient is nervous/anxious. No flowsheet data found. Physical Exam    [INSTRUCTIONS:  \"[x]\" Indicates a positive item  \"[]\" Indicates a negative item  -- DELETE ALL ITEMS NOT EXAMINED]    Constitutional: [x] Appears well-developed and well-nourished [x] No apparent distress      [] Abnormal -     Mental status: [x] Alert and awake  [x] Oriented to person/place/time [x] Able to follow commands    [] Abnormal -     Eyes:   EOM    [x]  Normal    [] Abnormal -   Sclera  [x]  Normal    [] Abnormal -          Discharge [x]  None visible   [] Abnormal -     HENT: [x] Normocephalic, atraumatic  [] Abnormal -   [x] Mouth/Throat: Mucous membranes are moist    External Ears [x] Normal  [] Abnormal -    Neck: [x] No visualized mass [] Abnormal -     Pulmonary/Chest: [x] Respiratory effort normal   [x] No visualized signs of difficulty breathing or respiratory distress        [] Abnormal -      Musculoskeletal:   [x] Normal gait with no signs of ataxia         [x] Normal range of motion of neck        [] Abnormal -     Neurological:        [x] No Facial Asymmetry (Cranial nerve 7 motor function) (limited exam due to video visit)          [x] No gaze palsy        [] Abnormal -          Skin:        [x] No significant exanthematous lesions or discoloration noted on facial skin         [] Abnormal -            Psychiatric:       [x] Normal Affect [] Abnormal -        [x] No Hallucinations    Other pertinent observable physical exam findings:-          On this date 7/15/2021 I have spent 20 minutes reviewing previous notes, test results and face to face (virtual) with the patient discussing the diagnosis and importance of compliance with the treatment plan as well as documenting on the day of the visit. Sofya Haider, was evaluated through a synchronous (real-time) audio-video encounter. The patient (or guardian if applicable) is aware that this is a billable service.  Verbal consent to proceed has been obtained within the past 12 months. The visit was conducted pursuant to the emergency declaration under the [facility] [place] [address] and the [employer] and [employer] Act. Patient identification was verified, and a caregiver was present when appropriate. The patient was located in a state where the provider was credentialed to provide care. An electronic signature was used to authenticate this note.     --BEBA Herndon - CNP no

## 2024-10-23 NOTE — PATIENT PROFILE ADULT - NSPROHMSYMPCOND_GEN_A_NUR
[FreeTextEntry1] : fasting comprehensive BW script to be completed\par Clonazepam renewed , potential for sedation and dependency discussed \par f/up Nephrology MD\par F/up neurology  ,   \par OV 3 months 
none

## 2024-10-24 LAB
ALBUMIN SERPL ELPH-MCNC: 4.2 G/DL — SIGNIFICANT CHANGE UP (ref 3.3–5)
ALP SERPL-CCNC: 79 U/L — SIGNIFICANT CHANGE UP (ref 40–120)
ALT FLD-CCNC: 26 U/L — SIGNIFICANT CHANGE UP (ref 10–45)
ANION GAP SERPL CALC-SCNC: 12 MMOL/L — SIGNIFICANT CHANGE UP (ref 5–17)
AST SERPL-CCNC: 51 U/L — HIGH (ref 10–40)
BASOPHILS # BLD AUTO: 0.03 K/UL — SIGNIFICANT CHANGE UP (ref 0–0.2)
BASOPHILS NFR BLD AUTO: 0.6 % — SIGNIFICANT CHANGE UP (ref 0–2)
BILIRUB SERPL-MCNC: 0.8 MG/DL — SIGNIFICANT CHANGE UP (ref 0.2–1.2)
BUN SERPL-MCNC: 10 MG/DL — SIGNIFICANT CHANGE UP (ref 7–23)
CALCIUM SERPL-MCNC: 9.5 MG/DL — SIGNIFICANT CHANGE UP (ref 8.4–10.5)
CHLORIDE SERPL-SCNC: 98 MMOL/L — SIGNIFICANT CHANGE UP (ref 96–108)
CO2 SERPL-SCNC: 23 MMOL/L — SIGNIFICANT CHANGE UP (ref 22–31)
CREAT SERPL-MCNC: 0.48 MG/DL — LOW (ref 0.5–1.3)
EGFR: 115 ML/MIN/1.73M2 — SIGNIFICANT CHANGE UP
EOSINOPHIL # BLD AUTO: 0.14 K/UL — SIGNIFICANT CHANGE UP (ref 0–0.5)
EOSINOPHIL NFR BLD AUTO: 2.9 % — SIGNIFICANT CHANGE UP (ref 0–6)
GLUCOSE SERPL-MCNC: 159 MG/DL — HIGH (ref 70–99)
HCT VFR BLD CALC: 37.7 % — SIGNIFICANT CHANGE UP (ref 34.5–45)
HGB BLD-MCNC: 11.9 G/DL — SIGNIFICANT CHANGE UP (ref 11.5–15.5)
IMM GRANULOCYTES NFR BLD AUTO: 0.4 % — SIGNIFICANT CHANGE UP (ref 0–0.9)
LYMPHOCYTES # BLD AUTO: 0.61 K/UL — LOW (ref 1–3.3)
LYMPHOCYTES # BLD AUTO: 12.5 % — LOW (ref 13–44)
MAGNESIUM SERPL-MCNC: 2 MG/DL — SIGNIFICANT CHANGE UP (ref 1.6–2.6)
MCHC RBC-ENTMCNC: 26.1 PG — LOW (ref 27–34)
MCHC RBC-ENTMCNC: 31.6 GM/DL — LOW (ref 32–36)
MCV RBC AUTO: 82.7 FL — SIGNIFICANT CHANGE UP (ref 80–100)
MONOCYTES # BLD AUTO: 0.32 K/UL — SIGNIFICANT CHANGE UP (ref 0–0.9)
MONOCYTES NFR BLD AUTO: 6.5 % — SIGNIFICANT CHANGE UP (ref 2–14)
NEUTROPHILS # BLD AUTO: 3.77 K/UL — SIGNIFICANT CHANGE UP (ref 1.8–7.4)
NEUTROPHILS NFR BLD AUTO: 77.1 % — HIGH (ref 43–77)
NRBC # BLD: 0 /100 WBCS — SIGNIFICANT CHANGE UP (ref 0–0)
PLATELET # BLD AUTO: 146 K/UL — LOW (ref 150–400)
POTASSIUM SERPL-MCNC: 4.4 MMOL/L — SIGNIFICANT CHANGE UP (ref 3.5–5.3)
POTASSIUM SERPL-SCNC: 4.4 MMOL/L — SIGNIFICANT CHANGE UP (ref 3.5–5.3)
PROT SERPL-MCNC: 8.2 G/DL — SIGNIFICANT CHANGE UP (ref 6–8.3)
RBC # BLD: 4.56 M/UL — SIGNIFICANT CHANGE UP (ref 3.8–5.2)
RBC # FLD: 18.3 % — HIGH (ref 10.3–14.5)
SODIUM SERPL-SCNC: 133 MMOL/L — LOW (ref 135–145)
WBC # BLD: 4.89 K/UL — SIGNIFICANT CHANGE UP (ref 3.8–10.5)
WBC # FLD AUTO: 4.89 K/UL — SIGNIFICANT CHANGE UP (ref 3.8–10.5)

## 2024-10-24 PROCEDURE — 99231 SBSQ HOSP IP/OBS SF/LOW 25: CPT

## 2024-10-24 RX ORDER — LORAZEPAM 2 MG
1 TABLET ORAL EVERY 6 HOURS
Refills: 0 | Status: DISCONTINUED | OUTPATIENT
Start: 2024-10-24 | End: 2024-10-25

## 2024-10-24 RX ORDER — LORAZEPAM 2 MG
TABLET ORAL
Refills: 0 | Status: DISCONTINUED | OUTPATIENT
Start: 2024-10-24 | End: 2024-10-24

## 2024-10-24 RX ORDER — LORAZEPAM 2 MG
TABLET ORAL
Refills: 0 | Status: COMPLETED | OUTPATIENT
Start: 2024-10-24 | End: 2024-10-25

## 2024-10-24 RX ORDER — LORAZEPAM 2 MG
3 TABLET ORAL EVERY 6 HOURS
Refills: 0 | Status: DISCONTINUED | OUTPATIENT
Start: 2024-10-24 | End: 2024-10-24

## 2024-10-24 RX ORDER — SODIUM CHLORIDE 9 MG/ML
1000 INJECTION, SOLUTION INTRAMUSCULAR; INTRAVENOUS; SUBCUTANEOUS ONCE
Refills: 0 | Status: COMPLETED | OUTPATIENT
Start: 2024-10-24 | End: 2024-10-24

## 2024-10-24 RX ORDER — LORAZEPAM 2 MG
0.5 TABLET ORAL EVERY 6 HOURS
Refills: 0 | Status: DISCONTINUED | OUTPATIENT
Start: 2024-10-25 | End: 2024-10-25

## 2024-10-24 RX ORDER — LORAZEPAM 2 MG
2 TABLET ORAL EVERY 6 HOURS
Refills: 0 | Status: DISCONTINUED | OUTPATIENT
Start: 2024-10-24 | End: 2024-10-24

## 2024-10-24 RX ADMIN — Medication 1 TABLET(S): at 15:58

## 2024-10-24 RX ADMIN — Medication 4 MILLIGRAM(S): at 02:23

## 2024-10-24 RX ADMIN — SODIUM CHLORIDE 1000 MILLILITER(S): 9 INJECTION, SOLUTION INTRAMUSCULAR; INTRAVENOUS; SUBCUTANEOUS at 10:25

## 2024-10-24 RX ADMIN — Medication 4 MILLIGRAM(S): at 05:57

## 2024-10-24 RX ADMIN — FOLIC ACID 1 MILLIGRAM(S): 1 TABLET ORAL at 15:58

## 2024-10-24 RX ADMIN — Medication 2 MILLIGRAM(S): at 16:04

## 2024-10-24 RX ADMIN — LOSARTAN POTASSIUM 50 MILLIGRAM(S): 25 TABLET ORAL at 05:58

## 2024-10-24 RX ADMIN — Medication 1 MILLIGRAM(S): at 22:23

## 2024-10-24 RX ADMIN — Medication 3 MILLIGRAM(S): at 10:21

## 2024-10-24 RX ADMIN — Medication 40 MILLIGRAM(S): at 22:23

## 2024-10-24 NOTE — PROGRESS NOTE ADULT - PROBLEM SELECTOR PLAN 2
- c/w losartan, monitor BP  - Elevated iso withdrawal, continue to monitor
- c/w losartan, monitor BP  - Elevated iso withdrawal, continue to monitor

## 2024-10-24 NOTE — PROGRESS NOTE ADULT - PROBLEM SELECTOR PLAN 1
Pt reports last drink 10/21. ETOH on admission 10/22 358. Pt reports hx etoh withdrawal seizures.  - CIWA much improved this AM, will shorten taper. 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
Pt reports last drink 10/21. ETOH on admission 10/22 358. Pt reports hx etoh withdrawal seizures.  - Persistently elevated CIWA, will start ativan taper  - CIWA triggered ativan prn  - thiamine, folate, MV  - fall/aspiration/seizure precautions  - SW, SBIRT c/s

## 2024-10-24 NOTE — PROGRESS NOTE ADULT - TIME BILLING
Time spent on review of vitals, physical exam, documentation, and discussion of plan of care with patient and multidisciplinary team.
Time spent on review of vitals, physical exam, documentation, and discussion of plan of care with patient and multidisciplinary team.

## 2024-10-24 NOTE — PROGRESS NOTE ADULT - PROBLEM SELECTOR PLAN 5
- lovenox VTE ppx  - precautions above  - DASH diet  - disposition pending course
- lovenox VTE ppx  - precautions above  - DASH diet  - disposition pending course

## 2024-10-24 NOTE — PROGRESS NOTE ADULT - SUBJECTIVE AND OBJECTIVE BOX
[facility] Division of Hospital Medicine  Yanni Lira MD  Available via MS Teams    SUBJECTIVE / OVERNIGHT EVENTS:  Feeling much better today. No n/v, HA. CIWA overnight 4-5, 1 since AM    MEDICATIONS  (STANDING):  enoxaparin Injectable 40 milliGRAM(s) SubCutaneous every 24 hours  folic acid 1 milliGRAM(s) Oral daily  LORazepam     Tablet   Oral   LORazepam     Tablet 1 milliGRAM(s) Oral every 6 hours  losartan 50 milliGRAM(s) Oral daily  multivitamin 1 Tablet(s) Oral daily    MEDICATIONS  (PRN):  acetaminophen     Tablet .. 650 milliGRAM(s) Oral every 6 hours PRN Temp greater or equal to 38C (100.4F), Mild Pain (1 - 3)  LORazepam     Tablet 2 milliGRAM(s) Oral every 2 hours PRN Symptom-triggered 2 point increase in CIWA-Ar  LORazepam   Injectable 2 milliGRAM(s) IV Push every 1 hour PRN Symptom-triggered: each CIWA -Ar score 8 or GREATER  melatonin 3 milliGRAM(s) Oral at bedtime PRN Insomnia      I&O's Summary    24 Oct 2024 07:01  -  24 Oct 2024 16:52  --------------------------------------------------------  IN: 300 mL / OUT: 0 mL / NET: 300 mL        PHYSICAL EXAM:  Vital Signs Last 24 Hrs  T(C): 36.7 (24 Oct 2024 08:00), Max: 37 (23 Oct 2024 16:58)  T(F): 98.1 (24 Oct 2024 08:00), Max: 98.6 (23 Oct 2024 16:58)  HR: 85 (24 Oct 2024 14:00) (85 - 110)  BP: 138/92 (24 Oct 2024 14:00) (131/93 - 161/101)  BP(mean): --  RR: 18 (24 Oct 2024 14:00) (18 - 18)  SpO2: 99% (24 Oct 2024 14:00) (97% - 100%)    Parameters below as of 24 Oct 2024 14:00  Patient On (Oxygen Delivery Method): room air    CONSTITUTIONAL: NAD  EYES: EOMI  ENMT: Moist oral mucosa  RESPIRATORY: Normal respiratory effort; lungs are clear to auscultation bilaterally  CARDIOVASCULAR: tachycardic, regular rhythm, no murmurs, no LE edema  ABDOMEN: Nontender to palpation, normoactive bowel sounds, no rebound/guarding  MUSCULOSKELETAL: no joint swelling or tenderness to palpation  PSYCH: A+O to person, place, and time; affect appropriate  NEUROLOGY: sensation and strength grossly intact  SKIN: No rashes; no palpable lesions    LABS:                        11.9   4.89  )-----------( 146      ( 24 Oct 2024 10:01 )             37.7     10-24    133[L]  |  98  |  10  ----------------------------<  159[H]  4.4   |  23  |  0.48[L]    Ca    9.5      24 Oct 2024 10:01  Phos  2.3     10-23  Mg     2.0     10-24    TPro  8.2  /  Alb  4.2  /  TBili  0.8  /  DBili  x   /  AST  51[H]  /  ALT  26  /  AlkPhos  79  10-24          Urinalysis Basic - ( 24 Oct 2024 10:01 )    Color: x / Appearance: x / SG: x / pH: x  Gluc: 159 mg/dL / Ketone: x  / Bili: x / Urobili: x   Blood: x / Protein: x / Nitrite: x   Leuk Esterase: x / RBC: x / WBC x   Sq Epi: x / Non Sq Epi: x / Bacteria: x            RADIOLOGY & ADDITIONAL TESTS:  New Imaging Personally Reviewed Today:  New Electrocardiogram Personally Reviewed Today:  Other Results Reviewed Today:   Prior or Outpatient Records Reviewed Today with Summary:    COORDINATION OF CARE:  Consultant Communication and Details of Discussion (where applicable):    
[facility] Division of Hospital Medicine  Yanni Lira MD  Available via MS Teams    SUBJECTIVE / OVERNIGHT EVENTS:  CIWA 7-11 overnight. Tremulous this AM. Denies n/v, cp, sob, auditory or visual hallucinations. Very anxious.    MEDICATIONS  (STANDING):  enoxaparin Injectable 40 milliGRAM(s) SubCutaneous every 24 hours  folic acid 1 milliGRAM(s) Oral daily  LORazepam   Injectable 4 milliGRAM(s) IV Push every 4 hours  LORazepam   Injectable   IV Push   losartan 50 milliGRAM(s) Oral daily  multivitamin 1 Tablet(s) Oral daily    MEDICATIONS  (PRN):  acetaminophen     Tablet .. 650 milliGRAM(s) Oral every 6 hours PRN Temp greater or equal to 38C (100.4F), Mild Pain (1 - 3)  LORazepam     Tablet 2 milliGRAM(s) Oral every 2 hours PRN Symptom-triggered 2 point increase in CIWA-Ar  LORazepam   Injectable 2 milliGRAM(s) IV Push every 1 hour PRN Symptom-triggered: each CIWA -Ar score 8 or GREATER  melatonin 3 milliGRAM(s) Oral at bedtime PRN Insomnia      I&O's Summary      PHYSICAL EXAM:  Vital Signs Last 24 Hrs  T(C): 36.8 (23 Oct 2024 12:38), Max: 37.2 (22 Oct 2024 17:53)  T(F): 98.3 (23 Oct 2024 12:38), Max: 98.9 (22 Oct 2024 17:53)  HR: 103 (23 Oct 2024 13:53) (98 - 118)  BP: 127/89 (23 Oct 2024 13:53) (127/89 - 165/109)  BP(mean): --  RR: 18 (23 Oct 2024 12:38) (15 - 22)  SpO2: 100% (23 Oct 2024 12:38) (97% - 100%)    Parameters below as of 23 Oct 2024 12:38  Patient On (Oxygen Delivery Method): room air      CONSTITUTIONAL: anxious  EYES: EOMI  ENMT: Moist oral mucosa  RESPIRATORY: Normal respiratory effort; lungs are clear to auscultation bilaterally  CARDIOVASCULAR: tachycardic, regular rhythm, no murmurs, no LE edema  ABDOMEN: Nontender to palpation, normoactive bowel sounds, no rebound/guarding  MUSCULOSKELETAL: no joint swelling or tenderness to palpation  PSYCH: A+O to person, place, and time; affect appropriate  NEUROLOGY: sensation and strength grossly intact  SKIN: No rashes; no palpable lesions    LABS:                        10.9   4.54  )-----------( 155      ( 23 Oct 2024 08:53 )             33.6     10-23    133[L]  |  95[L]  |  12  ----------------------------<  130[H]  4.3   |  25  |  0.49[L]    Ca    8.2[L]      23 Oct 2024 08:53  Phos  2.3     10-23  Mg     1.9     10-23    TPro  7.0  /  Alb  3.8  /  TBili  0.5  /  DBili  0.1  /  AST  38  /  ALT  19  /  AlkPhos  64  10-22          Urinalysis Basic - ( 23 Oct 2024 08:53 )    Color: x / Appearance: x / SG: x / pH: x  Gluc: 130 mg/dL / Ketone: x  / Bili: x / Urobili: x   Blood: x / Protein: x / Nitrite: x   Leuk Esterase: x / RBC: x / WBC x   Sq Epi: x / Non Sq Epi: x / Bacteria: x            RADIOLOGY & ADDITIONAL TESTS:  New Imaging Personally Reviewed Today:  New Electrocardiogram Personally Reviewed Today:  Other Results Reviewed Today:   Prior or Outpatient Records Reviewed Today with Summary:    COORDINATION OF CARE:  Consultant Communication and Details of Discussion (where applicable):

## 2024-10-24 NOTE — PROGRESS NOTE ADULT - ASSESSMENT
49F PMH HTN, AUD (c/b withdrawal seizure 2022, pancreatitis), mandibular fracture repair (11/2023), chronic anemia, admit 4/2024 for EtOH withdrawal at which time left AMA, now p/w EtOH intoxication admit for EtOH withdrawal 
49F PMH HTN, AUD (c/b withdrawal seizure 2022, pancreatitis), mandibular fracture repair (11/2023), chronic anemia, admit 4/2024 for EtOH withdrawal at which time left AMA, now p/w EtOH intoxication admit for EtOH withdrawal

## 2024-10-25 ENCOUNTER — TRANSCRIPTION ENCOUNTER (OUTPATIENT)
Age: 49
End: 2024-10-25

## 2024-10-25 VITALS
SYSTOLIC BLOOD PRESSURE: 142 MMHG | DIASTOLIC BLOOD PRESSURE: 109 MMHG | TEMPERATURE: 98 F | RESPIRATION RATE: 17 BRPM | OXYGEN SATURATION: 97 % | HEART RATE: 123 BPM

## 2024-10-25 PROCEDURE — 82607 VITAMIN B-12: CPT

## 2024-10-25 PROCEDURE — 82728 ASSAY OF FERRITIN: CPT

## 2024-10-25 PROCEDURE — 80048 BASIC METABOLIC PNL TOTAL CA: CPT

## 2024-10-25 PROCEDURE — 83690 ASSAY OF LIPASE: CPT

## 2024-10-25 PROCEDURE — 96375 TX/PRO/DX INJ NEW DRUG ADDON: CPT

## 2024-10-25 PROCEDURE — 80307 DRUG TEST PRSMV CHEM ANLYZR: CPT

## 2024-10-25 PROCEDURE — 85045 AUTOMATED RETICULOCYTE COUNT: CPT

## 2024-10-25 PROCEDURE — 80076 HEPATIC FUNCTION PANEL: CPT

## 2024-10-25 PROCEDURE — 96374 THER/PROPH/DIAG INJ IV PUSH: CPT

## 2024-10-25 PROCEDURE — 83550 IRON BINDING TEST: CPT

## 2024-10-25 PROCEDURE — 83735 ASSAY OF MAGNESIUM: CPT

## 2024-10-25 PROCEDURE — 80053 COMPREHEN METABOLIC PANEL: CPT

## 2024-10-25 PROCEDURE — 84100 ASSAY OF PHOSPHORUS: CPT

## 2024-10-25 PROCEDURE — 99285 EMERGENCY DEPT VISIT HI MDM: CPT

## 2024-10-25 PROCEDURE — 96372 THER/PROPH/DIAG INJ SC/IM: CPT

## 2024-10-25 PROCEDURE — 84702 CHORIONIC GONADOTROPIN TEST: CPT

## 2024-10-25 PROCEDURE — 83540 ASSAY OF IRON: CPT

## 2024-10-25 PROCEDURE — 99239 HOSP IP/OBS DSCHRG MGMT >30: CPT

## 2024-10-25 PROCEDURE — 85027 COMPLETE CBC AUTOMATED: CPT

## 2024-10-25 PROCEDURE — 82746 ASSAY OF FOLIC ACID SERUM: CPT

## 2024-10-25 PROCEDURE — 84466 ASSAY OF TRANSFERRIN: CPT

## 2024-10-25 PROCEDURE — 85025 COMPLETE CBC W/AUTO DIFF WBC: CPT

## 2024-10-25 RX ORDER — MELATONIN 5 MG
1 TABLET ORAL
Qty: 0 | Refills: 0 | DISCHARGE
Start: 2024-10-25

## 2024-10-25 RX ORDER — ZOLPIDEM TARTRATE 10 MG
1 TABLET ORAL
Refills: 0 | DISCHARGE

## 2024-10-25 RX ORDER — B-COMPLEX WITH VITAMIN C
1 VIAL (ML) INJECTION
Qty: 0 | Refills: 0 | DISCHARGE
Start: 2024-10-25

## 2024-10-25 RX ORDER — FOLIC ACID 1 MG/1
1 TABLET ORAL
Qty: 0 | Refills: 0 | DISCHARGE
Start: 2024-10-25

## 2024-10-25 RX ADMIN — LOSARTAN POTASSIUM 50 MILLIGRAM(S): 25 TABLET ORAL at 05:08

## 2024-10-25 RX ADMIN — Medication 0.5 MILLIGRAM(S): at 09:00

## 2024-10-25 RX ADMIN — Medication 1 MILLIGRAM(S): at 03:02

## 2024-10-25 RX ADMIN — Medication 1 TABLET(S): at 12:04

## 2024-10-25 RX ADMIN — FOLIC ACID 1 MILLIGRAM(S): 1 TABLET ORAL at 12:04

## 2024-10-25 NOTE — DISCHARGE NOTE NURSING/CASE MANAGEMENT/SOCIAL WORK - PATIENT PORTAL LINK FT
You can access the FollowMyHealth Patient Portal offered by [facility] by registering at the following website: [email or web address]. By joining [employer]’s FollowMyHealth portal, you will also be able to view your health information using other applications (apps) compatible with our system.

## 2024-10-25 NOTE — DISCHARGE NOTE NURSING/CASE MANAGEMENT/SOCIAL WORK - NSDCVIVACCINE_GEN_ALL_CORE_FT
influenza, injectable, quadrivalent, preservative free; 23-Oct-2020 20:55; Ramón Wong (RN); [employer]; [id number] (Exp. Date: 30-Jun-2021); IntraMuscular; Deltoid Left.; 0.5 milliLiter(s); VIS (VIS Published: 15-Aug-2019, VIS Presented: 23-Oct-2020);

## 2024-10-25 NOTE — DISCHARGE NOTE PROVIDER - NSDCMRMEDTOKEN_GEN_ALL_CORE_FT
ALPRAZolam 1 mg oral tablet: 1 tab(s) orally 2 times a day as needed for  anxiety  cholecalciferol 1250 mcg (50,000 intl units) oral capsule: 1 cap(s) orally once a week  folic acid 1 mg oral tablet: 1 tab(s) orally once a day  losartan 50 mg oral tablet: 1 tab(s) orally once a day  melatonin 3 mg oral tablet: 1 tab(s) orally once a day (at bedtime) As needed Insomnia  Multiple Vitamins oral tablet: 1 tab(s) orally once a day

## 2024-10-25 NOTE — DISCHARGE NOTE PROVIDER - HOSPITAL COURSE
49F PMH HTN, AUD (c/b withdrawal seizure 2022, pancreatitis), mandibular fracture repair (11/2023), chronic anemia, admit 4/2024 for EtOH withdrawal at which time left AMA, now p/w EtOH intoxication admit for EtOH withdrawal       ·  Problem: Alcohol dependence with withdrawal.   ·  Plan: Pt reports last drink 10/21. ETOH on admission 10/22 358. Pt reports hx etoh withdrawal seizures.  - CIWA much improved this AM, will shorten taper. 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    ·  Problem: Chronic hypertension.   ·  Plan: - c/w losartan, monitor BP  - Elevated iso withdrawal, continue to monitor. HPI:  49F PMH HTN, AUD (c/b withdrawal seizure 2022, pancreatitis), mandibular fracture repair (11/2023), chronic anemia, admit 4/2024 for EtOH withdrawal at which time left AMA, now p/w EtOH intoxication. AOx4, is vague regarding volume of EtOH use, though reports increased EtOH intake x weeks, culminating in drinking 1 bottle of liquor today. Denies hallucinations, anxiety, SI/HI, recent life stressors prompting EtOH use, stating that she "simply wants to get through this". Otherwise feels well, denies complaints.    HR 90s-110s, -170s/90s-110s, RR 18-22    H/H 10.5/32.9 (normocytic); Mg 1.2, Ph 1.6, K 3.4, Ca2+ 7.9 (corrected); EtOH 358     no imaging obtained in ED    s/p librium 50mg PO, tylenol 650mg PO, folic acid 1mg PO, haldol 5mg IM, ativan 8mg IV,  (22 Oct 2024 23:28)    Hospital Course:  Treated for withdrawal symptoms. Initially with elevated CIWA. Given ativan taper with improvement in sx. CIWA 0-1 since 10/24 AM. Last drink >76 hours prior. Pt with persistent sinus tachycardia and elevated pressures, likely with component of chronic anxiety. On day of discharge, patient without HA, n/v, or tremors. Stable for discharge with PCP follow up.     Important Medication Changes and Reason:  None    Active or Pending Issues Requiring Follow-up:  HTN-PCP follow up    Advanced Directives:   [x ] Full code  [ ] DNR  [ ] Hospice    Discharge Diagnoses:  ETOH withdrawal

## 2024-10-25 NOTE — DISCHARGE NOTE PROVIDER - ATTENDING DISCHARGE PHYSICAL EXAMINATION:
CONSTITUTIONAL: NAD  EYES: EOMI  ENMT: Moist oral mucosa  RESPIRATORY: Normal respiratory effort; lungs are clear to auscultation bilaterally  CARDIOVASCULAR: tachycardic, regular rhythm, no murmurs, no LE edema  ABDOMEN: Nontender to palpation, normoactive bowel sounds, no rebound/guarding  MUSCULOSKELETAL: no joint swelling or tenderness to palpation  PSYCH: A+O to person, place, and time; affect appropriate  NEUROLOGY: sensation and strength grossly intact  SKIN: No rashes; no palpable lesions

## 2024-10-25 NOTE — DISCHARGE NOTE NURSING/CASE MANAGEMENT/SOCIAL WORK - FINANCIAL ASSISTANCE
[facility] provides services at a reduced cost to those who are determined to be eligible through [facility]’s financial assistance program. Information regarding [facility]’s financial assistance program can be found by going to [email or web address] or by calling [phone].

## 2024-10-25 NOTE — DISCHARGE NOTE PROVIDER - NSFOLLOWUPCLINICS_GEN_ALL_ED_FT
[facility] General Internal Medicine  General Internal Medicine  [address] [place] [address]  Phone: [phone]  Fax:

## 2024-10-25 NOTE — DISCHARGE NOTE PROVIDER - NSDCCPCAREPLAN_GEN_ALL_CORE_FT
PRINCIPAL DISCHARGE DIAGNOSIS  Diagnosis: Alcohol intoxication  Assessment and Plan of Treatment: Please abstein from alcohol  Follow up with your primary care provider

## 2024-11-11 NOTE — ED PROVIDER NOTE - INTERNATIONAL TRAVEL
Pt is unable to provide a stool sample at this time   
Pt reports N/V/D x5 days ever since having chicken from [employer]. Is unable to keep fluids down including pedialyte and has only a small amount of food. Also has a migraine every night starting at 7 pm.   Has taken ibuprofen for migraine.   
No

## 2024-12-19 NOTE — BEHAVIORAL HEALTH ASSESSMENT NOTE - NSBHMEDSOTHERFT_PSY_A_CORE
Your provider has ordered a radiology test for you; please call our pre service department to schedule at your earliest convenience [phone].   When calling pre service we will work with you to find a date and time that works best for your test leaving enough time to ensure insurance authorization is in place prior to your arrival.  If at any time you have questions about this process, please call pre service at the number above.    Once MRI has been completed you will be called by our office with results and next step in treatment. Any further appointment will be scheduled at this time.     
Home Medications:  Ambien 10 mg oral tablet: 1 tab(s) orally once a day (at bedtime) (24 Jun 2020 11:40)  losartan 50 mg oral tablet: 1 tab(s) orally once a day (24 Jun 2020 11:40)  Multiple Vitamins oral tablet: 1 tab(s) orally once a day (24 Jun 2020 11:40)  Xanax 1 mg oral tablet: 1 tab(s) orally 2 times a day, As Needed (24 Jun 2020 11:40)

## 2024-12-22 NOTE — H&P ADULT - PROBLEM SELECTOR PROBLEM 1
Amber por venir a nuestro Departamento de Emergencias hoy. Es importante recordar que algunos problemas son difíciles de diagnosticar y es posible que no se detecten rosa lopez primera visita. Asegúrese de hacer un seguimiento con lopez médico de atención primaria.  Si no tiene maritza, puede comunicarse con el que figura en lopez documentación de carla o también puede llamar a la [facility] al [phone] para programar kasia katelyn con maritza.    Regrese a la chelsea de emergencias con cualquier pregunta / inquietud, síntomas nuevos / preocupantes, empeoramiento o falta de mejora. No conduzca ni tome decisiones importantes rosa 24 horas si ha recibido analgésicos, sedantes o fármacos que alteran el estado de ánimo rosa lopez visita a la chelsea de emergencias.    
Alcohol withdrawal syndrome without complication

## 2025-01-04 NOTE — PRE-OP CHECKLIST - NS PREOP CHK HIBICLENS NA
If your baby has more ear pulling or left ear pain you can consider getting the antibiotics.     You can give ibuprofen every 6 hours if your child is greater than 6 months of age or tylenol every 4-6 hours for pain or fever. Please continue to provide your child with a lot of fluids/ hydration.   Come back to the emergency department or see your pediatrician if a fever lasts longer than 3 days, has significantly less urine output, has difficulty breathing, appears more tired than usual, worsening of symptoms, or any other concerns         
N/A

## 2025-01-08 NOTE — H&P ADULT - NSICDXFAMILYHX_GEN_ALL_CORE_FT
There are no Wet Read(s) to document.
FAMILY HISTORY:  Father  Still living? Unknown  Family history of hypertension, Age at diagnosis: Age Unknown    Mother  Still living? Unknown  Family history of hypertension, Age at diagnosis: Age Unknown
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
## 2025-07-17 NOTE — PATIENT PROFILE ADULT - FALL HARM RISK - FALL HARM RISK
Time for this encouter was due to examining patient, coordination of care, complex decision making, discussing with consultants, communication with patient and family and staff. Other

## 2025-07-22 NOTE — ED ADULT NURSE NOTE - NS PRO AD NO ADVANCE DIRECTIVE
Zofran 4 mg every 8 hours, put under your tongue and it melts away.   Slow introduction of oral hydration.   [place] diet when tolerated. Apple sauce, white rice, banana, toast.   Stool studies ordered, pursue them if you are still symptomatic after 1 week.   Follow up if needed.    No

## 2025-07-29 NOTE — PATIENT PROFILE ADULT - ARE SIGNIFICANT INDICATORS COMPLETE.
7/29/2025      Regarding:    Rona Ryan  [address]  [place] [facility] [address]  1956      To Whom It May Concern:    Rona Ryan was seen in the clinic for an appointment today.  Because of her underlying medical conditions, I have recommended she use a motorized scooter to help her get around more comfortably and safely.    If you have any questions, please feel free to contact my office with the number provided below.      Sincerely,        Dimitry Lee MD, FAAFP  [facility]  [address] [place]  [address]  Office:  [phone]    
Yes

## (undated) DEVICE — BUR STRYKER OVAL CARBIDE 4MM

## (undated) DEVICE — DRILL BIT KLS MARTIN TWIST 1.5 X 50 5MM STOP

## (undated) DEVICE — DRSG STERISTRIPS 0.5 X 4"

## (undated) DEVICE — Device

## (undated) DEVICE — PACK DENTAL MINOR

## (undated) DEVICE — BLADE SURGICAL #15 CARBON

## (undated) DEVICE — VENODYNE/SCD SLEEVE CALF MEDIUM

## (undated) DEVICE — DRAPE INSTRUMENT POUCH 6.75" X 11"

## (undated) DEVICE — ELCTR COLORADO 3CM

## (undated) DEVICE — DRAPE SURGICAL #1010

## (undated) DEVICE — SAW BLADE STRYKER RECIPROCATING 22.5MMX0.38MM

## (undated) DEVICE — TWIST DRILL 1.5MM DIA X 50MM W/NOTCH SGL USE ONLY

## (undated) DEVICE — DRAPE TOWEL BLUE 17" X 24"

## (undated) DEVICE — SUT VICRYL 4-0 27" RB-1 UNDYED

## (undated) DEVICE — POSITIONER FOAM EGG CRATE ULNAR 2PCS (PINK)

## (undated) DEVICE — LABELS BLANK W PEN

## (undated) DEVICE — SPONGE PEANUT AUTO COUNT

## (undated) DEVICE — LIJ-ORAL SURGERY SCISSOR TRAY: Type: DURABLE MEDICAL EQUIPMENT

## (undated) DEVICE — DRAPE APERTURE

## (undated) DEVICE — PROTECTOR CORNEAL BLUE ADULT

## (undated) DEVICE — GLV 8 PROTEXIS (WHITE)

## (undated) DEVICE — SUT MONOCRYL 5-0 18" P-1 UNDYED

## (undated) DEVICE — ELCTR GROUNDING PAD ADULT COVIDIEN

## (undated) DEVICE — SUT SILK 4-0 24" RB-1

## (undated) DEVICE — PREP BETADINE SPONGE STICKS

## (undated) DEVICE — WARMING BLANKET LOWER ADULT

## (undated) DEVICE — SUT PLAIN GUT FAST ABSORBING 5-0 PC-1

## (undated) DEVICE — GLV 7.5 PROTEXIS (WHITE)

## (undated) DEVICE — BIPOLAR FORCEP CORD WECK STANDARD 12FT

## (undated) DEVICE — DRAPE 3/4 SHEET 52X76"

## (undated) DEVICE — COTTONBALL LG

## (undated) DEVICE — SUT MONOCRYL 5-0 18" P-3 UNDYED

## (undated) DEVICE — LIJ-TMJ OPEN TRAY: Type: DURABLE MEDICAL EQUIPMENT

## (undated) DEVICE — DRAPE UROLOGICAL SHEET #1071

## (undated) DEVICE — SOL IRR POUR NS 0.9% 500ML

## (undated) DEVICE — APPLICATOR COTTON TIP 6" STERLE

## (undated) DEVICE — BIPOLAR FORCEP KIRWAN JEWELERS STR 4" X 0.4MM W 12FT CORD (GREEN)